# Patient Record
Sex: FEMALE | Race: WHITE | NOT HISPANIC OR LATINO | Employment: UNEMPLOYED | URBAN - METROPOLITAN AREA
[De-identification: names, ages, dates, MRNs, and addresses within clinical notes are randomized per-mention and may not be internally consistent; named-entity substitution may affect disease eponyms.]

---

## 2018-07-24 ENCOUNTER — APPOINTMENT (OUTPATIENT)
Dept: LAB | Facility: MEDICAL CENTER | Age: 63
End: 2018-07-24
Payer: COMMERCIAL

## 2018-07-24 ENCOUNTER — TRANSCRIBE ORDERS (OUTPATIENT)
Dept: LAB | Facility: MEDICAL CENTER | Age: 63
End: 2018-07-24

## 2018-07-24 DIAGNOSIS — E08.9 DIABETES MELLITUS DUE TO UNDERLYING CONDITION WITHOUT COMPLICATION, WITHOUT LONG-TERM CURRENT USE OF INSULIN (HCC): ICD-10-CM

## 2018-07-24 DIAGNOSIS — Z00.00 WELLNESS EXAMINATION: ICD-10-CM

## 2018-07-24 DIAGNOSIS — E08.9 DIABETES MELLITUS DUE TO UNDERLYING CONDITION WITHOUT COMPLICATION, WITHOUT LONG-TERM CURRENT USE OF INSULIN (HCC): Primary | ICD-10-CM

## 2018-07-24 LAB
CHOLEST SERPL-MCNC: 213 MG/DL (ref 50–200)
GLUCOSE P FAST SERPL-MCNC: 169 MG/DL (ref 65–99)
HDLC SERPL-MCNC: 44 MG/DL (ref 40–60)
LDLC SERPL CALC-MCNC: 141 MG/DL (ref 0–100)
NONHDLC SERPL-MCNC: 169 MG/DL
TRIGL SERPL-MCNC: 140 MG/DL

## 2018-07-24 PROCEDURE — 36415 COLL VENOUS BLD VENIPUNCTURE: CPT

## 2018-07-24 PROCEDURE — 80061 LIPID PANEL: CPT

## 2021-06-02 ENCOUNTER — TRANSCRIBE ORDERS (OUTPATIENT)
Dept: LAB | Facility: MEDICAL CENTER | Age: 66
End: 2021-06-02

## 2021-06-02 ENCOUNTER — APPOINTMENT (OUTPATIENT)
Dept: LAB | Facility: MEDICAL CENTER | Age: 66
End: 2021-06-02
Payer: COMMERCIAL

## 2021-06-02 DIAGNOSIS — E55.9 VITAMIN D DEFICIENCY: ICD-10-CM

## 2021-06-02 DIAGNOSIS — E11.69 TYPE 2 DIABETES MELLITUS WITH OTHER SPECIFIED COMPLICATION, WITH LONG-TERM CURRENT USE OF INSULIN (HCC): ICD-10-CM

## 2021-06-02 DIAGNOSIS — Z79.4 TYPE 2 DIABETES MELLITUS WITH OTHER SPECIFIED COMPLICATION, WITH LONG-TERM CURRENT USE OF INSULIN (HCC): ICD-10-CM

## 2021-06-02 DIAGNOSIS — I10 HYPERTENSION, UNSPECIFIED TYPE: ICD-10-CM

## 2021-06-02 DIAGNOSIS — E55.9 VITAMIN D DEFICIENCY: Primary | ICD-10-CM

## 2021-06-02 LAB
25(OH)D3 SERPL-MCNC: 9.4 NG/ML (ref 30–100)
ALBUMIN SERPL BCP-MCNC: 4.3 G/DL (ref 3.5–5)
ALP SERPL-CCNC: 107 U/L (ref 46–116)
ALT SERPL W P-5'-P-CCNC: 27 U/L (ref 12–78)
ANION GAP SERPL CALCULATED.3IONS-SCNC: 3 MMOL/L (ref 4–13)
AST SERPL W P-5'-P-CCNC: 16 U/L (ref 5–45)
BILIRUB SERPL-MCNC: 0.51 MG/DL (ref 0.2–1)
BUN SERPL-MCNC: 17 MG/DL (ref 5–25)
CALCIUM SERPL-MCNC: 10.7 MG/DL (ref 8.3–10.1)
CHLORIDE SERPL-SCNC: 103 MMOL/L (ref 100–108)
CHOLEST SERPL-MCNC: 241 MG/DL (ref 50–200)
CO2 SERPL-SCNC: 30 MMOL/L (ref 21–32)
CREAT SERPL-MCNC: 0.74 MG/DL (ref 0.6–1.3)
CREAT UR-MCNC: 63.2 MG/DL
ERYTHROCYTE [DISTWIDTH] IN BLOOD BY AUTOMATED COUNT: 12.8 % (ref 11.6–15.1)
EST. AVERAGE GLUCOSE BLD GHB EST-MCNC: 220 MG/DL
GFR SERPL CREATININE-BSD FRML MDRD: 86 ML/MIN/1.73SQ M
GLUCOSE P FAST SERPL-MCNC: 232 MG/DL (ref 65–99)
HBA1C MFR BLD: 9.3 %
HCT VFR BLD AUTO: 43.4 % (ref 34.8–46.1)
HDLC SERPL-MCNC: 44 MG/DL
HGB BLD-MCNC: 14.5 G/DL (ref 11.5–15.4)
LDLC SERPL CALC-MCNC: 156 MG/DL (ref 0–100)
MCH RBC QN AUTO: 30.3 PG (ref 26.8–34.3)
MCHC RBC AUTO-ENTMCNC: 33.4 G/DL (ref 31.4–37.4)
MCV RBC AUTO: 91 FL (ref 82–98)
MICROALBUMIN UR-MCNC: 12 MG/L (ref 0–20)
MICROALBUMIN/CREAT 24H UR: 19 MG/G CREATININE (ref 0–30)
NONHDLC SERPL-MCNC: 197 MG/DL
PLATELET # BLD AUTO: 294 THOUSANDS/UL (ref 149–390)
PMV BLD AUTO: 11.1 FL (ref 8.9–12.7)
POTASSIUM SERPL-SCNC: 4.5 MMOL/L (ref 3.5–5.3)
PROT SERPL-MCNC: 8.2 G/DL (ref 6.4–8.2)
RBC # BLD AUTO: 4.79 MILLION/UL (ref 3.81–5.12)
SODIUM SERPL-SCNC: 136 MMOL/L (ref 136–145)
T4 FREE SERPL-MCNC: 0.78 NG/DL (ref 0.76–1.46)
TRIGL SERPL-MCNC: 204 MG/DL
TSH SERPL DL<=0.05 MIU/L-ACNC: 1.58 UIU/ML (ref 0.36–3.74)
WBC # BLD AUTO: 8.48 THOUSAND/UL (ref 4.31–10.16)

## 2021-06-02 PROCEDURE — 84439 ASSAY OF FREE THYROXINE: CPT

## 2021-06-02 PROCEDURE — 82306 VITAMIN D 25 HYDROXY: CPT

## 2021-06-02 PROCEDURE — 83036 HEMOGLOBIN GLYCOSYLATED A1C: CPT

## 2021-06-02 PROCEDURE — 80061 LIPID PANEL: CPT

## 2021-06-02 PROCEDURE — 85027 COMPLETE CBC AUTOMATED: CPT

## 2021-06-02 PROCEDURE — 36415 COLL VENOUS BLD VENIPUNCTURE: CPT

## 2021-06-02 PROCEDURE — 80053 COMPREHEN METABOLIC PANEL: CPT

## 2021-06-02 PROCEDURE — 82043 UR ALBUMIN QUANTITATIVE: CPT

## 2021-06-02 PROCEDURE — 84443 ASSAY THYROID STIM HORMONE: CPT

## 2021-06-02 PROCEDURE — 82570 ASSAY OF URINE CREATININE: CPT

## 2022-05-25 ENCOUNTER — APPOINTMENT (OUTPATIENT)
Dept: LAB | Facility: MEDICAL CENTER | Age: 67
End: 2022-05-25
Payer: COMMERCIAL

## 2022-05-25 DIAGNOSIS — N39.0 URINARY TRACT INFECTION WITHOUT HEMATURIA, SITE UNSPECIFIED: ICD-10-CM

## 2022-05-25 PROCEDURE — 87077 CULTURE AEROBIC IDENTIFY: CPT

## 2022-05-25 PROCEDURE — 87086 URINE CULTURE/COLONY COUNT: CPT

## 2022-05-25 PROCEDURE — 87186 SC STD MICRODIL/AGAR DIL: CPT

## 2022-05-28 LAB — BACTERIA UR CULT: ABNORMAL

## 2023-06-05 ENCOUNTER — HOSPITAL ENCOUNTER (INPATIENT)
Facility: HOSPITAL | Age: 68
LOS: 3 days | Discharge: HOME | DRG: 387 | End: 2023-06-08
Attending: FAMILY MEDICINE | Admitting: INTERNAL MEDICINE
Payer: COMMERCIAL

## 2023-06-05 ENCOUNTER — APPOINTMENT (EMERGENCY)
Dept: CT IMAGING | Facility: HOSPITAL | Age: 68
DRG: 387 | End: 2023-06-05
Payer: COMMERCIAL

## 2023-06-05 DIAGNOSIS — K51.00 PANCOLITIS (HCC): ICD-10-CM

## 2023-06-05 DIAGNOSIS — E11.65 TYPE 2 DIABETES MELLITUS WITH HYPERGLYCEMIA, WITHOUT LONG-TERM CURRENT USE OF INSULIN (HCC): ICD-10-CM

## 2023-06-05 DIAGNOSIS — K51.90 ULCERATIVE COLITIS (HCC): ICD-10-CM

## 2023-06-05 DIAGNOSIS — K52.9 COLITIS: ICD-10-CM

## 2023-06-05 DIAGNOSIS — R10.9 ABDOMINAL PAIN: Primary | ICD-10-CM

## 2023-06-05 DIAGNOSIS — I95.9 HYPOTENSION: ICD-10-CM

## 2023-06-05 DIAGNOSIS — R79.82 ELEVATED C-REACTIVE PROTEIN (CRP): ICD-10-CM

## 2023-06-05 PROBLEM — K82.8 BILIARY DYSKINESIA: Status: ACTIVE | Noted: 2023-06-05

## 2023-06-05 PROBLEM — E11.9 DMII (DIABETES MELLITUS, TYPE 2) (HCC): Status: ACTIVE | Noted: 2023-06-05

## 2023-06-05 LAB
ABO GROUP BLD: NORMAL
ABO GROUP BLD: NORMAL
ALBUMIN SERPL BCP-MCNC: 3.5 G/DL (ref 3.5–5)
ALP SERPL-CCNC: 56 U/L (ref 34–104)
ALT SERPL W P-5'-P-CCNC: 9 U/L (ref 7–52)
ANION GAP SERPL CALCULATED.3IONS-SCNC: 11 MMOL/L (ref 4–13)
APTT PPP: 29 SECONDS (ref 23–37)
AST SERPL W P-5'-P-CCNC: 13 U/L (ref 13–39)
BASOPHILS # BLD AUTO: 0.04 THOUSANDS/ÂΜL (ref 0–0.1)
BASOPHILS NFR BLD AUTO: 0 % (ref 0–1)
BILIRUB SERPL-MCNC: 0.69 MG/DL (ref 0.2–1)
BILIRUB UR QL STRIP: NEGATIVE
BLD GP AB SCN SERPL QL: NEGATIVE
BUN SERPL-MCNC: 10 MG/DL (ref 5–25)
CALCIUM SERPL-MCNC: 9.8 MG/DL (ref 8.4–10.2)
CHLORIDE SERPL-SCNC: 96 MMOL/L (ref 96–108)
CLARITY UR: CLEAR
CO2 SERPL-SCNC: 28 MMOL/L (ref 21–32)
COLOR UR: YELLOW
CREAT SERPL-MCNC: 0.79 MG/DL (ref 0.6–1.3)
CRP SERPL QL: 192.7 MG/L
EOSINOPHIL # BLD AUTO: 0.03 THOUSAND/ÂΜL (ref 0–0.61)
EOSINOPHIL NFR BLD AUTO: 0 % (ref 0–6)
ERYTHROCYTE [DISTWIDTH] IN BLOOD BY AUTOMATED COUNT: 12.8 % (ref 11.6–15.1)
FLUAV RNA RESP QL NAA+PROBE: NEGATIVE
FLUBV RNA RESP QL NAA+PROBE: NEGATIVE
GFR SERPL CREATININE-BSD FRML MDRD: 78 ML/MIN/1.73SQ M
GLUCOSE SERPL-MCNC: 118 MG/DL (ref 65–140)
GLUCOSE SERPL-MCNC: 144 MG/DL (ref 65–140)
GLUCOSE SERPL-MCNC: 153 MG/DL (ref 65–140)
GLUCOSE UR STRIP-MCNC: NEGATIVE MG/DL
HCT VFR BLD AUTO: 34.5 % (ref 34.8–46.1)
HGB BLD-MCNC: 11.4 G/DL (ref 11.5–15.4)
HGB UR QL STRIP.AUTO: NEGATIVE
IMM GRANULOCYTES # BLD AUTO: 0.07 THOUSAND/UL (ref 0–0.2)
IMM GRANULOCYTES NFR BLD AUTO: 1 % (ref 0–2)
INR PPP: 1.18 (ref 0.84–1.19)
KETONES UR STRIP-MCNC: ABNORMAL MG/DL
LACTATE SERPL-SCNC: 1.1 MMOL/L (ref 0.5–2)
LEUKOCYTE ESTERASE UR QL STRIP: NEGATIVE
LIPASE SERPL-CCNC: 15 U/L (ref 11–82)
LYMPHOCYTES # BLD AUTO: 1.38 THOUSANDS/ÂΜL (ref 0.6–4.47)
LYMPHOCYTES NFR BLD AUTO: 13 % (ref 14–44)
MCH RBC QN AUTO: 29.3 PG (ref 26.8–34.3)
MCHC RBC AUTO-ENTMCNC: 33 G/DL (ref 31.4–37.4)
MCV RBC AUTO: 89 FL (ref 82–98)
MONOCYTES # BLD AUTO: 1.44 THOUSAND/ÂΜL (ref 0.17–1.22)
MONOCYTES NFR BLD AUTO: 14 % (ref 4–12)
NEUTROPHILS # BLD AUTO: 7.74 THOUSANDS/ÂΜL (ref 1.85–7.62)
NEUTS SEG NFR BLD AUTO: 72 % (ref 43–75)
NITRITE UR QL STRIP: NEGATIVE
NRBC BLD AUTO-RTO: 0 /100 WBCS
PH UR STRIP.AUTO: 5.5 [PH]
PLATELET # BLD AUTO: 573 THOUSANDS/UL (ref 149–390)
PMV BLD AUTO: 9.5 FL (ref 8.9–12.7)
POTASSIUM SERPL-SCNC: 3.5 MMOL/L (ref 3.5–5.3)
PROCALCITONIN SERPL-MCNC: 0.32 NG/ML
PROT SERPL-MCNC: 7 G/DL (ref 6.4–8.4)
PROT UR STRIP-MCNC: NEGATIVE MG/DL
PROTHROMBIN TIME: 15 SECONDS (ref 11.6–14.5)
RBC # BLD AUTO: 3.89 MILLION/UL (ref 3.81–5.12)
RH BLD: POSITIVE
RH BLD: POSITIVE
RSV RNA RESP QL NAA+PROBE: NEGATIVE
SARS-COV-2 RNA RESP QL NAA+PROBE: NEGATIVE
SODIUM SERPL-SCNC: 135 MMOL/L (ref 135–147)
SP GR UR STRIP.AUTO: <=1.005
SPECIMEN EXPIRATION DATE: NORMAL
UROBILINOGEN UR QL STRIP.AUTO: 0.2 E.U./DL
WBC # BLD AUTO: 10.7 THOUSAND/UL (ref 4.31–10.16)

## 2023-06-05 PROCEDURE — 96361 HYDRATE IV INFUSION ADD-ON: CPT

## 2023-06-05 PROCEDURE — 87040 BLOOD CULTURE FOR BACTERIA: CPT | Performed by: FAMILY MEDICINE

## 2023-06-05 PROCEDURE — 87493 C DIFF AMPLIFIED PROBE: CPT | Performed by: INTERNAL MEDICINE

## 2023-06-05 PROCEDURE — G1004 CDSM NDSC: HCPCS

## 2023-06-05 PROCEDURE — 85730 THROMBOPLASTIN TIME PARTIAL: CPT | Performed by: FAMILY MEDICINE

## 2023-06-05 PROCEDURE — 99285 EMERGENCY DEPT VISIT HI MDM: CPT

## 2023-06-05 PROCEDURE — 71275 CT ANGIOGRAPHY CHEST: CPT

## 2023-06-05 PROCEDURE — 99223 1ST HOSP IP/OBS HIGH 75: CPT | Performed by: INTERNAL MEDICINE

## 2023-06-05 PROCEDURE — 93005 ELECTROCARDIOGRAM TRACING: CPT

## 2023-06-05 PROCEDURE — 86901 BLOOD TYPING SEROLOGIC RH(D): CPT | Performed by: FAMILY MEDICINE

## 2023-06-05 PROCEDURE — 86850 RBC ANTIBODY SCREEN: CPT | Performed by: FAMILY MEDICINE

## 2023-06-05 PROCEDURE — 83690 ASSAY OF LIPASE: CPT | Performed by: FAMILY MEDICINE

## 2023-06-05 PROCEDURE — 87505 NFCT AGENT DETECTION GI: CPT | Performed by: INTERNAL MEDICINE

## 2023-06-05 PROCEDURE — 86140 C-REACTIVE PROTEIN: CPT | Performed by: INTERNAL MEDICINE

## 2023-06-05 PROCEDURE — 86900 BLOOD TYPING SEROLOGIC ABO: CPT | Performed by: FAMILY MEDICINE

## 2023-06-05 PROCEDURE — 96365 THER/PROPH/DIAG IV INF INIT: CPT

## 2023-06-05 PROCEDURE — 83605 ASSAY OF LACTIC ACID: CPT | Performed by: FAMILY MEDICINE

## 2023-06-05 PROCEDURE — 74174 CTA ABD&PLVS W/CONTRAST: CPT

## 2023-06-05 PROCEDURE — 84145 PROCALCITONIN (PCT): CPT | Performed by: FAMILY MEDICINE

## 2023-06-05 PROCEDURE — 82948 REAGENT STRIP/BLOOD GLUCOSE: CPT

## 2023-06-05 PROCEDURE — 85610 PROTHROMBIN TIME: CPT | Performed by: FAMILY MEDICINE

## 2023-06-05 PROCEDURE — 80053 COMPREHEN METABOLIC PANEL: CPT | Performed by: FAMILY MEDICINE

## 2023-06-05 PROCEDURE — 85025 COMPLETE CBC W/AUTO DIFF WBC: CPT | Performed by: FAMILY MEDICINE

## 2023-06-05 PROCEDURE — 81003 URINALYSIS AUTO W/O SCOPE: CPT | Performed by: FAMILY MEDICINE

## 2023-06-05 PROCEDURE — 36415 COLL VENOUS BLD VENIPUNCTURE: CPT | Performed by: FAMILY MEDICINE

## 2023-06-05 PROCEDURE — 0241U HB NFCT DS VIR RESP RNA 4 TRGT: CPT | Performed by: FAMILY MEDICINE

## 2023-06-05 RX ORDER — CEFEPIME HYDROCHLORIDE 2 G/50ML
2000 INJECTION, SOLUTION INTRAVENOUS ONCE
Status: COMPLETED | OUTPATIENT
Start: 2023-06-05 | End: 2023-06-05

## 2023-06-05 RX ORDER — SODIUM CHLORIDE 9 MG/ML
100 INJECTION, SOLUTION INTRAVENOUS CONTINUOUS
Status: DISCONTINUED | OUTPATIENT
Start: 2023-06-05 | End: 2023-06-06

## 2023-06-05 RX ORDER — METRONIDAZOLE 500 MG/1
500 TABLET ORAL ONCE
Status: COMPLETED | OUTPATIENT
Start: 2023-06-05 | End: 2023-06-05

## 2023-06-05 RX ORDER — METRONIDAZOLE 500 MG/100ML
500 INJECTION, SOLUTION INTRAVENOUS EVERY 8 HOURS
Status: DISCONTINUED | OUTPATIENT
Start: 2023-06-05 | End: 2023-06-05

## 2023-06-05 RX ORDER — ACETAMINOPHEN 325 MG/1
650 TABLET ORAL EVERY 6 HOURS PRN
Status: DISCONTINUED | OUTPATIENT
Start: 2023-06-05 | End: 2023-06-08 | Stop reason: HOSPADM

## 2023-06-05 RX ORDER — INSULIN LISPRO 100 [IU]/ML
1-5 INJECTION, SOLUTION INTRAVENOUS; SUBCUTANEOUS
Status: DISCONTINUED | OUTPATIENT
Start: 2023-06-05 | End: 2023-06-08 | Stop reason: HOSPADM

## 2023-06-05 RX ORDER — ENOXAPARIN SODIUM 100 MG/ML
40 INJECTION SUBCUTANEOUS DAILY
Status: DISCONTINUED | OUTPATIENT
Start: 2023-06-05 | End: 2023-06-08 | Stop reason: HOSPADM

## 2023-06-05 RX ORDER — ONDANSETRON 2 MG/ML
4 INJECTION INTRAMUSCULAR; INTRAVENOUS ONCE
Status: DISCONTINUED | OUTPATIENT
Start: 2023-06-05 | End: 2023-06-08 | Stop reason: HOSPADM

## 2023-06-05 RX ORDER — ONDANSETRON 2 MG/ML
4 INJECTION INTRAMUSCULAR; INTRAVENOUS EVERY 6 HOURS PRN
Status: DISCONTINUED | OUTPATIENT
Start: 2023-06-05 | End: 2023-06-08 | Stop reason: HOSPADM

## 2023-06-05 RX ORDER — CEFTRIAXONE 1 G/50ML
1000 INJECTION, SOLUTION INTRAVENOUS EVERY 24 HOURS
Status: DISCONTINUED | OUTPATIENT
Start: 2023-06-06 | End: 2023-06-05

## 2023-06-05 RX ADMIN — CEFEPIME HYDROCHLORIDE 2000 MG: 2 INJECTION, SOLUTION INTRAVENOUS at 11:32

## 2023-06-05 RX ADMIN — IOHEXOL 100 ML: 350 INJECTION, SOLUTION INTRAVENOUS at 11:21

## 2023-06-05 RX ADMIN — SODIUM CHLORIDE 1000 ML: 0.9 INJECTION, SOLUTION INTRAVENOUS at 12:39

## 2023-06-05 RX ADMIN — METRONIDAZOLE 500 MG: 500 TABLET ORAL at 13:38

## 2023-06-05 RX ADMIN — SODIUM CHLORIDE 1000 ML: 0.9 INJECTION, SOLUTION INTRAVENOUS at 11:14

## 2023-06-05 RX ADMIN — SODIUM CHLORIDE 100 ML/HR: 9 INJECTION, SOLUTION INTRAVENOUS at 14:02

## 2023-06-05 NOTE — H&P
"[address]  H&P  Name: Vadim Ridley 78 y o  female I MRN: [id number]  Unit/Bed#: ED 13 I Date of Admission: 6/5/2023   Date of Service: 6/5/2023 I Hospital Day: 0      Assessment/Plan   * Colitis  Assessment & Plan  · Presented for 2-week history of abdominal discomfort, bloating, N/V, and intermittent diarrhea  · CTA C/A/P (6/5): Pancolitis, presumably due to infection versus inflammatory bowel disease  Stable small liver hemangiomas  · Check ESR/CRP  · Check stool cultures  · Begin Rocephin and Flagyl  · GI consultation requested    Hypotension  Assessment & Plan  · One episode of hypotension on arrival will no further episodes  · CTA dissection protocol was performed and showed no acute aortic syndrome  · Continue IVF resuscitation     History of biliary dyskinesia  Assessment & Plan  · Noted in 2016  · No LFT elevation or GB disease noted on imaging as above    DMII (diabetes mellitus, type 2) (HCC)  Assessment & Plan  Lab Results   Component Value Date    HGBA1C 9 3 (H) 06/02/2021       No results for input(s): \"POCGLU\" in the last 72 hours  Blood Sugar Average: Last 72 hrs:     · Update Hgb a1c; maintained on Metformin at home  · Begin corrective sliding scale insulin, accuchecks, and hypoglycemic protocol  · Currently on clear liquid diet       VTE Pharmacologic Prophylaxis: VTE Score: 4 Moderate Risk (Score 3-4) - Pharmacological DVT Prophylaxis Ordered: enoxaparin (Lovenox)  Code Status: Level 1 - Full Code   Discussion with family: Updated  () at bedside  Anticipated Length of Stay: Patient will be admitted on an inpatient basis with an anticipated length of stay of greater than 2 midnights secondary to colitis       Total Time Spent on Date of Encounter in care of patient: 75 minutes This time was spent on one or more of the following: performing physical exam; counseling and coordination of care; obtaining or reviewing history; documenting in the " medical record; reviewing/ordering tests, medications or procedures; communicating with other healthcare professionals and discussing with patient's family/caregivers  Chief Complaint: Abdominal Pain     History of Present Illness:  Adela Mcginnis is a 78 y o  female with a PMH of DMII and biliary dyskinesia who presents with the complaint of abdominal pain  She reports 2-week history of diffuse abdominal discomfort, worse in the right upper quadrant, with associated nausea, vomiting, bloating, intermittent diarrhea  She notes a history of respiratory illness approximately 1 month ago that she subsequently recovered from but unfortunately has since developed the above GI symptoms  Patient notes remote history of reduced biliary EF on HIDA scan and was told that her gallbladder may potentially need amount in the future  This was an 2016 and since that time the patient has had follow-up right upper quadrant ultrasound and CT abdomen and pelvis showed no significant gallbladder disease but she has not had repeat HIDA  She became concerned that her respiratory illness triggered 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 and therefore prompted presentation  She denies any fevers or chills but has reported poor oral intake and weight loss because of this over the last 2 weeks  Upon arrival to the emergency department patient was initially hypotensive and in combination with her abdominal symptoms there was concern for dissection  CTA chest/abdomen/pelvis with dissection protocol was performed negative for acute aortic syndrome  Imaging however did and straight pancolitis concerning for either infectious or inflammatory bowel disease  The patient was started on IV fluid resuscitation and improvement in her blood pressure and IV antibiotics and ultimately admitted to the medical floor for further observation and management  Review of Systems:  Review of Systems   Constitutional: Negative for chills and fever     HENT: Negative for ear pain, sinus pressure and sore throat  Eyes: Negative for pain and visual disturbance  Respiratory: Negative for cough, shortness of breath and wheezing  Cardiovascular: Negative for chest pain and palpitations  Gastrointestinal: Positive for abdominal pain, diarrhea, nausea and vomiting  Negative for constipation  Genitourinary: Negative for dysuria and hematuria  Musculoskeletal: Negative for arthralgias and back pain  Skin: Negative for color change and rash  Neurological: Positive for dizziness  Negative for seizures, syncope and weakness  Psychiatric/Behavioral: Negative for agitation, confusion and hallucinations  All other systems reviewed and are negative  Past Medical and Surgical History:   History reviewed  No pertinent past medical history  Past Surgical History:   Procedure Laterality Date   • HYSTERECTOMY      ~1990       Meds/Allergies:  Prior to Admission medications    Medication Sig Start Date End Date Taking? Authorizing Provider   metFORMIN (GLUCOPHAGE) 500 mg tablet Take 500 mg by mouth 2 (two) times a day with meals PT UNSURE OF DOSE   Yes Historical Provider, MD     I have reviewed home medications with patient personally  Allergies: No Known Allergies    Social History:  Marital Status: /Civil Union   Patient Pre-hospital Living Situation: Home, With spouse  Patient Pre-hospital Level of Mobility: walks  Patient Pre-hospital Diet Restrictions: Diabetic    Substance Use History:   Social History     Substance and Sexual Activity   Alcohol Use Never     Social History     Tobacco Use   Smoking Status Never   Smokeless Tobacco Never     Social History     Substance and Sexual Activity   Drug Use Never       Family History:  History reviewed  No pertinent family history      Physical Exam:     Vitals:   Blood Pressure: 143/67 (06/05/23 1400)  Pulse: 92 (06/05/23 1400)  Temperature: (!) 97 2 °F (36 2 °C) (06/05/23 1046)  Temp Source: Tympanic "(06/05/23 1046)  Respirations: 18 (06/05/23 1046)  Height: 5' 7\" (170 2 cm) (06/05/23 1046)  Weight - Scale: 72 6 kg (160 lb) (06/05/23 1046)  SpO2: 95 % (06/05/23 1230)    Physical Exam  Vitals and nursing note reviewed  Constitutional:       General: She is not in acute distress  Appearance: Normal appearance  She is well-developed  HENT:      Head: Normocephalic and atraumatic  Mouth/Throat:      Mouth: Mucous membranes are moist       Pharynx: Oropharynx is clear  Eyes:      Extraocular Movements: Extraocular movements intact  Conjunctiva/sclera: Conjunctivae normal    Cardiovascular:      Rate and Rhythm: Normal rate and regular rhythm  Pulses: Normal pulses  Heart sounds: Murmur heard  Pulmonary:      Effort: Pulmonary effort is normal  No respiratory distress  Breath sounds: Normal breath sounds  No wheezing  Abdominal:      General: Bowel sounds are normal  There is no distension  Palpations: Abdomen is soft  Tenderness: There is no abdominal tenderness  There is no guarding or rebound  Musculoskeletal:         General: No swelling  Normal range of motion  Cervical back: Normal range of motion and neck supple  Skin:     General: Skin is warm and dry  Capillary Refill: Capillary refill takes less than 2 seconds  Neurological:      General: No focal deficit present  Mental Status: She is alert and oriented to person, place, and time  Mental status is at baseline     Psychiatric:         Mood and Affect: Mood normal          Behavior: Behavior normal          Judgment: Judgment normal           Additional Data:     Lab Results:  Results from last 7 days   Lab Units 06/05/23  1102   EOS PCT % 0   HEMATOCRIT % 34 5*   HEMOGLOBIN g/dL 11 4*   LYMPHS PCT % 13*   MONOS PCT % 14*   NEUTROS PCT % 72   PLATELETS Thousands/uL 573*   WBC Thousand/uL 10 70*     Results from last 7 days   Lab Units 06/05/23  1102   ANION GAP mmol/L 11   ALBUMIN g/dL 3 5 " ALK PHOS U/L 56   ALT U/L 9   AST U/L 13   BUN mg/dL 10   CALCIUM mg/dL 9 8   CHLORIDE mmol/L 96   CO2 mmol/L 28   CREATININE mg/dL 0 79   GLUCOSE RANDOM mg/dL 144*   POTASSIUM mmol/L 3 5   SODIUM mmol/L 135   TOTAL BILIRUBIN mg/dL 0 69     Results from last 7 days   Lab Units 06/05/23  1102   INR  1 18     Results from last 7 days   Lab Units 06/05/23  1541   POC GLUCOSE mg/dl 118         Results from last 7 days   Lab Units 06/05/23  1102   LACTIC ACID mmol/L 1 1   PROCALCITONIN ng/ml 0 32*       Lines/Drains:  Invasive Devices     Peripheral Intravenous Line  Duration           Peripheral IV 06/05/23 Right Antecubital <1 day                    Imaging: Reviewed radiology reports from this admission including: chest CT scan and abdominal/pelvic CT  CTA dissection protocol chest/abdomen/pelvis   Final Result by Vickie Ornelas MD (06/05 1306)      No acute aortic syndrome  Pancolitis, presumably due to infection versus inflammatory bowel disease  Stable small liver hemangiomas  Workstation performed: [id number]             EKG and Other Studies Reviewed on Admission:   · EKG: No EKG obtained  ** Please Note: This note has been constructed using a voice recognition system   **

## 2023-06-05 NOTE — CASE MANAGEMENT
Case Management Assessment & Discharge Planning Note    Patient name Bibi Chen  Location ED 15/ED 15 MRN [id number]  : 1955 Date 2023       Current Admission Date: 2023  Current Admission Diagnosis:Colitis   Patient Active Problem List    Diagnosis Date Noted   • Colitis 2023   • Hypotension 2023   • History of biliary dyskinesia 2023   • DMII (diabetes mellitus, type 2) ([facility] [address] ) 2023      LOS (days): 0  Geometric Mean LOS (GMLOS) (days):   Days to GMLOS:     OBJECTIVE:       Current admission status: Inpatient    [address]    [address]  [place] [address] [place] [address]  Phone: [phone] Fax: [phone]    Primary Care Provider: Cala Pallas, MD    Primary Insurance: [address] [place] [address]  Secondary Insurance:     ASSESSMENT:  Rosita Albrecht Proxies    There are no active Health Care Proxies on file  Readmission Root Cause  30 Day Readmission: No    Patient Information  Admitted from[de-identified] Home  Mental Status: Alert  During Assessment patient was accompanied by: Spouse  Assessment information provided by[de-identified] Patient, Spouse  Primary Caregiver: Self  Support Systems: [facility] of Residence: [address] [place] [address] Avenue do you live in?: [facility] [address]:    Discharge planning discussed with[de-identified] Patient and spouse  Freedom of Choice: Yes     CM contacted family/caregiver?: Yes  Were Treatment Team discharge recommendations reviewed with patient/caregiver?: Yes  Did patient/caregiver verbalize understanding of patient care needs?: Yes  Were patient/caregiver advised of the risks associated with not following Treatment Team discharge recommendations?: Yes    Contacts  Patient Contacts: Luke Perry (Spouse)   [phone] (Home Phone)  Relationship to Patient[de-identified] Family  Contact Method: In Person  Reason/Outcome: Emergency Contact     Additional Comments: Met with patient and spouse at bedside  Patient refuses to answer any CM questions  Did review demographics  Patient states we are private people and I don't feel comfortable or feel it is necessary to answer CM questions  Explained CM department will follow thru discharge for any discharge needs

## 2023-06-05 NOTE — ASSESSMENT & PLAN NOTE
"Lab Results   Component Value Date    HGBA1C 9 3 (H) 06/02/2021       No results for input(s): \"POCGLU\" in the last 72 hours      Blood Sugar Average: Last 72 hrs:     · Update Hgb a1c; maintained on Metformin at home  · Begin corrective sliding scale insulin, accuchecks, and hypoglycemic protocol  · Currently on clear liquid diet  "

## 2023-06-05 NOTE — CONSULTS
Consultation - [facility]  Orlando Low 78 y o  female MRN: [id number]  Unit/Bed#: ED 15 Encounter: [id number]        Consults    Reason for Consult / Principal Problem:     GI symptoms      ASSESSMENT AND PLAN:      78year old with uncontrolled DM with HbA1c over 9 presented with acute prolonged GI illness  Likely viral or bacterial in nature  They tell me she was told she had a UTI  I see ketones in the urine but without evidence of UTI  She is currently on IVF  Agree with these  Pt and  state they do not want further antibiotics given risk of it causing another infection which is not unreasonable given she is not toxic appearing however I did tell them we would readdress tomorrow given her uncontrolled DM she is at risk for worse outcomes  CT reviewed  GI will follow  I advanced her diet to soft foods should be dairy free  I discussed plan of care with primary team and ER RN     ______________________________________________________________________    HPI:  78year old female with nausea,vomiting, abdominal pain for 2 weeks  Also with DM with HbA1c of 9  Currently refusing insulin therapy to me  She and her  report she had a URI like symptoms a few weeks ago  No recent travel  Now- without BM since being in the ER  CT with colitis  REVIEW OF SYSTEMS:    CONSTITUTIONAL: Denies any fever, chills, rigors, and weight loss  HEENT: No earache or tinnitus  Denies hearing loss or visual disturbances  CARDIOVASCULAR: No chest pain or palpitations  RESPIRATORY: Denies any cough, hemoptysis, shortness of breath or dyspnea on exertion  GASTROINTESTINAL: As noted in the History of Present Illness  GENITOURINARY: No problems with urination  Denies any hematuria or dysuria  NEUROLOGIC: No dizziness or vertigo, denies headaches  MUSCULOSKELETAL: Denies any muscle or joint pain  SKIN: Denies skin rashes or itching     ENDOCRINE: Denies excessive thirst  Denies "intolerance to heat or cold  PSYCHOSOCIAL: Denies depression or anxiety  Denies any recent memory loss  Historical Information   History reviewed  No pertinent past medical history  Past Surgical History:   Procedure Laterality Date   • HYSTERECTOMY      ~1990     Social History   Social History     Substance and Sexual Activity   Alcohol Use Never     Social History     Substance and Sexual Activity   Drug Use Never     Social History     Tobacco Use   Smoking Status Never   Smokeless Tobacco Never     History reviewed  No pertinent family history  Meds/Allergies     (Not in a hospital admission)    Current Facility-Administered Medications   Medication Dose Route Frequency   • acetaminophen (TYLENOL) tablet 650 mg  650 mg Oral Q6H PRN   • enoxaparin (LOVENOX) subcutaneous injection 40 mg  40 mg Subcutaneous Daily   • insulin lispro (HumaLOG) 100 units/mL subcutaneous injection 1-5 Units  1-5 Units Subcutaneous TID AC   • insulin lispro (HumaLOG) 100 units/mL subcutaneous injection 1-5 Units  1-5 Units Subcutaneous HS   • ondansetron (ZOFRAN) injection 4 mg  4 mg Intravenous Once   • ondansetron (ZOFRAN) injection 4 mg  4 mg Intravenous Q6H PRN   • sodium chloride 0 9 % infusion  100 mL/hr Intravenous Continuous       No Known Allergies        Objective     Blood pressure 122/57, pulse 88, temperature (!) 97 2 °F (36 2 °C), temperature source Tympanic, resp  rate 18, height 5' 7\" (1 702 m), weight 72 6 kg (160 lb), SpO2 95 %  Body mass index is 25 06 kg/m²  No intake or output data in the 24 hours ending 06/05/23 1644      PHYSICAL EXAM:      General Appearance:   Alert, cooperative, no distress   HEENT:   Normocephalic, atraumatic, anicteric      Neck:  Supple, symmetrical, trachea midline   Lungs:   Clear to auscultation bilaterally; no rales, rhonchi or wheezing; respirations unlabored    Heart[de-identified]   Regular rate and rhythm; no murmur, rub, or gallop     Abdomen:   Soft, non-tender, non-distended; " normal bowel sounds; no masses, no organomegaly    Genitalia:   Deferred    Rectal:   Deferred    Extremities:  No cyanosis, clubbing or edema    Pulses:  2+ and symmetric all extremities    Skin:  No jaundice, rashes, or lesions    Lymph nodes:  No palpable cervical lymphadenopathy        Lab Results:   Admission on 06/05/2023   Component Date Value   • WBC 06/05/2023 10 70 (H)    • RBC 06/05/2023 3 89    • Hemoglobin 06/05/2023 11 4 (L)    • Hematocrit 06/05/2023 34 5 (L)    • MCV 06/05/2023 89    • MCH 06/05/2023 29 3    • MCHC 06/05/2023 33 0    • RDW 06/05/2023 12 8    • MPV 06/05/2023 9 5    • Platelets 99/88/7922 573 (H)    • nRBC 06/05/2023 0    • Neutrophils Relative 06/05/2023 72    • Immat GRANS % 06/05/2023 1    • Lymphocytes Relative 06/05/2023 13 (L)    • Monocytes Relative 06/05/2023 14 (H)    • Eosinophils Relative 06/05/2023 0    • Basophils Relative 06/05/2023 0    • Neutrophils Absolute 06/05/2023 7 74 (H)    • Immature Grans Absolute 06/05/2023 0 07    • Lymphocytes Absolute 06/05/2023 1 38    • Monocytes Absolute 06/05/2023 1 44 (H)    • Eosinophils Absolute 06/05/2023 0 03    • Basophils Absolute 06/05/2023 0 04    • Sodium 06/05/2023 135    • Potassium 06/05/2023 3 5    • Chloride 06/05/2023 96    • CO2 06/05/2023 28    • ANION GAP 06/05/2023 11    • BUN 06/05/2023 10    • Creatinine 06/05/2023 0 79    • Glucose 06/05/2023 144 (H)    • Calcium 06/05/2023 9 8    • AST 06/05/2023 13    • ALT 06/05/2023 9    • Alkaline Phosphatase 06/05/2023 56    • Total Protein 06/05/2023 7 0    • Albumin 06/05/2023 3 5    • Total Bilirubin 06/05/2023 0 69    • eGFR 06/05/2023 78    • LACTIC ACID 06/05/2023 1 1    • Procalcitonin 06/05/2023 0 32 (H)    • Protime 06/05/2023 15 0 (H)    • INR 06/05/2023 1 18    • PTT 06/05/2023 29    • Color, UA 06/05/2023 Yellow    • Clarity, UA 06/05/2023 Clear    • Specific Gravity, UA 06/05/2023 <=1 005 (L)    • pH, UA 06/05/2023 5 5    • Leukocytes, UA 06/05/2023 Negative • Nitrite, UA 06/05/2023 Negative    • Protein, UA 06/05/2023 Negative    • Glucose, UA 06/05/2023 Negative    • Ketones, UA 06/05/2023 15 (1+) (A)    • Urobilinogen, UA 06/05/2023 0 2    • Bilirubin, UA 06/05/2023 Negative    • Occult Blood, UA 06/05/2023 Negative    • Lipase 06/05/2023 15    • ABO Grouping 06/05/2023 O    • Rh Factor 06/05/2023 Positive    • Antibody Screen 06/05/2023 Negative    • Specimen Expiration Date 06/05/2023 [id number]    • SARS-CoV-2 06/05/2023 Negative    • INFLUENZA A PCR 06/05/2023 Negative    • INFLUENZA B PCR 06/05/2023 Negative    • RSV PCR 06/05/2023 Negative    • Ventricular Rate 06/05/2023 96    • Atrial Rate 06/05/2023 96    • DE Interval 06/05/2023 174    • QRSD Interval 06/05/2023 128    • QT Interval 06/05/2023 382    • QTC Interval 06/05/2023 482    • P Axis 06/05/2023 55    • QRS Axis 06/05/2023 -9    • T Wave Axis 06/05/2023 111    • ABO Grouping 06/05/2023 O    • Rh Factor 06/05/2023 Positive    • POC Glucose 06/05/2023 118        Imaging Studies: I have personally reviewed pertinent imaging studies

## 2023-06-05 NOTE — ED PROVIDER NOTES
"History  Chief Complaint   Patient presents with   • Abdominal Pain     Pt presents to ER from home for reports of diffuse abd pain x2 weeks  Reports mild nausea and diarrhea, denies vomitting  Decreased PO intake stating \"I feel so bloated that I'm just not hungry  \"     HPI  73-year-old female presented with complaint of abdominal pain ongoing for past 2 weeks  Pain is a diffuse states that had some nausea decreasing p o  intake  Patient states \"I just do not feel good and I feel so bloated \"  Rating her pain 8 out of 10  Patient was found to be hypotensive in the ED was started on IV fluid sepsis labs were obtained  Denies any chest pain or shortness of breath  States pain is a sharp  Prior to Admission Medications   Prescriptions Last Dose Informant Patient Reported? Taking?   metFORMIN (GLUCOPHAGE) 500 mg tablet   Yes Yes   Sig: Take 500 mg by mouth 2 (two) times a day with meals PT UNSURE OF DOSE      Facility-Administered Medications: None       History reviewed  No pertinent past medical history  Past Surgical History:   Procedure Laterality Date   • HYSTERECTOMY      ~1990       History reviewed  No pertinent family history  I have reviewed and agree with the history as documented  E-Cigarette/Vaping     E-Cigarette/Vaping Substances     Social History     Tobacco Use   • Smoking status: Never   • Smokeless tobacco: Never   Substance Use Topics   • Alcohol use: Never   • Drug use: Never       Review of Systems   Constitutional: Negative for chills and fever  HENT: Negative for rhinorrhea and sore throat  Eyes: Negative for visual disturbance  Respiratory: Negative for cough and shortness of breath  Cardiovascular: Negative for chest pain and leg swelling  Gastrointestinal: Positive for abdominal pain, nausea and vomiting  Negative for diarrhea  Genitourinary: Negative for dysuria  Musculoskeletal: Negative for back pain and myalgias  Skin: Negative for rash     Neurological: " Positive for weakness  Negative for dizziness and headaches  Psychiatric/Behavioral: Negative for confusion  All other systems reviewed and are negative  Physical Exam  Physical Exam  Vitals and nursing note reviewed  Constitutional:       Appearance: She is well-developed  HENT:      Head: Normocephalic and atraumatic  Right Ear: External ear normal       Left Ear: External ear normal       Nose: Nose normal       Mouth/Throat:      Mouth: Mucous membranes are moist       Pharynx: No oropharyngeal exudate  Eyes:      General: No scleral icterus  Right eye: No discharge  Left eye: No discharge  Conjunctiva/sclera: Conjunctivae normal       Pupils: Pupils are equal, round, and reactive to light  Cardiovascular:      Rate and Rhythm: Normal rate and regular rhythm  Pulses: Normal pulses  Heart sounds: Normal heart sounds  Pulmonary:      Effort: Pulmonary effort is normal  No respiratory distress  Breath sounds: Normal breath sounds  No wheezing  Abdominal:      General: Bowel sounds are normal       Palpations: Abdomen is soft  Tenderness: There is generalized abdominal tenderness  Musculoskeletal:         General: Normal range of motion  Cervical back: Normal range of motion and neck supple  Lymphadenopathy:      Cervical: No cervical adenopathy  Skin:     General: Skin is warm and dry  Capillary Refill: Capillary refill takes less than 2 seconds  Neurological:      General: No focal deficit present  Mental Status: She is alert and oriented to person, place, and time     Psychiatric:         Mood and Affect: Mood normal          Behavior: Behavior normal          Vital Signs  ED Triage Vitals [06/05/23 1046]   Temperature Pulse Respirations Blood Pressure SpO2   (!) 97 2 °F (36 2 °C) (!) 106 18 (!) 78/54 94 %      Temp Source Heart Rate Source Patient Position - Orthostatic VS BP Location FiO2 (%)   Tympanic Monitor Sitting Left arm --      Pain Score       8           Vitals:    06/05/23 1230 06/05/23 1300 06/05/23 1315 06/05/23 1330   BP: 120/58 141/63 130/62 121/55   Pulse: 96 92 90 89   Patient Position - Orthostatic VS:             Visual Acuity      ED Medications  Medications   ondansetron (ZOFRAN) injection 4 mg (4 mg Intravenous Not Given 6/5/23 1114)   sodium chloride 0 9 % bolus 1,000 mL (0 mL Intravenous Stopped 6/5/23 1238)     Followed by   sodium chloride 0 9 % bolus 1,000 mL (0 mL Intravenous Stopped 6/5/23 1339)   cefepime (MAXIPIME) IVPB (premix in dextrose) 2,000 mg 50 mL (0 mg Intravenous Stopped 6/5/23 1238)   iohexol (OMNIPAQUE) 350 MG/ML injection (SINGLE-DOSE) 100 mL (100 mL Intravenous Given 6/5/23 1121)   metroNIDAZOLE (FLAGYL) tablet 500 mg (500 mg Oral Given 6/5/23 1338)       Diagnostic Studies  Results Reviewed     Procedure Component Value Units Date/Time    UA w Reflex to Microscopic w Reflex to Culture [[id number]]  (Abnormal) Collected: 06/05/23 1256    Lab Status: Final result Specimen: Urine, Clean Catch Updated: 06/05/23 1306     Color, UA Yellow     Clarity, UA Clear     Specific Gravity, UA <=1 005     pH, UA 5 5     Leukocytes, UA Negative     Nitrite, UA Negative     Protein, UA Negative mg/dl      Glucose, UA Negative mg/dl      Ketones, UA 15 (1+) mg/dl      Urobilinogen, UA 0 2 E U /dl      Bilirubin, UA Negative     Occult Blood, UA Negative    FLU/RSV/COVID - if FLU/RSV clinically relevant [[id number]]  (Normal) Collected: 06/05/23 1102    Lab Status: Final result Specimen: Nares from Nose Updated: 06/05/23 1159     SARS-CoV-2 Negative     INFLUENZA A PCR Negative     INFLUENZA B PCR Negative     RSV PCR Negative    Narrative:      FOR PEDIATRIC PATIENTS - copy/paste COVID Guidelines URL to browser: [email or web address]    SARS-CoV-2 assay is a Nucleic Acid Amplification assay intended for the  qualitative detection of nucleic acid from SARS-CoV-2 in nasopharyngeal  swabs  Results are for the presumptive identification of SARS-CoV-2 RNA  Positive results are indicative of infection with SARS-CoV-2, the virus  causing COVID-19, but do not rule out bacterial infection or co-infection  with other viruses  Laboratories within the [facility] and its  territories are required to report all positive results to the appropriate  public health authorities  Negative results do not preclude SARS-CoV-2  infection and should not be used as the sole basis for treatment or other  patient management decisions  Negative results must be combined with  clinical observations, patient history, and epidemiological information  This test has not been FDA cleared or approved  This test has been authorized by FDA under an Emergency Use Authorization  (EUA)  This test is only authorized for the duration of time the  declaration that circumstances exist justifying the authorization of the  emergency use of an in vitro diagnostic tests for detection of SARS-CoV-2  virus and/or diagnosis of COVID-19 infection under section 564(b)(1) of  the Act, 21 U  S C  382CDN-8(R)(7), unless the authorization is terminated  or revoked sooner  The test has been validated but independent review by FDA  and CLIA is pending  Test performed using [employer] GeneXpert: This RT-PCR assay targets N2,  a region unique to SARS-CoV-2  A conserved region in the E-gene was chosen  for pan-Sarbecovirus detection which includes SARS-CoV-2  According to CMS-2020-01-R, this platform meets the definition of high-throughput technology  Lactic acid [[id number]]  (Normal) Collected: 06/05/23 1102    Lab Status: Final result Specimen: Blood from Arm, Right Updated: 06/05/23 1154     LACTIC ACID 1 1 mmol/L     Narrative:      Result may be elevated if tourniquet was used during collection      Lipase [[id number]]  (Normal) Collected: 06/05/23 1102    Lab Status: Final result Specimen: Blood from Arm, Right Updated: 06/05/23 1153     Lipase 15 u/L     Comprehensive metabolic panel [[id number]]  (Abnormal) Collected: 06/05/23 1102    Lab Status: Final result Specimen: Blood from Arm, Right Updated: 06/05/23 1153     Sodium 135 mmol/L      Potassium 3 5 mmol/L      Chloride 96 mmol/L      CO2 28 mmol/L      ANION GAP 11 mmol/L      BUN 10 mg/dL      Creatinine 0 79 mg/dL      Glucose 144 mg/dL      Calcium 9 8 mg/dL      AST 13 U/L      ALT 9 U/L      Alkaline Phosphatase 56 U/L      Total Protein 7 0 g/dL      Albumin 3 5 g/dL      Total Bilirubin 0 69 mg/dL      eGFR 78 ml/min/1 73sq m     Narrative:      [place] guidelines for Chronic Kidney Disease (CKD):   •  Stage 1 with normal or high GFR (GFR > 90 mL/min/1 73 square meters)  •  Stage 2 Mild CKD (GFR = 60-89 mL/min/1 73 square meters)  •  Stage 3A Moderate CKD (GFR = 45-59 mL/min/1 73 square meters)  •  Stage 3B Moderate CKD (GFR = 30-44 mL/min/1 73 square meters)  •  Stage 4 Severe CKD (GFR = 15-29 mL/min/1 73 square meters)  •  Stage 5 End Stage CKD (GFR <15 mL/min/1 73 square meters)  Note: GFR calculation is accurate only with a steady state creatinine    Procalcitonin [[id number]]  (Abnormal) Collected: 06/05/23 1102    Lab Status: Final result Specimen: Blood from Arm, Right Updated: 06/05/23 1148     Procalcitonin 0 32 ng/ml     Protime-INR [[id number]]  (Abnormal) Collected: 06/05/23 1102    Lab Status: Final result Specimen: Blood from Arm, Right Updated: 06/05/23 1134     Protime 15 0 seconds      INR 1 18    APTT [[id number]]  (Normal) Collected: 06/05/23 1102    Lab Status: Final result Specimen: Blood from Arm, Right Updated: 06/05/23 1134     PTT 29 seconds     CBC and differential [[id number]]  (Abnormal) Collected: 06/05/23 1102    Lab Status: Final result Specimen: Blood from Arm, Right Updated: 06/05/23 1117     WBC 10 70 Thousand/uL      RBC 3 89 Million/uL      Hemoglobin 11 4 g/dL      Hematocrit 34 5 %      MCV 89 fL      MCH 29 3 pg      MCHC 33 0 g/dL      RDW 12 8 %      MPV 9 5 fL      Platelets 138 Thousands/uL      nRBC 0 /100 WBCs      Neutrophils Relative 72 %      Immat GRANS % 1 %      Lymphocytes Relative 13 %      Monocytes Relative 14 %      Eosinophils Relative 0 %      Basophils Relative 0 %      Neutrophils Absolute 7 74 Thousands/µL      Immature Grans Absolute 0 07 Thousand/uL      Lymphocytes Absolute 1 38 Thousands/µL      Monocytes Absolute 1 44 Thousand/µL      Eosinophils Absolute 0 03 Thousand/µL      Basophils Absolute 0 04 Thousands/µL     Blood culture #2 [[id number]] Collected: 06/05/23 1102    Lab Status: In process Specimen: Blood from Arm, Right Updated: 06/05/23 1113    Blood culture #1 [[id number]] Collected: 06/05/23 1110    Lab Status: In process Specimen: Blood from Arm, Left Updated: 06/05/23 1113                 CTA dissection protocol chest/abdomen/pelvis   Final Result by Modesta Zendejas MD (06/05 1306)      No acute aortic syndrome  Pancolitis, presumably due to infection versus inflammatory bowel disease  Stable small liver hemangiomas           Workstation performed: [id number]                    Procedures  CriticalCare Time    Date/Time: 6/5/2023 1:47 PM    Performed by: Ninoska Bingham MD  Authorized by: Ninoska Bingham MD    Critical care provider statement:     Critical care time (minutes):  50    Critical care start time:  6/5/2023 11:00 AM    Critical care end time:  6/5/2023 1:47 PM    Critical care time was exclusive of:  Separately billable procedures and treating other patients    Critical care was necessary to treat or prevent imminent or life-threatening deterioration of the following conditions:  Sepsis and dehydration    Critical care was time spent personally by me on the following activities:  Blood draw for specimens, obtaining history from patient or surrogate, development of treatment plan with patient or surrogate, discussions with primary provider, evaluation of patient's response to treatment, examination of patient, review of old charts, re-evaluation of patient's condition, ordering and review of radiographic studies, ordering and review of laboratory studies, interpretation of cardiac output measurements and ordering and performing treatments and interventions    I assumed direction of critical care for this patient from another provider in my specialty: no               ED Course  ED Course as of 06/05/23 1348   Mon Jun 05, 2023   1308 No acute aortic syndrome      Pancolitis, presumably due to infection versus inflammatory bowel disease      Stable small liver hemangiomas  [address] [place] [address]-Hosp-Admit/Call [facility]  [facility] inpatient  1:39 PM  20 days left                               SBIRT 22yo+    Flowsheet Row Most Recent Value   Initial Alcohol Screen: US AUDIT-C     1  How often do you have a drink containing alcohol? 0 Filed at: 06/05/2023 1050   2  How many drinks containing alcohol do you have on a typical day you are drinking? 0 Filed at: 06/05/2023 1050   3b  FEMALE Any Age, or MALE 65+: How often do you have 4 or more drinks on one occassion? 0 Filed at: 06/05/2023 1050   Audit-C Score 0 Filed at: 06/05/2023 1050   ISA: How many times in the past year have you    Used an illegal drug or used a prescription medication for non-medical reasons? Never Filed at: 06/05/2023 1050                    Medical Decision Making  50-year-old female presented to ED with complaint of abdominal pain  History is taken from patient  Patient is found to be hypotensive will obtain labs and a CT abdomen pelvis  Differential diagnoses include AAA/dissection/peritonitis/appendicitis/small bowel obstruction/cholecystitis/malignancy  Patient was found to be hypotensive and started on IV fluid  Blood pressure improved  We will continue to resuscitate  Labs within normal limits  As CT shows a pancolitis patient started on antibiotics  Discussed with patient agrees with the plan  Disposition: Case was discussed with internal medicine team accepted the admission for inpatient  Amount and/or Complexity of Data Reviewed  Labs: ordered  Radiology: ordered  Risk  Prescription drug management  Decision regarding hospitalization  Disposition  Final diagnoses:   Abdominal pain   Pancolitis ([facility] [address] )   Hypotension     Time reflects when diagnosis was documented in both MDM as applicable and the Disposition within this note     Time User Action Codes Description Comment    6/5/2023  1:44 PM Cyntha [place] [R10 9] Abdominal pain     6/5/2023  1:44 PM Presley Escoto Add [K51 00] Pancolitis ([facility] [address] )     6/5/2023  1:45 PM Paola Jeter Add [I95 [id number]] Hypotension       ED Disposition     ED Disposition   Admit    Condition   Stable    Date/Time   Mon Jun 5, 2023  1:44 PM    Comment   Case was discussed with Dr Catherine and the patient's admission status was agreed to be Admission Status: inpatient status to the service of Dr George Lao   Follow-up Information    None         Patient's Medications   Discharge Prescriptions    No medications on file       No discharge procedures on file      PDMP Review     None          ED Provider  Electronically Signed by           Crissy Yip MD  06/05/23 6497

## 2023-06-05 NOTE — ASSESSMENT & PLAN NOTE
· One episode of hypotension on arrival will no further episodes  · CTA dissection protocol was performed and showed no acute aortic syndrome  · Continue IVF resuscitation

## 2023-06-06 PROBLEM — E87.8 ELECTROLYTE ABNORMALITY: Status: ACTIVE | Noted: 2023-06-06

## 2023-06-06 LAB
ALBUMIN SERPL BCP-MCNC: 3 G/DL (ref 3.5–5)
ALP SERPL-CCNC: 48 U/L (ref 34–104)
ALT SERPL W P-5'-P-CCNC: 7 U/L (ref 7–52)
ANION GAP SERPL CALCULATED.3IONS-SCNC: 12 MMOL/L (ref 4–13)
AST SERPL W P-5'-P-CCNC: 11 U/L (ref 13–39)
ATRIAL RATE: 96 BPM
BILIRUB SERPL-MCNC: 0.54 MG/DL (ref 0.2–1)
BUN SERPL-MCNC: 6 MG/DL (ref 5–25)
C DIFF TOX GENS STL QL NAA+PROBE: NEGATIVE
CALCIUM ALBUM COR SERPL-MCNC: 9.1 MG/DL (ref 8.3–10.1)
CALCIUM SERPL-MCNC: 8.3 MG/DL (ref 8.4–10.2)
CAMPYLOBACTER DNA SPEC NAA+PROBE: NORMAL
CHLORIDE SERPL-SCNC: 103 MMOL/L (ref 96–108)
CO2 SERPL-SCNC: 24 MMOL/L (ref 21–32)
CREAT SERPL-MCNC: 0.62 MG/DL (ref 0.6–1.3)
CRP SERPL QL: 145.6 MG/L
ERYTHROCYTE [DISTWIDTH] IN BLOOD BY AUTOMATED COUNT: 12.9 % (ref 11.6–15.1)
ERYTHROCYTE [SEDIMENTATION RATE] IN BLOOD: 61 MM/HOUR (ref 0–29)
EST. AVERAGE GLUCOSE BLD GHB EST-MCNC: 148 MG/DL
GFR SERPL CREATININE-BSD FRML MDRD: 94 ML/MIN/1.73SQ M
GLUCOSE SERPL-MCNC: 134 MG/DL (ref 65–140)
GLUCOSE SERPL-MCNC: 143 MG/DL (ref 65–140)
GLUCOSE SERPL-MCNC: 153 MG/DL (ref 65–140)
GLUCOSE SERPL-MCNC: 157 MG/DL (ref 65–140)
HBA1C MFR BLD: 6.8 %
HCT VFR BLD AUTO: 30.7 % (ref 34.8–46.1)
HGB BLD-MCNC: 9.9 G/DL (ref 11.5–15.4)
MAGNESIUM SERPL-MCNC: 1.1 MG/DL (ref 1.9–2.7)
MCH RBC QN AUTO: 28.8 PG (ref 26.8–34.3)
MCHC RBC AUTO-ENTMCNC: 32.2 G/DL (ref 31.4–37.4)
MCV RBC AUTO: 89 FL (ref 82–98)
P AXIS: 55 DEGREES
PLATELET # BLD AUTO: 482 THOUSANDS/UL (ref 149–390)
PMV BLD AUTO: 9.4 FL (ref 8.9–12.7)
POTASSIUM SERPL-SCNC: 2.7 MMOL/L (ref 3.5–5.3)
POTASSIUM SERPL-SCNC: 3.4 MMOL/L (ref 3.5–5.3)
PR INTERVAL: 174 MS
PROT SERPL-MCNC: 6.2 G/DL (ref 6.4–8.4)
QRS AXIS: -9 DEGREES
QRSD INTERVAL: 128 MS
QT INTERVAL: 382 MS
QTC INTERVAL: 482 MS
RBC # BLD AUTO: 3.44 MILLION/UL (ref 3.81–5.12)
SALMONELLA DNA SPEC QL NAA+PROBE: NORMAL
SHIGA TOXIN STX GENE SPEC NAA+PROBE: NORMAL
SHIGELLA DNA SPEC QL NAA+PROBE: NORMAL
SODIUM SERPL-SCNC: 139 MMOL/L (ref 135–147)
T WAVE AXIS: 111 DEGREES
VENTRICULAR RATE: 96 BPM
WBC # BLD AUTO: 8.07 THOUSAND/UL (ref 4.31–10.16)

## 2023-06-06 PROCEDURE — 85652 RBC SED RATE AUTOMATED: CPT | Performed by: INTERNAL MEDICINE

## 2023-06-06 PROCEDURE — 93010 ELECTROCARDIOGRAM REPORT: CPT | Performed by: INTERNAL MEDICINE

## 2023-06-06 PROCEDURE — 84132 ASSAY OF SERUM POTASSIUM: CPT | Performed by: INTERNAL MEDICINE

## 2023-06-06 PROCEDURE — 85027 COMPLETE CBC AUTOMATED: CPT | Performed by: INTERNAL MEDICINE

## 2023-06-06 PROCEDURE — 80053 COMPREHEN METABOLIC PANEL: CPT | Performed by: INTERNAL MEDICINE

## 2023-06-06 PROCEDURE — 99232 SBSQ HOSP IP/OBS MODERATE 35: CPT | Performed by: PHYSICIAN ASSISTANT

## 2023-06-06 PROCEDURE — 83735 ASSAY OF MAGNESIUM: CPT | Performed by: INTERNAL MEDICINE

## 2023-06-06 PROCEDURE — 83036 HEMOGLOBIN GLYCOSYLATED A1C: CPT | Performed by: INTERNAL MEDICINE

## 2023-06-06 PROCEDURE — 82948 REAGENT STRIP/BLOOD GLUCOSE: CPT

## 2023-06-06 PROCEDURE — 86140 C-REACTIVE PROTEIN: CPT | Performed by: INTERNAL MEDICINE

## 2023-06-06 RX ORDER — POTASSIUM CHLORIDE 20 MEQ/1
20 TABLET, EXTENDED RELEASE ORAL DAILY
Status: DISCONTINUED | OUTPATIENT
Start: 2023-06-06 | End: 2023-06-06

## 2023-06-06 RX ORDER — MAGNESIUM SULFATE HEPTAHYDRATE 40 MG/ML
2 INJECTION, SOLUTION INTRAVENOUS ONCE
Status: COMPLETED | OUTPATIENT
Start: 2023-06-06 | End: 2023-06-06

## 2023-06-06 RX ORDER — POTASSIUM CHLORIDE 14.9 MG/ML
20 INJECTION INTRAVENOUS ONCE
Status: COMPLETED | OUTPATIENT
Start: 2023-06-06 | End: 2023-06-06

## 2023-06-06 RX ORDER — SODIUM CHLORIDE 9 MG/ML
75 INJECTION, SOLUTION INTRAVENOUS CONTINUOUS
Status: DISCONTINUED | OUTPATIENT
Start: 2023-06-06 | End: 2023-06-07

## 2023-06-06 RX ORDER — POTASSIUM CHLORIDE 20 MEQ/1
40 TABLET, EXTENDED RELEASE ORAL
Status: COMPLETED | OUTPATIENT
Start: 2023-06-06 | End: 2023-06-06

## 2023-06-06 RX ORDER — POTASSIUM CHLORIDE 20 MEQ/1
40 TABLET, EXTENDED RELEASE ORAL ONCE
Status: COMPLETED | OUTPATIENT
Start: 2023-06-06 | End: 2023-06-06

## 2023-06-06 RX ADMIN — POTASSIUM CHLORIDE 40 MEQ: 1500 TABLET, EXTENDED RELEASE ORAL at 15:05

## 2023-06-06 RX ADMIN — MAGNESIUM SULFATE HEPTAHYDRATE 2 G: 40 INJECTION, SOLUTION INTRAVENOUS at 08:20

## 2023-06-06 RX ADMIN — SODIUM CHLORIDE 100 ML/HR: 9 INJECTION, SOLUTION INTRAVENOUS at 06:12

## 2023-06-06 RX ADMIN — POTASSIUM CHLORIDE 40 MEQ: 1500 TABLET, EXTENDED RELEASE ORAL at 08:20

## 2023-06-06 RX ADMIN — POTASSIUM CHLORIDE 40 MEQ: 1500 TABLET, EXTENDED RELEASE ORAL at 10:29

## 2023-06-06 RX ADMIN — POTASSIUM CHLORIDE 20 MEQ: 14.9 INJECTION, SOLUTION INTRAVENOUS at 08:00

## 2023-06-06 RX ADMIN — POLYETHYLENE GLYCOL 3350, SODIUM SULFATE ANHYDROUS, SODIUM BICARBONATE, SODIUM CHLORIDE, POTASSIUM CHLORIDE 4000 ML: 236; 22.74; 6.74; 5.86; 2.97 POWDER, FOR SOLUTION ORAL at 17:00

## 2023-06-06 RX ADMIN — SODIUM CHLORIDE 75 ML/HR: 0.9 INJECTION, SOLUTION INTRAVENOUS at 19:56

## 2023-06-06 RX ADMIN — MAGNESIUM SULFATE HEPTAHYDRATE 2 G: 40 INJECTION, SOLUTION INTRAVENOUS at 10:29

## 2023-06-06 NOTE — PLAN OF CARE
Problem: PAIN - ADULT  Goal: Verbalizes/displays adequate comfort level or baseline comfort level  Description: Interventions:  - Encourage patient to monitor pain and request assistance  - Assess pain using appropriate pain scale  - Administer analgesics based on type and severity of pain and evaluate response  - Implement non-pharmacological measures as appropriate and evaluate response  - Consider cultural and social influences on pain and pain management  - Notify physician/advanced practitioner if interventions unsuccessful or patient reports new pain  Outcome: Progressing     Problem: INFECTION - ADULT  Goal: Absence or prevention of progression during hospitalization  Description: INTERVENTIONS:  - Assess and monitor for signs and symptoms of infection  - Monitor lab/diagnostic results  - Monitor all insertion sites, i e  indwelling lines, tubes, and drains  - Monitor endotracheal if appropriate and nasal secretions for changes in amount and color  - [place] appropriate cooling/warming therapies per order  - Administer medications as ordered  - Instruct and encourage patient and family to use good hand hygiene technique  - Identify and instruct in appropriate isolation precautions for identified infection/condition  Outcome: Progressing  Goal: Absence of fever/infection during neutropenic period  Description: INTERVENTIONS:  - Monitor WBC    Outcome: Progressing     Problem: SAFETY ADULT  Goal: Patient will remain free of falls  Description: INTERVENTIONS:  - Educate patient/family on patient safety including physical limitations  - Instruct patient to call for assistance with activity   - Consult OT/PT to assist with strengthening/mobility   - Keep Call bell within reach  - Keep bed low and locked with side rails adjusted as appropriate  - Keep care items and personal belongings within reach  - Initiate and maintain comfort rounds  - Make Fall Risk Sign visible to staff  - Offer Toileting every  Hours, in advance of need  - Initiate/Maintain alarm  - Obtain necessary fall risk management equipment:   - Apply yellow socks and bracelet for high fall risk patients  - Consider moving patient to room near nurses station  Outcome: Progressing  Goal: Maintain or return to baseline ADL function  Description: INTERVENTIONS:  -  Assess patient's ability to carry out ADLs; assess patient's baseline for ADL function and identify physical deficits which impact ability to perform ADLs (bathing, care of mouth/teeth, toileting, grooming, dressing, etc )  - Assess/evaluate cause of self-care deficits   - Assess range of motion  - Assess patient's mobility; develop plan if impaired  - Assess patient's need for assistive devices and provide as appropriate  - Encourage maximum independence but intervene and supervise when necessary  - Involve family in performance of ADLs  - Assess for home care needs following discharge   - Consider OT consult to assist with ADL evaluation and planning for discharge  - Provide patient education as appropriate  Outcome: Progressing  Goal: Maintains/Returns to pre admission functional level  Description: INTERVENTIONS:  - Perform BMAT or MOVE assessment daily    - Set and communicate daily mobility goal to care team and patient/family/caregiver  - Collaborate with rehabilitation services on mobility goals if consulted  - Perform Range of Motion  times a day  - Reposition patient every  hours    - Dangle patient  times a day  - Stand patient  times a day  - Ambulate patient  times a day  - Out of bed to chair  times a day   - Out of bed for meala times a day  - Out of bed for toileting  - Record patient progress and toleration of activity level   Outcome: Progressing

## 2023-06-06 NOTE — PLAN OF CARE
Problem: PAIN - ADULT  Goal: Verbalizes/displays adequate comfort level or baseline comfort level  Description: Interventions:  - Encourage patient to monitor pain and request assistance  - Assess pain using appropriate pain scale  - Administer analgesics based on type and severity of pain and evaluate response  - Implement non-pharmacological measures as appropriate and evaluate response  - Consider cultural and social influences on pain and pain management  - Notify physician/advanced practitioner if interventions unsuccessful or patient reports new pain  Outcome: Progressing     Problem: INFECTION - ADULT  Goal: Absence or prevention of progression during hospitalization  Description: INTERVENTIONS:  - Assess and monitor for signs and symptoms of infection  - Monitor lab/diagnostic results  - Monitor all insertion sites, i e  indwelling lines, tubes, and drains  - Monitor endotracheal if appropriate and nasal secretions for changes in amount and color  - [place] appropriate cooling/warming therapies per order  - Administer medications as ordered  - Instruct and encourage patient and family to use good hand hygiene technique  - Identify and instruct in appropriate isolation precautions for identified infection/condition  Outcome: Progressing  Goal: Absence of fever/infection during neutropenic period  Description: INTERVENTIONS:  - Monitor WBC    Outcome: Progressing     Problem: SAFETY ADULT  Goal: Patient will remain free of falls  Description: INTERVENTIONS:  - Educate patient/family on patient safety including physical limitations  - Instruct patient to call for assistance with activity   - Consult OT/PT to assist with strengthening/mobility   - Keep Call bell within reach  - Keep bed low and locked with side rails adjusted as appropriate  - Keep care items and personal belongings within reach  - Initiate and maintain comfort rounds  - Make Fall Risk Sign visible to staff  - Offer Toileting every 2 Hours, in advance of need  - Initiate/Maintain bedalarm  - Obtain necessary fall risk management equipment:   - Apply yellow socks and bracelet for high fall risk patients  - Consider moving patient to room near nurses station  Outcome: Progressing  Goal: Maintain or return to baseline ADL function  Description: INTERVENTIONS:  -  Assess patient's ability to carry out ADLs; assess patient's baseline for ADL function and identify physical deficits which impact ability to perform ADLs (bathing, care of mouth/teeth, toileting, grooming, dressing, etc )  - Assess/evaluate cause of self-care deficits   - Assess range of motion  - Assess patient's mobility; develop plan if impaired  - Assess patient's need for assistive devices and provide as appropriate  - Encourage maximum independence but intervene and supervise when necessary  - Involve family in performance of ADLs  - Assess for home care needs following discharge   - Consider OT consult to assist with ADL evaluation and planning for discharge  - Provide patient education as appropriate  Outcome: Progressing  Goal: Maintains/Returns to pre admission functional level  Description: INTERVENTIONS:  - Perform BMAT or MOVE assessment daily    - Set and communicate daily mobility goal to care team and patient/family/caregiver  - Collaborate with rehabilitation services on mobility goals if consulted  - Perform Range of Motion 3 times a day  - Reposition patient every 2 hours    - Dangle patient 3 times a day  - Stand patient 3 times a day  - Ambulate patient 3 times a day  - Out of bed to chair 3 times a day   - Out of bed for meals 3 times a day  - Out of bed for toileting  - Record patient progress and toleration of activity level   Outcome: Progressing     Problem: DISCHARGE PLANNING  Goal: Discharge to home or other facility with appropriate resources  Description: INTERVENTIONS:  - Identify barriers to discharge w/patient and caregiver  - Arrange for needed discharge resources and transportation as appropriate  - Identify discharge learning needs (meds, wound care, etc )  - Arrange for interpretive services to assist at discharge as needed  - Refer to Case Management Department for coordinating discharge planning if the patient needs post-hospital services based on physician/advanced practitioner order or complex needs related to functional status, cognitive ability, or social support system  Outcome: Progressing     Problem: Knowledge Deficit  Goal: Patient/family/caregiver demonstrates understanding of disease process, treatment plan, medications, and discharge instructions  Description: Complete learning assessment and assess knowledge base    Interventions:  - Provide teaching at level of understanding  - Provide teaching via preferred learning methods  Outcome: Progressing

## 2023-06-06 NOTE — ASSESSMENT & PLAN NOTE
Lab Results   Component Value Date    HGBA1C 6 8 (H) 06/06/2023       Recent Labs     06/05/23  1541 06/05/23  2116 06/06/23  1140   POCGLU 118 153* 157*       Blood Sugar Average: Last 72 hrs:  (P) 099 [id number]   · Hgb a1c 6 8, maintained on Metformin at home  · Begin corrective sliding scale insulin, accuchecks, and hypoglycemic protocol  · Patient currently refusing ISS  · Clear liquid diet advanced to soft surgical diet

## 2023-06-06 NOTE — ASSESSMENT & PLAN NOTE
· One episode of hypotension on arrival will no further episodes  · CTA dissection protocol was performed and showed no acute aortic syndrome  · Continue IVF resuscitation, will decrease to 75 mL/hr

## 2023-06-06 NOTE — ASSESSMENT & PLAN NOTE
· Presented for 2-week history of abdominal discomfort, bloating, N/V, and intermittent diarrhea  · CTA C/A/P (6/5): Pancolitis, presumably due to infection versus inflammatory bowel disease  Stable small liver hemangiomas     · ESR 61,  6  · Cdiff and enteric stool panel: negative  · Rocephin and Flagyl discontinued 6/5  · GI consultation appreciated- likely prolonged viral gastroenteritis

## 2023-06-06 NOTE — PROGRESS NOTES
[address] [address]  Progress Note  Name: Amy Conley  MRN: [id number]  Unit/Bed#: ICU 12-01 I Date of Admission: 6/5/2023   Date of Service: 6/6/2023 I Hospital Day: 1    Assessment/Plan   * Colitis  Assessment & Plan  · Presented for 2-week history of abdominal discomfort, bloating, N/V, and intermittent diarrhea  · CTA C/A/P (6/5): Pancolitis, presumably due to infection versus inflammatory bowel disease  Stable small liver hemangiomas     · ESR 61,  6  · Cdiff and enteric stool panel: negative  · Rocephin and Flagyl discontinued 6/5  · GI consultation appreciated- likely prolonged viral gastroenteritis     Electrolyte abnormality  Assessment & Plan  · Potassium 2 7/Magnesium 1 1  · Telemetry monitoring  · Patient received 40 mEq IV potassium, 20 mEq of po potassium, and 4 g IV magnesium   · Repeat K 3 4  · Will give an additional 40 mEq of po potassium now  · Repeat mag and potassium in am    DMII (diabetes mellitus, type 2) ([facility] [address] )  Assessment & Plan  Lab Results   Component Value Date    HGBA1C 6 8 (H) 06/06/2023       Recent Labs     06/05/23  1541 06/05/23  2116 06/06/23  1140   POCGLU 118 153* 157*       Blood Sugar Average: Last 72 hrs:  (P) 741 [id number]   · Hgb a1c 6 8, maintained on Metformin at home  · Begin corrective sliding scale insulin, accuchecks, and hypoglycemic protocol  · Patient currently refusing ISS  · Clear liquid diet advanced to soft surgical diet    History of biliary dyskinesia  Assessment & Plan  · Noted in 2016  · No LFT elevation or GB disease noted on imaging as above    Hypotension  Assessment & Plan  · One episode of hypotension on arrival will no further episodes  · CTA dissection protocol was performed and showed no acute aortic syndrome  · Continue IVF resuscitation, will decrease to 75 mL/hr         VTE Pharmacologic Prophylaxis: VTE Score: 4 patient refused    Patient Centered Rounds: I performed bedside rounds with nursing staff today   Discussions with Specialists or Other Care Team Provider: nursing, CM, GI    Education and Discussions with Family / Patient: Updated  () at bedside  Total Time Spent on Date of Encounter in care of patient: 35 minutes This time was spent on one or more of the following: performing physical exam; counseling and coordination of care; obtaining or reviewing history; documenting in the medical record; reviewing/ordering tests, medications or procedures; communicating with other healthcare professionals and discussing with patient's family/caregivers  Current Length of Stay: 1 day(s)  Current Patient Status: Inpatient   Certification Statement: The patient will continue to require additional inpatient hospital stay due to continued monitoring of telemetry, monitoring of labs, IV fluids  Discharge Plan: Anticipate discharge in 24-48 hrs to home  Code Status: Level 1 - Full Code    Subjective: The patient was seen and examined  The patient is lying in bed in no acute distress  She states she continues to have diarrhea  Her  reports about 2 episodes so far today  Objective:     Vitals:   Temp (24hrs), Av 4 °F (36 9 °C), Min:97 8 °F (36 6 °C), Max:99 1 °F (37 3 °C)    Temp:  [97 8 °F (36 6 °C)-99 1 °F (37 3 °C)] 99 1 °F (37 3 °C)  HR:  [67-98] 67  Resp:  [17-20] 20  BP: (113-180)/(56-88) 180/86  SpO2:  [95 %-97 %] 95 %  Body mass index is 26 kg/m²  Input and Output Summary (last 24 hours): Intake/Output Summary (Last 24 hours) at 2023 1422  Last data filed at 2023 0612  Gross per 24 hour   Intake 1000 ml   Output 850 ml   Net 150 ml       Physical Exam:   Physical Exam  Vitals and nursing note reviewed  Constitutional:       General: She is awake  Appearance: Normal appearance  HENT:      Head: Normocephalic and atraumatic  Cardiovascular:      Rate and Rhythm: Normal rate and regular rhythm  Heart sounds: Normal heart sounds  Pulmonary:      Effort: Pulmonary effort is normal       Breath sounds: Normal breath sounds  Abdominal:      Palpations: Abdomen is soft  Tenderness: There is no abdominal tenderness  Skin:     General: Skin is warm and dry  Neurological:      General: No focal deficit present  Mental Status: She is alert and oriented to person, place, and time  Psychiatric:         Attention and Perception: Attention normal          Mood and Affect: Mood normal          Speech: Speech normal          Behavior: Behavior is cooperative            Additional Data:     Labs:  Results from last 7 days   Lab Units 06/06/23  0423 06/05/23  1102   EOS PCT %  --  0   HEMATOCRIT % 30 7* 34 5*   HEMOGLOBIN g/dL 9 9* 11 4*   LYMPHS PCT %  --  13*   MONOS PCT %  --  14*   NEUTROS PCT %  --  72   PLATELETS Thousands/uL 482* 573*   WBC Thousand/uL 8 07 10 70*     Results from last 7 days   Lab Units 06/06/23  1317 06/06/23  0601   ANION GAP mmol/L  --  12   ALBUMIN g/dL  --  3 0*   ALK PHOS U/L  --  48   ALT U/L  --  7   AST U/L  --  11*   BUN mg/dL  --  6   CALCIUM mg/dL  --  8 3*   CHLORIDE mmol/L  --  103   CO2 mmol/L  --  24   CREATININE mg/dL  --  0 62   GLUCOSE RANDOM mg/dL  --  134   POTASSIUM mmol/L 3 4* 2 7*   SODIUM mmol/L  --  139   TOTAL BILIRUBIN mg/dL  --  0 54     Results from last 7 days   Lab Units 06/05/23  1102   INR  1 18     Results from last 7 days   Lab Units 06/06/23  1140 06/05/23  2116 06/05/23  1541   POC GLUCOSE mg/dl 157* 153* 118     Results from last 7 days   Lab Units 06/06/23  0423   HEMOGLOBIN A1C % 6 8*     Results from last 7 days   Lab Units 06/05/23  1102   LACTIC ACID mmol/L 1 1   PROCALCITONIN ng/ml 0 32*       Lines/Drains:  Invasive Devices     Peripheral Intravenous Line  Duration           Peripheral IV 06/06/23 Distal;Left;Ventral (anterior) Forearm <1 day                  Telemetry:  Telemetry Orders (From admission, onward)             24 Hour Telemetry Monitoring  Continuous x 24 Hours (Telem)        Question:  Reason for 24 Hour Telemetry  Answer:  Metabolic/electrolyte disturbance with high probability of dysrhythmia  K level <3 or >6 OR KCL infusion >10mEq/hr                 Telemetry Reviewed: Normal Sinus Rhythm  Indication for Continued Telemetry Use: Metabolic/electrolyte disturbance with high probability of dysrhythmia             Imaging: No pertinent imaging reviewed  Recent Cultures (last 7 days):   Results from last 7 days   Lab Units 06/05/23  1937 06/05/23  1110 06/05/23  1102   BLOOD CULTURE   --  Received in Microbiology Lab  Culture in Progress  Received in Microbiology Lab  Culture in Progress  C DIFF TOXIN B BY PCR  Negative  --   --        Last 24 Hours Medication List:   Current Facility-Administered Medications   Medication Dose Route Frequency Provider Last Rate   • acetaminophen  650 mg Oral Q6H PRN Marcelyn [place], DO     • enoxaparin  40 mg Subcutaneous Daily [facility], DO     • insulin lispro  1-5 Units Subcutaneous TID [facility], DO     • insulin lispro  1-5 Units Subcutaneous HS [facility], DO     • ondansetron  4 mg Intravenous Once Ninoska Bingham MD     • ondansetron  4 mg Intravenous Q6H PRN Marcelyn [place], DO     • potassium chloride  40 mEq Oral Once Rangel Galvez PA-C     • sodium chloride  75 mL/hr Intravenous Continuous Rangel Galvez PA-C          Today, Patient Was Seen By: Rangel Galvez PA-C    **Please Note: This note may have been constructed using a voice recognition system  **

## 2023-06-06 NOTE — PROGRESS NOTES
Progress Note- Chad Graf 78 y o  female MRN: [id number]    Unit/Bed#: ICU 12-01 Encounter: [id number]      Assessment and Plan:    Patient is a 28-year-old female with PMH significant for DM 2 and biliary dyskinesia admitted for persistent abdominal pain with associated nausea/NBNB vomiting and diarrhea x2 weeks found to have pancolitis on imaging  1   Nausea, vomiting and diarrhea  2  Pancolitis  Infectious stool studies including stool enteric bacterial panel and C  difficile collected and in process  BC x2 collected and in process  CRP significantly elevated but down-trending (192 7-145  6)  Antibiotics discontinued at patient's request pending  Clinical picture is most suspicious for persistent viral gastroenteritis/colitis in the setting of uncontrolled diabetes  Recommend to continue supportive care pending stool studies  If stool studies are positive, will treat appropriately  If stool studies are negative, would recommend discussion regarding the utility of an inpatient colonoscopy with biopsy for further evaluation of inflammatory colitis given significantly elevated CRP     - Diet as tolerated; Low residue/lactose restricted  - Continue IV fluid hydration with lyte replacement  - Continue supportive care with antiemetics/analgesics PRN  - Okay to hold abx pending stool studies  - Follow-up on stool studies  - Monitor stool output (frequency/consistency)  - If stool studies are negative, recommend consideration of inpt colonoscopy with biopsy    GI will continue to follow  ______________________________________________________________________    Subjective:     Patient found resting comfortably in bed  No acute overnight events  Patient reports continued diarrhea  Also reports persistent nausea but no further vomiting  Denies fever/chills, abdominal pain or rectal bleeding  Denies any additional GI related complaints        Medication Administration - last 24 hours from 06/05/2023 0839 to 06/06/2023 5596       Date/Time Order Dose Route Action Action by     06/05/2023 1238 EDT sodium chloride 0 9 % bolus 1,000 mL 0 mL Intravenous Stopped [facility], RN     06/05/2023 1114 EDT sodium chloride 0 9 % bolus 1,000 mL 1,000 mL Intravenous [address] [place], [place]     06/05/2023 1339 EDT sodium chloride 0 9 % bolus 1,000 mL 0 mL Intravenous Stopped [facility], RN     06/05/2023 1239 EDT sodium chloride 0 9 % bolus 1,000 mL 1,000 mL Intravenous [address] [facility] [place]     06/05/2023 1238 EDT cefepime (MAXIPIME) IVPB (premix in dextrose) 2,000 mg 50 mL 0 mg Intravenous Stopped [facility], RN     06/05/2023 1132 EDT cefepime (MAXIPIME) IVPB (premix in dextrose) 2,000 mg 50 mL 2,000 mg Intravenous [facility] [address] [facility], RN     06/05/2023 1114 EDT ondansetron (ZOFRAN) injection 4 mg 4 mg Intravenous Not Given [facility], RN     06/05/2023 1121 EDT iohexol (OMNIPAQUE) 350 MG/ML injection (SINGLE-DOSE) 100 mL 100 mL Intravenous Given Alisha Mckeon     06/05/2023 1338 EDT metroNIDAZOLE (FLAGYL) tablet 500 mg 500 mg Oral Given [facility], RN     06/06/2023 0612 EDT sodium chloride 0 9 % infusion 100 mL/hr Intravenous [address] [facility]     06/05/2023 1930 EDT sodium chloride 0 9 % infusion 100 mL/hr Intravenous [place] RN     06/05/2023 1735 EDT sodium chloride 0 9 % infusion 0 mL/hr Intravenous Stopped [facility], RN     06/05/2023 1402 EDT sodium chloride 0 9 % infusion 100 mL/hr Intravenous [address] [facility]     06/06/2023 0229 EDT enoxaparin (LOVENOX) subcutaneous injection 40 mg 40 mg Subcutaneous Not Given Maria Eugenia Jo RN     06/05/2023 1402 EDT enoxaparin (LOVENOX) subcutaneous injection 40 mg 40 mg Subcutaneous Not Given Moon Wan RN     06/06/2023 0700 EDT insulin lispro (HumaLOG) 100 units/mL subcutaneous injection 1-5 Units -- Subcutaneous Not Given Maria Eugenia Jo RN "06/05/2023 1543 EDT insulin lispro (HumaLOG) 100 units/mL subcutaneous injection 1-5 Units -- Subcutaneous Not Given Belia Gandhi RN     06/05/2023 2125 EDT insulin lispro (HumaLOG) 100 units/mL subcutaneous injection 1-5 Units -- Subcutaneous Not Given Bradford Morales RN     06/06/2023 0800 EDT potassium chloride 20 mEq IVPB (premix) 20 mEq Intravenous [id number] [place] [facility]     06/06/2023 0820 EDT potassium chloride (K-DUR,KLOR-CON) CR tablet 40 mEq 40 mEq Oral Given Carlota Birch RN     06/06/2023 0820 EDT magnesium sulfate 2 g/50 mL IVPB (premix) 2 g 2 g Intravenous New Bag Carlota Birch RN          Objective:     Vitals: Blood pressure 126/88, pulse 98, temperature 98 9 °F (37 2 °C), temperature source Oral, resp  rate 18, height 5' 7\" (1 702 m), weight 75 3 kg (166 lb 0 1 oz), SpO2 97 %  ,Body mass index is 26 kg/m²  Intake/Output Summary (Last 24 hours) at 6/6/2023 0839  Last data filed at 6/6/2023 0612  Gross per 24 hour   Intake 1000 ml   Output 850 ml   Net 150 ml       Physical Exam:   General Appearance: Awake and alert, in no acute distress  Abdomen: +Mild abd dsitention;  Soft, non-tender; bowel sounds normal; no masses or no organomegaly    Invasive Devices     Peripheral Intravenous Line  Duration           Peripheral IV 06/06/23 Distal;Left;Ventral (anterior) Forearm <1 day                Lab Results:  Admission on 06/05/2023   Component Date Value   • WBC 06/05/2023 10 70 (H)    • RBC 06/05/2023 3 89    • Hemoglobin 06/05/2023 11 4 (L)    • Hematocrit 06/05/2023 34 5 (L)    • MCV 06/05/2023 89    • MCH 06/05/2023 29 3    • MCHC 06/05/2023 33 0    • RDW 06/05/2023 12 8    • MPV 06/05/2023 9 5    • Platelets 70/69/2991 573 (H)    • nRBC 06/05/2023 0    • Neutrophils Relative 06/05/2023 72    • Immat GRANS % 06/05/2023 1    • Lymphocytes Relative 06/05/2023 13 (L)    • Monocytes Relative 06/05/2023 14 (H)    • Eosinophils Relative 06/05/2023 0    • Basophils Relative " 06/05/2023 0    • Neutrophils Absolute 06/05/2023 7 74 (H)    • Immature Grans Absolute 06/05/2023 0 07    • Lymphocytes Absolute 06/05/2023 1 38    • Monocytes Absolute 06/05/2023 1 44 (H)    • Eosinophils Absolute 06/05/2023 0 03    • Basophils Absolute 06/05/2023 0 04    • Sodium 06/05/2023 135    • Potassium 06/05/2023 3 5    • Chloride 06/05/2023 96    • CO2 06/05/2023 28    • ANION GAP 06/05/2023 11    • BUN 06/05/2023 10    • Creatinine 06/05/2023 0 79    • Glucose 06/05/2023 144 (H)    • Calcium 06/05/2023 9 8    • AST 06/05/2023 13    • ALT 06/05/2023 9    • Alkaline Phosphatase 06/05/2023 56    • Total Protein 06/05/2023 7 0    • Albumin 06/05/2023 3 5    • Total Bilirubin 06/05/2023 0 69    • eGFR 06/05/2023 78    • LACTIC ACID 06/05/2023 1 1    • Procalcitonin 06/05/2023 0 32 (H)    • Protime 06/05/2023 15 0 (H)    • INR 06/05/2023 1 18    • PTT 06/05/2023 29    • Blood Culture 06/05/2023 Received in Microbiology Lab  Culture in Progress  • Blood Culture 06/05/2023 Received in Microbiology Lab  Culture in Progress      • Color, UA 06/05/2023 Yellow    • Clarity, UA 06/05/2023 Clear    • Specific Gravity, UA 06/05/2023 <=1 005 (L)    • pH, UA 06/05/2023 5 5    • Leukocytes, UA 06/05/2023 Negative    • Nitrite, UA 06/05/2023 Negative    • Protein, UA 06/05/2023 Negative    • Glucose, UA 06/05/2023 Negative    • Ketones, UA 06/05/2023 15 (1+) (A)    • Urobilinogen, UA 06/05/2023 0 2    • Bilirubin, UA 06/05/2023 Negative    • Occult Blood, UA 06/05/2023 Negative    • Lipase 06/05/2023 15    • ABO Grouping 06/05/2023 O    • Rh Factor 06/05/2023 Positive    • Antibody Screen 06/05/2023 Negative    • Specimen Expiration Date 06/05/2023 [id number]    • SARS-CoV-2 06/05/2023 Negative    • INFLUENZA A PCR 06/05/2023 Negative    • INFLUENZA B PCR 06/05/2023 Negative    • RSV PCR 06/05/2023 Negative    • Ventricular Rate 06/05/2023 96    • Atrial Rate 06/05/2023 96    • DE Interval 06/05/2023 174    • QRSD Interval 06/05/2023 128    • QT Interval 06/05/2023 382    • QTC Interval 06/05/2023 482    • P Axis 06/05/2023 55    • QRS Axis 06/05/2023 -9    • T Wave Axis 06/05/2023 111    • ABO Grouping 06/05/2023 O    • Rh Factor 06/05/2023 Positive    • Sed Rate 06/06/2023 61 (H)    • CRP 06/05/2023 192 7 (H)    • POC Glucose 06/05/2023 118    • POC Glucose 06/05/2023 153 (H)    • Sodium 06/06/2023 139    • Potassium 06/06/2023 2 7 (LL)    • Chloride 06/06/2023 103    • CO2 06/06/2023 24    • ANION GAP 06/06/2023 12    • BUN 06/06/2023 6    • Creatinine 06/06/2023 0 62    • Glucose 06/06/2023 134    • Calcium 06/06/2023 8 3 (L)    • Corrected Calcium 06/06/2023 9 1    • AST 06/06/2023 11 (L)    • ALT 06/06/2023 7    • Alkaline Phosphatase 06/06/2023 48    • Total Protein 06/06/2023 6 2 (L)    • Albumin 06/06/2023 3 0 (L)    • Total Bilirubin 06/06/2023 0 54    • eGFR 06/06/2023 94    • Magnesium 06/06/2023 1 1 (L)    • WBC 06/06/2023 8 07    • RBC 06/06/2023 3 44 (L)    • Hemoglobin 06/06/2023 9 9 (L)    • Hematocrit 06/06/2023 30 7 (L)    • MCV 06/06/2023 89    • MCH 06/06/2023 28 8    • MCHC 06/06/2023 32 2    • RDW 06/06/2023 12 9    • Platelets 41/14/2465 482 (H)    • MPV 06/06/2023 9 4        Imaging Studies: I have personally reviewed pertinent imaging studies  **Please note:  Dictation voice to text software may have been used in the creation of this record  Occasional wrong word or “sound alike” substitutions may have occurred due to the inherent limitations of voice recognition software  Read the chart carefully and recognize, using context, where substitutions have occurred  **

## 2023-06-06 NOTE — ASSESSMENT & PLAN NOTE
· Potassium 2 7/Magnesium 1 1  · Telemetry monitoring  · Patient received 40 mEq IV potassium, 20 mEq of po potassium, and 4 g IV magnesium   · Repeat K 3 4  · Will give an additional 40 mEq of po potassium now  · Repeat mag and potassium in am

## 2023-06-06 NOTE — UTILIZATION REVIEW
"Initial Clinical Review    Admission: Date/Time/Statement:   Admission Orders (From admission, onward)     Ordered        06/05/23 1345  INPATIENT ADMISSION  Once                      Orders Placed This Encounter   Procedures   • INPATIENT ADMISSION     Standing Status:   Standing     Number of Occurrences:   1     Order Specific Question:   Level of Care     Answer:   Med Surg [16]     Order Specific Question:   Estimated length of stay     Answer:   More than 2 Midnights     Order Specific Question:   Certification     Answer:   I certify that inpatient services are medically necessary for this patient for a duration of greater than two midnights  See H&P and MD Progress Notes for additional information about the patient's course of treatment  ED Arrival Information     Expected   -    Arrival   6/5/2023 10:25    Acuity   Emergent            Means of arrival   Walk-In    Escorted by   Spouse    Service   Hospitalist    Admission type   Emergency            Arrival complaint   abd pain           Chief Complaint   Patient presents with   • Abdominal Pain     Pt presents to ER from home for reports of diffuse abd pain x2 weeks  Reports mild nausea and diarrhea, denies vomitting  Decreased PO intake stating \"I feel so bloated that I'm just not hungry  \"       Initial Presentation: 78 y o  female to the ED from home with complaints of abdominal pain for 2 weeks  Admitted to inpatient for colitis, hypotension, biliary dyskinesia  Arrives tachycardic, hypotensive with generalized abdominal pain   H/O DMII and biliary dyskinesia  CTA shows pancolitis  In the ED, given 1 liter bolus iv fluids, IV zofran, started on IV abx  Check ESR, CRP  Check stool studies  GI consult  IV fluids helped with BP  Mag 1 1  Give IV mag and recheck  Potassium 2 7  Given IV potassium and recheck  GI consult:  Continue with IV fluids  Advance diet to soft  NO bm yet  Abdomen soft, nontender       Date: 6/6   Day 2: C-diff and " enteric stool panel neg  Potassium 2 7  Given po potassium and recheck  2 episodes diarrhea today  GI:  CRP very elevated  Abdomen distended, soft  NPO after MN  Plan for colonoscopy  Hold abx pending stool studies  Blood culture pending  ED Triage Vitals [06/05/23 1046]   Temperature Pulse Respirations Blood Pressure SpO2   (!) 97 2 °F (36 2 °C) (!) 106 18 (!) 78/54 94 %      Temp Source Heart Rate Source Patient Position - Orthostatic VS BP Location FiO2 (%)   Tympanic Monitor Sitting Left arm --      Pain Score       8          Wt Readings from Last 1 Encounters:   06/05/23 75 3 kg (166 lb 0 1 oz)     Additional Vital Signs:   Date/Time Temp Pulse Resp BP MAP (mmHg) SpO2 O2 Device Patient Position - Orthostatic VS   06/06/23 1100 99 1 °F (37 3 °C) -- -- -- -- -- -- --   06/06/23 08:55:36 97 9 °F (36 6 °C) 67 20 180/86 Abnormal  117 95 % -- --   06/05/23 2300 98 9 °F (37 2 °C) 98 18 126/88 -- 97 % None (Room air) --   06/05/23 17:49:27 97 8 °F (36 6 °C) 94 17 116/87 97 97 % -- --   06/05/23 1600 -- 88 -- 122/57 81 -- -- --   06/05/23 1315 -- 90 -- 130/62 89 -- -- --   06/05/23 1300 -- 92 -- 141/63 91 -- -- --   06/05/23 1230 -- 96 -- 120/58 84 95 % -- --   06/05/23 1200 -- 98 -- 145/64 92 94 % -- --   06/05/23 1145 -- 98 -- -- -- 96 % -- --   06/05/23 1130 -- 100 -- 145/64 92 96 % -- --   06/05/23 1115 -- 94 -- -- -- 95 % -- --   06/05/23 1100 -- 95 -- 136/67 93 96 % None (Room air) Lying   06/05/23 1046 97 2 °F (36 2 °C) Abnormal  106 Abnormal  18 78/54 Abnormal  -- 94 % None (Room air) Sitting       Pertinent Labs/Diagnostic Test Results:   6/6 EKG: Normal sinus rhythm  Left bundle branch block  Abnormal ECG  No previous ECGs available  CTA dissection protocol chest/abdomen/pelvis   Final Result by Vidya Villafuerte MD (06/05 3112)      No acute aortic syndrome  Pancolitis, presumably due to infection versus inflammatory bowel disease  Stable small liver hemangiomas           Workstation performed: [id number]           Results from last 7 days   Lab Units 06/05/23  1102   SARS-COV-2  Negative     Results from last 7 days   Lab Units 06/06/23  0423 06/05/23  1102   HEMATOCRIT % 30 7* 34 5*   HEMOGLOBIN g/dL 9 9* 11 4*   NEUTROS ABS Thousands/µL  --  7 74*   PLATELETS Thousands/uL 482* 573*   WBC Thousand/uL 8 07 10 70*         Results from last 7 days   Lab Units 06/06/23  0601 06/05/23  1102   ANION GAP mmol/L 12 11   BUN mg/dL 6 10   CALCIUM mg/dL 8 3* 9 8   CHLORIDE mmol/L 103 96   CO2 mmol/L 24 28   CREATININE mg/dL 0 62 0 79   EGFR ml/min/1 73sq m 94 78   POTASSIUM mmol/L 2 7* 3 5   MAGNESIUM mg/dL 1 1*  --    SODIUM mmol/L 139 135     Results from last 7 days   Lab Units 06/06/23  0601 06/05/23  1102   ALBUMIN g/dL 3 0* 3 5   ALK PHOS U/L 48 56   ALT U/L 7 9   AST U/L 11* 13   TOTAL BILIRUBIN mg/dL 0 54 0 69   TOTAL PROTEIN g/dL 6 2* 7 0     Results from last 7 days   Lab Units 06/06/23  1140 06/05/23  2116 06/05/23  1541   POC GLUCOSE mg/dl 157* 153* 118     Results from last 7 days   Lab Units 06/06/23  0601 06/05/23  1102   GLUCOSE RANDOM mg/dL 134 144*         Results from last 7 days   Lab Units 06/06/23  0423   EAG mg/dl 148   HEMOGLOBIN A1C % 6 8*         Results from last 7 days   Lab Units 06/05/23  1102   INR  1 18   PROTIME seconds 15 0*   PTT seconds 29         Results from last 7 days   Lab Units 06/05/23  1102   PROCALCITONIN ng/ml 0 32*     Results from last 7 days   Lab Units 06/05/23  1102   LACTIC ACID mmol/L 1 1         Results from last 7 days   Lab Units 06/05/23  1102   LIPASE u/L 15     Results from last 7 days   Lab Units 06/06/23  0601 06/06/23  0423 06/05/23  1102   CRP mg/L 145 6*  --  192 7*   SED RATE mm/hour  --  61*  --        Results from last 7 days   Lab Units 06/05/23  1256   BILIRUBIN UA  Negative   BLOOD UA  Negative   CLARITY UA  Clear   COLOR UA  Yellow   GLUCOSE UA mg/dl Negative   KETONES UA mg/dl 15 (1+)*   LEUKOCYTES UA  Negative   NITRITE UA  Negative PH UA  5 5   PROTEIN UA mg/dl Negative   SPEC GRAV UA  <=1 005*   UROBILINOGEN UA E U /dl 0 2     Results from last 7 days   Lab Units 06/05/23  1102   INFLUENZA A PCR  Negative   INFLUENZA B PCR  Negative   RSV PCR  Negative       Results from last 7 days   Lab Units 06/05/23  1110 06/05/23  1102   BLOOD CULTURE  Received in Microbiology Lab  Culture in Progress  Received in Microbiology Lab  Culture in Progress  ED Treatment:   Medication Administration from 06/05/2023 1025 to 06/05/2023 1740       Date/Time Order Dose Route Action Comments     06/05/2023 1114 EDT sodium chloride 0 9 % bolus 1,000 mL 1,000 mL Intravenous New Bag --     06/05/2023 1239 EDT sodium chloride 0 9 % bolus 1,000 mL 1,000 mL Intravenous New Bag --     06/05/2023 1132 EDT cefepime (MAXIPIME) IVPB (premix in dextrose) 2,000 mg 50 mL 2,000 mg Intravenous New Bag --     06/05/2023 1338 EDT metroNIDAZOLE (FLAGYL) tablet 500 mg 500 mg Oral Given --     06/05/2023 1402 EDT sodium chloride 0 9 % infusion 100 mL/hr Intravenous New Bag --            Admitting Diagnosis: Pancolitis (HCC) [K51 00]  Abdominal pain [R10 9]  Hypotension [I95 9]  Age/Sex: 78 y o  female  Admission Orders:  Scheduled Medications:  enoxaparin, 40 mg, Subcutaneous, Daily  insulin lispro, 1-5 Units, Subcutaneous, TID AC  insulin lispro, 1-5 Units, Subcutaneous, HS  magnesium sulfate, 2 g, Intravenous, Once  ondansetron, 4 mg, Intravenous, Once      Continuous IV Infusions:  sodium chloride, 100 mL/hr, Intravenous, Continuous      PRN Meds:  acetaminophen, 650 mg, Oral, Q6H PRN  ondansetron, 4 mg, Intravenous, Q6H PRN        IP CONSULT TO GASTROENTEROLOGY    Network Utilization Review Department  ATTENTION: Please call with any questions or concerns to [phone] and carefully listen to the prompts so that you are directed to the right person   All voicemails are confidential   Ericka hSi all requests for admission clinical reviews, approved or denied determinations and any other requests to dedicated fax number below belonging to the campus where the patient is receiving treatment   List of dedicated fax numbers for the Facilities:  [address] [place] [address] DENIALS (Administrative/Medical Necessity) [phone]   [address] [place] [address] (Maternity/NICU/Pediatrics) [phone]   [address] [phone]   [address] [phone]   [address] [address] [facility] [phone]   [facility] 815 [facility] [phone]

## 2023-06-07 ENCOUNTER — ANESTHESIA (INPATIENT)
Dept: GASTROENTEROLOGY | Facility: HOSPITAL | Age: 68
DRG: 387 | End: 2023-06-07
Payer: COMMERCIAL

## 2023-06-07 ENCOUNTER — ANESTHESIA EVENT (INPATIENT)
Dept: GASTROENTEROLOGY | Facility: HOSPITAL | Age: 68
DRG: 387 | End: 2023-06-07
Payer: COMMERCIAL

## 2023-06-07 ENCOUNTER — APPOINTMENT (INPATIENT)
Dept: GASTROENTEROLOGY | Facility: HOSPITAL | Age: 68
DRG: 387 | End: 2023-06-07
Attending: INTERNAL MEDICINE
Payer: COMMERCIAL

## 2023-06-07 LAB
ANION GAP SERPL CALCULATED.3IONS-SCNC: 9 MMOL/L (ref 4–13)
BUN SERPL-MCNC: 5 MG/DL (ref 5–25)
CALCIUM SERPL-MCNC: 7 MG/DL (ref 8.4–10.2)
CHLORIDE SERPL-SCNC: 110 MMOL/L (ref 96–108)
CO2 SERPL-SCNC: 22 MMOL/L (ref 21–32)
CREAT SERPL-MCNC: 0.41 MG/DL (ref 0.6–1.3)
ERYTHROCYTE [DISTWIDTH] IN BLOOD BY AUTOMATED COUNT: 13.2 % (ref 11.6–15.1)
GFR SERPL CREATININE-BSD FRML MDRD: 108 ML/MIN/1.73SQ M
GLUCOSE SERPL-MCNC: 126 MG/DL (ref 65–140)
GLUCOSE SERPL-MCNC: 133 MG/DL (ref 65–140)
GLUCOSE SERPL-MCNC: 133 MG/DL (ref 65–140)
GLUCOSE SERPL-MCNC: 137 MG/DL (ref 65–140)
GLUCOSE SERPL-MCNC: 173 MG/DL (ref 65–140)
GLUCOSE SERPL-MCNC: 255 MG/DL (ref 65–140)
HCT VFR BLD AUTO: 28.1 % (ref 34.8–46.1)
HGB BLD-MCNC: 9 G/DL (ref 11.5–15.4)
MAGNESIUM SERPL-MCNC: 1.4 MG/DL (ref 1.9–2.7)
MCH RBC QN AUTO: 28.8 PG (ref 26.8–34.3)
MCHC RBC AUTO-ENTMCNC: 32 G/DL (ref 31.4–37.4)
MCV RBC AUTO: 90 FL (ref 82–98)
PLATELET # BLD AUTO: 469 THOUSANDS/UL (ref 149–390)
PMV BLD AUTO: 8.9 FL (ref 8.9–12.7)
POTASSIUM SERPL-SCNC: 3 MMOL/L (ref 3.5–5.3)
RBC # BLD AUTO: 3.13 MILLION/UL (ref 3.81–5.12)
SODIUM SERPL-SCNC: 141 MMOL/L (ref 135–147)
WBC # BLD AUTO: 8.38 THOUSAND/UL (ref 4.31–10.16)

## 2023-06-07 PROCEDURE — 0DBP8ZX EXCISION OF RECTUM, VIA NATURAL OR ARTIFICIAL OPENING ENDOSCOPIC, DIAGNOSTIC: ICD-10-PCS | Performed by: INTERNAL MEDICINE

## 2023-06-07 PROCEDURE — 88305 TISSUE EXAM BY PATHOLOGIST: CPT | Performed by: PATHOLOGY

## 2023-06-07 PROCEDURE — 45380 COLONOSCOPY AND BIOPSY: CPT | Performed by: INTERNAL MEDICINE

## 2023-06-07 PROCEDURE — 99232 SBSQ HOSP IP/OBS MODERATE 35: CPT | Performed by: PHYSICIAN ASSISTANT

## 2023-06-07 PROCEDURE — 82948 REAGENT STRIP/BLOOD GLUCOSE: CPT

## 2023-06-07 PROCEDURE — 83735 ASSAY OF MAGNESIUM: CPT | Performed by: PHYSICIAN ASSISTANT

## 2023-06-07 PROCEDURE — 0DBN8ZX EXCISION OF SIGMOID COLON, VIA NATURAL OR ARTIFICIAL OPENING ENDOSCOPIC, DIAGNOSTIC: ICD-10-PCS | Performed by: INTERNAL MEDICINE

## 2023-06-07 PROCEDURE — 80048 BASIC METABOLIC PNL TOTAL CA: CPT | Performed by: PHYSICIAN ASSISTANT

## 2023-06-07 PROCEDURE — 85027 COMPLETE CBC AUTOMATED: CPT | Performed by: PHYSICIAN ASSISTANT

## 2023-06-07 RX ORDER — SODIUM CHLORIDE, SODIUM LACTATE, POTASSIUM CHLORIDE, CALCIUM CHLORIDE 600; 310; 30; 20 MG/100ML; MG/100ML; MG/100ML; MG/100ML
INJECTION, SOLUTION INTRAVENOUS CONTINUOUS PRN
Status: DISCONTINUED | OUTPATIENT
Start: 2023-06-07 | End: 2023-06-07

## 2023-06-07 RX ORDER — PROPOFOL 10 MG/ML
INJECTION, EMULSION INTRAVENOUS AS NEEDED
Status: DISCONTINUED | OUTPATIENT
Start: 2023-06-07 | End: 2023-06-07

## 2023-06-07 RX ORDER — POTASSIUM CHLORIDE 14.9 MG/ML
20 INJECTION INTRAVENOUS
Status: COMPLETED | OUTPATIENT
Start: 2023-06-07 | End: 2023-06-07

## 2023-06-07 RX ORDER — LIDOCAINE HYDROCHLORIDE 10 MG/ML
INJECTION, SOLUTION EPIDURAL; INFILTRATION; INTRACAUDAL; PERINEURAL AS NEEDED
Status: DISCONTINUED | OUTPATIENT
Start: 2023-06-07 | End: 2023-06-07

## 2023-06-07 RX ORDER — PREDNISONE 20 MG/1
40 TABLET ORAL DAILY
Status: DISCONTINUED | OUTPATIENT
Start: 2023-06-07 | End: 2023-06-08 | Stop reason: HOSPADM

## 2023-06-07 RX ORDER — MAGNESIUM SULFATE HEPTAHYDRATE 40 MG/ML
4 INJECTION, SOLUTION INTRAVENOUS ONCE
Status: COMPLETED | OUTPATIENT
Start: 2023-06-07 | End: 2023-06-08

## 2023-06-07 RX ADMIN — PROPOFOL 70 MG: 10 INJECTION, EMULSION INTRAVENOUS at 08:41

## 2023-06-07 RX ADMIN — INSULIN LISPRO 2 UNITS: 100 INJECTION, SOLUTION INTRAVENOUS; SUBCUTANEOUS at 21:11

## 2023-06-07 RX ADMIN — PROPOFOL 20 MG: 10 INJECTION, EMULSION INTRAVENOUS at 08:42

## 2023-06-07 RX ADMIN — POTASSIUM CHLORIDE 20 MEQ: 14.9 INJECTION, SOLUTION INTRAVENOUS at 09:49

## 2023-06-07 RX ADMIN — SODIUM CHLORIDE 75 ML/HR: 0.9 INJECTION, SOLUTION INTRAVENOUS at 12:16

## 2023-06-07 RX ADMIN — MAGNESIUM SULFATE HEPTAHYDRATE 4 G: 40 INJECTION, SOLUTION INTRAVENOUS at 13:28

## 2023-06-07 RX ADMIN — POTASSIUM CHLORIDE 20 MEQ: 14.9 INJECTION, SOLUTION INTRAVENOUS at 11:30

## 2023-06-07 RX ADMIN — PREDNISONE 40 MG: 20 TABLET ORAL at 12:14

## 2023-06-07 RX ADMIN — SODIUM CHLORIDE, SODIUM LACTATE, POTASSIUM CHLORIDE, AND CALCIUM CHLORIDE: .6; .31; .03; .02 INJECTION, SOLUTION INTRAVENOUS at 08:34

## 2023-06-07 RX ADMIN — PROPOFOL 50 MG: 10 INJECTION, EMULSION INTRAVENOUS at 08:47

## 2023-06-07 RX ADMIN — LIDOCAINE HYDROCHLORIDE 50 MG: 10 INJECTION, SOLUTION EPIDURAL; INFILTRATION; INTRACAUDAL; PERINEURAL at 08:41

## 2023-06-07 NOTE — PLAN OF CARE
Problem: PAIN - ADULT  Goal: Verbalizes/displays adequate comfort level or baseline comfort level  Description: Interventions:  - Encourage patient to monitor pain and request assistance  - Assess pain using appropriate pain scale  - Administer analgesics based on type and severity of pain and evaluate response  - Implement non-pharmacological measures as appropriate and evaluate response  - Consider cultural and social influences on pain and pain management  - Notify physician/advanced practitioner if interventions unsuccessful or patient reports new pain  Outcome: Progressing     Problem: SAFETY ADULT  Goal: Patient will remain free of falls  Description: INTERVENTIONS:  - Educate patient/family on patient safety including physical limitations  - Instruct patient to call for assistance with activity   - Consult OT/PT to assist with strengthening/mobility   - Keep Call bell within reach  - Keep bed low and locked with side rails adjusted as appropriate  - Keep care items and personal belongings within reach  - Initiate and maintain comfort rounds  - Make Fall Risk Sign visible to staff  - Offer Toileting every 1 Hour while bowel prepping in advance of need  - Obtain necessary fall risk management equipment  - Apply yellow socks and bracelet for high fall risk patients  - Consider moving patient to room near nurses station  -initiate comfort rounds  Outcome: Progressing     Problem: Knowledge Deficit  Goal: Patient/family/caregiver demonstrates understanding of disease process, treatment plan, medications, and discharge instructions  Description: Complete learning assessment and assess knowledge base    Interventions:  - Provide teaching at level of understanding  - Provide teaching via preferred learning methods  Outcome: Progressing     Problem: Prexisting or High Potential for Compromised Skin Integrity  Goal: Skin integrity is maintained or improved  Description: INTERVENTIONS:  - Identify patients at risk for skin breakdown  - Assess and monitor skin integrity  - Assess and monitor nutrition and hydration status  - Monitor labs   - Assess for incontinence   - Turn and reposition patient  - Assist with mobility/ambulation  - Relieve pressure over bony prominences  - Avoid friction and shearing  - Provide appropriate hygiene as needed including keeping skin clean and dry  - Evaluate need for skin moisturizer/barrier cream  - Collaborate with interdisciplinary team   - Patient/family teaching  - Consider wound care consult   Outcome: Progressing     Problem: Nutrition/Hydration-ADULT  Goal: Nutrient/Hydration intake appropriate for improving, restoring or maintaining nutritional needs  Description: Monitor and assess patient's nutrition/hydration status for malnutrition  Collaborate with interdisciplinary team and initiate plan and interventions as ordered  Monitor patient's weight and dietary intake as ordered or per policy  Utilize nutrition screening tool and intervene as necessary  Determine patient's food preferences and provide high-protein, high-caloric foods as appropriate       INTERVENTIONS:  - Monitor oral intake, urinary output, labs, and treatment plans  - Assess nutrition and hydration status and recommend course of action  - Evaluate amount of meals eaten  - Assist patient with eating if necessary   - Allow adequate time for meals  - Recommend/ encourage appropriate diets, oral nutritional supplements, and vitamin/mineral supplements  - Order, calculate, and assess calorie counts as needed  - Recommend, monitor, and adjust tube feedings and TPN/PPN based on assessed needs  - Assess need for intravenous fluids  - Provide specific nutrition/hydration education as appropriate  - Include patient/family/caregiver in decisions related to nutrition  Outcome: Progressing

## 2023-06-07 NOTE — ASSESSMENT & PLAN NOTE
Lab Results   Component Value Date    HGBA1C 6 8 (H) 06/06/2023       Recent Labs     06/06/23 2057 06/07/23  0700 06/07/23  0911 06/07/23  1116   POCGLU 153* 133 126 133       Blood Sugar Average: Last 72 hrs:  (P) 139 5   · Hgb a1c 6 8, maintained on Metformin at home  · Begin corrective sliding scale insulin, accuchecks, and hypoglycemic protocol  · Patient currently refusing ISS  · Clear liquid diet advanced to soft surgical diet

## 2023-06-07 NOTE — PROGRESS NOTES
[address]  Progress Note  Name: Nidhi Cedeno  MRN: [id number]  Unit/Bed#: ICU 12-01 I Date of Admission: 6/5/2023   Date of Service: 6/7/2023 I Hospital Day: 2    Assessment/Plan   * Colitis  Assessment & Plan  · Presented for 2-week history of abdominal discomfort, bloating, N/V, and intermittent diarrhea  · CTA C/A/P (6/5): Pancolitis, presumably due to infection versus inflammatory bowel disease  Stable small liver hemangiomas  · ESR 61,  6  · Cdiff and enteric stool panel: negative  · Rocephin and Flagyl discontinued 6/5  · GI consultation appreciated  · Colonoscopy 6/7- suggestive of ulcerative colitis  Biopsies obtained  · The patient was started on prednisone 40 mg po daily with plan to taper  Electrolyte abnormality  Assessment & Plan  · Potassium 2 7 -> 3 0  · Magnesium 1 1 -> 1 4  · Telemetry monitoring  · Give 40 mEq IV potassium and 4 g IV magnesium   · Repeat mag and potassium in am    DMII (diabetes mellitus, type 2) [facility])  Assessment & Plan  Lab Results   Component Value Date    HGBA1C 6 8 (H) 06/06/2023       Recent Labs     06/06/23  2057 06/07/23  0700 06/07/23  0911 06/07/23  1116   POCGLU 153* 133 126 133       Blood Sugar Average: Last 72 hrs:  (P) 139 5   · Hgb a1c 6 8, maintained on Metformin at home  · Begin corrective sliding scale insulin, accuchecks, and hypoglycemic protocol  · Patient currently refusing ISS  · Clear liquid diet advanced to soft surgical diet    History of biliary dyskinesia  Assessment & Plan  · Noted in 2016  · No LFT elevation or GB disease noted on imaging as above    Hypotension  Assessment & Plan  · One episode of hypotension on arrival will no further episodes  · CTA dissection protocol was performed and showed no acute aortic syndrome  · Discontnue IVF             VTE Pharmacologic Prophylaxis: VTE Score: 4 patient refusing    Patient Centered Rounds: I performed bedside rounds with nursing staff today    Discussions with Specialists or Other Care Team Provider: nursing, GI, GM    Education and Discussions with Family / Patient: Updated  () at bedside  Total Time Spent on Date of Encounter in care of patient: 35 minutes This time was spent on one or more of the following: performing physical exam; counseling and coordination of care; obtaining or reviewing history; documenting in the medical record; reviewing/ordering tests, medications or procedures; communicating with other healthcare professionals and discussing with patient's family/caregivers  Current Length of Stay: 2 day(s)  Current Patient Status: Inpatient   Certification Statement: The patient will continue to require additional inpatient hospital stay due to need for treatment and monitoring of electrolyte abnormalities  Discharge Plan: Anticipate discharge in 24-48 hrs to home  Code Status: Level 1 - Full Code    Subjective: The patient was seen and examined  The patient is lying in bed in no acute distress  She is tolerating her lunch  She is concerned about starting steroids given issues with her eyes and concerns for increased pressure in her eyes  Objective:     Vitals:   Temp (24hrs), Av 6 °F (37 °C), Min:98 °F (36 7 °C), Max:99 7 °F (37 6 °C)    Temp:  [98 °F (36 7 °C)-99 7 °F (37 6 °C)] 98 3 °F (36 8 °C)  HR:  [] 98  Resp:  [13-32] 20  BP: (107-153)/(52-78) 133/67  SpO2:  [92 %-97 %] 96 %  Body mass index is 26 kg/m²  Input and Output Summary (last 24 hours): Intake/Output Summary (Last 24 hours) at 2023 1337  Last data filed at 2023 0853  Gross per 24 hour   Intake 3136 25 ml   Output --   Net 3136 25 ml       Physical Exam:   Physical Exam  Vitals and nursing note reviewed  Constitutional:       General: She is awake  Appearance: Normal appearance  HENT:      Head: Normocephalic and atraumatic  Cardiovascular:      Rate and Rhythm: Normal rate and regular rhythm     Pulmonary:      Effort: Pulmonary effort is normal       Breath sounds: Normal breath sounds  Abdominal:      Palpations: Abdomen is soft  Tenderness: There is no abdominal tenderness  Skin:     General: Skin is warm and dry  Neurological:      General: No focal deficit present  Mental Status: She is alert and oriented to person, place, and time  Psychiatric:         Attention and Perception: Attention normal          Mood and Affect: Mood normal          Speech: Speech normal          Behavior: Behavior is cooperative  Additional Data:     Labs:  Results from last 7 days   Lab Units 06/07/23  0452 06/06/23  0423 06/05/23  1102   EOS PCT %  --   --  0   HEMATOCRIT % 28 1*   < > 34 5*   HEMOGLOBIN g/dL 9 0*   < > 11 4*   LYMPHS PCT %  --   --  13*   MONOS PCT %  --   --  14*   NEUTROS PCT %  --   --  72   PLATELETS Thousands/uL 469*   < > 573*   WBC Thousand/uL 8 38   < > 10 70*    < > = values in this interval not displayed  Results from last 7 days   Lab Units 06/07/23  0552 06/06/23  1317 06/06/23  0601   ANION GAP mmol/L 9  --  12   ALBUMIN g/dL  --   --  3 0*   ALK PHOS U/L  --   --  48   ALT U/L  --   --  7   AST U/L  --   --  11*   BUN mg/dL 5  --  6   CALCIUM mg/dL 7 0*  --  8 3*   CHLORIDE mmol/L 110*  --  103   CO2 mmol/L 22  --  24   CREATININE mg/dL 0 41*  --  0 62   GLUCOSE RANDOM mg/dL 137  --  134   POTASSIUM mmol/L 3 0*   < > 2 7*   SODIUM mmol/L 141  --  139   TOTAL BILIRUBIN mg/dL  --   --  0 54    < > = values in this interval not displayed       Results from last 7 days   Lab Units 06/05/23  1102   INR  1 18     Results from last 7 days   Lab Units 06/07/23  1116 06/07/23  0911 06/07/23  0700 06/06/23  2057 06/06/23  1552 06/06/23  1140 06/05/23  2116 06/05/23  1541   POC GLUCOSE mg/dl 133 126 133 153* 143* 157* 153* 118     Results from last 7 days   Lab Units 06/06/23  0423   HEMOGLOBIN A1C % 6 8*     Results from last 7 days   Lab Units 06/05/23  1102   LACTIC ACID mmol/L 1 1 PROCALCITONIN ng/ml 0 32*       Lines/Drains:  Invasive Devices     Peripheral Intravenous Line  Duration           Peripheral IV 06/06/23 Distal;Left;Ventral (anterior) Forearm 1 day                  Telemetry:  Telemetry Orders (From admission, onward)             24 Hour Telemetry Monitoring  Continuous x 24 Hours (Telem)        Question:  Reason for 24 Hour Telemetry  Answer:  Metabolic/electrolyte disturbance with high probability of dysrhythmia  K level <3 or >6 OR KCL infusion >10mEq/hr                 Telemetry Reviewed: Normal Sinus Rhythm  Indication for Continued Telemetry Use: Metabolic/electrolyte disturbance with high probability of dysrhythmia             Imaging: No pertinent imaging reviewed  Recent Cultures (last 7 days):   Results from last 7 days   Lab Units 06/05/23  1937 06/05/23  1110 06/05/23  1102   BLOOD CULTURE   --  No Growth at 24 hrs  No Growth at 24 hrs  C DIFF TOXIN B BY PCR  Negative  --   --        Last 24 Hours Medication List:   Current Facility-Administered Medications   Medication Dose Route Frequency Provider Last Rate   • acetaminophen  650 mg Oral Q6H PRN Cira Dumas DO     • enoxaparin  40 mg Subcutaneous Daily Melissa Dumas DO     • insulin lispro  1-5 Units Subcutaneous TID AC Melissa Dumas, DO     • insulin lispro  1-5 Units Subcutaneous HS Melissa Dumas DO     • magnesium sulfate  4 g Intravenous Once Octaviano Diaz PA-C 4 g (06/07/23 1328)   • ondansetron  4 mg Intravenous Once Blairmc Che DO     • ondansetron  4 mg Intravenous Q6H PRN Melissa Dumas DO     • predniSONE  40 mg Oral Daily Cira Dumas DO          Today, Patient Was Seen By: Octaviano Diaz PA-C    **Please Note: This note may have been constructed using a voice recognition system  **

## 2023-06-07 NOTE — CASE MANAGEMENT
Case Management Discharge Planning Note    Patient name Vadim Ridley  Location ICU 12/ICU  MRN [id number]  : 1955 Date 2023       Current Admission Date: 2023  Current Admission Diagnosis:Colitis   Patient Active Problem List    Diagnosis Date Noted   • Electrolyte abnormality 2023   • Colitis 2023   • Hypotension 2023   • History of biliary dyskinesia 2023   • DMII (diabetes mellitus, type 2) ([facility] [address] ) 2023      LOS (days): 2  Geometric Mean LOS (GMLOS) (days): 2 60  Days to GMLOS:0 6     OBJECTIVE:  Risk of Unplanned Readmission Score: 12 22         Current admission status: Inpatient   Preferred Pharmacy:    [address]  [address]  Phone: [phone] Fax: [phone]    Primary Care Provider: Calvin Lutz MD    Primary Insurance: [address] [place] [address]  Secondary Insurance:     DISCHARGE DETAILS:    Discharge planning discussed with[de-identified] patient and sposue at the bedside  Freedom of Choice: Yes  Comments - Freedom of Choice: pt and family deny yuniel d/c needs  CM contacted family/caregiver?: Yes             Contacts  Patient Contacts: Luke Perry  Relationship to Patient[de-identified] Family (spouse)  Contact Method:  In Person  Reason/Outcome: Discharge [address]         Is the patient interested in [facility] at discharge?: No    DME Referral Provided  Referral made for DME?: No    Other Referral/Resources/Interventions Provided:  Referral Comments: pt and spouse deny any d/c needs    Would you like to participate in our [address] service program?  : No - Declined    Treatment Team Recommendation: Home (home with spouse and outpt follow up- spouse)     Transport at Discharge : Automobile, Family                                      Additional Comments: cm met with the pt and spouse at [facility] bedside , I made them aware I would be their cm and I reviewed my role with them- when I started to ask some questions and made them aware that we ask these questions to help set up a safe d/c plan  & I was told he will not answer any questions and they do not have any d/c needs and they do not need my help- cm will continue to follow to see if the pt has any d/c needs

## 2023-06-07 NOTE — NURSING NOTE
"Pt bowel prepping has measured 850mL GoLytely left in the bottle to drink at 6/7 @ 0000  Pt currently NPO starting 6/7 @0000  Patient and family educated by dayshift and nightshift staff on importance of finishing the bottle to prepare for colonoscopy  Patient stated she \"could not drink anymore\" and also stated she felt nauseous and she was going to be sick  Multiple BM's throughout the night, last BM around 3 am, brown & loose     "

## 2023-06-07 NOTE — ASSESSMENT & PLAN NOTE
· Potassium 2 7 -> 3 0  · Magnesium 1 1 -> 1 4  · Telemetry monitoring  · Give 40 mEq IV potassium and 4 g IV magnesium   · Repeat mag and potassium in am

## 2023-06-07 NOTE — PLAN OF CARE
Problem: PAIN - ADULT  Goal: Verbalizes/displays adequate comfort level or baseline comfort level  Description: Interventions:  - Encourage patient to monitor pain and request assistance  - Assess pain using appropriate pain scale  - Administer analgesics based on type and severity of pain and evaluate response  - Implement non-pharmacological measures as appropriate and evaluate response  - Consider cultural and social influences on pain and pain management  - Notify physician/advanced practitioner if interventions unsuccessful or patient reports new pain  Outcome: Progressing     Problem: INFECTION - ADULT  Goal: Absence or prevention of progression during hospitalization  Description: INTERVENTIONS:  - Assess and monitor for signs and symptoms of infection  - Monitor lab/diagnostic results  - Monitor all insertion sites, i e  indwelling lines, tubes, and drains  - Monitor endotracheal if appropriate and nasal secretions for changes in amount and color  - Hollister appropriate cooling/warming therapies per order  - Administer medications as ordered  - Instruct and encourage patient and family to use good hand hygiene technique  - Identify and instruct in appropriate isolation precautions for identified infection/condition  Outcome: Progressing     Problem: SAFETY ADULT  Goal: Patient will remain free of falls  Description: INTERVENTIONS:  - Educate patient/family on patient safety including physical limitations  - Instruct patient to call for assistance with activity   - Consult OT/PT to assist with strengthening/mobility   - Keep Call bell within reach  - Keep bed low and locked with side rails adjusted as appropriate  - Keep care items and personal belongings within reach  - Initiate and maintain comfort rounds  - Make Fall Risk Sign visible to staff  - Offer Toileting every Hours, in advance of need  - Initiate/Maintain alarm  - Obtain necessary fall risk management equipment:   - Apply yellow socks and bracelet for high fall risk patients  - Consider moving patient to room near nurses station  Outcome: Progressing

## 2023-06-07 NOTE — ASSESSMENT & PLAN NOTE
· Presented for 2-week history of abdominal discomfort, bloating, N/V, and intermittent diarrhea  · CTA C/A/P (6/5): Pancolitis, presumably due to infection versus inflammatory bowel disease  Stable small liver hemangiomas  · ESR 61,  6  · Cdiff and enteric stool panel: negative  · Rocephin and Flagyl discontinued 6/5  · GI consultation appreciated  · Colonoscopy 6/7- suggestive of ulcerative colitis  Biopsies obtained  · The patient was started on prednisone 40 mg po daily with plan to taper

## 2023-06-07 NOTE — ANESTHESIA PREPROCEDURE EVALUATION
Procedure:  COLONOSCOPY    Relevant Problems   ENDO   (+) DMII (diabetes mellitus, type 2) ([facility])      Lab Results   Component Value Date    HGBA1C 6 8 (H) 06/06/2023         Physical Exam    Airway    Mallampati score: II  TM Distance: >3 FB  Neck ROM: full     Dental   No notable dental hx     Cardiovascular  Rhythm: regular, Rate: normal,     Pulmonary  Breath sounds clear to auscultation,     Other Findings  Intercisor Distance > 3cm          Anesthesia Plan  ASA Score- 2     Anesthesia Type- IV sedation with anesthesia with ASA Monitors  Additional Monitors:   Airway Plan:     Comment: NPO appropriate  Discussed benefits/risks of monitored anesthetic care which involves providing a dynamic level of mild to deep sedation  Complications include awareness and/or airway obstruction/aspiration which may necessitate conversion to general anesthesia  All questions answered  Patient understands and wishes to proceed          Plan Factors-Exercise tolerance (METS): >4 METS  Chart reviewed  EKG reviewed  Existing labs reviewed  Induction-     Postoperative Plan- Plan for postoperative opioid use  Informed Consent- Anesthetic plan and risks discussed with patient  I personally reviewed this patient with the CRNA  Discussed and agreed on the Anesthesia Plan with the CRNA  Emy Gore

## 2023-06-07 NOTE — ASSESSMENT & PLAN NOTE
· One episode of hypotension on arrival will no further episodes  · CTA dissection protocol was performed and showed no acute aortic syndrome  · Discontnue IVF

## 2023-06-07 NOTE — ANESTHESIA POSTPROCEDURE EVALUATION
Post-Op Assessment Note    CV Status:  Stable  Pain Score: 0    Pain management: adequate     Mental Status:  Awake   Hydration Status:  Euvolemic   PONV Controlled:  Controlled   Airway Patency:  Patent      Post Op Vitals Reviewed: Yes      Staff: CRNA         No notable events documented      BP   109/51   Temp      Pulse  94   Resp   12   SpO2   97

## 2023-06-07 NOTE — PROGRESS NOTES
"[facility] - Progress Note  Ashwin Pinzon 78 y o  female MRN: [id number]  Unit/Bed#: ICU 12-01 Encounter: [id number]      ASSESSMENT & PLAN:    77 y/o female with DM presenting with prolonged course of nausea, emesis, abdominal pain with electrolyte abnormalities and CT scan showing nonspecific colitis on presentation, also with elevated CRP, presenting for these GI sx  Nausea, Emesis, Abdominal Pain  • In s/o electrolyte abnormalities, elevated CRP, CT scan showing pancolonic colitis  • Plan for colonoscopy today for diagnostic evaluation  • Ddx includes prolonged viral or bacterial gastroenteritis, IBD     Liver Hemangiomas, Fatty Liver Disease  · Stable from prior imaging  · Outpatient hepatology follow up recommended  ______________________________________________________________________    SUBJECTIVE:     Lizet Kerr  Stable for planned colonoscopy  Scheduled Meds:  Current Facility-Administered Medications   Medication Dose Route Frequency Provider Last Rate   • acetaminophen  650 mg Oral Q6H PRN [facility] Catherine, DO     • enoxaparin  40 mg Subcutaneous Daily [facility], DO     • insulin lispro  1-5 Units Subcutaneous TID [facility] Catherine, DO     • insulin lispro  1-5 Units Subcutaneous HS [facility], DO     • ondansetron  4 mg Intravenous Once Krysta Kohler MD     • ondansetron  4 mg Intravenous Q6H PRN [facility], DO     • sodium chloride  75 mL/hr Intravenous Continuous Stephie Corcoran PA-C 75 mL/hr (06/1955)     Continuous Infusions:sodium chloride, 75 mL/hr, Last Rate: 75 mL/hr (06/1955)      PRN Meds: •  acetaminophen  •  ondansetron    OBJECTIVE:     Objective   Blood pressure 130/68, pulse 104, temperature 98 7 °F (37 1 °C), temperature source Temporal, resp  rate 22, height 5' 7\" (1 702 m), weight 75 3 kg (166 lb), SpO2 95 %  Body mass index is 26 kg/m²      Intake/Output Summary (Last 24 hours) at 6/7/2023 0850  Last data filed at " "6/7/2023 0300  Gross per 24 hour   Intake 2916 25 ml   Output --   Net 2916 25 ml       PHYSICAL EXAM:   General Appearance: Awake and alert, in no acute distress  Abdomen: Soft, non-tender, non-distended; no masses or no organomegaly    Invasive Devices     Peripheral Intravenous Line  Duration           Peripheral IV 06/06/23 Distal;Left;Ventral (anterior) Forearm 1 day                LAB RESULTS:      Lab Units 06/07/23  0552 06/06/23  1317 06/06/23  0601 06/05/23  1102   BUN mg/dL 5  --  6 10   CALCIUM mg/dL 7 0*  --  8 3* 9 8   CHLORIDE mmol/L 110*  --  103 96   CO2 mmol/L 22  --  24 28   CREATININE mg/dL 0 41*  --  0 62 0 79   GLUCOSE RANDOM mg/dL 137  --  134 144*   POTASSIUM mmol/L 3 0* 3 4* 2 7* 3 5   MAGNESIUM mg/dL  --   --  1 1*  --    SODIUM mmol/L 141  --  139 135            Lab Units 06/06/23  0601 06/05/23  1102   ALBUMIN g/dL 3 0* 3 5   ALK PHOS U/L 48 56   ALT U/L 7 9   AST U/L 11* 13   TOTAL BILIRUBIN mg/dL 0 54 0 69   TOTAL PROTEIN g/dL 6 2* 7 0           Lab Units 06/07/23  0452 06/06/23  0423 06/05/23  1102   HEMATOCRIT % 28 1* 30 7* 34 5*   HEMOGLOBIN g/dL 9 0* 9 9* 11 4*   MCV fL 90 89 89   PLATELETS Thousands/uL 469* 482* 573*   WBC Thousand/uL 8 38 8 07 10 70*       No results found for: \"FERRITIN\", \"IRON\", \"TIBC\"    Lab Results   Component Value Date    INR 1 18 06/05/2023    PROTIME 15 0 (H) 06/05/2023       RADIOLOGY RESULTS:   Procedure: CTA dissection protocol chest/abdomen/pelvis    Result Date: 6/5/2023  Narrative: CTA - CHEST, ABDOMEN AND PELVIS - WITHOUT AND WITH IV CONTRAST INDICATION:   abdominal pain  COMPARISON: CT abdomen pelvis 4/15/2016  TECHNIQUE: CT examination of the chest, abdomen and pelvis was performed both prior to and after the administration of intravenous contrast    The noncontrast portion of this examination was performed utilizing low radiation dose technique     Thin section angiographic arterial phase post contrast technique was used in order to evaluate for " aortic dissection  3D reformatted images and volume rendering were performed on an independent workstation  Multiplanar 2D reformatted images were created from the source data  Radiation dose length product (DLP) for this visit:  1513 33 mGy-cm   This examination, like all CT scans performed in the [facility], was performed utilizing techniques to minimize radiation dose exposure, including the use of iterative reconstruction and automated exposure control  IV Contrast:  100 mL of iohexol (OMNIPAQUE) Enteric Contrast:  Enteric contrast was not administered  FINDINGS: AORTA:  There is no aortic dissection or intramural hematoma  There is no aortic aneurysm  Mild atherosclerotic disease  No flow limiting atherosclerotic stenosis of aorta or major aortic branch vessel in the chest, abdomen or pelvis  CHEST LUNGS:  Lungs are clear  There is no tracheal or endobronchial lesion  PLEURA:  Unremarkable  HEART/PULMONARY ARTERIAL TREE: Normal heart size  Extensive coronary artery calcifications  Normal pulmonary arteries  MEDIASTINUM AND EDU:  Unremarkable  CHEST WALL AND LOWER NECK:  Unremarkable  ABDOMEN LIVER/BILIARY TREE: 1 0 cm enhancing focus in the left hepatic lobe lateral segment, and 2 4 cm nodule with nodular peripheral enhancement at the posterior margin of the right hepatic lobe posterior segment compatible with hemangiomas  These are similar to the prior exam  Diffuse fatty infiltration of the liver  No biliary dilatation  GALLBLADDER:  No calcified gallstones  No pericholecystic inflammatory change  SPLEEN:  Unremarkable  PANCREAS:  Unremarkable  ADRENAL GLANDS:  There is low density lobulated thickening of adrenal glands bilaterally statistically most likely to represent adenomatous hyperplasia  KIDNEYS/URETERS:  Unremarkable  No hydronephrosis  STOMACH AND BOWEL: Pancolonic thickening with mucosal enhancement indicative of colitis   Small bowel loops and terminal ileum appear unremarkable  No obstruction  APPENDIX:  No findings to suggest appendicitis  ABDOMINOPELVIC CAVITY:  No ascites or free intraperitoneal air  No lymphadenopathy  PELVIS REPRODUCTIVE ORGANS:  Patient is status post hysterectomy  URINARY BLADDER: Decompressed, limiting evaluation  ABDOMINAL WALL/INGUINAL REGIONS:  Unremarkable  OSSEOUS STRUCTURES:  There are age appropriate degenerative changes  No acute fracture or destructive osseous lesion  Impression: No acute aortic syndrome  Pancolitis, presumably due to infection versus inflammatory bowel disease  Stable small liver hemangiomas  Workstation performed: [id number]     Narrative/Impressions - 3 day look back     JACLYN Worrell   PGY-4 Gastroenterology Fellow  [facility]  Available on [email or web address]

## 2023-06-08 VITALS
HEIGHT: 67 IN | TEMPERATURE: 98.1 F | RESPIRATION RATE: 17 BRPM | BODY MASS INDEX: 26.06 KG/M2 | DIASTOLIC BLOOD PRESSURE: 57 MMHG | SYSTOLIC BLOOD PRESSURE: 121 MMHG | OXYGEN SATURATION: 91 % | HEART RATE: 91 BPM | WEIGHT: 166 LBS

## 2023-06-08 LAB
ANION GAP SERPL CALCULATED.3IONS-SCNC: 10 MMOL/L (ref 4–13)
BUN SERPL-MCNC: 7 MG/DL (ref 5–25)
CALCIUM SERPL-MCNC: 8.3 MG/DL (ref 8.4–10.2)
CHLORIDE SERPL-SCNC: 103 MMOL/L (ref 96–108)
CO2 SERPL-SCNC: 24 MMOL/L (ref 21–32)
CREAT SERPL-MCNC: 0.46 MG/DL (ref 0.6–1.3)
ERYTHROCYTE [DISTWIDTH] IN BLOOD BY AUTOMATED COUNT: 13.2 % (ref 11.6–15.1)
GFR SERPL CREATININE-BSD FRML MDRD: 103 ML/MIN/1.73SQ M
GLUCOSE SERPL-MCNC: 131 MG/DL (ref 65–140)
GLUCOSE SERPL-MCNC: 143 MG/DL (ref 65–140)
GLUCOSE SERPL-MCNC: 181 MG/DL (ref 65–140)
HBV CORE AB SER QL: NORMAL
HBV SURFACE AB SER-ACNC: 3.16 MIU/ML
HBV SURFACE AG SER QL: NORMAL
HCT VFR BLD AUTO: 29.9 % (ref 34.8–46.1)
HGB BLD-MCNC: 9.7 G/DL (ref 11.5–15.4)
MAGNESIUM SERPL-MCNC: 2 MG/DL (ref 1.9–2.7)
MCH RBC QN AUTO: 28.6 PG (ref 26.8–34.3)
MCHC RBC AUTO-ENTMCNC: 32.4 G/DL (ref 31.4–37.4)
MCV RBC AUTO: 88 FL (ref 82–98)
PLATELET # BLD AUTO: 555 THOUSANDS/UL (ref 149–390)
PMV BLD AUTO: 9.3 FL (ref 8.9–12.7)
POTASSIUM SERPL-SCNC: 3.4 MMOL/L (ref 3.5–5.3)
RBC # BLD AUTO: 3.39 MILLION/UL (ref 3.81–5.12)
SODIUM SERPL-SCNC: 137 MMOL/L (ref 135–147)
WBC # BLD AUTO: 8.7 THOUSAND/UL (ref 4.31–10.16)

## 2023-06-08 PROCEDURE — 87340 HEPATITIS B SURFACE AG IA: CPT | Performed by: FAMILY MEDICINE

## 2023-06-08 PROCEDURE — 82948 REAGENT STRIP/BLOOD GLUCOSE: CPT

## 2023-06-08 PROCEDURE — 86706 HEP B SURFACE ANTIBODY: CPT | Performed by: FAMILY MEDICINE

## 2023-06-08 PROCEDURE — 83735 ASSAY OF MAGNESIUM: CPT | Performed by: PHYSICIAN ASSISTANT

## 2023-06-08 PROCEDURE — 80048 BASIC METABOLIC PNL TOTAL CA: CPT | Performed by: PHYSICIAN ASSISTANT

## 2023-06-08 PROCEDURE — 99238 HOSP IP/OBS DSCHRG MGMT 30/<: CPT | Performed by: PHYSICIAN ASSISTANT

## 2023-06-08 PROCEDURE — 86704 HEP B CORE ANTIBODY TOTAL: CPT | Performed by: FAMILY MEDICINE

## 2023-06-08 PROCEDURE — 85027 COMPLETE CBC AUTOMATED: CPT | Performed by: PHYSICIAN ASSISTANT

## 2023-06-08 RX ORDER — GLUCOSAMINE HCL/CHONDROITIN SU 500-400 MG
CAPSULE ORAL
Qty: 100 EACH | Refills: 0 | Status: SHIPPED | OUTPATIENT
Start: 2023-06-08

## 2023-06-08 RX ORDER — INSULIN ASPART 100 [IU]/ML
INJECTION, SOLUTION INTRAVENOUS; SUBCUTANEOUS
Qty: 15 ML | Refills: 0 | Status: SHIPPED | OUTPATIENT
Start: 2023-06-08 | End: 2023-06-08 | Stop reason: SDUPTHER

## 2023-06-08 RX ORDER — POTASSIUM CHLORIDE 750 MG/1
10 CAPSULE, EXTENDED RELEASE ORAL DAILY
Qty: 90 CAPSULE | Refills: 0 | Status: SHIPPED | OUTPATIENT
Start: 2023-06-08 | End: 2023-09-06

## 2023-06-08 RX ORDER — PREDNISONE 10 MG/1
TABLET ORAL
Qty: 118 TABLET | Refills: 0 | Status: SHIPPED | OUTPATIENT
Start: 2023-06-08 | End: 2023-08-01

## 2023-06-08 RX ORDER — BLOOD SUGAR DIAGNOSTIC
STRIP MISCELLANEOUS
Qty: 100 EACH | Refills: 0 | Status: SHIPPED | OUTPATIENT
Start: 2023-06-08

## 2023-06-08 RX ORDER — POTASSIUM CHLORIDE 20 MEQ/1
40 TABLET, EXTENDED RELEASE ORAL ONCE
Status: COMPLETED | OUTPATIENT
Start: 2023-06-08 | End: 2023-06-08

## 2023-06-08 RX ORDER — INSULIN ASPART 100 [IU]/ML
1-5 INJECTION, SOLUTION INTRAVENOUS; SUBCUTANEOUS
Qty: 15 ML | Refills: 0 | Status: SHIPPED | OUTPATIENT
Start: 2023-06-08

## 2023-06-08 RX ORDER — PEN NEEDLE, DIABETIC 32GX 5/32"
NEEDLE, DISPOSABLE MISCELLANEOUS
Qty: 100 EACH | Refills: 0 | Status: SHIPPED | OUTPATIENT
Start: 2023-06-08

## 2023-06-08 RX ORDER — LANCETS 33 GAUGE
EACH MISCELLANEOUS
Qty: 100 EACH | Refills: 0 | Status: SHIPPED | OUTPATIENT
Start: 2023-06-08

## 2023-06-08 RX ORDER — UREA 10 %
500 LOTION (ML) TOPICAL DAILY
Qty: 90 TABLET | Refills: 0 | Status: SHIPPED | OUTPATIENT
Start: 2023-06-08 | End: 2023-09-06

## 2023-06-08 RX ADMIN — PREDNISONE 40 MG: 20 TABLET ORAL at 08:16

## 2023-06-08 RX ADMIN — POTASSIUM CHLORIDE 40 MEQ: 1500 TABLET, EXTENDED RELEASE ORAL at 09:23

## 2023-06-08 RX ADMIN — INSULIN LISPRO 1 UNITS: 100 INJECTION, SOLUTION INTRAVENOUS; SUBCUTANEOUS at 11:46

## 2023-06-08 NOTE — ASSESSMENT & PLAN NOTE
· One episode of hypotension on arrival will no further episodes  · CTA dissection protocol was performed and showed no acute aortic syndrome

## 2023-06-08 NOTE — ASSESSMENT & PLAN NOTE
Lab Results   Component Value Date    HGBA1C 6 8 (H) 06/06/2023       Recent Labs     06/07/23  1624 06/07/23  2056 06/08/23  0736 06/08/23  1122   POCGLU 173* 255* 131 181*       Blood Sugar Average: Last 72 hrs:  (P) 159 [id number]   · Hgb a1c 6 8,   · Resume Metformin at discharge  · The patient was discharged on an insulin sliding scale for hyperglycemia while she's on a prednisone taper     · Outpatient follow-up with PCP

## 2023-06-08 NOTE — ASSESSMENT & PLAN NOTE
· Presented for 2-week history of abdominal discomfort, bloating, N/V, and intermittent diarrhea  · CTA C/A/P (6/5): Pancolitis, presumably due to infection versus inflammatory bowel disease  Stable small liver hemangiomas  · ESR 61,  6  · Cdiff and enteric stool panel: negative  · Rocephin and Flagyl discontinued 6/5  · GI consultation appreciated  · Colonoscopy 6/7- suggestive of ulcerative colitis  Biopsies obtained  · The patient was discharged on prednisone 40 mg po daily with plan to taper by 5 mg every 7 days     · Outpatient follow-up with GI and PCP

## 2023-06-08 NOTE — DISCHARGE SUMMARY
Any U  67   Discharge- Romero Camara 1955, 78 y o  female MRN: [id number]  Unit/Bed#: ICU 12-01 Encounter: [id number]  Primary Care Provider: Michael Alston MD   Date and time admitted to hospital: 6/5/2023 10:51 AM    * Colitis  Assessment & Plan  · Presented for 2-week history of abdominal discomfort, bloating, N/V, and intermittent diarrhea  · CTA C/A/P (6/5): Pancolitis, presumably due to infection versus inflammatory bowel disease  Stable small liver hemangiomas  · ESR 61,  6  · Cdiff and enteric stool panel: negative  · Rocephin and Flagyl discontinued 6/5  · GI consultation appreciated  · Colonoscopy 6/7- suggestive of ulcerative colitis  Biopsies obtained  · The patient was discharged on prednisone 40 mg po daily with plan to taper by 5 mg every 7 days  · Outpatient follow-up with GI and PCP    Electrolyte abnormality  Assessment & Plan  · Potassium 2 7 -> 3 0 -> 3 4  · Magnesium 1 1 -> 1 4 -> 2 0  · The patient was prescribed low dose potassium and magnesium supplementation on discharge  · BMP next week and follow-up with PCP regarding results    DMII (diabetes mellitus, type 2) [facility])  Assessment & Plan  Lab Results   Component Value Date    HGBA1C 6 8 (H) 06/06/2023       Recent Labs     06/07/23  1624 06/07/23  2056 06/08/23  0736 06/08/23  1122   POCGLU 173* 255* 131 181*       Blood Sugar Average: Last 72 hrs:  (P) 240 [id number]   · Hgb a1c 6 8,   · Resume Metformin at discharge  · The patient was discharged on an insulin sliding scale for hyperglycemia while she's on a prednisone taper     · Outpatient follow-up with PCP    History of biliary dyskinesia  Assessment & Plan  · Noted in 2016  · No LFT elevation or GB disease noted on imaging as above    Hypotension  Assessment & Plan  · One episode of hypotension on arrival will no further episodes  · CTA dissection protocol was performed and showed no acute aortic syndrome      Medical Problems     Resolved Problems  Date Reviewed: 6/8/2023   None       Discharging Physician / Practitioner: Herber Lozada PA-C  PCP: Candice Templeton MD  Admission Date:   Admission Orders (From admission, onward)     Ordered        06/05/23 1345  INPATIENT ADMISSION  Once                      Discharge Date: 06/08/23    Consultations During Hospital Stay:  · Gastroenterology    Procedures Performed:   · Colonoscopy     Significant Findings / Test Results:   Colonoscopy    Addendum Date: 6/7/2023    [address] [facility] [id number] [phone] DATE OF SERVICE: 6/07/23 PHYSICIAN(S): Attending: Korey Méndez DO Fellow: Valentin Garcia MD INDICATION: Elevated C-reactive protein (CRP), Colitis POST-OP DIAGNOSIS: See the impression below  HISTORY: Prior colonoscopy: No prior colonoscopy  BOWEL PREPARATION: Golytely/Colyte/Trilyte PREPROCEDURE: Informed consent was obtained for the procedure, including sedation  Risks including but not limited to bleeding, infection, perforation, adverse drug reaction and aspiration were explained in detail  Also explained about less than 100% sensitivity with the exam and other alternatives  The patient was placed in the left lateral decubitus position  Procedure: Colonoscopy DETAILS OF PROCEDURE: Patient was taken to the procedure room where a time out was performed to confirm correct patient and correct procedure  The patient underwent monitored anesthesia care, which was administered by an anesthesia professional  The patient's blood pressure, heart rate, level of consciousness, oxygen, respirations and ECG were monitored throughout the procedure  A digital rectal exam was performed  The scope was introduced through the anus and advanced to the sigmoid colon  Retroflexion was performed in the rectum   The quality of bowel preparation was evaluated using the [facility] Bowel Preparation Scale with scores of: right colon = not assessed, transverse colon = not assessed, left colon = 2  The total BBPS score was 2  The patient experienced no blood loss  The procedure was moderately difficult due to severe inflammation  The patient tolerated the procedure well  There were no apparent adverse events  ANESTHESIA INFORMATION: ASA: II Anesthesia Type: IV Sedation with Anesthesia MEDICATIONS: No administrations occurring from 0826 to 0858 on 06/07/23 FINDINGS: Severe edematous, erythematous, hemorrhagic and ulcerated mucosa with erosion and loss of folds in the sigmoid colon, rectosigmoid and rectum, consistent with ulcerative colitis EVENTS: Procedure Events Event Event Time ENDO SCOPE OUT TIME 6/7/2023  8:56 AM SPECIMENS: ID Type Source Tests Collected by Time Destination 1 : BX, R/O COLITIS (MOD-SEVERE) Tissue Large Intestine, Sigmoid Colon TISSUE EXAM Melissa Dumas DO 6/7/2023  8:49 AM  2 : BX, R/O COLITIS (MOD-SEVERE) Tissue Rectum TISSUE EXAM Issac Dumas DO 6/7/2023  8:52 AM  EQUIPMENT: Colonoscope - IMPRESSION: Severe edematous, erythematous, hemorrhagic and ulcerated mucosa with erosion and loss of folds, consistent with ulcerative colitis in the sigmoid colon, rectosigmoid and rectum  Cold forceps biopsies obtained  Mckeon endoscopic score of 3  RECOMMENDATION:  Await pathology results  Will discuss with patient about starting IV steroids vs oral prednisone  Will check Hep B status and quantiferon gold while inpt  Can resume diet  Will discuss with patient about biologic therapy as this is necessary  Can be started as an outpt  [facility] is closest for her if she elects for an infusion  Issac Dumas DO     Result Date: 6/7/2023  Impression: Severe edematous, erythematous, hemorrhagic and ulcerated mucosa with erosion and loss of folds, consistent with ulcerative colitis in the sigmoid colon, rectosigmoid and rectum  Cold forceps biopsies obtained    RECOMMENDATION:  Await pathology results  Issac Dumas DO     CTA dissection protocol chest/abdomen/pelvis    Result Date: 6/5/2023  · Impression: No acute aortic syndrome  Pancolitis, presumably due to infection versus inflammatory bowel disease  Stable small liver hemangiomas  Workstation performed: [id number]   ·     Incidental Findings:   · none     Test Results Pending at Discharge (will require follow up): · Biopsy taken during colonoscopy- outpatient follow-up with GI regarding results     Outpatient Tests Requested:  · BMP/magnesium- outpatient follow-up with PCP regarding results    Complications:  none    Reason for Admission: colitis     Hospital Course: Afshan Ruffin is a 78 y o  female patient who originally presented to the hospital on 6/5/2023 due to diarrhea and abdominal pain  Please see H&P by Dr Jens Hagen dated 6/5/2023 for details of presentation  CT A/P Pancolitis, presumably due to infection versus inflammatory bowel disease  GI consulted  She was noted to have a potassium of 2 7 and magnesium of 1 1  She was placed on telemetry and given potassium and magnesium supplementation  Colonoscopy was concerning for ulcerative colitis  She was started on prednisone  Her abdominal pain improved and she was tolerating a regular diet  Her electrolytes normalized  She was discharged on low dose potassium and magnesium given she was still having some diarrhea  She was discharged on prednisone 40 mg daily with a taper by 5 mg every 7 days  She was also discharged on a insulin sliding scale to cover her elevated blood sugars from the steroids  She was given a script for BMP and magnesium levels to be checked next week  She was instructed to follow-up with PCP regarding results  She was instructed to follow-up with GI for biopsy results  Please see above list of diagnoses and related plan for additional information       Condition at Discharge: good    Discharge Day Visit / Exam:   Subjective:    Vitals: Blood Pressure: 121/57 (06/08/23 1100)  Pulse: 91 (06/08/23 "1100)  Temperature: 98 1 °F (36 7 °C) (06/08/23 1100)  Temp Source: Tympanic (06/08/23 1100)  Respirations: 17 (06/08/23 1100)  Height: 5' 7\" (170 2 cm) (06/07/23 0714)  Weight - Scale: 75 3 kg (166 lb) (06/07/23 0714)  SpO2: 91 % (06/08/23 1100)  Exam:   Physical Exam  Vitals and nursing note reviewed  Constitutional:       Appearance: Normal appearance  HENT:      Head: Normocephalic and atraumatic  Cardiovascular:      Rate and Rhythm: Normal rate and regular rhythm  Heart sounds: Normal heart sounds  Pulmonary:      Effort: Pulmonary effort is normal       Breath sounds: Normal breath sounds  Abdominal:      General: Bowel sounds are normal  There is no distension  Palpations: Abdomen is soft  Tenderness: There is no abdominal tenderness  Skin:     General: Skin is warm and dry  Neurological:      General: No focal deficit present  Mental Status: She is alert and oriented to person, place, and time  Psychiatric:         Mood and Affect: Mood normal          Behavior: Behavior normal          Thought Content: Thought content normal          Judgment: Judgment normal           Discussion with Family: Updated  () at bedside  Discharge instructions/Information to patient and family:   See after visit summary for information provided to patient and family  Provisions for Follow-Up Care:  See after visit summary for information related to follow-up care and any pertinent home health orders  Disposition:   Home    Planned Readmission: no     Discharge Statement:  I spent 25 minutes discharging the patient  This time was spent on the day of discharge  I had direct contact with the patient on the day of discharge  Greater than 50% of the total time was spent examining patient, answering all patient questions, arranging and discussing plan of care with patient as well as directly providing post-discharge instructions    Additional time then spent on " discharge activities  Discharge Medications:  See after visit summary for reconciled discharge medications provided to patient and/or family        **Please Note: This note may have been constructed using a voice recognition system**

## 2023-06-08 NOTE — CASE MANAGEMENT
Case Management Discharge Planning Note    Patient name Bibi Chen  Location ICU 12/ICU  MRN [id number]  : 1955 Date 2023       Current Admission Date: 2023  Current Admission Diagnosis:Colitis   Patient Active Problem List    Diagnosis Date Noted   • Electrolyte abnormality 2023   • Colitis 2023   • Hypotension 2023   • History of biliary dyskinesia 2023   • DMII (diabetes mellitus, type 2) ([facility] [address] ) 2023      LOS (days): 3  Geometric Mean LOS (GMLOS) (days): 2 60  Days to GMLOS:-0 5     OBJECTIVE:  Risk of Unplanned Readmission Score: 12 14         Current admission status: Inpatient   Preferred Pharmacy:    [address]  Phone: [phone] Fax: [phone]    Primary Care Provider: Cala Pallas, MD    Primary Insurance: [address] [place] [address]  Secondary Insurance:     DISCHARGE DETAILS:    Discharge planning discussed with[de-identified] patient and spouse  at the bedside  Freedom of Choice: Yes  Comments - Freedom of Choice: pt declined [facility]-  pt denies any d/c needs  CM contacted family/caregiver?: Yes  Were Treatment Team discharge recommendations reviewed with patient/caregiver?: Yes  Did patient/caregiver verbalize understanding of patient care needs?: Yes  Were patient/caregiver advised of the risks associated with not following Treatment Team discharge recommendations?: Yes    Contacts  Patient Contacts: Luke Perry  Relationship to Patient[de-identified] Family  Contact Method:  In Person  Reason/Outcome: Discharge [address]         Is the patient interested in [facility] at discharge?: No (pt declinded)         Other Referral/Resources/Interventions Provided:  Interventions: Prescription Price Check  Referral Comments: cm called Juan Miguel Wade  pt's copay for the insulin is $35 00  pt stated she does have a glucometer at home but she does need new strips-  pt needs to gvien them updated insurance information- rn did educate the pt on how to use the insulin pen for glucose glidign scale-  pt is on metformin- pt is worried about her glucose level going high since she needs to go home on prednisone- cm offered [facility] and she declined - I made her aware if she does change her mind about [facility] she just needs to call her pcp    Would you like to participate in our [address] service program?  : No - Declined    Treatment Team Recommendation: Home (home with spouse & outpt follow up- spouse)  Discharge Destination Plan[de-identified] Home (home with spouse & outpt follow up)  Transport at Discharge : Automobile, Family       IMM reviewed with patient, patient agrees with discharge determination    Pt and spouse are in agreement with the d/c and d/c plan                 Accompanied by: Family member     IMM Given (Date):: 06/08/23  IMM Given to[de-identified] Patient  Family notified[de-identified] spouse at bedside

## 2023-06-08 NOTE — NURSING NOTE
Discharge instructions provided to pt and   Insulin pen use and dosing education provided  Written instructions also provided  All questions answered

## 2023-06-08 NOTE — PLAN OF CARE
Problem: PAIN - ADULT  Goal: Verbalizes/displays adequate comfort level or baseline comfort level  Description: Interventions:  - Encourage patient to monitor pain and request assistance  - Assess pain using appropriate pain scale  - Administer analgesics based on type and severity of pain and evaluate response  - Implement non-pharmacological measures as appropriate and evaluate response  - Consider cultural and social influences on pain and pain management  - Notify physician/advanced practitioner if interventions unsuccessful or patient reports new pain  Outcome: Progressing     Problem: INFECTION - ADULT  Goal: Absence or prevention of progression during hospitalization  Description: INTERVENTIONS:  - Assess and monitor for signs and symptoms of infection  - Monitor lab/diagnostic results  - Monitor all insertion sites, i e  indwelling lines, tubes, and drains  - Monitor endotracheal if appropriate and nasal secretions for changes in amount and color  - [place] appropriate cooling/warming therapies per order  - Administer medications as ordered  - Instruct and encourage patient and family to use good hand hygiene technique  - Identify and instruct in appropriate isolation precautions for identified infection/condition  Outcome: Progressing  Goal: Absence of fever/infection during neutropenic period  Description: INTERVENTIONS:  - Monitor WBC    Outcome: Progressing     Problem: SAFETY ADULT  Goal: Patient will remain free of falls  Description: INTERVENTIONS:  - Educate patient/family on patient safety including physical limitations  - Instruct patient to call for assistance with activity   - Consult OT/PT to assist with strengthening/mobility   - Keep Call bell within reach  - Keep bed low and locked with side rails adjusted as appropriate  - Keep care items and personal belongings within reach  - Initiate and maintain comfort rounds  - Make Fall Risk Sign visible to staff  - Apply yellow socks and bracelet for high fall risk patients  - Consider moving patient to room near nurses station  Outcome: Progressing  Goal: Maintain or return to baseline ADL function  Description: INTERVENTIONS:  -  Assess patient's ability to carry out ADLs; assess patient's baseline for ADL function and identify physical deficits which impact ability to perform ADLs (bathing, care of mouth/teeth, toileting, grooming, dressing, etc )  - Assess/evaluate cause of self-care deficits   - Assess range of motion  - Assess patient's mobility; develop plan if impaired  - Assess patient's need for assistive devices and provide as appropriate  - Encourage maximum independence but intervene and supervise when necessary  - Involve family in performance of ADLs  - Assess for home care needs following discharge   - Consider OT consult to assist with ADL evaluation and planning for discharge  - Provide patient education as appropriate  Outcome: Progressing  Goal: Maintains/Returns to pre admission functional level  Description: INTERVENTIONS:  - Perform BMAT or MOVE assessment daily    - Set and communicate daily mobility goal to care team and patient/family/caregiver  - Collaborate with rehabilitation services on mobility goals if consulted  - Perform Range of Motion 3 times a day  - Reposition patient every 2 hours    - Dangle patient 3 times a day  - Stand patient 3 times a day  - Ambulate patient 3 times a day  - Out of bed to chair 3 times a day   - Out of bed for meals 3 times a day  - Out of bed for toileting  - Record patient progress and toleration of activity level   Outcome: Progressing      Problem: SAFETY ADULT  Goal: Patient will remain free of falls  Description: INTERVENTIONS:  - Educate patient/family on patient safety including physical limitations  - Instruct patient to call for assistance with activity   - Consult OT/PT to assist with strengthening/mobility   - Keep Call bell within reach  - Keep bed low and locked with side rails adjusted as appropriate  - Keep care items and personal belongings within reach  - Initiate and maintain comfort rounds  - Make Fall Risk Sign visible to staff  - Apply yellow socks and bracelet for high fall risk patients  - Consider moving patient to room near nurses station  Outcome: Progressing  Goal: Maintain or return to baseline ADL function  Description: INTERVENTIONS:  -  Assess patient's ability to carry out ADLs; assess patient's baseline for ADL function and identify physical deficits which impact ability to perform ADLs (bathing, care of mouth/teeth, toileting, grooming, dressing, etc )  - Assess/evaluate cause of self-care deficits   - Assess range of motion  - Assess patient's mobility; develop plan if impaired  - Assess patient's need for assistive devices and provide as appropriate  - Encourage maximum independence but intervene and supervise when necessary  - Involve family in performance of ADLs  - Assess for home care needs following discharge   - Consider OT consult to assist with ADL evaluation and planning for discharge  - Provide patient education as appropriate  Outcome: Progressing  Goal: Maintains/Returns to pre admission functional level  Description: INTERVENTIONS:  - Perform BMAT or MOVE assessment daily    - Set and communicate daily mobility goal to care team and patient/family/caregiver  - Collaborate with rehabilitation services on mobility goals if consulted  - Perform Range of Motion 3 times a day  - Reposition patient every 2 hours    - Dangle patient 3 times a day  - Stand patient 3 times a day  - Ambulate patient 3 times a day  - Out of bed to chair 3 times a day   - Out of bed for meals 3 times a day  - Out of bed for toileting  - Record patient progress and toleration of activity level   Outcome: Progressing      Problem: DISCHARGE PLANNING  Goal: Discharge to home or other facility with appropriate resources  Description: INTERVENTIONS:  - Identify barriers to discharge w/patient and caregiver  - Arrange for needed discharge resources and transportation as appropriate  - Identify discharge learning needs (meds, wound care, etc )  - Arrange for interpretive services to assist at discharge as needed  - Refer to Case Management Department for coordinating discharge planning if the patient needs post-hospital services based on physician/advanced practitioner order or complex needs related to functional status, cognitive ability, or social support system  Outcome: Progressing     Problem: Knowledge Deficit  Goal: Patient/family/caregiver demonstrates understanding of disease process, treatment plan, medications, and discharge instructions  Description: Complete learning assessment and assess knowledge base  Interventions:  - Provide teaching at level of understanding  - Provide teaching via preferred learning methods  Outcome: Progressing     Problem: Prexisting or High Potential for Compromised Skin Integrity  Goal: Skin integrity is maintained or improved  Description: INTERVENTIONS:  - Identify patients at risk for skin breakdown  - Assess and monitor skin integrity  - Assess and monitor nutrition and hydration status  - Monitor labs   - Assess for incontinence   - Turn and reposition patient  - Assist with mobility/ambulation  - Relieve pressure over bony prominences  - Avoid friction and shearing  - Provide appropriate hygiene as needed including keeping skin clean and dry  - Evaluate need for skin moisturizer/barrier cream  - Collaborate with interdisciplinary team   - Patient/family teaching  - Consider wound care consult   Outcome: Progressing     Problem: Nutrition/Hydration-ADULT  Goal: Nutrient/Hydration intake appropriate for improving, restoring or maintaining nutritional needs  Description: Monitor and assess patient's nutrition/hydration status for malnutrition  Collaborate with interdisciplinary team and initiate plan and interventions as ordered    Monitor patient's weight and dietary intake as ordered or per policy  Utilize nutrition screening tool and intervene as necessary  Determine patient's food preferences and provide high-protein, high-caloric foods as appropriate       INTERVENTIONS:  - Monitor oral intake, urinary output, labs, and treatment plans  - Assess nutrition and hydration status and recommend course of action  - Evaluate amount of meals eaten  - Assist patient with eating if necessary   - Allow adequate time for meals  - Recommend/ encourage appropriate diets, oral nutritional supplements, and vitamin/mineral supplements  - Order, calculate, and assess calorie counts as needed  - Recommend, monitor, and adjust tube feedings and TPN/PPN based on assessed needs  - Assess need for intravenous fluids  - Provide specific nutrition/hydration education as appropriate  - Include patient/family/caregiver in decisions related to nutrition  Outcome: Progressing

## 2023-06-08 NOTE — ASSESSMENT & PLAN NOTE
· Potassium 2 7 -> 3 0 -> 3 4  · Magnesium 1 1 -> 1 4 -> 2 0  · The patient was prescribed low dose potassium and magnesium supplementation on discharge  · BMP next week and follow-up with PCP regarding results

## 2023-06-09 ENCOUNTER — TRANSITIONAL CARE MANAGEMENT (OUTPATIENT)
Dept: FAMILY MEDICINE CLINIC | Facility: CLINIC | Age: 68
End: 2023-06-09

## 2023-06-09 NOTE — UTILIZATION REVIEW
NOTIFICATION OF ADMISSION DISCHARGE   This is a Notification of Discharge from [address]  Please be advised that this patient has been discharge from our facility  Below you will find the admission and discharge date and time including the patient’s disposition  UTILIZATION REVIEW CONTACT:  Paige Bruno  Utilization   Network Utilization Review Department  Phone: [phone] carefully listen to the prompts  All voicemails are confidential   Email: [email or web address]     ADMISSION INFORMATION  PRESENTATION DATE: 6/5/2023 10:51 AM  OBERVATION ADMISSION DATE:  INPATIENT ADMISSION DATE: 6/5/23  1:45 PM   DISCHARGE DATE: 6/8/2023  3:26 PM   DISPOSITION:Home/Self Care    IMPORTANT INFORMATION:  Send all requests for admission clinical reviews, approved or denied determinations and any other requests to dedicated fax number below belonging to the campus where the patient is receiving treatment   List of dedicated fax numbers:  [address] DENIALS (Administrative/Medical Necessity) [phone]   [address] (Maternity/NICU/Pediatrics) [phone]   [facility] [phone]   [place] [address] [phone]   [address] [phone]   [address] [facility] [phone] 90 [facility] [phone]   [address] [place], [place] [address] [phone]   [facility]  [phone]   [address] [facility] [phone]   412 [facility] [address] [facility] [phone]

## 2023-06-10 LAB
BACTERIA BLD CULT: NORMAL
BACTERIA BLD CULT: NORMAL

## 2023-06-12 ENCOUNTER — TELEPHONE (OUTPATIENT)
Dept: GASTROENTEROLOGY | Facility: CLINIC | Age: 68
End: 2023-06-12

## 2023-06-12 PROBLEM — K82.4 GALLBLADDER POLYP: Status: ACTIVE | Noted: 2022-08-31

## 2023-06-12 PROCEDURE — 88305 TISSUE EXAM BY PATHOLOGIST: CPT | Performed by: PATHOLOGY

## 2023-06-13 DIAGNOSIS — K52.9 COLITIS: Primary | ICD-10-CM

## 2023-06-13 RX ORDER — MESALAMINE 1.2 G/1
4800 TABLET, DELAYED RELEASE ORAL
Qty: 120 TABLET | Refills: 3 | Status: SHIPPED | OUTPATIENT
Start: 2023-06-13 | End: 2023-06-22 | Stop reason: SDUPTHER

## 2023-06-13 NOTE — RESULT ENCOUNTER NOTE
Please call pt and offer to start her on lialda 4 8 g pill by mouth daily for new diagnosis of UC  She likely also needs a biologic and has an appt with Dr Caleb Mcclellan scheduled soon

## 2023-06-14 ENCOUNTER — APPOINTMENT (OUTPATIENT)
Age: 68
End: 2023-06-14
Payer: COMMERCIAL

## 2023-06-14 DIAGNOSIS — K51.90 ULCERATIVE COLITIS (HCC): ICD-10-CM

## 2023-06-14 LAB
ANION GAP SERPL CALCULATED.3IONS-SCNC: 2 MMOL/L (ref 4–13)
BUN SERPL-MCNC: 12 MG/DL (ref 5–25)
CALCIUM SERPL-MCNC: 9.5 MG/DL (ref 8.3–10.1)
CHLORIDE SERPL-SCNC: 103 MMOL/L (ref 96–108)
CO2 SERPL-SCNC: 31 MMOL/L (ref 21–32)
CREAT SERPL-MCNC: 0.9 MG/DL (ref 0.6–1.3)
GFR SERPL CREATININE-BSD FRML MDRD: 66 ML/MIN/1.73SQ M
GLUCOSE P FAST SERPL-MCNC: 112 MG/DL (ref 65–99)
MAGNESIUM SERPL-MCNC: 1.9 MG/DL (ref 1.6–2.6)
POTASSIUM SERPL-SCNC: 3.5 MMOL/L (ref 3.5–5.3)
SODIUM SERPL-SCNC: 136 MMOL/L (ref 135–147)

## 2023-06-14 PROCEDURE — 83735 ASSAY OF MAGNESIUM: CPT

## 2023-06-14 PROCEDURE — 80048 BASIC METABOLIC PNL TOTAL CA: CPT

## 2023-06-14 PROCEDURE — 36415 COLL VENOUS BLD VENIPUNCTURE: CPT

## 2023-06-16 ENCOUNTER — OFFICE VISIT (OUTPATIENT)
Dept: FAMILY MEDICINE CLINIC | Facility: CLINIC | Age: 68
End: 2023-06-16
Payer: COMMERCIAL

## 2023-06-16 ENCOUNTER — TELEPHONE (OUTPATIENT)
Dept: GASTROENTEROLOGY | Facility: CLINIC | Age: 68
End: 2023-06-16

## 2023-06-16 VITALS
WEIGHT: 160 LBS | BODY MASS INDEX: 26.66 KG/M2 | HEART RATE: 74 BPM | DIASTOLIC BLOOD PRESSURE: 80 MMHG | SYSTOLIC BLOOD PRESSURE: 120 MMHG | TEMPERATURE: 97.3 F | HEIGHT: 65 IN

## 2023-06-16 DIAGNOSIS — K52.9 COLITIS: Primary | ICD-10-CM

## 2023-06-16 DIAGNOSIS — D64.9 ANEMIA, UNSPECIFIED TYPE: ICD-10-CM

## 2023-06-16 DIAGNOSIS — E87.6 HYPOKALEMIA: ICD-10-CM

## 2023-06-16 DIAGNOSIS — E11.9 TYPE II DIABETES MELLITUS, WELL CONTROLLED (HCC): ICD-10-CM

## 2023-06-16 PROCEDURE — 99495 TRANSJ CARE MGMT MOD F2F 14D: CPT | Performed by: FAMILY MEDICINE

## 2023-06-16 RX ORDER — ONDANSETRON 4 MG/1
TABLET, FILM COATED ORAL
COMMUNITY

## 2023-06-16 NOTE — PROGRESS NOTES
Assessment & Plan     1  Colitis  Comments:  Discussed diagnosis and treatment at length  Encourage follow-up with GI  Diet was discussed  2  Anemia, unspecified type  Comments:  Patient denies any upper GI complaints  For repeat labs in 1 to 2 weeks as well as stool for FIT which was not done in the hospital   Orders:  -     CBC and differential; Future  -     Iron, TIBC and Ferritin Panel  -     Occult Blood, Fecal Immunochemical; Future    3  Hypokalemia  Comments:  Improved per recent labs  Continue same meds and discussed foods with high potassium content  Slip for repeat labs  Orders:  -     Basic metabolic panel; Future    4  Type II diabetes mellitus, well controlled ([facility] [address] )  Comments:  Continue monitoring and insulin coverage as per sliding scale provided         Subjective     Transitional Care Management Review:   Afshan Ruffin is a 78 y o  female here for TCM follow up  During the TCM phone call patient stated:  TCM Call     Date and time call was made  6/9/2023  7:09 AM    Hospital care reviewed  Records reviewed    Patient was hospitialized at  [address]    Date of Admission  06/05/23    Date of discharge  06/08/23    Diagnosis  Colitis    Disposition  Home      TCM Call     Scheduled for follow up? Yes    Did you obtain your prescribed medications  Yes    Do you need help managing your prescriptions or medications  No    Is transportation to your appointment needed  No    I have advised the patient to call PCP with any new or worsening symptoms  Steve Andrukaitis RMA    Are you recieving any outpatient services  No    Are you recieving home care services  No    Are you using any community resources  No    Current waiver services  No    Have you fallen in the last 12 months  No    Interperter language line needed  No    Counseling  Patient        Patient here for hospital follow-up status post admission for a diagnosis of new onset colitis    Prior to admission she gradually developed "more severe abdominal pain and distention with diarrhea and some nausea without vomiting without fever she now is on tapering prednisone and was called by GI 2 days ago following the final report of her biopsy to begin mesalamine  She does have follow-up with GI in 1 to 2 weeks  She denies any further pain but still complains of abdominal distention, gas, daily loose watery BMs x3 to 4/day, and decreased appetite however without nausea or vomiting  She is very concerned about her diabetes therefore watching what she is eating   She is keeping well-hydrated  She remains very weak with a marked weight loss  Prior to hospitalization her sugars were controlled glucometer readings at home averaging in the low 100s  Prednisone her readings are into the 200s however she was given insulin and a sliding scale and is covering herself as instructed  He does have multiple questions about her diet  She is taking a multivitamin and her vitamin D    As noted above she is very weak and complains of \"being \"wobbly\" on her feet  She she denies vertigo however does admit to lightheadedness feeling that she may pass to pass out  States she had some of these feelings even before hospitalization however feels they are worse since on the medications as noted above  She denies any falls or focal weakness  Review of Systems   Constitutional: Positive for unexpected weight change  Respiratory: Negative  Cardiovascular: Negative  Gastrointestinal: Positive for abdominal distention and diarrhea  Genitourinary: Positive for decreased urine volume  Neurological: Positive for weakness and light-headedness  Hematological: Bruises/bleeds easily  Psychiatric/Behavioral: The patient is nervous/anxious          Objective     /80 (Patient Position: Standing)   Pulse 74   Temp (!) 97 3 °F (36 3 °C)   Ht 5' 5\" (1 651 m)   Wt 72 6 kg (160 lb)   BMI 26 63 kg/m²      Physical Exam  Constitutional:       Appearance: " She is ill-appearing  Comments: Patient very pale  Cardiovascular:      Rate and Rhythm: Normal rate and regular rhythm  Pulses: Normal pulses  Heart sounds: Normal heart sounds  Pulmonary:      Breath sounds: Normal breath sounds  Abdominal:      Comments: Abdomen is soft with increased bowel sounds without tenderness or palpable masses   Musculoskeletal:         General: Normal range of motion  Neurological:      General: No focal deficit present  Mental Status: She is alert and oriented to person, place, and time         Medications have been reviewed by provider in current encounter    Curtis Diez MD

## 2023-06-16 NOTE — TELEPHONE ENCOUNTER
Pt returned Zara's call from yesterday   Pt is going to PCP appt at 130 pm call after that time please

## 2023-06-21 ENCOUNTER — APPOINTMENT (OUTPATIENT)
Age: 68
End: 2023-06-21
Payer: COMMERCIAL

## 2023-06-21 DIAGNOSIS — D64.9 ANEMIA, UNSPECIFIED TYPE: Primary | ICD-10-CM

## 2023-06-21 DIAGNOSIS — E87.6 HYPOKALEMIA: ICD-10-CM

## 2023-06-21 LAB
ANION GAP SERPL CALCULATED.3IONS-SCNC: 4 MMOL/L
BASOPHILS # BLD AUTO: 0.01 THOUSANDS/ÂΜL (ref 0–0.1)
BASOPHILS NFR BLD AUTO: 0 % (ref 0–1)
BUN SERPL-MCNC: 13 MG/DL (ref 5–25)
CALCIUM SERPL-MCNC: 9.6 MG/DL (ref 8.3–10.1)
CHLORIDE SERPL-SCNC: 106 MMOL/L (ref 96–108)
CO2 SERPL-SCNC: 28 MMOL/L (ref 21–32)
CREAT SERPL-MCNC: 0.81 MG/DL (ref 0.6–1.3)
EOSINOPHIL # BLD AUTO: 0.03 THOUSAND/ÂΜL (ref 0–0.61)
EOSINOPHIL NFR BLD AUTO: 0 % (ref 0–6)
ERYTHROCYTE [DISTWIDTH] IN BLOOD BY AUTOMATED COUNT: 14 % (ref 11.6–15.1)
FERRITIN SERPL-MCNC: 355 NG/ML (ref 11–307)
GFR SERPL CREATININE-BSD FRML MDRD: 75 ML/MIN/1.73SQ M
GLUCOSE P FAST SERPL-MCNC: 92 MG/DL (ref 65–99)
HCT VFR BLD AUTO: 33.3 % (ref 34.8–46.1)
HGB BLD-MCNC: 10.6 G/DL (ref 11.5–15.4)
IMM GRANULOCYTES # BLD AUTO: 0.03 THOUSAND/UL (ref 0–0.2)
IMM GRANULOCYTES NFR BLD AUTO: 0 % (ref 0–2)
IRON SATN MFR SERPL: 24 % (ref 15–50)
IRON SERPL-MCNC: 58 UG/DL (ref 50–170)
LYMPHOCYTES # BLD AUTO: 4.81 THOUSANDS/ÂΜL (ref 0.6–4.47)
LYMPHOCYTES NFR BLD AUTO: 61 % (ref 14–44)
MCH RBC QN AUTO: 29.1 PG (ref 26.8–34.3)
MCHC RBC AUTO-ENTMCNC: 31.8 G/DL (ref 31.4–37.4)
MCV RBC AUTO: 92 FL (ref 82–98)
MONOCYTES # BLD AUTO: 0.7 THOUSAND/ÂΜL (ref 0.17–1.22)
MONOCYTES NFR BLD AUTO: 9 % (ref 4–12)
NEUTROPHILS # BLD AUTO: 2.38 THOUSANDS/ÂΜL (ref 1.85–7.62)
NEUTS SEG NFR BLD AUTO: 30 % (ref 43–75)
NRBC BLD AUTO-RTO: 0 /100 WBCS
PLATELET # BLD AUTO: 496 THOUSANDS/UL (ref 149–390)
PMV BLD AUTO: 9.3 FL (ref 8.9–12.7)
POTASSIUM SERPL-SCNC: 3.9 MMOL/L (ref 3.5–5.3)
RBC # BLD AUTO: 3.64 MILLION/UL (ref 3.81–5.12)
SODIUM SERPL-SCNC: 138 MMOL/L (ref 135–147)
TIBC SERPL-MCNC: 246 UG/DL (ref 250–450)
WBC # BLD AUTO: 7.96 THOUSAND/UL (ref 4.31–10.16)

## 2023-06-21 PROCEDURE — 80048 BASIC METABOLIC PNL TOTAL CA: CPT

## 2023-06-21 PROCEDURE — 83540 ASSAY OF IRON: CPT

## 2023-06-21 PROCEDURE — 83550 IRON BINDING TEST: CPT

## 2023-06-21 PROCEDURE — 85025 COMPLETE CBC W/AUTO DIFF WBC: CPT

## 2023-06-21 PROCEDURE — 36415 COLL VENOUS BLD VENIPUNCTURE: CPT

## 2023-06-21 PROCEDURE — 82728 ASSAY OF FERRITIN: CPT

## 2023-06-22 ENCOUNTER — APPOINTMENT (OUTPATIENT)
Age: 68
End: 2023-06-22
Payer: COMMERCIAL

## 2023-06-22 ENCOUNTER — OFFICE VISIT (OUTPATIENT)
Dept: GASTROENTEROLOGY | Facility: CLINIC | Age: 68
End: 2023-06-22
Payer: COMMERCIAL

## 2023-06-22 VITALS
WEIGHT: 157.8 LBS | HEART RATE: 82 BPM | OXYGEN SATURATION: 97 % | HEIGHT: 67 IN | SYSTOLIC BLOOD PRESSURE: 125 MMHG | DIASTOLIC BLOOD PRESSURE: 70 MMHG | BODY MASS INDEX: 24.77 KG/M2 | TEMPERATURE: 96.8 F

## 2023-06-22 DIAGNOSIS — K52.9 INFLAMMATORY BOWEL DISEASE: ICD-10-CM

## 2023-06-22 DIAGNOSIS — D64.9 ANEMIA, UNSPECIFIED TYPE: ICD-10-CM

## 2023-06-22 DIAGNOSIS — K52.9 COLITIS: Primary | ICD-10-CM

## 2023-06-22 DIAGNOSIS — D53.8 OTHER SPECIFIED NUTRITIONAL ANEMIAS: Primary | ICD-10-CM

## 2023-06-22 LAB — HEMOCCULT STL QL IA: POSITIVE

## 2023-06-22 PROCEDURE — G0328 FECAL BLOOD SCRN IMMUNOASSAY: HCPCS

## 2023-06-22 PROCEDURE — 99204 OFFICE O/P NEW MOD 45 MIN: CPT | Performed by: INTERNAL MEDICINE

## 2023-06-22 RX ORDER — MESALAMINE 1.2 G/1
4800 TABLET, DELAYED RELEASE ORAL
Qty: 120 TABLET | Refills: 4 | Status: SHIPPED | OUTPATIENT
Start: 2023-06-22

## 2023-06-22 RX ORDER — MESALAMINE 4 G/60ML
4 ENEMA RECTAL
Qty: 30 ENEMA | Refills: 0 | Status: SHIPPED | OUTPATIENT
Start: 2023-06-22

## 2023-06-22 NOTE — PROGRESS NOTES
[facility] - Outpatient Consultation  Malcom Mast 78 y o  female MRN: [id number]  Encounter: [id number]          ASSESSMENT AND PLAN:    Malcom Mast is a 78 y o  female with diabetes who presents to recent hospitalization with what look like an acute GI illness and colitis with imaging concerning for chronic active colitis more suggestive of inflammatory bowel disease, who now presents to establish care  She has been having some improvement on the prednisone but is still having active symptoms as well as general weakness  She is slowly regaining her strength  She is trying to focus on nutrition  Colonoscopy from June 2023 with severe ulcerated mucosa in the rectosigmoid colon, Mckeon endoscopic score of 3  Biopsies with chronic active colitis seen  CT chest abdomen pelvis with pancolitis and stable small liver hemangioma  Most recent BMP normal   Iron studies with normal iron and iron saturation and elevated ferritin  Normal white blood cell count, hemoglobin 10 6 with MCV 92 and platelet count of 288  Hepatitis serologies with positive surface antibody and negative antigen  CRP was 192 Grecia fast and then 1 4506  C  difficile and stool enteric pathogen panel negative  1  Colitis    2  Inflammatory bowel disease        Orders Placed This Encounter   Procedures   • CBC and differential   • Comprehensive metabolic panel   • C-reactive protein   • Quantiferon TB Gold Plus     Continue Lialda 4 8 g/day  Consider adding Rowasa   Next blood work ordered today for 4-6 weeks  Continue prednisone taper  Consider adding turmeric and/or Radha-Diana  We discussed food and nutrition  Close follow-up      I have spent a total time of 40 minutes on 06/22/23 in caring for this patient including Diagnostic results, Prognosis, Risks and benefits of tx options, Instructions for management, Patient and family education, Risk factor reductions, Impressions, Counseling / Coordination of care, Documenting in the medical record, Reviewing / ordering tests, medicine, procedures   and Obtaining or reviewing history    ______________________________________________________________________    Referred by Dr Allison Nobles for colitis    HPI:    [place] is a 78 y o  female who presents with complaint of new diagnosis of IBD    She was recently diagnosed with colitis  She went in to the hospital weak  She is eating more solids and gaining strength  Last night was a better BM but still very loose  She feels gassy  Sputters and watery  She had a URI right before  She feels weak  Her sugars are elevated  REVIEW OF SYSTEMS:  10 point ROS reviewed and negative, except as above      Historical Information   Past Medical History:   Diagnosis Date   • Diabetes mellitus ([address] )    • PONV (postoperative nausea and vomiting)      Past Surgical History:   Procedure Laterality Date   • HYSTERECTOMY      ~1990     Social History   Social History     Substance and Sexual Activity   Alcohol Use Never     Social History     Substance and Sexual Activity   Drug Use Never     Social History     Tobacco Use   Smoking Status Former   • Types: Cigarettes   Smokeless Tobacco Never     No family history on file      Meds/Allergies       Current Outpatient Medications:   •  Alcohol Swabs 70 % PADS  •  glucose blood (OneTouch Verio) test strip  •  insulin aspart (NovoLOG FlexPen) 100 UNIT/ML injection pen  •  Insulin Pen Needle (BD Pen Needle Leonie 2nd Gen) 32G X 4 MM MISC  •  magnesium gluconate (MAGONATE) 500 mg tablet  •  mesalamine (LIALDA) 1 2 g EC tablet  •  mesalamine (ROWASA) 4 g  •  metFORMIN (GLUCOPHAGE) 1000 MG tablet  •  OneTouch Delica Lancets 78J MISC  •  potassium chloride (MICRO-K) 10 MEQ CR capsule  •  predniSONE 10 mg tablet  •  ondansetron (ZOFRAN) 4 mg tablet    Allergies   Allergen Reactions   • Valdecoxib Other (See Comments)     Patient is unaware of this allergy           Objective     Blood pressure "125/70, pulse 82, temperature (!) 96 8 °F (36 °C), temperature source Tympanic, height 5' 7\" (1 702 m), weight 71 6 kg (157 lb 12 8 oz), SpO2 97 %  Body mass index is 24 71 kg/m²  PHYSICAL EXAMINATION:    General Appearance:   Alert, cooperative, no distress   HEENT:  Normocephalic, atraumatic, anicteric  Neck supple, symmetrical, trachea midline  Lungs:   Equal chest rise and unlabored breathing, normal effort, no coughing  Cardiovascular:   No visualized JVD  Abdomen:   No abdominal distension  Skin:   No jaundice, rashes, or lesions  Musculoskeletal:   Normal range of motion visualized  Psych:  Normal affect and normal insight  Neuro:  Alert and appropriate           Lab Results:   Appointment on 06/22/2023   Component Date Value   • OCCULT BLD, FECAL IMMUNO* 06/22/2023 Positive (A)        Lab Results   Component Value Date    WBC 7 96 06/21/2023    HGB 10 6 (L) 06/21/2023    HCT 33 3 (L) 06/21/2023    MCV 92 06/21/2023     (H) 06/21/2023       Lab Results   Component Value Date    SODIUM 138 06/21/2023    K 3 9 06/21/2023     06/21/2023    CO2 28 06/21/2023    AGAP 4 06/21/2023    BUN 13 06/21/2023    CREATININE 0 81 06/21/2023    GLUC 143 (H) 06/08/2023    GLUF 92 06/21/2023    CALCIUM 9 6 06/21/2023    AST 11 (L) 06/06/2023    ALT 7 06/06/2023    ALKPHOS 48 06/06/2023    TP 6 2 (L) 06/06/2023    TBILI 0 54 06/06/2023    EGFR 75 06/21/2023       Lab Results   Component Value Date     6 (H) 06/06/2023       Lab Results   Component Value Date    HVI3MMZCEDQM 1 580 06/02/2021       Lab Results   Component Value Date    IRON 58 06/21/2023    TIBC 246 (L) 06/21/2023    FERRITIN 355 (H) 06/21/2023       Radiology Results:   Colonoscopy    Addendum Date: 6/7/2023 Addendum:   Yakov Garcia [facility] [id number] [phone] DATE OF SERVICE: 6/07/23 PHYSICIAN(S): Attending: Lexy Cain DO Fellow: Tyra Eden MD " INDICATION: Elevated C-reactive protein (CRP), Colitis POST-OP DIAGNOSIS: See the impression below  HISTORY: Prior colonoscopy: No prior colonoscopy  BOWEL PREPARATION: Golytely/Colyte/Trilyte PREPROCEDURE: Informed consent was obtained for the procedure, including sedation  Risks including but not limited to bleeding, infection, perforation, adverse drug reaction and aspiration were explained in detail  Also explained about less than 100% sensitivity with the exam and other alternatives  The patient was placed in the left lateral decubitus position  Procedure: Colonoscopy DETAILS OF PROCEDURE: Patient was taken to the procedure room where a time out was performed to confirm correct patient and correct procedure  The patient underwent monitored anesthesia care, which was administered by an anesthesia professional  The patient's blood pressure, heart rate, level of consciousness, oxygen, respirations and ECG were monitored throughout the procedure  A digital rectal exam was performed  The scope was introduced through the anus and advanced to the sigmoid colon  Retroflexion was performed in the rectum  The quality of bowel preparation was evaluated using the [facility] Bowel Preparation Scale with scores of: right colon = not assessed, transverse colon = not assessed, left colon = 2  The total BBPS score was 2  The patient experienced no blood loss  The procedure was moderately difficult due to severe inflammation  The patient tolerated the procedure well  There were no apparent adverse events   ANESTHESIA INFORMATION: ASA: II Anesthesia Type: IV Sedation with Anesthesia MEDICATIONS: No administrations occurring from 0826 to 0858 on 06/07/23 FINDINGS: Severe edematous, erythematous, hemorrhagic and ulcerated mucosa with erosion and loss of folds in the sigmoid colon, rectosigmoid and rectum, consistent with ulcerative colitis EVENTS: Procedure Events Event Event Time ENDO SCOPE OUT TIME 6/7/2023  8:56 AM SPECIMENS: ID Type Source Tests Collected by Time Destination 1 : BX, R/O COLITIS (MOD-SEVERE) Tissue Large Intestine, Sigmoid Colon TISSUE EXAM Melissa Daily Alesia,  6/7/2023  8:49 AM  2 : BX, R/O COLITIS (MOD-SEVERE) Tissue Rectum TISSUE EXAM Josh Pedersen Alesia,  6/7/2023  8:52 AM  EQUIPMENT: Colonoscope - IMPRESSION: Severe edematous, erythematous, hemorrhagic and ulcerated mucosa with erosion and loss of folds, consistent with ulcerative colitis in the sigmoid colon, rectosigmoid and rectum  Cold forceps biopsies obtained  Mckeon endoscopic score of 3  RECOMMENDATION:  Await pathology results  Will discuss with patient about starting IV steroids vs oral prednisone  Will check Hep B status and quantiferon gold while inpt  Can resume diet  Will discuss with patient about biologic therapy as this is necessary  Can be started as an outpt  [facility] is closest for her if she elects for an infusion  Josh Dumas DO     Result Date: 6/7/2023  Narrative: Table formatting from the original result was not included  [address] [place] [id number] [phone] DATE OF SERVICE: 6/07/23 PHYSICIAN(S): Attending: Mady Nettles DO Fellow: Buckley Goldmann, MD INDICATION: Elevated C-reactive protein (CRP), Colitis POST-OP DIAGNOSIS: See the impression below  HISTORY: Prior colonoscopy: No prior colonoscopy  BOWEL PREPARATION: Golytely/Colyte/Trilyte PREPROCEDURE: Informed consent was obtained for the procedure, including sedation  Risks including but not limited to bleeding, infection, perforation, adverse drug reaction and aspiration were explained in detail  Also explained about less than 100% sensitivity with the exam and other alternatives  The patient was placed in the left lateral decubitus position   Procedure: Colonoscopy DETAILS OF PROCEDURE: Patient was taken to the procedure room where a time out was performed to confirm correct patient and correct procedure  The patient underwent monitored anesthesia care, which was administered by an anesthesia professional  The patient's blood pressure, heart rate, level of consciousness, oxygen, respirations and ECG were monitored throughout the procedure  A digital rectal exam was performed  The scope was introduced through the anus and advanced to the sigmoid colon  Retroflexion was performed in the rectum  The quality of bowel preparation was evaluated using the [facility] Bowel Preparation Scale with scores of: right colon = not assessed, transverse colon = not assessed, left colon = 2  The total BBPS score was 2  The patient experienced no blood loss  The procedure was moderately difficult due to severe inflammation  The patient tolerated the procedure well  There were no apparent adverse events  ANESTHESIA INFORMATION: ASA: II Anesthesia Type: IV Sedation with Anesthesia MEDICATIONS: No administrations occurring from 0826 to 0858 on 06/07/23 FINDINGS: Severe edematous, erythematous, hemorrhagic and ulcerated mucosa with erosion and loss of folds in the sigmoid colon, rectosigmoid and rectum, consistent with ulcerative colitis EVENTS: Procedure Events Event Event Time ENDO SCOPE OUT TIME 6/7/2023  8:56 AM SPECIMENS: ID Type Source Tests Collected by Time Destination 1 : BX, R/O COLITIS (MOD-SEVERE) Tissue Large Intestine, Sigmoid Colon TISSUE EXAM Melissa Dumas,  6/7/2023  8:49 AM  2 : BX, R/O COLITIS (MOD-SEVERE) Tissue Rectum TISSUE EXAM Virgilio Landau Chaput, DO 6/7/2023  8:52 AM  EQUIPMENT: Colonoscope -     Impression: Severe edematous, erythematous, hemorrhagic and ulcerated mucosa with erosion and loss of folds, consistent with ulcerative colitis in the sigmoid colon, rectosigmoid and rectum  Cold forceps biopsies obtained    RECOMMENDATION:  Await pathology results  Virgilio Landau Chaput, DO     CTA dissection protocol chest/abdomen/pelvis    Result Date: 6/5/2023  Narrative: CTA - CHEST, ABDOMEN AND PELVIS - WITHOUT AND WITH IV CONTRAST INDICATION:   abdominal pain  COMPARISON: CT abdomen pelvis 4/15/2016  TECHNIQUE: CT examination of the chest, abdomen and pelvis was performed both prior to and after the administration of intravenous contrast    The noncontrast portion of this examination was performed utilizing low radiation dose technique  Thin section angiographic arterial phase post contrast technique was used in order to evaluate for aortic dissection  3D reformatted images and volume rendering were performed on an independent workstation  Multiplanar 2D reformatted images were created from the source data  Radiation dose length product (DLP) for this visit:  1513 33 mGy-cm   This examination, like all CT scans performed in the [facility], was performed utilizing techniques to minimize radiation dose exposure, including the use of iterative reconstruction and automated exposure control  IV Contrast:  100 mL of iohexol (OMNIPAQUE) Enteric Contrast:  Enteric contrast was not administered  FINDINGS: AORTA:  There is no aortic dissection or intramural hematoma  There is no aortic aneurysm  Mild atherosclerotic disease  No flow limiting atherosclerotic stenosis of aorta or major aortic branch vessel in the chest, abdomen or pelvis  CHEST LUNGS:  Lungs are clear  There is no tracheal or endobronchial lesion  PLEURA:  Unremarkable  HEART/PULMONARY ARTERIAL TREE: Normal heart size  Extensive coronary artery calcifications  Normal pulmonary arteries  MEDIASTINUM AND EDU:  Unremarkable  CHEST WALL AND LOWER NECK:  Unremarkable  ABDOMEN LIVER/BILIARY TREE: 1 0 cm enhancing focus in the left hepatic lobe lateral segment, and 2 4 cm nodule with nodular peripheral enhancement at the posterior margin of the right hepatic lobe posterior segment compatible with hemangiomas  These are similar to the prior exam  Diffuse fatty infiltration of the liver  No biliary dilatation   GALLBLADDER:  No calcified gallstones  No pericholecystic inflammatory change  SPLEEN:  Unremarkable  PANCREAS:  Unremarkable  ADRENAL GLANDS:  There is low density lobulated thickening of adrenal glands bilaterally statistically most likely to represent adenomatous hyperplasia  KIDNEYS/URETERS:  Unremarkable  No hydronephrosis  STOMACH AND BOWEL: Pancolonic thickening with mucosal enhancement indicative of colitis  Small bowel loops and terminal ileum appear unremarkable  No obstruction  APPENDIX:  No findings to suggest appendicitis  ABDOMINOPELVIC CAVITY:  No ascites or free intraperitoneal air  No lymphadenopathy  PELVIS REPRODUCTIVE ORGANS:  Patient is status post hysterectomy  URINARY BLADDER: Decompressed, limiting evaluation  ABDOMINAL WALL/INGUINAL REGIONS:  Unremarkable  OSSEOUS STRUCTURES:  There are age appropriate degenerative changes  No acute fracture or destructive osseous lesion  Impression: No acute aortic syndrome  Pancolitis, presumably due to infection versus inflammatory bowel disease  Stable small liver hemangiomas   Workstation performed: [id number]

## 2023-06-23 ENCOUNTER — NURSE TRIAGE (OUTPATIENT)
Age: 68
End: 2023-06-23

## 2023-06-23 DIAGNOSIS — R42 DIZZINESS: Primary | ICD-10-CM

## 2023-06-23 NOTE — TELEPHONE ENCOUNTER
Spoke with patients   Patient does not feel the rowasa enemas are necessary at this time and patient  Patient is feeling better less BM, no pain      Would you like patient to start the rowasa enemas?    ----- Message from Jany Centeno sent at 6/23/2023 12:56 PM EDT -----  Pt spouse calling questions on medications that were Rx at yesterday visit w/ Dr Meño Aranda

## 2023-06-26 ENCOUNTER — NURSE TRIAGE (OUTPATIENT)
Age: 68
End: 2023-06-26

## 2023-06-26 ENCOUNTER — TELEPHONE (OUTPATIENT)
Age: 68
End: 2023-06-26

## 2023-06-26 RX ORDER — MECLIZINE HCL 12.5 MG/1
12.5 TABLET ORAL 3 TIMES DAILY PRN
Qty: 30 TABLET | Refills: 0 | Status: SHIPPED | OUTPATIENT
Start: 2023-06-26

## 2023-06-26 RX ORDER — MECLIZINE HCL 12.5 MG/1
12.5 TABLET ORAL 3 TIMES DAILY PRN
Qty: 30 TABLET | Refills: 0 | Status: SHIPPED | OUTPATIENT
Start: 2023-06-26 | End: 2023-06-26

## 2023-06-26 NOTE — TELEPHONE ENCOUNTER
Last OV 6/22/23    I spoke with the patient's   Since Saturday patient experiencing new onset dizziness  Ongoing +nausea + fatigue  2-3 loose bowel movements daily  Decreased appetite, able to maintain adequate fluids  reports  +bloating, denies abdominal pain  Denies bleeding  Today's Blood sugar 102, bp 120/60  Currently taking 30 mg prednisone and mesalamine PO  Patient's  hesitant to go to ED  Will reach out to provider

## 2023-06-26 NOTE — TELEPHONE ENCOUNTER
Reason for Disposition  • Caller has already spoken with another triager and has no further questions    Protocols used: NO CONTACT OR DUPLICATE CONTACT CALL-ADULT-[place]

## 2023-06-26 NOTE — TELEPHONE ENCOUNTER
Spoke with patient's  Alba Nickerson, Pt denies headache at this time  Reviewed provider recommendations  If symptoms worsen advised patient to go to ED or call PCP  Patient's  verbalized understanding and agreeable

## 2023-06-26 NOTE — TELEPHONE ENCOUNTER
Caller: Shira Rahman    Doctor: Jeraldene Halsted    Reason for call: Patient called to check on med refill    Call back#: [phone]

## 2023-06-26 NOTE — TELEPHONE ENCOUNTER
Hi,    It can be from prednisone potentially  They should continue to keep an eye on the BP and blood sugar  Any headache? I would like her to continue the current prednisone taper, stay well hydrated  I will send in meclizine  She can take Tylenol if she has a headache  Stay well hydrated and be sure to eat   If the symptoms worsen I would like her to go to the ER or at least her PCP

## 2023-06-26 NOTE — TELEPHONE ENCOUNTER
Called pt and pt's spouse, Ying Lujan answered the phone  I had relayed message from provider about not having to take rowasa  Per , pt is not doing well, not able to stand and feeling very dizzy  Requested call back from provider as soon as possible for next step

## 2023-06-26 NOTE — TELEPHONE ENCOUNTER
Patients  already spoke to the office  Regarding: Regarding Medication/Symptom Call  ----- Message from Araseli Uriarte sent at 6/26/2023  9:10 AM EDT -----  PT's spouse, Kassi Link called stating PT has been experiencing dizziness since being discharged from hospital, he states it has been getting worse since Saturday  He thinks it may be related to medications, he mentioned Prednisone & Mesalamine  PT is also nauseous and vomited a few minutes before calling

## 2023-08-04 ENCOUNTER — TELEPHONE (OUTPATIENT)
Dept: FAMILY MEDICINE CLINIC | Facility: CLINIC | Age: 68
End: 2023-08-04

## 2023-08-04 ENCOUNTER — APPOINTMENT (OUTPATIENT)
Age: 68
End: 2023-08-04
Payer: COMMERCIAL

## 2023-08-04 DIAGNOSIS — K52.9 COLITIS: ICD-10-CM

## 2023-08-04 DIAGNOSIS — E11.65 TYPE 2 DIABETES MELLITUS WITH HYPERGLYCEMIA, WITHOUT LONG-TERM CURRENT USE OF INSULIN (HCC): ICD-10-CM

## 2023-08-04 DIAGNOSIS — K52.9 INFLAMMATORY BOWEL DISEASE: ICD-10-CM

## 2023-08-04 LAB
ALBUMIN SERPL BCP-MCNC: 3.1 G/DL (ref 3.5–5)
ALP SERPL-CCNC: 67 U/L (ref 46–116)
ALT SERPL W P-5'-P-CCNC: 21 U/L (ref 12–78)
ANION GAP SERPL CALCULATED.3IONS-SCNC: 6 MMOL/L
AST SERPL W P-5'-P-CCNC: 11 U/L (ref 5–45)
BASOPHILS # BLD AUTO: 0.06 THOUSANDS/ÂΜL (ref 0–0.1)
BASOPHILS NFR BLD AUTO: 1 % (ref 0–1)
BILIRUB SERPL-MCNC: 0.35 MG/DL (ref 0.2–1)
BUN SERPL-MCNC: 16 MG/DL (ref 5–25)
CALCIUM ALBUM COR SERPL-MCNC: 10.4 MG/DL (ref 8.3–10.1)
CALCIUM SERPL-MCNC: 9.7 MG/DL (ref 8.3–10.1)
CHLORIDE SERPL-SCNC: 110 MMOL/L (ref 96–108)
CO2 SERPL-SCNC: 27 MMOL/L (ref 21–32)
CREAT SERPL-MCNC: 0.74 MG/DL (ref 0.6–1.3)
CRP SERPL QL: 9.1 MG/L
EOSINOPHIL # BLD AUTO: 0.07 THOUSAND/ÂΜL (ref 0–0.61)
EOSINOPHIL NFR BLD AUTO: 1 % (ref 0–6)
ERYTHROCYTE [DISTWIDTH] IN BLOOD BY AUTOMATED COUNT: 15.9 % (ref 11.6–15.1)
GFR SERPL CREATININE-BSD FRML MDRD: 84 ML/MIN/1.73SQ M
GLUCOSE P FAST SERPL-MCNC: 129 MG/DL (ref 65–99)
HCT VFR BLD AUTO: 36.1 % (ref 34.8–46.1)
HGB BLD-MCNC: 10.9 G/DL (ref 11.5–15.4)
IMM GRANULOCYTES # BLD AUTO: 0.03 THOUSAND/UL (ref 0–0.2)
IMM GRANULOCYTES NFR BLD AUTO: 0 % (ref 0–2)
LYMPHOCYTES # BLD AUTO: 2.27 THOUSANDS/ÂΜL (ref 0.6–4.47)
LYMPHOCYTES NFR BLD AUTO: 30 % (ref 14–44)
MCH RBC QN AUTO: 28.8 PG (ref 26.8–34.3)
MCHC RBC AUTO-ENTMCNC: 30.2 G/DL (ref 31.4–37.4)
MCV RBC AUTO: 96 FL (ref 82–98)
MONOCYTES # BLD AUTO: 0.51 THOUSAND/ÂΜL (ref 0.17–1.22)
MONOCYTES NFR BLD AUTO: 7 % (ref 4–12)
NEUTROPHILS # BLD AUTO: 4.64 THOUSANDS/ÂΜL (ref 1.85–7.62)
NEUTS SEG NFR BLD AUTO: 61 % (ref 43–75)
NRBC BLD AUTO-RTO: 0 /100 WBCS
PLATELET # BLD AUTO: 344 THOUSANDS/UL (ref 149–390)
PMV BLD AUTO: 10.4 FL (ref 8.9–12.7)
POTASSIUM SERPL-SCNC: 4.1 MMOL/L (ref 3.5–5.3)
PROT SERPL-MCNC: 6.6 G/DL (ref 6.4–8.4)
RBC # BLD AUTO: 3.78 MILLION/UL (ref 3.81–5.12)
SODIUM SERPL-SCNC: 143 MMOL/L (ref 135–147)
WBC # BLD AUTO: 7.58 THOUSAND/UL (ref 4.31–10.16)

## 2023-08-04 PROCEDURE — 86480 TB TEST CELL IMMUN MEASURE: CPT

## 2023-08-04 PROCEDURE — 86140 C-REACTIVE PROTEIN: CPT

## 2023-08-04 PROCEDURE — 80053 COMPREHEN METABOLIC PANEL: CPT

## 2023-08-04 PROCEDURE — 85025 COMPLETE CBC W/AUTO DIFF WBC: CPT

## 2023-08-04 PROCEDURE — 36415 COLL VENOUS BLD VENIPUNCTURE: CPT

## 2023-08-04 RX ORDER — BLOOD SUGAR DIAGNOSTIC
STRIP MISCELLANEOUS
Qty: 300 EACH | Refills: 3 | Status: SHIPPED | OUTPATIENT
Start: 2023-08-04

## 2023-08-04 NOTE — TELEPHONE ENCOUNTER
Spoke to pt she states she needs refills on metformin 500 mg pt takes 2 tabs PO BID and also her onetouch ultra test strips pt tests TID. Pt wants 90 day supply of both sent to [facility].

## 2023-08-04 NOTE — TELEPHONE ENCOUNTER
She wants the 500 mg 2 tabs BID. I asked her if she wanted the 1000 so she doesn't have to take 2 BID but that is what she wants.

## 2023-08-04 NOTE — TELEPHONE ENCOUNTER
Von Parson called with a bunch of questions about her medications. She also said she wants to change pharmacies. I advised her to use the refill line but she insisted on talking with someone.  She can be reached at [phone]

## 2023-08-08 ENCOUNTER — TELEPHONE (OUTPATIENT)
Dept: GASTROENTEROLOGY | Facility: CLINIC | Age: 68
End: 2023-08-08

## 2023-08-08 LAB
GAMMA INTERFERON BACKGROUND BLD IA-ACNC: 0.03 IU/ML
M TB IFN-G BLD-IMP: NEGATIVE
M TB IFN-G CD4+ BCKGRND COR BLD-ACNC: 0 IU/ML
M TB IFN-G CD4+ BCKGRND COR BLD-ACNC: 0 IU/ML
MITOGEN IGNF BCKGRD COR BLD-ACNC: 2.91 IU/ML

## 2023-08-08 NOTE — TELEPHONE ENCOUNTER
----- Message from Melonie Welsh MD sent at 8/7/2023  2:41 PM EDT -----  Hi,    Can you please let her know that her blood work came back and overall it looked good. Her inflammatory marker has improved significantly. Her blood counts are stable and her hemoglobin is improving. Her electrolytes looked good. Her sugar was a little bit elevated. Thank you!

## 2023-08-10 ENCOUNTER — RA CDI HCC (OUTPATIENT)
Dept: OTHER | Facility: HOSPITAL | Age: 68
End: 2023-08-10

## 2023-08-14 RX ORDER — MESALAMINE 4 G/60 ML
KIT RECTAL
COMMUNITY
Start: 2023-06-22

## 2023-08-18 ENCOUNTER — HOSPITAL ENCOUNTER (OUTPATIENT)
Dept: NON INVASIVE DIAGNOSTICS | Facility: HOSPITAL | Age: 68
Discharge: HOME | End: 2023-08-18
Payer: COMMERCIAL

## 2023-08-18 ENCOUNTER — OFFICE VISIT (OUTPATIENT)
Dept: FAMILY MEDICINE CLINIC | Facility: CLINIC | Age: 68
End: 2023-08-18
Payer: COMMERCIAL

## 2023-08-18 ENCOUNTER — TELEPHONE (OUTPATIENT)
Age: 68
End: 2023-08-18

## 2023-08-18 VITALS
DIASTOLIC BLOOD PRESSURE: 68 MMHG | SYSTOLIC BLOOD PRESSURE: 158 MMHG | TEMPERATURE: 96.9 F | HEIGHT: 64 IN | BODY MASS INDEX: 27.59 KG/M2 | WEIGHT: 161.6 LBS | HEART RATE: 100 BPM

## 2023-08-18 DIAGNOSIS — Z00.00 MEDICARE ANNUAL WELLNESS VISIT, SUBSEQUENT: Primary | ICD-10-CM

## 2023-08-18 DIAGNOSIS — E11.9 TYPE II DIABETES MELLITUS, WELL CONTROLLED (HCC): ICD-10-CM

## 2023-08-18 DIAGNOSIS — L65.9 HAIR LOSS: ICD-10-CM

## 2023-08-18 DIAGNOSIS — K52.9 COLITIS: ICD-10-CM

## 2023-08-18 DIAGNOSIS — E83.42 HYPOMAGNESEMIA: ICD-10-CM

## 2023-08-18 DIAGNOSIS — M79.89 SWOLLEN LEG: ICD-10-CM

## 2023-08-18 DIAGNOSIS — M21.371 ACQUIRED RIGHT FOOT DROP: ICD-10-CM

## 2023-08-18 DIAGNOSIS — D51.9 ANEMIA DUE TO VITAMIN B12 DEFICIENCY, UNSPECIFIED B12 DEFICIENCY TYPE: ICD-10-CM

## 2023-08-18 DIAGNOSIS — E87.6 HYPOKALEMIA: ICD-10-CM

## 2023-08-18 PROCEDURE — 93971 EXTREMITY STUDY: CPT

## 2023-08-18 PROCEDURE — G0439 PPPS, SUBSEQ VISIT: HCPCS | Performed by: FAMILY MEDICINE

## 2023-08-18 PROCEDURE — 93971 EXTREMITY STUDY: CPT | Performed by: SURGERY

## 2023-08-18 NOTE — ASSESSMENT & PLAN NOTE
Hopefully resolved since diarrhea has improved.   Vies she finish this bottle of pills then stopping potassium pills and slip for labs after off medication for 3-week

## 2023-08-18 NOTE — TELEPHONE ENCOUNTER
pts spouse and pt called with questions about pt being on mesalamine long term. I reassured them that pts with colitis are often on mesalamine long term. She has been on the [address] since beginning of June. Pts concern is the possible side effects of the mesalamine. Pt is currently doing well and not experiencing any side effects. They mentioned slight ankle swelling but admitted that occurred in the hospital and is a side effect of many medications pt is on. I answered all questions but they inquired how long pt will be on mesalamine or if pt can decrease dose. I stated I would reach out to provider. Pt is scheduled for OV 9/18.

## 2023-08-18 NOTE — ASSESSMENT & PLAN NOTE
Questionable due to recent illness and/or medications including steroids etc.  We will check further labs.

## 2023-08-18 NOTE — TELEPHONE ENCOUNTER
Hi,    Thank you! Once we start mesalamine we can stop it at any time, but it often is very well-tolerated and usually if there are side effects they tend to occur earlier on. I would recommend continuing it ongoing for now but we can certainly try to stop it in the future. However, I would reassure them that overall it is very safe. Thank you!

## 2023-08-18 NOTE — ASSESSMENT & PLAN NOTE
Acute over the past 3 weeks of questionable etiology. Possibly a peroneal nerve irritation versus lumbar spine issue.  until left lower leg problem resolved advised trial of heat to lateral aspect agent affecting a possible peroneal injury without improvement will require further work-up including MRI of LS spine and nerve conduction test.

## 2023-08-18 NOTE — PROGRESS NOTES
Assessment and Plan:     Problem List Items Addressed This Visit        Digestive    Colitis       Other    Medicare annual wellness visit, subsequent - Primary     Patient behind on mammogram and DEXA will be ordered once above acute problems resolved he has had recent labs. She refuses any immunizations colonoscopy is now as per GI with her new diagnosis of colitis. Acquired right foot drop     Acute over the past 3 weeks of questionable etiology. Possibly a peroneal nerve irritation versus lumbar spine issue. until left lower leg problem resolved advised trial of heat to lateral aspect agent affecting a possible peroneal injury without improvement will require further work-up including MRI of LS spine and nerve conduction test.         Hypokalemia     Hopefully resolved since diarrhea has improved. Vies she finish this bottle of pills then stopping potassium pills and slip for labs after off medication for 3-week         Relevant Orders    Basic metabolic panel    Hypomagnesemia     As per note under hypokalemia. Relevant Orders    Magnesium    Hair loss     Questionable due to recent illness and/or medications including steroids etc.  We will check further labs. Relevant Orders    TSH, 3rd generation with Free T4 reflex   Other Visit Diagnoses     Swollen leg        Relevant Orders    VAS lower limb venous duplex study, unilateral/limited    Type II diabetes mellitus, well controlled ([address])        Anemia due to vitamin B12 deficiency, unspecified B12 deficiency type        Relevant Orders    Vitamin B12           Preventive health issues were discussed with patient, and age appropriate screening tests were ordered as noted in patient's After Visit Summary. Personalized health advice and appropriate referrals for health education or preventive services given if needed, as noted in patient's After Visit Summary.      History of Present Illness:     Patient presents for a Medicare Wellness Visit    Patient here for wellness exam but with multiple problems. Problem #1: Diabetes. States sugars were markedly elevated while on prednisone but now off of prednisone since the beginning of August with sugars averaging in the 100-1 30 range. She is trying to follow a diet. States her vision has changed but not due for an eye exam till later in the fall she denies any eye pain. Problem #2: Has noted swelling in her legs which she thinks is due to medication however not sure why 1 leg is more swollen than the other. Denies any pain in her legs but states the left leg feels heavy and weak. She first noticed the swelling on the day of discharge from the hospital in June. Problem #3: Noticed right foot drop about 3 weeks ago without any known trauma. States leg does feel numb especially from knee to ankle. She does get occasional pain over her lower back left buttocks more so than right with questionable radiation into either leg. She has noticed slight swelling of her right leg again she feels is due to medication. Problem #4: Colitis -she is currently on maintenance mesalamine with what she feels are some side effects therefore put a call into GI this morning. States her bowel movements have markedly improved still frequent but now solid without hematochezia or melena. She of is wondering if she must continue her potassium and magnesium now the diarrhea has so much improved. Does note occasional abdominal distention however denies pain. She states her appetite is good without nausea or vomiting. She is concerned about recent medications affecting her bones and if she needs a DEXA scan. Also concerned about recent elevated calcium and CRP on recent labs. Denies taking any over-the-counter calcium. She is taking vitamin D. States she had a DEXA years ago and of course is not up-to-date with her mammogram.  Also she wants to make sure this is a wellness exam and not be billed. Despite all the problems as noted above     Patient Care Team:  Bandar Peterson MD as PCP - Lana Mendez MD     Review of Systems:     Review of Systems   Constitutional: Negative for chills, fever and unexpected weight change. HENT: Negative for ear pain and sore throat. Eyes: Positive for visual disturbance. Negative for pain. Respiratory: Negative for cough and shortness of breath. Cardiovascular: Positive for leg swelling. Negative for chest pain and palpitations. Gastrointestinal: Negative for abdominal pain, blood in stool, diarrhea, nausea and vomiting. Genitourinary: Negative for dysuria and hematuria. Musculoskeletal: Positive for gait problem. Negative for arthralgias and back pain. Skin: Negative for color change and rash. Neurological: Positive for weakness and numbness. Negative for dizziness, seizures, syncope and light-headedness. All other systems reviewed and are negative. Problem List:     Patient Active Problem List   Diagnosis   • Colitis   • Hypotension   • History of biliary dyskinesia   • DMII (diabetes mellitus, type 2) (HCC)   • Electrolyte abnormality   • Gallbladder polyp   • Leiomyoma of uterus   • Medicare annual wellness visit, subsequent   • Left leg swelling   • Acquired right foot drop   • Hypokalemia   • Hypomagnesemia   • Hair loss      Past Medical and Surgical History:     Past Medical History:   Diagnosis Date   • Diabetes mellitus ([address])    • PONV (postoperative nausea and vomiting)      Past Surgical History:   Procedure Laterality Date   • HYSTERECTOMY      ~1990      Family History:     History reviewed. No pertinent family history.    Social History:     Social History     Socioeconomic History   • Marital status: /Civil Union     Spouse name: None   • Number of children: None   • Years of education: None   • Highest education level: None   Occupational History   • None   Tobacco Use   • Smoking status: Former     Types: Cigarettes   • Smokeless tobacco: Never   Vaping Use   • Vaping Use: Never used   Substance and Sexual Activity   • Alcohol use: Never   • Drug use: Never   • Sexual activity: None   Other Topics Concern   • None   Social History Narrative   • None     Social Determinants of Health     Financial Resource Strain: Unknown (8/18/2023)    Overall Financial Resource Strain (CARDIA)    • Difficulty of Paying Living Expenses: Patient refused   Food Insecurity: Not on file   Transportation Needs: No Transportation Needs (8/18/2023)    PRAPARE - Transportation    • Lack of Transportation (Medical): No    • Lack of Transportation (Non-Medical): No   Physical Activity: Not on file   Stress: Not on file   Social Connections: Not on file   Intimate Partner Violence: Not on file   Housing Stability: Not on file      Medications and Allergies:     Current Outpatient Medications   Medication Sig Dispense Refill   • Alcohol Swabs 70 % PADS May substitute brand based on insurance coverage. Check glucose TID. 100 each 0   • glucose blood (OneTouch Verio) test strip May substitute brand based on insurance coverage. Check glucose TID. 300 each 3   • glucose blood test strip Use 1 each daily as needed Use as instructed     • magnesium gluconate (MAGONATE) 500 mg tablet Take 1 tablet (500 mg total) by mouth in the morning 90 tablet 0   • mesalamine (LIALDA) 1.2 g EC tablet Take 4 tablets (4.8 g total) by mouth daily with breakfast 120 tablet 4   • metFORMIN (GLUCOPHAGE) 500 mg tablet 2 pills bid 360 tablet 3   • OneTouch Delica Lancets 72H MISC May substitute brand based on insurance coverage. Check glucose TID.  100 each 0   • potassium chloride (MICRO-K) 10 MEQ CR capsule Take 1 capsule (10 mEq total) by mouth daily 90 capsule 0   • insulin aspart (NovoLOG FlexPen) 100 UNIT/ML injection pen Inject 1-5 Units under the skin 3 (three) times a day with meals Blood glucose 150 - 209: 1 unit of insulin Blood glucose 210 - 269: 2 units of insulin Blood glucose 270 - 329 3 units of insulin Blood glucose 330 - 389 4 units of insulin Blood glucose greater than or equal to 390: 5 units of insulin (Patient not taking: Reported on 8/18/2023) 15 mL 0   • Insulin Pen Needle (BD Pen Needle Leonie 2nd Gen) 32G X 4 MM MISC For use with insulin pen. Pharmacy may dispense brand covered by insurance. (Patient not taking: Reported on 8/18/2023) 100 each 0   • meclizine (ANTIVERT) 12.5 MG tablet Take 1 tablet (12.5 mg total) by mouth 3 (three) times a day as needed for dizziness (Patient not taking: Reported on 8/18/2023) 30 tablet 0   • mesalamine (ROWASA) 4 g Insert 60 mL (4 g total) into the rectum daily at bedtime (Patient not taking: Reported on 8/18/2023) 30 enema 0   • Mesalamine-Cleanser 4 g KIT  (Patient not taking: Reported on 8/18/2023)     • ondansetron (ZOFRAN) 4 mg tablet ondansetron HCl 4 mg tablet (Patient not taking: Reported on 6/16/2023)       No current facility-administered medications for this visit. Allergies   Allergen Reactions   • Valdecoxib Other (See Comments)     Patient is unaware of this allergy      Immunizations: There is no immunization history on file for this patient. Health Maintenance:         Topic Date Due   • Hepatitis C Screening  Never done   • Breast Cancer Screening: Mammogram  11/09/2018   • Colorectal Cancer Screening  06/07/2033         Topic Date Due   • COVID-19 Vaccine (1) Never done   • Pneumococcal Vaccine: 65+ Years (1 - PCV) Never done   • Influenza Vaccine (1) 09/01/2023      Medicare Screening Tests and Risk Assessments:         Health Risk Assessment:   Patient rates overall health as good. Patient feels that their physical health rating is same. Patient is satisfied with their life. Eyesight was rated as same. Hearing was rated as same. Patient feels that their emotional and mental health rating is same. Patients states they are never, rarely angry.  Patient states they are never, rarely unusually tired/fatigued. Pain experienced in the last 7 days has been none. Patient states that she has experienced no weight loss or gain in last 6 months. Fall Risk Screening: In the past year, patient has experienced: no history of falling in past year      Urinary Incontinence Screening:   Patient has not leaked urine accidently in the last six months. Home Safety:  Patient does not have trouble with stairs inside or outside of their home. Patient has working smoke alarms and has working carbon monoxide detector. Home safety hazards include: none. Nutrition:   Current diet is Regular and Diabetic. Medications:   Patient is not currently taking any over-the-counter supplements. Patient is able to manage medications. Activities of Daily Living (ADLs)/Instrumental Activities of Daily Living (IADLs):   Walk and transfer into and out of bed and chair?: Yes  Dress and groom yourself?: Yes    Bathe or shower yourself?: Yes    Feed yourself?  Yes  Do your laundry/housekeeping?: Yes  Manage your money, pay your bills and track your expenses?: Yes  Make your own meals?: Yes    Do your own shopping?: Yes    Previous Hospitalizations:   Any hospitalizations or ED visits within the last 12 months?: Yes    How many hospitalizations have you had in the last year?: 1-2    Hospitalization Comments: Admission for diarrhea- acute colitis ijn 6/2023    Advance Care Planning:   Living will: No    Durable POA for healthcare: No    Advanced directive: No    Advanced directive counseling given: No    Five wishes given: Yes      Cognitive Screening:   Provider or family/friend/caregiver concerned regarding cognition?: No    PREVENTIVE SCREENINGS      Cardiovascular Screening:    General: Screening Current and Risks and Benefits Discussed      Diabetes Screening:     General: Screening Not Indicated, History Diabetes and Screening Current      Colorectal Cancer Screening:     General: Screening Current      Breast Cancer Screening:     General: Patient Declines      Cervical Cancer Screening:    General: Screening Not Indicated      Osteoporosis Screening:    General: Risks and Benefits Discussed      Abdominal Aortic Aneurysm (AAA) Screening:        General: Screening Not Indicated      Lung Cancer Screening:     General: Screening Not Indicated      Hepatitis C Screening:    General: Screening Not Indicated    Screening, Brief Intervention, and Referral to Treatment (SBIRT)    Screening  Typical number of drinks in a day: 0  Typical number of drinks in a week: 0  Interpretation: Low risk drinking behavior. AUDIT-C Screenin) How often did you have a drink containing alcohol in the past year? never  2) How many drinks did you have on a typical day when you were drinking in the past year? 0  3) How often did you have 6 or more drinks on one occasion in the past year? never    AUDIT-C Score: 0  Interpretation: Score 0-2 (female): Negative screen for alcohol misuse    Other Counseling Topics:   Regular weightbearing exercise. No results found. Physical Exam:     /68 (BP Location: Left arm, Patient Position: Sitting, Cuff Size: Adult)   Pulse 100   Temp (!) 96.9 °F (36.1 °C) (Tympanic)   Ht 5' 4" (1.626 m)   Wt 73.3 kg (161 lb 9.6 oz)   BMI 27.74 kg/m²     Physical Exam  Constitutional:       Appearance: Normal appearance. Neck:      Vascular: No carotid bruit. Cardiovascular:      Rate and Rhythm: Normal rate and regular rhythm. Pulses: Normal pulses. Heart sounds: Normal heart sounds. No murmur heard. Pulmonary:      Effort: Pulmonary effort is normal.      Breath sounds: Normal breath sounds. Musculoskeletal:      Right lower leg: Edema present. Left lower leg: Edema present.       Comments: Left lower extremity with marked swelling especially from knee to foot slight erythema and warmth with questionable calf tenderness without Homans right lower extremity with +1 pitting edema about ankle without erythema or warmth or calf tenderness or Homans. Lymphadenopathy:      Cervical: No cervical adenopathy. Neurological:      Mental Status: She is alert and oriented to person, place, and time. Deep Tendon Reflexes:      Reflex Scores:       Patellar reflexes are 3+ on the right side and 3+ on the left side. Achilles reflexes are 1+ on the right side and 1+ on the left side. Comments: Positive right foot drop otherwise motor strength 4+/5 of both lower extremities.           Bandar Peterson MD

## 2023-08-28 DIAGNOSIS — M79.89 LEG SWELLING: Primary | ICD-10-CM

## 2023-09-15 ENCOUNTER — TELEPHONE (OUTPATIENT)
Dept: FAMILY MEDICINE CLINIC | Facility: CLINIC | Age: 68
End: 2023-09-15

## 2023-09-15 NOTE — TELEPHONE ENCOUNTER
Patient notified. States the wraps seem to be helping the swelling. Patient will let you know if she needs to be seen again.

## 2023-09-15 NOTE — TELEPHONE ENCOUNTER
Bora Villalobos called to say that she had a venous doppler done on 8/18. She said that [facility] is now calling her to schedule another one and Dr Ly Cross did put an order in for one dated 8/28. Please clarify.  Bora Villalobos can be reached at [phone]

## 2023-09-21 ENCOUNTER — OFFICE VISIT (OUTPATIENT)
Dept: GASTROENTEROLOGY | Facility: CLINIC | Age: 68
End: 2023-09-21
Payer: COMMERCIAL

## 2023-09-21 VITALS
DIASTOLIC BLOOD PRESSURE: 82 MMHG | HEIGHT: 64 IN | HEART RATE: 85 BPM | WEIGHT: 161 LBS | SYSTOLIC BLOOD PRESSURE: 154 MMHG | OXYGEN SATURATION: 98 % | BODY MASS INDEX: 27.49 KG/M2 | TEMPERATURE: 97.6 F

## 2023-09-21 DIAGNOSIS — M21.379 DROP FOOT GAIT: ICD-10-CM

## 2023-09-21 DIAGNOSIS — K52.9 INFLAMMATORY BOWEL DISEASE: Primary | ICD-10-CM

## 2023-09-21 DIAGNOSIS — L65.9 HAIR LOSS: ICD-10-CM

## 2023-09-21 DIAGNOSIS — R42 DIZZINESS: ICD-10-CM

## 2023-09-21 PROCEDURE — 99214 OFFICE O/P EST MOD 30 MIN: CPT | Performed by: INTERNAL MEDICINE

## 2023-09-21 RX ORDER — VITAMIN B COMPLEX
1 CAPSULE ORAL DAILY
COMMUNITY

## 2023-09-21 RX ORDER — MELATONIN
1000 DAILY
COMMUNITY

## 2023-09-21 RX ORDER — DIPHENOXYLATE HYDROCHLORIDE AND ATROPINE SULFATE 2.5; .025 MG/1; MG/1
TABLET ORAL DAILY
COMMUNITY

## 2023-09-21 NOTE — PATIENT INSTRUCTIONS
Stop mesalamine 4.8 g/day  Next blood work and stool test ordered today.  To be completed around the time of the colonoscopy or with your next set of blood work  Next colonoscopy now  Add turmeric 1000mg twice daily

## 2023-09-21 NOTE — PROGRESS NOTES
[facility] - Outpatient Follow-up Note  Sarah Reid 78 y.o. female MRN: [id number]  Encounter: [id number]          ASSESSMENT AND PLAN:    Sarah Reid is a 78 y.o. female with diabetes, last seen in June 2023 after recent hospitalization for what appeared to be acute GI illness and colitis but with concern for chronic active colitis more consistent with IBD, now presenting for follow-up. When she was last seen she had noted some improvement on prednisone but still with some symptoms at that time. She has been experiencing muscle weakness, drop foot and hair loss. Concerned this is from [facility]. She wishes to stop. Colonoscopy from June 2023 with severe ulcerated mucosa in the rectosigmoid colon, Mckeon score of 3, biopsies with chronic active colitis. Prior CT chest abdomen pelvis with pancolitis and stable small liver hemangioma. Most recent CMP was chloride 110, glucose 129, corrected calcium 10.4 and albumin of 3.1 but otherwise normal.  Most recent CBC with normal white blood cell count, MCV, platelets but hemoglobin 10.9. CRP 9.1. Quant gold negative as of August.    1. Inflammatory bowel disease    2. Dizziness    3. Hair loss    4.  Drop foot gait        Orders Placed This Encounter   Procedures    Calprotectin,Fecal    CBC and differential    Comprehensive metabolic panel    C-reactive protein    Colonoscopy     Stop mesalamine 4.8 g/day  Next blood work and stool test ordered today  Next colonoscopy now  Add turmeric 1000mg twice daily    General health recommendations:  Yearly flu shot  COVID vaccine and booster  Pneumonia vaccine  Shingrix  Routine skin exams with the dermatologist  Routine pap smears and mammograms    I have spent a total time of 30 minutes on 09/21/23 in caring for this patient including Prognosis, Risks and benefits of tx options, Impressions, Counseling / Coordination of care, Documenting in the medical record, Reviewing / ordering tests, medicine, procedures  , and Obtaining or reviewing history  . ______________________________________________________________________    SUBJECTIVE:    Lucita Ramos is a 78 y.o. female who presents with complaint of colitis. She feels like she has effects from the medication. She is not sleeping well. She feels weak. She has drop foot. She is having swelling of her left leg. + diarrhea. Overall she does not feel well. REVIEW OF SYSTEMS IS OTHERWISE NEGATIVE. 10 point ROS reviewed and negative, except as above      Historical Information   Past Medical History:   Diagnosis Date    Diabetes mellitus (HCC)     PONV (postoperative nausea and vomiting)      Past Surgical History:   Procedure Laterality Date    HYSTERECTOMY      ~1990     Social History   Social History     Substance and Sexual Activity   Alcohol Use Never     Social History     Substance and Sexual Activity   Drug Use Never     Social History     Tobacco Use   Smoking Status Former    Types: Cigarettes   Smokeless Tobacco Never     No family history on file.     Meds/Allergies       Current Outpatient Medications:     Alcohol Swabs 70 % PADS    b complex vitamins capsule    cholecalciferol (VITAMIN D3) 1,000 units tablet    glucose blood (OneTouch Verio) test strip    glucose blood test strip    mesalamine (LIALDA) 1.2 g EC tablet    metFORMIN (GLUCOPHAGE) 500 mg tablet    multivitamin (THERAGRAN) TABS    OneTouch Delica Lancets 68X MISC    insulin aspart (NovoLOG FlexPen) 100 UNIT/ML injection pen    Insulin Pen Needle (BD Pen Needle Leonie 2nd Gen) 32G X 4 MM MISC    magnesium gluconate (MAGONATE) 500 mg tablet    meclizine (ANTIVERT) 12.5 MG tablet    mesalamine (ROWASA) 4 g    Mesalamine-Cleanser 4 g KIT    ondansetron (ZOFRAN) 4 mg tablet    potassium chloride (MICRO-K) 10 MEQ CR capsule    Allergies   Allergen Reactions    Valdecoxib Other (See Comments)     Patient is unaware of this allergy           Objective     Blood pressure 154/82, pulse 85, temperature 97.6 °F (36.4 °C), temperature source Tympanic, height 5' 4" (1.626 m), weight 73 kg (161 lb), SpO2 98 %. Body mass index is 27.64 kg/m². PHYSICAL EXAMINATION:    General Appearance:   Alert, cooperative, no distress   HEENT:  Normocephalic, atraumatic, anicteric. Neck supple, symmetrical, trachea midline. Lungs:   Equal chest rise and unlabored breathing, normal effort, no coughing. Cardiovascular:   No visualized JVD. Abdomen:   No abdominal distension. Skin:   No jaundice, rashes, or lesions. Musculoskeletal:   Normal range of motion visualized. Psych:  Normal affect and normal insight. Neuro:  Alert and appropriate. Lab Results:   No visits with results within 1 Day(s) from this visit.    Latest known visit with results is:   Appointment on 08/04/2023   Component Date Value    WBC 08/04/2023 7.58     RBC 08/04/2023 3.78 (L)     Hemoglobin 08/04/2023 10.9 (L)     Hematocrit 08/04/2023 36.1     MCV 08/04/2023 96     MCH 08/04/2023 28.8     MCHC 08/04/2023 30.2 (L)     RDW 08/04/2023 15.9 (H)     MPV 08/04/2023 10.4     Platelets 04/46/2041 344     nRBC 08/04/2023 0     Neutrophils Relative 08/04/2023 61     Immat GRANS % 08/04/2023 0     Lymphocytes Relative 08/04/2023 30     Monocytes Relative 08/04/2023 7     Eosinophils Relative 08/04/2023 1     Basophils Relative 08/04/2023 1     Neutrophils Absolute 08/04/2023 4.64     Immature Grans Absolute 08/04/2023 0.03     Lymphocytes Absolute 08/04/2023 2.27     Monocytes Absolute 08/04/2023 0.51     Eosinophils Absolute 08/04/2023 0.07     Basophils Absolute 08/04/2023 0.06     Sodium 08/04/2023 143     Potassium 08/04/2023 4.1     Chloride 08/04/2023 110 (H)     CO2 08/04/2023 27     ANION GAP 08/04/2023 6     BUN 08/04/2023 16     Creatinine 08/04/2023 0.74     Glucose, Fasting 08/04/2023 129 (H)     Calcium 08/04/2023 9.7     Corrected Calcium 08/04/2023 10.4 (H)     AST 08/04/2023 11     ALT 08/04/2023 21     Alkaline Phosphatase 08/04/2023 67     Total Protein 08/04/2023 6.6     Albumin 08/04/2023 3.1 (L)     Total Bilirubin 08/04/2023 0.35     eGFR 08/04/2023 84     CRP 08/04/2023 9.1 (H)     QFT Nil 08/04/2023 0.03     QFT TB1-NIL 08/04/2023 0.00     QFT TB2-NIL 08/04/2023 0.00     QFT Mitogen-NIL 08/04/2023 2.91     QFT Final Interpretation 08/04/2023 Negative        Lab Results   Component Value Date    WBC 7.58 08/04/2023    HGB 10.9 (L) 08/04/2023    HCT 36.1 08/04/2023    MCV 96 08/04/2023     08/04/2023       Lab Results   Component Value Date    SODIUM 143 08/04/2023    K 4.1 08/04/2023     (H) 08/04/2023    CO2 27 08/04/2023    AGAP 6 08/04/2023    BUN 16 08/04/2023    CREATININE 0.74 08/04/2023    GLUC 143 (H) 06/08/2023    GLUF 129 (H) 08/04/2023    CALCIUM 9.7 08/04/2023    AST 11 08/04/2023    ALT 21 08/04/2023    ALKPHOS 67 08/04/2023    TP 6.6 08/04/2023    TBILI 0.35 08/04/2023    EGFR 84 08/04/2023       Lab Results   Component Value Date    CRP 9.1 (H) 08/04/2023       Lab Results   Component Value Date    IVR0LIQDTAZF 1.580 06/02/2021       Lab Results   Component Value Date    IRON 58 06/21/2023    TIBC 246 (L) 06/21/2023    FERRITIN 355 (H) 06/21/2023       Radiology Results:   No results found.

## 2023-10-17 PROBLEM — Z00.00 MEDICARE ANNUAL WELLNESS VISIT, SUBSEQUENT: Status: RESOLVED | Noted: 2023-08-18 | Resolved: 2023-10-17

## 2023-11-01 LAB
LEFT EYE DIABETIC RETINOPATHY: POSITIVE
RIGHT EYE DIABETIC RETINOPATHY: POSITIVE
SEVERITY (EYE EXAM): NORMAL

## 2023-11-03 ENCOUNTER — APPOINTMENT (OUTPATIENT)
Age: 68
End: 2023-11-03
Payer: COMMERCIAL

## 2023-11-03 DIAGNOSIS — K52.9 INFLAMMATORY BOWEL DISEASE: ICD-10-CM

## 2023-11-03 DIAGNOSIS — D53.8 OTHER SPECIFIED NUTRITIONAL ANEMIAS: ICD-10-CM

## 2023-11-03 DIAGNOSIS — D51.9 ANEMIA DUE TO VITAMIN B12 DEFICIENCY, UNSPECIFIED B12 DEFICIENCY TYPE: ICD-10-CM

## 2023-11-03 DIAGNOSIS — E83.42 HYPOMAGNESEMIA: ICD-10-CM

## 2023-11-03 DIAGNOSIS — L65.9 HAIR LOSS: ICD-10-CM

## 2023-11-03 DIAGNOSIS — E87.6 HYPOKALEMIA: ICD-10-CM

## 2023-11-03 LAB
ALBUMIN SERPL BCP-MCNC: 4.3 G/DL (ref 3.5–5)
ALP SERPL-CCNC: 58 U/L (ref 34–104)
ALT SERPL W P-5'-P-CCNC: 14 U/L (ref 7–52)
ANION GAP SERPL CALCULATED.3IONS-SCNC: 8 MMOL/L
AST SERPL W P-5'-P-CCNC: 18 U/L (ref 13–39)
BASOPHILS # BLD AUTO: 0.07 THOUSANDS/ÂΜL (ref 0–0.1)
BASOPHILS NFR BLD AUTO: 1 % (ref 0–1)
BILIRUB SERPL-MCNC: 0.68 MG/DL (ref 0.2–1)
BUN SERPL-MCNC: 17 MG/DL (ref 5–25)
CALCIUM SERPL-MCNC: 10.8 MG/DL (ref 8.4–10.2)
CHLORIDE SERPL-SCNC: 101 MMOL/L (ref 96–108)
CO2 SERPL-SCNC: 32 MMOL/L (ref 21–32)
CREAT SERPL-MCNC: 0.74 MG/DL (ref 0.6–1.3)
CRP SERPL QL: 3.5 MG/L
EOSINOPHIL # BLD AUTO: 0.07 THOUSAND/ÂΜL (ref 0–0.61)
EOSINOPHIL NFR BLD AUTO: 1 % (ref 0–6)
ERYTHROCYTE [DISTWIDTH] IN BLOOD BY AUTOMATED COUNT: 12.6 % (ref 11.6–15.1)
GFR SERPL CREATININE-BSD FRML MDRD: 84 ML/MIN/1.73SQ M
GLUCOSE P FAST SERPL-MCNC: 109 MG/DL (ref 65–99)
HCT VFR BLD AUTO: 40.7 % (ref 34.8–46.1)
HGB BLD-MCNC: 13 G/DL (ref 11.5–15.4)
IMM GRANULOCYTES # BLD AUTO: 0.03 THOUSAND/UL (ref 0–0.2)
IMM GRANULOCYTES NFR BLD AUTO: 0 % (ref 0–2)
LYMPHOCYTES # BLD AUTO: 3 THOUSANDS/ÂΜL (ref 0.6–4.47)
LYMPHOCYTES NFR BLD AUTO: 39 % (ref 14–44)
MAGNESIUM SERPL-MCNC: 1.6 MG/DL (ref 1.9–2.7)
MCH RBC QN AUTO: 29.6 PG (ref 26.8–34.3)
MCHC RBC AUTO-ENTMCNC: 31.9 G/DL (ref 31.4–37.4)
MCV RBC AUTO: 93 FL (ref 82–98)
MONOCYTES # BLD AUTO: 0.59 THOUSAND/ÂΜL (ref 0.17–1.22)
MONOCYTES NFR BLD AUTO: 8 % (ref 4–12)
NEUTROPHILS # BLD AUTO: 3.85 THOUSANDS/ÂΜL (ref 1.85–7.62)
NEUTS SEG NFR BLD AUTO: 51 % (ref 43–75)
NRBC BLD AUTO-RTO: 0 /100 WBCS
PLATELET # BLD AUTO: 291 THOUSANDS/UL (ref 149–390)
PMV BLD AUTO: 11.5 FL (ref 8.9–12.7)
POTASSIUM SERPL-SCNC: 4.3 MMOL/L (ref 3.5–5.3)
PROT SERPL-MCNC: 7 G/DL (ref 6.4–8.4)
RBC # BLD AUTO: 4.39 MILLION/UL (ref 3.81–5.12)
SODIUM SERPL-SCNC: 141 MMOL/L (ref 135–147)
TSH SERPL DL<=0.05 MIU/L-ACNC: 1.68 UIU/ML (ref 0.45–4.5)
VIT B12 SERPL-MCNC: 141 PG/ML (ref 180–914)
WBC # BLD AUTO: 7.61 THOUSAND/UL (ref 4.31–10.16)

## 2023-11-03 PROCEDURE — 86140 C-REACTIVE PROTEIN: CPT

## 2023-11-03 PROCEDURE — 83993 ASSAY FOR CALPROTECTIN FECAL: CPT

## 2023-11-03 PROCEDURE — 85025 COMPLETE CBC W/AUTO DIFF WBC: CPT

## 2023-11-03 PROCEDURE — 84443 ASSAY THYROID STIM HORMONE: CPT

## 2023-11-03 PROCEDURE — 36415 COLL VENOUS BLD VENIPUNCTURE: CPT

## 2023-11-03 PROCEDURE — 80053 COMPREHEN METABOLIC PANEL: CPT

## 2023-11-03 PROCEDURE — 82607 VITAMIN B-12: CPT

## 2023-11-03 PROCEDURE — 83735 ASSAY OF MAGNESIUM: CPT

## 2023-11-06 DIAGNOSIS — E83.42 HYPOMAGNESEMIA: Primary | ICD-10-CM

## 2023-11-06 DIAGNOSIS — D51.9 ANEMIA DUE TO VITAMIN B12 DEFICIENCY, UNSPECIFIED B12 DEFICIENCY TYPE: ICD-10-CM

## 2023-11-06 RX ORDER — LANOLIN ALCOHOL/MO/W.PET/CERES
1000 CREAM (GRAM) TOPICAL DAILY
COMMUNITY

## 2023-11-06 RX ORDER — UREA 10 %
500 LOTION (ML) TOPICAL DAILY
COMMUNITY

## 2023-11-08 LAB — CALPROTECTIN STL-MCNT: 33 UG/G (ref 0–120)

## 2023-11-16 ENCOUNTER — TELEPHONE (OUTPATIENT)
Dept: GASTROENTEROLOGY | Facility: CLINIC | Age: 68
End: 2023-11-16

## 2023-11-16 NOTE — TELEPHONE ENCOUNTER
Patients GI provider:  Dr. Fiona Lopez    Number to return call: [phone]    Reason for call: Pt calling wanting to know if prior auth was submitted for upcoming procedure? Please advise if pt is cleared to have procedure.     Scheduled procedure/appointment date if applicable: proc 76/1

## 2023-11-21 NOTE — TELEPHONE ENCOUNTER
Pt calling again re: colonoscopy. Pt already had a colonoscopy earlier this year and pt wants to make sure this colonoscopy will be covered. Pt wants a call back once the prior auth is approved. Pt also mentioned wanting to know what code will be put for the procedure in order for it to be covered. Pt would like a call back once this is done and approved.

## 2023-12-06 ENCOUNTER — ANESTHESIA EVENT (OUTPATIENT)
Dept: PERIOP | Facility: HOSPITAL | Age: 68
End: 2023-12-06

## 2023-12-06 ENCOUNTER — HOSPITAL ENCOUNTER (OUTPATIENT)
Dept: PERIOP | Facility: HOSPITAL | Age: 68
Setting detail: OUTPATIENT SURGERY
Discharge: HOME | End: 2023-12-06
Attending: INTERNAL MEDICINE
Payer: COMMERCIAL

## 2023-12-06 ENCOUNTER — ANESTHESIA (OUTPATIENT)
Dept: PERIOP | Facility: HOSPITAL | Age: 68
End: 2023-12-06

## 2023-12-06 VITALS
TEMPERATURE: 97.8 F | HEART RATE: 94 BPM | WEIGHT: 161 LBS | HEIGHT: 64 IN | SYSTOLIC BLOOD PRESSURE: 124 MMHG | OXYGEN SATURATION: 94 % | RESPIRATION RATE: 20 BRPM | DIASTOLIC BLOOD PRESSURE: 72 MMHG | BODY MASS INDEX: 27.49 KG/M2

## 2023-12-06 DIAGNOSIS — K52.9 INFLAMMATORY BOWEL DISEASE: ICD-10-CM

## 2023-12-06 PROBLEM — K63.5 POLYP OF ASCENDING COLON: Status: ACTIVE | Noted: 2023-12-06

## 2023-12-06 LAB — GLUCOSE SERPL-MCNC: 101 MG/DL (ref 65–140)

## 2023-12-06 PROCEDURE — 82948 REAGENT STRIP/BLOOD GLUCOSE: CPT

## 2023-12-06 PROCEDURE — 88305 TISSUE EXAM BY PATHOLOGIST: CPT | Performed by: PATHOLOGY

## 2023-12-06 PROCEDURE — 45380 COLONOSCOPY AND BIOPSY: CPT | Performed by: INTERNAL MEDICINE

## 2023-12-06 RX ORDER — PROPOFOL 10 MG/ML
INJECTION, EMULSION INTRAVENOUS AS NEEDED
Status: DISCONTINUED | OUTPATIENT
Start: 2023-12-06 | End: 2023-12-06

## 2023-12-06 RX ORDER — SODIUM CHLORIDE, SODIUM LACTATE, POTASSIUM CHLORIDE, CALCIUM CHLORIDE 600; 310; 30; 20 MG/100ML; MG/100ML; MG/100ML; MG/100ML
125 INJECTION, SOLUTION INTRAVENOUS CONTINUOUS
Status: DISCONTINUED | OUTPATIENT
Start: 2023-12-06 | End: 2023-12-10 | Stop reason: HOSPADM

## 2023-12-06 RX ORDER — LIDOCAINE HYDROCHLORIDE 10 MG/ML
INJECTION, SOLUTION EPIDURAL; INFILTRATION; INTRACAUDAL; PERINEURAL AS NEEDED
Status: DISCONTINUED | OUTPATIENT
Start: 2023-12-06 | End: 2023-12-06

## 2023-12-06 RX ORDER — SODIUM CHLORIDE, SODIUM LACTATE, POTASSIUM CHLORIDE, CALCIUM CHLORIDE 600; 310; 30; 20 MG/100ML; MG/100ML; MG/100ML; MG/100ML
INJECTION, SOLUTION INTRAVENOUS CONTINUOUS PRN
Status: DISCONTINUED | OUTPATIENT
Start: 2023-12-06 | End: 2023-12-06

## 2023-12-06 RX ADMIN — SODIUM CHLORIDE, SODIUM LACTATE, POTASSIUM CHLORIDE, AND CALCIUM CHLORIDE 125 ML/HR: .6; .31; .03; .02 INJECTION, SOLUTION INTRAVENOUS at 10:28

## 2023-12-06 RX ADMIN — PROPOFOL 130 MCG/KG/MIN: 10 INJECTION, EMULSION INTRAVENOUS at 11:00

## 2023-12-06 RX ADMIN — SODIUM CHLORIDE, SODIUM LACTATE, POTASSIUM CHLORIDE, AND CALCIUM CHLORIDE: .6; .31; .03; .02 INJECTION, SOLUTION INTRAVENOUS at 10:46

## 2023-12-06 RX ADMIN — PROPOFOL 100 MG: 10 INJECTION, EMULSION INTRAVENOUS at 10:56

## 2023-12-06 RX ADMIN — PROPOFOL 50 MG: 10 INJECTION, EMULSION INTRAVENOUS at 10:58

## 2023-12-06 RX ADMIN — LIDOCAINE HYDROCHLORIDE 50 MG: 10 INJECTION, SOLUTION EPIDURAL; INFILTRATION; INTRACAUDAL; PERINEURAL at 10:56

## 2023-12-06 NOTE — H&P
History and Physical - [facility]  Wilfredo Thayer 78 y.o. female MRN: [id number]                  HPI: Wilfredo Thayer is a 78y.o. year old female who presents for ulcerative colitis      REVIEW OF SYSTEMS: Per the HPI, and otherwise unremarkable. Historical Information   Past Medical History:   Diagnosis Date    Diabetes mellitus ([address])     PONV (postoperative nausea and vomiting)      Past Surgical History:   Procedure Laterality Date    HYSTERECTOMY      ~1990     Social History   Social History     Substance and Sexual Activity   Alcohol Use Never     Social History     Substance and Sexual Activity   Drug Use Never     Social History     Tobacco Use   Smoking Status Former    Types: Cigarettes   Smokeless Tobacco Never     No family history on file. Meds/Allergies     (Not in a hospital admission)      Allergies   Allergen Reactions    Valdecoxib Other (See Comments)     Patient is unaware of this allergy       Objective     There were no vitals taken for this visit. PHYSICAL EXAMINATION:    General Appearance:   Alert, cooperative, no distress   HEENT:  Normocephalic, atraumatic, anicteric. Neck supple, symmetrical, trachea midline. Lungs:   Equal chest rise and unlabored breathing, normal effort, no coughing. Cardiovascular:   No visualized JVD. Abdomen:   No abdominal distension. Skin:   No jaundice, rashes, or lesions. Musculoskeletal:   Normal range of motion visualized. Psych:  Normal affect and normal insight. Neuro:  Alert and appropriate. ASSESSMENT/PLAN:  This is a 78y.o. year old female here for colonoscopy, and she is stable and optimized for her procedure.

## 2023-12-06 NOTE — ANESTHESIA POSTPROCEDURE EVALUATION
Post-Op Assessment Note    CV Status:  Stable    Pain management: satisfactory to patient       Mental Status:  Sleepy   Hydration Status:  Euvolemic   PONV Controlled:  Controlled   Airway Patency:  Patent     Post Op Vitals Reviewed: Yes      Staff: CRNA               BP   97/62   Temp  98.3   Pulse  75   Resp   18   SpO2   98

## 2023-12-06 NOTE — ANESTHESIA PREPROCEDURE EVALUATION
Procedure:  COLONOSCOPY    Relevant Problems   ENDO   (+) DMII (diabetes mellitus, type 2) ([facility])        Physical Exam    Airway    Mallampati score: II  TM Distance: >3 FB  Neck ROM: full     Dental       Cardiovascular      Pulmonary      Other Findings  post-pubertal.      Anesthesia Plan  ASA Score- 2     Anesthesia Type- IV sedation with anesthesia with ASA Monitors. Additional Monitors:     Airway Plan:            Plan Factors-Exercise tolerance (METS): >4 METS. Chart reviewed. Patient is not a current smoker. Patient did not smoke on day of surgery. Obstructive sleep apnea risk education given perioperatively. Induction- intravenous. Postoperative Plan-     Informed Consent- Anesthetic plan and risks discussed with patient. I personally reviewed this patient with the CRNA. Discussed and agreed on the Anesthesia Plan with the CRNA. .            NPO appropriate. Discussed benefits/risks of monitored anesthetic care and discussed providing a dynamic level of mild to deep sedation. Risks include awareness, airway obstruction, aspiration which may necessitate conversion to general anesthesia. All questions answered. Patient understands and wishes to proceed. Anesthesia plan and consent discussed with Violeta Zhou who expressed understanding and agreement. Risks/benefits and alternatives discussed with patient including possible PONV, sore throat, damage to teeth/lips/gums and possibility of rare anesthetic and surgical emergencies.

## 2023-12-11 ENCOUNTER — APPOINTMENT (OUTPATIENT)
Dept: RADIOLOGY | Facility: CLINIC | Age: 68
End: 2023-12-11
Payer: COMMERCIAL

## 2023-12-11 ENCOUNTER — OFFICE VISIT (OUTPATIENT)
Dept: FAMILY MEDICINE CLINIC | Facility: CLINIC | Age: 68
End: 2023-12-11
Payer: COMMERCIAL

## 2023-12-11 VITALS
DIASTOLIC BLOOD PRESSURE: 68 MMHG | SYSTOLIC BLOOD PRESSURE: 128 MMHG | HEIGHT: 64 IN | TEMPERATURE: 96.2 F | WEIGHT: 169.8 LBS | BODY MASS INDEX: 28.99 KG/M2 | OXYGEN SATURATION: 96 %

## 2023-12-11 DIAGNOSIS — R06.00 DYSPNEA, UNSPECIFIED TYPE: Primary | ICD-10-CM

## 2023-12-11 DIAGNOSIS — R60.9 EDEMA, UNSPECIFIED TYPE: ICD-10-CM

## 2023-12-11 DIAGNOSIS — J18.9 PNEUMONIA OF RIGHT LUNG DUE TO INFECTIOUS ORGANISM, UNSPECIFIED PART OF LUNG: ICD-10-CM

## 2023-12-11 DIAGNOSIS — J32.9 SINUSITIS, UNSPECIFIED CHRONICITY, UNSPECIFIED LOCATION: ICD-10-CM

## 2023-12-11 PROCEDURE — 71046 X-RAY EXAM CHEST 2 VIEWS: CPT

## 2023-12-11 PROCEDURE — 99214 OFFICE O/P EST MOD 30 MIN: CPT | Performed by: NURSE PRACTITIONER

## 2023-12-11 PROCEDURE — 88305 TISSUE EXAM BY PATHOLOGIST: CPT | Performed by: PATHOLOGY

## 2023-12-11 RX ORDER — AZITHROMYCIN 250 MG/1
TABLET, FILM COATED ORAL
Qty: 6 TABLET | Refills: 0 | Status: SHIPPED | OUTPATIENT
Start: 2023-12-11 | End: 2023-12-16

## 2023-12-11 NOTE — PROGRESS NOTES
Name: Ethan Cervantes      : 1955      MRN: [id number]  Encounter Provider: ROYAL Duran  Encounter Date: 2023   Encounter department: [address] [facility]    Assessment & Plan     1. Dyspnea, unspecified type  Comments:  pneumonia versus CHF,recheck  Orders:  -     CBC and differential; Future  -     B-Type Natriuretic Peptide(BNP); Future    2. Sinusitis, unspecified chronicity, unspecified location    3. Pneumonia of right lung due to infectious organism, unspecified part of lung  Comments:  Versus other process  Orders:  -     azithromycin (Zithromax) 250 mg tablet; Take 2 tablets (500 mg total) by mouth daily for 1 day, THEN 1 tablet (250 mg total) daily for 4 days. -     XR chest pa & lateral; Future; Expected date: 2023  -     Basic metabolic panel; Future    4. Edema, unspecified type  Comments: We recommend diuretic. They are very hesitant to do so. I explained they can wait until tomorrow and call me but if BNP is elevated they will need to  Orders:  -     B-Type Natriuretic Peptide(BNP); Future      Depression Screening and Follow-up Plan: Patient was screened for depression during today's encounter. They screened negative with a PHQ-2 score of 0. Subjective      Is complaining of shortness of breath for approximately 1 week. Has been is present. They deny starting or stopping any medications recently. Denies any fever chills head cold symptoms, sinus no sore throat. She does have an occasional dry cough. Shortness of breath is worse worse with walking. Particularly bad at night when she lies supine. No previous history of pneumonia or heart disease has had a tough year in  had a hospitalization diagnosis of colitis. Tried on mesalamine but had a lot of reactions and side effects to that medications. They are very leery about taking any other kind of medicine.   Follow with cardiology but during the workup in  she did have a CTA of the chest and abdomen, and no pulmonary process was found but I do see that she had some calcifications of her coronary arteries. She saw Dr. Tiffanie Alexandre in August she had been complaining of a little bit of edema. He states Dr. Tiffanie Alexandre wanted her to take a diuretic but her and her  did not like the idea of taking a diuretic. They did not like the side effect profile. Does deny chest pain or palpitations      Review of Systems    Current Outpatient Medications on File Prior to Visit   Medication Sig   • Alcohol Swabs 70 % PADS May substitute brand based on insurance coverage. Check glucose TID. • b complex vitamins capsule Take 1 capsule by mouth daily   • cholecalciferol (VITAMIN D3) 1,000 units tablet Take 1,000 Units by mouth daily   • glucose blood (OneTouch Verio) test strip May substitute brand based on insurance coverage. Check glucose TID. • glucose blood test strip Use 1 each daily as needed Use as instructed   • insulin aspart (NovoLOG FlexPen) 100 UNIT/ML injection pen Inject 1-5 Units under the skin 3 (three) times a day with meals Blood glucose 150 - 209: 1 unit of insulin Blood glucose 210 - 269: 2 units of insulin Blood glucose 270 - 329 3 units of insulin Blood glucose 330 - 389 4 units of insulin Blood glucose greater than or equal to 390: 5 units of insulin   • metFORMIN (GLUCOPHAGE) 500 mg tablet 2 pills bid   • multivitamin (THERAGRAN) TABS Take by mouth daily   • OneTouch Delica Lancets 51H MISC May substitute brand based on insurance coverage. Check glucose TID. • vitamin B-12 (VITAMIN B-12) 1,000 mcg tablet Take 1,000 mcg by mouth daily   • Insulin Pen Needle (BD Pen Needle Leonie 2nd Gen) 32G X 4 MM MISC For use with insulin pen. Pharmacy may dispense brand covered by insurance.  (Patient not taking: Reported on 8/18/2023)   • magnesium gluconate (MAGONATE) 500 mg tablet Take 1 tablet (500 mg total) by mouth in the morning   • meclizine (ANTIVERT) 12.5 MG tablet Take 1 tablet (12.5 mg total) by mouth 3 (three) times a day as needed for dizziness (Patient not taking: Reported on 8/18/2023)       Objective     /68 (BP Location: Right arm, Patient Position: Sitting, Cuff Size: Adult)   Temp (!) 96.2 °F (35.7 °C) (Tympanic)   Ht 5' 4" (1.626 m)   Wt 77 kg (169 lb 12.8 oz)   SpO2 96%   BMI 29.15 kg/m²     Physical Exam  Vitals and nursing note reviewed. Constitutional:       General: She is not in acute distress. Appearance: Normal appearance. She is not toxic-appearing. HENT:      Head: Normocephalic. Nose: Congestion and rhinorrhea (bright yellow) present. Mouth/Throat:      Mouth: Mucous membranes are moist.   Eyes:      Conjunctiva/sclera: Conjunctivae normal.   Cardiovascular:      Rate and Rhythm: Normal rate and regular rhythm. Heart sounds: Normal heart sounds. No murmur (faint +2 systolic) heard. Pulmonary:      Effort: Pulmonary effort is normal. No respiratory distress. Breath sounds: Normal breath sounds. Comments: 8 lb wt gain since aug, fine crackles LLL  Musculoskeletal:      Right lower leg: Edema present. Left lower leg: Edema present. Comments: +1 pitting edema bilaterally   Skin:     General: Skin is warm and dry. Neurological:      General: No focal deficit present. Mental Status: She is alert and oriented to person, place, and time. Psychiatric:         Mood and Affect: Mood normal.      Comments: She is having some anxiety worried about symptoms. It is present both have many questions she had a bad experience at the hospital in the past and they just are very  Cautious.    seems certain that it is pneumonia and I did need to visit with CHF was in the differential       ROYAL Naidu

## 2023-12-12 ENCOUNTER — LAB (OUTPATIENT)
Age: 68
End: 2023-12-12
Payer: COMMERCIAL

## 2023-12-12 DIAGNOSIS — J18.9 PNEUMONIA OF RIGHT LUNG DUE TO INFECTIOUS ORGANISM, UNSPECIFIED PART OF LUNG: ICD-10-CM

## 2023-12-12 DIAGNOSIS — R60.9 EDEMA, UNSPECIFIED TYPE: ICD-10-CM

## 2023-12-12 DIAGNOSIS — R06.00 DYSPNEA, UNSPECIFIED TYPE: ICD-10-CM

## 2023-12-12 LAB
ANION GAP SERPL CALCULATED.3IONS-SCNC: 10 MMOL/L
BASOPHILS # BLD AUTO: 0.05 THOUSANDS/ÂΜL (ref 0–0.1)
BASOPHILS NFR BLD AUTO: 1 % (ref 0–1)
BNP SERPL-MCNC: 561 PG/ML (ref 0–100)
BUN SERPL-MCNC: 14 MG/DL (ref 5–25)
CALCIUM SERPL-MCNC: 9.9 MG/DL (ref 8.4–10.2)
CHLORIDE SERPL-SCNC: 103 MMOL/L (ref 96–108)
CO2 SERPL-SCNC: 27 MMOL/L (ref 21–32)
CREAT SERPL-MCNC: 0.74 MG/DL (ref 0.6–1.3)
EOSINOPHIL # BLD AUTO: 0.04 THOUSAND/ÂΜL (ref 0–0.61)
EOSINOPHIL NFR BLD AUTO: 1 % (ref 0–6)
ERYTHROCYTE [DISTWIDTH] IN BLOOD BY AUTOMATED COUNT: 13.6 % (ref 11.6–15.1)
GFR SERPL CREATININE-BSD FRML MDRD: 84 ML/MIN/1.73SQ M
GLUCOSE P FAST SERPL-MCNC: 121 MG/DL (ref 65–99)
HCT VFR BLD AUTO: 36.8 % (ref 34.8–46.1)
HGB BLD-MCNC: 11.9 G/DL (ref 11.5–15.4)
IMM GRANULOCYTES # BLD AUTO: 0.02 THOUSAND/UL (ref 0–0.2)
IMM GRANULOCYTES NFR BLD AUTO: 0 % (ref 0–2)
LYMPHOCYTES # BLD AUTO: 2.53 THOUSANDS/ÂΜL (ref 0.6–4.47)
LYMPHOCYTES NFR BLD AUTO: 32 % (ref 14–44)
MCH RBC QN AUTO: 29.4 PG (ref 26.8–34.3)
MCHC RBC AUTO-ENTMCNC: 32.3 G/DL (ref 31.4–37.4)
MCV RBC AUTO: 91 FL (ref 82–98)
MONOCYTES # BLD AUTO: 0.53 THOUSAND/ÂΜL (ref 0.17–1.22)
MONOCYTES NFR BLD AUTO: 7 % (ref 4–12)
NEUTROPHILS # BLD AUTO: 4.71 THOUSANDS/ÂΜL (ref 1.85–7.62)
NEUTS SEG NFR BLD AUTO: 59 % (ref 43–75)
NRBC BLD AUTO-RTO: 0 /100 WBCS
PLATELET # BLD AUTO: 327 THOUSANDS/UL (ref 149–390)
PMV BLD AUTO: 11 FL (ref 8.9–12.7)
POTASSIUM SERPL-SCNC: 4.3 MMOL/L (ref 3.5–5.3)
RBC # BLD AUTO: 4.05 MILLION/UL (ref 3.81–5.12)
SODIUM SERPL-SCNC: 140 MMOL/L (ref 135–147)
WBC # BLD AUTO: 7.88 THOUSAND/UL (ref 4.31–10.16)

## 2023-12-12 PROCEDURE — 83880 ASSAY OF NATRIURETIC PEPTIDE: CPT

## 2023-12-12 PROCEDURE — 85025 COMPLETE CBC W/AUTO DIFF WBC: CPT

## 2023-12-12 PROCEDURE — 36415 COLL VENOUS BLD VENIPUNCTURE: CPT

## 2023-12-12 PROCEDURE — 80048 BASIC METABOLIC PNL TOTAL CA: CPT

## 2023-12-13 ENCOUNTER — TELEPHONE (OUTPATIENT)
Dept: FAMILY MEDICINE CLINIC | Facility: CLINIC | Age: 68
End: 2023-12-13

## 2023-12-13 DIAGNOSIS — I50.9 ACUTE CONGESTIVE HEART FAILURE, UNSPECIFIED HEART FAILURE TYPE (HCC): ICD-10-CM

## 2023-12-13 DIAGNOSIS — R60.9 EDEMA, UNSPECIFIED TYPE: Primary | ICD-10-CM

## 2023-12-13 RX ORDER — FUROSEMIDE 40 MG/1
TABLET ORAL
Qty: 30 TABLET | Refills: 0 | Status: SHIPPED | OUTPATIENT
Start: 2023-12-13

## 2023-12-13 NOTE — TELEPHONE ENCOUNTER
Patient called stating she was looking for her Blood Work results to see if she had something viral or bacterial going on with her lungs. Advised JAREN Gaspar still seeing patient's and would send a note to her making her aware you are looking for results. Did ask how she was feeling and she said she still is having Shortness of breath at night.

## 2023-12-13 NOTE — RESULT ENCOUNTER NOTE
BNP elevated- this indicates Heart failure. She must start diuretic. Also reviewed with Dr Delinda Dubin. She absolutely wants You to start diuretic.  CXR results still pending

## 2023-12-14 ENCOUNTER — TELEPHONE (OUTPATIENT)
Dept: FAMILY MEDICINE CLINIC | Facility: CLINIC | Age: 68
End: 2023-12-14

## 2023-12-14 DIAGNOSIS — R06.00 DYSPNEA, UNSPECIFIED TYPE: ICD-10-CM

## 2023-12-14 DIAGNOSIS — I50.9 ACUTE CONGESTIVE HEART FAILURE, UNSPECIFIED HEART FAILURE TYPE (HCC): Primary | ICD-10-CM

## 2023-12-14 NOTE — TELEPHONE ENCOUNTER
Phone call from patient. A nurse called her yesterday with  her blood work results. She was told she needed to start Lasix. The pharmacist told her that this medicine could affect her heart and potassium. She needs something else that won't do this.   Uses  Ph

## 2023-12-14 NOTE — TELEPHONE ENCOUNTER
Phone call from Alisha at [facility].   Significant findings on chest xray

## 2023-12-20 ENCOUNTER — TELEPHONE (OUTPATIENT)
Dept: GASTROENTEROLOGY | Facility: CLINIC | Age: 68
End: 2023-12-20

## 2023-12-20 NOTE — TELEPHONE ENCOUNTER
----- Message from João Dejesus MD sent at 12/11/2023  4:15 PM EST -----  Hi,    Cam you please call the patient and let her know her biopsies looked very good. There was only rare and mild inflammation (in particular in the left colon) but overall no significant inflammation and this is a very good result.     Thank you!

## 2023-12-22 ENCOUNTER — OFFICE VISIT (OUTPATIENT)
Dept: FAMILY MEDICINE CLINIC | Facility: CLINIC | Age: 68
End: 2023-12-22
Payer: COMMERCIAL

## 2023-12-22 VITALS
SYSTOLIC BLOOD PRESSURE: 122 MMHG | HEART RATE: 101 BPM | DIASTOLIC BLOOD PRESSURE: 80 MMHG | OXYGEN SATURATION: 98 % | TEMPERATURE: 98.5 F | HEIGHT: 64 IN | BODY MASS INDEX: 28.34 KG/M2 | WEIGHT: 166 LBS

## 2023-12-22 DIAGNOSIS — E11.9 TYPE II DIABETES MELLITUS, WELL CONTROLLED (HCC): ICD-10-CM

## 2023-12-22 DIAGNOSIS — Z23 ENCOUNTER FOR IMMUNIZATION: ICD-10-CM

## 2023-12-22 DIAGNOSIS — K52.9 COLITIS: ICD-10-CM

## 2023-12-22 DIAGNOSIS — R06.02 SOB (SHORTNESS OF BREATH) ON EXERTION: Primary | ICD-10-CM

## 2023-12-22 PROBLEM — I95.9 HYPOTENSION: Status: RESOLVED | Noted: 2023-06-05 | Resolved: 2023-12-22

## 2023-12-22 PROBLEM — E87.8 ELECTROLYTE ABNORMALITY: Status: RESOLVED | Noted: 2023-06-06 | Resolved: 2023-12-22

## 2023-12-22 PROBLEM — M79.89 LEFT LEG SWELLING: Status: RESOLVED | Noted: 2023-08-18 | Resolved: 2023-12-22

## 2023-12-22 PROBLEM — E87.6 HYPOKALEMIA: Status: RESOLVED | Noted: 2023-08-18 | Resolved: 2023-12-22

## 2023-12-22 PROCEDURE — 99214 OFFICE O/P EST MOD 30 MIN: CPT | Performed by: FAMILY MEDICINE

## 2023-12-22 NOTE — PROGRESS NOTES
Name: Julia Perry      : 1955      MRN: [id number]  Encounter Provider: Toya Titus MD  Encounter Date: 2023   Encounter department: [facility]    Assessment & Plan     1. SOB (shortness of breath) on exertion  Comments:  still with sx's after antibx   rebegin lasix 40 mg 1/2 pill daily till echo  repeat labs 1 week  I will call her with results and as to status  Orders:  -     B-Type Natriuretic Peptide(BNP); Future    2. Type II diabetes mellitus, well controlled (HCC)  Comments:  await labs  Orders:  -     Albumin / creatinine urine ratio  -     Basic metabolic panel; Future  -     Hemoglobin A1C; Future    3. Encounter for immunization  Comments:  refuses    4. Colitis  Comments:  apparently resolved   cont F/U with G-I      BMI Counseling: Body mass index is 28.49 kg/m². The BMI is above normal. Nutrition recommendations include decreasing portion sizes, consuming healthier snacks, limiting drinks that contain sugar and reducing intake of cholesterol. Exercise recommendations include exercising 3-5 times per week. No pharmacotherapy was ordered. Rationale for BMI follow-up plan is due to patient being overweight or obese.         Subjective      Still short of breath with any activity, feeling a little bit better since taking the Z-Spencer which she finished 5 days ago.  Question of also felt better today she was taking the diuretic which she has since stopped.  She denies symptoms at rest and states her pulse ox at rest is 99% however admits to pulse ox of 94% checked after up-and-down wrapping gifts.  She has not checked her pulse ox at other times with activity.  She denies associated chest pain or palpitations and denies any ankle swelling.  She does try to watch the salt in her diet.  Her echo is scheduled for . She does note SOB during nite which will awaken her.    Her sugar monitoring has been basically good usually running around 1 20-1 30 with an  occasional number to 170..  She does see an eye doctor routinely in [place].  He does admit to numbness of her feet without pain without keeping her awake at night but knows not to walk barefoot.    She recently had a colonoscopy apparently revealed resolution of her colitis.  She had been on prednisone for quite some time which of course elevated her sugars.  She is now off the prednisone as well aa menzalamine and states stools basically negative.  She did have recent polyps therefore does have routine follow-up with GI.      Review of Systems    Current Outpatient Medications on File Prior to Visit   Medication Sig   • Alcohol Swabs 70 % PADS May substitute brand based on insurance coverage. Check glucose TID.   • b complex vitamins capsule Take 1 capsule by mouth daily   • cholecalciferol (VITAMIN D3) 1,000 units tablet Take 1,000 Units by mouth daily   • furosemide (LASIX) 40 mg tablet 1 po x 5 days then prn   • glucose blood (OneTouch Verio) test strip May substitute brand based on insurance coverage. Check glucose TID.   • glucose blood test strip Use 1 each daily as needed Use as instructed   • magnesium gluconate (MAGONATE) 500 mg tablet Take 1 tablet (500 mg total) by mouth in the morning   • metFORMIN (GLUCOPHAGE) 500 mg tablet 2 pills bid   • multivitamin (THERAGRAN) TABS Take by mouth daily   • OneTouch Delica Lancets 33G MISC May substitute brand based on insurance coverage. Check glucose TID.   • vitamin B-12 (VITAMIN B-12) 1,000 mcg tablet Take 1,000 mcg by mouth daily   • [DISCONTINUED] insulin aspart (NovoLOG FlexPen) 100 UNIT/ML injection pen Inject 1-5 Units under the skin 3 (three) times a day with meals Blood glucose 150 - 209: 1 unit of insulin Blood glucose 210 - 269: 2 units of insulin Blood glucose 270 - 329 3 units of insulin Blood glucose 330 - 389 4 units of insulin Blood glucose greater than or equal to 390: 5 units of insulin (Patient not taking: Reported on 12/22/2023)   •  "[DISCONTINUED] Insulin Pen Needle (BD Pen Needle Leonie 2nd Gen) 32G X 4 MM MISC For use with insulin pen. Pharmacy may dispense brand covered by insurance. (Patient not taking: Reported on 8/18/2023)   • [DISCONTINUED] meclizine (ANTIVERT) 12.5 MG tablet Take 1 tablet (12.5 mg total) by mouth 3 (three) times a day as needed for dizziness (Patient not taking: Reported on 8/18/2023)       Objective     /80 (BP Location: Left arm, Patient Position: Sitting, Cuff Size: Standard)   Pulse 101   Temp 98.5 °F (36.9 °C) (Tympanic)   Ht 5' 4\" (1.626 m)   Wt 75.3 kg (166 lb)   SpO2 98%   BMI 28.49 kg/m²     Physical Exam  Neck:      Vascular: No carotid bruit.   Cardiovascular:      Pulses: no weak pulses          Dorsalis pedis pulses are 2+ on the right side and 2+ on the left side.        Posterior tibial pulses are 2+ on the right side and 2+ on the left side.      Heart sounds: No murmur heard.     Comments: Heart rhythm is regular with a rate of 100 without audible murmur  Pulmonary:      Comments: Lungs with decreased breath sounds without rales rhonchi or wheezes.  Patient does notice \"crackles\" and wheeze especially at bedtime.  Musculoskeletal:      Right lower leg: No edema.      Left lower leg: No edema.   Feet:      Right foot:      Skin integrity: No ulcer, skin breakdown, erythema, warmth, callus or dry skin.      Left foot:      Skin integrity: No ulcer, skin breakdown, erythema, warmth, callus or dry skin.   Lymphadenopathy:      Cervical: No cervical adenopathy.     Diabetic Foot Exam    Patient's shoes and socks removed.    Right Foot/Ankle   Right Foot Inspection  Skin Exam: skin normal and skin intact. No dry skin, no warmth, no callus, no erythema, no maceration, no abnormal color, no pre-ulcer, no ulcer and no callus.     Toe Exam: ROM and strength within normal limits.     Sensory   Monofilament testing: diminished    Vascular  Capillary refills: < 3 seconds  The right DP pulse is 2+. The " right PT pulse is 2+.     Left Foot/Ankle  Left Foot Inspection  Skin Exam: skin normal and skin intact. No dry skin, no warmth, no erythema, no maceration, normal color, no pre-ulcer, no ulcer and no callus.     Toe Exam: ROM and strength within normal limits.     Sensory   Monofilament testing: diminished    Vascular  Capillary refills: < 3 seconds  The left DP pulse is 2+. The left PT pulse is 2+.     Assign Risk Category  No deformity present  Loss of protective sensation  No weak pulses  Risk: 1    Toya Titus MD

## 2023-12-27 ENCOUNTER — TELEPHONE (OUTPATIENT)
Dept: ADMINISTRATIVE | Facility: OTHER | Age: 68
End: 2023-12-27

## 2023-12-27 NOTE — LETTER
Diabetic Eye Exam Form    Date Requested: 23  Patient: Julia Perry  Patient : 1955   Referring Provider: Toya Titus MD      DIABETIC Eye Exam Date _______________________________      Type of Exam MUST be documented for Diabetic Eye Exams. Please CHECK ONE.     Retinal Exam       Dilated Retinal Exam       OCT       Optomap-Iris Exam      Fundus Photography       Left Eye - Please check Retinopathy or No Retinopathy        Exam did show retinopathy    Exam did not show retinopathy       Right Eye - Please check Retinopathy or No Retinopathy       Exam did show retinopathy    Exam did not show retinopathy       Comments __________________________________________________________    Practice Providing Exam ______________________________________________    Exam Performed By (print name) _______________________________________      Provider Signature ___________________________________________________      These reports are needed for  compliance.  Please fax this completed form and a copy of the Diabetic Eye Exam report to our office located at [address] [place] [address] as soon as possible via Fax [phone] attention Denise: Phone [phone]  We thank you for your assistance in treating our mutual patient.

## 2023-12-27 NOTE — TELEPHONE ENCOUNTER
----- Message from Ella Cordoba MA sent at 12/27/2023 11:48 AM EST -----  Regarding: dm optho exam  12/27/23 11:48 AM    Hello, our patient Julia Perry has had Diabetic Eye Exam completed/performed. Please assist in updating the patient chart by making an External outreach to [facility] facility located in [address] [place] [address] [address] [address]. The date of service is 2023.    Thank you,  [facility]

## 2023-12-28 ENCOUNTER — APPOINTMENT (OUTPATIENT)
Age: 68
End: 2023-12-28
Payer: COMMERCIAL

## 2023-12-28 DIAGNOSIS — R06.02 SOB (SHORTNESS OF BREATH) ON EXERTION: ICD-10-CM

## 2023-12-28 DIAGNOSIS — E11.9 TYPE II DIABETES MELLITUS, WELL CONTROLLED (HCC): ICD-10-CM

## 2023-12-28 LAB
ANION GAP SERPL CALCULATED.3IONS-SCNC: 10 MMOL/L
BNP SERPL-MCNC: 558 PG/ML (ref 0–100)
BUN SERPL-MCNC: 15 MG/DL (ref 5–25)
CALCIUM SERPL-MCNC: 10.4 MG/DL (ref 8.4–10.2)
CHLORIDE SERPL-SCNC: 102 MMOL/L (ref 96–108)
CO2 SERPL-SCNC: 28 MMOL/L (ref 21–32)
CREAT SERPL-MCNC: 0.68 MG/DL (ref 0.6–1.3)
CREAT UR-MCNC: 40.9 MG/DL
EST. AVERAGE GLUCOSE BLD GHB EST-MCNC: 137 MG/DL
GFR SERPL CREATININE-BSD FRML MDRD: 90 ML/MIN/1.73SQ M
GLUCOSE SERPL-MCNC: 113 MG/DL (ref 65–140)
HBA1C MFR BLD: 6.4 %
MICROALBUMIN UR-MCNC: <7 MG/L
MICROALBUMIN/CREAT 24H UR: <17 MG/G CREATININE (ref 0–30)
POTASSIUM SERPL-SCNC: 4.4 MMOL/L (ref 3.5–5.3)
SODIUM SERPL-SCNC: 140 MMOL/L (ref 135–147)

## 2023-12-28 PROCEDURE — 3044F HG A1C LEVEL LT 7.0%: CPT | Performed by: FAMILY MEDICINE

## 2023-12-28 PROCEDURE — 80048 BASIC METABOLIC PNL TOTAL CA: CPT

## 2023-12-28 PROCEDURE — 82570 ASSAY OF URINE CREATININE: CPT | Performed by: FAMILY MEDICINE

## 2023-12-28 PROCEDURE — 83880 ASSAY OF NATRIURETIC PEPTIDE: CPT

## 2023-12-28 PROCEDURE — 36415 COLL VENOUS BLD VENIPUNCTURE: CPT

## 2023-12-28 PROCEDURE — 83036 HEMOGLOBIN GLYCOSYLATED A1C: CPT

## 2023-12-28 PROCEDURE — 82043 UR ALBUMIN QUANTITATIVE: CPT | Performed by: FAMILY MEDICINE

## 2023-12-29 NOTE — TELEPHONE ENCOUNTER
Upon review of the In Basket request and the patient's chart, initial outreach has been made via fax to facility. Please see Contacts section for details.     Thank you  Denise Dove

## 2024-01-03 ENCOUNTER — HOSPITAL ENCOUNTER (OUTPATIENT)
Dept: NON INVASIVE DIAGNOSTICS | Facility: HOSPITAL | Age: 69
Discharge: HOME | End: 2024-01-03
Payer: COMMERCIAL

## 2024-01-03 VITALS
WEIGHT: 166.01 LBS | SYSTOLIC BLOOD PRESSURE: 118 MMHG | DIASTOLIC BLOOD PRESSURE: 64 MMHG | BODY MASS INDEX: 28.34 KG/M2 | HEIGHT: 64 IN | HEART RATE: 75 BPM

## 2024-01-03 DIAGNOSIS — I50.9 ACUTE CONGESTIVE HEART FAILURE, UNSPECIFIED HEART FAILURE TYPE (HCC): ICD-10-CM

## 2024-01-03 DIAGNOSIS — R06.00 DYSPNEA, UNSPECIFIED TYPE: ICD-10-CM

## 2024-01-03 LAB
AORTIC ROOT: 3.5 CM
AV LVOT MEAN GRADIENT: 1 MMHG
AV LVOT PEAK GRADIENT: 1 MMHG
DOP CALC LVOT AREA: 3.14 CM2
DOP CALC LVOT CARDIAC INDEX: 1.69 L/MIN/M2
DOP CALC LVOT CARDIAC OUTPUT: 3.05 L/MIN
DOP CALC LVOT DIAMETER: 2 CM
DOP CALC LVOT PEAK VEL VTI: 9.65 CM
DOP CALC LVOT PEAK VEL: 0.58 M/S
DOP CALC LVOT STROKE INDEX: 17.1 ML/M2
DOP CALC LVOT STROKE VOLUME: 30.3
E WAVE DECELERATION TIME: 162 MS
FRACTIONAL SHORTENING: 13 (ref 28–44)
INTERVENTRICULAR SEPTUM IN DIASTOLE (PARASTERNAL SHORT AXIS VIEW): 1 CM
INTERVENTRICULAR SEPTUM: 1 CM (ref 0.6–1.1)
LAAS-AP2: 18 CM2
LAAS-AP4: 22.7 CM2
LEFT ATRIUM SIZE: 4 CM
LEFT ATRIUM VOLUME (MOD BIPLANE): 74 ML
LEFT ATRIUM VOLUME INDEX (MOD BIPLANE): 40.9 ML/M2
LEFT INTERNAL DIMENSION IN SYSTOLE: 4.7 CM (ref 2.1–4)
LEFT VENTRICULAR INTERNAL DIMENSION IN DIASTOLE: 5.4 CM (ref 3.5–6)
LEFT VENTRICULAR POSTERIOR WALL IN END DIASTOLE: 1 CM
LEFT VENTRICULAR STROKE VOLUME: 35 ML
LVSV (TEICH): 35 ML
MV E'TISSUE VEL-LAT: 8 CM/S
MV E'TISSUE VEL-SEP: 8 CM/S
MV PEAK E VEL: 129 CM/S
MV STENOSIS PRESSURE HALF TIME: 47 MS
MV VALVE AREA P 1/2 METHOD: 4.68
PULM VEIN S/D RATIO: 1.92
PV PEAK D VEL: 0.12 M/S
PV PEAK S VEL: 0.23 M/S
RIGHT ATRIUM AREA SYSTOLE A4C: 10.7 CM2
RIGHT VENTRICLE ID DIMENSION: 2.6 CM
SL CV LEFT ATRIUM LENGTH A2C: 4.3 CM
SL CV LV EF: 30
SL CV PED ECHO LEFT VENTRICLE DIASTOLIC VOLUME (MOD BIPLANE) 2D: 139 ML
SL CV PED ECHO LEFT VENTRICLE SYSTOLIC VOLUME (MOD BIPLANE) 2D: 104 ML
TRICUSPID ANNULAR PLANE SYSTOLIC EXCURSION: 1.8 CM
TRICUSPID VALVE PEAK REGURGITATION VELOCITY: 2.23 M/S

## 2024-01-03 PROCEDURE — 93306 TTE W/DOPPLER COMPLETE: CPT | Performed by: INTERNAL MEDICINE

## 2024-01-03 PROCEDURE — 93306 TTE W/DOPPLER COMPLETE: CPT

## 2024-01-04 ENCOUNTER — OFFICE VISIT (OUTPATIENT)
Dept: GASTROENTEROLOGY | Facility: CLINIC | Age: 69
End: 2024-01-04
Payer: COMMERCIAL

## 2024-01-04 VITALS
WEIGHT: 151 LBS | DIASTOLIC BLOOD PRESSURE: 62 MMHG | SYSTOLIC BLOOD PRESSURE: 98 MMHG | TEMPERATURE: 98 F | BODY MASS INDEX: 25.78 KG/M2 | HEIGHT: 64 IN | HEART RATE: 103 BPM | OXYGEN SATURATION: 98 %

## 2024-01-04 DIAGNOSIS — K52.9 INFLAMMATORY BOWEL DISEASE: ICD-10-CM

## 2024-01-04 DIAGNOSIS — I50.20 SYSTOLIC HEART FAILURE, UNSPECIFIED HF CHRONICITY (HCC): ICD-10-CM

## 2024-01-04 DIAGNOSIS — R06.09 DOE (DYSPNEA ON EXERTION): ICD-10-CM

## 2024-01-04 DIAGNOSIS — K51.00 ULCERATIVE PANCOLITIS WITHOUT COMPLICATION (HCC): Primary | ICD-10-CM

## 2024-01-04 DIAGNOSIS — R06.02 SOB (SHORTNESS OF BREATH): ICD-10-CM

## 2024-01-04 PROCEDURE — 99215 OFFICE O/P EST HI 40 MIN: CPT | Performed by: INTERNAL MEDICINE

## 2024-01-04 NOTE — PROGRESS NOTES
[facility] - Outpatient Follow-up Note  Julia Perry 68 y.o. female MRN: [id number]  Encounter: [id number]          ASSESSMENT AND PLAN:    Julia Perry is a 68 y.o. female with diabetes, prior colitis diagnosed in June 2023 when she was hospitalized for what appeared to be acute GI illness with colitis but with concern for chronic active disease more consistent with IBD, previously on mesalamine but stopped as she developed some weakness and foot drop, as well as hair loss, that she attributed to her Lialda, recent colonoscopy with endoscopic healing and significant histologic improvement who now presents for follow-up. From a GI perspective she is doing well but she has SOB and [place] and was found to have cardiomyopathy.    CT chest abdomen pelvis previously with pancolitis and small stable hemangioma.    Colonoscopy performed December 2023 with benign-appearing inflammatory pan colonic polyps but overall no colitis seen in the colon, and internal hemorrhoids noted.  Biopsies with some focal edema and hyperplastic change in the right colon as well as some evidence of focal edema and crypt branching with hyperplastic change and rare intraepithelial eosinophil granulocytes noted in the transverse colon.  Some focal edema and crypt branching in the transverse, descending colon.  Left colonic polyps with some mild chronic active colitis with rare cryptitis.    Echocardiogram from yesterday noted to have mildly increased wall thickness and eccentric hypertrophy of the left ventricle with EF reduced to 30%.  Atrium moderately dilated on the left side.  Small pericardial effusion noted.  X-ray from December with small bilateral pleural effusions with associated bibasilar atelectasis.    BMP with normal creatinine and electrolytes.  Calcium slightly elevated at 10.4.  BNP elevated at 558.  CBC from December with normal white blood cell count, hemoglobin, MCV, platelets.  Hemoglobin A1c elevated at  6.4 consistent with prediabetes/borderline diabetes.    1. Ulcerative pancolitis without complication (HCC)    2. Systolic heart failure, unspecified HF chronicity (HCC)    3. Inflammatory bowel disease    4. SOB (shortness of breath)    5. [place] (dyspnea on exertion)        Orders Placed This Encounter   Procedures    Calprotectin,Fecal    CBC and differential    Comprehensive metabolic panel    C-reactive protein    Ambulatory Referral to Cardiology     Referral to cardiology  Repeat blood work and stool test ordered.   Can consider repeat colonoscopy in 2026    I have spent a total time of 40 minutes on 01/04/24 in caring for this patient including Diagnostic results, Prognosis, Risks and benefits of tx options, Instructions for management, Patient and family education, Impressions, Counseling / Coordination of care, Documenting in the medical record, Reviewing / ordering tests, medicine, procedures  , and Obtaining or reviewing history  .    ______________________________________________________________________    SUBJECTIVE:    Julia Perry is a 68 y.o. female who presents with complaint of colitis.     Since the hospital stay she is not feeling well. She is SOB and can not walk steps well. In terms of the intestine she feels mostly normal like she was years ago. She does not have a BM every day. She will have a good stools. Occasional diarrhea. She had worsening SOB. Crackling in her lungs this past week. She could not lay down in bed because of SOB. It has improved since then. She was told she has some yellow mucous. Not a lot of mucous. Just started having a cough. She has lasix to be taken as needed.       REVIEW OF SYSTEMS IS OTHERWISE NEGATIVE.  10 point ROS reviewed and negative, except as above      Historical Information   Past Medical History:   Diagnosis Date    Diabetes mellitus (HCC)     PONV (postoperative nausea and vomiting)      Past Surgical History:   Procedure Laterality Date    HYSTERECTOMY    "   ~1990     Social History   Social History     Substance and Sexual Activity   Alcohol Use Never     Social History     Substance and Sexual Activity   Drug Use Never     Social History     Tobacco Use   Smoking Status Former    Types: Cigarettes   Smokeless Tobacco Never     No family history on file.    Meds/Allergies       Current Outpatient Medications:     Alcohol Swabs 70 % PADS    b complex vitamins capsule    cholecalciferol (VITAMIN D3) 1,000 units tablet    furosemide (LASIX) 40 mg tablet    glucose blood (OneTouch Verio) test strip    glucose blood test strip    magnesium gluconate (MAGONATE) 500 mg tablet    metFORMIN (GLUCOPHAGE) 500 mg tablet    multivitamin (THERAGRAN) TABS    OneTouch Delica Lancets 33G MISC    vitamin B-12 (VITAMIN B-12) 1,000 mcg tablet    Allergies   Allergen Reactions    Valdecoxib Other (See Comments)     Patient is unaware of this allergy           Objective     Blood pressure 98/62, pulse 103, temperature 98 °F (36.7 °C), temperature source Temporal, height 5' 4\" (1.626 m), weight 68.5 kg (151 lb), SpO2 98%. Body mass index is 25.92 kg/m².      PHYSICAL EXAM:      General Appearance:   Alert, cooperative, no distress   HEENT:   Normocephalic, atraumatic, anicteric.     Neck:  Supple, symmetrical, trachea midline   Lungs:   Clear to auscultation bilaterally; no rales, rhonchi or wheezing; respirations unlabored    Heart::   Regular rate and rhythm; no murmur, rub, or gallop.   Abdomen:   Soft, non-tender, non-distended; normal bowel sounds; no masses, no organomegaly    Genitalia:   Deferred    Rectal:   Deferred    Extremities:  No cyanosis, clubbing or edema    Pulses:  2+ and symmetric    Skin:  No jaundice, rashes, or lesions    Lymph nodes:  No palpable cervical lymphadenopathy        Lab Results:   No visits with results within 1 Day(s) from this visit.   Latest known visit with results is:   Hospital Outpatient Visit on 01/03/2024   Component Date Value    RAA A4C " 01/03/2024 10.7     LA Volume Index (BP) 01/03/2024 40.9     PV Peak D Ambrocio 01/03/2024 0.12     PV Peak S Mabrocio 01/03/2024 0.23     MV stenosis pressure 1/2* 01/03/2024 47     MV Peak E Ambrocio 01/03/2024 129     LVOT stroke volume 01/03/2024 30.30     LVOT peak VTI 01/03/2024 9.65     LVOT peak ambrocio 01/03/2024 0.58     LVOT diameter 01/03/2024 2.0     E wave deceleration time 01/03/2024 162     MV valve area p 1/2 meth* 01/03/2024 4.68     AV LVOT peak gradient 01/03/2024 1     LVOT mn grad 01/03/2024 1.0     RVID d 01/03/2024 2.6     Tricuspid valve peak reg* 01/03/2024 2.23     Left ventricular stroke * 01/03/2024 35.00     IVSd 01/03/2024 1.00     Tricuspid annular plane * 01/03/2024 1.80     Ao root 01/03/2024 3.50     LVPWd 01/03/2024 1.00     LA size 01/03/2024 4     LA volume (BP) 01/03/2024 74     FS 01/03/2024 13     LVIDS 01/03/2024 4.70     IVS 01/03/2024 1     LVIDd 01/03/2024 5.40     LA length (A2C) 01/03/2024 4.30     LEFT VENTRICLE SYSTOLIC * 01/03/2024 104     LV DIASTOLIC VOLUME (MOD* 01/03/2024 139     LVOT Cardiac Index 01/03/2024 1.69     LVOT stroke volume index 01/03/2024 17.10     LVOT Cardiac Output 01/03/2024 3.05     Left Atrium Area-systoli* 01/03/2024 22.7     Left Atrium Area-systoli* 01/03/2024 18     MV E' Tissue Velocity La* 01/03/2024 8     MV E' Tissue Velocity Se* 01/03/2024 8     LVSV, 2D 01/03/2024 35     LVOT area 01/03/2024 3.14     Pulm vein S/D ratio 01/03/2024 1.92     LV EF 01/03/2024 30        Lab Results   Component Value Date    WBC 7.88 12/12/2023    HGB 11.9 12/12/2023    HCT 36.8 12/12/2023    MCV 91 12/12/2023     12/12/2023       Lab Results   Component Value Date    SODIUM 140 12/28/2023    K 4.4 12/28/2023     12/28/2023    CO2 28 12/28/2023    AGAP 10 12/28/2023    BUN 15 12/28/2023    CREATININE 0.68 12/28/2023    GLUC 113 12/28/2023    GLUF 121 (H) 12/12/2023    CALCIUM 10.4 (H) 12/28/2023    AST 18 11/03/2023    ALT 14 11/03/2023    ALKPHOS 58  11/03/2023    TP 7.0 11/03/2023    TBILI 0.68 11/03/2023    EGFR 90 12/28/2023       Lab Results   Component Value Date    CRP 3.5 (H) 11/03/2023       Lab Results   Component Value Date    QOA3BIEAIUDI 1.678 11/03/2023       Lab Results   Component Value Date    IRON 58 06/21/2023    TIBC 246 (L) 06/21/2023    FERRITIN 355 (H) 06/21/2023       Radiology Results:   Echo complete w/ contrast if indicated    Result Date: 1/3/2024  Narrative:   Left Ventricle: Left ventricular cavity size is at the upper limits of normal when indexed for BSA. Wall thickness is mildly increased. There is eccentric hypertrophy. The left ventricular ejection fraction is 30%. Systolic function is severely reduced. Wall motion is normal. Unable to assess diastolic function due to E/A fusion due to tachycardia.   The following segments are akinetic: basal inferoseptal, basal inferior, mid inferoseptal and mid inferior.   The following segments are hypokinetic: basal anterior, basal anteroseptal, basal inferolateral, basal anterolateral, mid anterior, mid anteroseptal, mid inferolateral, mid anterolateral, apical anterior, apical septal, apical inferior, apical lateral and apex.   Left Atrium: The atrium is moderately dilated (42-48 mL/m2).   Mitral Valve: There is mild annular calcification. There is mild to moderate regurgitation.   Pulmonic Valve: There is mild regurgitation.   Pericardium: There is a small pericardial effusion along the right atrial and right ventricular free wall. The fluid exhibits no internal echoes. There is no echocardiographic evidence of tamponade. There is a left pleural effusion.     XR chest pa & lateral    Result Date: 12/14/2023  Narrative: CHEST INDICATION:   J18.9: Pneumonia, unspecified organism. COMPARISON: CT 6/5/2023. EXAM PERFORMED/VIEWS:  XR CHEST PA & LATERAL FINDINGS: Cardiomediastinal silhouette appears unremarkable. Small bilateral pleural effusions, with probable overlying bibasilar compressive  atelectasis. Osseous structures appear within normal limits for patient age.     Impression: Small bilateral pleural effusions with associated bibasilar atelectasis. The study was marked in EPIC for significant notification. Workstation performed: [id number]     Colonoscopy    Result Date: 12/6/2023  Narrative: Table formatting from the original result was not included. [facility] [address] [id number] [phone] DATE OF SERVICE: 12/06/23 PHYSICIAN(S): Attending: João Dejesus MD Fellow: No Staff Documented INDICATION: Inflammatory bowel disease POST-OP DIAGNOSIS: See the impression below. HISTORY: Prior colonoscopy: Less than 3 years ago. It is being repeated at an interval of less than 3 years because: Last colonoscopy was incomplete BOWEL PREPARATION: Miralax/Dulcolax PREPROCEDURE: Informed consent was obtained for the procedure, including sedation. Risks including but not limited to bleeding, infection, perforation, adverse drug reaction and aspiration were explained in detail. Also explained about less than 100% sensitivity with the exam and other alternatives. The patient was placed in the left lateral decubitus position. Procedure: Colonoscopy DETAILS OF PROCEDURE: Patient was taken to the procedure room where a time out was performed to confirm correct patient and correct procedure. The patient underwent monitored anesthesia care, which was administered by an anesthesia professional. The patient's blood pressure, heart rate, level of consciousness, oxygen, respirations and ETCO2 were monitored throughout the procedure. A digital rectal exam was performed. A perianal exam was performed. The scope was introduced through the anus and advanced to the cecum. Retroflexion was performed in the rectum. The quality of bowel preparation was evaluated using the Koppel Bowel Preparation Scale with scores of: right colon = 2, transverse colon = 2, left colon = 2. The  total BBPS score was 6. Bowel prep was adequate. The patient experienced no blood loss. The procedure was not difficult. The patient tolerated the procedure well. There were no apparent adverse events. ANESTHESIA INFORMATION: ASA: II Anesthesia Type: Anesthesia type not filed in the log. MEDICATIONS: No administrations occurring from 1044 to 1122 on 12/06/23 FINDINGS: Multiple benign-appearing, inflammatory pancolonic polyps measuring smaller than 5 mm; performed partial removal by cold forceps biopsy The entire colon appeared normal. Performed random biopsy using biopsy forceps. Attempted to intubate the terminal ileum, but difficult secondary to looping of the scope. Internal hemorrhoids EVENTS: Procedure Events Event Event Time ENDO CECUM REACHED 12/6/2023 11:06 AM ENDO SCOPE OUT TIME 12/6/2023 11:21 AM SPECIMENS: ID Type Source Tests Collected by Time Destination 1 : Cold Forcep biopsies Hx of Colitis Tissue Large Intestine, Right/Ascending Colon TISSUE EXAM João Dejesus MD 12/6/2023 11:10 AM  2 : Cold Forcep Biopsies Hx of Colitis Tissue Large Intestine, Transverse Colon TISSUE EXAM João Dejesus MD 12/6/2023 11:11 AM  3 : Cold Forcep Biopsy of Polyps Tissue Large Intestine, Transverse Colon TISSUE EXAM João Dejesus MD 12/6/2023 11:14 AM  4 : Cold Forcep Biopsies Hx of Colitis Tissue Large Intestine, Left/Descending Colon TISSUE EXAM João Dejesus MD 12/6/2023 11:17 AM  5 : Cold Forcep Biopsies of Polyps Tissue Large Intestine, Left/Descending Colon TISSUE EXAM João Dejesus MD 12/6/2023 11:18 AM  6 : Cold Forcep Biopsies Hx of Colitis Tissue Rectum TISSUE EXAM João Dejesus MD 12/6/2023 11:20 AM  EQUIPMENT: Colonoscope -CF [id number]     Impression: Multiple benign-appearing, inflammatory pancolonic polyps measuring smaller than 5 mm; performed partial removal by cold forceps biopsy The entire colon appeared normal. Performed random biopsy using biopsy forceps. Attempted  to intubate the terminal ileum, but difficult secondary to looping of the scope. Internal hemorrhoids RECOMMENDATION:  Repeat colonoscopy in 2 years  Personal history of colon polyps    João Dejesus MD

## 2024-01-05 ENCOUNTER — TELEPHONE (OUTPATIENT)
Age: 69
End: 2024-01-05

## 2024-01-05 NOTE — TELEPHONE ENCOUNTER
Patient calling to confirm if she already had the labs completed that was ordered at yesterdays visit. Did inform patient that she did have the labs completed in November and provider would like her to repeat. Patient understood and will repeat labs closer to her upcoming appointment.

## 2024-01-11 ENCOUNTER — APPOINTMENT (OUTPATIENT)
Age: 69
End: 2024-01-11
Payer: COMMERCIAL

## 2024-01-11 ENCOUNTER — DOCUMENTATION (OUTPATIENT)
Dept: FAMILY MEDICINE CLINIC | Facility: CLINIC | Age: 69
End: 2024-01-11

## 2024-01-11 ENCOUNTER — TELEPHONE (OUTPATIENT)
Dept: FAMILY MEDICINE CLINIC | Facility: CLINIC | Age: 69
End: 2024-01-11

## 2024-01-11 ENCOUNTER — PREP FOR PROCEDURE (OUTPATIENT)
Dept: CARDIOLOGY CLINIC | Facility: HOSPITAL | Age: 69
End: 2024-01-11

## 2024-01-11 ENCOUNTER — APPOINTMENT (OUTPATIENT)
Dept: LAB | Facility: HOSPITAL | Age: 69
End: 2024-01-11
Attending: INTERNAL MEDICINE
Payer: COMMERCIAL

## 2024-01-11 ENCOUNTER — CONSULT (OUTPATIENT)
Dept: CARDIOLOGY CLINIC | Facility: HOSPITAL | Age: 69
End: 2024-01-11
Attending: INTERNAL MEDICINE
Payer: COMMERCIAL

## 2024-01-11 VITALS
HEIGHT: 64 IN | HEART RATE: 92 BPM | BODY MASS INDEX: 27.31 KG/M2 | WEIGHT: 160 LBS | SYSTOLIC BLOOD PRESSURE: 118 MMHG | DIASTOLIC BLOOD PRESSURE: 76 MMHG

## 2024-01-11 DIAGNOSIS — I25.5 ISCHEMIC CARDIOMYOPATHY: ICD-10-CM

## 2024-01-11 DIAGNOSIS — I25.84 CORONARY ARTERY CALCIFICATION: ICD-10-CM

## 2024-01-11 DIAGNOSIS — N30.00 ACUTE CYSTITIS WITHOUT HEMATURIA: Primary | ICD-10-CM

## 2024-01-11 DIAGNOSIS — E78.2 MIXED HYPERLIPIDEMIA: ICD-10-CM

## 2024-01-11 DIAGNOSIS — I25.10 CORONARY ARTERY CALCIFICATION: ICD-10-CM

## 2024-01-11 DIAGNOSIS — N30.00 ACUTE CYSTITIS WITHOUT HEMATURIA: ICD-10-CM

## 2024-01-11 DIAGNOSIS — E11.9 TYPE II DIABETES MELLITUS, WELL CONTROLLED (HCC): ICD-10-CM

## 2024-01-11 DIAGNOSIS — I25.5 ISCHEMIC CARDIOMYOPATHY: Primary | ICD-10-CM

## 2024-01-11 DIAGNOSIS — I50.20 SYSTOLIC HEART FAILURE, UNSPECIFIED HF CHRONICITY (HCC): ICD-10-CM

## 2024-01-11 DIAGNOSIS — E11.65 TYPE 2 DIABETES MELLITUS WITH HYPERGLYCEMIA, UNSPECIFIED WHETHER LONG TERM INSULIN USE (HCC): ICD-10-CM

## 2024-01-11 DIAGNOSIS — I50.20 SYSTOLIC HEART FAILURE, UNSPECIFIED HF CHRONICITY (HCC): Primary | ICD-10-CM

## 2024-01-11 LAB
ALBUMIN SERPL BCP-MCNC: 4.5 G/DL (ref 3.5–5)
ALP SERPL-CCNC: 66 U/L (ref 34–104)
ALT SERPL W P-5'-P-CCNC: 11 U/L (ref 7–52)
ANION GAP SERPL CALCULATED.3IONS-SCNC: 9 MMOL/L
AST SERPL W P-5'-P-CCNC: 15 U/L (ref 13–39)
BACTERIA UR QL AUTO: ABNORMAL /HPF
BASOPHILS # BLD AUTO: 0.06 THOUSANDS/ÂΜL (ref 0–0.1)
BASOPHILS NFR BLD AUTO: 1 % (ref 0–1)
BILIRUB SERPL-MCNC: 0.73 MG/DL (ref 0.2–1)
BILIRUB UR QL STRIP: NEGATIVE
BUN SERPL-MCNC: 18 MG/DL (ref 5–25)
CALCIUM SERPL-MCNC: 10.9 MG/DL (ref 8.4–10.2)
CHLORIDE SERPL-SCNC: 101 MMOL/L (ref 96–108)
CHOLEST SERPL-MCNC: 204 MG/DL
CLARITY UR: ABNORMAL
CO2 SERPL-SCNC: 29 MMOL/L (ref 21–32)
COLOR UR: YELLOW
CREAT SERPL-MCNC: 0.74 MG/DL (ref 0.6–1.3)
EOSINOPHIL # BLD AUTO: 0.04 THOUSAND/ÂΜL (ref 0–0.61)
EOSINOPHIL NFR BLD AUTO: 0 % (ref 0–6)
ERYTHROCYTE [DISTWIDTH] IN BLOOD BY AUTOMATED COUNT: 13 % (ref 11.6–15.1)
GFR SERPL CREATININE-BSD FRML MDRD: 84 ML/MIN/1.73SQ M
GLUCOSE P FAST SERPL-MCNC: 99 MG/DL (ref 65–99)
GLUCOSE UR STRIP-MCNC: NEGATIVE MG/DL
HCT VFR BLD AUTO: 40.7 % (ref 34.8–46.1)
HDLC SERPL-MCNC: 47 MG/DL
HGB BLD-MCNC: 13 G/DL (ref 11.5–15.4)
HGB UR QL STRIP.AUTO: ABNORMAL
IMM GRANULOCYTES # BLD AUTO: 0.02 THOUSAND/UL (ref 0–0.2)
IMM GRANULOCYTES NFR BLD AUTO: 0 % (ref 0–2)
KETONES UR STRIP-MCNC: NEGATIVE MG/DL
LDLC SERPL CALC-MCNC: 130 MG/DL (ref 0–100)
LEUKOCYTE ESTERASE UR QL STRIP: ABNORMAL
LYMPHOCYTES # BLD AUTO: 2.29 THOUSANDS/ÂΜL (ref 0.6–4.47)
LYMPHOCYTES NFR BLD AUTO: 21 % (ref 14–44)
MCH RBC QN AUTO: 28.6 PG (ref 26.8–34.3)
MCHC RBC AUTO-ENTMCNC: 31.9 G/DL (ref 31.4–37.4)
MCV RBC AUTO: 90 FL (ref 82–98)
MONOCYTES # BLD AUTO: 0.61 THOUSAND/ÂΜL (ref 0.17–1.22)
MONOCYTES NFR BLD AUTO: 6 % (ref 4–12)
NEUTROPHILS # BLD AUTO: 8.1 THOUSANDS/ÂΜL (ref 1.85–7.62)
NEUTS SEG NFR BLD AUTO: 72 % (ref 43–75)
NITRITE UR QL STRIP: NEGATIVE
NON-SQ EPI CELLS URNS QL MICRO: ABNORMAL /HPF
NRBC BLD AUTO-RTO: 0 /100 WBCS
PH UR STRIP.AUTO: 6.5 [PH]
PLATELET # BLD AUTO: 359 THOUSANDS/UL (ref 149–390)
PMV BLD AUTO: 10.7 FL (ref 8.9–12.7)
POTASSIUM SERPL-SCNC: 4.4 MMOL/L (ref 3.5–5.3)
PROT SERPL-MCNC: 7.3 G/DL (ref 6.4–8.4)
PROT UR STRIP-MCNC: ABNORMAL MG/DL
RBC # BLD AUTO: 4.54 MILLION/UL (ref 3.81–5.12)
RBC #/AREA URNS AUTO: ABNORMAL /HPF
SODIUM SERPL-SCNC: 139 MMOL/L (ref 135–147)
SP GR UR STRIP.AUTO: 1.02 (ref 1–1.03)
TRANS CELLS #/AREA URNS HPF: PRESENT /[HPF]
TRIGL SERPL-MCNC: 135 MG/DL
UROBILINOGEN UR STRIP-ACNC: <2 MG/DL
WBC # BLD AUTO: 11.12 THOUSAND/UL (ref 4.31–10.16)
WBC #/AREA URNS AUTO: ABNORMAL /HPF

## 2024-01-11 PROCEDURE — 87086 URINE CULTURE/COLONY COUNT: CPT

## 2024-01-11 PROCEDURE — 93000 ELECTROCARDIOGRAM COMPLETE: CPT | Performed by: INTERNAL MEDICINE

## 2024-01-11 PROCEDURE — 87077 CULTURE AEROBIC IDENTIFY: CPT

## 2024-01-11 PROCEDURE — 80061 LIPID PANEL: CPT

## 2024-01-11 PROCEDURE — 81001 URINALYSIS AUTO W/SCOPE: CPT

## 2024-01-11 PROCEDURE — 36415 COLL VENOUS BLD VENIPUNCTURE: CPT

## 2024-01-11 PROCEDURE — 87186 SC STD MICRODIL/AGAR DIL: CPT

## 2024-01-11 PROCEDURE — 85025 COMPLETE CBC W/AUTO DIFF WBC: CPT

## 2024-01-11 PROCEDURE — 99205 OFFICE O/P NEW HI 60 MIN: CPT | Performed by: INTERNAL MEDICINE

## 2024-01-11 PROCEDURE — 80053 COMPREHEN METABOLIC PANEL: CPT

## 2024-01-11 RX ORDER — CEPHALEXIN 500 MG/1
500 CAPSULE ORAL 2 TIMES DAILY
Qty: 20 CAPSULE | Refills: 0 | Status: SHIPPED | OUTPATIENT
Start: 2024-01-11 | End: 2024-01-21

## 2024-01-11 RX ORDER — ASPIRIN 81 MG/1
81 TABLET, CHEWABLE ORAL DAILY
Qty: 90 TABLET | Refills: 3 | Status: SHIPPED | OUTPATIENT
Start: 2024-01-11

## 2024-01-11 RX ORDER — ROSUVASTATIN CALCIUM 5 MG/1
5 TABLET, COATED ORAL DAILY
Qty: 90 TABLET | Refills: 3 | Status: SHIPPED | OUTPATIENT
Start: 2024-01-11 | End: 2024-01-17

## 2024-01-11 RX ORDER — METOPROLOL SUCCINATE 25 MG/1
25 TABLET, EXTENDED RELEASE ORAL DAILY
Qty: 90 TABLET | Refills: 3 | Status: SHIPPED | OUTPATIENT
Start: 2024-01-11

## 2024-01-11 NOTE — H&P (VIEW-ONLY)
Julia Perry  1955  [id number]  [facility] [address] [address] [phone]    1. Systolic heart failure, unspecified HF chronicity (HCC)  POCT ECG    Ambulatory Referral to Cardiology    metoprolol succinate (TOPROL-XL) 25 mg 24 hr tablet    rosuvastatin (CRESTOR) 5 mg tablet    Comprehensive metabolic panel    CBC and differential    Lipid Panel with Direct LDL reflex    aspirin 81 mg chewable tablet      2. Type 2 diabetes mellitus with hyperglycemia, unspecified whether long term insulin use (HCC)        3. Type II diabetes mellitus, well controlled (HCC)        4. Coronary artery calcification        5. Ischemic cardiomyopathy        6. Mixed hyperlipidemia            Discussion/Summary:  #1 cardiomyopathy ejection fraction 30%  #2 extensive heavy coronary calcifications  #3 chronic combined systolic and diastolic congestive heart failure  #4 hyperlipidemia  #5 type 2 diabetes mellitus  #6 IVCD    Recommendations: She has a newly diagnosed cardiomyopathy on echocardiogram.  She has been symptomatic since May 2023.  CAT scan was personally reviewed from last year shows very heavy extensive coronary calcifications particularly in the left main and LAD distribution.  Plan left heart catheterization to define coronary anatomy.  Add aspirin 81 daily.  Add Crestor 5 mg once daily.  Start Toprol-XL 25 daily for goal-directed medical therapy.  She has marginal blood pressures will follow-up post cath and add further afterload reducing agents.  I will talk to her tomorrow about the concept of a LifeVest.  This can be arranged in the near future.    Interval History: 68-year-old female with a history of type 2 diabetes and colitis presents for new patient consultation on behalf of Dr. Titus for new cardiomyopathy.  The patient has had shortness of breath, PND, orthopnea, chest pain since 2023.  She was started on steroids for colitis and  had worsening shortness of breath but attributed this to medication side effects.  An echocardiogram was recently obtained which showed an ejection fraction of 30%.  The patient tells me she has had some chest heaviness with walking on and off for several years.  She is a diabetic.  The symptoms have been quite mild.  She has felt more shortness of breath with activity and had occasional lower extremity edema recently.  There is been no syncope.  She has been taking her medications as prescribed.  She is a non-smoker.    Medical Problems       Problem List       Colitis    History of biliary dyskinesia    DMII (diabetes mellitus, type 2) ([facility])      Lab Results   Component Value Date    HGBA1C 6.4 (H) 12/28/2023         Gallbladder polyp    Leiomyoma of uterus    Acquired right foot drop    Hypomagnesemia    Hair loss    Polyp of ascending colon    Ulcerative pancolitis without complication ([facility])    Type 2 diabetes mellitus with hyperglycemia, unspecified whether long term insulin use ([facility])      Lab Results   Component Value Date    HGBA1C 6.4 (H) 12/28/2023             Past Medical History:   Diagnosis Date    Diabetes mellitus ([facility])     PONV (postoperative nausea and vomiting)      Social History     Socioeconomic History    Marital status: /Civil Union     Spouse name: Not on file    Number of children: Not on file    Years of education: Not on file    Highest education level: Not on file   Occupational History    Not on file   Tobacco Use    Smoking status: Former     Types: Cigarettes    Smokeless tobacco: Never   Vaping Use    Vaping status: Never Used   Substance and Sexual Activity    Alcohol use: Never    Drug use: Never    Sexual activity: Not on file   Other Topics Concern    Not on file   Social History Narrative    Not on file     Social Determinants of Health     Financial Resource Strain: Patient Declined (8/18/2023)    Overall Financial Resource Strain (CARDIA)     Difficulty of Paying Living  Expenses: Patient declined   Food Insecurity: Not on file   Transportation Needs: No Transportation Needs (8/18/2023)    PRAPARE - Transportation     Lack of Transportation (Medical): No     Lack of Transportation (Non-Medical): No   Physical Activity: Not on file   Stress: Not on file   Social Connections: Not on file   Intimate Partner Violence: Not on file   Housing Stability: Not on file      History reviewed. No pertinent family history.  Past Surgical History:   Procedure Laterality Date    HYSTERECTOMY      ~1990       Current Outpatient Medications:     Alcohol Swabs 70 % PADS, May substitute brand based on insurance coverage. Check glucose TID., Disp: 100 each, Rfl: 0    aspirin 81 mg chewable tablet, Chew 1 tablet (81 mg total) daily, Disp: 90 tablet, Rfl: 3    b complex vitamins capsule, Take 1 capsule by mouth daily, Disp: , Rfl:     cholecalciferol (VITAMIN D3) 1,000 units tablet, Take 1,000 Units by mouth daily, Disp: , Rfl:     furosemide (LASIX) 40 mg tablet, 1 po x 5 days then prn (Patient taking differently: as needed), Disp: 30 tablet, Rfl: 0    glucose blood (OneTouch Verio) test strip, May substitute brand based on insurance coverage. Check glucose TID., Disp: 300 each, Rfl: 3    glucose blood test strip, Use 1 each daily as needed Use as instructed, Disp: , Rfl:     magnesium gluconate (MAGONATE) 500 mg tablet, Take 1 tablet (500 mg total) by mouth in the morning, Disp: 90 tablet, Rfl: 0    metFORMIN (GLUCOPHAGE) 500 mg tablet, 2 pills bid, Disp: 360 tablet, Rfl: 3    metoprolol succinate (TOPROL-XL) 25 mg 24 hr tablet, Take 1 tablet (25 mg total) by mouth daily, Disp: 90 tablet, Rfl: 3    multivitamin (THERAGRAN) TABS, Take by mouth daily, Disp: , Rfl:     OneTouch Delica Lancets 33G MISC, May substitute brand based on insurance coverage. Check glucose TID., Disp: 100 each, Rfl: 0    rosuvastatin (CRESTOR) 5 mg tablet, Take 1 tablet (5 mg total) by mouth daily, Disp: 90 tablet, Rfl: 3     "vitamin B-12 (VITAMIN B-12) 1,000 mcg tablet, Take 1,000 mcg by mouth daily (Patient not taking: Reported on 1/11/2024), Disp: , Rfl:   Allergies   Allergen Reactions    Valdecoxib Other (See Comments)     Patient is unaware of this allergy       Labs:     Chemistry        Component Value Date/Time    K 4.4 12/28/2023 1313     12/28/2023 1313    CO2 28 12/28/2023 1313    BUN 15 12/28/2023 1313    CREATININE 0.68 12/28/2023 1313        Component Value Date/Time    CALCIUM 10.4 (H) 12/28/2023 1313    ALKPHOS 58 11/03/2023 1206    AST 18 11/03/2023 1206    ALT 14 11/03/2023 1206            No results found for: \"CHOL\"  Lab Results   Component Value Date    HDL 44 06/02/2021    HDL 44 07/24/2018     Lab Results   Component Value Date    LDLCALC 156 (H) 06/02/2021    LDLCALC 141 (H) 07/24/2018     Lab Results   Component Value Date    TRIG 204 (H) 06/02/2021    TRIG 140 07/24/2018     No results found for: \"CHOLHDL\"    Imaging: Echo complete w/ contrast if indicated    Result Date: 1/3/2024  Narrative:   Left Ventricle: Left ventricular cavity size is at the upper limits of normal when indexed for BSA. Wall thickness is mildly increased. There is eccentric hypertrophy. The left ventricular ejection fraction is 30%. Systolic function is severely reduced. Wall motion is normal. Unable to assess diastolic function due to E/A fusion due to tachycardia.   The following segments are akinetic: basal inferoseptal, basal inferior, mid inferoseptal and mid inferior.   The following segments are hypokinetic: basal anterior, basal anteroseptal, basal inferolateral, basal anterolateral, mid anterior, mid anteroseptal, mid inferolateral, mid anterolateral, apical anterior, apical septal, apical inferior, apical lateral and apex.   Left Atrium: The atrium is moderately dilated (42-48 mL/m2).   Mitral Valve: There is mild annular calcification. There is mild to moderate regurgitation.   Pulmonic Valve: There is mild regurgitation. " "  Pericardium: There is a small pericardial effusion along the right atrial and right ventricular free wall. The fluid exhibits no internal echoes. There is no echocardiographic evidence of tamponade. There is a left pleural effusion.       ECG: Sinus rhythm IVCD      ROS    Vitals:    01/11/24 1245   BP: 118/76   Pulse: 92     Vitals:    01/11/24 1245   Weight: 72.6 kg (160 lb)     Height: 5' 4\" (162.6 cm)   Body mass index is 27.46 kg/m².    Physical Exam:  Vital signs reviewed.  General appearance:  Appears stated age, alert, well appearing and in no distress  HEENT:  PERRLA, EOMI, no scleral icterus, no conjunctival pallor  NECK:  Supple, No elevated JVP, no thyromegaly, no carotid bruits, no JVD  HEART:  Regular rate and rhythm, normal S1/S2, no S3/S4, no murmur or rub, PMI nondisplaced  LUNGS:  Clear to auscultation bilaterally, no wheezes rales or rhonchi  ABDOMEN:  Soft, non-tender, positive bowel sounds, no rebound or guarding, no organomegaly   EXTREMITIES:  Normal range of motion.  No clubbing or cyanosis. No edema  VASCULAR:  Normal pedal pulses   SKIN: No lesions or rashes on exposed skin  NEURO:  CN II-XII intact, no focal deficits    "

## 2024-01-11 NOTE — PROGRESS NOTES
Julia Perry  1955  [id number]  [facility] [address] [address] [phone]    1. Systolic heart failure, unspecified HF chronicity (HCC)  POCT ECG    Ambulatory Referral to Cardiology    metoprolol succinate (TOPROL-XL) 25 mg 24 hr tablet    rosuvastatin (CRESTOR) 5 mg tablet    Comprehensive metabolic panel    CBC and differential    Lipid Panel with Direct LDL reflex    aspirin 81 mg chewable tablet      2. Type 2 diabetes mellitus with hyperglycemia, unspecified whether long term insulin use (HCC)        3. Type II diabetes mellitus, well controlled (HCC)        4. Coronary artery calcification        5. Ischemic cardiomyopathy        6. Mixed hyperlipidemia            Discussion/Summary:  #1 cardiomyopathy ejection fraction 30%  #2 extensive heavy coronary calcifications  #3 chronic combined systolic and diastolic congestive heart failure  #4 hyperlipidemia  #5 type 2 diabetes mellitus  #6 IVCD    Recommendations: She has a newly diagnosed cardiomyopathy on echocardiogram.  She has been symptomatic since May 2023.  CAT scan was personally reviewed from last year shows very heavy extensive coronary calcifications particularly in the left main and LAD distribution.  Plan left heart catheterization to define coronary anatomy.  Add aspirin 81 daily.  Add Crestor 5 mg once daily.  Start Toprol-XL 25 daily for goal-directed medical therapy.  She has marginal blood pressures will follow-up post cath and add further afterload reducing agents.  I will talk to her tomorrow about the concept of a LifeVest.  This can be arranged in the near future.    Interval History: 68-year-old female with a history of type 2 diabetes and colitis presents for new patient consultation on behalf of Dr. Titus for new cardiomyopathy.  The patient has had shortness of breath, PND, orthopnea, chest pain since 2023.  She was started on steroids for colitis and  had worsening shortness of breath but attributed this to medication side effects.  An echocardiogram was recently obtained which showed an ejection fraction of 30%.  The patient tells me she has had some chest heaviness with walking on and off for several years.  She is a diabetic.  The symptoms have been quite mild.  She has felt more shortness of breath with activity and had occasional lower extremity edema recently.  There is been no syncope.  She has been taking her medications as prescribed.  She is a non-smoker.    Medical Problems       Problem List       Colitis    History of biliary dyskinesia    DMII (diabetes mellitus, type 2) ([facility])      Lab Results   Component Value Date    HGBA1C 6.4 (H) 12/28/2023         Gallbladder polyp    Leiomyoma of uterus    Acquired right foot drop    Hypomagnesemia    Hair loss    Polyp of ascending colon    Ulcerative pancolitis without complication ([facility])    Type 2 diabetes mellitus with hyperglycemia, unspecified whether long term insulin use ([facility])      Lab Results   Component Value Date    HGBA1C 6.4 (H) 12/28/2023             Past Medical History:   Diagnosis Date    Diabetes mellitus ([facility])     PONV (postoperative nausea and vomiting)      Social History     Socioeconomic History    Marital status: /Civil Union     Spouse name: Not on file    Number of children: Not on file    Years of education: Not on file    Highest education level: Not on file   Occupational History    Not on file   Tobacco Use    Smoking status: Former     Types: Cigarettes    Smokeless tobacco: Never   Vaping Use    Vaping status: Never Used   Substance and Sexual Activity    Alcohol use: Never    Drug use: Never    Sexual activity: Not on file   Other Topics Concern    Not on file   Social History Narrative    Not on file     Social Determinants of Health     Financial Resource Strain: Patient Declined (8/18/2023)    Overall Financial Resource Strain (CARDIA)     Difficulty of Paying Living  Expenses: Patient declined   Food Insecurity: Not on file   Transportation Needs: No Transportation Needs (8/18/2023)    PRAPARE - Transportation     Lack of Transportation (Medical): No     Lack of Transportation (Non-Medical): No   Physical Activity: Not on file   Stress: Not on file   Social Connections: Not on file   Intimate Partner Violence: Not on file   Housing Stability: Not on file      History reviewed. No pertinent family history.  Past Surgical History:   Procedure Laterality Date    HYSTERECTOMY      ~1990       Current Outpatient Medications:     Alcohol Swabs 70 % PADS, May substitute brand based on insurance coverage. Check glucose TID., Disp: 100 each, Rfl: 0    aspirin 81 mg chewable tablet, Chew 1 tablet (81 mg total) daily, Disp: 90 tablet, Rfl: 3    b complex vitamins capsule, Take 1 capsule by mouth daily, Disp: , Rfl:     cholecalciferol (VITAMIN D3) 1,000 units tablet, Take 1,000 Units by mouth daily, Disp: , Rfl:     furosemide (LASIX) 40 mg tablet, 1 po x 5 days then prn (Patient taking differently: as needed), Disp: 30 tablet, Rfl: 0    glucose blood (OneTouch Verio) test strip, May substitute brand based on insurance coverage. Check glucose TID., Disp: 300 each, Rfl: 3    glucose blood test strip, Use 1 each daily as needed Use as instructed, Disp: , Rfl:     magnesium gluconate (MAGONATE) 500 mg tablet, Take 1 tablet (500 mg total) by mouth in the morning, Disp: 90 tablet, Rfl: 0    metFORMIN (GLUCOPHAGE) 500 mg tablet, 2 pills bid, Disp: 360 tablet, Rfl: 3    metoprolol succinate (TOPROL-XL) 25 mg 24 hr tablet, Take 1 tablet (25 mg total) by mouth daily, Disp: 90 tablet, Rfl: 3    multivitamin (THERAGRAN) TABS, Take by mouth daily, Disp: , Rfl:     OneTouch Delica Lancets 33G MISC, May substitute brand based on insurance coverage. Check glucose TID., Disp: 100 each, Rfl: 0    rosuvastatin (CRESTOR) 5 mg tablet, Take 1 tablet (5 mg total) by mouth daily, Disp: 90 tablet, Rfl: 3     "vitamin B-12 (VITAMIN B-12) 1,000 mcg tablet, Take 1,000 mcg by mouth daily (Patient not taking: Reported on 1/11/2024), Disp: , Rfl:   Allergies   Allergen Reactions    Valdecoxib Other (See Comments)     Patient is unaware of this allergy       Labs:     Chemistry        Component Value Date/Time    K 4.4 12/28/2023 1313     12/28/2023 1313    CO2 28 12/28/2023 1313    BUN 15 12/28/2023 1313    CREATININE 0.68 12/28/2023 1313        Component Value Date/Time    CALCIUM 10.4 (H) 12/28/2023 1313    ALKPHOS 58 11/03/2023 1206    AST 18 11/03/2023 1206    ALT 14 11/03/2023 1206            No results found for: \"CHOL\"  Lab Results   Component Value Date    HDL 44 06/02/2021    HDL 44 07/24/2018     Lab Results   Component Value Date    LDLCALC 156 (H) 06/02/2021    LDLCALC 141 (H) 07/24/2018     Lab Results   Component Value Date    TRIG 204 (H) 06/02/2021    TRIG 140 07/24/2018     No results found for: \"CHOLHDL\"    Imaging: Echo complete w/ contrast if indicated    Result Date: 1/3/2024  Narrative:   Left Ventricle: Left ventricular cavity size is at the upper limits of normal when indexed for BSA. Wall thickness is mildly increased. There is eccentric hypertrophy. The left ventricular ejection fraction is 30%. Systolic function is severely reduced. Wall motion is normal. Unable to assess diastolic function due to E/A fusion due to tachycardia.   The following segments are akinetic: basal inferoseptal, basal inferior, mid inferoseptal and mid inferior.   The following segments are hypokinetic: basal anterior, basal anteroseptal, basal inferolateral, basal anterolateral, mid anterior, mid anteroseptal, mid inferolateral, mid anterolateral, apical anterior, apical septal, apical inferior, apical lateral and apex.   Left Atrium: The atrium is moderately dilated (42-48 mL/m2).   Mitral Valve: There is mild annular calcification. There is mild to moderate regurgitation.   Pulmonic Valve: There is mild regurgitation. " "  Pericardium: There is a small pericardial effusion along the right atrial and right ventricular free wall. The fluid exhibits no internal echoes. There is no echocardiographic evidence of tamponade. There is a left pleural effusion.       ECG: Sinus rhythm IVCD      ROS    Vitals:    01/11/24 1245   BP: 118/76   Pulse: 92     Vitals:    01/11/24 1245   Weight: 72.6 kg (160 lb)     Height: 5' 4\" (162.6 cm)   Body mass index is 27.46 kg/m².    Physical Exam:  Vital signs reviewed.  General appearance:  Appears stated age, alert, well appearing and in no distress  HEENT:  PERRLA, EOMI, no scleral icterus, no conjunctival pallor  NECK:  Supple, No elevated JVP, no thyromegaly, no carotid bruits, no JVD  HEART:  Regular rate and rhythm, normal S1/S2, no S3/S4, no murmur or rub, PMI nondisplaced  LUNGS:  Clear to auscultation bilaterally, no wheezes rales or rhonchi  ABDOMEN:  Soft, non-tender, positive bowel sounds, no rebound or guarding, no organomegaly   EXTREMITIES:  Normal range of motion.  No clubbing or cyanosis. No edema  VASCULAR:  Normal pedal pulses   SKIN: No lesions or rashes on exposed skin  NEURO:  CN II-XII intact, no focal deficits    "

## 2024-01-11 NOTE — TELEPHONE ENCOUNTER
Upon review of the In Basket request we were able to locate, review, and update the patient chart as requested for Diabetic Eye Exam.    Any additional questions or concerns should be emailed to the Practice Liaisons via the appropriate education email address, please do not reply via In Basket.    Thank you  Denise Dove

## 2024-01-11 NOTE — PROGRESS NOTES
Left a VM on pts home phone that Dr Titus sent in an antibiotic to the pharmacy for her and that she can start to take it once she has given the specimen to the lab

## 2024-01-11 NOTE — TELEPHONE ENCOUNTER
Patient stopped in office on her way to the cardiologist today.  She thinks she has a UTI  she has urinary frequency, burning when voiding and itching.  She took a home U/A and said it showed positive.  Has no Blood in her urine and no abdominal pain.  She is wondering if you could place an order for a urinalysis and C&S and she can get it done at [facility] while she is up there.  
7

## 2024-01-12 ENCOUNTER — TELEPHONE (OUTPATIENT)
Dept: CARDIOLOGY CLINIC | Facility: CLINIC | Age: 69
End: 2024-01-12

## 2024-01-12 NOTE — TELEPHONE ENCOUNTER
[facility]oraine pending ref # [id number] for [facility]/[id number] @ [facility].  Dr. Ceballos.  Clinical uploaded.  I can't guarantee there will be a determination on this case by mid-afternoon Monday, 1/15/24

## 2024-01-12 NOTE — TELEPHONE ENCOUNTER
Patient scheduled for LHC at [facility] on 1/16/2024 with Dr Asencio.    Patient aware of general instructions.    Meds holds: Lasix to hold in the morning of the procedure.  Metformin to hold in the morning of the procedure.    Can we please check insurance for service.

## 2024-01-12 NOTE — TELEPHONE ENCOUNTER
----- Message from Lisa Younger sent at 1/11/2024  1:35 PM EST -----  Regarding: L Heart Cath  Dr. Ceballos    Would like this patient setup for a Left Heart Cath.    Thank You  Deonna

## 2024-01-13 LAB — BACTERIA UR CULT: ABNORMAL

## 2024-01-15 NOTE — TELEPHONE ENCOUNTER
Patient had an UTI, she got prescribed KEFLEX twice per day for 10 day's, she is scheduled for cath on 1/17/2024 with Dr Sousa.  Wonder if procedure should be rescheduled due to the medication.  Thank you.

## 2024-01-15 NOTE — TELEPHONE ENCOUNTER
[facility] approved auth @ [id number] for [facility]/[id number] @ [facility].  Dr. Ceballos.  Valid 1/15/24-7/13/24

## 2024-01-17 ENCOUNTER — HOSPITAL ENCOUNTER (OUTPATIENT)
Facility: HOSPITAL | Age: 69
Setting detail: OUTPATIENT SURGERY
Discharge: HOME | End: 2024-01-17
Attending: INTERNAL MEDICINE | Admitting: INTERNAL MEDICINE
Payer: COMMERCIAL

## 2024-01-17 VITALS
OXYGEN SATURATION: 90 % | TEMPERATURE: 97.1 F | BODY MASS INDEX: 25.71 KG/M2 | DIASTOLIC BLOOD PRESSURE: 65 MMHG | WEIGHT: 160 LBS | SYSTOLIC BLOOD PRESSURE: 126 MMHG | HEART RATE: 80 BPM | RESPIRATION RATE: 16 BRPM | HEIGHT: 66 IN

## 2024-01-17 DIAGNOSIS — I25.5 ISCHEMIC CARDIOMYOPATHY: ICD-10-CM

## 2024-01-17 DIAGNOSIS — I25.10 CORONARY ARTERY DISEASE INVOLVING NATIVE CORONARY ARTERY: Primary | ICD-10-CM

## 2024-01-17 DIAGNOSIS — E11.65 TYPE 2 DIABETES MELLITUS WITH HYPERGLYCEMIA, WITHOUT LONG-TERM CURRENT USE OF INSULIN (HCC): ICD-10-CM

## 2024-01-17 LAB
ATRIAL RATE: 84 BPM
P AXIS: 63 DEGREES
PR INTERVAL: 174 MS
QRS AXIS: 16 DEGREES
QRSD INTERVAL: 128 MS
QT INTERVAL: 400 MS
QTC INTERVAL: 472 MS
T WAVE AXIS: 151 DEGREES
VENTRICULAR RATE: 84 BPM

## 2024-01-17 PROCEDURE — 93458 L HRT ARTERY/VENTRICLE ANGIO: CPT | Performed by: INTERNAL MEDICINE

## 2024-01-17 PROCEDURE — C1894 INTRO/SHEATH, NON-LASER: HCPCS | Performed by: INTERNAL MEDICINE

## 2024-01-17 PROCEDURE — C1760 CLOSURE DEV, VASC: HCPCS | Performed by: INTERNAL MEDICINE

## 2024-01-17 PROCEDURE — NC001 PR NO CHARGE: Performed by: INTERNAL MEDICINE

## 2024-01-17 PROCEDURE — C1769 GUIDE WIRE: HCPCS | Performed by: INTERNAL MEDICINE

## 2024-01-17 PROCEDURE — 93005 ELECTROCARDIOGRAM TRACING: CPT

## 2024-01-17 PROCEDURE — 99153 MOD SED SAME PHYS/QHP EA: CPT | Performed by: INTERNAL MEDICINE

## 2024-01-17 PROCEDURE — 93010 ELECTROCARDIOGRAM REPORT: CPT | Performed by: INTERNAL MEDICINE

## 2024-01-17 PROCEDURE — 99152 MOD SED SAME PHYS/QHP 5/>YRS: CPT | Performed by: INTERNAL MEDICINE

## 2024-01-17 DEVICE — PERCLOSE™ PROSTYLE™ SUTURE-MEDIATED CLOSURE AND REPAIR SYSTEM
Type: IMPLANTABLE DEVICE | Status: FUNCTIONAL
Brand: PERCLOSE™ PROSTYLE™

## 2024-01-17 RX ORDER — NITROGLYCERIN 0.4 MG/1
0.4 TABLET SUBLINGUAL ONCE AS NEEDED
Status: DISCONTINUED | OUTPATIENT
Start: 2024-01-17 | End: 2024-01-17 | Stop reason: HOSPADM

## 2024-01-17 RX ORDER — SODIUM CHLORIDE 9 MG/ML
50 INJECTION, SOLUTION INTRAVENOUS CONTINUOUS
Status: DISPENSED | OUTPATIENT
Start: 2024-01-17 | End: 2024-01-17

## 2024-01-17 RX ORDER — LIDOCAINE HYDROCHLORIDE 10 MG/ML
INJECTION, SOLUTION EPIDURAL; INFILTRATION; INTRACAUDAL; PERINEURAL CODE/TRAUMA/SEDATION MEDICATION
Status: DISCONTINUED | OUTPATIENT
Start: 2024-01-17 | End: 2024-01-17 | Stop reason: HOSPADM

## 2024-01-17 RX ORDER — SODIUM CHLORIDE 9 MG/ML
100 INJECTION, SOLUTION INTRAVENOUS CONTINUOUS
Status: DISCONTINUED | OUTPATIENT
Start: 2024-01-17 | End: 2024-01-17

## 2024-01-17 RX ORDER — MIDAZOLAM HYDROCHLORIDE 2 MG/2ML
INJECTION, SOLUTION INTRAMUSCULAR; INTRAVENOUS CODE/TRAUMA/SEDATION MEDICATION
Status: DISCONTINUED | OUTPATIENT
Start: 2024-01-17 | End: 2024-01-17 | Stop reason: HOSPADM

## 2024-01-17 RX ORDER — ROSUVASTATIN CALCIUM 40 MG/1
40 TABLET, COATED ORAL DAILY
Qty: 90 TABLET | Refills: 3 | Status: SHIPPED | OUTPATIENT
Start: 2024-01-17

## 2024-01-17 RX ORDER — ONDANSETRON 2 MG/ML
4 INJECTION INTRAMUSCULAR; INTRAVENOUS ONCE AS NEEDED
Status: DISCONTINUED | OUTPATIENT
Start: 2024-01-17 | End: 2024-01-17 | Stop reason: HOSPADM

## 2024-01-17 RX ORDER — FENTANYL CITRATE 50 UG/ML
INJECTION, SOLUTION INTRAMUSCULAR; INTRAVENOUS CODE/TRAUMA/SEDATION MEDICATION
Status: DISCONTINUED | OUTPATIENT
Start: 2024-01-17 | End: 2024-01-17 | Stop reason: HOSPADM

## 2024-01-17 RX ORDER — ASPIRIN 81 MG/1
324 TABLET, CHEWABLE ORAL ONCE
Status: COMPLETED | OUTPATIENT
Start: 2024-01-17 | End: 2024-01-17

## 2024-01-17 RX ORDER — ACETAMINOPHEN 325 MG/1
975 TABLET ORAL ONCE AS NEEDED
Status: DISCONTINUED | OUTPATIENT
Start: 2024-01-17 | End: 2024-01-17 | Stop reason: HOSPADM

## 2024-01-17 RX ADMIN — SODIUM CHLORIDE 100 ML/HR: 0.9 INJECTION, SOLUTION INTRAVENOUS at 07:38

## 2024-01-17 RX ADMIN — ASPIRIN 81 MG CHEWABLE TABLET 243 MG: 81 TABLET CHEWABLE at 07:37

## 2024-01-17 NOTE — DISCHARGE INSTR - AVS FIRST PAGE
1. Please see the post cardiac catheterization dishcarge instructions.   No heavy lifting, greater than 10 lbs. or strenuous  activity for 48 hrs.    2.Remove band aid tomorrow.  Shower and wash area- groin gently with soap and water- beginning tomorrow. Rinse and pat dry.  Apply new water seal band aid.  Repeat this process for 5 days. No powders, creams lotions or antibiotic ointments  for 5 days.  No tub baths, hot tubs or swimming for 5 days.     3. Please call our office ([phone]) if you have any fever, redness, swelling, discharge from your groin access site.    4.No driving for 1 day    5. Perclose Booklet

## 2024-01-17 NOTE — INTERVAL H&P NOTE
"H&P reviewed. After examining the patient, I find no changed to the H&P since it had been written. 68M w/ new onset cardiomyopathy of EF 30%, coronary alcification on CT, HTN, HLD, DM w/ HF symptoms is referred for LHC +/- PCI.     /68   Pulse 88   Temp 98.2 °F (36.8 °C) (Oral)   Resp 16   Ht 5' 6\" (1.676 m)   Wt 72.6 kg (160 lb)   SpO2 96%   BMI 25.82 kg/m²      Patient re-evaluated. Accept as history and physical.    Gino Quesada, DO/January 17, 2024/10:35 AM    "

## 2024-01-18 ENCOUNTER — APPOINTMENT (OUTPATIENT)
Age: 69
End: 2024-01-18
Payer: COMMERCIAL

## 2024-01-18 DIAGNOSIS — R60.9 EDEMA, UNSPECIFIED TYPE: ICD-10-CM

## 2024-01-18 DIAGNOSIS — I50.9 ACUTE CONGESTIVE HEART FAILURE, UNSPECIFIED HEART FAILURE TYPE (HCC): ICD-10-CM

## 2024-01-18 DIAGNOSIS — I25.10 CORONARY ARTERY DISEASE INVOLVING NATIVE CORONARY ARTERY: ICD-10-CM

## 2024-01-18 PROCEDURE — 36415 COLL VENOUS BLD VENIPUNCTURE: CPT

## 2024-01-18 PROCEDURE — 80048 BASIC METABOLIC PNL TOTAL CA: CPT

## 2024-01-18 RX ORDER — FUROSEMIDE 40 MG/1
TABLET ORAL
Qty: 30 TABLET | Refills: 0 | Status: CANCELLED | OUTPATIENT
Start: 2024-01-18

## 2024-01-18 RX ORDER — FUROSEMIDE 40 MG/1
40 TABLET ORAL AS NEEDED
Qty: 30 TABLET | Refills: 6 | Status: SHIPPED | OUTPATIENT
Start: 2024-01-18

## 2024-01-19 LAB
ANION GAP SERPL CALCULATED.3IONS-SCNC: 9 MMOL/L
BUN SERPL-MCNC: 18 MG/DL (ref 5–25)
CALCIUM SERPL-MCNC: 10.5 MG/DL (ref 8.4–10.2)
CHLORIDE SERPL-SCNC: 102 MMOL/L (ref 96–108)
CO2 SERPL-SCNC: 30 MMOL/L (ref 21–32)
CREAT SERPL-MCNC: 0.8 MG/DL (ref 0.6–1.3)
GFR SERPL CREATININE-BSD FRML MDRD: 76 ML/MIN/1.73SQ M
GLUCOSE P FAST SERPL-MCNC: 113 MG/DL (ref 65–99)
POTASSIUM SERPL-SCNC: 4.2 MMOL/L (ref 3.5–5.3)
SODIUM SERPL-SCNC: 141 MMOL/L (ref 135–147)

## 2024-01-22 DIAGNOSIS — E83.52 HYPERCALCEMIA: Primary | ICD-10-CM

## 2024-01-23 ENCOUNTER — APPOINTMENT (OUTPATIENT)
Dept: LAB | Facility: CLINIC | Age: 69
End: 2024-01-23
Payer: COMMERCIAL

## 2024-01-23 ENCOUNTER — OFFICE VISIT (OUTPATIENT)
Dept: CARDIAC SURGERY | Facility: CLINIC | Age: 69
End: 2024-01-23
Payer: COMMERCIAL

## 2024-01-23 VITALS
SYSTOLIC BLOOD PRESSURE: 117 MMHG | HEIGHT: 66 IN | WEIGHT: 154.8 LBS | OXYGEN SATURATION: 98 % | DIASTOLIC BLOOD PRESSURE: 58 MMHG | TEMPERATURE: 96.6 F | HEART RATE: 74 BPM | BODY MASS INDEX: 24.88 KG/M2

## 2024-01-23 DIAGNOSIS — Z01.812 ENCOUNTER FOR PRE-OPERATIVE LABORATORY TESTING: ICD-10-CM

## 2024-01-23 DIAGNOSIS — Z01.810 PREOPERATIVE CARDIOVASCULAR EXAMINATION: ICD-10-CM

## 2024-01-23 DIAGNOSIS — Z01.810 PRE-OPERATIVE CARDIOVASCULAR EXAMINATION: ICD-10-CM

## 2024-01-23 DIAGNOSIS — I25.10 CORONARY ARTERY DISEASE INVOLVING NATIVE CORONARY ARTERY OF NATIVE HEART WITHOUT ANGINA PECTORIS: ICD-10-CM

## 2024-01-23 DIAGNOSIS — R06.02 SHORTNESS OF BREATH: ICD-10-CM

## 2024-01-23 DIAGNOSIS — I25.10 CORONARY ARTERY DISEASE INVOLVING NATIVE CORONARY ARTERY OF NATIVE HEART WITHOUT ANGINA PECTORIS: Primary | ICD-10-CM

## 2024-01-23 DIAGNOSIS — Z01.89 ENCOUNTER FOR IMAGING OF BILATERAL GREATER SAPHENOUS VEINS: ICD-10-CM

## 2024-01-23 DIAGNOSIS — Z13.6 ENCOUNTER FOR SCREENING FOR STENOSIS OF CAROTID ARTERY: ICD-10-CM

## 2024-01-23 LAB
ABO GROUP BLD: NORMAL
BLD GP AB SCN SERPL QL: NEGATIVE
INR PPP: 1.07 (ref 0.84–1.19)
PROTHROMBIN TIME: 13.8 SECONDS (ref 11.6–14.5)
RH BLD: POSITIVE
SPECIMEN EXPIRATION DATE: NORMAL

## 2024-01-23 PROCEDURE — 86850 RBC ANTIBODY SCREEN: CPT

## 2024-01-23 PROCEDURE — 86900 BLOOD TYPING SEROLOGIC ABO: CPT

## 2024-01-23 PROCEDURE — 86901 BLOOD TYPING SEROLOGIC RH(D): CPT

## 2024-01-23 PROCEDURE — 99204 OFFICE O/P NEW MOD 45 MIN: CPT | Performed by: STUDENT IN AN ORGANIZED HEALTH CARE EDUCATION/TRAINING PROGRAM

## 2024-01-23 PROCEDURE — 36415 COLL VENOUS BLD VENIPUNCTURE: CPT

## 2024-01-23 PROCEDURE — 87081 CULTURE SCREEN ONLY: CPT

## 2024-01-23 PROCEDURE — 85610 PROTHROMBIN TIME: CPT

## 2024-01-23 RX ORDER — CEFAZOLIN SODIUM 2 G/50ML
2000 SOLUTION INTRAVENOUS ONCE
OUTPATIENT
Start: 2024-01-23 | End: 2024-01-23

## 2024-01-23 RX ORDER — CHLORHEXIDINE GLUCONATE ORAL RINSE 1.2 MG/ML
15 SOLUTION DENTAL ONCE
OUTPATIENT
Start: 2024-01-23 | End: 2024-01-23

## 2024-01-23 NOTE — PROGRESS NOTES
Consultation - Cardiothoracic Surgery   Julia Perry 68 y.o. female MRN: [id number]    Physician Requesting Consult: Dr. Khoi Sousa     Reason for Consult / Principal Problem: Coronary artery disease    History of Present Illness: Julia Perry is a 68 y.o. year old female with PMHx DM Type 2 on Metformin (A1C 6.1), HLD, new ischemic cardiomyopathy (EF 30%), colitis who has been referred for CABG evaluation due to findings of severe 3-vessel CAD on cardiac catheterization performed on 1/17/24. Patient's current clinical course began around August of 2023 when she presented to her PCP for lower extremity edema. She had been on about 8 weeks of prednisone prior to this after a hospitalization in June 2023 for ulcerative colitis, noted to be severe on colonoscopy on 6/6/23. This edema was attributed to her steroid course. Patient presented to PCP again in December with ongoing shortness of breath for about a week. She was experiencing orthopnea, PND, ongoing edema and chest pain. At this time, a CXR was obtained that demonstrated bilateral pleural effusions, and she was treated with antibiotics for pneumonia. Despite antibiotics, her symptoms had not improved, so an echocardiogram was obtained, and demonstrated reduced EF of 30% due to ischemic cardiomyopathy, as well as akinetic and hypokinetic segments. She was referred to cardiology at this time. She saw Dr. Ceballos on 1/11/24, and was started on goal directed medical therapy, and a cardiac catheterization was obtained on 1/17 demonstrated multivessel CAD. She was then referred to cardiac surgery for CABG eval.     Upon interview today, patient reports that with adjustment in her medications, she is feeling a little better, but still notes changes in her activity tolerance. She states that she is unable to do things like make the bed, and running the vacuum is difficult due to her symptoms. She endorses shortness of breath with exertion and chest discomfort which  occurs randomly. She also has lower extremity edema, and has been taking her lasix daily, normally splitting the 40 mg pill and taking 1/2 in the morning and 1/2 in the evening. She denies family history of coronary artery disease or premature SCD. Her mom is still living at the age of 97. She is a remote former smoker - has not smoked in 50 years. She denies alcohol or drug use.     Past Medical History:  Past Medical History:   Diagnosis Date    Diabetes mellitus (HCC)     PONV (postoperative nausea and vomiting)          Past Surgical History:   Past Surgical History:   Procedure Laterality Date    CARDIAC CATHETERIZATION Left 1/17/2024    Procedure: Cardiac Left Heart Cath;  Surgeon: Juan A Sousa MD;  [facility];  Service: Cardiology    CARDIAC CATHETERIZATION  1/17/2024    Procedure: Cardiac catheterization;  Surgeon: Juan A Sousa MD;  Location: [facility];  Service: Cardiology    HYSTERECTOMY      ~1990         Family History:  No family history on file.      Social History:      Social History     Substance and Sexual Activity   Alcohol Use Never     Social History     Substance and Sexual Activity   Drug Use Never     Social History     Tobacco Use   Smoking Status Former    Types: Cigarettes   Smokeless Tobacco Never       Home Medications:   Prior to Admission medications    Medication Sig Start Date End Date Taking? Authorizing Provider   Alcohol Swabs 70 % PADS May substitute brand based on insurance coverage. Check glucose TID. 6/8/23  Yes Jean Claude Villatoro PA-C   aspirin 81 mg chewable tablet Chew 1 tablet (81 mg total) daily 1/11/24  Yes Maverick Ceballos DO   b complex vitamins capsule Take 1 capsule by mouth daily   Yes Historical Provider, MD   cholecalciferol (VITAMIN D3) 1,000 units tablet Take 1,000 Units by mouth daily   Yes Historical Provider, MD   furosemide (LASIX) 40 mg tablet Take 1 tablet (40 mg total) by mouth as needed (Take 1 tablet daily as needed)  1/18/24  Yes Maverick Ceballos DO   glucose blood test strip Use 1 each daily as needed Use as instructed   Yes Historical Provider, MD   magnesium gluconate (MAGONATE) 500 mg tablet Take 1 tablet (500 mg total) by mouth in the morning 6/8/23 1/23/24 Yes Jean Claude Villatoro PA-C   metFORMIN (GLUCOPHAGE) 500 mg tablet 2 pills bid Do not start before January 18, 2024. 1/18/24  Yes ROYAL Aguilera   metoprolol succinate (TOPROL-XL) 25 mg 24 hr tablet Take 1 tablet (25 mg total) by mouth daily 1/11/24  Yes Maverick Ceballos DO   multivitamin (THERAGRAN) TABS Take by mouth daily   Yes Historical Provider, MD Rodriguez Delica Lancets 33G MISC May substitute brand based on insurance coverage. Check glucose TID. 6/8/23  Yes Jean Claude Villatoro PA-C   rosuvastatin (CRESTOR) 40 MG tablet Take 1 tablet (40 mg total) by mouth daily 1/17/24  Yes ROYAL Aguilera       Allergies:  Allergies   Allergen Reactions    Valdecoxib Other (See Comments)     Patient is unaware of this allergy       Review of Systems:     Review of Systems   Constitutional:  Positive for activity change and fatigue. Negative for chills and fever.   HENT:  Negative for ear pain and sore throat.    Eyes:  Negative for pain and visual disturbance.   Respiratory:  Positive for shortness of breath. Negative for cough.    Cardiovascular:  Positive for chest pain and leg swelling. Negative for palpitations.   Gastrointestinal:  Negative for abdominal pain and vomiting.   Genitourinary:  Negative for dysuria and hematuria.   Musculoskeletal:  Negative for arthralgias and back pain.   Skin:  Negative for color change and rash.   Neurological:  Negative for seizures and syncope.   Psychiatric/Behavioral: Negative.     All other systems reviewed and are negative.      Vital Signs:     Vitals:    01/23/24 1043 01/23/24 1048   BP: 121/59 117/58   BP Location: Left arm Right arm   Patient Position: Sitting Sitting   Cuff Size: Standard Standard   Pulse: 74   "  Temp: (!) 96.6 °F (35.9 °C)    TempSrc: Tympanic    SpO2: 98%    Weight: 70.2 kg (154 lb 12.8 oz)    Height: 5' 6\" (1.676 m)        Physical Exam:    Physical Exam  Vitals reviewed.   Constitutional:       Appearance: Normal appearance.   HENT:      Head: Normocephalic and atraumatic.   Eyes:      Pupils: Pupils are equal, round, and reactive to light.   Neck:      Vascular: No carotid bruit or JVD.   Cardiovascular:      Rate and Rhythm: Normal rate and regular rhythm.      Pulses:           Carotid pulses are 2+ on the right side and 2+ on the left side.       Radial pulses are 2+ on the right side and 2+ on the left side.        Dorsalis pedis pulses are 2+ on the right side and 2+ on the left side.      Heart sounds: Normal heart sounds. No murmur heard.     No friction rub. No gallop.   Pulmonary:      Effort: Pulmonary effort is normal.      Breath sounds: Normal breath sounds. No wheezing, rhonchi or rales.   Abdominal:      Palpations: Abdomen is soft.      Tenderness: There is no abdominal tenderness.   Musculoskeletal:      Cervical back: Normal range of motion.      Right lower le+ Edema present.      Left lower le+ Edema present.   Skin:     General: Skin is warm and dry.      Capillary Refill: Capillary refill takes less than 2 seconds.   Neurological:      General: No focal deficit present.      Mental Status: She is alert.      Sensory: Sensation is intact.   Psychiatric:         Attention and Perception: Attention normal.         Mood and Affect: Mood normal.         Behavior: Behavior normal. Behavior is cooperative.         Lab Results:         Results from last 7 days   Lab Units 24  1236   POTASSIUM mmol/L 4.2   CHLORIDE mmol/L 102   CO2 mmol/L 30   BUN mg/dL 18   CREATININE mg/dL 0.80   CALCIUM mg/dL 10.5*         Lab Results   Component Value Date    HGBA1C 6.4 (H) 2023     No results found for: \"CKTOTAL\", \"CKMB\", \"CKMBINDEX\", \"TROPONINI\"    Imaging Studies:     Cardiac " Catheterization: 1/17/24    Prox LAD lesion is 50% stenosed.    Mid LAD lesion is 85% stenosed.    Prox Cx lesion is 40% stenosed.    Mid Cx lesion is 70% stenosed.    Dist Cx lesion is 90% stenosed.    Prox RCA lesion is 50% stenosed.    Mid RCA lesion is 95% stenosed.     3 vessel epicardial CAD    Echocardiogram: 1/3/24  Left Ventricle Left ventricular cavity size is at the upper limits of normal when indexed for BSA. Wall thickness is mildly increased. There is eccentric hypertrophy. The left ventricular ejection fraction is 30%. Systolic function is severely reduced. Wall motion is normal. Unable to assess diastolic function due to E/A fusion due to tachycardia.   Wall Scoring Baseline     The following segments are akinetic: basal inferoseptal, basal inferior, mid inferoseptal and mid inferior.  The following segments are hypokinetic: basal anterior, basal anteroseptal, basal inferolateral, basal anterolateral, mid anterior, mid anteroseptal, mid inferolateral, mid anterolateral, apical anterior, apical septal, apical inferior, apical lateral and apex.            Right Ventricle Right ventricular cavity size is normal. Systolic function is normal. Wall thickness is normal.   Left Atrium The atrium is moderately dilated (42-48 mL/m2).   Right Atrium The atrium is normal in size.   Aortic Valve The aortic valve is trileaflet. The leaflets are not thickened. The leaflets are not calcified. The leaflets exhibit normal mobility. There is no evidence of regurgitation. The aortic valve has no significant stenosis.   Mitral Valve There is mild annular calcification.  There is mild to moderate regurgitation. There is no evidence of stenosis.   Tricuspid Valve Tricuspid valve structure is normal. There is trace regurgitation. There is no evidence of stenosis. Right ventricular systolic pressure could not be assessed.   Pulmonic Valve Pulmonic valve structure is normal. There is mild regurgitation. There is no evidence  of stenosis.   Ascending Aorta The aortic root is normal in size.   IVC/SVC The inferior vena cava is normal in size. Respirophasic changes were normal.   Pericardium There is a small pericardial effusion along the right atrial and right ventricular free wall. The fluid exhibits no internal echoes. There is no echocardiographic evidence of tamponade. The pericardium is normal in appearance. There is a left pleural effusion.       I have personally reviewed pertinent reports.  I have also reviewed prior visit notes.     Assessment:  Patient Active Problem List    Diagnosis Date Noted    Type 2 diabetes mellitus with hyperglycemia, unspecified whether long term insulin use ([facility]) 01/11/2024    Coronary artery disease involving native coronary artery 01/11/2024    Ischemic cardiomyopathy 01/11/2024    Mixed hyperlipidemia 01/11/2024    Ulcerative pancolitis without complication ([facility]) 01/04/2024    Polyp of ascending colon 12/06/2023    Acquired right foot drop 08/18/2023    Hypomagnesemia 08/18/2023    Hair loss 08/18/2023    Colitis 06/05/2023    History of biliary dyskinesia 06/05/2023    DMII (diabetes mellitus, type 2) ([facility]) 06/05/2023    Gallbladder polyp 08/31/2022    Leiomyoma of uterus 11/22/2013     Severe coronary artery disease; Ongoing CABG workup    Plan:  Risks and benefits of coronary artery bypass grafting were discussed in detail today with the patient.  They understand and wish to proceed with further workup and ultimately surgical intervention.  We have ordered routine preoperative laboratory and vascular studies, including carotid artery ultrasound and vein mapping.  Pending the results of these tests, they will be scheduled for surgery, CABG x3 with Dr. Josh Perry was comfortable with our recommendations, and their questions were answered to their satisfaction.  Thank you for allowing us to participate in the care of this patient.     The patient recently had a screening  colonoscopy in December 2023.  Therefore GI referral is not indicated at this time.     SIGNATURE: Waleska Gee PA-C  DATE: 01/23/24  TIME: 10:51 AM

## 2024-01-23 NOTE — H&P
Preop History and Physical - Cardiothoracic Surgery   Julia Perry 68 y.o. female MRN: [id number]    Physician Requesting Consult: Dr. Khoi Sousa     Reason for Consult / Principal Problem: Coronary artery disease    History of Present Illness: Julia Perry is a 68 y.o. year old female with PMHx DM Type 2 on Metformin (A1C 6.1), HLD, new ischemic cardiomyopathy (EF 30%), colitis who has been referred for CABG evaluation due to findings of severe 3-vessel CAD on cardiac catheterization performed on 1/17/24. Patient's current clinical course began around August of 2023 when she presented to her PCP for lower extremity edema. She had been on about 8 weeks of prednisone prior to this after a hospitalization in June 2023 for ulcerative colitis, noted to be severe on colonoscopy on 6/6/23. This edema was attributed to her steroid course. Patient presented to PCP again in December with ongoing shortness of breath for about a week. She was experiencing orthopnea, PND, ongoing edema and chest pain. At this time, a CXR was obtained that demonstrated bilateral pleural effusions, and she was treated with antibiotics for pneumonia. Despite antibiotics, her symptoms had not improved, so an echocardiogram was obtained, and demonstrated reduced EF of 30% due to ischemic cardiomyopathy, as well as akinetic and hypokinetic segments. She was referred to cardiology at this time. She saw Dr. Ceballos on 1/11/24, and was started on goal directed medical therapy, and a cardiac catheterization was obtained on 1/17 demonstrated multivessel CAD. She was then referred to cardiac surgery for CABG eval.     Upon interview today, patient reports that with adjustment in her medications, she is feeling a little better, but still notes changes in her activity tolerance. She states that she is unable to do things like make the bed, and running the vacuum is difficult due to her symptoms. She endorses shortness of breath with exertion and chest  discomfort which occurs randomly. She also has lower extremity edema, and has been taking her lasix daily, normally splitting the 40 mg pill and taking 1/2 in the morning and 1/2 in the evening. She denies family history of coronary artery disease or premature SCD. Her mom is still living at the age of 97. She is a remote former smoker - has not smoked in 50 years. She denies alcohol or drug use.     Past Medical History:  Past Medical History:   Diagnosis Date    Diabetes mellitus (HCC)     PONV (postoperative nausea and vomiting)          Past Surgical History:   Past Surgical History:   Procedure Laterality Date    CARDIAC CATHETERIZATION Left 1/17/2024    Procedure: Cardiac Left Heart Cath;  Surgeon: Juan A Sousa MD;  Location: [facility];  Service: Cardiology    CARDIAC CATHETERIZATION  1/17/2024    Procedure: Cardiac catheterization;  Surgeon: Juan A Sousa MD;  Location: [facility];  Service: Cardiology    HYSTERECTOMY      ~1990         Family History:  No family history on file.      Social History:      Social History     Substance and Sexual Activity   Alcohol Use Never     Social History     Substance and Sexual Activity   Drug Use Never     Social History     Tobacco Use   Smoking Status Former    Types: Cigarettes   Smokeless Tobacco Never       Home Medications:   Prior to Admission medications    Medication Sig Start Date End Date Taking? Authorizing Provider   Alcohol Swabs 70 % PADS May substitute brand based on insurance coverage. Check glucose TID. 6/8/23  Yes Jean Claude Villtaoro PA-C   aspirin 81 mg chewable tablet Chew 1 tablet (81 mg total) daily 1/11/24  Yes Maverick Ceballos DO   b complex vitamins capsule Take 1 capsule by mouth daily   Yes Historical Provider, MD   cholecalciferol (VITAMIN D3) 1,000 units tablet Take 1,000 Units by mouth daily   Yes Historical Provider, MD   furosemide (LASIX) 40 mg tablet Take 1 tablet (40 mg total) by mouth as needed (Take 1 tablet  daily as needed) 1/18/24  Yes Maverick Ceballos DO   glucose blood test strip Use 1 each daily as needed Use as instructed   Yes Historical Provider, MD   magnesium gluconate (MAGONATE) 500 mg tablet Take 1 tablet (500 mg total) by mouth in the morning 6/8/23 1/23/24 Yes Jean Claude Villatoro PA-C   metFORMIN (GLUCOPHAGE) 500 mg tablet 2 pills bid Do not start before January 18, 2024. 1/18/24  Yes ROYAL Aguilera   metoprolol succinate (TOPROL-XL) 25 mg 24 hr tablet Take 1 tablet (25 mg total) by mouth daily 1/11/24  Yes Maverick Ceballos DO   multivitamin (THERAGRAN) TABS Take by mouth daily   Yes Historical Provider, MD Ruizuch Delica Lancets 33G MISC May substitute brand based on insurance coverage. Check glucose TID. 6/8/23  Yes Jean Claude Villatoro PA-C   rosuvastatin (CRESTOR) 40 MG tablet Take 1 tablet (40 mg total) by mouth daily 1/17/24  Yes ROYAL Aguilera       Allergies:  Allergies   Allergen Reactions    Valdecoxib Other (See Comments)     Patient is unaware of this allergy       Review of Systems:     Review of Systems   Constitutional:  Positive for activity change and fatigue. Negative for chills and fever.   HENT:  Negative for ear pain and sore throat.    Eyes:  Negative for pain and visual disturbance.   Respiratory:  Positive for shortness of breath. Negative for cough.    Cardiovascular:  Positive for chest pain and leg swelling. Negative for palpitations.   Gastrointestinal:  Negative for abdominal pain and vomiting.   Genitourinary:  Negative for dysuria and hematuria.   Musculoskeletal:  Negative for arthralgias and back pain.   Skin:  Negative for color change and rash.   Neurological:  Negative for seizures and syncope.   Psychiatric/Behavioral: Negative.     All other systems reviewed and are negative.      Vital Signs:     Vitals:    01/23/24 1043 01/23/24 1048   BP: 121/59 117/58   BP Location: Left arm Right arm   Patient Position: Sitting Sitting   Cuff Size: Standard  "Standard   Pulse: 74    Temp: (!) 96.6 °F (35.9 °C)    TempSrc: Tympanic    SpO2: 98%    Weight: 70.2 kg (154 lb 12.8 oz)    Height: 5' 6\" (1.676 m)        Physical Exam:    Physical Exam  Vitals reviewed.   Constitutional:       Appearance: Normal appearance.   HENT:      Head: Normocephalic and atraumatic.   Eyes:      Pupils: Pupils are equal, round, and reactive to light.   Neck:      Vascular: No carotid bruit or JVD.   Cardiovascular:      Rate and Rhythm: Normal rate and regular rhythm.      Pulses:           Carotid pulses are 2+ on the right side and 2+ on the left side.       Radial pulses are 2+ on the right side and 2+ on the left side.        Dorsalis pedis pulses are 2+ on the right side and 2+ on the left side.      Heart sounds: Normal heart sounds. No murmur heard.     No friction rub. No gallop.   Pulmonary:      Effort: Pulmonary effort is normal.      Breath sounds: Normal breath sounds. No wheezing, rhonchi or rales.   Abdominal:      Palpations: Abdomen is soft.      Tenderness: There is no abdominal tenderness.   Musculoskeletal:      Cervical back: Normal range of motion.      Right lower le+ Edema present.      Left lower le+ Edema present.   Skin:     General: Skin is warm and dry.      Capillary Refill: Capillary refill takes less than 2 seconds.   Neurological:      General: No focal deficit present.      Mental Status: She is alert.      Sensory: Sensation is intact.   Psychiatric:         Attention and Perception: Attention normal.         Mood and Affect: Mood normal.         Behavior: Behavior normal. Behavior is cooperative.         Lab Results:         Results from last 7 days   Lab Units 24  1236   POTASSIUM mmol/L 4.2   CHLORIDE mmol/L 102   CO2 mmol/L 30   BUN mg/dL 18   CREATININE mg/dL 0.80   CALCIUM mg/dL 10.5*         Lab Results   Component Value Date    HGBA1C 6.4 (H) 2023     No results found for: \"CKTOTAL\", \"CKMB\", \"CKMBINDEX\", \"TROPONINI\"    Imaging " Studies:     Cardiac Catheterization: 1/17/24    Prox LAD lesion is 50% stenosed.    Mid LAD lesion is 85% stenosed.    Prox Cx lesion is 40% stenosed.    Mid Cx lesion is 70% stenosed.    Dist Cx lesion is 90% stenosed.    Prox RCA lesion is 50% stenosed.    Mid RCA lesion is 95% stenosed.     3 vessel epicardial CAD    Echocardiogram: 1/3/24  Left Ventricle Left ventricular cavity size is at the upper limits of normal when indexed for BSA. Wall thickness is mildly increased. There is eccentric hypertrophy. The left ventricular ejection fraction is 30%. Systolic function is severely reduced. Wall motion is normal. Unable to assess diastolic function due to E/A fusion due to tachycardia.   Wall Scoring Baseline     The following segments are akinetic: basal inferoseptal, basal inferior, mid inferoseptal and mid inferior.  The following segments are hypokinetic: basal anterior, basal anteroseptal, basal inferolateral, basal anterolateral, mid anterior, mid anteroseptal, mid inferolateral, mid anterolateral, apical anterior, apical septal, apical inferior, apical lateral and apex.            Right Ventricle Right ventricular cavity size is normal. Systolic function is normal. Wall thickness is normal.   Left Atrium The atrium is moderately dilated (42-48 mL/m2).   Right Atrium The atrium is normal in size.   Aortic Valve The aortic valve is trileaflet. The leaflets are not thickened. The leaflets are not calcified. The leaflets exhibit normal mobility. There is no evidence of regurgitation. The aortic valve has no significant stenosis.   Mitral Valve There is mild annular calcification.  There is mild to moderate regurgitation. There is no evidence of stenosis.   Tricuspid Valve Tricuspid valve structure is normal. There is trace regurgitation. There is no evidence of stenosis. Right ventricular systolic pressure could not be assessed.   Pulmonic Valve Pulmonic valve structure is normal. There is mild regurgitation.  There is no evidence of stenosis.   Ascending Aorta The aortic root is normal in size.   IVC/SVC The inferior vena cava is normal in size. Respirophasic changes were normal.   Pericardium There is a small pericardial effusion along the right atrial and right ventricular free wall. The fluid exhibits no internal echoes. There is no echocardiographic evidence of tamponade. The pericardium is normal in appearance. There is a left pleural effusion.       I have personally reviewed pertinent reports.  I have also reviewed prior visit notes.     Assessment:  Patient Active Problem List    Diagnosis Date Noted    Type 2 diabetes mellitus with hyperglycemia, unspecified whether long term insulin use ([facility]) 01/11/2024    Coronary artery disease involving native coronary artery 01/11/2024    Ischemic cardiomyopathy 01/11/2024    Mixed hyperlipidemia 01/11/2024    Ulcerative pancolitis without complication ([facility]) 01/04/2024    Polyp of ascending colon 12/06/2023    Acquired right foot drop 08/18/2023    Hypomagnesemia 08/18/2023    Hair loss 08/18/2023    Colitis 06/05/2023    History of biliary dyskinesia 06/05/2023    DMII (diabetes mellitus, type 2) ([facility]) 06/05/2023    Gallbladder polyp 08/31/2022    Leiomyoma of uterus 11/22/2013     Severe coronary artery disease; Ongoing CABG workup    Plan:  Risks and benefits of coronary artery bypass grafting were discussed in detail today with the patient.  They understand and wish to proceed with further workup and ultimately surgical intervention.  We have ordered routine preoperative laboratory and vascular studies, including carotid artery ultrasound and vein mapping.  Pending the results of these tests, they will be scheduled for surgery, CABG x3 with Dr. Josh Perry was comfortable with our recommendations, and their questions were answered to their satisfaction.  Thank you for allowing us to participate in the care of this patient.     The patient recently had  a screening colonoscopy in December 2023.  Therefore GI referral is not indicated at this time.     SIGNATURE: Waleska Gee PA-C  DATE: 01/23/24  TIME: 10:51 AM

## 2024-01-24 LAB — MRSA NOSE QL CULT: NORMAL

## 2024-01-26 ENCOUNTER — HOSPITAL ENCOUNTER (OUTPATIENT)
Dept: NON INVASIVE DIAGNOSTICS | Facility: HOSPITAL | Age: 69
Discharge: HOME | End: 2024-01-26
Payer: COMMERCIAL

## 2024-01-26 ENCOUNTER — HOSPITAL ENCOUNTER (OUTPATIENT)
Dept: CT IMAGING | Facility: HOSPITAL | Age: 69
End: 2024-01-26
Payer: COMMERCIAL

## 2024-01-26 DIAGNOSIS — Z01.89 ENCOUNTER FOR IMAGING OF BILATERAL GREATER SAPHENOUS VEINS: ICD-10-CM

## 2024-01-26 DIAGNOSIS — R06.02 SHORTNESS OF BREATH: ICD-10-CM

## 2024-01-26 DIAGNOSIS — I25.10 CORONARY ARTERY DISEASE INVOLVING NATIVE CORONARY ARTERY OF NATIVE HEART WITHOUT ANGINA PECTORIS: ICD-10-CM

## 2024-01-26 DIAGNOSIS — I65.22 STENOSIS OF LEFT CAROTID ARTERY: ICD-10-CM

## 2024-01-26 DIAGNOSIS — Z01.812 ENCOUNTER FOR PRE-OPERATIVE LABORATORY TESTING: ICD-10-CM

## 2024-01-26 DIAGNOSIS — Z13.6 ENCOUNTER FOR SCREENING FOR STENOSIS OF CAROTID ARTERY: ICD-10-CM

## 2024-01-26 DIAGNOSIS — I65.22 STENOSIS OF LEFT CAROTID ARTERY: Primary | ICD-10-CM

## 2024-01-26 DIAGNOSIS — Z01.810 PRE-OPERATIVE CARDIOVASCULAR EXAMINATION: ICD-10-CM

## 2024-01-26 DIAGNOSIS — I25.10 CORONARY ARTERY DISEASE INVOLVING NATIVE CORONARY ARTERY OF NATIVE HEART WITHOUT ANGINA PECTORIS: Primary | ICD-10-CM

## 2024-01-26 PROCEDURE — 70498 CT ANGIOGRAPHY NECK: CPT

## 2024-01-26 PROCEDURE — G1004 CDSM NDSC: HCPCS

## 2024-01-26 PROCEDURE — 93880 EXTRACRANIAL BILAT STUDY: CPT

## 2024-01-26 PROCEDURE — 70496 CT ANGIOGRAPHY HEAD: CPT

## 2024-01-26 PROCEDURE — 93971 EXTREMITY STUDY: CPT | Performed by: SURGERY

## 2024-01-26 PROCEDURE — 93971 EXTREMITY STUDY: CPT

## 2024-01-26 RX ADMIN — IOHEXOL 85 ML: 350 INJECTION, SOLUTION INTRAVENOUS at 15:31

## 2024-01-26 NOTE — PROGRESS NOTES
Patient has 70-99% left carotid artery stenosis noted on carotid ultrasound.   Order placed for STAT CT head and neck, and order placed for urgent vascular surgery consult.   Called patient and left message with office callback number provided.

## 2024-01-27 PROCEDURE — 93880 EXTRACRANIAL BILAT STUDY: CPT | Performed by: SURGERY

## 2024-01-29 ENCOUNTER — TELEPHONE (OUTPATIENT)
Dept: FAMILY MEDICINE CLINIC | Facility: CLINIC | Age: 69
End: 2024-01-29

## 2024-01-29 DIAGNOSIS — G93.89 SUPRASELLAR MASS: Primary | ICD-10-CM

## 2024-01-29 DIAGNOSIS — I65.22 STENOSIS OF LEFT CAROTID ARTERY: ICD-10-CM

## 2024-01-29 DIAGNOSIS — E83.52 HYPERCALCEMIA: Primary | ICD-10-CM

## 2024-01-29 NOTE — PROGRESS NOTES
Reviewed results of CTA Head and Neck from 1/26/24 with Dr. Castellanos.   As a result of these findings, we will cancel surgery for Wednesday 1/31/24, pending Neurosurgery evaluation and clearance. She should keep her vascular surgery appointment for tomorrow afternoon.   Spoke with patient and made her aware of these details. Answered questions and reviewed instructions.   Order placed for Neurosurgery consult.

## 2024-01-29 NOTE — TELEPHONE ENCOUNTER
----- Message from Toya Titus MD sent at 2024  7:17 AM EST -----  Regarding: FW: LAB VALUE NOTIFICATION  Please notify patient of possible lab order and need to repeat    order placed in lab is nonfasting  ----- Message -----  From: Nini Paige  Sent: 2024   3:29 PM EST  To: Toya Titus MD  Subject: LAB VALUE NOTIFICATION                           2024     Dear Provider,      [facility] is notifying you that an analyzer at the core laboratory had an intermittent malfunction impacting serum Calcium results between 2023, and 2024. Due to the error, patients may have had a falsely elevated serum Calcium level reported. This error did not have an impact on other lab tests. Please review the below patient's result:     Patient Name: Julia Perry   : 1955   MRN: [id number]    Specimen Collection:  Calcium       Date                     Value               Ref Range           Status                2024               10.5 (H)            8.4 - 10.2 mg/#     Final            ----------       [facility] recommends repeat testing at no charge, especially for patients whose clinical presentation does not match the result. The need for reordering is at your discretion.      For repeat testing, please utilize the order: Calcium (LAB 53)     Refer questions to the Lab Customer Service Center at [phone], option 1.     Thank you,     Carey Bundy, MT ASCP MHA  Director of Clinical Pathology-Operations  [facility]     Maverick Benitez MD, FCAP   of Pathology & Laboratory Medicine  [facility]

## 2024-01-29 NOTE — TELEPHONE ENCOUNTER
Spoke to pt, she said she is not going back at this time, see's vascular tomorrow then maybe going for surgery.

## 2024-01-30 ENCOUNTER — OFFICE VISIT (OUTPATIENT)
Dept: VASCULAR SURGERY | Facility: CLINIC | Age: 69
End: 2024-01-30
Payer: COMMERCIAL

## 2024-01-30 ENCOUNTER — TELEPHONE (OUTPATIENT)
Dept: VASCULAR SURGERY | Facility: CLINIC | Age: 69
End: 2024-01-30

## 2024-01-30 VITALS
BODY MASS INDEX: 25.55 KG/M2 | HEIGHT: 66 IN | OXYGEN SATURATION: 96 % | SYSTOLIC BLOOD PRESSURE: 110 MMHG | HEART RATE: 72 BPM | WEIGHT: 159 LBS | DIASTOLIC BLOOD PRESSURE: 76 MMHG

## 2024-01-30 DIAGNOSIS — I25.10 CORONARY ARTERY DISEASE INVOLVING NATIVE CORONARY ARTERY OF NATIVE HEART WITHOUT ANGINA PECTORIS: ICD-10-CM

## 2024-01-30 DIAGNOSIS — I65.22 STENOSIS OF LEFT CAROTID ARTERY: ICD-10-CM

## 2024-01-30 DIAGNOSIS — I25.5 ISCHEMIC CARDIOMYOPATHY: Primary | ICD-10-CM

## 2024-01-30 DIAGNOSIS — I65.22 LEFT CAROTID STENOSIS: ICD-10-CM

## 2024-01-30 PROCEDURE — 99204 OFFICE O/P NEW MOD 45 MIN: CPT | Performed by: SURGERY

## 2024-01-30 NOTE — ASSESSMENT & PLAN NOTE
"Julia is a pleasant 68-year-old woman who is referred to the office for finding of high-grade asymptomatic left carotid stenosis during workup for planned CABG.  Patient denies any acute focal neurologic deficits.  She does complain of \"blurry vision\".  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  She is scheduled to be evaluated by neurosurgery tomorrow.    Discussed briefly with cardiac surgeon Dr. Castellanos.  In an effort to mitigate intraoperative/perioperative stroke risk especially in setting of severe intracranial occlusive disease recommend proceeding with left carotid intervention.  Discussed with patient and  that even though recommending procedure to decrease risk of stroke during planned open heart surgery there is a small risk of stroke approximately 1-2% during the carotid intervention.  Will have [facility] rep review images to ascertain suitability for potential TCAR. If deemed adequate candidate then will plan for L TCAR, possible endarterectomy.  Procedures, risk including but not limited to infection, bleeding, nerve injury, stroke were discussed with patient and .  Patient is agreeable to proceed.  Written consent was obtained.  Will await consultation/formal recommendations from neurosurgery regarding the intracranial disease/mass.  If no contraindications from that standpoint we will proceed accordingly.  Patient is already on aspirin and Crestor.  Will need initiation of Plavix at least 7 days prior to procedure if proceed with TCAR.    Multiple questions addressed in the office to their satisfaction.  She did make a point of only excepting blood transfusions if absolutely necessary to \"save her life\".    "

## 2024-01-30 NOTE — TELEPHONE ENCOUNTER
"AS per Dr. De Oliveira\" Will await consultation/formal recommendations from neurosurgery regarding the intracranial disease/mass.  If no contraindications from that standpoint we will proceed accordingly.  Patient is already on aspirin and Crestor.  Will need initiation of Plavix at least 7 days prior to procedure if proceed with TCAR.  "

## 2024-01-30 NOTE — PROGRESS NOTES
"Assessment/Plan:    Left carotid stenosis  Julia is a pleasant 68-year-old woman who is referred to the office for finding of high-grade asymptomatic left carotid stenosis during workup for planned CABG.  Patient denies any acute focal neurologic deficits.  She does complain of \"blurry vision\".  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  She is scheduled to be evaluated by neurosurgery tomorrow.    Discussed briefly with cardiac surgeon Dr. Castellanos.  In an effort to mitigate intraoperative/perioperative stroke risk especially in setting of severe intracranial occlusive disease recommend proceeding with left carotid intervention.  Discussed with patient and  that even though recommending procedure to decrease risk of stroke during planned open heart surgery there is a small risk of stroke approximately 1-2% during the carotid intervention.  Will have [facility] review images to ascertain suitability for potential TCAR. If deemed adequate candidate then will plan for L TCAR, possible endarterectomy.  Procedures, risk including but not limited to infection, bleeding, nerve injury, stroke were discussed with patient and .  Patient is agreeable to proceed.  Written consent was obtained.  Will await consultation/formal recommendations from neurosurgery regarding the intracranial disease/mass.  If no contraindications from that standpoint we will proceed accordingly.  Patient is already on aspirin and Crestor.  Will need initiation of Plavix at least 7 days prior to procedure if proceed with TCAR.    Multiple questions addressed in the office to their satisfaction.  She did make a point of only excepting blood transfusions if absolutely necessary to \"save her life\".       Diagnoses and all orders for this visit:    Ischemic cardiomyopathy    Coronary artery disease involving native coronary artery of native heart without angina pectoris  -     Ambulatory " "Referral to Vascular Surgery    Stenosis of left carotid artery  -     Ambulatory Referral to Vascular Surgery    Left carotid stenosis          Subjective:      Patient ID: Julia Perry is a 68 y.o. female.    Patient presents for vascular clearance to upcoming CABG and discuss carotid arteries. She had a CV and CTA  head/neck done 1/26/24. She reports blurry vision, BLE weakness, and dizziness. She is taking ASA 81 mg and Rosuvastatin.     Julia is a pleasant 68-year-old woman referred to the office for left carotid stenosis.        The following portions of the patient's history were reviewed and updated as appropriate: allergies, current medications, past family history, past medical history, past social history, past surgical history, and problem list.    Review of Systems   Constitutional: Negative.    HENT: Negative.     Eyes:  Positive for visual disturbance (Blurry vision).   Respiratory:  Positive for shortness of breath (walking inclines/steps).    Cardiovascular: Negative.    Gastrointestinal: Negative.    Endocrine: Negative.    Genitourinary: Negative.    Musculoskeletal: Negative.    Skin: Negative.    Neurological:  Positive for dizziness and weakness (BLE). Negative for facial asymmetry, speech difficulty, light-headedness and headaches.   Hematological: Negative.    Psychiatric/Behavioral: Negative.           Objective:      /76 (BP Location: Right arm, Patient Position: Sitting, Cuff Size: Standard)   Pulse 72   Ht 5' 6\" (1.676 m)   Wt 72.1 kg (159 lb)   SpO2 96%   BMI 25.66 kg/m²          Physical Exam  Constitutional:       General: She is not in acute distress.     Appearance: She is well-developed.   HENT:      Head: Normocephalic and atraumatic.   Eyes:      General: No scleral icterus.     Conjunctiva/sclera: Conjunctivae normal.   Neck:      Trachea: No tracheal deviation.   Cardiovascular:      Rate and Rhythm: Normal rate and regular rhythm.      Heart sounds: Normal heart " sounds.   Pulmonary:      Effort: Pulmonary effort is normal.      Breath sounds: Normal breath sounds.   Abdominal:      General: There is no distension.      Palpations: Abdomen is soft. There is no mass (no appreciable aortic pulsation/aneurysm).      Tenderness: There is no abdominal tenderness. There is no guarding or rebound.   Musculoskeletal:      Cervical back: Normal range of motion and neck supple.      Right lower leg: No edema.      Left lower leg: No edema.   Skin:     General: Skin is warm and dry.   Neurological:      Mental Status: She is alert and oriented to person, place, and time.   Psychiatric:         Mood and Affect: Mood normal.         Behavior: Behavior normal.         Thought Content: Thought content normal.         Judgment: Judgment normal.         CTA head and neck:  IMPRESSION:        1. No acute intracranial abnormality.     2. Mild cerebral chronic microangiopathic changes.     3. 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 There appears to be compression of the optic chiasm. Dedicated pre and postcontrast MR imaging of the brain with a sellar protocol is recommended along with consultations with the   neurosurgical and endocrinology services.     4. Critical left ICA origin and carotid bulb stenosis with greater than 90% stenosis. No hemodynamically significant stenosis at the right ICA origin. Consultation with the vascular surgical service is recommended.     5. Near occlusive to occlusive atherosclerotic disease in the left cavernous and proximal supraclinoid ICA with reconstitution of the distal segment and carotid terminus likely via an intact [place].     6. Moderate to severe stenosis in the right cavernous and proximal supraclinoid ICA.     7. Focal moderate to severe segmental stenosis in the P2 segment of the right posterior cerebral artery. Mild stenosis in the P2 segment of the left posterior cerebral artery.     8. Moderate mid to  distal basilar stenosis.

## 2024-01-31 ENCOUNTER — OFFICE VISIT (OUTPATIENT)
Dept: NEUROSURGERY | Facility: CLINIC | Age: 69
End: 2024-01-31
Payer: COMMERCIAL

## 2024-01-31 ENCOUNTER — TELEPHONE (OUTPATIENT)
Dept: NEUROSURGERY | Facility: CLINIC | Age: 69
End: 2024-01-31

## 2024-01-31 VITALS
RESPIRATION RATE: 20 BRPM | HEART RATE: 78 BPM | BODY MASS INDEX: 25.07 KG/M2 | DIASTOLIC BLOOD PRESSURE: 72 MMHG | OXYGEN SATURATION: 99 % | SYSTOLIC BLOOD PRESSURE: 120 MMHG | TEMPERATURE: 98.2 F | WEIGHT: 156 LBS | HEIGHT: 66 IN

## 2024-01-31 DIAGNOSIS — I65.22 STENOSIS OF LEFT CAROTID ARTERY: ICD-10-CM

## 2024-01-31 DIAGNOSIS — G93.89 SUPRASELLAR MASS: Primary | ICD-10-CM

## 2024-01-31 PROCEDURE — 99204 OFFICE O/P NEW MOD 45 MIN: CPT | Performed by: NURSE PRACTITIONER

## 2024-01-31 NOTE — PROGRESS NOTES
Assessment/Plan:    Stenosis of left carotid artery  As addressed in HPI  She presents with multiple complaints fatigue all the time, blurry vision, gait imbalance sometimes with dizziness, once in a blue moon headache, generalized weakness, confused and forgetful at times.  Imaging  1/26/2024 CTA H/N Critical left ICA origin and carotid bulb stenosis with greater than 90% stenosis. No hemodynamically significant stenosis at the right ICA origin. Consultation with the vascular surgical service is recommended.    Plan  Collaborative review of imaging with Dr. Centeno, no neurosurgical intervention indicated recommend cardiology and their medical PCP optimize medical management with ASA 81 mg, statin, and Plavix  If additional questions or concerns call the neurosurgery department.  Reviewed MRI and findings in great detail with patient and medical management protocol for secondary stroke prevention.  Patient reports she has recently lost weight about 20 pounds from 200-140.    Suprasellar mass  As addressed in HPI  Patient reports blurry vision.  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  She reports undergoing multiple eye examination since that time.  She is unsure if she has ever undergone a visual field testing  Ophthalmologist is  , [facility] . 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 There appears to be compression of the optic chiasm. 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  Patient is anxious reported she likes you know everything even the good and bad she asked if she needed surgery what the surgery would entitle, explained surgical procedure for resection of a pituitary macroadenoma.    Ordered pituitary assessment as per protocol/order set  Pituitary labs ordered as per order set  Ordered referral to endocrinology to interpret labs and for further assessment--checkout assisting patient to schedule an Endo appointment with a provider closer to her home if possible.  Ordered referral to ophthalmology patient's  telephoned her doctor's office, during the visit as patient expressed great concerns regarding the possibility of getting an appointment with her eye doctor.  I spoke directly with the office staff Katrin, patient's last VF was 2017.  Provided Katrin with neurosurgery fax number she will fax VF report today.  Office will call patient in the morning after they discussed case with her ophthalmologist ,, who is not in today, to determine when he can schedule VF examination.  And agrees to call patient tomorrow with a scheduled appointment date and time.  Ordered MRI pituitary with and without contrast, explained in detail to patient rationale for the order.  Schedule follow-up appointment joint with Dr. Quinn and I after completion of MRI pituitary and hopefully VF.          Suprasellar mass  -     Ambulatory Referral to Neurosurgery    Stenosis of left carotid artery  -     Ambulatory Referral to Neurosurgery        Subjective: Referral from cardiology to endovascular for abnormalities seen on CTA neck and brain stenosis of left carotid artery and suprasellar mass.     Patient ID: Julia Perry is a 68 y.o. female     HPI   She was initially scheduled for 1/31/2024 to undergo coronary artery bypass graft 3 vessels by Dr. Castellanos for a diagnosis of coronary artery disease involving native coronary artery of  native heart without angina pectoris but surgery was canceled after abnormalities seen on CTA of neck and brain requiring further assessment by corresponding disciplines vascular and neurosurgery.      1/26/20234 CTA Head /neck w/wo   BRAIN--PARENCHYMA: A rounded homogeneous sellar and suprasellar soft tissue mass is noted with thinning of the sellar floor. The lesion measures approximately 1.6 x 1.5 x 2.1 cm in the TV, AP and CC dimensions. The mass appears to abut and uplifts the optic   chiasm and its ventral and left lateral borders are intimately associated with the medial walls of the cavernous and supraclinoid ICA segments. There is erosion of the dorsum sella, more so on the left than the right.    INTERNAL CAROTID ARTERIES: Moderate to severe stenosis is noted within the right cavernous and proximal supraclinoid ICA. The ophthalmic artery and carotid termini remain patent. There is suggestion of moderate to severe stenosis in the proximal left   cavernous ICA. Occlusive versus near occlusive disease is suspected in the mid to distal left cavernous ICA with a slightly improved caliber noted in the distal left supraclinoid ICA segment. The left carotid terminus is patent. The left ophthalmic   artery origin is not clearly opacified.      She presents today for initial neurosurgery visit for assessment of abnormality on CTA for stenosis of the left coronary artery and for suprasellar mass.    REVIEW OF SYSTEMS  Review of Systems   Constitutional:  Positive for fatigue.   HENT: Negative.     Eyes:  Positive for visual disturbance (Blurr).   Respiratory: Negative.     Cardiovascular: Negative.    Gastrointestinal: Negative.    Endocrine: Negative.    Genitourinary: Negative.    Musculoskeletal:  Positive for gait problem (unbalance when dizzy). Negative for neck stiffness.   Skin: Negative.    Allergic/Immunologic: Negative.    Neurological:  Positive for dizziness, weakness (generalize), numbness and headaches  (once in the blue). Negative for speech difficulty and light-headedness.   Psychiatric/Behavioral:  Negative for confusion and sleep disturbance.          Meds/Allergies     Current Outpatient Medications   Medication Sig Dispense Refill    aspirin 81 mg chewable tablet Chew 1 tablet (81 mg total) daily 90 tablet 3    b complex vitamins capsule Take 1 capsule by mouth daily      furosemide (LASIX) 40 mg tablet Take 1 tablet (40 mg total) by mouth as needed (Take 1 tablet daily as needed) 30 tablet 6    glucose blood test strip Use 1 each daily as needed Use as instructed      metoprolol succinate (TOPROL-XL) 25 mg 24 hr tablet Take 1 tablet (25 mg total) by mouth daily 90 tablet 3    OneTouch Delica Lancets 33G MISC May substitute brand based on insurance coverage. Check glucose TID. 100 each 0    Alcohol Swabs 70 % PADS May substitute brand based on insurance coverage. Check glucose TID. (Patient not taking: Reported on 1/31/2024) 100 each 0    cholecalciferol (VITAMIN D3) 1,000 units tablet Take 1,000 Units by mouth daily (Patient not taking: Reported on 1/30/2024)      magnesium gluconate (MAGONATE) 500 mg tablet Take 1 tablet (500 mg total) by mouth in the morning 90 tablet 0    metFORMIN (GLUCOPHAGE) 500 mg tablet 2 pills bid Do not start before January 18, 2024. (Patient not taking: Reported on 1/30/2024)      multivitamin (THERAGRAN) TABS Take by mouth daily (Patient not taking: Reported on 1/30/2024)      mupirocin (BACTROBAN) 2 % ointment Apply topically 2 (two) times a day (Patient not taking: Reported on 1/30/2024) 22 g 0    rosuvastatin (CRESTOR) 40 MG tablet Take 1 tablet (40 mg total) by mouth daily (Patient taking differently: Take 40 mg by mouth daily Patient reports taking 5 mg) 90 tablet 3     No current facility-administered medications for this visit.       Allergies   Allergen Reactions    Valdecoxib Other (See Comments)     Patient is unaware of this allergy       PAST HISTORY    Past Medical  "History:   Diagnosis Date    Diabetes mellitus (HCC)     PONV (postoperative nausea and vomiting)        Past Surgical History:   Procedure Laterality Date    CARDIAC CATHETERIZATION Left 1/17/2024    Procedure: Cardiac Left Heart Cath;  Surgeon: Juan A Sousa MD;  Location: [facility];  Service: Cardiology    CARDIAC CATHETERIZATION  1/17/2024    Procedure: Cardiac catheterization;  Surgeon: Juan A Sousa MD;  Location: [facility];  Service: Cardiology    HYSTERECTOMY      ~1990       Social History     Tobacco Use    Smoking status: Former     Types: Cigarettes    Smokeless tobacco: Never   Vaping Use    Vaping status: Never Used   Substance Use Topics    Alcohol use: Never    Drug use: Never       History reviewed. No pertinent family history.    The following portions of the patient's history were reviewed and updated as appropriate: allergies, current medications, past family history, past medical history, past social history, past surgical history and problem list.      EXAM    Vitals:Blood pressure 120/72, pulse 78, temperature 98.2 °F (36.8 °C), temperature source Temporal, resp. rate 20, height 5' 6\" (1.676 m), weight 70.8 kg (156 lb), SpO2 99%.,Body mass index is 25.18 kg/m².     Physical Exam  Vitals and nursing note reviewed. Exam conducted with a chaperone present ().   Constitutional:       General: She is not in acute distress.     Appearance: Normal appearance. She is not ill-appearing or toxic-appearing.   HENT:      Head: Normocephalic and atraumatic.   Eyes:      General: No scleral icterus.        Right eye: No discharge.         Left eye: No discharge.   Pulmonary:      Effort: Pulmonary effort is normal. No respiratory distress.   Musculoskeletal:      Right lower leg: No edema.      Left lower leg: No edema.   Neurological:      Mental Status: She is alert and oriented to person, place, and time.      Sensory: No sensory deficit.      Motor: No weakness.      " Coordination: Coordination normal.      Gait: Gait is intact. Gait normal.   Psychiatric:         Mood and Affect: Mood normal.         Speech: Speech normal.         Behavior: Behavior normal.         Neurologic Exam     Mental Status   Oriented to person, place, and time.   Oriented to person.   Oriented to place. Oriented to country, city, area, street and number.   Oriented to time. Oriented to year, month, date, day and season.   Speech: speech is normal   Level of consciousness: alert  Knowledge: good.     Cranial Nerves     CN III, IV, VI   Nystagmus: none   Diplopia: none  Ophthalmoparesis: none  Upgaze: normal  Downgaze: normal  Conjugate gaze: present    Motor Exam   Muscle bulk: normal    Sensory Exam   Light touch normal.     Gait, Coordination, and Reflexes     Gait  Gait: normal      Imaging Studies  VAS carotid complete study    Result Date: 1/27/2024  Narrative:  [facility] REPORT CLINICAL: Indications: Patient presents for surgical clearance and general health evaluation secondary to future open heart surgery. Operative History: No cardiovascular surgeries Risk Factors The patient has history of Diabetes, Hyperlipidemia and CAD. Clinical Right Pressure:  110/ mm Hg, Left Pressure:  116/ mm Hg.  FINDINGS:  Right        Impression  PSV  EDV (cm/s)  Direction of Flow  Ratio  Dist. ICA                113          37                      0.86  Mid. ICA                  94          35                      0.71  Prox. ICA    1 - 49%     143          41                      1.09  Dist CCA                 108          29                            Mid CCA                  132          27                      1.26  Prox CCA                 104          18                      1.27  Ext Carotid               83           3                      0.63  Prox Vert                 51          14  Antegrade                 Subclavian                70           0                            Innominate                 82           0                             Left         Impression  PSV  EDV (cm/s)  Direction of Flow  Ratio  Dist. ICA                 53          20                      0.60  Mid. ICA                 120          35                      1.36  Prox. ICA    70 - 99%    361         143                      4.09  Dist CCA                  82          16                            Mid CCA                   88          19                      0.74  Prox CCA                 119          17                            Ext Carotid              166          16                      1.88  Prox Vert                 69          21  Antegrade                 Subclavian               108           0                               CONCLUSION:  Impression RIGHT: There is <50% stenosis noted in the internal carotid artery. Plaque is heterogenous and irregular. Vertebral artery flow is antegrade. There is no significant subclavian artery disease.  LEFT: There is 70-99% stenosis noted in the internal carotid artery. Plaque is heterogenous and irregular. Vertebral artery flow is antegrade. There is no significant subclavian artery disease.  Technical findings given to Waleska Gee PA-C via [employer] at time of exam.  There is no previous study for comparison.  SIGNATURE: Electronically Signed by: SHREE TAMEZ MD on 2024-01-27 11:46:29 PM    VAS VEIN MAPPING- LOWER EXTREMITY    Result Date: 1/26/2024  Narrative:  [facility] REPORT CLINICAL: Indications: Patient presents for surgical clearance and general health evaluation secondary to future open heart surgery.  Patient is asymptomatic at this time. Operative History: No cardiovascular surgeries Risk Factors The patient has history of Diabetes (IDDM).  FINDINGS:  Right           AP (mm)  AP (mm)  GSV Inguinal        5.3      5.3  GSV Prox Thigh      1.7           GSV Mid Thigh       1.0           GSV Dist Thigh      2.1           GSV Knee            2.2            GSV Prox Calf       2.4           GSV Mid Calf        1.3           GSV Dist Calf       1.0           SSV Knee            1.8           SSV Mid Calf        2.2           SSV Ankle           2.1            Left            AP (mm)  GSV Inguinal        4.6  GSV Prox Thigh      2.6  GSV Mid Thigh       2.6  GSV Dist Thigh      2.5  GSV Knee            0.6  GSV Prox Calf       0.9  GSV Mid Calf        0.8  GSV Dist Calf       1.2  SSV Knee            3.1  SSV Mid Calf        1.7     CONCLUSION:  Impression: RIGHT LOWER LIMB: The great saphenous vein is patent  from the groin to the ankle. The intraluminal diameter measurements range from 1 mm to 2.4 mm throughout.  LEFT LOWER LIMB: The great saphenous vein is patent  from the groin to the ankle. The intraluminal diameter measurements range from 0.6 mm to 2.6 mm throughout.  SIGNATURE: Electronically Signed by: SHREE TAMEZ MD on 2024-01-26 11:26:36 PM    CTA head and neck w wo contrast---Result Date: 1/26/2024  Narrative: CTA NECK AND BRAIN WITH AND WITHOUT CONTRAST INDICATION: I65.22: Occlusion and stenosis of left carotid artery COMPARISON:   None. TECHNIQUE:  Routine CT imaging of the Brain without contrast.  Post contrast imaging was performed after administration of iodinated contrast through the neck and brain. Post contrast axial 0.625 mm images timed to opacify the arterial system. 3D rendering was performed on an independent workstation.   MIP reconstructions performed. Coronal reconstructions were performed of the noncontrast portion of the brain. Radiation dose length product (DLP) for this visit:  1190.55 mGy-cm .  This examination, like all CT scans performed in the [facility], was performed utilizing techniques to minimize radiation dose exposure, including the use of iterative reconstruction and automated exposure control. IV Contrast:  85 mL of iohexol (OMNIPAQUE) IMAGE QUALITY:   Diagnostic FINDINGS: NONCONTRAST BRAIN PARENCHYMA: A  rounded homogeneous sellar and suprasellar soft tissue mass is noted with thinning of the sellar floor. The lesion measures approximately 1.6 x 1.5 x 2.1 cm in the TV, AP and CC dimensions. The mass appears to abut and uplifts the optic chiasm and its ventral and left lateral borders are intimately associated with the medial walls of the cavernous and supraclinoid ICA segments. There is erosion of the dorsum sella, more so on the left than the right. VENTRICLES AND EXTRA-AXIAL SPACES: The suprasellar component of the mass abuts the anterior floor of the third ventricle. Normal ventricular size. VISUALIZED ORBITS: Normal visualized orbits. PARANASAL SINUSES: Normal visualized paranasal sinuses. CERVICAL VASCULATURE AORTIC ARCH AND GREAT VESSELS: Mild atherosclerotic disease of the arch, proximal great vessels and visualized subclavian vessels. There is bilateral atherosclerotic stenosis within the mid to distal segments of both subclavian arteries and proximal axillary arteries. RIGHT VERTEBRAL ARTERY CERVICAL SEGMENT:  Normal origin. The vessel is normal in caliber throughout the neck. LEFT VERTEBRAL ARTERY CERVICAL SEGMENT:  Normal origin. The vessel is normal in caliber throughout the neck. RIGHT EXTRACRANIAL CAROTID SEGMENT: Mild atherosclerotic disease of the distal common carotid artery and proximal cervical internal carotid artery without significant stenosis compared to the more distal ICA. LEFT EXTRACRANIAL CAROTID SEGMENT: Severe atherosclerotic disease of the bifurcation. There is critical ICA origin stenosis due to calcified and noncalcified plaque extending from the ICA origin to the carotid bulb. The post bulbar ICA segment is normal in caliber and patent throughout the remaining neck. NASCET criteria was used to determine the degree of internal carotid artery diameter stenosis. INTRACRANIAL VASCULATURE INTERNAL CAROTID ARTERIES: Moderate to severe stenosis is noted within the right cavernous and  proximal supraclinoid ICA. The ophthalmic artery and carotid termini remain patent. There is suggestion of moderate to severe stenosis in the proximal left cavernous ICA. Occlusive versus near occlusive disease is suspected in the mid to distal left cavernous ICA with a slightly improved caliber noted in the distal left supraclinoid ICA segment. The left carotid terminus is patent. The left ophthalmic artery origin is not clearly opacified. ANTERIOR CIRCULATION:  Symmetric A1 segments and anterior cerebral arteries with normal enhancement.  Normal anterior communicating artery. MIDDLE CEREBRAL ARTERY CIRCULATION:  M1 segment and middle cerebral artery branches demonstrate normal enhancement bilaterally. DISTAL VERTEBRAL ARTERIES: Mild atherosclerotic disease in the proximal intradural vertebral artery segments without significant stenosis. The pre and post PICA segments are patent with mild stenosis noted in the right post PICA segment. The posterior inferior cerebellar artery origins are maintained. The vertebrobasilar junction is unremarkable. BASILAR ARTERY: Mild stenosis is noted in the mid basilar artery. There is moderate stenosis in the distal basilar artery as well as at the basilar terminus. POSTERIOR CEREBRAL ARTERIES: Multifocal segmental areas of narrowing are noted within the proximal, mid and distal right PCA branch segments, with moderate proximal and severe mid P2 segment stenosis. There is mild stenosis in the mid to distal left P2 segment. VENOUS STRUCTURES:  Normal. NON VASCULAR ANATOMY BONY STRUCTURES:  No acute osseous abnormality. Osteoporosis and multilevel cervical spondylosis with advanced degenerative changes resulting in moderate spinal stenosis from C3-4 to C6-7. Multilevel facet arthropathy and uncovertebral joint disease also results in moderate to severe bilateral foraminal stenosis when combined with the disc osteophytes throughout the cervical spine. SOFT TISSUES OF THE NECK:   Unremarkable. THORACIC INLET: Bilateral pleural effusions.     Impression: 1. No acute intracranial abnormality. 2. Mild cerebral chronic microangiopathic changes. 3. 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 There appears to be compression of the optic chiasm. Dedicated pre and postcontrast MR imaging of the brain with a sellar protocol is recommended along with consultations with the neurosurgical and endocrinology services. 4.     Critical left ICA origin and carotid bulb stenosis with greater than 90% stenosis. No hemodynamically significant stenosis at the right ICA origin. Consultation with the vascular surgical service is recommended. 5. Near occlusive to occlusive atherosclerotic disease in the left cavernous and proximal supraclinoid ICA with reconstitution of the distal segment and carotid terminus likely via an intact [place]. 6. Moderate to severe stenosis in the right cavernous and proximal supraclinoid ICA. 7. Focal moderate to severe segmental stenosis in the P2 segment of the right posterior cerebral artery. Mild stenosis in the P2 segment of the left posterior cerebral artery. 8. Moderate mid to distal basilar stenosis. I personally discussed this study with TALIA CLAY on 1/26/2024 4:19 PM. Workstation performed: [id number]     Cardiac catheterization    Result Date: 1/17/2024  Narrative:   Prox LAD lesion is 50% stenosed.   Mid LAD lesion is 85% stenosed.   Prox Cx lesion is 40% stenosed.   Mid Cx lesion is 70% stenosed.   Dist Cx lesion is 90% stenosed.   Prox RCA lesion is 50% stenosed.   Mid RCA lesion is 95% stenosed. 3 vessel epicardial CAD     Echo complete w/ contrast if indicated    Result Date: 1/3/2024  Narrative:   Left Ventricle: Left ventricular cavity size is at the upper limits of normal when indexed for BSA. Wall thickness is mildly increased. There is eccentric hypertrophy. The left ventricular ejection fraction is 30%. Systolic function is  severely reduced. Wall motion is normal. Unable to assess diastolic function due to E/A fusion due to tachycardia.   The following segments are akinetic: basal inferoseptal, basal inferior, mid inferoseptal and mid inferior.   The following segments are hypokinetic: basal anterior, basal anteroseptal, basal inferolateral, basal anterolateral, mid anterior, mid anteroseptal, mid inferolateral, mid anterolateral, apical anterior, apical septal, apical inferior, apical lateral and apex.   Left Atrium: The atrium is moderately dilated (42-48 mL/m2).   Mitral Valve: There is mild annular calcification. There is mild to moderate regurgitation.   Pulmonic Valve: There is mild regurgitation.   Pericardium: There is a small pericardial effusion along the right atrial and right ventricular free wall. The fluid exhibits no internal echoes. There is no echocardiographic evidence of tamponade. There is a left pleural effusion.       I have personally reviewed pertinent reports.   and I have personally reviewed pertinent films in PACS    I have spent a total time of 5 minutes on 01/31/24 in caring for this patient including Diagnostic results, Risks and benefits of tx options, Instructions for management, Patient and family education, Importance of tx compliance, Risk factor reductions, Impressions, Counseling / Coordination of care, Documenting in the medical record, Reviewing / ordering tests, medicine, procedures  , Obtaining or reviewing history  , Communicating with other healthcare professionals , and   .

## 2024-01-31 NOTE — ASSESSMENT & PLAN NOTE
As addressed in HPI  She presents with multiple complaints fatigue all the time, blurry vision, gait imbalance sometimes with dizziness, once in a blue moon headache, generalized weakness, confused and forgetful at times.  Imaging  1/26/2024 CTA H/N Critical left ICA origin and carotid bulb stenosis with greater than 90% stenosis. No hemodynamically significant stenosis at the right ICA origin. Consultation with the vascular surgical service is recommended.    Plan  Collaborative review of imaging with Dr. Centeno, no neurosurgical intervention indicated recommend cardiology and their medical PCP optimize medical management with ASA 81 mg, statin, and Plavix  If additional questions or concerns call the neurosurgery department.  Reviewed MRI and findings in great detail with patient and medical management protocol for secondary stroke prevention.  Patient reports she has recently lost weight about 20 pounds from 200-140.

## 2024-01-31 NOTE — ASSESSMENT & PLAN NOTE
As addressed in HPI  Patient reports blurry vision.  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  She reports undergoing multiple eye examination since that time.  She is unsure if she has ever undergone a visual field testing  Ophthalmologist is  , [facility] . 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 There appears to be compression of the optic chiasm. 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  Patient is anxious reported she likes you know everything even the good and bad she asked if she needed surgery what the surgery would entitle, explained surgical procedure for resection of a pituitary macroadenoma.    Ordered pituitary assessment as per protocol/order set  Pituitary labs ordered as per order set  Ordered referral to endocrinology to interpret labs and for further assessment--checkout assisting patient to schedule an Endo appointment with a provider closer to her home if possible.  Ordered referral to ophthalmology patient's  telephoned her doctor's office, during the visit as patient expressed great concerns regarding the possibility of getting an appointment with her eye doctor.  I spoke directly with the office staff Katrin, patient's last VF was 2017.  Provided Katrin with neurosurgery fax number she will fax VF report today.  Office will call patient in the morning after they discussed case with her ophthalmologist  ,, who is not in today, to determine when he can schedule VF examination.  And agrees to call patient tomorrow with a scheduled appointment date and time.  Ordered MRI pituitary with and without contrast, explained in detail to patient rationale for the order.  Schedule follow-up appointment joint with Dr. Quinn and I after completion of MRI pituitary and hopefully VF.

## 2024-02-01 ENCOUNTER — APPOINTMENT (OUTPATIENT)
Dept: LAB | Facility: HOSPITAL | Age: 69
End: 2024-02-01
Payer: COMMERCIAL

## 2024-02-01 ENCOUNTER — TELEPHONE (OUTPATIENT)
Dept: VASCULAR SURGERY | Facility: CLINIC | Age: 69
End: 2024-02-01

## 2024-02-01 DIAGNOSIS — G93.89 SUPRASELLAR MASS: ICD-10-CM

## 2024-02-01 DIAGNOSIS — E83.52 HYPERCALCEMIA: ICD-10-CM

## 2024-02-01 LAB
ANION GAP SERPL CALCULATED.3IONS-SCNC: 9 MMOL/L
BUN SERPL-MCNC: 25 MG/DL (ref 5–25)
CA-I BLD-SCNC: 1.27 MMOL/L (ref 1.12–1.32)
CALCIUM SERPL-MCNC: 10.6 MG/DL (ref 8.4–10.2)
CHLORIDE SERPL-SCNC: 97 MMOL/L (ref 96–108)
CO2 SERPL-SCNC: 31 MMOL/L (ref 21–32)
CORTIS AM PEAK SERPL-MCNC: 20.1 UG/DL (ref 6.7–22.6)
CREAT SERPL-MCNC: 0.83 MG/DL (ref 0.6–1.3)
FSH SERPL-ACNC: 11.3 MIU/ML
GFR SERPL CREATININE-BSD FRML MDRD: 73 ML/MIN/1.73SQ M
GLUCOSE P FAST SERPL-MCNC: 112 MG/DL (ref 65–99)
LH SERPL-ACNC: 5.8 MIU/ML
OSMOLALITY UR/SERPL-RTO: 299 MMOL/KG (ref 282–298)
OSMOLALITY UR: 438 MMOL/KG
POTASSIUM SERPL-SCNC: 4.1 MMOL/L (ref 3.5–5.3)
PROLACTIN SERPL-MCNC: 34.81 NG/ML (ref 2.74–19.64)
SODIUM SERPL-SCNC: 137 MMOL/L (ref 135–147)
T4 FREE SERPL-MCNC: 0.84 NG/DL (ref 0.61–1.12)
TSH SERPL DL<=0.05 MIU/L-ACNC: 2.53 UIU/ML (ref 0.45–4.5)

## 2024-02-01 PROCEDURE — 83002 ASSAY OF GONADOTROPIN (LH): CPT

## 2024-02-01 PROCEDURE — 83930 ASSAY OF BLOOD OSMOLALITY: CPT

## 2024-02-01 PROCEDURE — 84305 ASSAY OF SOMATOMEDIN: CPT

## 2024-02-01 PROCEDURE — 36415 COLL VENOUS BLD VENIPUNCTURE: CPT

## 2024-02-01 PROCEDURE — 84443 ASSAY THYROID STIM HORMONE: CPT

## 2024-02-01 PROCEDURE — 84439 ASSAY OF FREE THYROXINE: CPT

## 2024-02-01 PROCEDURE — 82024 ASSAY OF ACTH: CPT

## 2024-02-01 PROCEDURE — 84403 ASSAY OF TOTAL TESTOSTERONE: CPT

## 2024-02-01 PROCEDURE — 80048 BASIC METABOLIC PNL TOTAL CA: CPT

## 2024-02-01 PROCEDURE — 84402 ASSAY OF FREE TESTOSTERONE: CPT

## 2024-02-01 PROCEDURE — 83001 ASSAY OF GONADOTROPIN (FSH): CPT

## 2024-02-01 PROCEDURE — 83520 IMMUNOASSAY QUANT NOS NONAB: CPT

## 2024-02-01 PROCEDURE — 82330 ASSAY OF CALCIUM: CPT

## 2024-02-01 PROCEDURE — 84146 ASSAY OF PROLACTIN: CPT

## 2024-02-01 PROCEDURE — 82533 TOTAL CORTISOL: CPT

## 2024-02-01 PROCEDURE — 83935 ASSAY OF URINE OSMOLALITY: CPT

## 2024-02-01 NOTE — TELEPHONE ENCOUNTER
Spoke with Dr. Woods this afternoon and he advised patient is to be scheduled for L CEA not TCAR candidate. Advised patient has an MRI upcoming and ENDO/NEURO SX appts in April. He does not recommend we wait that long. Advised to obtain clearance from neurosurgery.

## 2024-02-02 ENCOUNTER — APPOINTMENT (OUTPATIENT)
Dept: LAB | Facility: HOSPITAL | Age: 69
End: 2024-02-02
Payer: COMMERCIAL

## 2024-02-02 LAB
ACTH PLAS-MCNC: 26.9 PG/ML (ref 7.2–63.3)
TESTOST FREE SERPL-MCNC: 2.7 PG/ML (ref 0–4.2)
TESTOST SERPL-MCNC: 19 NG/DL (ref 3–67)

## 2024-02-02 NOTE — TELEPHONE ENCOUNTER
Att: to Irina...Patient called to inform that she had her appointment with Ophthalomogy. Patient mentioned that office faxed over the results to have it in her records. Patient requested a callback from office reports are received. Please assist

## 2024-02-03 LAB — IGF-I SERPL-MCNC: 105 NG/ML (ref 52–196)

## 2024-02-05 ENCOUNTER — OFFICE VISIT (OUTPATIENT)
Dept: CARDIOLOGY CLINIC | Facility: HOSPITAL | Age: 69
End: 2024-02-05
Payer: COMMERCIAL

## 2024-02-05 VITALS
HEIGHT: 66 IN | HEART RATE: 67 BPM | BODY MASS INDEX: 25.23 KG/M2 | DIASTOLIC BLOOD PRESSURE: 64 MMHG | SYSTOLIC BLOOD PRESSURE: 116 MMHG | WEIGHT: 157 LBS

## 2024-02-05 DIAGNOSIS — I65.22 STENOSIS OF LEFT CAROTID ARTERY: ICD-10-CM

## 2024-02-05 DIAGNOSIS — E78.2 MIXED HYPERLIPIDEMIA: ICD-10-CM

## 2024-02-05 DIAGNOSIS — I25.5 ISCHEMIC CARDIOMYOPATHY: Primary | ICD-10-CM

## 2024-02-05 DIAGNOSIS — I25.10 CORONARY ARTERY DISEASE INVOLVING NATIVE CORONARY ARTERY OF NATIVE HEART WITHOUT ANGINA PECTORIS: ICD-10-CM

## 2024-02-05 PROCEDURE — 99214 OFFICE O/P EST MOD 30 MIN: CPT | Performed by: INTERNAL MEDICINE

## 2024-02-05 NOTE — PROGRESS NOTES
Julia Perry  1955  [id number]  [facility] [address] [id number] [phone]    1. Ischemic cardiomyopathy        2. Coronary artery disease involving native coronary artery of native heart without angina pectoris        3. Stenosis of left carotid artery        4. Mixed hyperlipidemia            Discussion/Summary:  #1 cardiomyopathy ejection fraction 30%  #2 extensive heavy coronary calcifications  #3 chronic combined systolic and diastolic congestive heart failure  #4 hyperlipidemia  #5 type 2 diabetes mellitus  #6 IVCD  #7 high degree left internal carotid stenosis  #8 intracranial stenosis  #9 pituitary mass    Recommendations: Left heart catheterization showed multivessel coronary disease she already met with cardiothoracic surgery.  Preoperative testing revealed high-grade left internal carotid lesion.  Discussed the case with both vascular and cardiothoracic surgery we are all in agreement to reduce the risk of perioperative stroke will proceed with left carotid endarterectomy first followed by coronary artery bypass graft surgery.  She is already met with neurosurgery and has follow-up with them as well as endocrine given pituitary mass.  Uptitrate Crestor to 10 mg a day after a few weeks can go to 20.  Continue antiplatelet agent.  For cardiomyopathy continue Toprol.  Avoid ACE inhibitor preoperatively want to maximize CNS pressure.    Interval History: 68-year-old female with a history of type 2 diabetes and colitis presents for new patient consultation on behalf of Dr. Titus for new cardiomyopathy.  The patient has had shortness of breath, PND, orthopnea, chest pain since 2023.  She was started on steroids for colitis and had worsening shortness of breath but attributed this to medication side effects.  An echocardiogram was recently obtained which showed an ejection fraction of 30%.  The patient tells me she has had some  chest heaviness with walking on and off for several years.  She is a diabetic.  The symptoms have been quite mild.  She has felt more shortness of breath with activity and had occasional lower extremity edema recently.  There is been no syncope.  She has been taking her medications as prescribed.  She is a non-smoker.    Patient currently awaiting plan from surgery regarding CABG versus carotid endarterectomy.  She has not had any anginal sounding chest pain or discomfort.  She does continue to have heart failure symptoms and has been taking the Lasix on a daily basis.  Denies any PND orthopnea.  Is been a lower extremity edema, PND.  Denies any palpitations, lightheadedness, dizziness.    Medical Problems       Problem List       Colitis    History of biliary dyskinesia    DMII (diabetes mellitus, type 2) ([facility])      Lab Results   Component Value Date    HGBA1C 6.4 (H) 12/28/2023         Gallbladder polyp    Leiomyoma of uterus    Acquired right foot drop    Hypomagnesemia    Hair loss    Polyp of ascending colon    Ulcerative pancolitis without complication ([facility])    Type 2 diabetes mellitus with hyperglycemia, unspecified whether long term insulin use ([facility])      Lab Results   Component Value Date    HGBA1C 6.4 (H) 12/28/2023             Past Medical History:   Diagnosis Date    Diabetes mellitus (HCC)     PONV (postoperative nausea and vomiting)      Social History     Socioeconomic History    Marital status: /Civil Union     Spouse name: Not on file    Number of children: Not on file    Years of education: Not on file    Highest education level: Not on file   Occupational History    Not on file   Tobacco Use    Smoking status: Former     Types: Cigarettes    Smokeless tobacco: Never   Vaping Use    Vaping status: Never Used   Substance and Sexual Activity    Alcohol use: Never    Drug use: Never    Sexual activity: Not on file   Other Topics Concern    Not on file   Social History Narrative    Not on file      Social Determinants of Health     Financial Resource Strain: Patient Declined (8/18/2023)    Overall Financial Resource Strain (CARDIA)     Difficulty of Paying Living Expenses: Patient declined   Food Insecurity: Not on file   Transportation Needs: No Transportation Needs (8/18/2023)    PRAPARE - Transportation     Lack of Transportation (Medical): No     Lack of Transportation (Non-Medical): No   Physical Activity: Not on file   Stress: Not on file   Social Connections: Not on file   Intimate Partner Violence: Not on file   Housing Stability: Not on file      History reviewed. No pertinent family history.  Past Surgical History:   Procedure Laterality Date    CARDIAC CATHETERIZATION Left 1/17/2024    Procedure: Cardiac Left Heart Cath;  Surgeon: Juan A Sousa MD;  [facility]: [facility];  Service: Cardiology    CARDIAC CATHETERIZATION  1/17/2024    Procedure: Cardiac catheterization;  Surgeon: Juan A Sousa MD;  Location: [facility];  Service: Cardiology    HYSTERECTOMY      ~1990       Current Outpatient Medications:     Alcohol Swabs 70 % PADS, May substitute brand based on insurance coverage. Check glucose TID., Disp: 100 each, Rfl: 0    aspirin 81 mg chewable tablet, Chew 1 tablet (81 mg total) daily, Disp: 90 tablet, Rfl: 3    b complex vitamins capsule, Take 1 capsule by mouth daily, Disp: , Rfl:     furosemide (LASIX) 40 mg tablet, Take 1 tablet (40 mg total) by mouth as needed (Take 1 tablet daily as needed), Disp: 30 tablet, Rfl: 6    glucose blood test strip, Use 1 each daily as needed Use as instructed, Disp: , Rfl:     magnesium gluconate (MAGONATE) 500 mg tablet, Take 1 tablet (500 mg total) by mouth in the morning, Disp: 90 tablet, Rfl: 0    metFORMIN (GLUCOPHAGE) 500 mg tablet, 2 pills bid Do not start before January 18, 2024., Disp: , Rfl:     metoprolol succinate (TOPROL-XL) 25 mg 24 hr tablet, Take 1 tablet (25 mg total) by mouth daily, Disp: 90 tablet, Rfl: 3     "multivitamin (THERAGRAN) TABS, Take by mouth daily, Disp: , Rfl:     mupirocin (BACTROBAN) 2 % ointment, Apply topically 2 (two) times a day, Disp: 22 g, Rfl: 0    OneTouch Delica Lancets 33G MISC, May substitute brand based on insurance coverage. Check glucose TID., Disp: 100 each, Rfl: 0    rosuvastatin (CRESTOR) 40 MG tablet, Take 1 tablet (40 mg total) by mouth daily (Patient taking differently: Take 40 mg by mouth daily Patient reports taking 5 mg), Disp: 90 tablet, Rfl: 3    cholecalciferol (VITAMIN D3) 1,000 units tablet, Take 1,000 Units by mouth daily (Patient not taking: Reported on 1/30/2024), Disp: , Rfl:   Allergies   Allergen Reactions    Valdecoxib Other (See Comments)     Patient is unaware of this allergy       Labs:     Chemistry        Component Value Date/Time    K 4.1 02/01/2024 0735    CL 97 02/01/2024 0735    CO2 31 02/01/2024 0735    BUN 25 02/01/2024 0735    CREATININE 0.83 02/01/2024 0735        Component Value Date/Time    CALCIUM 10.6 (H) 02/01/2024 0735    ALKPHOS 66 01/11/2024 1355    AST 15 01/11/2024 1355    ALT 11 01/11/2024 1355            No results found for: \"CHOL\"  Lab Results   Component Value Date    HDL 47 (L) 01/11/2024    HDL 44 06/02/2021    HDL 44 07/24/2018     Lab Results   Component Value Date    LDLCALC 130 (H) 01/11/2024    LDLCALC 156 (H) 06/02/2021    LDLCALC 141 (H) 07/24/2018     Lab Results   Component Value Date    TRIG 135 01/11/2024    TRIG 204 (H) 06/02/2021    TRIG 140 07/24/2018     No results found for: \"CHOLHDL\"    Imaging: Echo complete w/ contrast if indicated    Result Date: 1/3/2024  Narrative:   Left Ventricle: Left ventricular cavity size is at the upper limits of normal when indexed for BSA. Wall thickness is mildly increased. There is eccentric hypertrophy. The left ventricular ejection fraction is 30%. Systolic function is severely reduced. Wall motion is normal. Unable to assess diastolic function due to E/A fusion due to tachycardia.   The " "following segments are akinetic: basal inferoseptal, basal inferior, mid inferoseptal and mid inferior.   The following segments are hypokinetic: basal anterior, basal anteroseptal, basal inferolateral, basal anterolateral, mid anterior, mid anteroseptal, mid inferolateral, mid anterolateral, apical anterior, apical septal, apical inferior, apical lateral and apex.   Left Atrium: The atrium is moderately dilated (42-48 mL/m2).   Mitral Valve: There is mild annular calcification. There is mild to moderate regurgitation.   Pulmonic Valve: There is mild regurgitation.   Pericardium: There is a small pericardial effusion along the right atrial and right ventricular free wall. The fluid exhibits no internal echoes. There is no echocardiographic evidence of tamponade. There is a left pleural effusion.       ECG: Sinus rhythm IVCD      ROS    Vitals:    02/05/24 0831   BP: 116/64   Pulse: 67     Vitals:    02/05/24 0831   Weight: 71.2 kg (157 lb)     Height: 5' 6\" (167.6 cm)   Body mass index is 25.34 kg/m².    Physical Exam:  Vital signs reviewed  General:  Alert and cooperative, appears stated age, no acute distress  HEENT:  PERRLA, EOMI, no scleral icterus, no conjunctival pallor  Neck:  No lymphadenopathy, no thyromegaly, no carotid bruits, no elevated JVP  Heart:  Regular rate and rhythm, normal S1/S2, no S3/S4, no murmur, rubs or gallops.  PMI nondisplaced  Lungs:  Clear to auscultation bilaterally, no wheezes rales or rhonchi  Abdomen:  Soft, non-tender, positive bowel sounds, no rebound or guarding,   no organomegaly   Extremities:  Normal range of motion.  No clubbing, cyanosis or edema   Vascular:  2+ pedal pulses  Skin:  No rashes or lesions on exposed skin  Neurologic:  Cranial nerves II-XII grossly intact without focal deficits  Psych:  Normal mood and affect      "

## 2024-02-05 NOTE — TELEPHONE ENCOUNTER
Contacted Julia to advise VF results have been received and are scanned to her chart. Left a detailed message stating above and encouraging a call back with questions.

## 2024-02-06 ENCOUNTER — HOSPITAL ENCOUNTER (OUTPATIENT)
Dept: MRI IMAGING | Facility: HOSPITAL | Age: 69
Discharge: HOME | End: 2024-02-06
Payer: COMMERCIAL

## 2024-02-06 DIAGNOSIS — G93.89 SUPRASELLAR MASS: ICD-10-CM

## 2024-02-06 PROCEDURE — 70553 MRI BRAIN STEM W/O & W/DYE: CPT

## 2024-02-06 PROCEDURE — G1004 CDSM NDSC: HCPCS

## 2024-02-06 PROCEDURE — A9585 GADOBUTROL INJECTION: HCPCS | Performed by: FAMILY MEDICINE

## 2024-02-06 RX ORDER — GADOBUTROL 604.72 MG/ML
7 INJECTION INTRAVENOUS
Status: COMPLETED | OUTPATIENT
Start: 2024-02-06 | End: 2024-02-06

## 2024-02-06 RX ADMIN — GADOBUTROL 7 ML: 604.72 INJECTION INTRAVENOUS at 18:47

## 2024-02-07 LAB — ALPHA SUBUNIT FREE SERPL-MCNC: 0.41 NG/ML

## 2024-02-15 ENCOUNTER — APPOINTMENT (OUTPATIENT)
Dept: LAB | Facility: CLINIC | Age: 69
End: 2024-02-15
Payer: COMMERCIAL

## 2024-02-15 ENCOUNTER — CONSULT (OUTPATIENT)
Dept: ENDOCRINOLOGY | Facility: CLINIC | Age: 69
End: 2024-02-15
Payer: COMMERCIAL

## 2024-02-15 VITALS
SYSTOLIC BLOOD PRESSURE: 138 MMHG | BODY MASS INDEX: 25.2 KG/M2 | HEART RATE: 69 BPM | HEIGHT: 66 IN | WEIGHT: 156.8 LBS | DIASTOLIC BLOOD PRESSURE: 78 MMHG

## 2024-02-15 DIAGNOSIS — E22.1 HYPERPROLACTINEMIA (HCC): ICD-10-CM

## 2024-02-15 DIAGNOSIS — D35.2 PITUITARY MACROADENOMA (HCC): ICD-10-CM

## 2024-02-15 DIAGNOSIS — E11.65 TYPE 2 DIABETES MELLITUS WITH HYPERGLYCEMIA, WITHOUT LONG-TERM CURRENT USE OF INSULIN (HCC): ICD-10-CM

## 2024-02-15 DIAGNOSIS — D35.2 PITUITARY MACROADENOMA (HCC): Primary | ICD-10-CM

## 2024-02-15 DIAGNOSIS — E83.52 HYPERCALCEMIA: ICD-10-CM

## 2024-02-15 DIAGNOSIS — G93.89 SUPRASELLAR MASS: ICD-10-CM

## 2024-02-15 PROCEDURE — 36415 COLL VENOUS BLD VENIPUNCTURE: CPT

## 2024-02-15 PROCEDURE — 84146 ASSAY OF PROLACTIN: CPT

## 2024-02-15 PROCEDURE — 99204 OFFICE O/P NEW MOD 45 MIN: CPT | Performed by: STUDENT IN AN ORGANIZED HEALTH CARE EDUCATION/TRAINING PROGRAM

## 2024-02-15 NOTE — PROGRESS NOTES
Julia Perry 68 y.o. female MRN: [id number]    Encounter: [id number]      Assessment/Plan     Problem List Items Addressed This Visit     DMII (diabetes mellitus, type 2) (HCC)     Last A1c 6.4% on metformin           Suprasellar mass    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HCC) - Primary     Mild elevation of prolactin in setting of macroadenoma. This may be due to hook effect vs stalk effect. No evidence of hormonal hyposecretion or other evidence concerning for hypersecretion. Will proceed with checking prolactin dilution study. If the diluted specimen yields a higher value, the diagnosis of a macroprolactinoma may be made. Treatment for macroprolactinoma with dopamine agonist will be recommended. MRI did reveal impingement of adenoma against optic chiasm. So long as no urgent surgical indication per neurosurgery, would prioritize management of carotid stenosis and mutivessel CAD.           Relevant Orders    Prolactin, Dilution Study    Hypercalcemia     Mild elevation of serum calcium, ionized wnl. PTH non-suppressed. 24h urine calcium wnl. No history of kidney stones. This may be mild primary hyperparathyroidism. Would prefer course of monitoring for now. Should consider updated DXA study         Hyperprolactinemia (HCC)    Relevant Orders    Prolactin, Dilution Study     RTC TBDim    I have spent a total time of 45 minutes on 02/15/24 in caring for this patient including Diagnostic results, Patient and family education, Risk factor reductions, Impressions, Counseling / Coordination of care, Documenting in the medical record, Reviewing / ordering tests, medicine, procedures  , and Obtaining or reviewing history  .      CC: pituitary macroadenoma    History of Present Illness     HPI:    Julia presents today for endocrine evaluation of a pituitary macroadenoma. Pituitary macroadenoma detected incidentally during CT imaging for evaluation of occluded left carotid artery. Patient had been in process of eval for carotid  endarterectomy and CABG. She has since met with neurosurgery and had a dedicated pituitary MRI in addition to visual field testing, which is available in her chart.     She and her  are confused about next steps.     Review of Systems   Constitutional:  Negative for diaphoresis.   Eyes:  Positive for visual disturbance.   Respiratory:  Positive for shortness of breath.    Gastrointestinal:  Negative for nausea and vomiting.   Endocrine:        No galactorrhea   Neurological:  Negative for headaches.   All other systems reviewed and are negative.      Historical Information   Past Medical History:   Diagnosis Date   • Diabetes mellitus (HCC)    • PONV (postoperative nausea and vomiting)      Past Surgical History:   Procedure Laterality Date   • CARDIAC CATHETERIZATION Left 1/17/2024    Procedure: Cardiac Left Heart Cath;  Surgeon: Juan A Sousa MD;  Location: [facility];  Service: Cardiology   • CARDIAC CATHETERIZATION  1/17/2024    Procedure: Cardiac catheterization;  Surgeon: Juan A Sousa MD;  Location: [facility];  Service: Cardiology   • HYSTERECTOMY      ~1990     Social History   Social History     Substance and Sexual Activity   Alcohol Use Never     Social History     Substance and Sexual Activity   Drug Use Never     Social History     Tobacco Use   Smoking Status Former   • Types: Cigarettes   Smokeless Tobacco Never     Family History: History reviewed. No pertinent family history.    Meds/Allergies   Current Outpatient Medications   Medication Sig Dispense Refill   • Alcohol Swabs 70 % PADS May substitute brand based on insurance coverage. Check glucose TID. 100 each 0   • aspirin 81 mg chewable tablet Chew 1 tablet (81 mg total) daily 90 tablet 3   • b complex vitamins capsule Take 1 capsule by mouth daily     • cholecalciferol (VITAMIN D3) 1,000 units tablet Take 1,000 Units by mouth daily     • furosemide (LASIX) 40 mg tablet Take 1 tablet (40 mg total) by mouth as needed  "(Take 1 tablet daily as needed) 30 tablet 6   • glucose blood test strip Use 1 each daily as needed Use as instructed     • magnesium gluconate (MAGONATE) 500 mg tablet Take 1 tablet (500 mg total) by mouth in the morning 90 tablet 0   • metFORMIN (GLUCOPHAGE) 500 mg tablet 2 pills bid Do not start before January 18, 2024.     • metoprolol succinate (TOPROL-XL) 25 mg 24 hr tablet Take 1 tablet (25 mg total) by mouth daily 90 tablet 3   • OneTouch Delica Lancets 33G MISC May substitute brand based on insurance coverage. Check glucose TID. 100 each 0   • rosuvastatin (CRESTOR) 40 MG tablet Take 1 tablet (40 mg total) by mouth daily (Patient taking differently: Take 10 mg by mouth daily Patient reports taking 10 mg) 90 tablet 3   • multivitamin (THERAGRAN) TABS Take by mouth daily (Patient not taking: Reported on 2/15/2024)     • mupirocin (BACTROBAN) 2 % ointment Apply topically 2 (two) times a day (Patient not taking: Reported on 2/15/2024) 22 g 0     No current facility-administered medications for this visit.     Allergies   Allergen Reactions   • Valdecoxib Other (See Comments)     Patient is unaware of this allergy       Objective   Vitals: Blood pressure 138/78, pulse 69, height 5' 6\" (1.676 m), weight 71.1 kg (156 lb 12.8 oz).    Physical Exam  Vitals reviewed.   Constitutional:       Appearance: Normal appearance.   HENT:      Head: Normocephalic and atraumatic.      Nose: Nose normal.   Eyes:      General: No scleral icterus.     Conjunctiva/sclera: Conjunctivae normal.   Cardiovascular:      Rate and Rhythm: Normal rate and regular rhythm.   Pulmonary:      Effort: Pulmonary effort is normal. No respiratory distress.   Musculoskeletal:         General: No deformity.      Cervical back: Normal range of motion.   Skin:     General: Skin is warm and dry.   Neurological:      General: No focal deficit present.      Mental Status: She is alert.   Psychiatric:         Mood and Affect: Mood normal.         " Behavior: Behavior normal.         The history was obtained from the review of the chart, patient and family.    Lab Results:   Lab Results   Component Value Date/Time    Potassium 4.1 02/01/2024 07:35 AM    Potassium 4.2 01/18/2024 12:36 PM    Potassium 4.4 01/11/2024 01:55 PM    Chloride 97 02/01/2024 07:35 AM    Chloride 102 01/18/2024 12:36 PM    Chloride 101 01/11/2024 01:55 PM    CO2 31 02/01/2024 07:35 AM    CO2 30 01/18/2024 12:36 PM    CO2 29 01/11/2024 01:55 PM    BUN 25 02/01/2024 07:35 AM    BUN 18 01/18/2024 12:36 PM    BUN 18 01/11/2024 01:55 PM    Creatinine 0.83 02/01/2024 07:35 AM    Creatinine 0.80 01/18/2024 12:36 PM    Creatinine 0.74 01/11/2024 01:55 PM    Glucose, Fasting 112 (H) 02/01/2024 07:35 AM    Glucose, Fasting 113 (H) 01/18/2024 12:36 PM    Glucose, Fasting 99 01/11/2024 01:55 PM    Calcium 10.6 (H) 02/01/2024 07:35 AM    Calcium 10.5 (H) 01/18/2024 12:36 PM    Calcium 10.9 (H) 01/11/2024 01:55 PM    eGFR 73 02/01/2024 07:35 AM    eGFR 76 01/18/2024 12:36 PM    eGFR 84 01/11/2024 01:55 PM    TSH 3RD GENERATON 2.530 02/01/2024 07:35 AM    TSH 3RD GENERATON 1.678 11/03/2023 12:06 PM    Free T4 0.84 02/01/2024 07:35 AM    Prolactin 34.81 (H) 02/01/2024 07:35 AM    Testosterone, Free 2.7 02/01/2024 07:35 AM    FSH 11.3 02/01/2024 07:35 AM    LH 5.8 02/01/2024 07:35 AM       Component      Latest Ref Rng 2/1/2024 2/2/2024   Sodium      135 - 147 mmol/L 137     Potassium      3.5 - 5.3 mmol/L 4.1     Chloride      96 - 108 mmol/L 97     Carbon Dioxide      21 - 32 mmol/L 31     ANION GAP      mmol/L 9     BUN      5 - 25 mg/dL 25     Creatinine      0.60 - 1.30 mg/dL 0.83     GLUCOSE, FASTING      65 - 99 mg/dL 112 (H)     Calcium      8.4 - 10.2 mg/dL 10.6 (H)     GFR, Calculated      ml/min/1.73sq m 73     24 HR URINE VOLUME      mL  1,200    CALCIUM 24 HOUR URINE      100 - 300 mg/24 hrs  234    PARATHYROID HORMONE      12.0 - 88.0 pg/mL 49.2     Calcium, Ionized      1.12 - 1.32 mmol/L  "1.27        Legend:  (H) High    Component      Latest Ref Rng 2/1/2024   Sodium      135 - 147 mmol/L 137    Potassium      3.5 - 5.3 mmol/L 4.1    Chloride      96 - 108 mmol/L 97    Carbon Dioxide      21 - 32 mmol/L 31    ANION GAP      mmol/L 9    BUN      5 - 25 mg/dL 25    Creatinine      0.60 - 1.30 mg/dL 0.83    GLUCOSE, FASTING      65 - 99 mg/dL 112 (H)    Calcium      8.4 - 10.2 mg/dL 10.6 (H)    GFR, Calculated      ml/min/1.73sq m 73    TESTOSTERONE FREE      0.0 - 4.2 pg/mL 2.7    Testosterone, Total, LC/MS      3 - 67 ng/dL 19    INSULIN-LIKE GROWTH FACTOR-1      52 - 196 ng/mL 105    TSH 3RD GENERATON      0.450 - 4.500 uIU/mL 2.530    Free T4      0.61 - 1.12 ng/dL 0.84    ACTH      7.2 - 63.3 pg/mL 26.9    Cortisol - AM      6.7 - 22.6 ug/dL 20.1    ALPHA SUBUNIT (FREE)      ng/mL 0.41    PROLACTIN      2.74 - 19.64 ng/mL 34.81 (H)    FSH, POC      See Comment mIU/mL 11.3    LUTEINIZING HORMONE      See Comment mIU/mL 5.8    Osmolality, Ur      250 - 900 mmol/    OSMOLALITY, SERUM      282 - 298 mmol/ (H)       Legend:  (H) High  Imaging Studies:  Results for orders placed during the hospital encounter of 02/06/24    MRI brain pituitary wo and w contrast    Impression  Enhancing sellar/suprasellar lesion compatible with a macroadenoma. The lesion impinges on the optic chiasm.    No acute infarction, edema, or or pathologic intra-axial enhancement.    Mild chronic microangiopathic ischemic changes.        Workstation performed: [id number]    ?     I have personally reviewed pertinent reports.      Portions of the record may have been created with voice recognition software. Occasional wrong word or \"sound a like\" substitutions may have occurred due to the inherent limitations of voice recognition software. Read the chart carefully and recognize, using context, where substitutions have occurred.    " "    Portions of the record may have been created with voice recognition software. Occasional wrong word or \"sound a like\" substitutions may have occurred due to the inherent limitations of voice recognition software. Read the chart carefully and recognize, using context, where substitutions have occurred.    "

## 2024-02-15 NOTE — ASSESSMENT & PLAN NOTE
Mild elevation of prolactin in setting of macroadenoma. This may be due to hook effect vs stalk effect. No evidence of hormonal hyposecretion or other evidence concerning for hypersecretion. Will proceed with checking prolactin dilution study. If the diluted specimen yields a higher value, the diagnosis of a macroprolactinoma may be made. Treatment for macroprolactinoma with dopamine agonist will be recommended. MRI did reveal impingement of adenoma against optic chiasm. So long as no urgent surgical indication per neurosurgery, would prioritize management of carotid stenosis and mutivessel CAD.

## 2024-02-15 NOTE — ASSESSMENT & PLAN NOTE
Mild elevation of serum calcium, ionized wnl. PTH non-suppressed. 24h urine calcium wnl. No history of kidney stones. This may be mild primary hyperparathyroidism. Would prefer course of monitoring for now. Should consider updated DXA study

## 2024-02-16 LAB — PROLACTIN SERPL-MCNC: 27.48 NG/ML (ref 2.74–19.64)

## 2024-02-19 ENCOUNTER — PREP FOR PROCEDURE (OUTPATIENT)
Dept: VASCULAR SURGERY | Facility: CLINIC | Age: 69
End: 2024-02-19

## 2024-02-19 DIAGNOSIS — I65.22 STENOSIS OF LEFT CAROTID ARTERY: Primary | ICD-10-CM

## 2024-02-20 ENCOUNTER — APPOINTMENT (OUTPATIENT)
Age: 69
End: 2024-02-20
Payer: COMMERCIAL

## 2024-02-20 DIAGNOSIS — I65.22 STENOSIS OF LEFT CAROTID ARTERY: Primary | ICD-10-CM

## 2024-02-20 DIAGNOSIS — D35.2 PITUITARY MACROADENOMA (HCC): Primary | ICD-10-CM

## 2024-02-20 LAB
ANION GAP SERPL CALCULATED.3IONS-SCNC: 9 MMOL/L
BUN SERPL-MCNC: 21 MG/DL (ref 5–25)
CALCIUM SERPL-MCNC: 10.4 MG/DL (ref 8.4–10.2)
CHLORIDE SERPL-SCNC: 99 MMOL/L (ref 96–108)
CO2 SERPL-SCNC: 33 MMOL/L (ref 21–32)
CREAT SERPL-MCNC: 0.95 MG/DL (ref 0.6–1.3)
ERYTHROCYTE [DISTWIDTH] IN BLOOD BY AUTOMATED COUNT: 13.1 % (ref 11.6–15.1)
GFR SERPL CREATININE-BSD FRML MDRD: 62 ML/MIN/1.73SQ M
GLUCOSE SERPL-MCNC: 105 MG/DL (ref 65–140)
HCT VFR BLD AUTO: 41.5 % (ref 34.8–46.1)
HGB BLD-MCNC: 12.6 G/DL (ref 11.5–15.4)
INR PPP: 1.05 (ref 0.84–1.19)
MCH RBC QN AUTO: 27.9 PG (ref 26.8–34.3)
MCHC RBC AUTO-ENTMCNC: 30.4 G/DL (ref 31.4–37.4)
MCV RBC AUTO: 92 FL (ref 82–98)
PLATELET # BLD AUTO: 210 THOUSANDS/UL (ref 149–390)
PMV BLD AUTO: 12.7 FL (ref 8.9–12.7)
POTASSIUM SERPL-SCNC: 4.4 MMOL/L (ref 3.5–5.3)
PROTHROMBIN TIME: 13.6 SECONDS (ref 11.6–14.5)
RBC # BLD AUTO: 4.51 MILLION/UL (ref 3.81–5.12)
SODIUM SERPL-SCNC: 141 MMOL/L (ref 135–147)
WBC # BLD AUTO: 5.08 THOUSAND/UL (ref 4.31–10.16)

## 2024-02-20 PROCEDURE — 86900 BLOOD TYPING SEROLOGIC ABO: CPT | Performed by: SURGERY

## 2024-02-20 PROCEDURE — 85027 COMPLETE CBC AUTOMATED: CPT

## 2024-02-20 PROCEDURE — 85610 PROTHROMBIN TIME: CPT

## 2024-02-20 PROCEDURE — 36415 COLL VENOUS BLD VENIPUNCTURE: CPT

## 2024-02-20 PROCEDURE — 86850 RBC ANTIBODY SCREEN: CPT | Performed by: SURGERY

## 2024-02-20 PROCEDURE — 86901 BLOOD TYPING SEROLOGIC RH(D): CPT | Performed by: SURGERY

## 2024-02-20 PROCEDURE — 80048 BASIC METABOLIC PNL TOTAL CA: CPT

## 2024-02-21 ENCOUNTER — TELEPHONE (OUTPATIENT)
Age: 69
End: 2024-02-21

## 2024-02-21 ENCOUNTER — LAB REQUISITION (OUTPATIENT)
Dept: LAB | Facility: HOSPITAL | Age: 69
End: 2024-02-21
Payer: COMMERCIAL

## 2024-02-21 ENCOUNTER — ANESTHESIA EVENT (OUTPATIENT)
Dept: PERIOP | Facility: HOSPITAL | Age: 69
DRG: 038 | End: 2024-02-21
Payer: COMMERCIAL

## 2024-02-21 DIAGNOSIS — I65.22 OCCLUSION AND STENOSIS OF LEFT CAROTID ARTERY: ICD-10-CM

## 2024-02-21 LAB
ABO GROUP BLD: NORMAL
BLD GP AB SCN SERPL QL: NEGATIVE
RH BLD: POSITIVE
SPECIMEN EXPIRATION DATE: NORMAL

## 2024-02-21 NOTE — PRE-PROCEDURE INSTRUCTIONS
Pre-Surgery Instructions:   Medication Instructions    aspirin 81 mg chewable tablet Instructions provided by MD    b complex vitamins capsule Stop taking 7 days prior to surgery.    cholecalciferol (VITAMIN D3) 1,000 units tablet Stop taking 7 days prior to surgery.    furosemide (LASIX) 40 mg tablet Hold day of surgery.    magnesium gluconate (MAGONATE) 500 mg tablet Hold day of surgery.    metFORMIN (GLUCOPHAGE) 500 mg tablet Hold day of surgery.    metoprolol succinate (TOPROL-XL) 25 mg 24 hr tablet Take day of surgery.    rosuvastatin (CRESTOR) 40 MG tablet Take night before surgery        Medication instructions for day surgery reviewed. Please use only a sip of water to take your instructed medications. Avoid all over the counter vitamins, supplements and NSAIDS for one week prior to surgery per anesthesia guidelines. Tylenol is ok to take as needed.     You will receive a call one business day prior to surgery with an arrival time and hospital directions. If your surgery is scheduled on a Monday, the hospital will be calling you on the Friday prior to your surgery. If you have not heard from anyone by 8pm, please call the hospital supervisor through the hospital  at [phone]. ([place] [phone] or [place] [phone]).    Do not eat or drink anything after midnight the night before your surgery, including candy, mints, lifesavers, or chewing gum. Do not drink alcohol 24hrs before your surgery. Try not to smoke at least 24hrs before your surgery.       Follow the pre surgery showering instructions as listed in the “My Surgical Experience Booklet” or otherwise provided by your surgeon's office. Do not use a blade to shave the surgical area 1 week before surgery. It is okay to use a clean electric clippers up to 24 hours before surgery. Do not apply any lotions, creams, including makeup, cologne, deodorant, or perfumes after showering on the day of your surgery. Do not use dry shampoo, hair  spray, hair gel, or any type of hair products.     No contact lenses, eye make-up, or artificial eyelashes. Remove nail polish, including gel polish, and any artificial, gel, or acrylic nails if possible. Remove all jewelry including rings and body piercing jewelry.     Wear causal clothing that is easy to take on and off. Consider your type of surgery.    Keep any valuables, jewelry, piercings at home. Please bring any specially ordered equipment (sling, braces) if indicated.    Arrange for a responsible person to drive you to and from the hospital on the day of your surgery. Please confirm the visitor policy for the day of your procedure when you receive your phone call with an arrival time.     Call the surgeon's office with any new illnesses, exposures, or additional questions prior to surgery.    Please reference your “My Surgical Experience Booklet” for additional information to prepare for your upcoming surgery.

## 2024-03-04 ENCOUNTER — ANESTHESIA (OUTPATIENT)
Dept: PERIOP | Facility: HOSPITAL | Age: 69
DRG: 038 | End: 2024-03-04
Payer: COMMERCIAL

## 2024-03-04 ENCOUNTER — HOSPITAL ENCOUNTER (OUTPATIENT)
Dept: NON INVASIVE DIAGNOSTICS | Facility: HOSPITAL | Age: 69
Discharge: HOME | DRG: 038 | End: 2024-03-04
Payer: COMMERCIAL

## 2024-03-04 ENCOUNTER — HOSPITAL ENCOUNTER (INPATIENT)
Facility: HOSPITAL | Age: 69
LOS: 1 days | Discharge: HOME | DRG: 038 | End: 2024-03-05
Attending: SURGERY | Admitting: SURGERY
Payer: COMMERCIAL

## 2024-03-04 DIAGNOSIS — I65.22 CAROTID STENOSIS, LEFT: ICD-10-CM

## 2024-03-04 DIAGNOSIS — R60.9 EDEMA, UNSPECIFIED TYPE: ICD-10-CM

## 2024-03-04 DIAGNOSIS — I65.22 STENOSIS OF LEFT CAROTID ARTERY: Primary | ICD-10-CM

## 2024-03-04 DIAGNOSIS — I50.9 ACUTE CONGESTIVE HEART FAILURE, UNSPECIFIED HEART FAILURE TYPE (HCC): ICD-10-CM

## 2024-03-04 LAB
GLUCOSE SERPL-MCNC: 136 MG/DL (ref 65–140)
GLUCOSE SERPL-MCNC: 137 MG/DL (ref 65–140)
GLUCOSE SERPL-MCNC: 157 MG/DL (ref 65–140)
KCT BLD-ACNC: 234 SEC (ref 89–137)
KCT BLD-ACNC: 240 SEC (ref 89–137)
PLATELET # BLD AUTO: 229 THOUSANDS/UL (ref 149–390)
PMV BLD AUTO: 11.5 FL (ref 8.9–12.7)
SPECIMEN SOURCE: ABNORMAL
SPECIMEN SOURCE: ABNORMAL

## 2024-03-04 PROCEDURE — 99223 1ST HOSP IP/OBS HIGH 75: CPT | Performed by: INTERNAL MEDICINE

## 2024-03-04 PROCEDURE — 85049 AUTOMATED PLATELET COUNT: CPT | Performed by: STUDENT IN AN ORGANIZED HEALTH CARE EDUCATION/TRAINING PROGRAM

## 2024-03-04 PROCEDURE — 85347 COAGULATION TIME ACTIVATED: CPT

## 2024-03-04 PROCEDURE — 82948 REAGENT STRIP/BLOOD GLUCOSE: CPT

## 2024-03-04 PROCEDURE — 35301 RECHANNELING OF ARTERY: CPT | Performed by: SURGERY

## 2024-03-04 PROCEDURE — 03CJ0ZZ EXTIRPATION OF MATTER FROM LEFT COMMON CAROTID ARTERY, OPEN APPROACH: ICD-10-PCS | Performed by: SURGERY

## 2024-03-04 PROCEDURE — 93882 EXTRACRANIAL UNI/LTD STUDY: CPT

## 2024-03-04 PROCEDURE — NC001 PR NO CHARGE: Performed by: SURGERY

## 2024-03-04 RX ORDER — ONDANSETRON 2 MG/ML
4 INJECTION INTRAMUSCULAR; INTRAVENOUS EVERY 6 HOURS PRN
Status: DISCONTINUED | OUTPATIENT
Start: 2024-03-04 | End: 2024-03-05 | Stop reason: HOSPADM

## 2024-03-04 RX ORDER — ONDANSETRON 2 MG/ML
4 INJECTION INTRAMUSCULAR; INTRAVENOUS ONCE AS NEEDED
Status: COMPLETED | OUTPATIENT
Start: 2024-03-04 | End: 2024-03-04

## 2024-03-04 RX ORDER — METOPROLOL SUCCINATE 25 MG/1
25 TABLET, EXTENDED RELEASE ORAL DAILY
Status: DISCONTINUED | OUTPATIENT
Start: 2024-03-05 | End: 2024-03-05 | Stop reason: HOSPADM

## 2024-03-04 RX ORDER — PHENYLEPHRINE HCL IN 0.9% NACL 1 MG/10 ML
SYRINGE (ML) INTRAVENOUS AS NEEDED
Status: DISCONTINUED | OUTPATIENT
Start: 2024-03-04 | End: 2024-03-04

## 2024-03-04 RX ORDER — HYDROMORPHONE HCL/PF 1 MG/ML
0.5 SYRINGE (ML) INJECTION
Status: DISCONTINUED | OUTPATIENT
Start: 2024-03-04 | End: 2024-03-04 | Stop reason: HOSPADM

## 2024-03-04 RX ORDER — EPHEDRINE SULFATE 50 MG/ML
INJECTION INTRAVENOUS AS NEEDED
Status: DISCONTINUED | OUTPATIENT
Start: 2024-03-04 | End: 2024-03-04

## 2024-03-04 RX ORDER — PROMETHAZINE HYDROCHLORIDE 25 MG/ML
6.25 INJECTION, SOLUTION INTRAMUSCULAR; INTRAVENOUS ONCE AS NEEDED
Status: COMPLETED | OUTPATIENT
Start: 2024-03-04 | End: 2024-03-05

## 2024-03-04 RX ORDER — LABETALOL HYDROCHLORIDE 5 MG/ML
INJECTION, SOLUTION INTRAVENOUS AS NEEDED
Status: DISCONTINUED | OUTPATIENT
Start: 2024-03-04 | End: 2024-03-04

## 2024-03-04 RX ORDER — FENTANYL CITRATE/PF 50 MCG/ML
50 SYRINGE (ML) INJECTION
Status: DISCONTINUED | OUTPATIENT
Start: 2024-03-04 | End: 2024-03-04 | Stop reason: HOSPADM

## 2024-03-04 RX ORDER — INSULIN LISPRO 100 [IU]/ML
1-5 INJECTION, SOLUTION INTRAVENOUS; SUBCUTANEOUS EVERY 6 HOURS SCHEDULED
Status: DISCONTINUED | OUTPATIENT
Start: 2024-03-04 | End: 2024-03-05 | Stop reason: HOSPADM

## 2024-03-04 RX ORDER — MELATONIN
1000 DAILY
Status: DISCONTINUED | OUTPATIENT
Start: 2024-03-04 | End: 2024-03-05 | Stop reason: HOSPADM

## 2024-03-04 RX ORDER — SODIUM CHLORIDE 9 MG/ML
75 INJECTION, SOLUTION INTRAVENOUS CONTINUOUS
Status: DISCONTINUED | OUTPATIENT
Start: 2024-03-04 | End: 2024-03-05 | Stop reason: HOSPADM

## 2024-03-04 RX ORDER — MAGNESIUM HYDROXIDE 1200 MG/15ML
LIQUID ORAL AS NEEDED
Status: DISCONTINUED | OUTPATIENT
Start: 2024-03-04 | End: 2024-03-04 | Stop reason: HOSPADM

## 2024-03-04 RX ORDER — HEPARIN SODIUM 5000 [USP'U]/ML
5000 INJECTION, SOLUTION INTRAVENOUS; SUBCUTANEOUS EVERY 8 HOURS SCHEDULED
Status: DISCONTINUED | OUTPATIENT
Start: 2024-03-04 | End: 2024-03-05 | Stop reason: HOSPADM

## 2024-03-04 RX ORDER — PROMETHAZINE HYDROCHLORIDE 25 MG/ML
6.25 INJECTION, SOLUTION INTRAMUSCULAR; INTRAVENOUS ONCE AS NEEDED
Status: DISCONTINUED | OUTPATIENT
Start: 2024-03-04 | End: 2024-03-04 | Stop reason: HOSPADM

## 2024-03-04 RX ORDER — CEFAZOLIN SODIUM 1 G/3ML
INJECTION, POWDER, FOR SOLUTION INTRAMUSCULAR; INTRAVENOUS AS NEEDED
Status: DISCONTINUED | OUTPATIENT
Start: 2024-03-04 | End: 2024-03-04

## 2024-03-04 RX ORDER — SODIUM CHLORIDE 9 MG/ML
125 INJECTION, SOLUTION INTRAVENOUS CONTINUOUS
Status: DISCONTINUED | OUTPATIENT
Start: 2024-03-04 | End: 2024-03-04

## 2024-03-04 RX ORDER — LABETALOL HYDROCHLORIDE 5 MG/ML
5 INJECTION, SOLUTION INTRAVENOUS
Status: DISCONTINUED | OUTPATIENT
Start: 2024-03-04 | End: 2024-03-05 | Stop reason: HOSPADM

## 2024-03-04 RX ORDER — PROTAMINE SULFATE 10 MG/ML
INJECTION, SOLUTION INTRAVENOUS AS NEEDED
Status: DISCONTINUED | OUTPATIENT
Start: 2024-03-04 | End: 2024-03-04

## 2024-03-04 RX ORDER — CHLORHEXIDINE GLUCONATE ORAL RINSE 1.2 MG/ML
15 SOLUTION DENTAL EVERY 12 HOURS SCHEDULED
Status: COMPLETED | OUTPATIENT
Start: 2024-03-04 | End: 2024-03-04

## 2024-03-04 RX ORDER — MEPERIDINE HYDROCHLORIDE 25 MG/ML
12.5 INJECTION INTRAMUSCULAR; INTRAVENOUS; SUBCUTANEOUS ONCE AS NEEDED
Status: DISCONTINUED | OUTPATIENT
Start: 2024-03-04 | End: 2024-03-04 | Stop reason: HOSPADM

## 2024-03-04 RX ORDER — HYDRALAZINE HYDROCHLORIDE 20 MG/ML
15 INJECTION INTRAMUSCULAR; INTRAVENOUS
Status: DISCONTINUED | OUTPATIENT
Start: 2024-03-04 | End: 2024-03-05 | Stop reason: HOSPADM

## 2024-03-04 RX ORDER — ROCURONIUM BROMIDE 10 MG/ML
INJECTION, SOLUTION INTRAVENOUS AS NEEDED
Status: DISCONTINUED | OUTPATIENT
Start: 2024-03-04 | End: 2024-03-04

## 2024-03-04 RX ORDER — ATORVASTATIN CALCIUM 80 MG/1
80 TABLET, FILM COATED ORAL
Status: DISCONTINUED | OUTPATIENT
Start: 2024-03-04 | End: 2024-03-05 | Stop reason: HOSPADM

## 2024-03-04 RX ORDER — MIDAZOLAM HYDROCHLORIDE 2 MG/2ML
INJECTION, SOLUTION INTRAMUSCULAR; INTRAVENOUS AS NEEDED
Status: DISCONTINUED | OUTPATIENT
Start: 2024-03-04 | End: 2024-03-04

## 2024-03-04 RX ORDER — ONDANSETRON 2 MG/ML
INJECTION INTRAMUSCULAR; INTRAVENOUS AS NEEDED
Status: DISCONTINUED | OUTPATIENT
Start: 2024-03-04 | End: 2024-03-04

## 2024-03-04 RX ORDER — PROPOFOL 10 MG/ML
INJECTION, EMULSION INTRAVENOUS AS NEEDED
Status: DISCONTINUED | OUTPATIENT
Start: 2024-03-04 | End: 2024-03-04

## 2024-03-04 RX ORDER — HEPARIN SODIUM 1000 [USP'U]/ML
INJECTION, SOLUTION INTRAVENOUS; SUBCUTANEOUS AS NEEDED
Status: DISCONTINUED | OUTPATIENT
Start: 2024-03-04 | End: 2024-03-04

## 2024-03-04 RX ORDER — CEFAZOLIN SODIUM 1 G/50ML
1000 SOLUTION INTRAVENOUS EVERY 8 HOURS
Qty: 100 ML | Refills: 0 | Status: COMPLETED | OUTPATIENT
Start: 2024-03-04 | End: 2024-03-05

## 2024-03-04 RX ORDER — ASPIRIN 81 MG/1
81 TABLET, CHEWABLE ORAL DAILY
Status: DISCONTINUED | OUTPATIENT
Start: 2024-03-05 | End: 2024-03-05 | Stop reason: HOSPADM

## 2024-03-04 RX ORDER — ACETAMINOPHEN 325 MG/1
650 TABLET ORAL EVERY 6 HOURS PRN
Status: DISCONTINUED | OUTPATIENT
Start: 2024-03-04 | End: 2024-03-05 | Stop reason: HOSPADM

## 2024-03-04 RX ORDER — FENTANYL CITRATE 50 UG/ML
INJECTION, SOLUTION INTRAMUSCULAR; INTRAVENOUS AS NEEDED
Status: DISCONTINUED | OUTPATIENT
Start: 2024-03-04 | End: 2024-03-04

## 2024-03-04 RX ORDER — VITAMIN B COMPLEX
1 CAPSULE ORAL DAILY
Status: DISCONTINUED | OUTPATIENT
Start: 2024-03-04 | End: 2024-03-04

## 2024-03-04 RX ORDER — PROPOFOL 10 MG/ML
INJECTION, EMULSION INTRAVENOUS CONTINUOUS PRN
Status: DISCONTINUED | OUTPATIENT
Start: 2024-03-04 | End: 2024-03-04

## 2024-03-04 RX ADMIN — SODIUM CHLORIDE 75 ML/HR: 0.9 INJECTION, SOLUTION INTRAVENOUS at 15:18

## 2024-03-04 RX ADMIN — INSULIN LISPRO 1 UNITS: 100 INJECTION, SOLUTION INTRAVENOUS; SUBCUTANEOUS at 18:48

## 2024-03-04 RX ADMIN — ROCURONIUM BROMIDE 50 MG: 10 INJECTION, SOLUTION INTRAVENOUS at 10:36

## 2024-03-04 RX ADMIN — MIDAZOLAM 2 MG: 1 INJECTION INTRAMUSCULAR; INTRAVENOUS at 10:36

## 2024-03-04 RX ADMIN — ROCURONIUM BROMIDE 10 MG: 10 INJECTION, SOLUTION INTRAVENOUS at 12:14

## 2024-03-04 RX ADMIN — CEFAZOLIN SODIUM 1000 MG: 1 SOLUTION INTRAVENOUS at 18:32

## 2024-03-04 RX ADMIN — ACETAMINOPHEN 325MG 650 MG: 325 TABLET ORAL at 18:40

## 2024-03-04 RX ADMIN — FENTANYL CITRATE 100 MCG: 50 INJECTION INTRAMUSCULAR; INTRAVENOUS at 10:36

## 2024-03-04 RX ADMIN — Medication 100 MCG: at 10:42

## 2024-03-04 RX ADMIN — PHENYLEPHRINE HYDROCHLORIDE 20 MCG/MIN: 10 INJECTION INTRAVENOUS at 10:40

## 2024-03-04 RX ADMIN — HEPARIN SODIUM 5000 UNITS: 5000 INJECTION INTRAVENOUS; SUBCUTANEOUS at 21:39

## 2024-03-04 RX ADMIN — FENTANYL CITRATE 25 MCG: 50 INJECTION INTRAMUSCULAR; INTRAVENOUS at 12:35

## 2024-03-04 RX ADMIN — SODIUM CHLORIDE: 0.9 INJECTION, SOLUTION INTRAVENOUS at 13:04

## 2024-03-04 RX ADMIN — PROMETHAZINE HYDROCHLORIDE 6.25 MG: 25 INJECTION INTRAMUSCULAR; INTRAVENOUS at 14:01

## 2024-03-04 RX ADMIN — PROPOFOL 50 MCG/KG/MIN: 10 INJECTION, EMULSION INTRAVENOUS at 10:40

## 2024-03-04 RX ADMIN — EPHEDRINE SULFATE 5 MG: 50 INJECTION, SOLUTION INTRAVENOUS at 10:42

## 2024-03-04 RX ADMIN — SODIUM CHLORIDE 0.15 MCG/KG/MIN: 9 INJECTION, SOLUTION INTRAVENOUS at 10:44

## 2024-03-04 RX ADMIN — CHLORHEXIDINE GLUCONATE 15 ML: 1.2 RINSE ORAL at 08:26

## 2024-03-04 RX ADMIN — ROCURONIUM BROMIDE 20 MG: 10 INJECTION, SOLUTION INTRAVENOUS at 11:36

## 2024-03-04 RX ADMIN — ONDANSETRON 4 MG: 2 INJECTION INTRAMUSCULAR; INTRAVENOUS at 13:33

## 2024-03-04 RX ADMIN — SODIUM CHLORIDE 125 ML/HR: 0.9 INJECTION, SOLUTION INTRAVENOUS at 08:26

## 2024-03-04 RX ADMIN — PROPOFOL 130 MG: 10 INJECTION, EMULSION INTRAVENOUS at 10:36

## 2024-03-04 RX ADMIN — CEFAZOLIN 2000 MG: 1 INJECTION, POWDER, FOR SOLUTION INTRAMUSCULAR; INTRAVENOUS at 11:05

## 2024-03-04 RX ADMIN — FENTANYL CITRATE 50 MCG: 50 INJECTION INTRAMUSCULAR; INTRAVENOUS at 13:31

## 2024-03-04 RX ADMIN — Medication 100 MCG: at 10:44

## 2024-03-04 RX ADMIN — LABETALOL HYDROCHLORIDE 2.5 MG: 5 INJECTION, SOLUTION INTRAVENOUS at 12:58

## 2024-03-04 RX ADMIN — HEPARIN SODIUM 2000 UNITS: 1000 INJECTION INTRAVENOUS; SUBCUTANEOUS at 11:39

## 2024-03-04 RX ADMIN — HEPARIN SODIUM 7000 UNITS: 1000 INJECTION INTRAVENOUS; SUBCUTANEOUS at 11:29

## 2024-03-04 RX ADMIN — PROTAMINE SULFATE 50 MG: 10 INJECTION, SOLUTION INTRAVENOUS at 12:35

## 2024-03-04 RX ADMIN — ONDANSETRON 4 MG: 2 INJECTION INTRAMUSCULAR; INTRAVENOUS at 12:55

## 2024-03-04 NOTE — ASSESSMENT & PLAN NOTE
Home medication: Crestor 40 mg daily  Continue atorvastatin 80 mg daily   [General Appearance - Alert] : alert [General Appearance - In No Acute Distress] : in no acute distress [Sclera] : the sclera and conjunctiva were normal [Extraocular Movements] : extraocular movements were intact [Neck Appearance] : the appearance of the neck was normal [] : no respiratory distress [Respiration, Rhythm And Depth] : normal respiratory rhythm and effort [Heart Rate And Rhythm] : heart rate was normal and rhythm regular [Heart Sounds] : normal S1 and S2 [Heart Sounds Gallop] : no gallops [Murmurs] : no murmurs [Heart Sounds Pericardial Friction Rub] : no pericardial rub [FreeTextEntry1] : bilateral LE <1+ pitting edema [Bowel Sounds] : normal bowel sounds [Abdomen Soft] : soft [Abdomen Tenderness] : non-tender [Abnormal Walk] : normal gait [Involuntary Movements] : no involuntary movements were seen [No Focal Deficits] : no focal deficits [Oriented To Time, Place, And Person] : oriented to person, place, and time [Impaired Insight] : insight and judgment were intact [Affect] : the affect was normal [Mood] : the mood was normal

## 2024-03-04 NOTE — ASSESSMENT & PLAN NOTE
Presenting for Left asymptomatic carotid stenosis revascularization prior to coronary revascularization (CABG)   Procedure performed on 3/4 without complications  History is significant for HFrEF, UC, type 2 diabetes mellitus, pituitary macroadenoma, CAD, and bilateral carotid artery stenosis maintained on aspirin and high intensity statin  On exam, patient is in no acute pain or considerable discomfort. Vitals stable.      Plan:  Continue aspirin and atorvastatin  Frequent neurovascular checks  Monitor left carotid surgical site for signs of infection  Monitor for signs of bleeding and dysphagia  Can consider addition of low dose oxycodone for pain management  Tylenol every 6 hrs PRN for mild pain  Zofran PRN  As needed anti-hypertensives ordered by primary  Remainder of care per primary

## 2024-03-04 NOTE — QUICK NOTE
Post-Op Check  68 F with DM, CAD, L JARON now s/p L CEA with Eversion.  C/o mild PONV, otherwise well    Vitals:    03/04/24 1500   BP:    Pulse: 88   Resp: 15   Temp:    SpO2: (!) 88%     General: NAD  HEENT: normocephalic, atraumatic, MMM  Neck: incision c/d/i  Cardiovascular: RRR, 2+ carotid and femoral pulses b/l, Radial a-line with proper waveform   Respiratory: No distress, 2L nasal cannula   Abdomen: soft, non-distended, nontender   Extremities: No clubbing, no cyanosis, no edema   Neuro: moving all extremities, 5/5 strength in all major muscle groups, no sensory deficits, no faical droop, no tongue deviation   Skin: warm, dry     -continue diet as toelrated  -SBP goal 110-160mmHg  -continue asa/statin  -incisional wound care protocol   -neuro checks  -wean supplemental O2 as able, goal Sat> 92%  -DVT PPX  -disposition planning

## 2024-03-04 NOTE — ANESTHESIA PROCEDURE NOTES
Arterial Line Insertion    Performed by: Shaniqua Linder CRNA  Authorized by: Waldo Larose DO

## 2024-03-04 NOTE — PLAN OF CARE
Problem: PAIN - ADULT  Goal: Verbalizes/displays adequate comfort level or baseline comfort level  Description: Interventions:  - Encourage patient to monitor pain and request assistance  - Assess pain using appropriate pain scale  - Administer analgesics based on type and severity of pain and evaluate response  - Implement non-pharmacological measures as appropriate and evaluate response  - Consider cultural and social influences on pain and pain management  - Notify physician/advanced practitioner if interventions unsuccessful or patient reports new pain  Outcome: Progressing     Problem: INFECTION - ADULT  Goal: Absence or prevention of progression during hospitalization  Description: INTERVENTIONS:  - Assess and monitor for signs and symptoms of infection  - Monitor lab/diagnostic results  - Monitor all insertion sites, i.e. indwelling lines, tubes, and drains  - Monitor endotracheal if appropriate and nasal secretions for changes in amount and color  - [place] appropriate cooling/warming therapies per order  - Administer medications as ordered  - Instruct and encourage patient and family to use good hand hygiene technique  - Identify and instruct in appropriate isolation precautions for identified infection/condition  Outcome: Progressing  Goal: Absence of fever/infection during neutropenic period  Description: INTERVENTIONS:  - Monitor WBC    Outcome: Progressing     Problem: SAFETY ADULT  Goal: Patient will remain free of falls  Description: INTERVENTIONS:  - Educate patient/family on patient safety including physical limitations  - Instruct patient to call for assistance with activity   - Consult OT/PT to assist with strengthening/mobility   - Keep Call bell within reach  - Keep bed low and locked with side rails adjusted as appropriate  - Keep care items and personal belongings within reach  - Initiate and maintain comfort rounds  - Make Fall Risk Sign visible to staff  - Offer Toileting every 2 Hours,  in advance of need  - Initiate/Maintain bed alarm  - Obtain necessary fall risk management equipment: call bell.  - Apply yellow socks and bracelet for high fall risk patients  - Consider moving patient to room near nurses station  Outcome: Progressing  Goal: Maintain or return to baseline ADL function  Description: INTERVENTIONS:  -  Assess patient's ability to carry out ADLs; assess patient's baseline for ADL function and identify physical deficits which impact ability to perform ADLs (bathing, care of mouth/teeth, toileting, grooming, dressing, etc.)  - Assess/evaluate cause of self-care deficits   - Assess range of motion  - Assess patient's mobility; develop plan if impaired  - Assess patient's need for assistive devices and provide as appropriate  - Encourage maximum independence but intervene and supervise when necessary  - Involve family in performance of ADLs  - Assess for home care needs following discharge   - Consider OT consult to assist with ADL evaluation and planning for discharge  - Provide patient education as appropriate  Outcome: Progressing  Goal: Maintains/Returns to pre admission functional level  Description: INTERVENTIONS:  - Perform AM-PAC 6 Click Basic Mobility/ Daily Activity assessment daily.  - Set and communicate daily mobility goal to care team and patient/family/caregiver.   - Collaborate with rehabilitation services on mobility goals if consulted  - Perform Range of Motion 4 times a day.  - Reposition patient every 2 hours.  - Dangle patient 0 times a day  - Stand patient 0 times a day  - Ambulate patient 0 times a day  - Out of bed to chair 0 times a day   - Out of bed for meals 0 times a day  - Out of bed for toileting  - Record patient progress and toleration of activity level   Outcome: Progressing     Problem: DISCHARGE PLANNING  Goal: Discharge to home or other facility with appropriate resources  Description: INTERVENTIONS:  - Identify barriers to discharge w/patient and  caregiver  - Arrange for needed discharge resources and transportation as appropriate  - Identify discharge learning needs (meds, wound care, etc.)  - Arrange for interpretive services to assist at discharge as needed  - Refer to Case Management Department for coordinating discharge planning if the patient needs post-hospital services based on physician/advanced practitioner order or complex needs related to functional status, cognitive ability, or social support system  Outcome: Progressing     Problem: Knowledge Deficit  Goal: Patient/family/caregiver demonstrates understanding of disease process, treatment plan, medications, and discharge instructions  Description: Complete learning assessment and assess knowledge base.  Interventions:  - Provide teaching at level of understanding  - Provide teaching via preferred learning methods  Outcome: Progressing     Problem: Nutrition/Hydration-ADULT  Goal: Nutrient/Hydration intake appropriate for improving, restoring or maintaining nutritional needs  Description: Monitor and assess patient's nutrition/hydration status for malnutrition. Collaborate with interdisciplinary team and initiate plan and interventions as ordered.  Monitor patient's weight and dietary intake as ordered or per policy. Utilize nutrition screening tool and intervene as necessary. Determine patient's food preferences and provide high-protein, high-caloric foods as appropriate.     INTERVENTIONS:  - Monitor oral intake, urinary output, labs, and treatment plans  - Assess nutrition and hydration status and recommend course of action  - Evaluate amount of meals eaten  - Assist patient with eating if necessary   - Allow adequate time for meals  - Recommend/ encourage appropriate diets, oral nutritional supplements, and vitamin/mineral supplements  - Order, calculate, and assess calorie counts as needed  - Recommend, monitor, and adjust tube feedings and TPN/PPN based on assessed needs  - Assess need for  intravenous fluids  - Provide specific nutrition/hydration education as appropriate  - Include patient/family/caregiver in decisions related to nutrition  Outcome: Progressing     Problem: NEUROSENSORY - ADULT  Goal: Achieves stable or improved neurological status  Description: INTERVENTIONS  - Monitor and report changes in neurological status  - Monitor vital signs such as temperature, blood pressure, glucose, and any other labs ordered   - Initiate measures to prevent increased intracranial pressure  - Monitor for seizure activity and implement precautions if appropriate      Outcome: Progressing  Goal: Achieves maximal functionality and self care  Description: INTERVENTIONS  - Monitor swallowing and airway patency with patient fatigue and changes in neurological status  - Encourage and assist patient to increase activity and self care.   - Encourage visually impaired, hearing impaired and aphasic patients to use assistive/communication devices  Outcome: Progressing     Problem: RESPIRATORY - ADULT  Goal: Achieves optimal ventilation and oxygenation  Description: INTERVENTIONS:  - Assess for changes in respiratory status  - Assess for changes in mentation and behavior  - Position to facilitate oxygenation and minimize respiratory effort  - Oxygen administered by appropriate delivery if ordered  - Initiate smoking cessation education as indicated  - Encourage broncho-pulmonary hygiene including cough, deep breathe, Incentive Spirometry  - Assess the need for suctioning and aspirate as needed  - Assess and instruct to report SOB or any respiratory difficulty  - Respiratory Therapy support as indicated  Outcome: Progressing     Problem: GENITOURINARY - ADULT  Goal: Absence of urinary retention  Description: INTERVENTIONS:  - Assess patient’s ability to void and empty bladder  - Monitor I/O  - Bladder scan as needed  - Discuss with physician/AP medications to alleviate retention as needed  - Discuss catheterization  for long term situations as appropriate  Outcome: Progressing     Problem: METABOLIC, FLUID AND ELECTROLYTES - ADULT  Goal: Electrolytes maintained within normal limits  Description: INTERVENTIONS:  - Monitor labs and assess patient for signs and symptoms of electrolyte imbalances  - Administer electrolyte replacement as ordered  - Monitor response to electrolyte replacements, including repeat lab results as appropriate  - Instruct patient on fluid and nutrition as appropriate  Outcome: Progressing  Goal: Fluid balance maintained  Description: INTERVENTIONS:  - Monitor labs   - Monitor I/O and WT  - Instruct patient on fluid and nutrition as appropriate  - Assess for signs & symptoms of volume excess or deficit  Outcome: Progressing  Goal: Glucose maintained within target range  Description: INTERVENTIONS:  - Monitor Blood Glucose as ordered  - Assess for signs and symptoms of hyperglycemia and hypoglycemia  - Administer ordered medications to maintain glucose within target range  - Assess nutritional intake and initiate nutrition service referral as needed  Outcome: Progressing

## 2024-03-04 NOTE — ANESTHESIA POSTPROCEDURE EVALUATION
Post-Op Assessment Note    CV Status:  Stable    Pain management: adequate       Mental Status:  Alert and awake   Hydration Status:  Euvolemic   PONV Controlled:  Controlled   Airway Patency:  Patent     Post Op Vitals Reviewed: Yes    No anethesia notable event occurred.    Staff: Anesthesiologist               /78 (03/04/24 1430)    Temp      Pulse 86 (03/04/24 1430)   Resp 14 (03/04/24 1430)    SpO2 94 % (03/04/24 1430)

## 2024-03-04 NOTE — NURSING NOTE
Attempted calling pts family for update, no answer received from either listed phone number at this time

## 2024-03-04 NOTE — CONSULTS
[facility]  Consult  Name: Julia Perry 68 y.o. female I MRN: [id number]  Unit/Bed#: ICU 13 I Date of Admission: 3/4/2024   Date of Service: 3/4/2024 I Hospital Day: 0    Inpatient consult to Medical Critical Care  Consult performed by: Wild Sagastume DO  Consult ordered by: Kailash Dumont DO          Assessment/Plan   * Stenosis of left carotid artery  Assessment & Plan  Presenting for Left asymptomatic carotid stenosis revascularization prior to coronary revascularization (CABG)   Procedure performed on 3/4 without complications  History is significant for HFrEF, UC, type 2 diabetes mellitus, pituitary macroadenoma, CAD, and bilateral carotid artery stenosis maintained on aspirin and high intensity statin  On exam, patient is in no acute pain or considerable discomfort. Vitals stable.      Plan:  Continue aspirin and atorvastatin  Frequent neurovascular checks  Monitor left carotid surgical site for signs of infection  Monitor for signs of bleeding and dysphagia  Can consider addition of low dose oxycodone for pain management  Tylenol every 6 hrs PRN for mild pain  Zofran PRN  As needed anti-hypertensives ordered by primary  Remainder of care per primary    Mixed hyperlipidemia  Assessment & Plan  Home medication: Crestor 40 mg daily  Continue atorvastatin 80 mg daily    Coronary artery disease involving native coronary artery  Assessment & Plan  History of coronary atherosclerosis  Recent cardiac cath in 1/2024 with evidence of triple vessel CAD involving > 80% stenosis in the mid LAD, distal circumflex, and mid RCA  Continue to monitor on telemetry  Continue metoprolol succinate 25 mg daily    DMII (diabetes mellitus, type 2) (Formerly [facility])  Assessment & Plan  Lab Results   Component Value Date    HGBA1C 6.4 (H) 12/28/2023       Recent Labs     03/04/24  0821 03/04/24  1337   POCGLU 136 137       Blood Sugar Average: Last 72 hrs:  (P) 136.5    Continue insulin sliding scale  Monitor  "blood glucose levels   Continue to hold home metformin  Carbohydrate level II diet             -------------------------------------------------------------------------------------------------------------  Chief Complaint: Left CEA    History of Present Illness   HX and PE limited by: None  Julia Perry is a 68 y.o. female with PMH of HFrEF with EF of 30%, T2DM, HLD, CAD, UC, pituitary macroadenoma, and bilateral carotid artery stenosis who presents to the [place] intensive care unit following left carotid endarterectomy on 3/4 prior to coronary revascularization. Patient was endorsing symptoms of chest tightness and shortness of breath with exertion while walking up the stairs in the hospital today. These symptoms are chronic but had worsened recently. She denies any complaints of slurred speech, confusion, headache, visual changes, chest pain, shortness of breath, and abdominal pain following the CEA. Does endorse some mild pain at the operative site and nausea. Arrived to the ICU hemodynamically stable.    History obtained from chart review and the patient.  -------------------------------------------------------------------------------------------------------------  Dispo: Transfer to Critical Care     Code Status: Prior  --------------------------------------------------------------------------------------------------------------  Review of Systems    A 12-point, complete review of systems was reviewed and negative except as stated above     Physical Exam  --------------------------------------------------------------------------------------------------------------  Vitals:   Vitals:    03/04/24 1400 03/04/24 1415 03/04/24 1430 03/04/24 1455   BP: 143/72 129/78 129/78 147/78   BP Location:    Right arm   Pulse: 86 88 86 92   Resp: 14 16 14 14   Temp:    (!) 96.5 °F (35.8 °C)   TempSrc:    Axillary   SpO2: 96% 92% 94% (!) 87%   Weight:    73.9 kg (162 lb 14.7 oz)   Height:    5' 4\" (1.626 m)     Temp  Min: " "96.5 °F (35.8 °C)  Max: 97.6 °F (36.4 °C)  IBW (Ideal Body Weight): 54.7 kg  Height: 5' 4\" (162.6 cm)  Body mass index is 27.97 kg/m².    Laboratory and Diagnostics:                            ABG:    VBG:          Micro:        EKG: Normal sinus rhythm  Imaging:  No pertinent imaging results.      Historical Information   Past Medical History:   Diagnosis Date    CHF (congestive heart failure) (HCC)     Colitis     Diabetes mellitus (HCC)     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HCC)     PONV (postoperative nausea and vomiting)      Past Surgical History:   Procedure Laterality Date    CARDIAC CATHETERIZATION Left 1/17/2024    Procedure: Cardiac Left Heart Cath;  Surgeon: Juan A Sousa MD;  [facility];  Service: Cardiology    CARDIAC CATHETERIZATION  1/17/2024    Procedure: Cardiac catheterization;  Surgeon: Juan A Sousa MD;  Location: [facility];  Service: Cardiology    HYSTERECTOMY      ~1990     Social History   Social History     Substance and Sexual Activity   Alcohol Use Never     Social History     Substance and Sexual Activity   Drug Use Never     Social History     Tobacco Use   Smoking Status Former    Types: Cigarettes   Smokeless Tobacco Never   Tobacco Comments    Quit 1970's     Exercise History: N/A  Family History:   History reviewed. No pertinent family history.  I have reviewed this patient's family history and commented on sigificant items within the HPI      Medications:  Current Facility-Administered Medications   Medication Dose Route Frequency    acetaminophen (TYLENOL) tablet 650 mg  650 mg Oral Q6H PRN    [START ON 3/5/2024] aspirin chewable tablet 81 mg  81 mg Oral Daily    atorvastatin (LIPITOR) tablet 80 mg  80 mg Oral Daily With Dinner    ceFAZolin (ANCEF) IVPB (premix in dextrose) 1,000 mg 50 mL  1,000 mg Intravenous Q8H    cholecalciferol (VITAMIN D3) tablet 1,000 Units  1,000 Units Oral Daily    heparin (porcine) subcutaneous injection 5,000 Units  5,000 Units " Subcutaneous Q8H LINDA    labetalol (NORMODYNE) injection 5 mg  5 mg Intravenous Q15 Min PRN    And    hydrALAZINE (APRESOLINE) injection 15 mg  15 mg Intravenous Q15 Min PRN    And    niCARdipine (CARDENE) 25 mg (STANDARD CONCENTRATION) in sodium chloride 0.9% 250 mL  1-15 mg/hr Intravenous Continuous PRN    insulin lispro (HumALOG/ADMELOG) 100 units/mL subcutaneous injection 1-5 Units  1-5 Units Subcutaneous Q6H LINDA    [START ON 3/5/2024] metoprolol succinate (TOPROL-XL) 24 hr tablet 25 mg  25 mg Oral Daily    multivitamin stress formula tablet 1 tablet  1 tablet Oral Daily    ondansetron (ZOFRAN) injection 4 mg  4 mg Intravenous Q6H PRN    sodium chloride 0.9 % bolus 500 mL  500 mL Intravenous Once PRN    Followed by    [START ON 3/6/2024] phenylephrine (TRI-SYNEPHRINE) 50 mg (STANDARD CONCENTRATION) in sodium chloride 0.9% 250 mL   mcg/min Intravenous Continuous PRN    promethazine (PHENERGAN) injection 6.25 mg  6.25 mg Intravenous Once PRN    sodium chloride 0.9 % infusion  75 mL/hr Intravenous Continuous     Home medications:  Prior to Admission Medications   Prescriptions Last Dose Informant Patient Reported? Taking?   Alcohol Swabs 70 % PADS  Self No No   Sig: May substitute brand based on insurance coverage. Check glucose TID.   OneTouch Delica Lancets 33G MISC  Self No No   Sig: May substitute brand based on insurance coverage. Check glucose TID.   aspirin 81 mg chewable tablet 3/4/2024 at 0630 Self No Yes   Sig: Chew 1 tablet (81 mg total) daily   b complex vitamins capsule 2/26/2024 Self Yes Yes   Sig: Take 1 capsule by mouth daily   cholecalciferol (VITAMIN D3) 1,000 units tablet 2/26/2024 Self Yes Yes   Sig: Take 1,000 Units by mouth daily   furosemide (LASIX) 40 mg tablet 3/3/2024 at 2100 Self No Yes   Sig: Take 1 tablet (40 mg total) by mouth as needed (Take 1 tablet daily as needed)   Patient taking differently: Take 40 mg by mouth as needed 1/2 tab in am, 1/2 in pm   glucose blood test strip   Self Yes No   Sig: Use 1 each daily as needed Use as instructed   magnesium gluconate (MAGONATE) 500 mg tablet 2024  No Yes   Sig: Take 1 tablet (500 mg total) by mouth in the morning   metFORMIN (GLUCOPHAGE) 500 mg tablet 3/3/2024 at 2100 Self No Yes   Si pills bid Do not start before 2024.   Patient taking differently: 500 mg daily after dinner Pt takes according to BGM   metoprolol succinate (TOPROL-XL) 25 mg 24 hr tablet 3/4/2024 at 0630 Self No Yes   Sig: Take 1 tablet (25 mg total) by mouth daily   multivitamin (THERAGRAN) TABS  Self Yes No   Sig: Take by mouth daily   Patient not taking: Reported on 2/15/2024   mupirocin (BACTROBAN) 2 % ointment  Self No No   Sig: Apply topically 2 (two) times a day   Patient not taking: Reported on 2/15/2024   rosuvastatin (CRESTOR) 40 MG tablet 3/3/2024 at 2100 Self No Yes   Sig: Take 1 tablet (40 mg total) by mouth daily   Patient taking differently: Take 10 mg by mouth daily Patient reports taking 10 mg      Facility-Administered Medications: None     Allergies:  Allergies   Allergen Reactions    Valdecoxib Other (See Comments)     Patient is unaware of this allergy       ------------------------------------------------------------------------------------------------------------  Advance Directive and Living Will:      Power of :    POLST:    ------------------------------------------------------------------------------------------------------------  Anticipated Length of Stay is > 2 midnights    Care Time Delivered:   Upon my evaluation, this patient had a high probability of imminent or life-threatening deterioration due to left carotid aterosclerosis, which required my direct attention, intervention, and personal management.  I have personally provided 30 minutes (1300 to 1330) of critical care time, exclusive of procedures, teaching, family meetings, and any prior time recorded by providers other than myself.       Wild Sagastume,  "DO        Portions of the record may have been created with voice recognition software.  Occasional wrong word or \"sound a like\" substitutions may have occurred due to the inherent limitations of voice recognition software.  Read the chart carefully and recognize, using context, where substitutions have occurred   "

## 2024-03-04 NOTE — ASSESSMENT & PLAN NOTE
Lab Results   Component Value Date    HGBA1C 6.4 (H) 12/28/2023       Recent Labs     03/04/24  0821 03/04/24  1337   POCGLU 136 137       Blood Sugar Average: Last 72 hrs:  (P) 136.5    Continue insulin sliding scale  Monitor blood glucose levels   Continue to hold home metformin  Carbohydrate level II diet

## 2024-03-04 NOTE — OP NOTE
OPERATIVE REPORT  PATIENT NAME: Julia Perry    :  1955  MRN: [id number]  Pt Location: [facility] 09    SURGERY DATE: 3/4/2024    Surgeons and Role:     * Janeth De Oliveira, DO - Primary     * Nigel Castillo MD - Assisting     * Kailash Dumont DO - Fellow    Preop Diagnosis:  Stenosis of left carotid artery [I65.22]    Post-Op Diagnosis Codes:     * Stenosis of left carotid artery [I65.22]    Procedure(s):  Left - LEFT ENDARTERECTOMY ARTERY CAROTID    Specimen(s):  * No specimens in log *    Estimated Blood Loss:   Minimal    Drains:  * No LDAs found *    Anesthesia Type:   General    Operative Indications:  Stenosis of left carotid artery [I65.22]    69 y/o fem with PMH CHF, UC, DM, Pituitary Macroadenoma, CAD, B/L Carotid artery stenosis presenting for Left Asymptomatic carotid stenosis revascularization prior to coronary revascularization (CABG). Discussed risk and benefits intervention.     Operative Findings:    Left Carotid Endarterectomy with Eversion     Patient tolerated procedure well and awoke with intact neurologic exam, moving all extremities.     Complications:   None    Procedure and Technique:    Patient seen and examined in the preoperative holding area the preoperative nursing staff and anesthesiology team.  Patient was brought to the operating table and placed supine on the operating table.  General anesthesia was induced with endotracheal intubation by the anesthesiology team.  A radial A-line was placed.  A carotid ultrasound was used to lon the level of the bifurcation.  The left neck was prepped and draped in normal standard fashion.  A timeout was performed.    A 15 blade scalpel was used to make an oblique incision in a pre-existing neck crease.  Electrocautery was used to through the subcutaneous tissues.  Care was taken to clip or tie any significant crossing venous or lymphatic channels.  Latissimus was incised with electrocautery.  The anterior border of the  sternocleidomastoid was mobilized.  The omohyoid was identified.  The anterior border the internal jugular vein was mobilized with a combination of electrocautery Bovie and sharp dissection.  The facial vein was identified and ligated with clips and ties.  Any accessory internal jugular vein branches crossing the carotid were clipped and tied as appropriate.  A self-retaining retractor was then placed through expose the common carotid artery.  The carotid artery was identified and controlled at the common carotid artery with a Silastic loop.    Patient was fully heparinized and ACT's were trended throughout the case to maintain therapeutic levels.    The superior thyroid artery and the external carotid artery were sharply dissected circumferentially and controlled with Silastic loops.  The internal carotid artery was fully circumferentially dissected for a generous length and controlled Silastic loops.  The ansa, vagus, hypogastric nerves were identified and preserved.  There appeared to be adequate anatomy to perform eversion carotid endarterectomy.  ACT's were confirmed to be therapeutic and a MAP goal greater than 100 was maintained.    The internal carotid artery was occluded with a profunda clamp.  The common carotid artery was occluded with Silastic loops.  The external carotid artery and superior thyroid artery were occluded with Silastic loops.  11 blade scalpel was used to make a arteriotomy near the carotid bifurcation.  Ignacio scissors were used to transect the internal carotid artery away from the carotid bulb.  A endarterectomy spatula was then used to develop a plane between the atherosclerotic plaque and the adventitia of the internal carotid artery.  An eversion carotid endarterectomy was then performed of the internal carotid artery.  The endpoint was appropriate and the plaque feathered away from the intima.  The endpoint was investigated with heparin saline flushes without any significant intimal  flaps or debris remaining.  An Nelson shunt was then placed into the internal carotid artery and a shunt clamp placed on the internal carotid artery.  The shunt was then placed in the common carotid artery with appropriate backbleeding.  A endarterectomy was then performed with the carotid bulb, ECA, common carotid artery.  There is appropriate endpoints any significant remaining debris or flaps were flushed from the carotid bulb.    A end-to-end anastomosis was then performed of the internal carotid artery onto the carotid bulb in a running fashion with a 6-0 Prolene.  Prior to completion the internal carotid artery was allowed to generously backbleed.  The shunt was removed and the common carotid artery was carotid artery was flushed.  The anastomosis then completed and was hemostatic.    A completion carotid duplex showed a patent common carotid, ECA, ICA.  The ICA endpoint of the endarterectomy appeared appropriate without any significant flaps or technical defects.  The anastomosis appeared patent without any significant flaps or technical defects.  The ICA waveforms were of low resistance with approximately peak systolic velocity in the 60s.  The common carotid artery had mixed waveforms with low velocities.    The wound was packed with fibrillar and 50 mg of protamine was administered.  The wound was copiously irrigated and diligent inspection of hemostasis occurred.    The wound was reapproximated with 3-0 Monocryl in the platysma.  The subcutaneous tissues were then once again irrigated.  The skin was closed with 4-0 Monocryl and skin glue.    The patient was extubated and awoken from general anesthesia.  She was neurologically intact and moving all extremities.    Dr. De Oliveira was present and scrubbed for all critical steps of the procedure.  All sharps and counts were correct x 2.    Patient Disposition:  PACU  and Critical Care Unit        SIGNATURE: Kailash Dumont DO  DATE: March 4, 2024  TIME:  12:47 PM    Vascular Quality Initiative - Carotid Endarterectomy     Urgency:  Elective    Anesthesia: General Type: Eversion    Side: left    Patch Type: None    If Prosthetic, Patch : N/A    Shunt: Yes- Routine    Skin Prep: Chlorhexidine    Drain: no  Heparin: yes    Protamine: yes      Dextran: no     Re-explore artery after closure: no    Total Procedure time: see anesthesia record     Monitoring:   EEG: no   Stump Pressure: no   Other: no    Completion Study:   Doppler: no   Duplex: yes   Arteriogram: no    Concomitant Procedure:   Proximal Endovascular: no   Distal Endovascular: no   CABG: no   Other Arterial Op: no

## 2024-03-04 NOTE — ASSESSMENT & PLAN NOTE
History of coronary atherosclerosis  Recent cardiac cath in 1/2024 with evidence of triple vessel CAD involving > 80% stenosis in the mid LAD, distal circumflex, and mid RCA  Continue to monitor on telemetry  Continue metoprolol succinate 25 mg daily

## 2024-03-04 NOTE — H&P
H&P Exam - Vascular Surgery   Julia Perry 68 y.o. female MRN: [id number]  Unit/Bed#: OR [place] Encounter: [id number]    Assessment/Plan     Assessment:    67 y/o fem with PMH CHF, UC, DM, Pituitary Macroadenoma, CAD, B/L Carotid artery stenosis presenting for Left Asymptomatic carotid stenosis revascularization prior to coronary revascularization (CABG).      Plan:  - OR 3/4 for Left CEA  - Discussed risk of ernie-operative stroke, MI, hematoma, bleeding  - Discussed role of asymptomatic carotid artery interventions in setting of known coronary artery disease    History of Present Illness     HPI:  Julia Perry is a 68 y.o. female who presents with no new PMH.     States she has chest tightness, sob when walking upstairs. This occurred today when walking on the stairs to hospital. States the symptoms are stable and have been present to similar to ongoing symptoms. Denies chest pain at rest    States she has bilateral vision changes described as looking through a dirty window with bright sun. Denies symptoms consistent with amaurosis fugax.     Review of Systems   All other systems reviewed and are negative.      Historical Information   Past Medical History:   Diagnosis Date    CHF (congestive heart failure) (HCC)     Colitis     Diabetes mellitus (HCC)     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HCC)     PONV (postoperative nausea and vomiting)      Past Surgical History:   Procedure Laterality Date    CARDIAC CATHETERIZATION Left 1/17/2024    Procedure: Cardiac Left Heart Cath;  Surgeon: Juan A Sousa MD;  [facility];  Service: Cardiology    CARDIAC CATHETERIZATION  1/17/2024    Procedure: Cardiac catheterization;  Surgeon: Juan A Sousa MD;  [facility]: [facility];  Service: Cardiology    HYSTERECTOMY      ~1990     Social History   Social History     Substance and Sexual Activity   Alcohol Use Never     Social History     Substance and Sexual Activity   Drug Use Never     Social History     Tobacco Use    Smoking Status Former    Types: Cigarettes   Smokeless Tobacco Never   Tobacco Comments    Quit      E-Cigarette/Vaping    E-Cigarette Use Never User      E-Cigarette/Vaping Substances    Nicotine No      Family History: non-contributory    Meds/Allergies   all current active meds have been reviewed and PTA meds:   Prior to Admission Medications   Prescriptions Last Dose Informant Patient Reported? Taking?   Alcohol Swabs 70 % PADS  Self No No   Sig: May substitute brand based on insurance coverage. Check glucose TID.   OneTouch Delica Lancets 33G MISC  Self No No   Sig: May substitute brand based on insurance coverage. Check glucose TID.   aspirin 81 mg chewable tablet 3/4/2024 at 0630 Self No Yes   Sig: Chew 1 tablet (81 mg total) daily   b complex vitamins capsule 2024 Self Yes Yes   Sig: Take 1 capsule by mouth daily   cholecalciferol (VITAMIN D3) 1,000 units tablet 2024 Self Yes Yes   Sig: Take 1,000 Units by mouth daily   furosemide (LASIX) 40 mg tablet 3/3/2024 at 2100 Self No Yes   Sig: Take 1 tablet (40 mg total) by mouth as needed (Take 1 tablet daily as needed)   Patient taking differently: Take 40 mg by mouth as needed 1/2 tab in am, 1/2 in pm   glucose blood test strip  Self Yes No   Sig: Use 1 each daily as needed Use as instructed   magnesium gluconate (MAGONATE) 500 mg tablet 2024  No Yes   Sig: Take 1 tablet (500 mg total) by mouth in the morning   metFORMIN (GLUCOPHAGE) 500 mg tablet 3/3/2024 at 2100 Self No Yes   Si pills bid Do not start before 2024.   Patient taking differently: 500 mg daily after dinner Pt takes according to BGM   metoprolol succinate (TOPROL-XL) 25 mg 24 hr tablet 3/4/2024 at 0630 Self No Yes   Sig: Take 1 tablet (25 mg total) by mouth daily   multivitamin (THERAGRAN) TABS  Self Yes No   Sig: Take by mouth daily   Patient not taking: Reported on 2/15/2024   mupirocin (BACTROBAN) 2 % ointment  Self No No   Sig: Apply topically 2 (two)  "times a day   Patient not taking: Reported on 2/15/2024   rosuvastatin (CRESTOR) 40 MG tablet 3/3/2024 at 2100 Self No Yes   Sig: Take 1 tablet (40 mg total) by mouth daily   Patient taking differently: Take 10 mg by mouth daily Patient reports taking 10 mg      Facility-Administered Medications: None     Allergies   Allergen Reactions    Valdecoxib Other (See Comments)     Patient is unaware of this allergy       Objective   Vitals: Blood pressure 110/56, pulse 68, temperature 97.6 °F (36.4 °C), temperature source Temporal, resp. rate 16, height 5' 4\" (1.626 m), weight 69.9 kg (154 lb 1.6 oz), SpO2 98%.,Body mass index is 26.45 kg/m².  No intake or output data in the 24 hours ending 03/04/24 0843  Invasive Devices       Peripheral Intravenous Line  Duration             Peripheral IV 03/04/24 Right Wrist <1 day                    Physical Exam  Vitals and nursing note reviewed.   Constitutional:       General: She is not in acute distress.     Appearance: She is well-developed.   HENT:      Head: Normocephalic and atraumatic.   Eyes:      Conjunctiva/sclera: Conjunctivae normal.   Cardiovascular:      Rate and Rhythm: Normal rate and regular rhythm.      Heart sounds: No murmur heard.  Pulmonary:      Effort: Pulmonary effort is normal. No respiratory distress.      Breath sounds: Normal breath sounds.   Abdominal:      Palpations: Abdomen is soft.      Tenderness: There is no abdominal tenderness.   Musculoskeletal:         General: No swelling.      Cervical back: Normal range of motion and neck supple. No rigidity.   Skin:     General: Skin is warm and dry.      Capillary Refill: Capillary refill takes less than 2 seconds.   Neurological:      General: No focal deficit present.      Mental Status: She is alert and oriented to person, place, and time. Mental status is at baseline.   Psychiatric:         Mood and Affect: Mood normal.         Lab Results: I have personally reviewed pertinent reports.    Imaging: I " have personally reviewed pertinent reports.    EKG, Pathology, and Other Studies: I have personally reviewed pertinent reports.      Code Status: Prior  Advance Directive and Living Will:      Power of :    POLST:

## 2024-03-04 NOTE — ANESTHESIA PROCEDURE NOTES
"Arterial Line Insertion    Performed by: Shaniqua Linder CRNA  Authorized by: Waldo Larose DO  Consent: Verbal consent obtained.  Risks and benefits: risks, benefits and alternatives were discussed  Consent given by: patient  Required items: required blood products, implants, devices, and special equipment available  Time out: Immediately prior to procedure a \"time out\" was called to verify the correct patient, procedure, equipment, support staff and site/side marked as required.  Preparation: Patient was prepped and draped in the usual sterile fashion.  Indications: multiple ABGs and hemodynamic monitoring  Orientation:  Left  Location: radial artery  Sedation:  Patient sedated: yes (general anesthesia)    Procedure Details:  Eddie's test normal: yes  Needle gauge: 22  Seldinger technique: Seldinger technique used  Number of attempts: 2    Post-procedure:  Post-procedure: dressing applied  Waveform: good waveform  Post-procedure CNS: normal  Comments: Placed by Lincoln Mcmillan CRNA alongside Dr Laroes          "

## 2024-03-04 NOTE — DISCHARGE INSTR - AVS FIRST PAGE
DISCHARGE INSTRUCTIONS  CAROTID ENDARTERECTOMY    ACTIVITY:  Limit your physical activity to walking for the first week and then increase your activity as tolerated.  Walking up steps and normal activities may be resumed as you feel ready.  Avoid strenuous activity such as vigorous exercise.  Avoid heavy lifting (do not lift more than 15 pounds) for the first four weeks after surgery.  You should not drive a car for at least one week following discharge from the hospital and you are off all narcotic pain medication.  You may ride in a car.  If you have had a stroke or mini-stroke, please consult with your neurologist prior to driving.    DO NOT START OR RESUME ANY PREVIOUSLY PLANNED THERAPY (PHYSICAL, CARDIAC, REHAB, ETC…) UNTIL YOU DISCUSS WITH YOUR SURGEON AND THEY FEEL IT IS SAFE TO ENGAGE IN THERAPY.    DIET:  Resume your normal diet.  Good nutrition is important for healing of your incision.  You can expect to have a sore throat and trouble swallowing after surgery which should improve quickly.  If you feel like you are choking, please call your doctor.    RECURRENT SYMPTOMS: If you develop any new numbness, weakness, vision changes, drooping of the face, or difficulty with speech after discharge, CALL 911 or go to the nearest Emergency department immediately.    INCISION SITE:  You have surgical glue at your incision site.  There are stitches present under the skin which will absorb on their own.  The glue is used to cover the incision, assist in closure, and prevent contamination. This adhesive will darken and peel away on its own within one to two weeks. Do not pick at it.  If you have a bandage, please remove this on the second day after surgery.    You should shower daily.  Wash incision daily with soap and water, but do not rub or scrub the incision; rinse thoroughly and pat dry.  Do not bathe in a tub or swim for the first 4 weeks following surgery or if you have any open wounds.  It is normal to have  some bruising, swelling or discoloration around the incision.  IF increasing redness, pain, bleeding or a bulge develops, call our office immediately.    If you notice any active bleeding at the site, apply pressure to the site and call our office ([phone]) or [phone].    FOLLOW UP STUDIES: Doppler ultrasound studies are very important to your post-operative care. Your surgeon will arrange them at your first postoperative visit. The first study will be 3 months after surgery.    FOLLOW UP APPOINTMENTS:  Making and keeping follow up appointments and ultrasound tests are important to your recovery.  If you have difficulty making it to or keeping your follow up appointments, call the office.    If you have increased pain, fever >101.5, increased drainage, redness or a bad smell at your surgery site, new coldness/numbness of your arm or leg, please call us immediately and GO directly to the ER.    PLEASE CALL THE OFFICE IF YOU HAVE ANY QUESTIONS  [phone]  [phone] [address], [address], [place] [address]  [address] [facility], [address], [place] [address]  [address] [place] [address]  [address], [address], [place] [address]  [address] [facility], [place] [address]  [address] [facility], 2nd Floor, [address], [place] [address]  [address] [facility], [address], [place] [address]  [address] [facility], 1st Floor, [address], [place] [address]  [address], [facility] [address], [place] [address]  [address] [address]

## 2024-03-04 NOTE — ANESTHESIA PREPROCEDURE EVALUATION
Procedure:  ENDARTERECTOMY ARTERY CAROTID (Left: Neck)    Relevant Problems   CARDIO   (+) Coronary artery disease involving native coronary artery   (+) Mixed hyperlipidemia      ENDO   (+) DMII (diabetes mellitus, type 2) (Formerly [facility])        Physical Exam    Airway    Mallampati score: II  TM Distance: >3 FB  Neck ROM: full     Dental   Comment: Partial lower lower dentures    Cardiovascular  Rhythm: regular, Rate: normal, Cardiovascular exam normal    Pulmonary  Pulmonary exam normal Breath sounds clear to auscultation    Other Findings  post-pubertal.      Anesthesia Plan  ASA Score- 4     Anesthesia Type- general with ASA Monitors.         Additional Monitors: arterial line.    Airway Plan: ETT.    Comment: 30% EF  Cath:  Prox LAD lesion is 50% stenosed.  ·  Mid LAD lesion is 85% stenosed.  ·  Prox Cx lesion is 40% stenosed.  ·  Mid Cx lesion is 70% stenosed.  ·  Dist Cx lesion is 90% stenosed.  ·  Prox RCA lesion is 50% stenosed.  ·  Mid RCA lesion is 95% stenosed.  For CABG after carotid stenosis is resolved.  .       Plan Factors-    Chart reviewed. EKG reviewed. Imaging results reviewed. Existing labs reviewed. Patient summary reviewed.    Patient is not a current smoker. Patient not instructed to abstain from smoking on day of procedure. Patient did not smoke on day of surgery.    There is medical exclusion for perioperative obstructive sleep apnea risk education.        Induction- intravenous.    Postoperative Plan- . Planned trial extubation    Informed Consent- Anesthetic plan and risks discussed with patient and spouse.                  
yes

## 2024-03-05 ENCOUNTER — DOCUMENTATION (OUTPATIENT)
Dept: VASCULAR SURGERY | Facility: CLINIC | Age: 69
End: 2024-03-05

## 2024-03-05 VITALS
RESPIRATION RATE: 20 BRPM | DIASTOLIC BLOOD PRESSURE: 71 MMHG | WEIGHT: 162.92 LBS | SYSTOLIC BLOOD PRESSURE: 109 MMHG | OXYGEN SATURATION: 93 % | HEART RATE: 90 BPM | TEMPERATURE: 98.5 F | BODY MASS INDEX: 27.81 KG/M2 | HEIGHT: 64 IN

## 2024-03-05 LAB
ANION GAP SERPL CALCULATED.3IONS-SCNC: 6 MMOL/L
APTT PPP: 18 SECONDS (ref 23–37)
ATRIAL RATE: 111 BPM
BUN SERPL-MCNC: 21 MG/DL (ref 5–25)
CALCIUM SERPL-MCNC: 8.9 MG/DL (ref 8.4–10.2)
CHLORIDE SERPL-SCNC: 108 MMOL/L (ref 96–108)
CO2 SERPL-SCNC: 24 MMOL/L (ref 21–32)
CREAT SERPL-MCNC: 0.71 MG/DL (ref 0.6–1.3)
ERYTHROCYTE [DISTWIDTH] IN BLOOD BY AUTOMATED COUNT: 12.9 % (ref 11.6–15.1)
GFR SERPL CREATININE-BSD FRML MDRD: 88 ML/MIN/1.73SQ M
GLUCOSE SERPL-MCNC: 131 MG/DL (ref 65–140)
GLUCOSE SERPL-MCNC: 146 MG/DL (ref 65–140)
GLUCOSE SERPL-MCNC: 154 MG/DL (ref 65–140)
GLUCOSE SERPL-MCNC: 159 MG/DL (ref 65–140)
HCT VFR BLD AUTO: 34.4 % (ref 34.8–46.1)
HGB BLD-MCNC: 11.2 G/DL (ref 11.5–15.4)
INR PPP: 1.07 (ref 0.84–1.19)
MCH RBC QN AUTO: 28.2 PG (ref 26.8–34.3)
MCHC RBC AUTO-ENTMCNC: 32.6 G/DL (ref 31.4–37.4)
MCV RBC AUTO: 87 FL (ref 82–98)
P AXIS: 7 DEGREES
PLATELET # BLD AUTO: 221 THOUSANDS/UL (ref 149–390)
PMV BLD AUTO: 11 FL (ref 8.9–12.7)
POTASSIUM SERPL-SCNC: 4.2 MMOL/L (ref 3.5–5.3)
PR INTERVAL: 150 MS
PROTHROMBIN TIME: 14.1 SECONDS (ref 11.6–14.5)
QRS AXIS: -77 DEGREES
QRSD INTERVAL: 133 MS
QT INTERVAL: 325 MS
QTC INTERVAL: 442 MS
RBC # BLD AUTO: 3.97 MILLION/UL (ref 3.81–5.12)
SODIUM SERPL-SCNC: 138 MMOL/L (ref 135–147)
T WAVE AXIS: 116 DEGREES
VENTRICULAR RATE: 111 BPM
WBC # BLD AUTO: 12.74 THOUSAND/UL (ref 4.31–10.16)

## 2024-03-05 PROCEDURE — 85610 PROTHROMBIN TIME: CPT | Performed by: STUDENT IN AN ORGANIZED HEALTH CARE EDUCATION/TRAINING PROGRAM

## 2024-03-05 PROCEDURE — 93005 ELECTROCARDIOGRAM TRACING: CPT

## 2024-03-05 PROCEDURE — 80048 BASIC METABOLIC PNL TOTAL CA: CPT | Performed by: STUDENT IN AN ORGANIZED HEALTH CARE EDUCATION/TRAINING PROGRAM

## 2024-03-05 PROCEDURE — 99024 POSTOP FOLLOW-UP VISIT: CPT | Performed by: SURGERY

## 2024-03-05 PROCEDURE — 85730 THROMBOPLASTIN TIME PARTIAL: CPT | Performed by: STUDENT IN AN ORGANIZED HEALTH CARE EDUCATION/TRAINING PROGRAM

## 2024-03-05 PROCEDURE — 93010 ELECTROCARDIOGRAM REPORT: CPT | Performed by: INTERNAL MEDICINE

## 2024-03-05 PROCEDURE — 99232 SBSQ HOSP IP/OBS MODERATE 35: CPT | Performed by: INTERNAL MEDICINE

## 2024-03-05 PROCEDURE — 82948 REAGENT STRIP/BLOOD GLUCOSE: CPT

## 2024-03-05 PROCEDURE — 85027 COMPLETE CBC AUTOMATED: CPT | Performed by: STUDENT IN AN ORGANIZED HEALTH CARE EDUCATION/TRAINING PROGRAM

## 2024-03-05 RX ORDER — ONDANSETRON 2 MG/ML
4 INJECTION INTRAMUSCULAR; INTRAVENOUS ONCE
Status: COMPLETED | OUTPATIENT
Start: 2024-03-05 | End: 2024-03-05

## 2024-03-05 RX ORDER — ACETAMINOPHEN 325 MG/1
650 TABLET ORAL EVERY 6 HOURS PRN
COMMUNITY
Start: 2024-03-05

## 2024-03-05 RX ADMIN — CEFAZOLIN SODIUM 1000 MG: 1 SOLUTION INTRAVENOUS at 03:11

## 2024-03-05 RX ADMIN — HEPARIN SODIUM 5000 UNITS: 5000 INJECTION INTRAVENOUS; SUBCUTANEOUS at 05:49

## 2024-03-05 RX ADMIN — ATORVASTATIN CALCIUM 80 MG: 80 TABLET, FILM COATED ORAL at 17:46

## 2024-03-05 RX ADMIN — ACETAMINOPHEN 325MG 650 MG: 325 TABLET ORAL at 00:49

## 2024-03-05 RX ADMIN — Medication 1000 UNITS: at 08:42

## 2024-03-05 RX ADMIN — SODIUM CHLORIDE 500 ML: 0.9 INJECTION, SOLUTION INTRAVENOUS at 13:10

## 2024-03-05 RX ADMIN — ASPIRIN 81 MG CHEWABLE TABLET 81 MG: 81 TABLET CHEWABLE at 08:42

## 2024-03-05 RX ADMIN — ONDANSETRON 4 MG: 2 INJECTION INTRAMUSCULAR; INTRAVENOUS at 00:49

## 2024-03-05 RX ADMIN — ONDANSETRON 4 MG: 2 INJECTION INTRAMUSCULAR; INTRAVENOUS at 01:22

## 2024-03-05 RX ADMIN — PROMETHAZINE HYDROCHLORIDE 6.25 MG: 25 INJECTION INTRAMUSCULAR; INTRAVENOUS at 00:08

## 2024-03-05 RX ADMIN — METOPROLOL SUCCINATE 25 MG: 25 TABLET, EXTENDED RELEASE ORAL at 08:42

## 2024-03-05 RX ADMIN — INSULIN LISPRO 1 UNITS: 100 INJECTION, SOLUTION INTRAVENOUS; SUBCUTANEOUS at 12:22

## 2024-03-05 NOTE — PROGRESS NOTES
Progress Note - Vascular Surgery   Julia Perry 68 y.o. female [id number]  Unit/Bed#:ICU 13 Encounter: [id number]      Assessment:    68 y.o. with PMH PMH CHF, UC, DM, Pituitary Macroadenoma, CAD, B/L Carotid artery stenosis presenting for Left Asymptomatic carotid stenosis revascularization prior to coronary revascularization (CABG).     3/4: Left CEA c Eversion     Plan:  - Doing well, grossly neuro intact  - Post-op nausea   - Resolved with phenergan   - Able to jaida diet   - known history from prior hysterectomy  - Cont SBP Goals 100-160mmHg   - Well controlled overnight   - no requirement for vasopressor or antihypertensive PRNs   - DC Joes  - Repeat EKG Overnight with sinus tach  - OOB as tolerated  - Diet: as tolerated   - Abx: SCIPs  - VTEppx: SQH  - Cont Aspirin, Statin  - Anticipate DC to Home today    Subjective:    Pt s/e. Pt awake, alert.    Overnight with nausea, stomach pain. Known history of post-anesthesia nausea    Pt complains of mild pain at incision site. Tolerating diet. Not OOB. Voiding.      No new complaints. Denies n/v, sob, chest pain, f/c. No sensory change, numbness, or weakness of lower extremities    Admits to mild headache, non-localizing. No hypertensive episodes overnight.     I/Os:  I/O last 3 completed shifts:  In: 3252.5 [I.V.:3152.5; IV Piggyback:100]  Out: 510 [Urine:500; Blood:10]  No intake/output data recorded.    Review of Systems   All other systems reviewed and are negative.      Vitals:    03/05/24 0630   BP: 103/60   Pulse: 88   Resp: 12   Temp:    SpO2: 95%        Physical Exam  Vitals and nursing note reviewed.   Constitutional:       General: She is not in acute distress.     Appearance: She is well-developed. She is not ill-appearing, toxic-appearing or diaphoretic.   HENT:      Head: Normocephalic and atraumatic.   Eyes:      Conjunctiva/sclera: Conjunctivae normal.   Neck:      Comments: Left neck soft, incision c/d/I, no ecchymosis, no hematoma   Cardiovascular:       Rate and Rhythm: Normal rate and regular rhythm.      Heart sounds: No murmur heard.  Pulmonary:      Effort: Pulmonary effort is normal. No respiratory distress.      Breath sounds: Normal breath sounds.   Abdominal:      Palpations: Abdomen is soft.      Tenderness: There is no abdominal tenderness. There is no guarding.   Musculoskeletal:         General: No swelling.      Cervical back: Neck supple.      Right lower leg: No edema.      Left lower leg: No edema.   Skin:     General: Skin is warm and dry.      Capillary Refill: Capillary refill takes less than 2 seconds.   Neurological:      General: No focal deficit present.      Mental Status: She is alert and oriented to person, place, and time. Mental status is at baseline.      Comments: B/L UE and LE Motor and sensory intact   Psychiatric:         Mood and Affect: Mood normal.         Imaging:I have personally reviewed pertinent reports.      Lab Results and Cultures:   Lab Results   Component Value Date    WBC 12.74 (H) 03/05/2024    HGB 11.2 (L) 03/05/2024    HCT 34.4 (L) 03/05/2024    MCV 87 03/05/2024     03/05/2024     Lab Results   Component Value Date    CALCIUM 8.9 03/05/2024    K 4.2 03/05/2024    CO2 24 03/05/2024     03/05/2024    BUN 21 03/05/2024    CREATININE 0.71 03/05/2024     Lab Results   Component Value Date    INR 1.07 03/05/2024    INR 1.05 02/20/2024    INR 1.07 01/23/2024    PROTIME 14.1 03/05/2024    PROTIME 13.6 02/20/2024    PROTIME 13.8 01/23/2024        Blood Culture:   Lab Results   Component Value Date    BLOODCX No Growth After 5 Days. 06/05/2023   ,   Urinalysis:   Lab Results   Component Value Date    COLORU Yellow 01/11/2024    CLARITYU Turbid 01/11/2024    SPECGRAV 1.016 01/11/2024    PHUR 6.5 01/11/2024    LEUKOCYTESUR Large (A) 01/11/2024    NITRITE Negative 01/11/2024    GLUCOSEU Negative 01/11/2024    KETONESU Negative 01/11/2024    BILIRUBINUR Negative 01/11/2024    BLOODU Small (A) 01/11/2024   ,  "  Urine Culture:   Lab Results   Component Value Date    URINECX >100,000 cfu/ml Escherichia coli (A) 01/11/2024   ,   Wound Culure: No results found for: \"WOUNDCULT\"    Medications:  Current Facility-Administered Medications   Medication Dose Route Frequency    acetaminophen (TYLENOL) tablet 650 mg  650 mg Oral Q6H PRN    aspirin chewable tablet 81 mg  81 mg Oral Daily    atorvastatin (LIPITOR) tablet 80 mg  80 mg Oral Daily With Dinner    cholecalciferol (VITAMIN D3) tablet 1,000 Units  1,000 Units Oral Daily    heparin (porcine) subcutaneous injection 5,000 Units  5,000 Units Subcutaneous Q8H [facility]    labetalol (NORMODYNE) injection 5 mg  5 mg Intravenous Q15 Min PRN    And    hydrALAZINE (APRESOLINE) injection 15 mg  15 mg Intravenous Q15 Min PRN    And    niCARdipine (CARDENE) 25 mg (STANDARD CONCENTRATION) in sodium chloride 0.9% 250 mL  1-15 mg/hr Intravenous Continuous PRN    insulin lispro (HumALOG/ADMELOG) 100 units/mL subcutaneous injection 1-5 Units  1-5 Units Subcutaneous Q6H [facility]    metoprolol succinate (TOPROL-XL) 24 hr tablet 25 mg  25 mg Oral Daily    multivitamin stress formula tablet 1 tablet  1 tablet Oral Daily    ondansetron (ZOFRAN) injection 4 mg  4 mg Intravenous Q6H PRN    sodium chloride 0.9 % bolus 500 mL  500 mL Intravenous Once PRN    Followed by    [START ON 3/6/2024] phenylephrine (TRI-SYNEPHRINE) 50 mg (STANDARD CONCENTRATION) in sodium chloride 0.9% 250 mL   mcg/min Intravenous Continuous PRN    sodium chloride 0.9 % infusion  75 mL/hr Intravenous Continuous       Patient Active Problem List   Diagnosis    Colitis    History of biliary dyskinesia    DMII (diabetes mellitus, type 2) (Formerly [facility])    Gallbladder polyp    Leiomyoma of uterus    Acquired right foot drop    Hypomagnesemia    Hair loss    Polyp of ascending colon    Ulcerative pancolitis without complication (Formerly [facility])    Type 2 diabetes mellitus with hyperglycemia, unspecified whether long term insulin use (Formerly [facility])    Coronary artery " disease involving native coronary artery    Ischemic cardiomyopathy    Mixed hyperlipidemia    Stenosis of left carotid artery    Suprasellar mass    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HCC)    Hypercalcemia    Hyperprolactinemia (HCC)       Past Surgical History:   Procedure Laterality Date    CARDIAC CATHETERIZATION Left 1/17/2024    Procedure: Cardiac Left Heart Cath;  Surgeon: Juan A Sousa MD;  Location: [facility] CARD[facility]AC [facility];  Service: Cardiology    CARDIAC CATHETERIZATION  1/17/2024    Procedure: Cardiac catheterization;  Surgeon: Juan A Sousa MD;  Location: [facility] CARDIAC [facility];  Service: Cardiology    HYSTERECTOMY      ~1990       History reviewed. No pertinent family history.    Social History     Socioeconomic History    Marital status: /Civil Union     Spouse name: Not on file    Number of children: Not on file    Years of education: Not on file    Highest education level: Not on file   Occupational History    Not on file   Tobacco Use    Smoking status: Former     Types: Cigarettes    Smokeless tobacco: Never    Tobacco comments:     Quit 1970's   Vaping Use    Vaping status: Never Used   Substance and Sexual Activity    Alcohol use: Never    Drug use: Never    Sexual activity: Not on file   Other Topics Concern    Not on file   Social History Narrative    Not on file     Social Determinants of Health     Financial Resource Strain: Patient Declined (8/18/2023)    Overall Financial Resource Strain (CARDIA)     Difficulty of Paying Living Expenses: Patient declined   Food Insecurity: Not on file   Transportation Needs: No Transportation Needs (8/18/2023)    PRAPARE - Transportation     Lack of Transportation (Medical): No     Lack of Transportation (Non-Medical): No   Physical Activity: Not on file   Stress: Not on file   Social Connections: Not on file   Intimate Partner Violence: Not on file   Housing Stability: Not on file       Allergies   Allergen Reactions    Valdecoxib Other (See Comments)     Patient  is unaware of this allergy

## 2024-03-05 NOTE — PLAN OF CARE
Problem: PAIN - ADULT  Goal: Verbalizes/displays adequate comfort level or baseline comfort level  Description: Interventions:  - Encourage patient to monitor pain and request assistance  - Assess pain using appropriate pain scale  - Administer analgesics based on type and severity of pain and evaluate response  - Implement non-pharmacological measures as appropriate and evaluate response  - Consider cultural and social influences on pain and pain management  - Notify physician/advanced practitioner if interventions unsuccessful or patient reports new pain  Outcome: Progressing     Problem: INFECTION - ADULT  Goal: Absence or prevention of progression during hospitalization  Description: INTERVENTIONS:  - Assess and monitor for signs and symptoms of infection  - Monitor lab/diagnostic results  - Monitor all insertion sites, i.e. indwelling lines, tubes, and drains  - Monitor endotracheal if appropriate and nasal secretions for changes in amount and color  - [place] appropriate cooling/warming therapies per order  - Administer medications as ordered  - Instruct and encourage patient and family to use good hand hygiene technique  - Identify and instruct in appropriate isolation precautions for identified infection/condition  Outcome: Progressing  Goal: Absence of fever/infection during neutropenic period  Description: INTERVENTIONS:  - Monitor WBC    Outcome: Progressing     Problem: SAFETY ADULT  Goal: Patient will remain free of falls  Description: INTERVENTIONS:  - Educate patient/family on patient safety including physical limitations  - Instruct patient to call for assistance with activity   - Consult OT/PT to assist with strengthening/mobility   - Keep Call bell within reach  - Keep bed low and locked with side rails adjusted as appropriate  - Keep care items and personal belongings within reach  - Initiate and maintain comfort rounds  - Make Fall Risk Sign visible to staff  - Offer Toileting   - Apply  yellow socks and bracelet for high fall risk patients  - Consider moving patient to room near nurses station  Outcome: Progressing  Goal: Maintain or return to baseline ADL function  Description: INTERVENTIONS:  -  Assess patient's ability to carry out ADLs; assess patient's baseline for ADL function and identify physical deficits which impact ability to perform ADLs (bathing, care of mouth/teeth, toileting, grooming, dressing, etc.)  - Assess/evaluate cause of self-care deficits   - Assess range of motion  - Assess patient's mobility; develop plan if impaired  - Assess patient's need for assistive devices and provide as appropriate  - Encourage maximum independence but intervene and supervise when necessary  - Involve family in performance of ADLs  - Assess for home care needs following discharge   - Consider OT consult to assist with ADL evaluation and planning for discharge  - Provide patient education as appropriate  Outcome: Progressing  Goal: Maintains/Returns to pre admission functional level  Description: INTERVENTIONS:  - Perform AM-PAC 6 Click Basic Mobility/ Daily Activity assessment daily.  - Set and communicate daily mobility goal to care team and patient/family/caregiver.   - Collaborate with rehabilitation services on mobility goals if consulted  - Perform Range of Motion   - Reposition patient   - Out of bed for toileting  - Record patient progress and toleration of activity level   Outcome: Progressing     Problem: DISCHARGE PLANNING  Goal: Discharge to home or other facility with appropriate resources  Description: INTERVENTIONS:  - Identify barriers to discharge w/patient and caregiver  - Arrange for needed discharge resources and transportation as appropriate  - Identify discharge learning needs (meds, wound care, etc.)  - Arrange for interpretive services to assist at discharge as needed  - Refer to Case Management Department for coordinating discharge planning if the patient needs post-hospital  services based on physician/advanced practitioner order or complex needs related to functional status, cognitive ability, or social support system  Outcome: Progressing     Problem: Knowledge Deficit  Goal: Patient/family/caregiver demonstrates understanding of disease process, treatment plan, medications, and discharge instructions  Description: Complete learning assessment and assess knowledge base.  Interventions:  - Provide teaching at level of understanding  - Provide teaching via preferred learning methods  Outcome: Progressing     Problem: Nutrition/Hydration-ADULT  Goal: Nutrient/Hydration intake appropriate for improving, restoring or maintaining nutritional needs  Description: Monitor and assess patient's nutrition/hydration status for malnutrition. Collaborate with interdisciplinary team and initiate plan and interventions as ordered.  Monitor patient's weight and dietary intake as ordered or per policy. Utilize nutrition screening tool and intervene as necessary. Determine patient's food preferences and provide high-protein, high-caloric foods as appropriate.     INTERVENTIONS:  - Monitor oral intake, urinary output, labs, and treatment plans  - Assess nutrition and hydration status and recommend course of action  - Evaluate amount of meals eaten  - Assist patient with eating if necessary   - Allow adequate time for meals  - Recommend/ encourage appropriate diets, oral nutritional supplements, and vitamin/mineral supplements  - Order, calculate, and assess calorie counts as needed  - Recommend, monitor, and adjust tube feedings and TPN/PPN based on assessed needs  - Assess need for intravenous fluids  - Provide specific nutrition/hydration education as appropriate  - Include patient/family/caregiver in decisions related to nutrition  Outcome: Progressing     Problem: NEUROSENSORY - ADULT  Goal: Achieves stable or improved neurological status  Description: INTERVENTIONS  - Monitor and report changes in  neurological status  - Monitor vital signs such as temperature, blood pressure, glucose, and any other labs ordered   - Initiate measures to prevent increased intracranial pressure  - Monitor for seizure activity and implement precautions if appropriate      Outcome: Progressing  Goal: Achieves maximal functionality and self care  Description: INTERVENTIONS  - Monitor swallowing and airway patency with patient fatigue and changes in neurological status  - Encourage and assist patient to increase activity and self care.   - Encourage visually impaired, hearing impaired and aphasic patients to use assistive/communication devices  Outcome: Progressing     Problem: RESPIRATORY - ADULT  Goal: Achieves optimal ventilation and oxygenation  Description: INTERVENTIONS:  - Assess for changes in respiratory status  - Assess for changes in mentation and behavior  - Position to facilitate oxygenation and minimize respiratory effort  - Oxygen administered by appropriate delivery if ordered  - Initiate smoking cessation education as indicated  - Encourage broncho-pulmonary hygiene including cough, deep breathe, Incentive Spirometry  - Assess the need for suctioning and aspirate as needed  - Assess and instruct to report SOB or any respiratory difficulty  - Respiratory Therapy support as indicated  Outcome: Progressing     Problem: GENITOURINARY - ADULT  Goal: Absence of urinary retention  Description: INTERVENTIONS:  - Assess patient’s ability to void and empty bladder  - Monitor I/O  - Bladder scan as needed  - Discuss with physician/AP medications to alleviate retention as needed  - Discuss catheterization for long term situations as appropriate  Outcome: Progressing     Problem: METABOLIC, FLUID AND ELECTROLYTES - ADULT  Goal: Electrolytes maintained within normal limits  Description: INTERVENTIONS:  - Monitor labs and assess patient for signs and symptoms of electrolyte imbalances  - Administer electrolyte replacement as  ordered  - Monitor response to electrolyte replacements, including repeat lab results as appropriate  - Instruct patient on fluid and nutrition as appropriate  Outcome: Progressing  Goal: Fluid balance maintained  Description: INTERVENTIONS:  - Monitor labs   - Monitor I/O and WT  - Instruct patient on fluid and nutrition as appropriate  - Assess for signs & symptoms of volume excess or deficit  Outcome: Progressing  Goal: Glucose maintained within target range  Description: INTERVENTIONS:  - Monitor Blood Glucose as ordered  - Assess for signs and symptoms of hyperglycemia and hypoglycemia  - Administer ordered medications to maintain glucose within target range  - Assess nutritional intake and initiate nutrition service referral as needed  Outcome: Progressing     Problem: Prexisting or High Potential for Compromised Skin Integrity  Goal: Skin integrity is maintained or improved  Description: INTERVENTIONS:  - Identify patients at risk for skin breakdown  - Assess and monitor skin integrity  - Assess and monitor nutrition and hydration status  - Monitor labs   - Assess for incontinence   - Turn and reposition patient  - Assist with mobility/ambulation  - Relieve pressure over bony prominences  - Avoid friction and shearing  - Provide appropriate hygiene as needed including keeping skin clean and dry  - Evaluate need for skin moisturizer/barrier cream  - Collaborate with interdisciplinary team   - Patient/family teaching  - Consider wound care consult   Outcome: Progressing

## 2024-03-05 NOTE — ASSESSMENT & PLAN NOTE
Lab Results   Component Value Date    HGBA1C 6.4 (H) 12/28/2023       Recent Labs     03/04/24  1337 03/04/24  1834 03/05/24  0013 03/05/24  0452   POCGLU 137 157* 131 146*         Blood Sugar Average: Last 72 hrs:  (P) 141.4    Continue insulin sliding scale  Monitor blood glucose levels   Continue to hold home metformin  Carbohydrate level II diet

## 2024-03-05 NOTE — PLAN OF CARE
Problem: METABOLIC, FLUID AND ELECTROLYTES - ADULT  Goal: Electrolytes maintained within normal limits  Description: INTERVENTIONS:  - Monitor labs and assess patient for signs and symptoms of electrolyte imbalances  - Administer electrolyte replacement as ordered  - Monitor response to electrolyte replacements, including repeat lab results as appropriate  - Instruct patient on fluid and nutrition as appropriate  3/4/2024 2312 by Lisa Thornton RN  Outcome: Progressing    Problem: NEUROSENSORY - ADULT  Goal: Achieves stable or improved neurological status  Description: INTERVENTIONS  - Monitor and report changes in neurological status  - Monitor vital signs such as temperature, blood pressure, glucose, and any other labs ordered   - Initiate measures to prevent increased intracranial pressure  - Monitor for seizure activity and implement precautions if appropriate      3/4/2024 2312 by Lisa Thornton RN  Outcome: Progressing    Goal: Achieves maximal functionality and self care  Description: INTERVENTIONS  - Monitor swallowing and airway patency with patient fatigue and changes in neurological status  - Encourage and assist patient to increase activity and self care.   - Encourage visually impaired, hearing impaired and aphasic patients to use assistive/communication devices  3/4/2024 2312 by Lisa Thornton RN  Outcome: Progressing    Goal: Fluid balance maintained  Description: INTERVENTIONS:  - Monitor labs   - Monitor I/O and WT  - Instruct patient on fluid and nutrition as appropriate  - Assess for signs & symptoms of volume excess or deficit  3/4/2024 2312 by Lisa Thornton RN  Outcome: Progressing    Goal: Glucose maintained within target range  Description: INTERVENTIONS:  - Monitor Blood Glucose as ordered  - Assess for signs and symptoms of hyperglycemia and hypoglycemia  - Administer ordered medications to maintain glucose within target range  - Assess nutritional intake and initiate  nutrition service referral as needed  3/4/2024 2312 by Lisa Thornton, RN  Outcome: Progressing

## 2024-03-05 NOTE — ASSESSMENT & PLAN NOTE
67 yo w/ a hx of CHF, UC, DM, pituitary macroadenoma and CAD, presented to [facility] on 3/4/24 for a scheduled L CEA. Pt has a hx of B/L JARON and is planning to undergo CABG in the near future and pre-op testing showed a high-grade L ICA lesion. Decision was made between CT surgery, cardiology and vascular surgery to address asymptomatic L JARON prior to CABG to decrease stroke risk. Pt underwent L CEA w/ eversion on 3/4/24. Pt experienced some post-op nausea which resolved w/ phenergan.    POD #1: Repeat EKG overnight showed sinus tachycardia w/ rate 110s. Pt is eating, swallowing, voiding and ambulating at baseline. Pt remains hemodynamically stable w/ Hgb 11.2 and stable VS. Pt's SBP remains  after administration of home metoprolol and HR has decreased to 80bpm w/ no further tachycardia. L neck incision well-approx, soft w/ small amount of soft swelling at incision site. No erythema or drainage; surgical glue closure. Pt remains neurologically intact w/ no unilateral deficits. Pt reports a dull headache which has improved w/ tylenol. Pt feels her headache is from not sleeping last night. Pt reports mild soreness at L neck incision site. She has used tylenol prn twice since her surgery for pain control, which she reports as effective. Pt denies any visual changes, unilateral weakness or any further complaints at this time and is looking forward to returning home with her family today. Pt will continue ASA and crestor for medical management. She will follow up w/ vascular surgery in the office; appt scheduled for 3/18/24.    Plan:  -Asymptomatic L JARON  -S/P L CEA w/ eversion on 3/4 (POD #1)  -Continue regular diet as tolerated  -Continue BP monitoring for SBP goal 100-160  -OOB and ambulate as tolerated  -Continue ASA and lipitor for medical management  -Anticipate D/C to home today   -Will discuss w/ Dr. Núñez

## 2024-03-05 NOTE — PROGRESS NOTES
Vascular Nurse Navigator Post Op Education    Met with patient and daughter Fe and  Luke at bedside to introduce myself as Vascular Nurse Navigator and explained my role.  Patient is appropriate and accepting to education. Patient was educated with Review of written materials provided, Teachback, Explanation, Demonstration, and Question & Answer on expectations of post op care and recovery on Left CEA. Patient is a former smoker, quit 50 years ago, as such Smoking effects on the lungs, tobacco triggers, and Smoking cessation was reviewed. Education provided to patient, patient's  Luke and patient's daughter Fe on infection prevention, activity limitations, when to call the office, importance of follow up, and incisional care.  Discharge instruction handout provided to patient to review.

## 2024-03-05 NOTE — PROGRESS NOTES
[facility]  Progress Note  Name: Julia Perry I  MRN: [id number]  Unit/Bed#: ICU 13 I Date of Admission: 3/4/2024   Date of Service: 3/5/2024 I Hospital Day: 1    Assessment/Plan   * Stenosis of left carotid artery  Assessment & Plan  Presenting for Left asymptomatic carotid stenosis revascularization prior to coronary revascularization (CABG)   Procedure performed on 3/4 without complications  History is significant for HFrEF, UC, type 2 diabetes mellitus, pituitary macroadenoma, CAD, and bilateral carotid artery stenosis maintained on aspirin and high intensity statin  On exam, patient is in no acute pain or considerable discomfort. Vitals stable.      Plan:  Continue aspirin and atorvastatin  Frequent neurovascular checks  Monitor left carotid surgical site for signs of infection  Monitor for signs of bleeding and dysphagia  Can consider addition of low dose oxycodone for pain management  Tylenol every 6 hrs PRN for mild pain  Zofran PRN  As needed anti-hypertensives ordered by primary  Remainder of care per primary    Mixed hyperlipidemia  Assessment & Plan  Home medication: Crestor 40 mg daily  Continue atorvastatin 80 mg daily    Coronary artery disease involving native coronary artery  Assessment & Plan  History of coronary atherosclerosis  Recent cardiac cath in 1/2024 with evidence of triple vessel CAD involving > 80% stenosis in the mid LAD, distal circumflex, and mid RCA  Continue to monitor on telemetry  Continue metoprolol succinate 25 mg daily    DMII (diabetes mellitus, type 2) (HCC)  Assessment & Plan  Lab Results   Component Value Date    HGBA1C 6.4 (H) 12/28/2023       Recent Labs     03/04/24  1337 03/04/24  1834 03/05/24  0013 03/05/24  0452   POCGLU 137 157* 131 146*         Blood Sugar Average: Last 72 hrs:  (P) 141.4    Continue insulin sliding scale  Monitor blood glucose levels   Continue to hold home metformin  Carbohydrate level II diet             Disposition:  Critical care    ICU Core Measures     A: Assess, Prevent, and Manage Pain Has pain been assessed? Yes  Need for changes to pain regimen? No   B: Both SAT/SAT  N/A   C: Choice of Sedation RASS Goal: N/A patient not on sedation  Need for changes to sedation or analgesia regimen? NA   D: Delirium CAM-ICU: Negative   E: Early Mobility  Plan for early mobility? Yes   F: Family Engagement Plan for family engagement today? Yes       Review of Invasive Devices:            Prophylaxis:  VTE VTE covered by:  heparin (porcine), Subcutaneous, 5,000 Units at 03/05/24 0549       Stress Ulcer  not ordered         Significant 24hr Events     24hr events: No acute events overnight. Patient did have some nausea overnight and was given promethazine with relief. Remains hemodynamically stable. Anticipate discharge today.     Subjective   Review of Systems   Constitutional:  Negative for chills and fever.   HENT:  Negative for trouble swallowing.    Eyes:  Negative for visual disturbance.   Respiratory:  Negative for shortness of breath and wheezing.    Cardiovascular:  Negative for chest pain and palpitations.   Gastrointestinal:  Positive for nausea.   Genitourinary:  Negative for dysuria.   Musculoskeletal:  Negative for arthralgias and neck pain.   Skin:  Negative for color change and rash.   Neurological:  Negative for light-headedness and headaches.   Psychiatric/Behavioral:  Negative for agitation and confusion.       Objective                            Vitals I/O      Most Recent Min/Max in 24hrs   Temp 98.6 °F (37 °C) Temp  Min: 96.5 °F (35.8 °C)  Max: 98.6 °F (37 °C)   Pulse 88 Pulse  Min: 68  Max: 110   Resp 12 Resp  Min: 7  Max: 34   /60 BP  Min: 102/54  Max: 148/75   O2 Sat 95 % SpO2  Min: 87 %  Max: 100 %      Intake/Output Summary (Last 24 hours) at 3/5/2024 0705  Last data filed at 3/5/2024 0400  Gross per 24 hour   Intake 3252.5 ml   Output 510 ml   Net 2742.5 ml       Diet Ernie/CHO Controlled; Consistent  Carbohydrate Diet Level 2 (5 carb servings/75 grams CHO/meal)    Invasive Monitoring   None        Physical Exam   Physical Exam  Eyes:      Extraocular Movements: Extraocular movements intact.      Conjunctiva/sclera: Conjunctivae normal.   Skin:     General: Skin is warm and dry.   HENT:      Head: Normocephalic and atraumatic.   Cardiovascular:      Rate and Rhythm: Normal rate and regular rhythm.      Pulses: Normal pulses.      Heart sounds: Normal heart sounds.   Abdominal: General: Bowel sounds are normal.      Palpations: Abdomen is soft.      Tenderness: There is no abdominal tenderness.   Constitutional:       General: She is not in acute distress.     Appearance: She is well-developed. She is not ill-appearing.   Pulmonary:      Effort: Pulmonary effort is normal.      Breath sounds: Normal breath sounds.   Neurological:      General: No focal deficit present.      Mental Status: She is alert and oriented to person, place and time. Mental status is at baseline. She is not agitated.            Diagnostic Studies      EKG: Normal sinus rhythm  Imaging:  I have personally reviewed pertinent reports.       Medications:  Scheduled PRN   aspirin, 81 mg, Daily  atorvastatin, 80 mg, Daily With Dinner  cholecalciferol, 1,000 Units, Daily  heparin (porcine), 5,000 Units, Q8H LINDA  insulin lispro, 1-5 Units, Q6H LINDA  metoprolol succinate, 25 mg, Daily  multivitamin stress formula, 1 tablet, Daily      acetaminophen, 650 mg, Q6H PRN  labetalol, 5 mg, Q15 Min PRN   And  hydrALAZINE, 15 mg, Q15 Min PRN   And  niCARdipine, 1-15 mg/hr, Continuous PRN  ondansetron, 4 mg, Q6H PRN  sodium chloride, 500 mL, Once PRN   Followed by  [START ON 3/6/2024] phenylephine,  mcg/min, Continuous PRN       Continuous    niCARdipine, 1-15 mg/hr  [START ON 3/6/2024] phenylephine,  mcg/min  sodium chloride, 75 mL/hr, Last Rate: 75 mL/hr (03/04/24 1518)         Labs:    CBC    Recent Labs     03/04/24  1514 03/05/24  0450   WBC   --  12.74*   HGB  --  11.2*   HCT  --  34.4*    221     BMP    Recent Labs     03/05/24  0450   SODIUM 138   K 4.2      CO2 24   AGAP 6   BUN 21   CREATININE 0.71   CALCIUM 8.9       Coags    Recent Labs     03/05/24  0450   INR 1.07   PTT 18*        Additional Electrolytes  No recent results       Blood Gas    No recent results  No recent results LFTs  No recent results    Infectious  No recent results  Glucose  Recent Labs     03/05/24  0450   GLUC 154*               Wild Sagastume DO

## 2024-03-05 NOTE — UTILIZATION REVIEW
"Initial Clinical Review    Elective   IP    surgical procedure    Age/Sex: 68 y.o. female    Surgery Date:   3/4/24    Procedure: Left - LEFT ENDARTERECTOMY ARTERY CAROTID     Anesthesia:  general    Operative Findings: Left Carotid Endarterectomy with Eversion        POD#1 Progress Note:  3/5    Continue post op care.  Overnight  EKG  shows  ST.  Continue pain control/antiemetics as  needed.  Tolerating diet.    Admission Orders: Date/Time/Statement:   Admission Orders (From admission, onward)       Ordered        03/04/24 1246  Inpatient Admission  Once                          Orders Placed This Encounter   Procedures    Inpatient Admission     Standing Status:   Standing     Number of Occurrences:   1     Order Specific Question:   Level of Care     Answer:   Critical Care [15]     Order Specific Question:   Estimated length of stay     Answer:   Inpatient Only Surgery     Vital Signs: /60   Pulse 96   Temp 98.1 °F (36.7 °C) (Oral)   Resp (!) 27   Ht 5' 4\" (1.626 m)   Wt 73.9 kg (162 lb 14.7 oz)   SpO2 96%   BMI 27.97 kg/m²     Pertinent Labs/Diagnostic Test Results:         Results from last 7 days   Lab Units 03/05/24  0450 03/04/24  1514   WBC Thousand/uL 12.74*  --    HEMOGLOBIN g/dL 11.2*  --    HEMATOCRIT % 34.4*  --    PLATELETS Thousands/uL 221 229         Results from last 7 days   Lab Units 03/05/24  0450   SODIUM mmol/L 138   POTASSIUM mmol/L 4.2   CHLORIDE mmol/L 108   CO2 mmol/L 24   ANION GAP mmol/L 6   BUN mg/dL 21   CREATININE mg/dL 0.71   EGFR ml/min/1.73sq m 88   CALCIUM mg/dL 8.9         Results from last 7 days   Lab Units 03/05/24  0452 03/05/24  0013 03/04/24  1834 03/04/24  1337 03/04/24  0821   POC GLUCOSE mg/dl 146* 131 157* 137 136     Results from last 7 days   Lab Units 03/05/24  0450   GLUCOSE RANDOM mg/dL 154*           Results from last 7 days   Lab Units 03/05/24  0450   PROTIME seconds 14.1   INR  1.07   PTT seconds 18*                     Diet:  CCD    Mobility:  as "  tolerated    DVT Prophylaxis:  SCD's    Medications/Pain Control:   Scheduled Medications:  aspirin, 81 mg, Oral, Daily  atorvastatin, 80 mg, Oral, Daily With Dinner  cholecalciferol, 1,000 Units, Oral, Daily  heparin (porcine), 5,000 Units, Subcutaneous, Q8H LINDA  insulin lispro, 1-5 Units, Subcutaneous, Q6H LINDA  metoprolol succinate, 25 mg, Oral, Daily  multivitamin stress formula, 1 tablet, Oral, Daily      Continuous IV Infusions:  niCARdipine, 1-15 mg/hr, Intravenous, Continuous PRN  [START ON 3/6/2024] phenylephine,  mcg/min, Intravenous, Continuous PRN  sodium chloride, 75 mL/hr, Intravenous, Continuous      PRN Meds:  acetaminophen, 650 mg, Oral, Q6H PRN  labetalol, 5 mg, Intravenous, Q15 Min PRN   And  hydrALAZINE, 15 mg, Intravenous, Q15 Min PRN   And  niCARdipine, 1-15 mg/hr, Intravenous, Continuous PRN  ondansetron, 4 mg, Intravenous, Q6H PRN  sodium chloride, 500 mL, Intravenous, Once PRN   Followed by  [START ON 3/6/2024] phenylephine,  mcg/min, Intravenous, Continuous PRN    Neuro checks Q 1 hr    Network Utilization Review Department  ATTENTION: Please call with any questions or concerns to [phone] and carefully listen to the prompts so that you are directed to the right person. All voicemails are confidential.   For Discharge needs, contact Care Management DC Support Team at [phone] opt. 2  Send all requests for admission clinical reviews, approved or denied determinations and any other requests to dedicated fax number below belonging to the campus where the patient is receiving treatment. List of dedicated fax numbers for the Facilities:  FACILITY NAME UR FAX NUMBER   ADMISSION DENIALS (Administrative/Medical Necessity) [phone]   DISCHARGE SUPPORT TEAM (NETWORK) [phone]   PARENT CHILD HEALTH (Maternity/NICU/Pediatrics) [phone]   [facility] [phone]   [facility] [phone]   [facility]  [facility] [phone]   [facility] [phone]   [facility] [phone]   [facility] [phone]   [facility] [phone]   [facility] [phone]   [facility] [phone]   [facility] [phone]   [facility] [phone]   [facility] [phone]

## 2024-03-06 ENCOUNTER — TRANSITIONAL CARE MANAGEMENT (OUTPATIENT)
Dept: FAMILY MEDICINE CLINIC | Facility: CLINIC | Age: 69
End: 2024-03-06

## 2024-03-06 NOTE — UTILIZATION REVIEW
NOTIFICATION OF ADMISSION DISCHARGE   This is a Notification of Discharge from [facility]. Please be advised that this patient has been discharge from our facility. Below you will find the admission and discharge date and time including the patient’s disposition.   UTILIZATION REVIEW CONTACT:  Shruti Tran  Utilization   Network Utilization Review Department  Phone: [phone] x carefully listen to the prompts. All voicemails are confidential.  Email: [email or web address]     ADMISSION INFORMATION  PRESENTATION DATE: 3/4/2024  7:35 AM  OBERVATION ADMISSION DATE:   INPATIENT ADMISSION DATE: 3/4/24 12:46 PM   DISCHARGE DATE: 3/5/2024  6:03 PM   DISPOSITION:Home/Self Care    Network Utilization Review Department  ATTENTION: Please call with any questions or concerns to [phone] and carefully listen to the prompts so that you are directed to the right person. All voicemails are confidential.   For Discharge needs, contact Care Management DC Support Team at [phone] opt. 2  Send all requests for admission clinical reviews, approved or denied determinations and any other requests to dedicated fax number below belonging to the campus where the patient is receiving treatment. List of dedicated fax numbers for the Facilities:  FACILITY NAME UR FAX NUMBER   ADMISSION DENIALS (Administrative/Medical Necessity) [phone]   DISCHARGE SUPPORT TEAM ([facility]) [phone]   PARENT CHILD HEALTH (Maternity/NICU/Pediatrics) [phone]   [facility] [phone]   [facility] [phone]   Replaced by [facility] [phone]   [facility] [phone]   [facility] [phone]   [facility] [phone]   [facility] [phone]   [facility] [phone]   [facility]  [facility] [phone]   [facility] [phone]   [facility] [phone]   [facility] [phone]

## 2024-03-07 ENCOUNTER — TELEPHONE (OUTPATIENT)
Dept: VASCULAR SURGERY | Facility: CLINIC | Age: 69
End: 2024-03-07

## 2024-03-07 ENCOUNTER — TELEPHONE (OUTPATIENT)
Age: 69
End: 2024-03-07

## 2024-03-07 NOTE — TELEPHONE ENCOUNTER
Patients GI provider:  Dr. Dejesus    Number to return call: [phone]    Reason for call: Pts  calling stating she's having bypass surgery in the near future. They would like  to be involved due to antibiotics causing flare ups.  is requesting a call back to discuss further.     Scheduled procedure/appointment date if applicable: Apt 7/16/2024

## 2024-03-07 NOTE — TELEPHONE ENCOUNTER
Vascular Nurse Navigator Post Op Call    Procedure: Left - LEFT ENDARTERECTOMY ARTERY CAROTID     Date of Procedure: 3/4/24    Surgeon:    * Janeth De Oliveira, DO - Primary     * Nigel Castillo MD - Assisting     * Kailash Dumont, DO - Fellow    Discharge Date: 3/5/24    Discharge Disposition: Home    Change in Vision?: No    Change in Speech?: No    Weakness?: No    Uncontrolled Pain?: No    Hoarseness?: No    Trouble Swallowing?: No    Incision Concerns?: No    Anticoagulation pt was discharged on post op?: Aspirin    Statin pt was discharged on post op?: Rosuvastatin (Crestor)    Bleeding?: No    Fever/chills?: No      Reviewed discharge instructions and incision care with patient.      NEXT OFFICE VISIT SCHEDULED:  3/18/24 at 11 am with ROYAL Luong at [facility]    Transportation Available?: Yes      Any further questions/concerns?    Patient stated that she has been doing okay since discharge.  She stated that he has colitis and yesterday she was sick and vomited a little.  She stated that they reached out to her GI doctor to see if the antibiotic that was given to her prior to surgery caused a flare up of her colitis.  She stated that she was able to eat today and had no vomiting today, but stated that she has some stomach pains similar to when she has a flare up of her colitis.  Advised her to reach out to PCP and await recommendations from GI.  She stated that her neck is still swollen and unchanged from when she was in the hospital and denies any redness, drainage, or open areas on incision.  Reviewed incision care with her - wash daily with soap and water.  All questions answered.

## 2024-03-08 NOTE — TELEPHONE ENCOUNTER
I spoke with the patient's . Relayed Dr. Dejesus's message for patient to take probiotics (Align) if antibiotics are involved.     Pt not currently scheduled for coronary artery bypass graft surgery,  to call our office once date is scheduled.

## 2024-03-13 ENCOUNTER — RA CDI HCC (OUTPATIENT)
Dept: OTHER | Facility: HOSPITAL | Age: 69
End: 2024-03-13

## 2024-03-13 NOTE — PROGRESS NOTES
HCC coding opportunities       Chart reviewed, no opportunity found: CHART REVIEWED, NO OPPORTUNITY FOUND     No hypertension dx found.     Patients Insurance     Medicare Insurance: [employer]

## 2024-03-18 ENCOUNTER — TELEPHONE (OUTPATIENT)
Dept: CARDIAC SURGERY | Facility: CLINIC | Age: 69
End: 2024-03-18

## 2024-03-18 ENCOUNTER — OFFICE VISIT (OUTPATIENT)
Dept: VASCULAR SURGERY | Facility: CLINIC | Age: 69
End: 2024-03-18

## 2024-03-18 VITALS
OXYGEN SATURATION: 95 % | WEIGHT: 163 LBS | BODY MASS INDEX: 27.83 KG/M2 | DIASTOLIC BLOOD PRESSURE: 68 MMHG | SYSTOLIC BLOOD PRESSURE: 96 MMHG | HEIGHT: 64 IN | HEART RATE: 87 BPM

## 2024-03-18 DIAGNOSIS — Z98.890 S/P CAROTID ENDARTERECTOMY: Primary | ICD-10-CM

## 2024-03-18 DIAGNOSIS — I65.22 STENOSIS OF LEFT CAROTID ARTERY: ICD-10-CM

## 2024-03-18 PROCEDURE — 99024 POSTOP FOLLOW-UP VISIT: CPT | Performed by: NURSE PRACTITIONER

## 2024-03-18 NOTE — ASSESSMENT & PLAN NOTE
69y/o f w/ hx of CHF, UC, DM, pituitary macroadenoma and CAD, b/l JARON s/p L CEA w. Eversion on 3/4/24 by   -Presents to the office w/o complaints accompanied with .  -Denies symptoms of numbness/ tingling/ weakness on one side of the body, facial droop, slurred speech or blindness in one eye.   -L carotid incision site is clean, dry and well approximated with surgical glue in place.   -No drainage, no erythremia, denies fever/ chills.  -Increasing physical activity daily.   -Discussed post-op incision care and post-op restrictions. Educated on post-op follow up ultrasound surveillance and monitoring. All questions answered.     Plan  -Complete carotid ultrasound in 3 months and return to the office for review with your vascular surgeon.  -Continue ASA daily.  -Continue Rosuvastatin daily as prescribed by PCP.  -Call 911/ Go to the ER with any S/S of TIA/ CVA.  -Call the office with any surgical site swelling, discoloration, fever/ chills.

## 2024-03-18 NOTE — PATIENT INSTRUCTIONS
-Complete carotid ultrasound in 3 months and return to the office for review with vascular surgeon    -Go to the emergency department/ Call 911 with any signs or symptoms of a stroke: numbness/ tingling/ weakness on one side of the body, facial droop, slurred speech or blindness in one eye.     -Continue to take Aspirin 81 mg daily, as per your family doctor.     -Continue to take statin medication as per your family doctor daily.    -Call our office with any question or concerns.

## 2024-03-18 NOTE — PROGRESS NOTES
Assessment/Plan:    Stenosis of left carotid artery  67y/o f w/ hx of CHF, UC, DM, pituitary macroadenoma and CAD, b/l JARON s/p L CEA w. Eversion on 3/4/24 by   -Presents to the office w/o complaints accompanied with .  -Denies symptoms of numbness/ tingling/ weakness on one side of the body, facial droop, slurred speech or blindness in one eye.   -L carotid incision site is clean, dry and well approximated with surgical glue in place.   -No drainage, no erythremia, denies fever/ chills.  -Increasing physical activity daily.   -Discussed post-op incision care and post-op restrictions. Educated on post-op follow up ultrasound surveillance and monitoring. All questions answered.     Plan  -Complete carotid ultrasound in 3 months and return to the office for review with your vascular surgeon.  -Continue ASA daily.  -Continue Rosuvastatin daily as prescribed by PCP.  -Call 911/ Go to the ER with any S/S of TIA/ CVA.  -Call the office with any surgical site swelling, discoloration, fever/ chills.        Diagnoses and all orders for this visit:    S/P carotid endarterectomy  -     VAS carotid complete study; Future    Stenosis of left carotid artery  -     VAS carotid complete study; Future          Subjective:      Patient ID: Julia Perry is a 68 y.o. female.    Patient is s/p L CEA done 3/4/24 by Dr. De Oliveira. She denies fever, chills, pain, trouble swallowing, sore throat, or swelling. Left neck incision is clean, dry , and intact with glue. She does report numbness of the left side of her face. She denies TIA/CVA symptoms. She is taking ASA 81 mg and Rosuvastatin.     67y/o f w/ hx of CHF, UC, DM, pituitary macroadenoma and CAD, b/l JARON s/p L CEA w. Eversion on 3/4/24 by .   Pt has a hx of B/L JARON and is planning to undergo CABG in the near future and pre-op testing showed a high-grade L ICA lesion. Decision was made between CT surgery, cardiology and vascular surgery to address  "asymptomatic L JARON prior to CABG to decrease stroke risk she is now s/p L CEA.   -She presents to the office today accompanied with . She is doing well. She reports some mild numbness surrounding the incision site with some mild tenderness. We discussed this will take several weeks to months if it resolves.   -She is compliant with her Aspirin and rosuvastatin.         The following portions of the patient's history were reviewed and updated as appropriate: allergies, current medications, past family history, past medical history, past social history, past surgical history, and problem list.    Review of Systems   Constitutional: Negative.    HENT: Negative.     Eyes: Negative.  Negative for visual disturbance.   Respiratory: Negative.     Cardiovascular: Negative.    Gastrointestinal: Negative.    Endocrine: Negative.    Genitourinary: Negative.    Musculoskeletal: Negative.    Skin:  Positive for wound (Left neck incision).   Allergic/Immunologic: Negative.    Neurological:  Positive for dizziness. Negative for facial asymmetry, speech difficulty, weakness, light-headedness and headaches.   Hematological: Negative.    Psychiatric/Behavioral: Negative.           Objective:      BP 96/68 (BP Location: Left arm, Patient Position: Sitting, Cuff Size: Standard)   Pulse 87   Ht 5' 4\" (1.626 m)   Wt 73.9 kg (163 lb)   SpO2 95%   BMI 27.98 kg/m²          Physical Exam  Vitals and nursing note reviewed.   Constitutional:       Appearance: Normal appearance. She is normal weight.   HENT:      Head: Normocephalic and atraumatic.   Neck:      Vascular: No carotid bruit.   Cardiovascular:      Rate and Rhythm: Normal rate and regular rhythm.      Pulses: Normal pulses.      Heart sounds: Normal heart sounds. No murmur heard.  Pulmonary:      Effort: Pulmonary effort is normal. No respiratory distress.      Breath sounds: Normal breath sounds.   Musculoskeletal:      Cervical back: Normal range of motion. Tenderness " "(Mild tenderness to the left neck incision site) present. No rigidity.   Skin:     General: Skin is warm and dry.   Neurological:      General: No focal deficit present.      Mental Status: She is alert and oriented to person, place, and time.      Comments: Equal strength bilaterally, midline tongue.   Psychiatric:         Mood and Affect: Mood normal.         Behavior: Behavior normal.         I have reviewed and made appropriate changes to the review of systems input by the medical assistant.    Vitals:    03/18/24 1059   BP: 96/68   BP Location: Left arm   Patient Position: Sitting   Cuff Size: Standard   Pulse: 87   SpO2: 95%   Weight: 73.9 kg (163 lb)   Height: 5' 4\" (1.626 m)       Patient Active Problem List   Diagnosis    Colitis    History of biliary dyskinesia    DMII (diabetes mellitus, type 2) (HCC)    Gallbladder polyp    Leiomyoma of uterus    Acquired right foot drop    Hypomagnesemia    Hair loss    Polyp of ascending colon    Ulcerative pancolitis without complication (HCC)    Type 2 diabetes mellitus with hyperglycemia, unspecified whether long term insulin use (HCC)    Coronary artery disease involving native coronary artery    Ischemic cardiomyopathy    Mixed hyperlipidemia    Stenosis of left carotid artery    Suprasellar mass    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HCC)    Hypercalcemia    Hyperprolactinemia (HCC)       Past Surgical History:   Procedure Laterality Date    CARDIAC CATHETERIZATION Left 1/17/2024    Procedure: Cardiac Left Heart Cath;  Surgeon: Juan A Sousa MD;  [facility];  Service: Cardiology    CARDIAC CATHETERIZATION  1/17/2024    Procedure: Cardiac catheterization;  Surgeon: Juan A Sousa MD;  [facility];  Service: Cardiology    HYSTERECTOMY      ~1990    MO TEAEC W/PATCH GRF CAROTID VERTB SUBCLAV NECK INC Left 3/4/2024    Procedure: LEFT ENDARTERECTOMY ARTERY CAROTID;  Surgeon: Janeth De Oliveira DO;  Location: [facility];  Service: Vascular "       History reviewed. No pertinent family history.    Social History     Socioeconomic History    Marital status: /Civil Union     Spouse name: Not on file    Number of children: Not on file    Years of education: Not on file    Highest education level: Not on file   Occupational History    Not on file   Tobacco Use    Smoking status: Former     Types: Cigarettes    Smokeless tobacco: Never    Tobacco comments:     Quit 1970's   Vaping Use    Vaping status: Never Used   Substance and Sexual Activity    Alcohol use: Never    Drug use: Never    Sexual activity: Not on file   Other Topics Concern    Not on file   Social History Narrative    Not on file     Social Determinants of Health     Financial Resource Strain: Patient Declined (8/18/2023)    Overall Financial Resource Strain (CARDIA)     Difficulty of Paying Living Expenses: Patient declined   Food Insecurity: No Food Insecurity (3/5/2024)    Hunger Vital Sign     Worried About Running Out of Food in the Last Year: Never true     Ran Out of Food in the Last Year: Never true   Transportation Needs: No Transportation Needs (3/5/2024)    PRAPARE - Transportation     Lack of Transportation (Medical): No     Lack of Transportation (Non-Medical): No   Physical Activity: Not on file   Stress: Not on file   Social Connections: Not on file   Intimate Partner Violence: Not on file   Housing Stability: Low Risk  (3/5/2024)    Housing Stability Vital Sign     Unable to Pay for Housing in the Last Year: No     Number of Places Lived in the Last Year: 1     Unstable Housing in the Last Year: No       Allergies   Allergen Reactions    Valdecoxib Other (See Comments)     Patient is unaware of this allergy         Current Outpatient Medications:     acetaminophen (TYLENOL) 325 mg tablet, Take 2 tablets (650 mg total) by mouth every 6 (six) hours as needed for mild pain, Disp: , Rfl:     Alcohol Swabs 70 % PADS, May substitute brand based on insurance coverage. Check  glucose TID., Disp: 100 each, Rfl: 0    aspirin 81 mg chewable tablet, Chew 1 tablet (81 mg total) daily, Disp: 90 tablet, Rfl: 3    b complex vitamins capsule, Take 1 capsule by mouth daily, Disp: , Rfl:     cholecalciferol (VITAMIN D3) 1,000 units tablet, Take 1,000 Units by mouth daily, Disp: , Rfl:     furosemide (LASIX) 40 mg tablet, Take 1 tablet (40 mg total) by mouth as needed (Take 1 tablet daily as needed) (Patient taking differently: Take 40 mg by mouth as needed 1/2 tab in am, 1/2 in pm), Disp: 30 tablet, Rfl: 6    glucose blood test strip, Use 1 each daily as needed Use as instructed, Disp: , Rfl:     metFORMIN (GLUCOPHAGE) 500 mg tablet, 2 pills bid Do not start before January 18, 2024. (Patient taking differently: 500 mg daily after dinner Pt takes according to BGM / PRN), Disp: , Rfl:     metoprolol succinate (TOPROL-XL) 25 mg 24 hr tablet, Take 1 tablet (25 mg total) by mouth daily, Disp: 90 tablet, Rfl: 3    OneTouch Delica Lancets 33G MISC, May substitute brand based on insurance coverage. Check glucose TID., Disp: 100 each, Rfl: 0    rosuvastatin (CRESTOR) 40 MG tablet, Take 1 tablet (40 mg total) by mouth daily (Patient taking differently: Take 10 mg by mouth daily Patient reports taking 10 mg), Disp: 90 tablet, Rfl: 3    magnesium gluconate (MAGONATE) 500 mg tablet, Take 1 tablet (500 mg total) by mouth in the morning, Disp: 90 tablet, Rfl: 0    mupirocin (BACTROBAN) 2 % ointment, Apply topically 2 (two) times a day (Patient not taking: Reported on 2/15/2024), Disp: 22 g, Rfl: 0  I have spent a total time of 30 minutes on 03/18/24 in caring for this patient including Diagnostic results, Risks and benefits of tx options, Instructions for management, Patient and family education, Importance of tx compliance, Risk factor reductions, Documenting in the medical record, and Reviewing / ordering tests, medicine, procedures  .

## 2024-03-19 ENCOUNTER — OFFICE VISIT (OUTPATIENT)
Dept: CARDIOLOGY CLINIC | Facility: HOSPITAL | Age: 69
End: 2024-03-19
Payer: COMMERCIAL

## 2024-03-19 ENCOUNTER — OFFICE VISIT (OUTPATIENT)
Dept: FAMILY MEDICINE CLINIC | Facility: CLINIC | Age: 69
End: 2024-03-19
Payer: COMMERCIAL

## 2024-03-19 VITALS
WEIGHT: 164 LBS | HEIGHT: 64 IN | BODY MASS INDEX: 28 KG/M2 | TEMPERATURE: 96.9 F | SYSTOLIC BLOOD PRESSURE: 94 MMHG | DIASTOLIC BLOOD PRESSURE: 60 MMHG

## 2024-03-19 VITALS
SYSTOLIC BLOOD PRESSURE: 95 MMHG | DIASTOLIC BLOOD PRESSURE: 72 MMHG | HEART RATE: 88 BPM | HEIGHT: 64 IN | WEIGHT: 164 LBS | BODY MASS INDEX: 28 KG/M2

## 2024-03-19 DIAGNOSIS — E22.1 HYPERPROLACTINEMIA (HCC): ICD-10-CM

## 2024-03-19 DIAGNOSIS — E11.65 TYPE 2 DIABETES MELLITUS WITH HYPERGLYCEMIA, WITHOUT LONG-TERM CURRENT USE OF INSULIN (HCC): ICD-10-CM

## 2024-03-19 DIAGNOSIS — I25.5 ISCHEMIC CARDIOMYOPATHY: ICD-10-CM

## 2024-03-19 DIAGNOSIS — E83.52 HYPERCALCEMIA: ICD-10-CM

## 2024-03-19 DIAGNOSIS — D35.2 PITUITARY MACROADENOMA (HCC): ICD-10-CM

## 2024-03-19 DIAGNOSIS — I65.22 STENOSIS OF LEFT CAROTID ARTERY: ICD-10-CM

## 2024-03-19 DIAGNOSIS — I25.118 CORONARY ARTERY DISEASE OF NATIVE ARTERY OF NATIVE HEART WITH STABLE ANGINA PECTORIS (HCC): Primary | ICD-10-CM

## 2024-03-19 DIAGNOSIS — E78.2 MIXED HYPERLIPIDEMIA: ICD-10-CM

## 2024-03-19 DIAGNOSIS — I25.10 CORONARY ARTERY DISEASE INVOLVING NATIVE CORONARY ARTERY OF NATIVE HEART WITHOUT ANGINA PECTORIS: Primary | ICD-10-CM

## 2024-03-19 DIAGNOSIS — K52.9 COLITIS: ICD-10-CM

## 2024-03-19 PROBLEM — G93.89 SUPRASELLAR MASS: Status: RESOLVED | Noted: 2024-01-31 | Resolved: 2024-03-19

## 2024-03-19 PROCEDURE — 99214 OFFICE O/P EST MOD 30 MIN: CPT | Performed by: INTERNAL MEDICINE

## 2024-03-19 PROCEDURE — 99495 TRANSJ CARE MGMT MOD F2F 14D: CPT | Performed by: FAMILY MEDICINE

## 2024-03-19 NOTE — ASSESSMENT & PLAN NOTE
Status post left carotid endarterectomy.  She does have follow-up with vascular scheduled in 3 months.

## 2024-03-19 NOTE — PROGRESS NOTES
Julia Perry  1955  [id number]  [facility] [address] [id number] [phone]    1. Coronary artery disease involving native coronary artery of native heart without angina pectoris        2. Ischemic cardiomyopathy        3. Stenosis of left carotid artery        4. 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HCC)        5. Mixed hyperlipidemia            Discussion/Summary:  #1 cardiomyopathy ejection fraction 30%  #2 extensive heavy coronary calcifications  #3 chronic combined systolic and diastolic congestive heart failure  #4 hyperlipidemia  #5 type 2 diabetes mellitus  #6 IVCD  #7 high degree left internal carotid stenosis  #8 intracranial stenosis  #9 pituitary mass    Recommendations: Left heart catheterization showed multivessel coronary disease she already met with cardiothoracic surgery.  She had her carotid endarterectomy done with a good result.  She has also been seen by endocrine and neurosurgery for the CNS mass.  Needs to be scheduled for CABG will contact CT surgery.  Continue current medical therapy.  No room for any afterload reducing agents given borderline blood pressures want to avoid any renal dysfunction preoperatively.      Interval History: 68-year-old female with a history of type 2 diabetes and colitis presents for new patient consultation on behalf of Dr. Titus for new cardiomyopathy.  The patient has had shortness of breath, PND, orthopnea, chest pain since 2023.  She was started on steroids for colitis and had worsening shortness of breath but attributed this to medication side effects.  An echocardiogram was recently obtained which showed an ejection fraction of 30%.  The patient tells me she has had some chest heaviness with walking on and off for several years.  She is a diabetic.  The symptoms have been quite mild.  She has felt more shortness of breath with activity and had occasional lower extremity edema recently.   There is been no syncope.  She has been taking her medications as prescribed.  She is a non-smoker.    Remains with shortness of breath on exertion.  She is euvolemic on exam.  Awaiting CABG she underwent carotid endarterectomy on the left recently.  Denies any palpitations or fluttering.  There is been no syncope.  Denies any lower extremity edema, PND, orthopnea.    Medical Problems       Problem List       Colitis    History of biliary dyskinesia    DMII (diabetes mellitus, type 2) ([facility])      Lab Results   Component Value Date    HGBA1C 6.4 (H) 12/28/2023         Gallbladder polyp    Leiomyoma of uterus    Acquired right foot drop    Hypomagnesemia    Hair loss    Polyp of ascending colon    Ulcerative pancolitis without complication ([facility])    Type 2 diabetes mellitus with hyperglycemia, unspecified whether long term insulin use ([facility])      Lab Results   Component Value Date    HGBA1C 6.4 (H) 12/28/2023             Past Medical History:   Diagnosis Date    CHF (congestive heart failure) ([facility])     Colitis     Diabetes mellitus (HCC)     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HCC)     PONV (postoperative nausea and vomiting)      Social History     Socioeconomic History    Marital status: /Civil Union     Spouse name: Not on file    Number of children: Not on file    Years of education: Not on file    Highest education level: Not on file   Occupational History    Not on file   Tobacco Use    Smoking status: Former     Types: Cigarettes    Smokeless tobacco: Never    Tobacco comments:     Quit 1970's   Vaping Use    Vaping status: Never Used   Substance and Sexual Activity    Alcohol use: Never    Drug use: Never    Sexual activity: Not on file   Other Topics Concern    Not on file   Social History Narrative    Not on file     Social Determinants of Health     Financial Resource Strain: Patient Declined (8/18/2023)    Overall Financial Resource Strain (CARDIA)     Difficulty of Paying Living Expenses: Patient declined   Food  Insecurity: No Food Insecurity (3/5/2024)    Hunger Vital Sign     Worried About Running Out of Food in the Last Year: Never true     Ran Out of Food in the Last Year: Never true   Transportation Needs: No Transportation Needs (3/5/2024)    PRAPARE - Transportation     Lack of Transportation (Medical): No     Lack of Transportation (Non-Medical): No   Physical Activity: Not on file   Stress: Not on file   Social Connections: Not on file   Intimate Partner Violence: Not on file   Housing Stability: Low Risk  (3/5/2024)    Housing Stability Vital Sign     Unable to Pay for Housing in the Last Year: No     Number of Places Lived in the Last Year: 1     Unstable Housing in the Last Year: No      History reviewed. No pertinent family history.  Past Surgical History:   Procedure Laterality Date    CARDIAC CATHETERIZATION Left 1/17/2024    Procedure: Cardiac Left Heart Cath;  Surgeon: Juan A Sousa MD;  [facility];  Service: Cardiology    CARDIAC CATHETERIZATION  1/17/2024    Procedure: Cardiac catheterization;  Surgeon: Juan A Sousa MD;  [facility];  Service: Cardiology    HYSTERECTOMY      ~1990    ND TEAEC W/PATCH GRF CAROTID VERTB SUBCLAV NECK INC Left 3/4/2024    Procedure: LEFT ENDARTERECTOMY ARTERY CAROTID;  Surgeon: Janeth De Oliveira DO;  Location[facility];  Service: Vascular       Current Outpatient Medications:     acetaminophen (TYLENOL) 325 mg tablet, Take 2 tablets (650 mg total) by mouth every 6 (six) hours as needed for mild pain, Disp: , Rfl:     Alcohol Swabs 70 % PADS, May substitute brand based on insurance coverage. Check glucose TID., Disp: 100 each, Rfl: 0    aspirin 81 mg chewable tablet, Chew 1 tablet (81 mg total) daily, Disp: 90 tablet, Rfl: 3    b complex vitamins capsule, Take 1 capsule by mouth daily, Disp: , Rfl:     cholecalciferol (VITAMIN D3) 1,000 units tablet, Take 1,000 Units by mouth daily, Disp: , Rfl:     furosemide (LASIX) 40 mg tablet,  "Take 1 tablet (40 mg total) by mouth as needed (Take 1 tablet daily as needed) (Patient taking differently: Take 40 mg by mouth daily 1/2 tab in am, 1/2 in pm), Disp: 30 tablet, Rfl: 6    glucose blood test strip, Use 1 each daily as needed Use as instructed, Disp: , Rfl:     magnesium gluconate (MAGONATE) 500 mg tablet, Take 1 tablet (500 mg total) by mouth in the morning, Disp: 90 tablet, Rfl: 0    metFORMIN (GLUCOPHAGE) 500 mg tablet, 2 pills bid Do not start before January 18, 2024. (Patient taking differently: 500 mg daily after dinner Pt takes according to BGM / PRN), Disp: , Rfl:     metoprolol succinate (TOPROL-XL) 25 mg 24 hr tablet, Take 1 tablet (25 mg total) by mouth daily, Disp: 90 tablet, Rfl: 3    OneTouch Delica Lancets 33G MISC, May substitute brand based on insurance coverage. Check glucose TID., Disp: 100 each, Rfl: 0    rosuvastatin (CRESTOR) 40 MG tablet, Take 1 tablet (40 mg total) by mouth daily (Patient taking differently: Take 10 mg by mouth daily Patient reports taking 10 mg), Disp: 90 tablet, Rfl: 3    mupirocin (BACTROBAN) 2 % ointment, Apply topically 2 (two) times a day (Patient not taking: Reported on 2/15/2024), Disp: 22 g, Rfl: 0  Allergies   Allergen Reactions    Valdecoxib Other (See Comments)     Patient is unaware of this allergy       Labs:     Chemistry        Component Value Date/Time    K 4.2 03/05/2024 0450    K 4.4 08/17/2022 1507     03/05/2024 0450     08/17/2022 1507    CO2 24 03/05/2024 0450    CO2 27 08/17/2022 1507    BUN 21 03/05/2024 0450    BUN 14 08/17/2022 1507    CREATININE 0.71 03/05/2024 0450    CREATININE 0.78 08/17/2022 1507        Component Value Date/Time    CALCIUM 8.9 03/05/2024 0450    CALCIUM 10.0 08/17/2022 1507    ALKPHOS 66 01/11/2024 1355    AST 15 01/11/2024 1355    ALT 11 01/11/2024 1355            No results found for: \"CHOL\"  Lab Results   Component Value Date    HDL 47 (L) 01/11/2024    HDL 44 06/02/2021    HDL 44 07/24/2018 " "    Lab Results   Component Value Date    LDLCALC 130 (H) 01/11/2024    LDLCALC 156 (H) 06/02/2021    LDLCALC 141 (H) 07/24/2018     Lab Results   Component Value Date    TRIG 135 01/11/2024    TRIG 204 (H) 06/02/2021    TRIG 140 07/24/2018     No results found for: \"CHOLHDL\"    Imaging: Echo complete w/ contrast if indicated    Result Date: 1/3/2024  Narrative:   Left Ventricle: Left ventricular cavity size is at the upper limits of normal when indexed for BSA. Wall thickness is mildly increased. There is eccentric hypertrophy. The left ventricular ejection fraction is 30%. Systolic function is severely reduced. Wall motion is normal. Unable to assess diastolic function due to E/A fusion due to tachycardia.   The following segments are akinetic: basal inferoseptal, basal inferior, mid inferoseptal and mid inferior.   The following segments are hypokinetic: basal anterior, basal anteroseptal, basal inferolateral, basal anterolateral, mid anterior, mid anteroseptal, mid inferolateral, mid anterolateral, apical anterior, apical septal, apical inferior, apical lateral and apex.   Left Atrium: The atrium is moderately dilated (42-48 mL/m2).   Mitral Valve: There is mild annular calcification. There is mild to moderate regurgitation.   Pulmonic Valve: There is mild regurgitation.   Pericardium: There is a small pericardial effusion along the right atrial and right ventricular free wall. The fluid exhibits no internal echoes. There is no echocardiographic evidence of tamponade. There is a left pleural effusion.       ECG: Sinus rhythm IVCD      ROS    Vitals:    03/19/24 0802   BP: 95/72   Pulse: 88     Vitals:    03/19/24 0802   Weight: 74.4 kg (164 lb)     Height: 5' 4\" (162.6 cm)   Body mass index is 28.15 kg/m².    Physical Exam:  Vital signs reviewed  General:  Alert and cooperative, appears stated age, no acute distress  HEENT:  PERRLA, EOMI, no scleral icterus, no conjunctival pallor  Neck:  No lymphadenopathy, no " thyromegaly, no carotid bruits, no elevated JVP  Heart:  Regular rate and rhythm, normal S1/S2, no S3/S4, no murmur, rubs or gallops.  PMI nondisplaced  Lungs:  Clear to auscultation bilaterally, no wheezes rales or rhonchi  Abdomen:  Soft, non-tender, positive bowel sounds, no rebound or guarding,   no organomegaly   Extremities:  Normal range of motion.  No clubbing, cyanosis or edema   Vascular:  2+ pedal pulses  Skin:  No rashes or lesions on exposed skin  Neurologic:  Cranial nerves II-XII grossly intact without focal deficits  Psych:  Normal mood and affect

## 2024-03-19 NOTE — ASSESSMENT & PLAN NOTE
Symptoms stable at present, but leaving a very sedentary lifestyle due to the symptoms.  He is awaiting CABG   as per cardiology, additional medications are limited due to her hypotension.

## 2024-03-19 NOTE — PROGRESS NOTES
Assessment & Plan     1. Coronary artery disease of native artery of native heart with stable angina pectoris (HCC)  Assessment & Plan:  Symptoms stable at present, but leaving a very sedentary lifestyle due to the symptoms.  He is awaiting CABG   as per cardiology, additional medications are limited due to her hypotension.      2. Ischemic cardiomyopathy  Assessment & Plan:  As above      3. Stenosis of left carotid artery  Assessment & Plan:  Status post left carotid endarterectomy.  She does have follow-up with vascular scheduled in 3 months.      4. Colitis  Assessment & Plan:  Symptoms controlled at present      5. Type 2 diabetes mellitus with hyperglycemia, without long-term current use of insulin (HCC)  Assessment & Plan:  Patient currently taking metformin as needed pending her sugar reports.  Endo is aware of this.  Sugars of course have been up-and-down since occasions were completely discontinued prior to surgery again will be discontinued prior to CABG.  I advised after surgery we will wait at least 2 months before repeating hemoglobin A1c.  I did encourage diabetic diet as much as she is capable of.  Lab Results   Component Value Date    HGBA1C 6.4 (H) 12/28/2023       6. 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HCC)  Assessment & Plan:  Being followed by Endo and neuro      7. Hyperprolactinemia (HCC)  Assessment & Plan:  Being followed by Endo      8. Mixed hyperlipidemia  Assessment & Plan:  Again we will hold on rechecking lab several months after CABG      9. Hypercalcemia  Assessment & Plan:  Being followed by Endo           Subjective     Transitional Care Management Review:   Julia Perry is a 68 y.o. female here for TCM follow up.     During the TCM phone call patient stated:  TCM Call     Date and time call was made  3/6/2024  7:15 AM    Hospital care reviewed  Records reviewed    Patient was hospitialized at  [facility]    Date of Admission  03/04/24    Date of discharge  03/05/24    Diagnosis   "Stenosis of left carotid artery    Disposition  Home    Were the patients medications reviewed and updated  Yes      TCM Call     Scheduled for follow up?  Yes    Did you obtain your prescribed medications  Yes    Do you need help managing your prescriptions or medications  No    Is transportation to your appointment needed  No    I have advised the patient to call PCP with any new or worsening symptoms  Ella CARR    Are you recieving any outpatient services  No    Are you recieving home care services  No    Are you using any community resources  No    Current waiver services  No    Have you fallen in the last 12 months  No    Interperter language line needed  No    Counseling  Patient        Patient admitted March 4 through March for for left carotid endarterectomy which was completed successfully..  She was sent home feeling fine however the following 2 days and marked GI symptoms she feels due to anesthesia nausea and vomiting.  She did have follow-up with vascular surgeon; incision healing well and for follow-up Dopplers in 3 months.  She complains of pain in the area but states the area is numb she reassured is normal postop.    She also saw her cardiologist earlier today and is being scheduled to see cardiac surgeon due to need for CABG.  She continues with shortness of breath with any activity associated chest pain.  She states the fluid tension is fairly well-controlled taking the Lasix and is watching a low-salt diet.  She does hear a little \"crackling\" in her lungs when lying down in the evening but denies any worsened shortness of breath at that time or symptoms ofPND.  Despite low blood pressure she denies lightheadedness with ambulation.  She is trying to stay well-hydrated.    She also is being followed by Endo due to diabetes as well as a pituitary mass.  At present this is being monitored by Endo and also by neurology.  She denies any headache or visual complaints.      Review of " "Systems    Objective     BP 94/60 (BP Location: Left arm, Patient Position: Sitting, Cuff Size: Adult)   Temp (!) 96.9 °F (36.1 °C) (Tympanic)   Ht 5' 4\" (1.626 m)   Wt 74.4 kg (164 lb)   BMI 28.15 kg/m²      Physical Exam  Vitals and nursing note reviewed.   Constitutional:       General: She is not in acute distress.     Appearance: Normal appearance. She is well-developed.   HENT:      Head: Normocephalic and atraumatic.   Eyes:      Conjunctiva/sclera: Conjunctivae normal.   Neck:      Vascular: No carotid bruit.   Cardiovascular:      Rate and Rhythm: Normal rate and regular rhythm.      Pulses:           Carotid pulses are 2+ on the right side and 2+ on the left side.       Radial pulses are 2+ on the right side and 1+ on the left side.        Dorsalis pedis pulses are 2+ on the right side and 2+ on the left side.        Posterior tibial pulses are 2+ on the right side and 2+ on the left side.      Heart sounds: Murmur heard.      Comments: Heart rate 80; rhythm is regular without ectopics with grade 2/6 blowing systolic murmur over right upper sternal border without radiation.  BP is 105/70 without tilt    Edema is noted without calf tenderness  Pulmonary:      Effort: Pulmonary effort is normal. No respiratory distress.      Breath sounds: Normal breath sounds.   Abdominal:      Tenderness: There is no abdominal tenderness.   Musculoskeletal:         General: No swelling.      Cervical back: Neck supple.      Right lower le+ Pitting Edema present.      Left lower le+ Pitting Edema present.   Lymphadenopathy:      Cervical: No cervical adenopathy.   Skin:     General: Skin is warm and dry.      Capillary Refill: Capillary refill takes less than 2 seconds.   Neurological:      Mental Status: She is alert.   Psychiatric:         Mood and Affect: Mood normal.       Medications have been reviewed by provider in current encounter    Toya Titus MD         "

## 2024-03-19 NOTE — ASSESSMENT & PLAN NOTE
Patient currently taking metformin as needed pending her sugar reports.  Endo is aware of this.  Sugars of course have been up-and-down since occasions were completely discontinued prior to surgery again will be discontinued prior to CABG.  I advised after surgery we will wait at least 2 months before repeating hemoglobin A1c.  I did encourage diabetic diet as much as she is capable of.  Lab Results   Component Value Date    HGBA1C 6.4 (H) 12/28/2023

## 2024-04-02 ENCOUNTER — HOSPITAL ENCOUNTER (OUTPATIENT)
Dept: RADIOLOGY | Facility: HOSPITAL | Age: 69
Discharge: HOME | End: 2024-04-02
Payer: COMMERCIAL

## 2024-04-02 ENCOUNTER — OFFICE VISIT (OUTPATIENT)
Dept: CARDIAC SURGERY | Facility: CLINIC | Age: 69
End: 2024-04-02
Payer: COMMERCIAL

## 2024-04-02 ENCOUNTER — APPOINTMENT (OUTPATIENT)
Dept: LAB | Facility: CLINIC | Age: 69
End: 2024-04-02
Payer: COMMERCIAL

## 2024-04-02 VITALS
OXYGEN SATURATION: 98 % | HEART RATE: 92 BPM | WEIGHT: 164.4 LBS | TEMPERATURE: 96.8 F | HEIGHT: 64 IN | SYSTOLIC BLOOD PRESSURE: 108 MMHG | BODY MASS INDEX: 28.07 KG/M2 | DIASTOLIC BLOOD PRESSURE: 57 MMHG

## 2024-04-02 DIAGNOSIS — I25.5 ISCHEMIC CARDIOMYOPATHY: ICD-10-CM

## 2024-04-02 DIAGNOSIS — I25.118 CORONARY ARTERY DISEASE INVOLVING NATIVE CORONARY ARTERY OF NATIVE HEART WITH OTHER FORM OF ANGINA PECTORIS (HCC): ICD-10-CM

## 2024-04-02 DIAGNOSIS — Z98.890 S/P CAROTID ENDARTERECTOMY: ICD-10-CM

## 2024-04-02 DIAGNOSIS — I25.118 CORONARY ARTERY DISEASE INVOLVING NATIVE CORONARY ARTERY OF NATIVE HEART WITH OTHER FORM OF ANGINA PECTORIS (HCC): Primary | ICD-10-CM

## 2024-04-02 LAB
ABO GROUP BLD: NORMAL
ALBUMIN SERPL BCP-MCNC: 4.1 G/DL (ref 3.5–5)
ALP SERPL-CCNC: 64 U/L (ref 34–104)
ALT SERPL W P-5'-P-CCNC: 29 U/L (ref 7–52)
ANION GAP SERPL CALCULATED.3IONS-SCNC: 8 MMOL/L (ref 4–13)
AST SERPL W P-5'-P-CCNC: 25 U/L (ref 13–39)
BILIRUB SERPL-MCNC: 0.7 MG/DL (ref 0.2–1)
BLD GP AB SCN SERPL QL: NEGATIVE
BUN SERPL-MCNC: 28 MG/DL (ref 5–25)
CALCIUM SERPL-MCNC: 9.7 MG/DL (ref 8.4–10.2)
CHLORIDE SERPL-SCNC: 102 MMOL/L (ref 96–108)
CO2 SERPL-SCNC: 31 MMOL/L (ref 21–32)
CREAT SERPL-MCNC: 0.96 MG/DL (ref 0.6–1.3)
ERYTHROCYTE [DISTWIDTH] IN BLOOD BY AUTOMATED COUNT: 15.7 % (ref 11.6–15.1)
GFR SERPL CREATININE-BSD FRML MDRD: 61 ML/MIN/1.73SQ M
GLUCOSE P FAST SERPL-MCNC: 127 MG/DL (ref 65–99)
HCT VFR BLD AUTO: 33.3 % (ref 34.8–46.1)
HGB BLD-MCNC: 10.3 G/DL (ref 11.5–15.4)
INR PPP: 1.16 (ref 0.84–1.19)
MCH RBC QN AUTO: 28.6 PG (ref 26.8–34.3)
MCHC RBC AUTO-ENTMCNC: 30.9 G/DL (ref 31.4–37.4)
MCV RBC AUTO: 93 FL (ref 82–98)
PLATELET # BLD AUTO: 323 THOUSANDS/UL (ref 149–390)
PMV BLD AUTO: 10.4 FL (ref 8.9–12.7)
POTASSIUM SERPL-SCNC: 4.1 MMOL/L (ref 3.5–5.3)
PROT SERPL-MCNC: 6.9 G/DL (ref 6.4–8.4)
PROTHROMBIN TIME: 14.7 SECONDS (ref 11.6–14.5)
RBC # BLD AUTO: 3.6 MILLION/UL (ref 3.81–5.12)
RH BLD: POSITIVE
SODIUM SERPL-SCNC: 141 MMOL/L (ref 135–147)
SPECIMEN EXPIRATION DATE: NORMAL
WBC # BLD AUTO: 8.41 THOUSAND/UL (ref 4.31–10.16)

## 2024-04-02 PROCEDURE — 86850 RBC ANTIBODY SCREEN: CPT

## 2024-04-02 PROCEDURE — 85610 PROTHROMBIN TIME: CPT

## 2024-04-02 PROCEDURE — 99214 OFFICE O/P EST MOD 30 MIN: CPT | Performed by: STUDENT IN AN ORGANIZED HEALTH CARE EDUCATION/TRAINING PROGRAM

## 2024-04-02 PROCEDURE — 85027 COMPLETE CBC AUTOMATED: CPT

## 2024-04-02 PROCEDURE — 80053 COMPREHEN METABOLIC PANEL: CPT

## 2024-04-02 PROCEDURE — 86901 BLOOD TYPING SEROLOGIC RH(D): CPT

## 2024-04-02 PROCEDURE — 86900 BLOOD TYPING SEROLOGIC ABO: CPT

## 2024-04-02 PROCEDURE — 36415 COLL VENOUS BLD VENIPUNCTURE: CPT

## 2024-04-02 PROCEDURE — 71046 X-RAY EXAM CHEST 2 VIEWS: CPT

## 2024-04-02 RX ORDER — CHLORHEXIDINE GLUCONATE ORAL RINSE 1.2 MG/ML
15 SOLUTION DENTAL ONCE
Status: CANCELLED | OUTPATIENT
Start: 2024-04-02 | End: 2024-04-02

## 2024-04-02 NOTE — H&P
Pre-Op History & Physical - Cardiothoracic Surgery   Julia Perry 68 y.o. female MRN: [id number]    Cardiologist: Dr. Ceballos  PCP:  Dr. Titus  Vascular Surgeon: Dr. De Oliveira  Endocrinologist: Dr. Lau    Reason for Consult / Principal Problem: Coronary artery disease; Ischemic Cardiomyopathy    History of Present Illness: Julia Perry is a 68 y.o. year old female with PMHx notable for CAD, ICMP (EF 30%), HLD, DM2, chronic combined systolic & diastolic CHF, IVCD, B/L carotid stenosis s/p L CEA (3/4/24), intracranial stenosis, pituitary macroadenoma, hyperprolactinemia, hypercalcemia and ulcerative colitis.  Patient's clinical course began around August 2023 when she presented to her PCP for lower extremity edema. She had been on an 8 week course of prednisone prior to this after a hospitalization in June 2023 for ulcerative colitis (severe on  colonoscopy on 6/6/23.) The edema was attributed to her steroid course. Patient presented to PCP again in December with ongoing shortness of breath with orthopnea, PND, persistent edema and chest pain. At this time, a CXR was obtained that demonstrated bilateral pleural effusions, and she was treated with antibiotics for suspected pneumonia. Despite antibiotics, her symptoms did not improved, therefore,  she was sent for an echocardiogram that demonstrated reduced EF of 30%.  She was referred to cardiology and was  seen by Dr. Ceballos on 1/11/24. GDMT was initiated and cardiac cath ordered for further ischemic w/u. Coronary angiogram on 1/17/24 demonstrated multivessel CAD and she was then referred to cardiac surgery for CABG evaluation, seen in the office on 1/23/24 by Dr. Castellanos. Pre-op testing ordered. Carotid duplex demonstrated significant carotid stenosis prompting immediate CTA H&N scan which confirmed high grade left carotid stenosis. CABG surgery was postponed and she was seen by Vascular Surgery and subsequently underwent L CEA on 3/4/34. CTA H&N also identified a  pituitary macroadenoma and patient has been seen in consultation by both neurosurgery and endocrinology due to this finding.     Patient presents back to our office today to discuss and schedule CABG surgery. She is accompanied by her . She reports progressive SOB & [place] with most all activities. She is fatigued with poor activity tolerance  She can not climb stairs anymore. Her edema is persistent, despite Furosemide,  and she is experiencing orthopnea at night.  She reports a UC flair after returning home after her L CEA surgery in early March. She denies N&V, abdominal pain, bloody stool, fever or chills currently.  She reports back pain.    Former smoker (remote; quit > 50 yr ago); no alcohol or drug use.      Past Medical History:  Past Medical History:   Diagnosis Date    CHF (congestive heart failure) (HCC)     Colitis     Diabetes mellitus (HCC)     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HCC)     PONV (postoperative nausea and vomiting)          Past Surgical History:   Past Surgical History:   Procedure Laterality Date    CARDIAC CATHETERIZATION Left 1/17/2024    Procedure: Cardiac Left Heart Cath;  Surgeon: Juan A Sousa MD;  Location: [facility];  Service: Cardiology    CARDIAC CATHETERIZATION  1/17/2024    Procedure: Cardiac catheterization;  Surgeon: Juan A Sousa MD;  Location: [facility];  Service: Cardiology    HYSTERECTOMY      ~1990    NC TEAEC W/PATCH GRF CAROTID VERTB SUBCLAV NECK INC Left 3/4/2024    Procedure: LEFT ENDARTERECTOMY ARTERY CAROTID;  Surgeon: Janeth De Oliveira DO;  Location[facility];  Service: Vascular         Family History:  History reviewed. No pertinent family history.      Social History:    Social History     Substance and Sexual Activity   Alcohol Use Never     Social History     Substance and Sexual Activity   Drug Use Never     Social History     Tobacco Use   Smoking Status Former    Types: Cigarettes    Passive exposure: Past   Smokeless Tobacco Never    Tobacco Comments    Quit 1970's       Home Medications:   Prior to Admission medications    Medication Sig Start Date End Date Taking? Authorizing Provider   acetaminophen (TYLENOL) 325 mg tablet Take 2 tablets (650 mg total) by mouth every 6 (six) hours as needed for mild pain  Patient taking differently: Take 650 mg by mouth every 6 (six) hours as needed for mild pain As needed 3/5/24  Yes ROYAL Rojas   Alcohol Swabs 70 % PADS May substitute brand based on insurance coverage. Check glucose TID. 6/8/23  Yes Jean Claude Villatoro PA-C   aspirin 81 mg chewable tablet Chew 1 tablet (81 mg total) daily 1/11/24  Yes Maverick Ceballos DO   b complex vitamins capsule Take 1 capsule by mouth daily   Yes Historical Provider, MD   cholecalciferol (VITAMIN D3) 1,000 units tablet Take 1,000 Units by mouth daily   Yes Historical Provider, MD   furosemide (LASIX) 40 mg tablet Take 1 tablet (40 mg total) by mouth as needed (Take 1 tablet daily as needed)  Patient taking differently: Take 40 mg by mouth daily 1/2 tab in am, 1/2 in pm 1/18/24  Yes Maverick Ceballos DO   glucose blood test strip Use 1 each daily as needed Use as instructed   Yes Historical Provider, MD   magnesium gluconate (MAGONATE) 500 mg tablet Take 1 tablet (500 mg total) by mouth in the morning  Patient taking differently: Take 400 mg by mouth in the morning 6/8/23 4/2/24 Yes Jean Claude Villatoro PA-C   metFORMIN (GLUCOPHAGE) 500 mg tablet 2 pills bid Do not start before January 18, 2024.  Patient taking differently: 500 mg daily after dinner Pt takes according to BGM / PRN 1/18/24  Yes ROYAL Aguilera   metoprolol succinate (TOPROL-XL) 25 mg 24 hr tablet Take 1 tablet (25 mg total) by mouth daily 1/11/24  Yes Maverick Ceballos DO   OneTouch Delica Lancets 33G MISC May substitute brand based on insurance coverage. Check glucose TID. 6/8/23  Yes Jean Claude Villatoro PA-C   rosuvastatin (CRESTOR) 40 MG tablet Take 1 tablet (40 mg total) by mouth  "daily  Patient taking differently: Take 10 mg by mouth daily Patient reports taking 10 mg 1/17/24  Yes ROYAL Aguilera       Allergies:  Allergies   Allergen Reactions    Valdecoxib Other (See Comments)     Patient is unaware of this allergy       Review of Systems:  Review of Systems - History obtained from chart review and the patient  General ROS: positive for  - fatigue and poor activity tolerance  negative for - chills or fever  Psychological ROS: negative  Ophthalmic ROS: positive for - uses glasses  ENT ROS: negative  Allergy and Immunology ROS: negative  Hematological and Lymphatic ROS: negative  Endocrine ROS: negative  Breast ROS: negative  Respiratory ROS: positive for - orthopnea and shortness of breath  negative for - cough, hemoptysis, or pleuritic pain  Cardiovascular ROS: positive for - chest pain, dyspnea on exertion, edema, orthopnea, paroxysmal nocturnal dyspnea, and shortness of breath  negative for - irregular heartbeat, loss of consciousness, palpitations, or rapid heart rate  Gastrointestinal ROS: positive for - diarrhea  negative for - abdominal pain, blood in stools, nausea/vomiting, or swallowing difficulty/pain  Genito-Urinary ROS: no dysuria, trouble voiding, or hematuria  Musculoskeletal ROS: positive for - back pain  Neurological ROS: no TIA or stroke symptoms  Dermatological ROS: negative    Vital Signs:     Vitals:    04/02/24 1024 04/02/24 1028   BP: 116/62 108/57   BP Location: Left arm Right arm   Patient Position: Sitting Sitting   Cuff Size: Standard    Pulse: 92    Temp: (!) 96.8 °F (36 °C)    TempSrc: Tympanic    SpO2: 98%    Weight: 74.6 kg (164 lb 6.4 oz)    Height: 5' 4\" (1.626 m)        Physical Exam:    General:  Alert, oriented, well developed, no acute distress  HEENT/NECK:  PERRLA.  No jugular venous distention. Cardiac:Regular rate and rhythm, no murmurs rubs or gallops.  Carotid arteries: 1+, no bruits; L CEA incision well healed.  Pulmonary:  Breath sounds " clear bilaterally- upper lung fields  but absent in bases bilaterally  Abdomen:  Non-tender, Non-distended.  Positive bowel sounds.  Upper extremities: 2+ radial pulses; brisk capillary refill; RIGHT hand dominant  Lower extremities: Extremities warm/dry. PT/DP pulses 1+ bilaterally.  2+ edema B/L  Musculoskeletal: MAEE, stable gait  Neuro: Alert and oriented X 3.  Sensation is grossly intact.  No focal deficits.  Skin: Warm/Dry, without rashes or lesions.    Lab Results:     Lab Results   Component Value Date    WBC 12.74 (H) 03/05/2024    HGB 11.2 (L) 03/05/2024    HCT 34.4 (L) 03/05/2024    MCV 87 03/05/2024     03/05/2024     Lab Results   Component Value Date    SODIUM 138 03/05/2024    K 4.2 03/05/2024     03/05/2024    CO2 24 03/05/2024    AGAP 6 03/05/2024    BUN 21 03/05/2024    CREATININE 0.71 03/05/2024    GLUC 154 (H) 03/05/2024    GLUF 112 (H) 02/01/2024    CALCIUM 8.9 03/05/2024    AST 15 01/11/2024    ALT 11 01/11/2024    ALKPHOS 66 01/11/2024    TP 7.3 01/11/2024    TBILI 0.73 01/11/2024    EGFR 88 03/05/2024     Lab Results   Component Value Date    HGBA1C 6.4 (H) 12/28/2023     Lab Results   Component Value Date    CHOLESTEROL 204 (H) 01/11/2024    CHOLESTEROL 241 (H) 06/02/2021    CHOLESTEROL 213 (H) 07/24/2018     Lab Results   Component Value Date    HDL 47 (L) 01/11/2024    HDL 44 06/02/2021    HDL 44 07/24/2018     Lab Results   Component Value Date    TRIG 135 01/11/2024    TRIG 204 (H) 06/02/2021    TRIG 140 07/24/2018     Lab Results   Component Value Date    NONHDLC 197 06/02/2021    NONHDLC 169 07/24/2018       Imaging Studies:     Cardiac Catheterization: 1/17/24      Prox LAD lesion is 50% stenosed.    Mid LAD lesion is 85% stenosed.    Prox Cx lesion is 40% stenosed.    Mid Cx lesion is 70% stenosed.    Dist Cx lesion is 90% stenosed.    Prox RCA lesion is 50% stenosed.    Mid RCA lesion is 95% stenosed.    Echocardiogram:  1/3/24      Left Ventricle: Left ventricular  cavity size is at the upper limits of normal when indexed for BSA. Wall thickness is mildly increased. There is eccentric hypertrophy. The left ventricular ejection fraction is 30%. Systolic function is severely reduced. Wall motion is normal. Unable to assess diastolic function due to E/A fusion due to tachycardia.    The following segments are akinetic: basal inferoseptal, basal inferior, mid inferoseptal and mid inferior.    The following segments are hypokinetic: basal anterior, basal anteroseptal, basal inferolateral, basal anterolateral, mid anterior, mid anteroseptal, mid inferolateral, mid anterolateral, apical anterior, apical septal, apical inferior, apical lateral and apex.    Left Atrium: The atrium is moderately dilated (42-48 mL/m2).    Mitral Valve: There is mild annular calcification. There is mild to moderate regurgitation.    Pulmonic Valve: There is mild regurgitation.    Pericardium: There is a small pericardial effusion along the right atrial and right ventricular free wall. The fluid exhibits no internal echoes. There is no echocardiographic evidence of tamponade. There is a left pleural effusion.     Findings    Left Ventricle Left ventricular cavity size is at the upper limits of normal when indexed for BSA. Wall thickness is mildly increased. There is eccentric hypertrophy. The left ventricular ejection fraction is 30%. Systolic function is severely reduced. Wall motion is normal. Unable to assess diastolic function due to E/A fusion due to tachycardia.   Wall Scoring Baseline     The following segments are akinetic: basal inferoseptal, basal inferior, mid inferoseptal and mid inferior.  The following segments are hypokinetic: basal anterior, basal anteroseptal, basal inferolateral, basal anterolateral, mid anterior, mid anteroseptal, mid inferolateral, mid anterolateral, apical anterior, apical septal, apical inferior, apical lateral and apex.            Right Ventricle Right ventricular  cavity size is normal. Systolic function is normal. Wall thickness is normal.   Left Atrium The atrium is moderately dilated (42-48 mL/m2).   Right Atrium The atrium is normal in size.   Aortic Valve The aortic valve is trileaflet. The leaflets are not thickened. The leaflets are not calcified. The leaflets exhibit normal mobility. There is no evidence of regurgitation. The aortic valve has no significant stenosis.   Mitral Valve There is mild annular calcification.  There is mild to moderate regurgitation. There is no evidence of stenosis.   Tricuspid Valve Tricuspid valve structure is normal. There is trace regurgitation. There is no evidence of stenosis. Right ventricular systolic pressure could not be assessed.   Pulmonic Valve Pulmonic valve structure is normal. There is mild regurgitation. There is no evidence of stenosis.   Ascending Aorta The aortic root is normal in size.   IVC/SVC The inferior vena cava is normal in size. Respirophasic changes were normal.   Pericardium There is a small pericardial effusion along the right atrial and right ventricular free wall. The fluid exhibits no internal echoes. There is no echocardiographic evidence of tamponade. The pericardium is normal in appearance. There is a left pleural effusion.     Left Ventricle Measurements    Function/Volumes   LVOT stroke volume 30.3         LVOT stroke volume index 17.1 ml/m2         LVOT Cardiac Output 3.05 l/min         LVOT Cardiac Index 1.69 l/min/m2         Dimensions   LVIDd 5.4 cm         LVIDS 4.7 cm         IVSd 1 cm         LVPWd 1 cm         LVOT area 3.14 cm2         FS 13         Diastolic Filling   MV E' Tissue Velocity Septal 8 cm/s         MV E' Tissue Velocity Lateral 8 cm/s         LA Volume Index (BP) 40.9 mL/m2         E wave deceleration time 162 ms         MV Peak E Ambrocio 129 cm/s         PV Peak S Ambrocio 0.23 m/s         PV Peak D Ambrocio 0.12 m/s         Pulm vein S/D ratio 1.92          Report Measurements   AV LVOT peak  gradient 1 mmHg              Interventricular Septum Measurements    Shunt Ratio   LVOT peak VTI 9.65 cm         LVOT peak valentino 0.58 m/s              Right Ventricle Measurements    Dimensions   RVID d 2.6 cm         Tricuspid annular plane systolic excursion 1.8 cm               Left Atrium Measurements    Dimensions   LA size 4 cm         LA length (A2C) 4.3 cm         Volumes   LA volume (BP) 74 mL         LA Volume Index (BP) 40.9 mL/m2               Right Atrium Measurements    Dimensions   RAA A4C 10.7 cm2               Atrial Septum Measurements    Shunt Ratio   LVOT peak VTI 9.65 cm         LVOT peak valentino 0.58 m/s               Aortic Valve Measurements    Stenosis   LVOT peak valentino 0.58 m/s         LVOT peak VTI 9.65 cm         LVOT mn grad 1 mmHg         AV LVOT peak gradient 1 mmHg         Area/Dimensions   LVOT diameter 2 cm         LVOT area 3.14 cm2               Mitral Valve Measurements    Stenosis   MV stenosis pressure 1/2 time 47 ms         MV valve area p 1/2 method 4.68               Aorta Measurements    Aortic Dimensions   Ao root 3.5 cm           ECG: 3/5/24     Sinus tachycardia  Left axis deviation  Non-specific intra-ventricular conduction block  T wave abnormality, consider lateral ischemia      Carotid Duplex: 1/26/24    RIGHT:  There is <50% stenosis noted in the internal carotid artery. Plaque is  heterogenous and irregular.  Vertebral artery flow is antegrade. There is no significant subclavian artery  disease.     LEFT:  There is 70-99% stenosis noted in the internal carotid artery. Plaque is  heterogenous and irregular.  Vertebral artery flow is antegrade. There is no significant subclavian artery  disease.     CTA H & N: 1/26/24    1. No acute intracranial abnormality.     2. Mild cerebral chronic microangiopathic changes.     3. 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 There appears to be compression of the optic chiasm. Dedicated pre and postcontrast MR  imaging of the brain with a sellar protocol is recommended along with consultations with the   neurosurgical and endocrinology services.     4. Critical left ICA origin and carotid bulb stenosis with greater than 90% stenosis. No hemodynamically significant stenosis at the right ICA origin. Consultation with the vascular surgical service is recommended.     5. Near occlusive to occlusive atherosclerotic disease in the left cavernous and proximal supraclinoid ICA with reconstitution of the distal segment and carotid terminus likely via an intact [place].     6. Moderate to severe stenosis in the right cavernous and proximal supraclinoid ICA.     7. Focal moderate to severe segmental stenosis in the P2 segment of the right posterior cerebral artery. Mild stenosis in the P2 segment of the left posterior cerebral artery.     8. Moderate mid to distal basilar stenosis.       Vein Mappin24    RIGHT LOWER LIMB:  The great saphenous vein is patent  from the groin to the ankle.  The intraluminal diameter measurements range from 1 mm to 2.4 mm throughout.     LEFT LOWER LIMB:  The great saphenous vein is patent  from the groin to the ankle.  The intraluminal diameter measurements range from 0.6 mm to 2.6 mm throughout.       I have personally reviewed pertinent films in PACS    Assessment:  Patient Active Problem List    Diagnosis Date Noted    S/P carotid endarterectomy 2024    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HCC) 02/15/2024    Hypercalcemia 02/15/2024    Hyperprolactinemia (HCC) 02/15/2024    Stenosis of left carotid artery 2024    Coronary artery disease involving native coronary artery 2024    Ischemic cardiomyopathy 2024    Mixed hyperlipidemia 2024    Ulcerative pancolitis without complication (HCC) 2024    Polyp of ascending colon 2023    Acquired right foot drop 2023    Hypomagnesemia 2023    Hair loss 2023    Colitis 2023    History of  biliary dyskinesia 06/05/2023    DMII (diabetes mellitus, type 2) (HCC) 06/05/2023    Gallbladder polyp 08/31/2022    Leiomyoma of uterus 11/22/2013         Impression/Plan:    Coronary Artery Disease  Ischemic Cardiomyopathy    Risks and benefits of coronary artery bypass grafting were discussed in detail today with the patient. We have reviewed results all preoperative radiographic,  laboratory and vascular studies.  They understand and wish to proceed with surgical intervention on 4/9/24 with Dr. Josh Perry was comfortable with our recommendations, and their questions were answered to their satisfaction.  Thank you for allowing us to participate in the care of this patient.     The patient recently had a screening colonoscopy in 12/6/23.  Therefore GI referral is not indicated at this time.     SIGNATURE: ROYAL Moore  DATE: April 2, 2024  TIME: 11:12 AM

## 2024-04-02 NOTE — PROGRESS NOTES
Pre-Op History & Physical - Cardiothoracic Surgery   Julia Perry 68 y.o. female MRN: [id number]    Cardiologist: Dr. Ceballos  PCP:  Dr. Titus  Vascular Surgeon: Dr. De Oliveira  Endocrinologist: Dr. Lau    Reason for Consult / Principal Problem: Coronary artery disease; Ischemic Cardiomyopathy    History of Present Illness: Julia Perry is a 68 y.o. year old female with PMHx notable for CAD, ICMP (EF 30%), HLD, DM2, chronic combined systolic & diastolic CHF, IVCD, B/L carotid stenosis s/p L CEA (3/4/24), intracranial stenosis, pituitary macroadenoma, hyperprolactinemia, hypercalcemia and ulcerative colitis.  Patient's clinical course began around August 2023 when she presented to her PCP for lower extremity edema. She had been on an 8 week course of prednisone prior to this after a hospitalization in June 2023 for ulcerative colitis (severe on  colonoscopy on 6/6/23.) The edema was attributed to her steroid course. Patient presented to PCP again in December with ongoing shortness of breath with orthopnea, PND, persistent edema and chest pain. At this time, a CXR was obtained that demonstrated bilateral pleural effusions, and she was treated with antibiotics for suspected pneumonia. Despite antibiotics, her symptoms did not improved, therefore,  she was sent for an echocardiogram that demonstrated reduced EF of 30%.  She was referred to cardiology and was  seen by Dr. Ceballos on 1/11/24. GDMT was initiated and cardiac cath ordered for further ischemic w/u. Coronary angiogram on 1/17/24 demonstrated multivessel CAD and she was then referred to cardiac surgery for CABG evaluation, seen in the office on 1/23/24 by Dr. Castellanos. Pre-op testing ordered. Carotid duplex demonstrated significant carotid stenosis prompting immediate CTA H&N scan which confirmed high grade left carotid stenosis. CABG surgery was postponed and she was seen by Vascular Surgery and subsequently underwent L CEA on 3/4/34. CTA H&N also identified a  pituitary macroadenoma and patient has been seen in consultation by both neurosurgery and endocrinology due to this finding.     Patient presents back to our office today to discuss and schedule CABG surgery. She is accompanied by her . She reports progressive SOB & [place] with most all activities. She is fatigued with poor activity tolerance  She can not climb stairs anymore. Her edema is persistent, despite Furosemide,  and she is experiencing orthopnea at night.  She reports a UC flair after returning home after her L CEA surgery in early March. She denies N&V, abdominal pain, bloody stool, fever or chills currently.  She reports back pain.    Former smoker (remote; quit > 50 yr ago); no alcohol or drug use.      Past Medical History:  Past Medical History:   Diagnosis Date    CHF (congestive heart failure) (HCC)     Colitis     Diabetes mellitus (HCC)     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HCC)     PONV (postoperative nausea and vomiting)          Past Surgical History:   Past Surgical History:   Procedure Laterality Date    CARDIAC CATHETERIZATION Left 1/17/2024    Procedure: Cardiac Left Heart Cath;  Surgeon: Juan A Sousa MD;  Location: [facility];  Service: Cardiology    CARDIAC CATHETERIZATION  1/17/2024    Procedure: Cardiac catheterization;  Surgeon: Juan A Sousa MD;  Location: [facility];  Service: Cardiology    HYSTERECTOMY      ~1990    OR TEAEC W/PATCH GRF CAROTID VERTB SUBCLAV NECK INC Left 3/4/2024    Procedure: LEFT ENDARTERECTOMY ARTERY CAROTID;  Surgeon: Janeth De Oliveira DO;  Location[facility];  Service: Vascular         Family History:  History reviewed. No pertinent family history.      Social History:    Social History     Substance and Sexual Activity   Alcohol Use Never     Social History     Substance and Sexual Activity   Drug Use Never     Social History     Tobacco Use   Smoking Status Former    Types: Cigarettes    Passive exposure: Past   Smokeless Tobacco Never    Tobacco Comments    Quit 1970's       Home Medications:   Prior to Admission medications    Medication Sig Start Date End Date Taking? Authorizing Provider   acetaminophen (TYLENOL) 325 mg tablet Take 2 tablets (650 mg total) by mouth every 6 (six) hours as needed for mild pain  Patient taking differently: Take 650 mg by mouth every 6 (six) hours as needed for mild pain As needed 3/5/24  Yes ROYAL Rojas   Alcohol Swabs 70 % PADS May substitute brand based on insurance coverage. Check glucose TID. 6/8/23  Yes Jean Claude Villatoro PA-C   aspirin 81 mg chewable tablet Chew 1 tablet (81 mg total) daily 1/11/24  Yes Maverick Ceballos DO   b complex vitamins capsule Take 1 capsule by mouth daily   Yes Historical Provider, MD   cholecalciferol (VITAMIN D3) 1,000 units tablet Take 1,000 Units by mouth daily   Yes Historical Provider, MD   furosemide (LASIX) 40 mg tablet Take 1 tablet (40 mg total) by mouth as needed (Take 1 tablet daily as needed)  Patient taking differently: Take 40 mg by mouth daily 1/2 tab in am, 1/2 in pm 1/18/24  Yes Maverick Ceballos DO   glucose blood test strip Use 1 each daily as needed Use as instructed   Yes Historical Provider, MD   magnesium gluconate (MAGONATE) 500 mg tablet Take 1 tablet (500 mg total) by mouth in the morning  Patient taking differently: Take 400 mg by mouth in the morning 6/8/23 4/2/24 Yes Jean Claude Villatoro PA-C   metFORMIN (GLUCOPHAGE) 500 mg tablet 2 pills bid Do not start before January 18, 2024.  Patient taking differently: 500 mg daily after dinner Pt takes according to BGM / PRN 1/18/24  Yes ROYAL Aguilera   metoprolol succinate (TOPROL-XL) 25 mg 24 hr tablet Take 1 tablet (25 mg total) by mouth daily 1/11/24  Yes Maverick Ceballos DO   OneTouch Delica Lancets 33G MISC May substitute brand based on insurance coverage. Check glucose TID. 6/8/23  Yes Jean Claude Villatoro PA-C   rosuvastatin (CRESTOR) 40 MG tablet Take 1 tablet (40 mg total) by mouth  "daily  Patient taking differently: Take 10 mg by mouth daily Patient reports taking 10 mg 1/17/24  Yes ROYAL Aguilera       Allergies:  Allergies   Allergen Reactions    Valdecoxib Other (See Comments)     Patient is unaware of this allergy       Review of Systems:  Review of Systems - History obtained from chart review and the patient  General ROS: positive for  - fatigue and poor activity tolerance  negative for - chills or fever  Psychological ROS: negative  Ophthalmic ROS: positive for - uses glasses  ENT ROS: negative  Allergy and Immunology ROS: negative  Hematological and Lymphatic ROS: negative  Endocrine ROS: negative  Breast ROS: negative  Respiratory ROS: positive for - orthopnea and shortness of breath  negative for - cough, hemoptysis, or pleuritic pain  Cardiovascular ROS: positive for - chest pain, dyspnea on exertion, edema, orthopnea, paroxysmal nocturnal dyspnea, and shortness of breath  negative for - irregular heartbeat, loss of consciousness, palpitations, or rapid heart rate  Gastrointestinal ROS: positive for - diarrhea  negative for - abdominal pain, blood in stools, nausea/vomiting, or swallowing difficulty/pain  Genito-Urinary ROS: no dysuria, trouble voiding, or hematuria  Musculoskeletal ROS: positive for - back pain  Neurological ROS: no TIA or stroke symptoms  Dermatological ROS: negative    Vital Signs:     Vitals:    04/02/24 1024 04/02/24 1028   BP: 116/62 108/57   BP Location: Left arm Right arm   Patient Position: Sitting Sitting   Cuff Size: Standard    Pulse: 92    Temp: (!) 96.8 °F (36 °C)    TempSrc: Tympanic    SpO2: 98%    Weight: 74.6 kg (164 lb 6.4 oz)    Height: 5' 4\" (1.626 m)        Physical Exam:    General:  Alert, oriented, well developed, no acute distress  HEENT/NECK:  PERRLA.  No jugular venous distention. Cardiac:Regular rate and rhythm, no murmurs rubs or gallops.  Carotid arteries: 1+, no bruits; L CEA incision well healed.  Pulmonary:  Breath sounds " clear bilaterally- upper lung fields  but absent in bases bilaterally  Abdomen:  Non-tender, Non-distended.  Positive bowel sounds.  Upper extremities: 2+ radial pulses; brisk capillary refill; RIGHT hand dominant  Lower extremities: Extremities warm/dry. PT/DP pulses 1+ bilaterally.  2+ edema B/L  Musculoskeletal: MAEE, stable gait  Neuro: Alert and oriented X 3.  Sensation is grossly intact.  No focal deficits.  Skin: Warm/Dry, without rashes or lesions.    Lab Results:     Lab Results   Component Value Date    WBC 12.74 (H) 03/05/2024    HGB 11.2 (L) 03/05/2024    HCT 34.4 (L) 03/05/2024    MCV 87 03/05/2024     03/05/2024     Lab Results   Component Value Date    SODIUM 138 03/05/2024    K 4.2 03/05/2024     03/05/2024    CO2 24 03/05/2024    AGAP 6 03/05/2024    BUN 21 03/05/2024    CREATININE 0.71 03/05/2024    GLUC 154 (H) 03/05/2024    GLUF 112 (H) 02/01/2024    CALCIUM 8.9 03/05/2024    AST 15 01/11/2024    ALT 11 01/11/2024    ALKPHOS 66 01/11/2024    TP 7.3 01/11/2024    TBILI 0.73 01/11/2024    EGFR 88 03/05/2024     Lab Results   Component Value Date    HGBA1C 6.4 (H) 12/28/2023     Lab Results   Component Value Date    CHOLESTEROL 204 (H) 01/11/2024    CHOLESTEROL 241 (H) 06/02/2021    CHOLESTEROL 213 (H) 07/24/2018     Lab Results   Component Value Date    HDL 47 (L) 01/11/2024    HDL 44 06/02/2021    HDL 44 07/24/2018     Lab Results   Component Value Date    TRIG 135 01/11/2024    TRIG 204 (H) 06/02/2021    TRIG 140 07/24/2018     Lab Results   Component Value Date    NONHDLC 197 06/02/2021    NONHDLC 169 07/24/2018       Imaging Studies:     Cardiac Catheterization: 1/17/24      Prox LAD lesion is 50% stenosed.    Mid LAD lesion is 85% stenosed.    Prox Cx lesion is 40% stenosed.    Mid Cx lesion is 70% stenosed.    Dist Cx lesion is 90% stenosed.    Prox RCA lesion is 50% stenosed.    Mid RCA lesion is 95% stenosed.    Echocardiogram:  1/3/24      Left Ventricle: Left ventricular  cavity size is at the upper limits of normal when indexed for BSA. Wall thickness is mildly increased. There is eccentric hypertrophy. The left ventricular ejection fraction is 30%. Systolic function is severely reduced. Wall motion is normal. Unable to assess diastolic function due to E/A fusion due to tachycardia.    The following segments are akinetic: basal inferoseptal, basal inferior, mid inferoseptal and mid inferior.    The following segments are hypokinetic: basal anterior, basal anteroseptal, basal inferolateral, basal anterolateral, mid anterior, mid anteroseptal, mid inferolateral, mid anterolateral, apical anterior, apical septal, apical inferior, apical lateral and apex.    Left Atrium: The atrium is moderately dilated (42-48 mL/m2).    Mitral Valve: There is mild annular calcification. There is mild to moderate regurgitation.    Pulmonic Valve: There is mild regurgitation.    Pericardium: There is a small pericardial effusion along the right atrial and right ventricular free wall. The fluid exhibits no internal echoes. There is no echocardiographic evidence of tamponade. There is a left pleural effusion.     Findings    Left Ventricle Left ventricular cavity size is at the upper limits of normal when indexed for BSA. Wall thickness is mildly increased. There is eccentric hypertrophy. The left ventricular ejection fraction is 30%. Systolic function is severely reduced. Wall motion is normal. Unable to assess diastolic function due to E/A fusion due to tachycardia.   Wall Scoring Baseline     The following segments are akinetic: basal inferoseptal, basal inferior, mid inferoseptal and mid inferior.  The following segments are hypokinetic: basal anterior, basal anteroseptal, basal inferolateral, basal anterolateral, mid anterior, mid anteroseptal, mid inferolateral, mid anterolateral, apical anterior, apical septal, apical inferior, apical lateral and apex.            Right Ventricle Right ventricular  cavity size is normal. Systolic function is normal. Wall thickness is normal.   Left Atrium The atrium is moderately dilated (42-48 mL/m2).   Right Atrium The atrium is normal in size.   Aortic Valve The aortic valve is trileaflet. The leaflets are not thickened. The leaflets are not calcified. The leaflets exhibit normal mobility. There is no evidence of regurgitation. The aortic valve has no significant stenosis.   Mitral Valve There is mild annular calcification.  There is mild to moderate regurgitation. There is no evidence of stenosis.   Tricuspid Valve Tricuspid valve structure is normal. There is trace regurgitation. There is no evidence of stenosis. Right ventricular systolic pressure could not be assessed.   Pulmonic Valve Pulmonic valve structure is normal. There is mild regurgitation. There is no evidence of stenosis.   Ascending Aorta The aortic root is normal in size.   IVC/SVC The inferior vena cava is normal in size. Respirophasic changes were normal.   Pericardium There is a small pericardial effusion along the right atrial and right ventricular free wall. The fluid exhibits no internal echoes. There is no echocardiographic evidence of tamponade. The pericardium is normal in appearance. There is a left pleural effusion.     Left Ventricle Measurements    Function/Volumes   LVOT stroke volume 30.3         LVOT stroke volume index 17.1 ml/m2         LVOT Cardiac Output 3.05 l/min         LVOT Cardiac Index 1.69 l/min/m2         Dimensions   LVIDd 5.4 cm         LVIDS 4.7 cm         IVSd 1 cm         LVPWd 1 cm         LVOT area 3.14 cm2         FS 13         Diastolic Filling   MV E' Tissue Velocity Septal 8 cm/s         MV E' Tissue Velocity Lateral 8 cm/s         LA Volume Index (BP) 40.9 mL/m2         E wave deceleration time 162 ms         MV Peak E Ambrocio 129 cm/s         PV Peak S Ambrocio 0.23 m/s         PV Peak D Ambrocio 0.12 m/s         Pulm vein S/D ratio 1.92          Report Measurements   AV LVOT peak  gradient 1 mmHg              Interventricular Septum Measurements    Shunt Ratio   LVOT peak VTI 9.65 cm         LVOT peak valentino 0.58 m/s              Right Ventricle Measurements    Dimensions   RVID d 2.6 cm         Tricuspid annular plane systolic excursion 1.8 cm               Left Atrium Measurements    Dimensions   LA size 4 cm         LA length (A2C) 4.3 cm         Volumes   LA volume (BP) 74 mL         LA Volume Index (BP) 40.9 mL/m2               Right Atrium Measurements    Dimensions   RAA A4C 10.7 cm2               Atrial Septum Measurements    Shunt Ratio   LVOT peak VTI 9.65 cm         LVOT peak valentino 0.58 m/s               Aortic Valve Measurements    Stenosis   LVOT peak valentino 0.58 m/s         LVOT peak VTI 9.65 cm         LVOT mn grad 1 mmHg         AV LVOT peak gradient 1 mmHg         Area/Dimensions   LVOT diameter 2 cm         LVOT area 3.14 cm2               Mitral Valve Measurements    Stenosis   MV stenosis pressure 1/2 time 47 ms         MV valve area p 1/2 method 4.68               Aorta Measurements    Aortic Dimensions   Ao root 3.5 cm           ECG: 3/5/24     Sinus tachycardia  Left axis deviation  Non-specific intra-ventricular conduction block  T wave abnormality, consider lateral ischemia      Carotid Duplex: 1/26/24    RIGHT:  There is <50% stenosis noted in the internal carotid artery. Plaque is  heterogenous and irregular.  Vertebral artery flow is antegrade. There is no significant subclavian artery  disease.     LEFT:  There is 70-99% stenosis noted in the internal carotid artery. Plaque is  heterogenous and irregular.  Vertebral artery flow is antegrade. There is no significant subclavian artery  disease.     CTA H & N: 1/26/24    1. No acute intracranial abnormality.     2. Mild cerebral chronic microangiopathic changes.     3. 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 There appears to be compression of the optic chiasm. Dedicated pre and postcontrast MR  imaging of the brain with a sellar protocol is recommended along with consultations with the   neurosurgical and endocrinology services.     4. Critical left ICA origin and carotid bulb stenosis with greater than 90% stenosis. No hemodynamically significant stenosis at the right ICA origin. Consultation with the vascular surgical service is recommended.     5. Near occlusive to occlusive atherosclerotic disease in the left cavernous and proximal supraclinoid ICA with reconstitution of the distal segment and carotid terminus likely via an intact [place].     6. Moderate to severe stenosis in the right cavernous and proximal supraclinoid ICA.     7. Focal moderate to severe segmental stenosis in the P2 segment of the right posterior cerebral artery. Mild stenosis in the P2 segment of the left posterior cerebral artery.     8. Moderate mid to distal basilar stenosis.       Vein Mappin24    RIGHT LOWER LIMB:  The great saphenous vein is patent  from the groin to the ankle.  The intraluminal diameter measurements range from 1 mm to 2.4 mm throughout.     LEFT LOWER LIMB:  The great saphenous vein is patent  from the groin to the ankle.  The intraluminal diameter measurements range from 0.6 mm to 2.6 mm throughout.       I have personally reviewed pertinent films in PACS    Assessment:  Patient Active Problem List    Diagnosis Date Noted    S/P carotid endarterectomy 2024    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HCC) 02/15/2024    Hypercalcemia 02/15/2024    Hyperprolactinemia (HCC) 02/15/2024    Stenosis of left carotid artery 2024    Coronary artery disease involving native coronary artery 2024    Ischemic cardiomyopathy 2024    Mixed hyperlipidemia 2024    Ulcerative pancolitis without complication (HCC) 2024    Polyp of ascending colon 2023    Acquired right foot drop 2023    Hypomagnesemia 2023    Hair loss 2023    Colitis 2023    History of  biliary dyskinesia 06/05/2023    DMII (diabetes mellitus, type 2) (HCC) 06/05/2023    Gallbladder polyp 08/31/2022    Leiomyoma of uterus 11/22/2013         Impression/Plan:    Coronary Artery Disease  Ischemic Cardiomyopathy    Risks and benefits of coronary artery bypass grafting were discussed in detail today with the patient. We have reviewed results all preoperative radiographic,  laboratory and vascular studies.  They understand and wish to proceed with surgical intervention on 4/9/24 with Dr. Josh Perry was comfortable with our recommendations, and their questions were answered to their satisfaction.  Thank you for allowing us to participate in the care of this patient.     The patient recently had a screening colonoscopy in 12/6/23.  Therefore GI referral is not indicated at this time.     SIGNATURE: ROYAL Moore  DATE: April 2, 2024  TIME: 11:12 AM

## 2024-04-02 NOTE — LETTER
April 2, 2024     Toya Titus MD  [address] [place] [id number]    Patient: Julia Perry   YOB: 1955   Date of Visit: 4/2/2024       Dear Dr. Titus:    Thank you for referring Julia Perry to me for evaluation. Below are my notes for this consultation.    If you have questions, please do not hesitate to call me. I look forward to following your patient along with you.         Sincerely,        Josh Castellanos MD        CC: DO Haley Arias CRNP  4/2/2024 11:55 AM  Attested  Pre-Op History & Physical - Cardiothoracic Surgery   Julia Perry 68 y.o. female MRN: [id number]    Cardiologist: Dr. Ceballos  PCP:  Dr. Titus  Vascular Surgeon: Dr. De Oliveira  Endocrinologist: Dr. Lau    Reason for Consult / Principal Problem: Coronary artery disease; Ischemic Cardiomyopathy    History of Present Illness: Julia Perry is a 68 y.o. year old female with PMHx notable for CAD, ICMP (EF 30%), HLD, DM2, chronic combined systolic & diastolic CHF, IVCD, B/L carotid stenosis s/p L CEA (3/4/24), intracranial stenosis, pituitary macroadenoma, hyperprolactinemia, hypercalcemia and ulcerative colitis.  Patient's clinical course began around August 2023 when she presented to her PCP for lower extremity edema. She had been on an 8 week course of prednisone prior to this after a hospitalization in June 2023 for ulcerative colitis (severe on  colonoscopy on 6/6/23.) The edema was attributed to her steroid course. Patient presented to PCP again in December with ongoing shortness of breath with orthopnea, PND, persistent edema and chest pain. At this time, a CXR was obtained that demonstrated bilateral pleural effusions, and she was treated with antibiotics for suspected pneumonia. Despite antibiotics, her symptoms did not improved, therefore,  she was sent for an echocardiogram that demonstrated reduced EF of 30%.  She was referred to cardiology and was  seen by Dr. Ceballos on 1/11/24.  GDMT was initiated and cardiac cath ordered for further ischemic w/u. Coronary angiogram on 1/17/24 demonstrated multivessel CAD and she was then referred to cardiac surgery for CABG evaluation, seen in the office on 1/23/24 by Dr. Castellanos. Pre-op testing ordered. Carotid duplex demonstrated significant carotid stenosis prompting immediate CTA H&N scan which confirmed high grade left carotid stenosis. CABG surgery was postponed and she was seen by Vascular Surgery and subsequently underwent L CEA on 3/4/34. CTA H&N also identified a pituitary macroadenoma and patient has been seen in consultation by both neurosurgery and endocrinology due to this finding.     Patient presents back to our office today to discuss and schedule CABG surgery. She is accompanied by her . She reports progressive SOB & [place] with most all activities. She is fatigued with poor activity tolerance  She can not climb stairs anymore. Her edema is persistent, despite Furosemide,  and she is experiencing orthopnea at night.  She reports a UC flair after returning home after her L CEA surgery in early March. She denies N&V, abdominal pain, bloody stool, fever or chills currently.  She reports back pain.    Former smoker (remote; quit > 50 yr ago); no alcohol or drug use.      Past Medical History:  Past Medical History:   Diagnosis Date   • CHF (congestive heart failure) (HCC)    • Colitis    • Diabetes mellitus (HCC)    • 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HCC)    • PONV (postoperative nausea and vomiting)          Past Surgical History:   Past Surgical History:   Procedure Laterality Date   • CARDIAC CATHETERIZATION Left 1/17/2024    Procedure: Cardiac Left Heart Cath;  Surgeon: Juan A Sousa MD;  Location: [facility];  Service: Cardiology   • CARDIAC CATHETERIZATION  1/17/2024    Procedure: Cardiac catheterization;  Surgeon: Juan A Sousa MD;  Location: [facility];  Service: Cardiology   • HYSTERECTOMY      ~1990   • HI TEAEC  W/PATCH GRF CAROTID VERTB SUBCLAV NECK INC Left 3/4/2024    Procedure: LEFT ENDARTERECTOMY ARTERY CAROTID;  Surgeon: Janeth De Oliveira DO;  Location: [facility];  Service: Vascular         Family History:  History reviewed. No pertinent family history.      Social History:    Social History     Substance and Sexual Activity   Alcohol Use Never     Social History     Substance and Sexual Activity   Drug Use Never     Social History     Tobacco Use   Smoking Status Former   • Types: Cigarettes   • Passive exposure: Past   Smokeless Tobacco Never   Tobacco Comments    Quit 1970's       Home Medications:   Prior to Admission medications    Medication Sig Start Date End Date Taking? Authorizing Provider   acetaminophen (TYLENOL) 325 mg tablet Take 2 tablets (650 mg total) by mouth every 6 (six) hours as needed for mild pain  Patient taking differently: Take 650 mg by mouth every 6 (six) hours as needed for mild pain As needed 3/5/24  Yes ROYAL Rojas   Alcohol Swabs 70 % PADS May substitute brand based on insurance coverage. Check glucose TID. 6/8/23  Yes Jean Claude Villatoro PA-C   aspirin 81 mg chewable tablet Chew 1 tablet (81 mg total) daily 1/11/24  Yes Maverick Ceballos DO   b complex vitamins capsule Take 1 capsule by mouth daily   Yes Historical Provider, MD   cholecalciferol (VITAMIN D3) 1,000 units tablet Take 1,000 Units by mouth daily   Yes Historical Provider, MD   furosemide (LASIX) 40 mg tablet Take 1 tablet (40 mg total) by mouth as needed (Take 1 tablet daily as needed)  Patient taking differently: Take 40 mg by mouth daily 1/2 tab in am, 1/2 in pm 1/18/24  Yes Maverick Ceballos DO   glucose blood test strip Use 1 each daily as needed Use as instructed   Yes Historical Provider, MD   magnesium gluconate (MAGONATE) 500 mg tablet Take 1 tablet (500 mg total) by mouth in the morning  Patient taking differently: Take 400 mg by mouth in the morning 6/8/23 4/2/24 Yes Jean Claude Villatoro PA-C   metFORMIN  (GLUCOPHAGE) 500 mg tablet 2 pills bid Do not start before January 18, 2024.  Patient taking differently: 500 mg daily after dinner Pt takes according to BGM / PRN 1/18/24  Yes ROYAL Aguilera   metoprolol succinate (TOPROL-XL) 25 mg 24 hr tablet Take 1 tablet (25 mg total) by mouth daily 1/11/24  Yes Maverick Ceballos,    OneTouch Delica Lancets 33G MISC May substitute brand based on insurance coverage. Check glucose TID. 6/8/23  Yes Jean Claude Villatoro PA-C   rosuvastatin (CRESTOR) 40 MG tablet Take 1 tablet (40 mg total) by mouth daily  Patient taking differently: Take 10 mg by mouth daily Patient reports taking 10 mg 1/17/24  Yes ROYAL Aguilera       Allergies:  Allergies   Allergen Reactions   • Valdecoxib Other (See Comments)     Patient is unaware of this allergy       Review of Systems:  Review of Systems - History obtained from chart review and the patient  General ROS: positive for  - fatigue and poor activity tolerance  negative for - chills or fever  Psychological ROS: negative  Ophthalmic ROS: positive for - uses glasses  ENT ROS: negative  Allergy and Immunology ROS: negative  Hematological and Lymphatic ROS: negative  Endocrine ROS: negative  Breast ROS: negative  Respiratory ROS: positive for - orthopnea and shortness of breath  negative for - cough, hemoptysis, or pleuritic pain  Cardiovascular ROS: positive for - chest pain, dyspnea on exertion, edema, orthopnea, paroxysmal nocturnal dyspnea, and shortness of breath  negative for - irregular heartbeat, loss of consciousness, palpitations, or rapid heart rate  Gastrointestinal ROS: positive for - diarrhea  negative for - abdominal pain, blood in stools, nausea/vomiting, or swallowing difficulty/pain  Genito-Urinary ROS: no dysuria, trouble voiding, or hematuria  Musculoskeletal ROS: positive for - back pain  Neurological ROS: no TIA or stroke symptoms  Dermatological ROS: negative    Vital Signs:     Vitals:    04/02/24 1024 04/02/24  "1028   BP: 116/62 108/57   BP Location: Left arm Right arm   Patient Position: Sitting Sitting   Cuff Size: Standard    Pulse: 92    Temp: (!) 96.8 °F (36 °C)    TempSrc: Tympanic    SpO2: 98%    Weight: 74.6 kg (164 lb 6.4 oz)    Height: 5' 4\" (1.626 m)        Physical Exam:    General:  Alert, oriented, well developed, no acute distress  HEENT/NECK:  PERRLA.  No jugular venous distention. Cardiac:Regular rate and rhythm, no murmurs rubs or gallops.  Carotid arteries: 1+, no bruits; L CEA incision well healed.  Pulmonary:  Breath sounds clear bilaterally- upper lung fields  but absent in bases bilaterally  Abdomen:  Non-tender, Non-distended.  Positive bowel sounds.  Upper extremities: 2+ radial pulses; brisk capillary refill; RIGHT hand dominant  Lower extremities: Extremities warm/dry. PT/DP pulses 1+ bilaterally.  2+ edema B/L  Musculoskeletal: MAEE, stable gait  Neuro: Alert and oriented X 3.  Sensation is grossly intact.  No focal deficits.  Skin: Warm/Dry, without rashes or lesions.    Lab Results:     Lab Results   Component Value Date    WBC 12.74 (H) 03/05/2024    HGB 11.2 (L) 03/05/2024    HCT 34.4 (L) 03/05/2024    MCV 87 03/05/2024     03/05/2024     Lab Results   Component Value Date    SODIUM 138 03/05/2024    K 4.2 03/05/2024     03/05/2024    CO2 24 03/05/2024    AGAP 6 03/05/2024    BUN 21 03/05/2024    CREATININE 0.71 03/05/2024    GLUC 154 (H) 03/05/2024    GLUF 112 (H) 02/01/2024    CALCIUM 8.9 03/05/2024    AST 15 01/11/2024    ALT 11 01/11/2024    ALKPHOS 66 01/11/2024    TP 7.3 01/11/2024    TBILI 0.73 01/11/2024    EGFR 88 03/05/2024     Lab Results   Component Value Date    HGBA1C 6.4 (H) 12/28/2023     Lab Results   Component Value Date    CHOLESTEROL 204 (H) 01/11/2024    CHOLESTEROL 241 (H) 06/02/2021    CHOLESTEROL 213 (H) 07/24/2018     Lab Results   Component Value Date    HDL 47 (L) 01/11/2024    HDL 44 06/02/2021    HDL 44 07/24/2018     Lab Results   Component Value " Date    TRIG 135 01/11/2024    TRIG 204 (H) 06/02/2021    TRIG 140 07/24/2018     Lab Results   Component Value Date    NONHDLC 197 06/02/2021    NONHDLC 169 07/24/2018       Imaging Studies:     Cardiac Catheterization: 1/17/24    •  Prox LAD lesion is 50% stenosed.  •  Mid LAD lesion is 85% stenosed.  •  Prox Cx lesion is 40% stenosed.  •  Mid Cx lesion is 70% stenosed.  •  Dist Cx lesion is 90% stenosed.  •  Prox RCA lesion is 50% stenosed.  •  Mid RCA lesion is 95% stenosed.    Echocardiogram:  1/3/24    •  Left Ventricle: Left ventricular cavity size is at the upper limits of normal when indexed for BSA. Wall thickness is mildly increased. There is eccentric hypertrophy. The left ventricular ejection fraction is 30%. Systolic function is severely reduced. Wall motion is normal. Unable to assess diastolic function due to E/A fusion due to tachycardia.  •  The following segments are akinetic: basal inferoseptal, basal inferior, mid inferoseptal and mid inferior.  •  The following segments are hypokinetic: basal anterior, basal anteroseptal, basal inferolateral, basal anterolateral, mid anterior, mid anteroseptal, mid inferolateral, mid anterolateral, apical anterior, apical septal, apical inferior, apical lateral and apex.  •  Left Atrium: The atrium is moderately dilated (42-48 mL/m2).  •  Mitral Valve: There is mild annular calcification. There is mild to moderate regurgitation.  •  Pulmonic Valve: There is mild regurgitation.  •  Pericardium: There is a small pericardial effusion along the right atrial and right ventricular free wall. The fluid exhibits no internal echoes. There is no echocardiographic evidence of tamponade. There is a left pleural effusion.     Findings    Left Ventricle Left ventricular cavity size is at the upper limits of normal when indexed for BSA. Wall thickness is mildly increased. There is eccentric hypertrophy. The left ventricular ejection fraction is 30%. Systolic function is  severely reduced. Wall motion is normal. Unable to assess diastolic function due to E/A fusion due to tachycardia.   Wall Scoring Baseline     The following segments are akinetic: basal inferoseptal, basal inferior, mid inferoseptal and mid inferior.  The following segments are hypokinetic: basal anterior, basal anteroseptal, basal inferolateral, basal anterolateral, mid anterior, mid anteroseptal, mid inferolateral, mid anterolateral, apical anterior, apical septal, apical inferior, apical lateral and apex.            Right Ventricle Right ventricular cavity size is normal. Systolic function is normal. Wall thickness is normal.   Left Atrium The atrium is moderately dilated (42-48 mL/m2).   Right Atrium The atrium is normal in size.   Aortic Valve The aortic valve is trileaflet. The leaflets are not thickened. The leaflets are not calcified. The leaflets exhibit normal mobility. There is no evidence of regurgitation. The aortic valve has no significant stenosis.   Mitral Valve There is mild annular calcification.  There is mild to moderate regurgitation. There is no evidence of stenosis.   Tricuspid Valve Tricuspid valve structure is normal. There is trace regurgitation. There is no evidence of stenosis. Right ventricular systolic pressure could not be assessed.   Pulmonic Valve Pulmonic valve structure is normal. There is mild regurgitation. There is no evidence of stenosis.   Ascending Aorta The aortic root is normal in size.   IVC/SVC The inferior vena cava is normal in size. Respirophasic changes were normal.   Pericardium There is a small pericardial effusion along the right atrial and right ventricular free wall. The fluid exhibits no internal echoes. There is no echocardiographic evidence of tamponade. The pericardium is normal in appearance. There is a left pleural effusion.     Left Ventricle Measurements    Function/Volumes   LVOT stroke volume 30.3         LVOT stroke volume index 17.1 ml/m2         LVOT  Cardiac Output 3.05 l/min         LVOT Cardiac Index 1.69 l/min/m2         Dimensions   LVIDd 5.4 cm         LVIDS 4.7 cm         IVSd 1 cm         LVPWd 1 cm         LVOT area 3.14 cm2         FS 13         Diastolic Filling   MV E' Tissue Velocity Septal 8 cm/s         MV E' Tissue Velocity Lateral 8 cm/s         LA Volume Index (BP) 40.9 mL/m2         E wave deceleration time 162 ms         MV Peak E Ambrocio 129 cm/s         PV Peak S Ambrocio 0.23 m/s         PV Peak D Ambrocio 0.12 m/s         Pulm vein S/D ratio 1.92          Report Measurements   AV LVOT peak gradient 1 mmHg              Interventricular Septum Measurements    Shunt Ratio   LVOT peak VTI 9.65 cm         LVOT peak ambrocio 0.58 m/s              Right Ventricle Measurements    Dimensions   RVID d 2.6 cm         Tricuspid annular plane systolic excursion 1.8 cm               Left Atrium Measurements    Dimensions   LA size 4 cm         LA length (A2C) 4.3 cm         Volumes   LA volume (BP) 74 mL         LA Volume Index (BP) 40.9 mL/m2               Right Atrium Measurements    Dimensions   RAA A4C 10.7 cm2               Atrial Septum Measurements    Shunt Ratio   LVOT peak VTI 9.65 cm         LVOT peak ambrocio 0.58 m/s               Aortic Valve Measurements    Stenosis   LVOT peak ambrocio 0.58 m/s         LVOT peak VTI 9.65 cm         LVOT mn grad 1 mmHg         AV LVOT peak gradient 1 mmHg         Area/Dimensions   LVOT diameter 2 cm         LVOT area 3.14 cm2               Mitral Valve Measurements    Stenosis   MV stenosis pressure 1/2 time 47 ms         MV valve area p 1/2 method 4.68               Aorta Measurements    Aortic Dimensions   Ao root 3.5 cm           ECG: 3/5/24     Sinus tachycardia  Left axis deviation  Non-specific intra-ventricular conduction block  T wave abnormality, consider lateral ischemia      Carotid Duplex: 1/26/24    RIGHT:  There is <50% stenosis noted in the internal carotid artery. Plaque is  heterogenous and irregular.  Vertebral  artery flow is antegrade. There is no significant subclavian artery  disease.     LEFT:  There is 70-99% stenosis noted in the internal carotid artery. Plaque is  heterogenous and irregular.  Vertebral artery flow is antegrade. There is no significant subclavian artery  disease.     CTA H & N: 24    1. No acute intracranial abnormality.     2. Mild cerebral chronic microangiopathic changes.     3. 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 There appears to be compression of the optic chiasm. Dedicated pre and postcontrast MR imaging of the brain with a sellar protocol is recommended along with consultations with the   neurosurgical and endocrinology services.     4. Critical left ICA origin and carotid bulb stenosis with greater than 90% stenosis. No hemodynamically significant stenosis at the right ICA origin. Consultation with the vascular surgical service is recommended.     5. Near occlusive to occlusive atherosclerotic disease in the left cavernous and proximal supraclinoid ICA with reconstitution of the distal segment and carotid terminus likely via an intact [place].     6. Moderate to severe stenosis in the right cavernous and proximal supraclinoid ICA.     7. Focal moderate to severe segmental stenosis in the P2 segment of the right posterior cerebral artery. Mild stenosis in the P2 segment of the left posterior cerebral artery.     8. Moderate mid to distal basilar stenosis.       Vein Mappin24    RIGHT LOWER LIMB:  The great saphenous vein is patent  from the groin to the ankle.  The intraluminal diameter measurements range from 1 mm to 2.4 mm throughout.     LEFT LOWER LIMB:  The great saphenous vein is patent  from the groin to the ankle.  The intraluminal diameter measurements range from 0.6 mm to 2.6 mm throughout.       I have personally reviewed pertinent films in PACS    Assessment:  Patient Active Problem List    Diagnosis Date Noted   • S/P carotid  endarterectomy 04/02/2024   • 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HCC) 02/15/2024   • Hypercalcemia 02/15/2024   • Hyperprolactinemia (HCC) 02/15/2024   • Stenosis of left carotid artery 01/30/2024   • Coronary artery disease involving native coronary artery 01/11/2024   • Ischemic cardiomyopathy 01/11/2024   • Mixed hyperlipidemia 01/11/2024   • Ulcerative pancolitis without complication (HCC) 01/04/2024   • Polyp of ascending colon 12/06/2023   • Acquired right foot drop 08/18/2023   • Hypomagnesemia 08/18/2023   • Hair loss 08/18/2023   • Colitis 06/05/2023   • History of biliary dyskinesia 06/05/2023   • DMII (diabetes mellitus, type 2) ([facility]) 06/05/2023   • Gallbladder polyp 08/31/2022   • Leiomyoma of uterus 11/22/2013         Impression/Plan:    Coronary Artery Disease  Ischemic Cardiomyopathy    Risks and benefits of coronary artery bypass grafting were discussed in detail today with the patient. We have reviewed results all preoperative radiographic,  laboratory and vascular studies.  They understand and wish to proceed with surgical intervention on 4/9/24 with Dr. Josh Guevarafelicia BRUCE Perry was comfortable with our recommendations, and their questions were answered to their satisfaction.  Thank you for allowing us to participate in the care of this patient.     The patient recently had a screening colonoscopy in 12/6/23.  Therefore GI referral is not indicated at this time.     SIGNATURE: ROYAL Moore  DATE: April 2, 2024  TIME: 11:12 AM  Attestation signed by Josh Castellanos MD at 4/2/2024 12:07 PM:  Attending Attestation:    I supervised the Advanced Practitioner.? In addition to personally performing portions of the history and physical exam, I performed, in its entirety, the assessment/plan/medical decision making/counseling/care coordination component of the visit.  I reviewed the Advanced Practitioner's note,  medications prescribed and orders placed.    Medical decision making is detailed  below:  Ms. Perry is a 68yF who was initially seen in early February for CABG evaluation, but was found to have severe carotid stenosis during workup, for which she ultimately underwent left CEA. She did well after surgery and now presents for CABG. She has seen Dr. Ceballos with cardiology, with whom I discussed the case.     She notes lower and middle back pain since surgery, but denies chest pain. She does endorse clear heart failure symptoms off orthopnea and PND. She has lower extremity edema to mid-shin level. Overall, she is in good spirits and appears well.     We again discussed the risks and benefits of surgery, which are a bit elevated with her low EF. We discussed the potential need for IABP. She voiced understanding and was ready to proceed.      Plan will be CABGx3 with LIMA, vein. Targets include: LAD, OM2, distal RCA. Her distal RCA branches are all relatively small and do not supply much myocardium, but her distal RCA appears to be a good target.     Josh Castellanos MD  04/02/24  12:07 PM

## 2024-04-02 NOTE — H&P (VIEW-ONLY)
Pre-Op History & Physical - Cardiothoracic Surgery   Julia Perry 68 y.o. female MRN: [id number]    Cardiologist: Dr. Ceballos  PCP:  Dr. Titus  Vascular Surgeon: Dr. De Oliveira  Endocrinologist: Dr. Lau    Reason for Consult / Principal Problem: Coronary artery disease; Ischemic Cardiomyopathy    History of Present Illness: Julia Perry is a 68 y.o. year old female with PMHx notable for CAD, ICMP (EF 30%), HLD, DM2, chronic combined systolic & diastolic CHF, IVCD, B/L carotid stenosis s/p L CEA (3/4/24), intracranial stenosis, pituitary macroadenoma, hyperprolactinemia, hypercalcemia and ulcerative colitis.  Patient's clinical course began around August 2023 when she presented to her PCP for lower extremity edema. She had been on an 8 week course of prednisone prior to this after a hospitalization in June 2023 for ulcerative colitis (severe on  colonoscopy on 6/6/23.) The edema was attributed to her steroid course. Patient presented to PCP again in December with ongoing shortness of breath with orthopnea, PND, persistent edema and chest pain. At this time, a CXR was obtained that demonstrated bilateral pleural effusions, and she was treated with antibiotics for suspected pneumonia. Despite antibiotics, her symptoms did not improved, therefore,  she was sent for an echocardiogram that demonstrated reduced EF of 30%.  She was referred to cardiology and was  seen by Dr. Ceballos on 1/11/24. GDMT was initiated and cardiac cath ordered for further ischemic w/u. Coronary angiogram on 1/17/24 demonstrated multivessel CAD and she was then referred to cardiac surgery for CABG evaluation, seen in the office on 1/23/24 by Dr. Castellanos. Pre-op testing ordered. Carotid duplex demonstrated significant carotid stenosis prompting immediate CTA H&N scan which confirmed high grade left carotid stenosis. CABG surgery was postponed and she was seen by Vascular Surgery and subsequently underwent L CEA on 3/4/34. CTA H&N also identified a  pituitary macroadenoma and patient has been seen in consultation by both neurosurgery and endocrinology due to this finding.     Patient presents back to our office today to discuss and schedule CABG surgery. She is accompanied by her . She reports progressive SOB & [place] with most all activities. She is fatigued with poor activity tolerance  She can not climb stairs anymore. Her edema is persistent, despite Furosemide,  and she is experiencing orthopnea at night.  She reports a UC flair after returning home after her L CEA surgery in early March. She denies N&V, abdominal pain, bloody stool, fever or chills currently.  She reports back pain.    Former smoker (remote; quit > 50 yr ago); no alcohol or drug use.      Past Medical History:  Past Medical History:   Diagnosis Date    CHF (congestive heart failure) (HCC)     Colitis     Diabetes mellitus (HCC)     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HCC)     PONV (postoperative nausea and vomiting)          Past Surgical History:   Past Surgical History:   Procedure Laterality Date    CARDIAC CATHETERIZATION Left 1/17/2024    Procedure: Cardiac Left Heart Cath;  Surgeon: Juan A Sousa MD;  Location: [facility];  Service: Cardiology    CARDIAC CATHETERIZATION  1/17/2024    Procedure: Cardiac catheterization;  Surgeon: Juan A Sousa MD;  Location: [facility];  Service: Cardiology    HYSTERECTOMY      ~1990    [place] TEAEC W/PATCH GRF CAROTID VERTB SUBCLAV NECK INC Left 3/4/2024    Procedure: LEFT ENDARTERECTOMY ARTERY CAROTID;  Surgeon: Janeth De Oliveira DO;  Location: [facility];  Service: Vascular         Family History:  History reviewed. No pertinent family history.      Social History:    Social History     Substance and Sexual Activity   Alcohol Use Never     Social History     Substance and Sexual Activity   Drug Use Never     Social History     Tobacco Use   Smoking Status Former    Types: Cigarettes    Passive exposure: Past   Smokeless Tobacco Never    Tobacco Comments    Quit 1970's       Home Medications:   Prior to Admission medications    Medication Sig Start Date End Date Taking? Authorizing Provider   acetaminophen (TYLENOL) 325 mg tablet Take 2 tablets (650 mg total) by mouth every 6 (six) hours as needed for mild pain  Patient taking differently: Take 650 mg by mouth every 6 (six) hours as needed for mild pain As needed 3/5/24  Yes ROYAL Rojas   Alcohol Swabs 70 % PADS May substitute brand based on insurance coverage. Check glucose TID. 6/8/23  Yes Jean Claude Villatoro PA-C   aspirin 81 mg chewable tablet Chew 1 tablet (81 mg total) daily 1/11/24  Yes Maverick Ceballos DO   b complex vitamins capsule Take 1 capsule by mouth daily   Yes Historical Provider, MD   cholecalciferol (VITAMIN D3) 1,000 units tablet Take 1,000 Units by mouth daily   Yes Historical Provider, MD   furosemide (LASIX) 40 mg tablet Take 1 tablet (40 mg total) by mouth as needed (Take 1 tablet daily as needed)  Patient taking differently: Take 40 mg by mouth daily 1/2 tab in am, 1/2 in pm 1/18/24  Yes Maverick Ceballos DO   glucose blood test strip Use 1 each daily as needed Use as instructed   Yes Historical Provider, MD   magnesium gluconate (MAGONATE) 500 mg tablet Take 1 tablet (500 mg total) by mouth in the morning  Patient taking differently: Take 400 mg by mouth in the morning 6/8/23 4/2/24 Yes Jean Claude Villatoro PA-C   metFORMIN (GLUCOPHAGE) 500 mg tablet 2 pills bid Do not start before January 18, 2024.  Patient taking differently: 500 mg daily after dinner Pt takes according to BGM / PRN 1/18/24  Yes ROYAL Aguilera   metoprolol succinate (TOPROL-XL) 25 mg 24 hr tablet Take 1 tablet (25 mg total) by mouth daily 1/11/24  Yes Maverick Ceballos DO   OneTouch Delica Lancets 33G MISC May substitute brand based on insurance coverage. Check glucose TID. 6/8/23  Yes Jean Claude Villatoro PA-C   rosuvastatin (CRESTOR) 40 MG tablet Take 1 tablet (40 mg total) by mouth  "daily  Patient taking differently: Take 10 mg by mouth daily Patient reports taking 10 mg 1/17/24  Yes ROYAL Aguilera       Allergies:  Allergies   Allergen Reactions    Valdecoxib Other (See Comments)     Patient is unaware of this allergy       Review of Systems:  Review of Systems - History obtained from chart review and the patient  General ROS: positive for  - fatigue and poor activity tolerance  negative for - chills or fever  Psychological ROS: negative  Ophthalmic ROS: positive for - uses glasses  ENT ROS: negative  Allergy and Immunology ROS: negative  Hematological and Lymphatic ROS: negative  Endocrine ROS: negative  Breast ROS: negative  Respiratory ROS: positive for - orthopnea and shortness of breath  negative for - cough, hemoptysis, or pleuritic pain  Cardiovascular ROS: positive for - chest pain, dyspnea on exertion, edema, orthopnea, paroxysmal nocturnal dyspnea, and shortness of breath  negative for - irregular heartbeat, loss of consciousness, palpitations, or rapid heart rate  Gastrointestinal ROS: positive for - diarrhea  negative for - abdominal pain, blood in stools, nausea/vomiting, or swallowing difficulty/pain  Genito-Urinary ROS: no dysuria, trouble voiding, or hematuria  Musculoskeletal ROS: positive for - back pain  Neurological ROS: no TIA or stroke symptoms  Dermatological ROS: negative    Vital Signs:     Vitals:    04/02/24 1024 04/02/24 1028   BP: 116/62 108/57   BP Location: Left arm Right arm   Patient Position: Sitting Sitting   Cuff Size: Standard    Pulse: 92    Temp: (!) 96.8 °F (36 °C)    TempSrc: Tympanic    SpO2: 98%    Weight: 74.6 kg (164 lb 6.4 oz)    Height: 5' 4\" (1.626 m)        Physical Exam:    General:  Alert, oriented, well developed, no acute distress  HEENT/NECK:  PERRLA.  No jugular venous distention. Cardiac:Regular rate and rhythm, no murmurs rubs or gallops.  Carotid arteries: 1+, no bruits; L CEA incision well healed.  Pulmonary:  Breath sounds " clear bilaterally- upper lung fields  but absent in bases bilaterally  Abdomen:  Non-tender, Non-distended.  Positive bowel sounds.  Upper extremities: 2+ radial pulses; brisk capillary refill; RIGHT hand dominant  Lower extremities: Extremities warm/dry. PT/DP pulses 1+ bilaterally.  2+ edema B/L  Musculoskeletal: MAEE, stable gait  Neuro: Alert and oriented X 3.  Sensation is grossly intact.  No focal deficits.  Skin: Warm/Dry, without rashes or lesions.    Lab Results:     Lab Results   Component Value Date    WBC 12.74 (H) 03/05/2024    HGB 11.2 (L) 03/05/2024    HCT 34.4 (L) 03/05/2024    MCV 87 03/05/2024     03/05/2024     Lab Results   Component Value Date    SODIUM 138 03/05/2024    K 4.2 03/05/2024     03/05/2024    CO2 24 03/05/2024    AGAP 6 03/05/2024    BUN 21 03/05/2024    CREATININE 0.71 03/05/2024    GLUC 154 (H) 03/05/2024    GLUF 112 (H) 02/01/2024    CALCIUM 8.9 03/05/2024    AST 15 01/11/2024    ALT 11 01/11/2024    ALKPHOS 66 01/11/2024    TP 7.3 01/11/2024    TBILI 0.73 01/11/2024    EGFR 88 03/05/2024     Lab Results   Component Value Date    HGBA1C 6.4 (H) 12/28/2023     Lab Results   Component Value Date    CHOLESTEROL 204 (H) 01/11/2024    CHOLESTEROL 241 (H) 06/02/2021    CHOLESTEROL 213 (H) 07/24/2018     Lab Results   Component Value Date    HDL 47 (L) 01/11/2024    HDL 44 06/02/2021    HDL 44 07/24/2018     Lab Results   Component Value Date    TRIG 135 01/11/2024    TRIG 204 (H) 06/02/2021    TRIG 140 07/24/2018     Lab Results   Component Value Date    NONHDLC 197 06/02/2021    NONHDLC 169 07/24/2018       Imaging Studies:     Cardiac Catheterization: 1/17/24      Prox LAD lesion is 50% stenosed.    Mid LAD lesion is 85% stenosed.    Prox Cx lesion is 40% stenosed.    Mid Cx lesion is 70% stenosed.    Dist Cx lesion is 90% stenosed.    Prox RCA lesion is 50% stenosed.    Mid RCA lesion is 95% stenosed.    Echocardiogram:  1/3/24      Left Ventricle: Left ventricular  cavity size is at the upper limits of normal when indexed for BSA. Wall thickness is mildly increased. There is eccentric hypertrophy. The left ventricular ejection fraction is 30%. Systolic function is severely reduced. Wall motion is normal. Unable to assess diastolic function due to E/A fusion due to tachycardia.    The following segments are akinetic: basal inferoseptal, basal inferior, mid inferoseptal and mid inferior.    The following segments are hypokinetic: basal anterior, basal anteroseptal, basal inferolateral, basal anterolateral, mid anterior, mid anteroseptal, mid inferolateral, mid anterolateral, apical anterior, apical septal, apical inferior, apical lateral and apex.    Left Atrium: The atrium is moderately dilated (42-48 mL/m2).    Mitral Valve: There is mild annular calcification. There is mild to moderate regurgitation.    Pulmonic Valve: There is mild regurgitation.    Pericardium: There is a small pericardial effusion along the right atrial and right ventricular free wall. The fluid exhibits no internal echoes. There is no echocardiographic evidence of tamponade. There is a left pleural effusion.     Findings    Left Ventricle Left ventricular cavity size is at the upper limits of normal when indexed for BSA. Wall thickness is mildly increased. There is eccentric hypertrophy. The left ventricular ejection fraction is 30%. Systolic function is severely reduced. Wall motion is normal. Unable to assess diastolic function due to E/A fusion due to tachycardia.   Wall Scoring Baseline     The following segments are akinetic: basal inferoseptal, basal inferior, mid inferoseptal and mid inferior.  The following segments are hypokinetic: basal anterior, basal anteroseptal, basal inferolateral, basal anterolateral, mid anterior, mid anteroseptal, mid inferolateral, mid anterolateral, apical anterior, apical septal, apical inferior, apical lateral and apex.            Right Ventricle Right ventricular  cavity size is normal. Systolic function is normal. Wall thickness is normal.   Left Atrium The atrium is moderately dilated (42-48 mL/m2).   Right Atrium The atrium is normal in size.   Aortic Valve The aortic valve is trileaflet. The leaflets are not thickened. The leaflets are not calcified. The leaflets exhibit normal mobility. There is no evidence of regurgitation. The aortic valve has no significant stenosis.   Mitral Valve There is mild annular calcification.  There is mild to moderate regurgitation. There is no evidence of stenosis.   Tricuspid Valve Tricuspid valve structure is normal. There is trace regurgitation. There is no evidence of stenosis. Right ventricular systolic pressure could not be assessed.   Pulmonic Valve Pulmonic valve structure is normal. There is mild regurgitation. There is no evidence of stenosis.   Ascending Aorta The aortic root is normal in size.   IVC/SVC The inferior vena cava is normal in size. Respirophasic changes were normal.   Pericardium There is a small pericardial effusion along the right atrial and right ventricular free wall. The fluid exhibits no internal echoes. There is no echocardiographic evidence of tamponade. The pericardium is normal in appearance. There is a left pleural effusion.     Left Ventricle Measurements    Function/Volumes   LVOT stroke volume 30.3         LVOT stroke volume index 17.1 ml/m2         LVOT Cardiac Output 3.05 l/min         LVOT Cardiac Index 1.69 l/min/m2         Dimensions   LVIDd 5.4 cm         LVIDS 4.7 cm         IVSd 1 cm         LVPWd 1 cm         LVOT area 3.14 cm2         FS 13         Diastolic Filling   MV E' Tissue Velocity Septal 8 cm/s         MV E' Tissue Velocity Lateral 8 cm/s         LA Volume Index (BP) 40.9 mL/m2         E wave deceleration time 162 ms         MV Peak E Ambrocio 129 cm/s         PV Peak S Ambrocio 0.23 m/s         PV Peak D Ambrocio 0.12 m/s         Pulm vein S/D ratio 1.92          Report Measurements   AV LVOT peak  gradient 1 mmHg              Interventricular Septum Measurements    Shunt Ratio   LVOT peak VTI 9.65 cm         LVOT peak valentino 0.58 m/s              Right Ventricle Measurements    Dimensions   RVID d 2.6 cm         Tricuspid annular plane systolic excursion 1.8 cm               Left Atrium Measurements    Dimensions   LA size 4 cm         LA length (A2C) 4.3 cm         Volumes   LA volume (BP) 74 mL         LA Volume Index (BP) 40.9 mL/m2               Right Atrium Measurements    Dimensions   RAA A4C 10.7 cm2               Atrial Septum Measurements    Shunt Ratio   LVOT peak VTI 9.65 cm         LVOT peak valentino 0.58 m/s               Aortic Valve Measurements    Stenosis   LVOT peak valentino 0.58 m/s         LVOT peak VTI 9.65 cm         LVOT mn grad 1 mmHg         AV LVOT peak gradient 1 mmHg         Area/Dimensions   LVOT diameter 2 cm         LVOT area 3.14 cm2               Mitral Valve Measurements    Stenosis   MV stenosis pressure 1/2 time 47 ms         MV valve area p 1/2 method 4.68               Aorta Measurements    Aortic Dimensions   Ao root 3.5 cm           ECG: 3/5/24     Sinus tachycardia  Left axis deviation  Non-specific intra-ventricular conduction block  T wave abnormality, consider lateral ischemia      Carotid Duplex: 1/26/24    RIGHT:  There is <50% stenosis noted in the internal carotid artery. Plaque is  heterogenous and irregular.  Vertebral artery flow is antegrade. There is no significant subclavian artery  disease.     LEFT:  There is 70-99% stenosis noted in the internal carotid artery. Plaque is  heterogenous and irregular.  Vertebral artery flow is antegrade. There is no significant subclavian artery  disease.     CTA H & N: 1/26/24    1. No acute intracranial abnormality.     2. Mild cerebral chronic microangiopathic changes.     3. 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 There appears to be compression of the optic chiasm. Dedicated pre and postcontrast MR  imaging of the brain with a sellar protocol is recommended along with consultations with the   neurosurgical and endocrinology services.     4. Critical left ICA origin and carotid bulb stenosis with greater than 90% stenosis. No hemodynamically significant stenosis at the right ICA origin. Consultation with the vascular surgical service is recommended.     5. Near occlusive to occlusive atherosclerotic disease in the left cavernous and proximal supraclinoid ICA with reconstitution of the distal segment and carotid terminus likely via an intact [place] of [place].     6. Moderate to severe stenosis in the right cavernous and proximal supraclinoid ICA.     7. Focal moderate to severe segmental stenosis in the P2 segment of the right posterior cerebral artery. Mild stenosis in the P2 segment of the left posterior cerebral artery.     8. Moderate mid to distal basilar stenosis.       Vein Mappin24    RIGHT LOWER LIMB:  The great saphenous vein is patent  from the groin to the ankle.  The intraluminal diameter measurements range from 1 mm to 2.4 mm throughout.     LEFT LOWER LIMB:  The great saphenous vein is patent  from the groin to the ankle.  The intraluminal diameter measurements range from 0.6 mm to 2.6 mm throughout.       I have personally reviewed pertinent films in PACS    Assessment:  Patient Active Problem List    Diagnosis Date Noted    S/P carotid endarterectomy 2024    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HCC) 02/15/2024    Hypercalcemia 02/15/2024    Hyperprolactinemia (HCC) 02/15/2024    Stenosis of left carotid artery 2024    Coronary artery disease involving native coronary artery 2024    Ischemic cardiomyopathy 2024    Mixed hyperlipidemia 2024    Ulcerative pancolitis without complication (HCC) 2024    Polyp of ascending colon 2023    Acquired right foot drop 2023    Hypomagnesemia 2023    Hair loss 2023    Colitis 2023    History of  biliary dyskinesia 06/05/2023    DMII (diabetes mellitus, type 2) (HCC) 06/05/2023    Gallbladder polyp 08/31/2022    Leiomyoma of uterus 11/22/2013         Impression/Plan:    Coronary Artery Disease  Ischemic Cardiomyopathy    Risks and benefits of coronary artery bypass grafting were discussed in detail today with the patient. We have reviewed results all preoperative radiographic,  laboratory and vascular studies.  They understand and wish to proceed with surgical intervention on 4/9/24 with Dr. Josh Perry was comfortable with our recommendations, and their questions were answered to their satisfaction.  Thank you for allowing us to participate in the care of this patient.     The patient recently had a screening colonoscopy in 12/6/23.  Therefore GI referral is not indicated at this time.     SIGNATURE: ROYAL Moore  DATE: April 2, 2024  TIME: 11:12 AM

## 2024-04-08 ENCOUNTER — ANESTHESIA EVENT (OUTPATIENT)
Dept: PERIOP | Facility: HOSPITAL | Age: 69
End: 2024-04-08
Payer: COMMERCIAL

## 2024-04-08 PROBLEM — Z98.890 PONV (POSTOPERATIVE NAUSEA AND VOMITING): Status: ACTIVE | Noted: 2024-04-08

## 2024-04-08 PROBLEM — I50.9 CHF (CONGESTIVE HEART FAILURE) (HCC): Status: ACTIVE | Noted: 2024-04-08

## 2024-04-08 PROBLEM — R11.2 PONV (POSTOPERATIVE NAUSEA AND VOMITING): Status: ACTIVE | Noted: 2024-04-08

## 2024-04-08 NOTE — ANESTHESIA PREPROCEDURE EVALUATION
Procedure:  CORONARY ARTERY BYPASS GRAFT (CABG) 3 VESSELS / EVH, LIMA (Chest)    Relevant Problems   ANESTHESIA   (+) PONV (postoperative nausea and vomiting)      CARDIO   (+) CHF (congestive heart failure) ([facility])   (+) Coronary artery disease involving native coronary artery   (+) Mixed hyperlipidemia      ENDO   (+) DMII (diabetes mellitus, type 2) ([facility])      Digestive   (+) History of biliary dyskinesia      Neurology/Sleep   (+) Acquired right foot drop   (+) 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[facility])      Surgery/Wound/Pain   (+) S/P carotid endarterectomy      Cardiovascular/Peripheral Vascular   (+) Ischemic cardiomyopathy      FEN/Gastrointestinal   (+) Colitis   (+) Ulcerative pancolitis without complication ([facility])      Findings    Left Ventricle Left ventricular cavity size is at the upper limits of normal when indexed for BSA. Wall thickness is mildly increased. There is eccentric hypertrophy. The left ventricular ejection fraction is 30%. Systolic function is severely reduced. Wall motion is normal. Unable to assess diastolic function due to E/A fusion due to tachycardia.   Wall Scoring Baseline     The following segments are akinetic: basal inferoseptal, basal inferior, mid inferoseptal and mid inferior.  The following segments are hypokinetic: basal anterior, basal anteroseptal, basal inferolateral, basal anterolateral, mid anterior, mid anteroseptal, mid inferolateral, mid anterolateral, apical anterior, apical septal, apical inferior, apical lateral and apex.            Right Ventricle Right ventricular cavity size is normal. Systolic function is normal. Wall thickness is normal.   Left Atrium The atrium is moderately dilated (42-48 mL/m2).   Right Atrium The atrium is normal in size.   Aortic Valve The aortic valve is trileaflet. The leaflets are not thickened. The leaflets are not calcified. The leaflets exhibit normal mobility. There is no evidence of regurgitation. The aortic valve has no significant stenosis.    Mitral Valve There is mild annular calcification.  There is mild to moderate regurgitation. There is no evidence of stenosis.   Tricuspid Valve Tricuspid valve structure is normal. There is trace regurgitation. There is no evidence of stenosis. Right ventricular systolic pressure could not be assessed.   Pulmonic Valve Pulmonic valve structure is normal. There is mild regurgitation. There is no evidence of stenosis.   Ascending Aorta The aortic root is normal in size.   IVC/SVC The inferior vena cava is normal in size. Respirophasic changes were normal.   Pericardium There is a small pericardial effusion along the right atrial and right ventricular free wall. The fluid exhibits no internal echoes. There is no echocardiographic evidence of tamponade. The pericardium is normal in appearance. There is a left pleural effusion.         Prox LAD lesion is 50% stenosed.    Mid LAD lesion is 85% stenosed.    Prox Cx lesion is 40% stenosed.    Mid Cx lesion is 70% stenosed.    Dist Cx lesion is 90% stenosed.    Prox RCA lesion is 50% stenosed.    Mid RCA lesion is 95% stenosed.     3 vessel epicardial CAD     Recommendation    CABG       Sinus tachycardia  Left axis deviation  Non-specific intra-ventricular conduction block  T wave abnormality, consider lateral ischemia  Abnormal ECG  When compared with ECG of 17-JAN-2024 07:17,  QRS axis Shifted left  Nonspecific T wave abnormality no longer evident in Inferior leads  T wave inversion more evident in Lateral leads  Physical Exam    Airway    Mallampati score: II  TM Distance: >3 FB  Neck ROM: full     Dental   No notable dental hx     Cardiovascular  Rhythm: regular, Rate: normal, Cardiovascular exam normal    Pulmonary   Decreased breath sounds    Other Findings  post-pubertal.      Anesthesia Plan  ASA Score- 4     Anesthesia Type- general with ASA Monitors.         Additional Monitors: arterial line and central venous line.    Airway Plan: ETT.    Comment: ELOISA.        Plan Factors-Exercise tolerance (METS): <4 METS.    Chart reviewed. EKG reviewed.  Existing labs reviewed. Patient summary reviewed.    Patient is not a current smoker.              Induction- intravenous.    Postoperative Plan-     Informed Consent- Anesthetic plan and risks discussed with patient.

## 2024-04-09 ENCOUNTER — HOSPITAL ENCOUNTER (INPATIENT)
Facility: HOSPITAL | Age: 69
LOS: 14 days | Discharge: HOME HEALTH | DRG: 215 | End: 2024-04-23
Attending: STUDENT IN AN ORGANIZED HEALTH CARE EDUCATION/TRAINING PROGRAM | Admitting: STUDENT IN AN ORGANIZED HEALTH CARE EDUCATION/TRAINING PROGRAM
Payer: COMMERCIAL

## 2024-04-09 ENCOUNTER — APPOINTMENT (INPATIENT)
Dept: RADIOLOGY | Facility: HOSPITAL | Age: 69
DRG: 215 | End: 2024-04-09
Payer: COMMERCIAL

## 2024-04-09 ENCOUNTER — ANESTHESIA (OUTPATIENT)
Dept: PERIOP | Facility: HOSPITAL | Age: 69
End: 2024-04-09
Payer: COMMERCIAL

## 2024-04-09 ENCOUNTER — APPOINTMENT (OUTPATIENT)
Dept: NON INVASIVE DIAGNOSTICS | Facility: HOSPITAL | Age: 69
DRG: 215 | End: 2024-04-09
Payer: COMMERCIAL

## 2024-04-09 DIAGNOSIS — S71.101A OPEN WOUND OF RIGHT THIGH, INITIAL ENCOUNTER: ICD-10-CM

## 2024-04-09 DIAGNOSIS — S71.001A OPEN WOUND OF RIGHT HIP, INITIAL ENCOUNTER: ICD-10-CM

## 2024-04-09 DIAGNOSIS — R57.0 CARDIAC SHOCK SYNDROME (HCC): ICD-10-CM

## 2024-04-09 DIAGNOSIS — E87.1 HYPONATREMIA: ICD-10-CM

## 2024-04-09 DIAGNOSIS — I25.10 CORONARY ARTERY DISEASE INVOLVING NATIVE CORONARY ARTERY: ICD-10-CM

## 2024-04-09 DIAGNOSIS — E11.65 TYPE 2 DIABETES MELLITUS WITH HYPERGLYCEMIA, WITHOUT LONG-TERM CURRENT USE OF INSULIN (HCC): ICD-10-CM

## 2024-04-09 DIAGNOSIS — I50.9 ACUTE CONGESTIVE HEART FAILURE, UNSPECIFIED HEART FAILURE TYPE (HCC): ICD-10-CM

## 2024-04-09 DIAGNOSIS — I48.91 ATRIAL FIBRILLATION WITH RVR (HCC): ICD-10-CM

## 2024-04-09 DIAGNOSIS — I25.10 CAD IN NATIVE ARTERY: ICD-10-CM

## 2024-04-09 DIAGNOSIS — R60.9 EDEMA, UNSPECIFIED TYPE: ICD-10-CM

## 2024-04-09 DIAGNOSIS — Z95.1 S/P CABG X 3: Primary | ICD-10-CM

## 2024-04-09 DIAGNOSIS — R93.1 LVEF <40%: ICD-10-CM

## 2024-04-09 LAB
ANION GAP SERPL CALCULATED.3IONS-SCNC: 8 MMOL/L (ref 4–13)
APTT PPP: 29 SECONDS (ref 23–37)
ATRIAL RATE: 104 BPM
BASE EX.OXY STD BLDV CALC-SCNC: 67.4 % (ref 60–80)
BASE EXCESS BLDA CALC-SCNC: -1 MMOL/L (ref -2–3)
BASE EXCESS BLDA CALC-SCNC: -2 MMOL/L (ref -2–3)
BASE EXCESS BLDA CALC-SCNC: -2 MMOL/L (ref -2–3)
BASE EXCESS BLDA CALC-SCNC: -4 MMOL/L (ref -2–3)
BASE EXCESS BLDA CALC-SCNC: 0 MMOL/L (ref -2–3)
BASE EXCESS BLDA CALC-SCNC: 0 MMOL/L (ref -2–3)
BASE EXCESS BLDA CALC-SCNC: 2 MMOL/L (ref -2–3)
BASE EXCESS BLDV CALC-SCNC: -3.8 MMOL/L
BUN SERPL-MCNC: 26 MG/DL (ref 5–25)
CA-I BLD-SCNC: 1.04 MMOL/L (ref 1.12–1.32)
CA-I BLD-SCNC: 1.05 MMOL/L (ref 1.12–1.32)
CA-I BLD-SCNC: 1.09 MMOL/L (ref 1.12–1.32)
CA-I BLD-SCNC: 1.17 MMOL/L (ref 1.12–1.32)
CA-I BLD-SCNC: 1.25 MMOL/L (ref 1.12–1.32)
CA-I BLD-SCNC: 1.25 MMOL/L (ref 1.12–1.32)
CA-I BLD-SCNC: 1.29 MMOL/L (ref 1.12–1.32)
CALCIUM SERPL-MCNC: 8.1 MG/DL (ref 8.4–10.2)
CHLORIDE SERPL-SCNC: 109 MMOL/L (ref 96–108)
CO2 SERPL-SCNC: 23 MMOL/L (ref 21–32)
CREAT SERPL-MCNC: 0.82 MG/DL (ref 0.6–1.3)
FIBRINOGEN PPP-MCNC: 240 MG/DL (ref 207–520)
FRACTIONAL SHORTENING: 16 (ref 28–44)
GFR SERPL CREATININE-BSD FRML MDRD: 74 ML/MIN/1.73SQ M
GLUCOSE SERPL-MCNC: 115 MG/DL (ref 65–140)
GLUCOSE SERPL-MCNC: 129 MG/DL (ref 65–140)
GLUCOSE SERPL-MCNC: 143 MG/DL (ref 65–140)
GLUCOSE SERPL-MCNC: 144 MG/DL (ref 65–140)
GLUCOSE SERPL-MCNC: 154 MG/DL (ref 65–140)
GLUCOSE SERPL-MCNC: 158 MG/DL (ref 65–140)
GLUCOSE SERPL-MCNC: 168 MG/DL (ref 65–140)
GLUCOSE SERPL-MCNC: 169 MG/DL (ref 65–140)
GLUCOSE SERPL-MCNC: 175 MG/DL (ref 65–140)
GLUCOSE SERPL-MCNC: 177 MG/DL (ref 65–140)
GLUCOSE SERPL-MCNC: 187 MG/DL (ref 65–140)
GLUCOSE SERPL-MCNC: 211 MG/DL (ref 65–140)
GLUCOSE SERPL-MCNC: 85 MG/DL (ref 65–140)
HCO3 BLDA-SCNC: 20.7 MMOL/L (ref 22–28)
HCO3 BLDA-SCNC: 21.6 MMOL/L (ref 22–28)
HCO3 BLDA-SCNC: 24.2 MMOL/L (ref 24–30)
HCO3 BLDA-SCNC: 24.4 MMOL/L (ref 22–28)
HCO3 BLDA-SCNC: 24.4 MMOL/L (ref 22–28)
HCO3 BLDA-SCNC: 25.1 MMOL/L (ref 22–28)
HCO3 BLDA-SCNC: 28.5 MMOL/L (ref 24–30)
HCO3 BLDV-SCNC: 21.6 MMOL/L (ref 24–30)
HCT VFR BLD AUTO: 34.5 % (ref 34.8–46.1)
HCT VFR BLD AUTO: 35.8 % (ref 34.8–46.1)
HCT VFR BLD CALC: 19 % (ref 34.8–46.1)
HCT VFR BLD CALC: 20 % (ref 34.8–46.1)
HCT VFR BLD CALC: 21 % (ref 34.8–46.1)
HCT VFR BLD CALC: 25 % (ref 34.8–46.1)
HCT VFR BLD CALC: 28 % (ref 34.8–46.1)
HCT VFR BLD CALC: 29 % (ref 34.8–46.1)
HCT VFR BLD CALC: 32 % (ref 34.8–46.1)
HGB BLD-MCNC: 11.1 G/DL (ref 11.5–15.4)
HGB BLD-MCNC: 11.7 G/DL (ref 11.5–15.4)
HGB BLDA-MCNC: 10.9 G/DL (ref 11.5–15.4)
HGB BLDA-MCNC: 6.5 G/DL (ref 11.5–15.4)
HGB BLDA-MCNC: 6.8 G/DL (ref 11.5–15.4)
HGB BLDA-MCNC: 7.1 G/DL (ref 11.5–15.4)
HGB BLDA-MCNC: 8.5 G/DL (ref 11.5–15.4)
HGB BLDA-MCNC: 9.5 G/DL (ref 11.5–15.4)
HGB BLDA-MCNC: 9.9 G/DL (ref 11.5–15.4)
HOROWITZ INDEX BLDA+IHG-RTO: 40 MM[HG]
INR PPP: 1.82 (ref 0.84–1.19)
KCT BLD-ACNC: 149 SEC (ref 89–137)
KCT BLD-ACNC: 441 SEC (ref 89–137)
KCT BLD-ACNC: 467 SEC (ref 89–137)
KCT BLD-ACNC: 473 SEC (ref 89–137)
KCT BLD-ACNC: 557 SEC (ref 89–137)
LEFT INTERNAL DIMENSION IN SYSTOLE: 5.2 CM (ref 2.1–4)
LEFT VENTRICULAR INTERNAL DIMENSION IN DIASTOLE: 6.2 CM (ref 3.5–6)
LEFT VENTRICULAR STROKE VOLUME: 64 ML
LVSV (TEICH): 64 ML
O2 CT BLDV-SCNC: 11.7 ML/DL
P AXIS: 75 DEGREES
PCO2 BLD: 22 MMOL/L (ref 21–32)
PCO2 BLD: 23 MMOL/L (ref 21–32)
PCO2 BLD: 25 MMOL/L (ref 21–32)
PCO2 BLD: 26 MMOL/L (ref 21–32)
PCO2 BLD: 26 MMOL/L (ref 21–32)
PCO2 BLD: 27 MMOL/L (ref 21–32)
PCO2 BLD: 30 MMOL/L (ref 21–32)
PCO2 BLD: 33.2 MM HG (ref 36–44)
PCO2 BLD: 33.8 MM HG (ref 36–44)
PCO2 BLD: 37.4 MM HG (ref 36–44)
PCO2 BLD: 37.7 MM HG (ref 36–44)
PCO2 BLD: 39.5 MM HG (ref 42–50)
PCO2 BLD: 50.9 MM HG (ref 36–44)
PCO2 BLD: 52.8 MM HG (ref 42–50)
PCO2 BLDV: 40.6 MM HG (ref 42–50)
PEEP RESPIRATORY: 6 CM[H2O]
PH BLD: 7.3 [PH] (ref 7.35–7.45)
PH BLD: 7.34 [PH] (ref 7.3–7.4)
PH BLD: 7.39 [PH] (ref 7.35–7.45)
PH BLD: 7.39 [PH] (ref 7.3–7.4)
PH BLD: 7.42 [PH] (ref 7.35–7.45)
PH BLDV: 7.34 [PH] (ref 7.3–7.4)
PLATELET # BLD AUTO: 152 THOUSANDS/UL (ref 149–390)
PLATELET # BLD AUTO: 180 THOUSANDS/UL (ref 149–390)
PMV BLD AUTO: 10.2 FL (ref 8.9–12.7)
PMV BLD AUTO: 9.8 FL (ref 8.9–12.7)
PO2 BLD: 210 MM HG (ref 75–129)
PO2 BLD: 240 MM HG (ref 75–129)
PO2 BLD: 28 MM HG (ref 35–45)
PO2 BLD: 303 MM HG (ref 75–129)
PO2 BLD: 347 MM HG (ref 75–129)
PO2 BLD: 347 MM HG (ref 75–129)
PO2 BLD: 47 MM HG (ref 35–45)
PO2 BLDV: 38.7 MM HG (ref 35–45)
POTASSIUM BLD-SCNC: 2.9 MMOL/L (ref 3.5–5.3)
POTASSIUM BLD-SCNC: 3.4 MMOL/L (ref 3.5–5.3)
POTASSIUM BLD-SCNC: 3.7 MMOL/L (ref 3.5–5.3)
POTASSIUM BLD-SCNC: 3.8 MMOL/L (ref 3.5–5.3)
POTASSIUM BLD-SCNC: 3.8 MMOL/L (ref 3.5–5.3)
POTASSIUM BLD-SCNC: 4 MMOL/L (ref 3.5–5.3)
POTASSIUM BLD-SCNC: 4 MMOL/L (ref 3.5–5.3)
POTASSIUM SERPL-SCNC: 3 MMOL/L (ref 3.5–5.3)
POTASSIUM SERPL-SCNC: 3.6 MMOL/L (ref 3.5–5.3)
POTASSIUM SERPL-SCNC: 3.6 MMOL/L (ref 3.5–5.3)
PR INTERVAL: 198 MS
PROTHROMBIN TIME: 20.8 SECONDS (ref 11.6–14.5)
QRS AXIS: 132 DEGREES
QRSD INTERVAL: 140 MS
QT INTERVAL: 378 MS
QTC INTERVAL: 497 MS
SAO2 % BLD FROM PO2: 100 % (ref 60–85)
SAO2 % BLD FROM PO2: 47 % (ref 60–85)
SAO2 % BLD FROM PO2: 82 % (ref 60–85)
SL CV PED ECHO LEFT VENTRICLE DIASTOLIC VOLUME (MOD BIPLANE) 2D: 194 ML
SL CV PED ECHO LEFT VENTRICLE SYSTOLIC VOLUME (MOD BIPLANE) 2D: 130 ML
SODIUM BLD-SCNC: 134 MMOL/L (ref 136–145)
SODIUM BLD-SCNC: 136 MMOL/L (ref 136–145)
SODIUM BLD-SCNC: 137 MMOL/L (ref 136–145)
SODIUM BLD-SCNC: 138 MMOL/L (ref 136–145)
SODIUM BLD-SCNC: 141 MMOL/L (ref 136–145)
SODIUM SERPL-SCNC: 140 MMOL/L (ref 135–147)
SPECIMEN SOURCE: ABNORMAL
T WAVE AXIS: 18 DEGREES
VENT AC: 15
VENT- AC: AC
VENTRICULAR RATE: 104 BPM
VT SETTING VENT: 450 ML

## 2024-04-09 PROCEDURE — 82805 BLOOD GASES W/O2 SATURATION: CPT | Performed by: PHYSICIAN ASSISTANT

## 2024-04-09 PROCEDURE — 02HV33Z INSERTION OF INFUSION DEVICE INTO SUPERIOR VENA CAVA, PERCUTANEOUS APPROACH: ICD-10-PCS | Performed by: ANESTHESIOLOGY

## 2024-04-09 PROCEDURE — 82330 ASSAY OF CALCIUM: CPT

## 2024-04-09 PROCEDURE — 84295 ASSAY OF SERUM SODIUM: CPT

## 2024-04-09 PROCEDURE — 85014 HEMATOCRIT: CPT

## 2024-04-09 PROCEDURE — 30233N1 TRANSFUSION OF NONAUTOLOGOUS RED BLOOD CELLS INTO PERIPHERAL VEIN, PERCUTANEOUS APPROACH: ICD-10-PCS | Performed by: STUDENT IN AN ORGANIZED HEALTH CARE EDUCATION/TRAINING PROGRAM

## 2024-04-09 PROCEDURE — 85384 FIBRINOGEN ACTIVITY: CPT | Performed by: PHYSICIAN ASSISTANT

## 2024-04-09 PROCEDURE — C1894 INTRO/SHEATH, NON-LASER: HCPCS | Performed by: STUDENT IN AN ORGANIZED HEALTH CARE EDUCATION/TRAINING PROGRAM

## 2024-04-09 PROCEDURE — 71045 X-RAY EXAM CHEST 1 VIEW: CPT

## 2024-04-09 PROCEDURE — 0BH17EZ INSERTION OF ENDOTRACHEAL AIRWAY INTO TRACHEA, VIA NATURAL OR ARTIFICIAL OPENING: ICD-10-PCS | Performed by: ANESTHESIOLOGY

## 2024-04-09 PROCEDURE — 93010 ELECTROCARDIOGRAM REPORT: CPT | Performed by: INTERNAL MEDICINE

## 2024-04-09 PROCEDURE — 93355 ECHO TRANSESOPHAGEAL (TEE): CPT

## 2024-04-09 PROCEDURE — 85347 COAGULATION TIME ACTIVATED: CPT

## 2024-04-09 PROCEDURE — 30233N0 TRANSFUSION OF AUTOLOGOUS RED BLOOD CELLS INTO PERIPHERAL VEIN, PERCUTANEOUS APPROACH: ICD-10-PCS | Performed by: ANESTHESIOLOGY

## 2024-04-09 PROCEDURE — 33518 CABG ARTERY-VEIN TWO: CPT | Performed by: STUDENT IN AN ORGANIZED HEALTH CARE EDUCATION/TRAINING PROGRAM

## 2024-04-09 PROCEDURE — 84132 ASSAY OF SERUM POTASSIUM: CPT | Performed by: PHYSICIAN ASSISTANT

## 2024-04-09 PROCEDURE — 84132 ASSAY OF SERUM POTASSIUM: CPT

## 2024-04-09 PROCEDURE — 86923 COMPATIBILITY TEST ELECTRIC: CPT

## 2024-04-09 PROCEDURE — 93005 ELECTROCARDIOGRAM TRACING: CPT

## 2024-04-09 PROCEDURE — 06BQ4ZZ EXCISION OF LEFT SAPHENOUS VEIN, PERCUTANEOUS ENDOSCOPIC APPROACH: ICD-10-PCS | Performed by: STUDENT IN AN ORGANIZED HEALTH CARE EDUCATION/TRAINING PROGRAM

## 2024-04-09 PROCEDURE — 33518 CABG ARTERY-VEIN TWO: CPT | Performed by: PHYSICIAN ASSISTANT

## 2024-04-09 PROCEDURE — 82948 REAGENT STRIP/BLOOD GLUCOSE: CPT

## 2024-04-09 PROCEDURE — 85014 HEMATOCRIT: CPT | Performed by: PHYSICIAN ASSISTANT

## 2024-04-09 PROCEDURE — 5A1223Z PERFORMANCE OF CARDIAC PACING, CONTINUOUS: ICD-10-PCS | Performed by: STUDENT IN AN ORGANIZED HEALTH CARE EDUCATION/TRAINING PROGRAM

## 2024-04-09 PROCEDURE — 33508 ENDOSCOPIC VEIN HARVEST: CPT | Performed by: STUDENT IN AN ORGANIZED HEALTH CARE EDUCATION/TRAINING PROGRAM

## 2024-04-09 PROCEDURE — P9016 RBC LEUKOCYTES REDUCED: HCPCS

## 2024-04-09 PROCEDURE — 99291 CRITICAL CARE FIRST HOUR: CPT | Performed by: ANESTHESIOLOGY

## 2024-04-09 PROCEDURE — 33508 ENDOSCOPIC VEIN HARVEST: CPT | Performed by: PHYSICIAN ASSISTANT

## 2024-04-09 PROCEDURE — 94760 N-INVAS EAR/PLS OXIMETRY 1: CPT

## 2024-04-09 PROCEDURE — 82947 ASSAY GLUCOSE BLOOD QUANT: CPT

## 2024-04-09 PROCEDURE — 33533 CABG ARTERIAL SINGLE: CPT | Performed by: PHYSICIAN ASSISTANT

## 2024-04-09 PROCEDURE — 80048 BASIC METABOLIC PNL TOTAL CA: CPT | Performed by: PHYSICIAN ASSISTANT

## 2024-04-09 PROCEDURE — 85049 AUTOMATED PLATELET COUNT: CPT | Performed by: PHYSICIAN ASSISTANT

## 2024-04-09 PROCEDURE — A7041 WATER SEAL DRAIN CONTAINER: HCPCS | Performed by: STUDENT IN AN ORGANIZED HEALTH CARE EDUCATION/TRAINING PROGRAM

## 2024-04-09 PROCEDURE — 021009W BYPASS CORONARY ARTERY, ONE ARTERY FROM AORTA WITH AUTOLOGOUS VENOUS TISSUE, OPEN APPROACH: ICD-10-PCS | Performed by: STUDENT IN AN ORGANIZED HEALTH CARE EDUCATION/TRAINING PROGRAM

## 2024-04-09 PROCEDURE — 85049 AUTOMATED PLATELET COUNT: CPT | Performed by: STUDENT IN AN ORGANIZED HEALTH CARE EDUCATION/TRAINING PROGRAM

## 2024-04-09 PROCEDURE — 5A1945Z RESPIRATORY VENTILATION, 24-96 CONSECUTIVE HOURS: ICD-10-PCS | Performed by: ANESTHESIOLOGY

## 2024-04-09 PROCEDURE — 33533 CABG ARTERIAL SINGLE: CPT | Performed by: STUDENT IN AN ORGANIZED HEALTH CARE EDUCATION/TRAINING PROGRAM

## 2024-04-09 PROCEDURE — 85610 PROTHROMBIN TIME: CPT | Performed by: PHYSICIAN ASSISTANT

## 2024-04-09 PROCEDURE — 85018 HEMOGLOBIN: CPT | Performed by: PHYSICIAN ASSISTANT

## 2024-04-09 PROCEDURE — 02100Z9 BYPASS CORONARY ARTERY, ONE ARTERY FROM LEFT INTERNAL MAMMARY, OPEN APPROACH: ICD-10-PCS | Performed by: STUDENT IN AN ORGANIZED HEALTH CARE EDUCATION/TRAINING PROGRAM

## 2024-04-09 PROCEDURE — 82803 BLOOD GASES ANY COMBINATION: CPT

## 2024-04-09 PROCEDURE — 85730 THROMBOPLASTIN TIME PARTIAL: CPT | Performed by: PHYSICIAN ASSISTANT

## 2024-04-09 PROCEDURE — 5A1221Z PERFORMANCE OF CARDIAC OUTPUT, CONTINUOUS: ICD-10-PCS | Performed by: STUDENT IN AN ORGANIZED HEALTH CARE EDUCATION/TRAINING PROGRAM

## 2024-04-09 DEVICE — MARKER CORONARY BYPASS VOSS GRAFT: Type: IMPLANTABLE DEVICE | Site: HEART | Status: FUNCTIONAL

## 2024-04-09 RX ORDER — ACETAMINOPHEN 650 MG/1
650 SUPPOSITORY RECTAL EVERY 4 HOURS PRN
Status: DISCONTINUED | OUTPATIENT
Start: 2024-04-09 | End: 2024-04-17

## 2024-04-09 RX ORDER — ALBUMIN, HUMAN INJ 5% 5 %
SOLUTION INTRAVENOUS CONTINUOUS PRN
Status: DISCONTINUED | OUTPATIENT
Start: 2024-04-09 | End: 2024-04-09

## 2024-04-09 RX ORDER — MILRINONE LACTATE 0.2 MG/ML
INJECTION, SOLUTION INTRAVENOUS CONTINUOUS PRN
Status: DISCONTINUED | OUTPATIENT
Start: 2024-04-09 | End: 2024-04-09

## 2024-04-09 RX ORDER — SODIUM CHLORIDE, SODIUM GLUCONATE, SODIUM ACETATE, POTASSIUM CHLORIDE, MAGNESIUM CHLORIDE, SODIUM PHOSPHATE, DIBASIC, AND POTASSIUM PHOSPHATE .53; .5; .37; .037; .03; .012; .00082 G/100ML; G/100ML; G/100ML; G/100ML; G/100ML; G/100ML; G/100ML
INJECTION, SOLUTION INTRAVENOUS AS NEEDED
Status: DISCONTINUED | OUTPATIENT
Start: 2024-04-09 | End: 2024-04-09

## 2024-04-09 RX ORDER — CALCIUM CHLORIDE 100 MG/ML
1 INJECTION INTRAVENOUS; INTRAVENTRICULAR ONCE AS NEEDED
Status: DISCONTINUED | OUTPATIENT
Start: 2024-04-09 | End: 2024-04-10

## 2024-04-09 RX ORDER — VANCOMYCIN HYDROCHLORIDE 1 G/20ML
INJECTION, POWDER, LYOPHILIZED, FOR SOLUTION INTRAVENOUS AS NEEDED
Status: DISCONTINUED | OUTPATIENT
Start: 2024-04-09 | End: 2024-04-09 | Stop reason: HOSPADM

## 2024-04-09 RX ORDER — AMIODARONE HYDROCHLORIDE 150 MG/3ML
INJECTION, SOLUTION INTRAVENOUS AS NEEDED
Status: DISCONTINUED | OUTPATIENT
Start: 2024-04-09 | End: 2024-04-09

## 2024-04-09 RX ORDER — ATORVASTATIN CALCIUM 80 MG/1
80 TABLET, FILM COATED ORAL
Status: DISCONTINUED | OUTPATIENT
Start: 2024-04-09 | End: 2024-04-23 | Stop reason: HOSPADM

## 2024-04-09 RX ORDER — POTASSIUM CHLORIDE 14.9 MG/ML
20 INJECTION INTRAVENOUS ONCE AS NEEDED
Status: COMPLETED | OUTPATIENT
Start: 2024-04-09 | End: 2024-04-09

## 2024-04-09 RX ORDER — AMIODARONE HYDROCHLORIDE 200 MG/1
200 TABLET ORAL EVERY 8 HOURS SCHEDULED
Status: DISCONTINUED | OUTPATIENT
Start: 2024-04-09 | End: 2024-04-23 | Stop reason: HOSPADM

## 2024-04-09 RX ORDER — HEPARIN SODIUM 1000 [USP'U]/ML
400 INJECTION, SOLUTION INTRAVENOUS; SUBCUTANEOUS ONCE
Status: COMPLETED | OUTPATIENT
Start: 2024-04-09 | End: 2024-04-09

## 2024-04-09 RX ORDER — MAGNESIUM SULFATE HEPTAHYDRATE 40 MG/ML
2 INJECTION, SOLUTION INTRAVENOUS ONCE
Status: COMPLETED | OUTPATIENT
Start: 2024-04-09 | End: 2024-04-10

## 2024-04-09 RX ORDER — ONDANSETRON 2 MG/ML
4 INJECTION INTRAMUSCULAR; INTRAVENOUS EVERY 6 HOURS PRN
Status: DISCONTINUED | OUTPATIENT
Start: 2024-04-09 | End: 2024-04-23 | Stop reason: HOSPADM

## 2024-04-09 RX ORDER — MIDAZOLAM HYDROCHLORIDE 2 MG/2ML
INJECTION, SOLUTION INTRAMUSCULAR; INTRAVENOUS AS NEEDED
Status: DISCONTINUED | OUTPATIENT
Start: 2024-04-09 | End: 2024-04-09

## 2024-04-09 RX ORDER — CEFAZOLIN SODIUM 2 G/50ML
2000 SOLUTION INTRAVENOUS ONCE
Status: COMPLETED | OUTPATIENT
Start: 2024-04-09 | End: 2024-04-09

## 2024-04-09 RX ORDER — CALCIUM CHLORIDE 100 MG/ML
INJECTION INTRAVENOUS; INTRAVENTRICULAR AS NEEDED
Status: DISCONTINUED | OUTPATIENT
Start: 2024-04-09 | End: 2024-04-09

## 2024-04-09 RX ORDER — GLYCOPYRROLATE 0.2 MG/ML
INJECTION INTRAMUSCULAR; INTRAVENOUS AS NEEDED
Status: DISCONTINUED | OUTPATIENT
Start: 2024-04-09 | End: 2024-04-09

## 2024-04-09 RX ORDER — GABAPENTIN 300 MG/1
300 CAPSULE ORAL ONCE
Status: COMPLETED | OUTPATIENT
Start: 2024-04-09 | End: 2024-04-09

## 2024-04-09 RX ORDER — SODIUM CHLORIDE 9 MG/ML
INJECTION, SOLUTION INTRAVENOUS CONTINUOUS PRN
Status: DISCONTINUED | OUTPATIENT
Start: 2024-04-09 | End: 2024-04-09

## 2024-04-09 RX ORDER — PROTAMINE SULFATE 10 MG/ML
INJECTION, SOLUTION INTRAVENOUS AS NEEDED
Status: DISCONTINUED | OUTPATIENT
Start: 2024-04-09 | End: 2024-04-09

## 2024-04-09 RX ORDER — POTASSIUM CHLORIDE 14.9 MG/ML
20 INJECTION INTRAVENOUS
Status: COMPLETED | OUTPATIENT
Start: 2024-04-09 | End: 2024-04-10

## 2024-04-09 RX ORDER — FENTANYL CITRATE 50 UG/ML
INJECTION, SOLUTION INTRAMUSCULAR; INTRAVENOUS AS NEEDED
Status: DISCONTINUED | OUTPATIENT
Start: 2024-04-09 | End: 2024-04-09

## 2024-04-09 RX ORDER — OXYCODONE HYDROCHLORIDE 5 MG/1
5 TABLET ORAL EVERY 4 HOURS PRN
Status: DISCONTINUED | OUTPATIENT
Start: 2024-04-09 | End: 2024-04-23 | Stop reason: HOSPADM

## 2024-04-09 RX ORDER — PHENYLEPHRINE HCL IN 0.9% NACL 1 MG/10 ML
200-2000 SYRINGE (ML) INTRAVENOUS ONCE
Status: DISCONTINUED | OUTPATIENT
Start: 2024-04-09 | End: 2024-04-09

## 2024-04-09 RX ORDER — POTASSIUM CHLORIDE 20MEQ/15ML
40 LIQUID (ML) ORAL ONCE
Status: COMPLETED | OUTPATIENT
Start: 2024-04-09 | End: 2024-04-09

## 2024-04-09 RX ORDER — ASPIRIN 325 MG
325 TABLET ORAL DAILY
Status: DISCONTINUED | OUTPATIENT
Start: 2024-04-09 | End: 2024-04-12

## 2024-04-09 RX ORDER — MAGNESIUM HYDROXIDE 1200 MG/15ML
LIQUID ORAL AS NEEDED
Status: DISCONTINUED | OUTPATIENT
Start: 2024-04-09 | End: 2024-04-09 | Stop reason: HOSPADM

## 2024-04-09 RX ORDER — PANTOPRAZOLE SODIUM 40 MG/1
40 TABLET, DELAYED RELEASE ORAL
Status: DISCONTINUED | OUTPATIENT
Start: 2024-04-09 | End: 2024-04-23 | Stop reason: HOSPADM

## 2024-04-09 RX ORDER — ALBUMIN, HUMAN INJ 5% 5 %
12.5 SOLUTION INTRAVENOUS ONCE
Status: COMPLETED | OUTPATIENT
Start: 2024-04-09 | End: 2024-04-09

## 2024-04-09 RX ORDER — MAGNESIUM SULFATE HEPTAHYDRATE 40 MG/ML
2 INJECTION, SOLUTION INTRAVENOUS ONCE
Status: COMPLETED | OUTPATIENT
Start: 2024-04-09 | End: 2024-04-09

## 2024-04-09 RX ORDER — FENTANYL CITRATE 50 UG/ML
50 INJECTION, SOLUTION INTRAMUSCULAR; INTRAVENOUS ONCE
Status: DISCONTINUED | OUTPATIENT
Start: 2024-04-09 | End: 2024-04-09

## 2024-04-09 RX ORDER — ACETAMINOPHEN 325 MG/1
650 TABLET ORAL
Status: DISCONTINUED | OUTPATIENT
Start: 2024-04-09 | End: 2024-04-10

## 2024-04-09 RX ORDER — EPHEDRINE SULFATE 50 MG/ML
INJECTION INTRAVENOUS AS NEEDED
Status: DISCONTINUED | OUTPATIENT
Start: 2024-04-09 | End: 2024-04-09

## 2024-04-09 RX ORDER — MILRINONE LACTATE 0.2 MG/ML
0.38 INJECTION, SOLUTION INTRAVENOUS CONTINUOUS
Status: DISCONTINUED | OUTPATIENT
Start: 2024-04-09 | End: 2024-04-10

## 2024-04-09 RX ORDER — MAGNESIUM SULFATE 500 MG/ML
VIAL (ML) INJECTION AS NEEDED
Status: DISCONTINUED | OUTPATIENT
Start: 2024-04-09 | End: 2024-04-09

## 2024-04-09 RX ORDER — FONDAPARINUX SODIUM 2.5 MG/.5ML
2.5 INJECTION SUBCUTANEOUS DAILY
Status: DISCONTINUED | OUTPATIENT
Start: 2024-04-10 | End: 2024-04-10

## 2024-04-09 RX ORDER — BISACODYL 10 MG
10 SUPPOSITORY, RECTAL RECTAL DAILY PRN
Status: DISCONTINUED | OUTPATIENT
Start: 2024-04-09 | End: 2024-04-17

## 2024-04-09 RX ORDER — HEPARIN SODIUM 1000 [USP'U]/ML
INJECTION, SOLUTION INTRAVENOUS; SUBCUTANEOUS AS NEEDED
Status: DISCONTINUED | OUTPATIENT
Start: 2024-04-09 | End: 2024-04-09

## 2024-04-09 RX ORDER — POTASSIUM CHLORIDE 29.8 MG/ML
40 INJECTION INTRAVENOUS
Status: COMPLETED | OUTPATIENT
Start: 2024-04-09 | End: 2024-04-09

## 2024-04-09 RX ORDER — ALBUMIN, HUMAN INJ 5% 5 %
SOLUTION INTRAVENOUS
Status: COMPLETED
Start: 2024-04-09 | End: 2024-04-09

## 2024-04-09 RX ORDER — HEPARIN SODIUM 1000 [USP'U]/ML
10000 INJECTION, SOLUTION INTRAVENOUS; SUBCUTANEOUS ONCE
Status: DISCONTINUED | OUTPATIENT
Start: 2024-04-09 | End: 2024-04-09

## 2024-04-09 RX ORDER — KETAMINE HCL IN NACL, ISO-OSM 100MG/10ML
SYRINGE (ML) INJECTION AS NEEDED
Status: DISCONTINUED | OUTPATIENT
Start: 2024-04-09 | End: 2024-04-09

## 2024-04-09 RX ORDER — FENTANYL CITRATE 50 UG/ML
50 INJECTION, SOLUTION INTRAMUSCULAR; INTRAVENOUS
Status: DISCONTINUED | OUTPATIENT
Start: 2024-04-09 | End: 2024-04-11

## 2024-04-09 RX ORDER — LIDOCAINE HYDROCHLORIDE 10 MG/ML
INJECTION, SOLUTION EPIDURAL; INFILTRATION; INTRACAUDAL; PERINEURAL
Status: COMPLETED
Start: 2024-04-09 | End: 2024-04-09

## 2024-04-09 RX ORDER — CHLORHEXIDINE GLUCONATE ORAL RINSE 1.2 MG/ML
15 SOLUTION DENTAL ONCE
Status: COMPLETED | OUTPATIENT
Start: 2024-04-09 | End: 2024-04-09

## 2024-04-09 RX ORDER — CEFAZOLIN SODIUM 2 G/50ML
2000 SOLUTION INTRAVENOUS EVERY 8 HOURS
Qty: 150 ML | Refills: 0 | Status: COMPLETED | OUTPATIENT
Start: 2024-04-09 | End: 2024-04-10

## 2024-04-09 RX ORDER — HYDROMORPHONE HCL/PF 1 MG/ML
0.5 SYRINGE (ML) INJECTION EVERY 2 HOUR PRN
Status: DISCONTINUED | OUTPATIENT
Start: 2024-04-09 | End: 2024-04-17

## 2024-04-09 RX ORDER — ONDANSETRON 2 MG/ML
INJECTION INTRAMUSCULAR; INTRAVENOUS AS NEEDED
Status: DISCONTINUED | OUTPATIENT
Start: 2024-04-09 | End: 2024-04-09

## 2024-04-09 RX ORDER — LIDOCAINE HCL/PF 100 MG/5ML
SYRINGE (ML) INJECTION AS NEEDED
Status: DISCONTINUED | OUTPATIENT
Start: 2024-04-09 | End: 2024-04-09

## 2024-04-09 RX ORDER — CHLORHEXIDINE GLUCONATE ORAL RINSE 1.2 MG/ML
15 SOLUTION DENTAL 2 TIMES DAILY
Status: DISCONTINUED | OUTPATIENT
Start: 2024-04-09 | End: 2024-04-23 | Stop reason: HOSPADM

## 2024-04-09 RX ORDER — ACETAMINOPHEN 325 MG/1
975 TABLET ORAL ONCE
Status: COMPLETED | OUTPATIENT
Start: 2024-04-09 | End: 2024-04-09

## 2024-04-09 RX ORDER — SODIUM CHLORIDE 450 MG/100ML
20 INJECTION, SOLUTION INTRAVENOUS CONTINUOUS
Status: DISCONTINUED | OUTPATIENT
Start: 2024-04-09 | End: 2024-04-13

## 2024-04-09 RX ORDER — NEOSTIGMINE METHYLSULFATE 1 MG/ML
INJECTION INTRAVENOUS AS NEEDED
Status: DISCONTINUED | OUTPATIENT
Start: 2024-04-09 | End: 2024-04-09

## 2024-04-09 RX ORDER — LIDOCAINE HYDROCHLORIDE 20 MG/ML
INJECTION, SOLUTION EPIDURAL; INFILTRATION; INTRACAUDAL; PERINEURAL AS NEEDED
Status: DISCONTINUED | OUTPATIENT
Start: 2024-04-09 | End: 2024-04-09

## 2024-04-09 RX ORDER — VECURONIUM BROMIDE 1 MG/ML
INJECTION, POWDER, LYOPHILIZED, FOR SOLUTION INTRAVENOUS AS NEEDED
Status: DISCONTINUED | OUTPATIENT
Start: 2024-04-09 | End: 2024-04-09

## 2024-04-09 RX ORDER — UREA 10 %
500 LOTION (ML) TOPICAL DAILY
Status: DISCONTINUED | OUTPATIENT
Start: 2024-04-09 | End: 2024-04-18

## 2024-04-09 RX ORDER — POLYETHYLENE GLYCOL 3350 17 G/17G
17 POWDER, FOR SOLUTION ORAL DAILY
Status: DISCONTINUED | OUTPATIENT
Start: 2024-04-09 | End: 2024-04-23 | Stop reason: HOSPADM

## 2024-04-09 RX ORDER — LIDOCAINE HYDROCHLORIDE 10 MG/ML
INJECTION, SOLUTION EPIDURAL; INFILTRATION; INTRACAUDAL; PERINEURAL
Status: COMPLETED | OUTPATIENT
Start: 2024-04-09 | End: 2024-04-09

## 2024-04-09 RX ADMIN — VASOPRESSIN 0.04 UNITS/MIN: 20 INJECTION INTRAVENOUS at 16:08

## 2024-04-09 RX ADMIN — POTASSIUM CHLORIDE 40 MEQ: 29.8 INJECTION, SOLUTION INTRAVENOUS at 14:31

## 2024-04-09 RX ADMIN — NOREPINEPHRINE BITARTRATE 8 MCG/MIN: 1 INJECTION, SOLUTION, CONCENTRATE INTRAVENOUS at 16:09

## 2024-04-09 RX ADMIN — ACETAMINOPHEN 650 MG: 325 TABLET, FILM COATED ORAL at 20:18

## 2024-04-09 RX ADMIN — GABAPENTIN 300 MG: 300 CAPSULE ORAL at 06:20

## 2024-04-09 RX ADMIN — SODIUM CHLORIDE: 0.9 INJECTION, SOLUTION INTRAVENOUS at 08:05

## 2024-04-09 RX ADMIN — MAGNESIUM SULFATE HEPTAHYDRATE 2 G: 40 INJECTION, SOLUTION INTRAVENOUS at 22:47

## 2024-04-09 RX ADMIN — MIDAZOLAM 2 MG: 1 INJECTION INTRAMUSCULAR; INTRAVENOUS at 09:55

## 2024-04-09 RX ADMIN — Medication 150 ML: at 10:17

## 2024-04-09 RX ADMIN — PROTAMINE SULFATE 240 MG: 10 INJECTION, SOLUTION INTRAVENOUS at 11:29

## 2024-04-09 RX ADMIN — PHENYLEPHRINE HYDROCHLORIDE 250 MCG: 10 INJECTION INTRAVENOUS at 10:56

## 2024-04-09 RX ADMIN — Medication 1000 ML: at 10:02

## 2024-04-09 RX ADMIN — Medication 50 MG: at 11:14

## 2024-04-09 RX ADMIN — CHLORHEXIDINE GLUCONATE 15 ML: 1.2 SOLUTION ORAL at 06:20

## 2024-04-09 RX ADMIN — CALCIUM CHLORIDE 1 G: 100 INJECTION INTRAVENOUS; INTRAVENTRICULAR at 11:23

## 2024-04-09 RX ADMIN — SODIUM CHLORIDE: 9 INJECTION, SOLUTION INTRAVENOUS at 07:27

## 2024-04-09 RX ADMIN — AMINOCAPROIC ACID 1 G/HR: 250 INJECTION, SOLUTION INTRAVENOUS at 08:26

## 2024-04-09 RX ADMIN — HEPARIN SODIUM 29840 UNITS: 1000 INJECTION INTRAVENOUS; SUBCUTANEOUS at 09:16

## 2024-04-09 RX ADMIN — CEFAZOLIN SODIUM 2000 MG: 2 SOLUTION INTRAVENOUS at 20:14

## 2024-04-09 RX ADMIN — ALBUMIN (HUMAN) 12.5 G: 12.5 INJECTION, SOLUTION INTRAVENOUS at 17:17

## 2024-04-09 RX ADMIN — VECURONIUM BROMIDE 3 MG: 10 INJECTION, POWDER, LYOPHILIZED, FOR SOLUTION INTRAVENOUS at 09:55

## 2024-04-09 RX ADMIN — CEFAZOLIN SODIUM 2000 MG: 2 SOLUTION INTRAVENOUS at 11:14

## 2024-04-09 RX ADMIN — AMINOCAPROIC ACID 5 G: 250 INJECTION, SOLUTION INTRAVENOUS at 08:05

## 2024-04-09 RX ADMIN — SODIUM BICARBONATE 50 MEQ: 84 INJECTION, SOLUTION INTRAVENOUS at 09:57

## 2024-04-09 RX ADMIN — SODIUM CHLORIDE, SODIUM GLUCONATE, SODIUM ACETATE, POTASSIUM CHLORIDE, MAGNESIUM CHLORIDE, SODIUM PHOSPHATE, DIBASIC, AND POTASSIUM PHOSPHATE 500 ML: .53; .5; .37; .037; .03; .012; .00082 INJECTION, SOLUTION INTRAVENOUS at 07:05

## 2024-04-09 RX ADMIN — HEPARIN SODIUM 5000 UNITS: 1000 INJECTION INTRAVENOUS; SUBCUTANEOUS at 09:57

## 2024-04-09 RX ADMIN — LIDOCAINE HYDROCHLORIDE 100 MG: 20 INJECTION, SOLUTION EPIDURAL; INFILTRATION; INTRACAUDAL; PERINEURAL at 07:39

## 2024-04-09 RX ADMIN — EPINEPHRINE 4 MCG/MIN: 1 INJECTION INTRAMUSCULAR; INTRAVENOUS; SUBCUTANEOUS at 16:08

## 2024-04-09 RX ADMIN — LIDOCAINE HYDROCHLORIDE 0.5 ML: 10 INJECTION, SOLUTION EPIDURAL; INFILTRATION; INTRACAUDAL; PERINEURAL at 07:34

## 2024-04-09 RX ADMIN — MUPIROCIN 1 APPLICATION: 20 OINTMENT TOPICAL at 20:18

## 2024-04-09 RX ADMIN — SODIUM CHLORIDE 1 ML/HR: 9 INJECTION, SOLUTION INTRAVENOUS at 08:05

## 2024-04-09 RX ADMIN — HEPARIN SODIUM 5000 UNITS: 1000 INJECTION INTRAVENOUS; SUBCUTANEOUS at 10:23

## 2024-04-09 RX ADMIN — PHENYLEPHRINE HYDROCHLORIDE 250 MCG: 10 INJECTION INTRAVENOUS at 10:04

## 2024-04-09 RX ADMIN — Medication 200 ML: at 10:43

## 2024-04-09 RX ADMIN — GLYCOPYRROLATE 0.4 MG: 0.2 INJECTION, SOLUTION INTRAMUSCULAR; INTRAVENOUS at 13:04

## 2024-04-09 RX ADMIN — VASOPRESSIN 0.04 UNITS/MIN: 20 INJECTION INTRAVENOUS at 11:14

## 2024-04-09 RX ADMIN — EPHEDRINE SULFATE 5 MG: 50 INJECTION, SOLUTION INTRAVENOUS at 07:42

## 2024-04-09 RX ADMIN — MILRINONE LACTATE 0.25 MCG/KG/MIN: 0.2 INJECTION, SOLUTION INTRAVENOUS at 08:30

## 2024-04-09 RX ADMIN — ALBUMIN (HUMAN): 12.5 INJECTION, SOLUTION INTRAVENOUS at 12:18

## 2024-04-09 RX ADMIN — AMIODARONE HYDROCHLORIDE 150 MG: 50 INJECTION, SOLUTION INTRAVENOUS at 11:06

## 2024-04-09 RX ADMIN — MIDAZOLAM 4 MG: 1 INJECTION INTRAMUSCULAR; INTRAVENOUS at 07:30

## 2024-04-09 RX ADMIN — NOREPINEPHRINE BITARTRATE 2 MCG/MIN: 1 INJECTION, SOLUTION, CONCENTRATE INTRAVENOUS at 11:28

## 2024-04-09 RX ADMIN — CEFAZOLIN SODIUM 2000 MG: 2 SOLUTION INTRAVENOUS at 08:05

## 2024-04-09 RX ADMIN — NEOSTIGMINE METHYLSULFATE 4 MG: 1 INJECTION INTRAVENOUS at 13:04

## 2024-04-09 RX ADMIN — Medication 50 MG: at 07:39

## 2024-04-09 RX ADMIN — Medication 1 EACH: at 07:05

## 2024-04-09 RX ADMIN — EPINEPHRINE 2 MCG/MIN: 1 INJECTION INTRAMUSCULAR; INTRAVENOUS; SUBCUTANEOUS at 08:05

## 2024-04-09 RX ADMIN — Medication 12.5 MG: at 06:20

## 2024-04-09 RX ADMIN — CHLORHEXIDINE GLUCONATE 0.12% ORAL RINSE 15 ML: 1.2 LIQUID ORAL at 20:18

## 2024-04-09 RX ADMIN — ONDANSETRON 4 MG: 2 INJECTION INTRAMUSCULAR; INTRAVENOUS at 12:11

## 2024-04-09 RX ADMIN — ACETAMINOPHEN 975 MG: 325 TABLET, FILM COATED ORAL at 06:20

## 2024-04-09 RX ADMIN — ALBUMIN (HUMAN): 12.5 INJECTION, SOLUTION INTRAVENOUS at 12:05

## 2024-04-09 RX ADMIN — LIDOCAINE HYDROCHLORIDE 100 MG: 20 INJECTION INTRAVENOUS at 11:14

## 2024-04-09 RX ADMIN — SODIUM CHLORIDE 1.5 UNITS/HR: 9 INJECTION, SOLUTION INTRAVENOUS at 16:09

## 2024-04-09 RX ADMIN — MILRINONE LACTATE IN DEXTROSE 0.25 MCG/KG/MIN: 200 INJECTION, SOLUTION INTRAVENOUS at 16:06

## 2024-04-09 RX ADMIN — MAGNESIUM SULFATE HEPTAHYDRATE 2 G: 40 INJECTION, SOLUTION INTRAVENOUS at 13:09

## 2024-04-09 RX ADMIN — MIDAZOLAM 4 MG: 1 INJECTION INTRAMUSCULAR; INTRAVENOUS at 11:14

## 2024-04-09 RX ADMIN — ALBUMIN, HUMAN INJ 5% 12.5 G: 5 SOLUTION at 16:37

## 2024-04-09 RX ADMIN — VECURONIUM BROMIDE 10 MG: 10 INJECTION, POWDER, LYOPHILIZED, FOR SOLUTION INTRAVENOUS at 07:39

## 2024-04-09 RX ADMIN — MUPIROCIN 1 APPLICATION: 20 OINTMENT TOPICAL at 06:21

## 2024-04-09 RX ADMIN — ALBUMIN (HUMAN) 12.5 G: 12.5 INJECTION, SOLUTION INTRAVENOUS at 16:37

## 2024-04-09 RX ADMIN — POTASSIUM CHLORIDE 40 MEQ: 1.5 SOLUTION ORAL at 20:18

## 2024-04-09 RX ADMIN — EPINEPHRINE 4 MCG/MIN: 1 INJECTION INTRAMUSCULAR; INTRAVENOUS; SUBCUTANEOUS at 13:15

## 2024-04-09 RX ADMIN — AMIODARONE HYDROCHLORIDE 200 MG: 200 TABLET ORAL at 21:03

## 2024-04-09 RX ADMIN — MAGNESIUM SULFATE HEPTAHYDRATE 2 G: 500 INJECTION, SOLUTION INTRAMUSCULAR; INTRAVENOUS at 10:56

## 2024-04-09 RX ADMIN — POTASSIUM CHLORIDE 20 MEQ: 14.9 INJECTION, SOLUTION INTRAVENOUS at 15:48

## 2024-04-09 RX ADMIN — FENTANYL CITRATE 1000 MCG: 50 INJECTION, SOLUTION INTRAMUSCULAR; INTRAVENOUS at 07:39

## 2024-04-09 RX ADMIN — POTASSIUM CHLORIDE 20 MEQ: 14.9 INJECTION, SOLUTION INTRAVENOUS at 23:42

## 2024-04-09 NOTE — RESPIRATORY THERAPY NOTE
04/09/24 3626   Respiratory Assessment   Resp Comments Placed pt on ASV per MD   O2 Device vent   Vent Information   Vent type Gurrola C3   Gurrola Vent Mode ASV   $ Pulse Oximetry Spot Check Charge Completed   SpO2 98 %   ASV Settings   P-ramp (ms) 100 (ms)   P ASV Limit (cmH2O) 35 cmH2O   ETS (%) 25 %   FIO2 (%) 40 %   PEEP (cmH2O) 6 cmH2O   % MinVol 100 %   % MinVol Target 5.5 %   Flow Trigger (LPM) 3 LPM   ASV Actuals   Resp Rate (BPM) 15 BPM   VT (mL) 375 mL   MV (Obs) 5.5   MAP (cmH2O) 12 cmH2O   Peak Pressure (cmH2O) 27 cmH2O   I:E Ratio (Obs) 1/2

## 2024-04-09 NOTE — RESPIRATORY THERAPY NOTE
RT Ventilator Management Note  Julia Perry 68 y.o. female MRN: [id number]  Unit/Bed#: [facility] 414-01 Encounter: [id number]      Daily Screen         4/9/2024  1258             Patient safety screen outcome:: Failed (P)               Physical Exam:   Assessment Type: Assess only  General Appearance: Sedated  Respiratory Pattern: Assisted  Chest Assessment: Chest expansion symmetrical  Bilateral Breath Sounds: Clear, Diminished  O2 Device: vent      Resp Comments: Receivde pt back from OR. Intubated with 7.0/18 @ lip. Bilateral BS clear/diminshed. Placed pt on CMV settings.S/P CABG. No known pulmonary hx nor home meds

## 2024-04-09 NOTE — ANESTHESIA PROCEDURE NOTES
"Arterial Line Insertion    Performed by: Saida Maya MD  Authorized by: Saida Maya MD  Consent: Verbal consent obtained. Written consent obtained.  Risks and benefits: risks, benefits and alternatives were discussed  Consent given by: patient  Patient understanding: patient states understanding of the procedure being performed  Patient consent: the patient's understanding of the procedure matches consent given  Required items: required blood products, implants, devices, and special equipment available  Patient identity confirmed: arm band and verbally with patient  Time out: Immediately prior to procedure a \"time out\" was called to verify the correct patient, procedure, equipment, support staff and site/side marked as required.  Preparation: Patient was prepped and draped in the usual sterile fashion.  Indications: multiple ABGs and hemodynamic monitoring  Orientation:  Left  Location: radial arterylidocaine (PF) (XYLOCAINE-MPF) 1 % - Infiltration   0.5 mL - 4/9/2024 7:34:00 AM  Sedation:  Patient sedated: yes  Sedation type: anxiolysis  Sedatives: midazolam and see MAR for details  Vitals: Vital signs were monitored during sedation.    Procedure Details:  Needle gauge: 20  Seldinger technique: Seldinger technique used  Number of attempts: 1    Post-procedure:  Post-procedure: dressing applied  Waveform: good waveform  Post-procedure CNS: unchanged  Patient tolerance: Patient tolerated the procedure well with no immediate complications and patient tolerated the procedure well with no immediate complications          "

## 2024-04-09 NOTE — ANESTHESIA POSTPROCEDURE EVALUATION
Post-Op Assessment Note    CV Status:  Stable    Pain management: adequate       Mental Status:  Sleepy and somnolent   Hydration Status:  Stable   PONV Controlled:  None   Airway Patency:  Patent  Airway: intubated     Post Op Vitals Reviewed: Yes    No anethesia notable event occurred.    Staff: Anesthesiologist     Reason for prolonged intubation > 24 hours:  Persistent respiratory and circulatory failureReason for prolonged intubation > 48 hours:  Persistent respiratory and circulatory failure          BP   115/67   Temp   97.5F   Pulse (!) 107 (04/09/24 1258)   Resp 22 (04/09/24 1258)    SpO2 98 % (04/09/24 1258)

## 2024-04-09 NOTE — ANESTHESIA PROCEDURE NOTES
"Introducer/[place]    Performed by: Saida Maya MD  Authorized by: Saida Maya MD    Date/Time: 4/9/2024 8:02 AM  Consent: Verbal consent obtained. Written consent obtained.  Risks and benefits: risks, benefits and alternatives were discussed  Consent given by: patient  Patient understanding: patient states understanding of the procedure being performed  Patient consent: the patient's understanding of the procedure matches consent given  Required items: required blood products, implants, devices, and special equipment available  Patient identity confirmed: arm band, provided demographic data and hospital-assigned identification number  Time out: Immediately prior to procedure a \"time out\" was called to verify the correct patient, procedure, equipment, support staff and site/side marked as required.  Indications: central pressure monitoring and vascular access  Location details: right internal jugular  Catheter size: 9 Fr  Patient position: Trendelenburg  Assessment: blood return through all ports and free fluid flow  Preparation: skin prepped with 2% chlorhexidine  Skin prep agent dried: skin prep agent completely dried prior to procedure  Sterile barriers: all five maximum sterile barriers used - cap, mask, sterile gown, sterile gloves, and large sterile sheet  Hand hygiene: hand hygiene performed prior to central venous catheter insertion  Ultrasound guidance: yes  ultrasound permanent image saved  Number of attempts: 1  Successful placement: yes  Post-procedure: line sutured and dressing applied  Patient tolerance: Patient tolerated the procedure well with no immediate complications and patient tolerated the procedure well with no immediate complications        "

## 2024-04-09 NOTE — OP NOTE
OPERATIVE REPORT  PATIENT NAME: Julia Perry    :  1955  MRN: [id number]  Pt Location: [facility] OR ROOM 16    SURGERY DATE: 2024    SURGEON: Josh Castellanos MD    ASSISTANT: Rosalina Leslie PA-C    PREOPERATIVE DIAGNOSIS:  Multivessel coronary artery disease    POSTOPERATIVE DIAGNOSIS:  Multivessel coronary artery disease    NYHA Class: 2    CCS Class: 2    PROCEDURE: Coronary artery bypass grafting x 3 with left internal mammary artery to left anterior descending, saphenous vein graft to obtuse marginal 2, saphenous vein graft to right posterior descending artery.     ANESTHESIA: General endotracheal anesthesia with transesophageal echocardiogram guidance, Dr. Saida Maya     CARDIOPULMONARY BYPASS TIME: 93 minutes.     CROSSCLAMP TIME: 76 minutes.    PACKS/TUBES/DRAINS: Chest tubes x 4.     MATERIALS: Pacing wires: A x1. V x1.     TRANSFUSION: 2 PRBC.     SPECIMENS: None.     ESTIMATED BLOOD LOSS: 25 mL    OPERATIVE TECHNIQUE:    The patient was taken to the operating room and placed supine on the operating table. Following the satisfactory induction of general anesthesia and placement of monitoring lines, the patient was prepped and draped in the usual sterile fashion. A time-out procedure was performed.    The patient underwent median sternotomy, LIMA harvest, endoscopic left greater saphenous vein harvest, systemic heparinization and conduit preparation. The patient underwent pericardiotomy and epiaortic ultrasound was used to evaluate the ascending aorta, which was found to be free of significant atheromatous disease. The patient underwent aortic and right atrial cannulation and was initiated on bypass. The ascending aorta was crossclamped. Antegrade del Nido cardioplegia was delivered with an excellent arrest.    Attention was turned to the inferior wall. The PDA was readily identified. On the cath, the distal RCA was very large and appeared to be the ideal target. The PDA was dissected back to  the distal RCA, which was found to be heavily calcified. Thus the proximal PDA was chosen for bypass. The saphenous vein was anastomosed to the right posterior descending artery in end-to-side fashion using running 7-0 Prolene suture. Test flow was 70cc/min. Following anastomosis, 150cc del Nido cardioplegia was delivered down the graft. The saphenous vein was then anastomosed to the  obtuse marginal 2 in end-to-side fashion using running 7-0 Prolene suture. Test flow was 90cc/min. The left internal mammary artery was anastomosed to the left anterior descending in end-to-side fashion using running 7-0 Prolene suture. A total of 2 proximal anastomoses were completed on the ascending aorta in end-to-side fashion using running 5-0 Prolene suture. The heart was de-aired and the crossclamp was removed. Atrial and ventricular pacing wires were placed. Following a period of reperfusion, the patient was weaned from cardiopulmonary bypass and decannulated. Protamine was administered with normalization of the ACT. Hemostasis was confirmed in all fields. Thoracostomy tubes were placed. The sternum was closed with stainless steel wires. The fascia, subdermis and skin were closed with multiple layers of running absorbable suture.    As the attending surgeon, I was present and scrubbed for all critical portions of this procedure. Sponge, needle and instrument counts were reported as correct by the nursing staff. There is no cardiac residency program at this facility and for complex procedures such as this, it is vital to have a physician assistant experienced in cardiac surgery.  The assistance of Rosalina Leslie PA-C was necessary for endoscopic saphenous vein harvesting, retraction of the heart and coronary conduit with proper tissue handling techniques, and following of the suture with correct tension during construction of the proximal and distal coronary anastomoses.    Final transesophageal echocardiogram demonstrated  improved RV function and unchanged LV function.        SIGNATURE: Josh Castellanos MD  DATE: April 9, 2024  TIME: 12:37 PM

## 2024-04-09 NOTE — CONSULTS
[facility]  Consult: Critical Care   Date of Service: 4/9/2024  Hospital Day: 0  Name: Julia Perry I  MRN: [id number]  Unit/Bed#: [facility] 414-01    Inpatient consult to Medical Critical Care  Consult performed by: Brandon Up PA-C  Consult ordered by: Rosalina Leslie PA-C        Assessment/Plan     Impressions:  MVCAD s/p CABG x3, LIMA to LAD, SVG to OM2, SVG to RPDA  ICM (EF 30%)  HTN  HLD  B/l carotid stenosis s/p L CEA 3/24  Pituitary Macroadenoma with elevated prolactin  Hypercalcemia  UC    Neuro:   Discontinue continuous sedation.   ATC tylenol, PRN oxycodone for pain  Trend neuro exam  Delirium precautions    CV:   Goals:   Cardiac Surgery Hemodynamic Monitoring goals: MAP goal >65 CI >2.2 Continue pulmonary artery catheter for hemodynamic monitoring  Continue central line due to vasoactive medications Continue arterial line for blood pressure monitoring and/or frequent blood gas draws  Volume resuscitation as needed.   Epicardial pacing wire plan : Epicardial pacing wires in place. Will pace as needed for bradycardia or cardiac output  V wire only  Monitor rhythm on telemetry.        Lung:   Check STAT post-op ABG and CXR  Wean vent with spontaneous breathing trial with goal to extubate today    GI:   GI prophylaxis with PPI  Bowel regimen  Zofran PRN for nausea.     FEN:   NPO Replenish K >4.0, mag >2.0 and calcium >7.0.     :   Check STAT post-op BMP  Stiles in place.   Monitor UOP with goal >0.5cc/kg/hour.  Lasix versus volume resuscitate as needed depending on hemodynamics and volume status.    ID:   Prophylactic post-op abx. Maintain normothermia. Trend temps.     Heme:   Check STAT post-op H/H and platelets.  Monitor incision site, invasive lines, and chest tube outputs for bleeding. Send coag panel.    Endo:   Insulin gtt for blood sugar control.     Disposition: ICU Care        Subjective     HPI: Julia Perry is a 68 y.o. female with PMH of CAD, iCM ( EF 30%),  HTN, HLD, IVCD DM2, b/l carotid stenosis s/p L CEA ( 3/24), pituitary macrodenoma, hyperporlactinemia, hypercalcemia,and UC who presented to her PCP in late 2023 with complaints of LE edema, PND, orthopnea, and chest pain. A CXR revealed b/l pleural effusion. FOllow up ECHO revealed EF30% and was initiated on GDMT by cardiology. Coronary Angiogram in 1/24 revealed MVCAD. The patient was identified to have b/l carotid stenosis on CABG workup for which she underwent L CEA. In addition to carotid stenosis, patient was incidentally found to have a pituitary macroadenoma for which she follows with endocrinology and NSG.After completion of CEA she was scheduled for CABG. Patient presents to the ICU 4/9 s/p CABGx3, LIMA to LAD, SVG to OM2, and SVG to RPDA supported on Epinephrine, milrinone, levo, and vaso. Post op EF unchanged from 25-30% however RV depressed fxn had improved post op. A R femoral arterial line was placed at the start of the case however a balloon pump was not needed.     Intra-op ELOISA LVEF 25-30%  Post-op medications: Critical Care Infusions : Levophed 4 mcg/min, Vasopressin .04 Units/min, Epinephrine 4 mcg/min, and Primacor .25 mcg/kg/min    ROS: ROS unable to be obtained due to patient being intubated and sedated.     History obtained from chart review due to patient being intubated and sedated.        Objective          Vitals: Invasive Monitoring      /81   Pulse (!) 107   Resp 22   O2 Sat 98 %   O2 Device Nasal cannula   Temp (!) 97.1 °F (36.2 °C)    Arterial Line  [place] BP    No data recorded   MAP    No data recorded     PA Catheter   Most Recent  Min/Max in 24hrs    PAP  27/7 No data recorded   CVP  3-6 No data recorded   CI   1.97 No data recorded   SVR  1100 No data recorded          Physical Exam   Physical Exam  Eyes:      Pupils: Pupils are equal, round, and reactive to light.   Skin:     General: Skin is warm, dry and not mottled extremities.      Capillary Refill: Capillary refill  takes less than 2 seconds.   Neck:      Vascular: Central line present. No JVD.   Cardiovascular:      Rate and Rhythm: Normal rate and regular rhythm.      Pulses: Normal pulses.      Heart sounds: No murmur heard.     Friction rub present. No gallop.      Comments: Wide complex tachycardia  Musculoskeletal:      Right lower leg: No edema.      Left lower leg: No edema.   Abdominal: General: There is no distension.      Palpations: Abdomen is soft.      Tenderness: There is no abdominal tenderness. There is no guarding.      Comments: R groin arterial line in place, no hematoma or ecchymosis.    Constitutional:       General: She is not in acute distress.     Appearance: She is well-developed. She is not toxic-appearing.      Interventions: She is sedated and intubated.   Pulmonary:      Effort: Pulmonary effort is normal. No accessory muscle usage, respiratory distress or accessory muscle usage. She is intubated.      Breath sounds: Normal breath sounds. No wheezing or rales.   Neurological:      General: No focal deficit present.      Mental Status: She is calm.      Cranial Nerves: No facial asymmetry.   Genitourinary/Anorectal:  Stiles present.        Diagnostic Studies      04/09/24 CXR: Possibly very small R PTX at apex.   This was personally reviewed by myself in PACS.  04/09/24 EKG: Wide complex tachycardia, Qtc 582, No obvious ST segment elevation.   This was personally reviewed by myself.       Medications:  Scheduled PRN   acetaminophen, 650 mg, Q6H While awake  amiodarone, 200 mg, Q8H LINDA  aspirin, 325 mg, Daily  atorvastatin, 80 mg, Daily With Dinner  cefazolin, 2,000 mg, Q8H  chlorhexidine, 15 mL, BID  Cholecalciferol, 1,000 Units, Daily  fentaNYL, 50 mcg, Once  [START ON 4/10/2024] fondaparinux, 2.5 mg, Daily  magnesium gluconate, 500 mg, Daily  magnesium sulfate, 2 g, Once  mupirocin, 1 Application, Q12H LINDA  pantoprazole, 40 mg, Early Morning  polyethylene glycol, 17 g, Daily      acetaminophen, 650  mg, Q4H PRN  bisacodyl, 10 mg, Daily PRN  calcium chloride, 1 g, Once PRN  fentaNYL, 50 mcg, Q1H PRN  HYDROmorphone, 0.5 mg, Q2H PRN  lactated ringers, 500 mL, Once PRN  ondansetron, 4 mg, Q6H PRN  oxyCODONE, 2.5 mg, Q4H PRN   Or  oxyCODONE, 5 mg, Q4H PRN  potassium chloride, 20 mEq, Once PRN  potassium chloride, 20 mEq, Q30 Min PRN  potassium chloride, 40 mEq, Q1H PRN       Continuous    epinephrine, 1-10 mcg/min  insulin regular (HumuLIN R,NovoLIN R) 1 Units/mL in sodium chloride 0.9 % 100 mL infusion, 0.3-21 Units/hr  milrinone (Primacor) infusion, 0.25 mcg/kg/min  niCARdipine, 2.5-15 mg/hr  norepinephrine, 1-30 mcg/min  phenylephine,  mcg/min  sodium chloride, 20 mL/hr  vasopressin, 0.04 Units/min         Labs:  CBC    Recent Labs     04/09/24  1103 04/09/24  1105 04/09/24  1305 04/09/24  1307   HGB  --    < > 11.7 10.9*   HCT  --    < > 35.8 32*     --  152  --     < > = values in this interval not displayed.     BMP    Recent Labs     04/09/24  1305 04/09/24  1307   SODIUM 140  --    K 3.0*  --    *  --    CO2 23 22   AGAP 8  --    BUN 26*  --    CREATININE 0.82  --    CALCIUM 8.1*  --        Coags    No recent results     Additional Electrolytes  Recent Labs     04/09/24  1150 04/09/24  1307   CAIONIZED 1.25 1.17          Blood Gas    No recent results  No recent results LFTs  No recent results    Infectious    No recent results     No recent results Glucose  Recent Labs     04/09/24  1305   GLUC 158*          Invasive lines and devices:  Invasive Devices       Central Venous Catheter Line  Duration             CVC Central Lines 04/09/24 <1 day    Introducer 04/09/24 <1 day              Peripheral Intravenous Line  Duration             Peripheral IV 03/04/24 Left Arm 36 days    Peripheral IV 03/04/24 Right Wrist 36 days    Peripheral IV 04/09/24 Right Forearm <1 day              Arterial Line  Duration             Arterial Line 04/09/24 Left Radial <1 day    Arterial Line 04/09/24 Right  Femoral <1 day              Line  Duration             Pacer Wires <1 day    Pacer Wires <1 day              Drain  Duration             Chest Tube 1 Left Pleural 28 Fr. <1 day    Chest Tube 2 Right Pleural 28 Fr. <1 day    Chest Tube 3 Posterior;Mediastinal 24 Fr. <1 day    Chest Tube 4 Mediastinal;Anterior 28 Fr. <1 day    Urethral Catheter Latex;Straight-tip 16 Fr. <1 day              Airway  Duration             ETT  Hi-Lo;Cuffed;Oral 7 mm <1 day                     Historical Information   Past Medical History:  4/8/2024: CHF (congestive heart failure) (HCC)  No date: Colitis  No date: Diabetes mellitus (HCC)  No date: 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HCC)  No date: PONV (postoperative nausea and vomiting) Past Surgical History:  01/17/2024: CARDIAC CATHETERIZATION; Left      Comment:  Procedure: Cardiac Left Heart Cath;  Surgeon: Juan A Sousa MD;  Location: [facility];  Service:                Cardiology  01/17/2024: CARDIAC CATHETERIZATION      Comment:  Procedure: Cardiac catheterization;  Surgeon: Juan A Sousa MD;  Location: [facility];  Service:                Cardiology  12/06/2023: COLONOSCOPY  No date: HYSTERECTOMY      Comment:  ~1990 03/04/2024: DE TEAEC W/PATCH GRF CAROTID VERTB SUBCLAV NECK INC; Left      Comment:  Procedure: LEFT ENDARTERECTOMY ARTERY CAROTID;  Surgeon:               Janeth De Oliveira DO;  Location: [facility];  Service:                Vascular   Current Outpatient Medications   Medication Instructions    acetaminophen (TYLENOL) 650 mg, Oral, Every 6 hours PRN    Alcohol Swabs 70 % PADS May substitute brand based on insurance coverage. Check glucose TID.    aspirin 81 mg, Oral, Daily    b complex vitamins capsule 1 capsule, Oral, Daily    cholecalciferol 1,000 Units, Oral, Daily    furosemide (LASIX) 40 mg, Oral, As needed    glucose blood test strip 1 each, Other, Daily PRN, Use as instructed    magnesium gluconate (MAGONATE) 500 mg,  Oral, Daily    metFORMIN (GLUCOPHAGE) 500 mg tablet 2 pills bid    metoprolol succinate (TOPROL-XL) 25 mg, Oral, Daily    OneTouch Delica Lancets 33G MISC May substitute brand based on insurance coverage. Check glucose TID.    rosuvastatin (CRESTOR) 40 mg, Oral, Daily    Allergies   Allergen Reactions    Valdecoxib Other (See Comments)     Patient is unaware of this allergy      Social History     Tobacco Use    Smoking status: Former     Types: Cigarettes     Passive exposure: Past    Smokeless tobacco: Never    Tobacco comments:     Quit 1970's   Vaping Use    Vaping status: Never Used   Substance Use Topics    Alcohol use: Never    Drug use: Never    No family history on file.         SIGNATURE: Brandon Up PA-C

## 2024-04-09 NOTE — Clinical Note
The LIMA was selectively engaged, injected and visualized. Multiple views of the injected vessel were taken.

## 2024-04-09 NOTE — Clinical Note
Sterile dressing applied to right femoral site, manual pressure held for 30 minutes, no evidence of bleeding or hematoma, pt ready for transport to room 414

## 2024-04-09 NOTE — INTERVAL H&P NOTE
H&P reviewed. After examining the patient I find no changes in the patients condition since the H&P had been written.    Vitals:    04/09/24 0542   BP: 122/81   Pulse: 97   Resp: 18   Temp: (!) 97.1 °F (36.2 °C)   SpO2: 96%       Anticipated Length of Stay: Patient will be admitted on an inpatient basis with an anticipated length of stay of greater than 2 midnights.  Justification for Hospital StaY: Post surgical recovery following open heart surgery.      Josh Castellanos MD  04/09/24  7:23 AM

## 2024-04-09 NOTE — ANESTHESIA PROCEDURE NOTES
Procedure Performed: ELOISA Anesthesia  Start Time:  4/9/2024 8:04 AM        Preanesthesia Checklist    Patient identified, IV assessed, risks and benefits discussed, monitors and equipment assessed, procedure being performed at surgeon's request and anesthesia consent obtained.      Procedure     Type of ELOISA: complete ELOISA with interpretation. Images Saved: ultrasound permanent image saved.   Location performed: OR. Intubated.  Heart visualized. Insertion of ELOISA Probe:  Easy. Probe Type:  Epiaortic and multiplane. Modalities:  Color flow mapping, 3D, pulse wave Doppler and continuous wave Doppler.      Echocardiographic and Doppler Measurements    PREPROCEDURE    LEFT VENTRICLE:  Systolic Function: severely impaired. Ejection Fraction: 25-30%. Cavity size: dilated. Cavity Dimension: 6 cm.  Regional Wall Motion Abnormalities: Akinetic inferoseptal wall. Severely hypokinetic anterior, anteroseptal  inferior walls. Moderately hypokinetic anterolateral and inferolateral walls.    RIGHT VENTRICLE:  Systolic Function: moderately impaired.  Cavity size mildly dilated. No hypertrophy              AORTIC VALVE:  Leaflets: normal and trileaflet. Leaflet motions normal and normal. Stenosis: none.     Regurgitation: none.      MITRAL VALVE:  Leaflets: normal. Leaflet Motions: restricted. Regurgitation: mild and Mild to moderate.   Stenosis: none.       TRICUSPID VALVE:  Leaflets: normal.         PULMONIC VALVE:  Leaflets: normal. Regurgitation: trace. Stenosis: none.        ASCENDING AORTA:  Size:  normal.  Dissection not present.      AORTIC ARCH:  Size:  normal.  dissection not present. Grade 3: atheroma protruding < 0.5 cm into lumen.    DESCENDING AORTA:  Size: normal.  Dissection not present. Grade 3: atheroma protruding < 0.5 cm into lumen.        RIGHT ATRIUM:  Size:  normal. No spontaneous echo contrast.    LEFT ATRIUM:  Size: dilated. No spontaneous echo contrast.    LEFT ATRIAL APPENDAGE:  Size: normal. No spontaneous  echo contrast         ATRIAL SEPTUM:  Intra-atrial septal morphology: normal.          VENTRICULAR SEPTUM:  Intra-ventricular septum morphology: normal.         EPIAORTIC:  Plaque Thickness: 0-5 mm.    OTHER FINDINGS:  Pericardium:  normal. Pleural Effusion:  bilateral.        POSTPROCEDURE    LEFT VENTRICLE: Unchanged .         RIGHT VENTRICLE:   Systolic Function: mildly depressed.         AORTIC VALVE: Unchanged .           MITRAL VALVE: Unchanged .         TRICUSPID VALVE: Unchanged .        PULMONIC VALVE: Unchanged             ATRIA: Unchanged .          AORTA: Unchanged .        REMOVAL:  Probe Removal: atraumatic.

## 2024-04-09 NOTE — RESPIRATORY THERAPY NOTE
04/09/24 1504   Respiratory Assessment   Resp Comments attempted to wean pt; howver pt very rapid and shallow breathing in high 40s. Communicated with RN and will try again when more awake.   Vent Information   Vent type Gurrola C3   Gurrola Vent Mode (S)CMV   $ Pulse Oximetry Spot Check Charge Completed   SpO2 100 %   (S)CMV Settings   Resp Rate (BPM) 10 BPM   VT (mL) 450 mL   FIO2 (%) 40 %   PEEP (cmH2O) 6 cmH2O   I:E Ratio 1/6.5   Insp Time (%) 0.8 %   Flow Trigger (LPM) 3   Humidification Heat and moisture exchanger   (S)CMV Actuals   Resp Rate (BPM) 26 BPM   VT (mL) 414   MV 11.1   MAP (cmH2O) 13 cmH2O   Peak Pressure (cmH2O) 32 cmH2O   I:E Ratio (Obs) 1/1.9   (S)CMV Alarms   High Peak Pressure (cmH2O) 45   Low Pressure (cmH2O) 5 cm H2O   High Resp Rate (BPM) 40 BPM   Low Resp Rate (BPM) 8 BPM   High MV (L/min) 16 L/min   Low MV (L/min) 3 L/min   High VT (mL) 1000 mL   Low VT (mL) 200 mL   Apnea Time (s) 20 S   Maintenance   Alarm (pink) cable attached No   Resuscitation bag with peep valve at bedside Yes   Water bag changed No   Circuit changed No   IHI Ventilator Associated Pneumonia Bundle   Head of Bed Elevated HOB 30   ETT  Hi-Lo;Cuffed;Oral 7 mm   Placement Date/Time: 04/09/24 0742   Mask Ventilation: Ventilated by mask (1)  Preoxygenated: Yes  Technique: Direct laryngoscopy  Type: Hi-Lo;Cuffed;Oral  Tube Size: 7 mm  Laryngoscope: Mac  Blade Size: 3  Location: Oral  Grade View: Full view of the...   Secured at (cm) 18   Measured from Lips   Secured Location Right   Secured by Commercial tube padgett   Site Condition Dry   Cuff Pressure (color) Green   HI-LO Suction  Intermittent suction   HI-LO Secretions Scant   HI-LO Intervention Patent

## 2024-04-09 NOTE — ANESTHESIA PROCEDURE NOTES
"Central Line Insertion    Performed by: Saida Maya MD  Authorized by: Saida Maya MD    Date/Time: 4/9/2024 8:02 AM  Consent: Verbal consent obtained. Written consent obtained.  Risks and benefits: risks, benefits and alternatives were discussed  Consent given by: patient  Patient understanding: patient states understanding of the procedure being performed  Patient consent: the patient's understanding of the procedure matches consent given  Required items: required blood products, implants, devices, and special equipment available  Patient identity confirmed: arm band, provided demographic data and hospital-assigned identification number  Time out: Immediately prior to procedure a \"time out\" was called to verify the correct patient, procedure, equipment, support staff and site/side marked as required.  Indications: vascular access and central pressure monitoring  Catheter size: 7 Fr  Patient position: Trendelenburg  Assessment: blood return through all ports and free fluid flow  Preparation: skin prepped with 2% chlorhexidine  Skin prep agent dried: skin prep agent completely dried prior to procedure  Sterile barriers: all five maximum sterile barriers used - cap, mask, sterile gown, sterile gloves, and large sterile sheet  Hand hygiene: hand hygiene performed prior to central venous catheter insertion  sterile gel and probe cover used in ultrasound-guided central venous catheter insertionultrasound permanent image saved  Vessel of Catheter Tip End: svc  Number of attempts: 1  Successful placement: yes  Post-procedure: line sutured, dressing applied and chlorhexidine patch applied  Patient tolerance: Patient tolerated the procedure well with no immediate complications and patient tolerated the procedure well with no immediate complications        "

## 2024-04-10 ENCOUNTER — APPOINTMENT (INPATIENT)
Dept: NON INVASIVE DIAGNOSTICS | Facility: HOSPITAL | Age: 69
DRG: 215 | End: 2024-04-10
Payer: COMMERCIAL

## 2024-04-10 ENCOUNTER — APPOINTMENT (INPATIENT)
Dept: RADIOLOGY | Facility: HOSPITAL | Age: 69
DRG: 215 | End: 2024-04-10
Payer: COMMERCIAL

## 2024-04-10 LAB
ABO GROUP BLD BPU: NORMAL
ACANTHOCYTES BLD QL SMEAR: PRESENT
ALBUMIN SERPL BCP-MCNC: 3.6 G/DL (ref 3.5–5)
ALP SERPL-CCNC: 42 U/L (ref 34–104)
ALT SERPL W P-5'-P-CCNC: 12 U/L (ref 7–52)
ANION GAP SERPL CALCULATED.3IONS-SCNC: 10 MMOL/L (ref 4–13)
ANION GAP SERPL CALCULATED.3IONS-SCNC: 12 MMOL/L (ref 4–13)
ANION GAP SERPL CALCULATED.3IONS-SCNC: 8 MMOL/L (ref 4–13)
ANION GAP SERPL CALCULATED.3IONS-SCNC: 9 MMOL/L (ref 4–13)
ANISOCYTOSIS BLD QL SMEAR: PRESENT
APICAL FOUR CHAMBER EJECTION FRACTION: 24 %
APTT PPP: >210 SECONDS (ref 23–37)
APTT PPP: >210 SECONDS (ref 23–37)
ARTERIAL PATENCY WRIST A: NO
AST SERPL W P-5'-P-CCNC: 35 U/L (ref 13–39)
ATRIAL RATE: 104 BPM
ATRIAL RATE: 99 BPM
BASE EX.OXY STD BLDV CALC-SCNC: 67.8 % (ref 60–80)
BASE EX.OXY STD BLDV CALC-SCNC: 71.1 % (ref 60–80)
BASE EX.OXY STD BLDV CALC-SCNC: 75.3 % (ref 60–80)
BASE EX.OXY STD BLDV CALC-SCNC: 76.1 % (ref 60–80)
BASE EXCESS BLDA CALC-SCNC: -3.8 MMOL/L
BASE EXCESS BLDA CALC-SCNC: -4 MMOL/L
BASE EXCESS BLDA CALC-SCNC: -4.5 MMOL/L
BASE EXCESS BLDA CALC-SCNC: -5 MMOL/L
BASE EXCESS BLDA CALC-SCNC: -6.2 MMOL/L
BASE EXCESS BLDV CALC-SCNC: -1.9 MMOL/L
BASE EXCESS BLDV CALC-SCNC: -3.6 MMOL/L
BASE EXCESS BLDV CALC-SCNC: -4.1 MMOL/L
BASE EXCESS BLDV CALC-SCNC: -7 MMOL/L
BASOPHILS # BLD MANUAL: 0 THOUSAND/UL (ref 0–0.1)
BASOPHILS NFR MAR MANUAL: 0 % (ref 0–1)
BILIRUB DIRECT SERPL-MCNC: 0.24 MG/DL (ref 0–0.2)
BILIRUB SERPL-MCNC: 0.62 MG/DL (ref 0.2–1)
BNP SERPL-MCNC: 3164 PG/ML (ref 0–100)
BODY TEMPERATURE: 101.3 DEGREES FEHRENHEIT
BPU ID: NORMAL
BSA FOR ECHO PROCEDURE: 1.93 M2
BUN SERPL-MCNC: 21 MG/DL (ref 5–25)
BUN SERPL-MCNC: 22 MG/DL (ref 5–25)
BUN SERPL-MCNC: 23 MG/DL (ref 5–25)
BUN SERPL-MCNC: 23 MG/DL (ref 5–25)
CA-I BLD-SCNC: 1.16 MMOL/L (ref 1.12–1.32)
CA-I BLD-SCNC: 1.16 MMOL/L (ref 1.12–1.32)
CALCIUM SERPL-MCNC: 8 MG/DL (ref 8.4–10.2)
CALCIUM SERPL-MCNC: 8.1 MG/DL (ref 8.4–10.2)
CALCIUM SERPL-MCNC: 8.1 MG/DL (ref 8.4–10.2)
CALCIUM SERPL-MCNC: 8.4 MG/DL (ref 8.4–10.2)
CHLORIDE SERPL-SCNC: 110 MMOL/L (ref 96–108)
CHLORIDE SERPL-SCNC: 111 MMOL/L (ref 96–108)
CO2 SERPL-SCNC: 18 MMOL/L (ref 21–32)
CO2 SERPL-SCNC: 19 MMOL/L (ref 21–32)
CO2 SERPL-SCNC: 21 MMOL/L (ref 21–32)
CO2 SERPL-SCNC: 22 MMOL/L (ref 21–32)
CORTIS SERPL-MCNC: 83.4 UG/DL
CREAT SERPL-MCNC: 0.86 MG/DL (ref 0.6–1.3)
CREAT SERPL-MCNC: 0.94 MG/DL (ref 0.6–1.3)
CREAT SERPL-MCNC: 0.95 MG/DL (ref 0.6–1.3)
CREAT SERPL-MCNC: 0.95 MG/DL (ref 0.6–1.3)
CROSSMATCH: NORMAL
EOSINOPHIL # BLD MANUAL: 0 THOUSAND/UL (ref 0–0.4)
EOSINOPHIL NFR BLD MANUAL: 0 % (ref 0–6)
ERYTHROCYTE [DISTWIDTH] IN BLOOD BY AUTOMATED COUNT: 17.1 % (ref 11.6–15.1)
ERYTHROCYTE [DISTWIDTH] IN BLOOD BY AUTOMATED COUNT: 17.5 % (ref 11.6–15.1)
GFR SERPL CREATININE-BSD FRML MDRD: 62 ML/MIN/1.73SQ M
GFR SERPL CREATININE-BSD FRML MDRD: 70 ML/MIN/1.73SQ M
GIANT PLATELETS BLD QL SMEAR: PRESENT
GLUCOSE SERPL-MCNC: 119 MG/DL (ref 65–140)
GLUCOSE SERPL-MCNC: 136 MG/DL (ref 65–140)
GLUCOSE SERPL-MCNC: 138 MG/DL (ref 65–140)
GLUCOSE SERPL-MCNC: 140 MG/DL (ref 65–140)
GLUCOSE SERPL-MCNC: 150 MG/DL (ref 65–140)
GLUCOSE SERPL-MCNC: 158 MG/DL (ref 65–140)
GLUCOSE SERPL-MCNC: 162 MG/DL (ref 65–140)
GLUCOSE SERPL-MCNC: 173 MG/DL (ref 65–140)
GLUCOSE SERPL-MCNC: 173 MG/DL (ref 65–140)
GLUCOSE SERPL-MCNC: 175 MG/DL (ref 65–140)
GLUCOSE SERPL-MCNC: 185 MG/DL (ref 65–140)
GLUCOSE SERPL-MCNC: 188 MG/DL (ref 65–140)
GLUCOSE SERPL-MCNC: 204 MG/DL (ref 65–140)
GLUCOSE SERPL-MCNC: 205 MG/DL (ref 65–140)
GLUCOSE SERPL-MCNC: 211 MG/DL (ref 65–140)
GLUCOSE SERPL-MCNC: 211 MG/DL (ref 65–140)
HCO3 BLDA-SCNC: 18 MMOL/L (ref 22–28)
HCO3 BLDA-SCNC: 19.5 MMOL/L (ref 22–28)
HCO3 BLDA-SCNC: 19.9 MMOL/L (ref 22–28)
HCO3 BLDA-SCNC: 20.9 MMOL/L (ref 22–28)
HCO3 BLDA-SCNC: 20.9 MMOL/L (ref 22–28)
HCO3 BLDV-SCNC: 19.5 MMOL/L (ref 24–30)
HCO3 BLDV-SCNC: 21.5 MMOL/L (ref 24–30)
HCO3 BLDV-SCNC: 21.9 MMOL/L (ref 24–30)
HCO3 BLDV-SCNC: 24.9 MMOL/L (ref 24–30)
HCT VFR BLD AUTO: 31.4 % (ref 34.8–46.1)
HCT VFR BLD AUTO: 32.5 % (ref 34.8–46.1)
HGB BLD-MCNC: 10.2 G/DL (ref 11.5–15.4)
HGB BLD-MCNC: 10.2 G/DL (ref 11.5–15.4)
HGB BLD-MCNC: 10.6 G/DL (ref 11.5–15.4)
HOROWITZ INDEX BLDA+IHG-RTO: 40 MM[HG]
INR PPP: 2.18 (ref 0.84–1.19)
INR PPP: 2.22 (ref 0.84–1.19)
KCT BLD-ACNC: 132 SEC (ref 89–137)
KCT BLD-ACNC: 328 SEC (ref 89–137)
LACTATE SERPL-SCNC: 1.3 MMOL/L (ref 0.5–2)
LACTATE SERPL-SCNC: 1.4 MMOL/L (ref 0.5–2)
LACTATE SERPL-SCNC: 1.5 MMOL/L (ref 0.5–2)
LACTATE SERPL-SCNC: 1.9 MMOL/L (ref 0.5–2)
LDH SERPL-CCNC: 361 U/L (ref 140–271)
LYMPHOCYTES # BLD AUTO: 0.32 THOUSAND/UL (ref 0.6–4.47)
LYMPHOCYTES # BLD AUTO: 2 % (ref 14–44)
MAGNESIUM SERPL-MCNC: 2.5 MG/DL (ref 1.9–2.7)
MAGNESIUM SERPL-MCNC: 2.7 MG/DL (ref 1.9–2.7)
MAGNESIUM SERPL-MCNC: 2.9 MG/DL (ref 1.9–2.7)
MCH RBC QN AUTO: 29.3 PG (ref 26.8–34.3)
MCH RBC QN AUTO: 29.6 PG (ref 26.8–34.3)
MCHC RBC AUTO-ENTMCNC: 32.5 G/DL (ref 31.4–37.4)
MCHC RBC AUTO-ENTMCNC: 32.6 G/DL (ref 31.4–37.4)
MCV RBC AUTO: 90 FL (ref 82–98)
MCV RBC AUTO: 91 FL (ref 82–98)
MONOCYTES # BLD AUTO: 1.28 THOUSAND/UL (ref 0–1.22)
MONOCYTES NFR BLD: 8 % (ref 4–12)
NEUTROPHILS # BLD MANUAL: 14.45 THOUSAND/UL (ref 1.85–7.62)
NEUTS BAND NFR BLD MANUAL: 2 % (ref 0–8)
NEUTS SEG NFR BLD AUTO: 88 % (ref 43–75)
O2 CT BLDA-SCNC: 14.8 ML/DL (ref 16–23)
O2 CT BLDA-SCNC: 15.7 ML/DL (ref 16–23)
O2 CT BLDA-SCNC: 15.9 ML/DL (ref 16–23)
O2 CT BLDA-SCNC: 16 ML/DL (ref 16–23)
O2 CT BLDA-SCNC: 17.1 ML/DL (ref 16–23)
O2 CT BLDV-SCNC: 10.4 ML/DL
O2 CT BLDV-SCNC: 11.1 ML/DL
O2 CT BLDV-SCNC: 11.4 ML/DL
O2 CT BLDV-SCNC: 12.8 ML/DL
OXYHGB MFR BLDA: 97.9 % (ref 94–97)
OXYHGB MFR BLDA: 98 % (ref 94–97)
OXYHGB MFR BLDA: 98.2 % (ref 94–97)
OXYHGB MFR BLDA: 98.2 % (ref 94–97)
OXYHGB MFR BLDA: 98.6 % (ref 94–97)
P AXIS: 68 DEGREES
P AXIS: 69 DEGREES
PCO2 BLDA: 31.2 MM HG (ref 36–44)
PCO2 BLDA: 32.5 MM HG (ref 36–44)
PCO2 BLDA: 36.4 MM HG (ref 36–44)
PCO2 BLDA: 36.7 MM HG (ref 36–44)
PCO2 BLDA: 37.1 MM HG (ref 36–44)
PCO2 BLDV: 41.2 MM HG (ref 42–50)
PCO2 BLDV: 41.5 MM HG (ref 42–50)
PCO2 BLDV: 42.8 MM HG (ref 42–50)
PCO2 BLDV: 52.1 MM HG (ref 42–50)
PEEP RESPIRATORY: 6 CM[H2O]
PH BLDA: 7.35 [PH] (ref 7.35–7.45)
PH BLDA: 7.37 [PH] (ref 7.35–7.45)
PH BLDA: 7.38 [PH] (ref 7.35–7.45)
PH BLDA: 7.38 [PH] (ref 7.35–7.45)
PH BLDA: 7.4 [PH] (ref 7.35–7.45)
PH BLDV: 7.28 [PH] (ref 7.3–7.4)
PH BLDV: 7.3 [PH] (ref 7.3–7.4)
PH BLDV: 7.33 [PH] (ref 7.3–7.4)
PH BLDV: 7.34 [PH] (ref 7.3–7.4)
PHOSPHATE SERPL-MCNC: 2.7 MG/DL (ref 2.3–4.1)
PHOSPHATE SERPL-MCNC: 2.9 MG/DL (ref 2.3–4.1)
PLATELET # BLD AUTO: 127 THOUSANDS/UL (ref 149–390)
PLATELET # BLD AUTO: 141 THOUSANDS/UL (ref 149–390)
PLATELET BLD QL SMEAR: ABNORMAL
PMV BLD AUTO: 10.5 FL (ref 8.9–12.7)
PMV BLD AUTO: 11.1 FL (ref 8.9–12.7)
PO2 BLDA: 156.6 MM HG (ref 75–129)
PO2 BLDA: 163.1 MM HG (ref 75–129)
PO2 BLDA: 179.2 MM HG (ref 75–129)
PO2 BLDA: 180.4 MM HG (ref 75–129)
PO2 BLDA: 189.6 MM HG (ref 75–129)
PO2 BLDV: 36.7 MM HG (ref 35–45)
PO2 BLDV: 39.3 MM HG (ref 35–45)
PO2 BLDV: 41.4 MM HG (ref 35–45)
PO2 BLDV: 45.2 MM HG (ref 35–45)
POIKILOCYTOSIS BLD QL SMEAR: PRESENT
POLYCHROMASIA BLD QL SMEAR: PRESENT
POTASSIUM SERPL-SCNC: 4.4 MMOL/L (ref 3.5–5.3)
POTASSIUM SERPL-SCNC: 4.4 MMOL/L (ref 3.5–5.3)
POTASSIUM SERPL-SCNC: 4.5 MMOL/L (ref 3.5–5.3)
POTASSIUM SERPL-SCNC: 4.6 MMOL/L (ref 3.5–5.3)
PR INTERVAL: 176 MS
PR INTERVAL: 186 MS
PROT SERPL-MCNC: 4.9 G/DL (ref 6.4–8.4)
PROTHROMBIN TIME: 23.8 SECONDS (ref 11.6–14.5)
PROTHROMBIN TIME: 24.2 SECONDS (ref 11.6–14.5)
QRS AXIS: 122 DEGREES
QRS AXIS: 124 DEGREES
QRSD INTERVAL: 116 MS
QRSD INTERVAL: 118 MS
QT INTERVAL: 376 MS
QT INTERVAL: 384 MS
QTC INTERVAL: 482 MS
QTC INTERVAL: 504 MS
RBC # BLD AUTO: 3.45 MILLION/UL (ref 3.81–5.12)
RBC # BLD AUTO: 3.62 MILLION/UL (ref 3.81–5.12)
RBC MORPH BLD: PRESENT
SL CV LV EF: 14
SODIUM SERPL-SCNC: 140 MMOL/L (ref 135–147)
SODIUM SERPL-SCNC: 140 MMOL/L (ref 135–147)
SODIUM SERPL-SCNC: 141 MMOL/L (ref 135–147)
SODIUM SERPL-SCNC: 141 MMOL/L (ref 135–147)
SPECIMEN SOURCE: ABNORMAL
SPECIMEN SOURCE: NORMAL
T WAVE AXIS: -21 DEGREES
T WAVE AXIS: -28 DEGREES
TSH SERPL DL<=0.05 MIU/L-ACNC: 1.1 UIU/ML (ref 0.45–4.5)
UNIT DISPENSE STATUS: NORMAL
UNIT PRODUCT CODE: NORMAL
UNIT PRODUCT VOLUME: 350 ML
UNIT RH: NORMAL
VENT AC: 15
VENT- AC: AC
VENTRICULAR RATE: 104 BPM
VENTRICULAR RATE: 99 BPM
VT SETTING VENT: 350 ML
VT SETTING VENT: 650 ML
WBC # BLD AUTO: 14.48 THOUSAND/UL (ref 4.31–10.16)
WBC # BLD AUTO: 16.05 THOUSAND/UL (ref 4.31–10.16)
WBC TOXIC VACUOLES BLD QL SMEAR: PRESENT

## 2024-04-10 PROCEDURE — 80076 HEPATIC FUNCTION PANEL: CPT | Performed by: PHYSICIAN ASSISTANT

## 2024-04-10 PROCEDURE — 33990 INSJ PERQ VAD L HRT ARTERIAL: CPT | Performed by: INTERNAL MEDICINE

## 2024-04-10 PROCEDURE — 4A023N7 MEASUREMENT OF CARDIAC SAMPLING AND PRESSURE, LEFT HEART, PERCUTANEOUS APPROACH: ICD-10-PCS | Performed by: INTERNAL MEDICINE

## 2024-04-10 PROCEDURE — 93005 ELECTROCARDIOGRAM TRACING: CPT

## 2024-04-10 PROCEDURE — 5A0221D ASSISTANCE WITH CARDIAC OUTPUT USING IMPELLER PUMP, CONTINUOUS: ICD-10-PCS | Performed by: INTERNAL MEDICINE

## 2024-04-10 PROCEDURE — 85610 PROTHROMBIN TIME: CPT | Performed by: INTERNAL MEDICINE

## 2024-04-10 PROCEDURE — C1874 STENT, COATED/COV W/DEL SYS: HCPCS | Performed by: INTERNAL MEDICINE

## 2024-04-10 PROCEDURE — 93010 ELECTROCARDIOGRAM REPORT: CPT | Performed by: INTERNAL MEDICINE

## 2024-04-10 PROCEDURE — 80048 BASIC METABOLIC PNL TOTAL CA: CPT | Performed by: INTERNAL MEDICINE

## 2024-04-10 PROCEDURE — 83605 ASSAY OF LACTIC ACID: CPT | Performed by: PHYSICIAN ASSISTANT

## 2024-04-10 PROCEDURE — 027035Z DILATION OF CORONARY ARTERY, ONE ARTERY WITH TWO DRUG-ELUTING INTRALUMINAL DEVICES, PERCUTANEOUS APPROACH: ICD-10-PCS | Performed by: INTERNAL MEDICINE

## 2024-04-10 PROCEDURE — 82805 BLOOD GASES W/O2 SATURATION: CPT | Performed by: PHYSICIAN ASSISTANT

## 2024-04-10 PROCEDURE — 83735 ASSAY OF MAGNESIUM: CPT | Performed by: PHYSICIAN ASSISTANT

## 2024-04-10 PROCEDURE — 93459 L HRT ART/GRFT ANGIO: CPT | Performed by: INTERNAL MEDICINE

## 2024-04-10 PROCEDURE — C1760 CLOSURE DEV, VASC: HCPCS | Performed by: INTERNAL MEDICINE

## 2024-04-10 PROCEDURE — 82948 REAGENT STRIP/BLOOD GLUCOSE: CPT

## 2024-04-10 PROCEDURE — 99153 MOD SED SAME PHYS/QHP EA: CPT | Performed by: INTERNAL MEDICINE

## 2024-04-10 PROCEDURE — 80048 BASIC METABOLIC PNL TOTAL CA: CPT | Performed by: PHYSICIAN ASSISTANT

## 2024-04-10 PROCEDURE — B2111ZZ FLUOROSCOPY OF MULTIPLE CORONARY ARTERIES USING LOW OSMOLAR CONTRAST: ICD-10-PCS | Performed by: INTERNAL MEDICINE

## 2024-04-10 PROCEDURE — C1769 GUIDE WIRE: HCPCS | Performed by: INTERNAL MEDICINE

## 2024-04-10 PROCEDURE — 93979 VASCULAR STUDY: CPT

## 2024-04-10 PROCEDURE — B2151ZZ FLUOROSCOPY OF LEFT HEART USING LOW OSMOLAR CONTRAST: ICD-10-PCS | Performed by: INTERNAL MEDICINE

## 2024-04-10 PROCEDURE — 71045 X-RAY EXAM CHEST 1 VIEW: CPT

## 2024-04-10 PROCEDURE — 85027 COMPLETE CBC AUTOMATED: CPT

## 2024-04-10 PROCEDURE — 99291 CRITICAL CARE FIRST HOUR: CPT | Performed by: ANESTHESIOLOGY

## 2024-04-10 PROCEDURE — 02HA3RZ INSERTION OF SHORT-TERM EXTERNAL HEART ASSIST SYSTEM INTO HEART, PERCUTANEOUS APPROACH: ICD-10-PCS | Performed by: INTERNAL MEDICINE

## 2024-04-10 PROCEDURE — 94003 VENT MGMT INPAT SUBQ DAY: CPT

## 2024-04-10 PROCEDURE — NC001 PR NO CHARGE: Performed by: SURGERY

## 2024-04-10 PROCEDURE — 85007 BL SMEAR W/DIFF WBC COUNT: CPT

## 2024-04-10 PROCEDURE — 84100 ASSAY OF PHOSPHORUS: CPT

## 2024-04-10 PROCEDURE — 82330 ASSAY OF CALCIUM: CPT | Performed by: PHYSICIAN ASSISTANT

## 2024-04-10 PROCEDURE — 99024 POSTOP FOLLOW-UP VISIT: CPT | Performed by: STUDENT IN AN ORGANIZED HEALTH CARE EDUCATION/TRAINING PROGRAM

## 2024-04-10 PROCEDURE — 93308 TTE F-UP OR LMTD: CPT | Performed by: INTERNAL MEDICINE

## 2024-04-10 PROCEDURE — 83735 ASSAY OF MAGNESIUM: CPT

## 2024-04-10 PROCEDURE — 99152 MOD SED SAME PHYS/QHP 5/>YRS: CPT | Performed by: INTERNAL MEDICINE

## 2024-04-10 PROCEDURE — 93321 DOPPLER ECHO F-UP/LMTD STD: CPT | Performed by: INTERNAL MEDICINE

## 2024-04-10 PROCEDURE — 93926 LOWER EXTREMITY STUDY: CPT

## 2024-04-10 PROCEDURE — 93325 DOPPLER ECHO COLOR FLOW MAPG: CPT | Performed by: INTERNAL MEDICINE

## 2024-04-10 PROCEDURE — B2131ZZ FLUOROSCOPY OF MULTIPLE CORONARY ARTERY BYPASS GRAFTS USING LOW OSMOLAR CONTRAST: ICD-10-PCS | Performed by: INTERNAL MEDICINE

## 2024-04-10 PROCEDURE — 93456 R HRT CORONARY ARTERY ANGIO: CPT | Performed by: INTERNAL MEDICINE

## 2024-04-10 PROCEDURE — 82330 ASSAY OF CALCIUM: CPT

## 2024-04-10 PROCEDURE — B2181ZZ FLUOROSCOPY OF LEFT INTERNAL MAMMARY BYPASS GRAFT USING LOW OSMOLAR CONTRAST: ICD-10-PCS | Performed by: INTERNAL MEDICINE

## 2024-04-10 PROCEDURE — C1725 CATH, TRANSLUMIN NON-LASER: HCPCS | Performed by: INTERNAL MEDICINE

## 2024-04-10 PROCEDURE — C1887 CATHETER, GUIDING: HCPCS | Performed by: INTERNAL MEDICINE

## 2024-04-10 PROCEDURE — 93308 TTE F-UP OR LMTD: CPT

## 2024-04-10 PROCEDURE — 93321 DOPPLER ECHO F-UP/LMTD STD: CPT

## 2024-04-10 PROCEDURE — 84100 ASSAY OF PHOSPHORUS: CPT | Performed by: PHYSICIAN ASSISTANT

## 2024-04-10 PROCEDURE — 82805 BLOOD GASES W/O2 SATURATION: CPT | Performed by: INTERNAL MEDICINE

## 2024-04-10 PROCEDURE — 93325 DOPPLER ECHO COLOR FLOW MAPG: CPT

## 2024-04-10 PROCEDURE — 85610 PROTHROMBIN TIME: CPT

## 2024-04-10 PROCEDURE — 83615 LACTATE (LD) (LDH) ENZYME: CPT | Performed by: PHYSICIAN ASSISTANT

## 2024-04-10 PROCEDURE — 85730 THROMBOPLASTIN TIME PARTIAL: CPT

## 2024-04-10 PROCEDURE — 82533 TOTAL CORTISOL: CPT | Performed by: PHYSICIAN ASSISTANT

## 2024-04-10 PROCEDURE — 85018 HEMOGLOBIN: CPT | Performed by: PHYSICIAN ASSISTANT

## 2024-04-10 PROCEDURE — C1894 INTRO/SHEATH, NON-LASER: HCPCS | Performed by: INTERNAL MEDICINE

## 2024-04-10 PROCEDURE — 84443 ASSAY THYROID STIM HORMONE: CPT | Performed by: PHYSICIAN ASSISTANT

## 2024-04-10 PROCEDURE — 94760 N-INVAS EAR/PLS OXIMETRY 1: CPT

## 2024-04-10 PROCEDURE — 85347 COAGULATION TIME ACTIVATED: CPT

## 2024-04-10 PROCEDURE — 92928 PRQ TCAT PLMT NTRAC ST 1 LES: CPT | Performed by: INTERNAL MEDICINE

## 2024-04-10 PROCEDURE — 85027 COMPLETE CBC AUTOMATED: CPT | Performed by: PHYSICIAN ASSISTANT

## 2024-04-10 PROCEDURE — 83880 ASSAY OF NATRIURETIC PEPTIDE: CPT

## 2024-04-10 PROCEDURE — 85730 THROMBOPLASTIN TIME PARTIAL: CPT | Performed by: INTERNAL MEDICINE

## 2024-04-10 PROCEDURE — C9600 PERC DRUG-EL COR STENT SING: HCPCS | Performed by: INTERNAL MEDICINE

## 2024-04-10 DEVICE — XIENCE SKYPOINT™ EVEROLIMUS ELUTING CORONARY STENT SYSTEM 2.75 MM X 12 MM / RAPID-EXCHANGE
Type: IMPLANTABLE DEVICE | Site: GROIN | Status: FUNCTIONAL
Brand: XIENCE SKYPOINT™

## 2024-04-10 DEVICE — XIENCE SKYPOINT™ EVEROLIMUS ELUTING CORONARY STENT SYSTEM 2.50 MM X 38 MM / RAPID-EXCHANGE
Type: IMPLANTABLE DEVICE | Site: GROIN | Status: FUNCTIONAL
Brand: XIENCE SKYPOINT™

## 2024-04-10 DEVICE — PERCLOSE™ PROSTYLE™ SUTURE-MEDIATED CLOSURE AND REPAIR SYSTEM
Type: IMPLANTABLE DEVICE | Site: GROIN | Status: FUNCTIONAL
Brand: PERCLOSE™ PROSTYLE™

## 2024-04-10 RX ORDER — FENTANYL CITRATE-0.9 % NACL/PF 10 MCG/ML
50 PLASTIC BAG, INJECTION (ML) INTRAVENOUS CONTINUOUS
Status: DISCONTINUED | OUTPATIENT
Start: 2024-04-10 | End: 2024-04-11

## 2024-04-10 RX ORDER — HEPARIN SODIUM 1000 [USP'U]/ML
2000 INJECTION, SOLUTION INTRAVENOUS; SUBCUTANEOUS EVERY 6 HOURS PRN
Status: DISCONTINUED | OUTPATIENT
Start: 2024-04-10 | End: 2024-04-10

## 2024-04-10 RX ORDER — FENTANYL CITRATE 50 UG/ML
50 INJECTION, SOLUTION INTRAMUSCULAR; INTRAVENOUS ONCE
Status: COMPLETED | OUTPATIENT
Start: 2024-04-10 | End: 2024-04-10

## 2024-04-10 RX ORDER — ALBUMIN, HUMAN INJ 5% 5 %
12.5 SOLUTION INTRAVENOUS ONCE
Status: COMPLETED | OUTPATIENT
Start: 2024-04-10 | End: 2024-04-10

## 2024-04-10 RX ORDER — FENTANYL CITRATE 50 UG/ML
INJECTION, SOLUTION INTRAMUSCULAR; INTRAVENOUS CODE/TRAUMA/SEDATION MEDICATION
Status: DISCONTINUED | OUTPATIENT
Start: 2024-04-10 | End: 2024-04-10 | Stop reason: HOSPADM

## 2024-04-10 RX ORDER — ACETAMINOPHEN 325 MG/1
975 TABLET ORAL EVERY 8 HOURS SCHEDULED
Status: DISCONTINUED | OUTPATIENT
Start: 2024-04-10 | End: 2024-04-16

## 2024-04-10 RX ORDER — NITROGLYCERIN 20 MG/100ML
INJECTION INTRAVENOUS CODE/TRAUMA/SEDATION MEDICATION
Status: DISCONTINUED | OUTPATIENT
Start: 2024-04-10 | End: 2024-04-10 | Stop reason: HOSPADM

## 2024-04-10 RX ORDER — ENOXAPARIN SODIUM 100 MG/ML
40 INJECTION SUBCUTANEOUS DAILY
Status: DISCONTINUED | OUTPATIENT
Start: 2024-04-11 | End: 2024-04-11 | Stop reason: SDUPTHER

## 2024-04-10 RX ORDER — DEXMEDETOMIDINE HYDROCHLORIDE 4 UG/ML
.1-.7 INJECTION, SOLUTION INTRAVENOUS
Status: DISCONTINUED | OUTPATIENT
Start: 2024-04-10 | End: 2024-04-11

## 2024-04-10 RX ORDER — LIDOCAINE HYDROCHLORIDE 10 MG/ML
INJECTION, SOLUTION EPIDURAL; INFILTRATION; INTRACAUDAL; PERINEURAL CODE/TRAUMA/SEDATION MEDICATION
Status: DISCONTINUED | OUTPATIENT
Start: 2024-04-10 | End: 2024-04-10 | Stop reason: HOSPADM

## 2024-04-10 RX ORDER — AMIODARONE HYDROCHLORIDE 150 MG/3ML
INJECTION, SOLUTION INTRAVENOUS
Status: COMPLETED
Start: 2024-04-10 | End: 2024-04-10

## 2024-04-10 RX ORDER — MILRINONE LACTATE 0.2 MG/ML
0.13 INJECTION, SOLUTION INTRAVENOUS CONTINUOUS
Status: DISCONTINUED | OUTPATIENT
Start: 2024-04-10 | End: 2024-04-12

## 2024-04-10 RX ORDER — HEPARIN SODIUM 10000 [USP'U]/100ML
3-20 INJECTION, SOLUTION INTRAVENOUS
Status: DISCONTINUED | OUTPATIENT
Start: 2024-04-10 | End: 2024-04-10

## 2024-04-10 RX ORDER — ALBUMIN, HUMAN INJ 5% 5 %
12.5 SOLUTION INTRAVENOUS ONCE
Status: COMPLETED | OUTPATIENT
Start: 2024-04-10 | End: 2024-04-11

## 2024-04-10 RX ORDER — MIDAZOLAM HYDROCHLORIDE 2 MG/2ML
INJECTION, SOLUTION INTRAMUSCULAR; INTRAVENOUS CODE/TRAUMA/SEDATION MEDICATION
Status: DISCONTINUED | OUTPATIENT
Start: 2024-04-10 | End: 2024-04-10 | Stop reason: HOSPADM

## 2024-04-10 RX ORDER — HEPARIN SODIUM 1000 [USP'U]/ML
4000 INJECTION, SOLUTION INTRAVENOUS; SUBCUTANEOUS EVERY 6 HOURS PRN
Status: DISCONTINUED | OUTPATIENT
Start: 2024-04-10 | End: 2024-04-10

## 2024-04-10 RX ORDER — HEPARIN SODIUM 1000 [USP'U]/ML
INJECTION, SOLUTION INTRAVENOUS; SUBCUTANEOUS CODE/TRAUMA/SEDATION MEDICATION
Status: DISCONTINUED | OUTPATIENT
Start: 2024-04-10 | End: 2024-04-10 | Stop reason: HOSPADM

## 2024-04-10 RX ADMIN — ATORVASTATIN CALCIUM 80 MG: 80 TABLET, FILM COATED ORAL at 15:51

## 2024-04-10 RX ADMIN — NOREPINEPHRINE BITARTRATE 6 MCG/MIN: 1 INJECTION, SOLUTION, CONCENTRATE INTRAVENOUS at 07:20

## 2024-04-10 RX ADMIN — MUPIROCIN 1 APPLICATION: 20 OINTMENT TOPICAL at 21:03

## 2024-04-10 RX ADMIN — Medication 50 MCG/HR: at 23:02

## 2024-04-10 RX ADMIN — FENTANYL CITRATE 50 MCG: 50 INJECTION INTRAMUSCULAR; INTRAVENOUS at 14:00

## 2024-04-10 RX ADMIN — MILRINONE LACTATE IN DEXTROSE 0.5 MCG/KG/MIN: 200 INJECTION, SOLUTION INTRAVENOUS at 10:10

## 2024-04-10 RX ADMIN — AMIODARONE HYDROCHLORIDE 150 MG: 50 INJECTION, SOLUTION INTRAVENOUS at 03:50

## 2024-04-10 RX ADMIN — ALBUMIN (HUMAN) 12.5 G: 12.5 INJECTION, SOLUTION INTRAVENOUS at 03:23

## 2024-04-10 RX ADMIN — ACETAMINOPHEN 650 MG: 325 TABLET, FILM COATED ORAL at 08:03

## 2024-04-10 RX ADMIN — MILRINONE LACTATE IN DEXTROSE 0.38 MCG/KG/MIN: 200 INJECTION, SOLUTION INTRAVENOUS at 02:21

## 2024-04-10 RX ADMIN — TICAGRELOR 90 MG: 90 TABLET ORAL at 21:03

## 2024-04-10 RX ADMIN — AMIODARONE HYDROCHLORIDE 200 MG: 200 TABLET ORAL at 21:03

## 2024-04-10 RX ADMIN — CEFAZOLIN SODIUM 2000 MG: 2 SOLUTION INTRAVENOUS at 02:45

## 2024-04-10 RX ADMIN — ACETAMINOPHEN 975 MG: 325 TABLET, FILM COATED ORAL at 21:03

## 2024-04-10 RX ADMIN — PANTOPRAZOLE SODIUM 40 MG: 40 TABLET, DELAYED RELEASE ORAL at 05:12

## 2024-04-10 RX ADMIN — AMIODARONE HYDROCHLORIDE 200 MG: 200 TABLET ORAL at 05:12

## 2024-04-10 RX ADMIN — PERFLUTREN 0.4 ML/MIN: 6.52 INJECTION, SUSPENSION INTRAVENOUS at 08:26

## 2024-04-10 RX ADMIN — ACETAMINOPHEN 650 MG: 325 TABLET, FILM COATED ORAL at 15:50

## 2024-04-10 RX ADMIN — CEFAZOLIN SODIUM 2000 MG: 2 SOLUTION INTRAVENOUS at 10:16

## 2024-04-10 RX ADMIN — ALBUMIN (HUMAN) 12.5 G: 12.5 INJECTION, SOLUTION INTRAVENOUS at 06:01

## 2024-04-10 RX ADMIN — ASPIRIN 325 MG ORAL TABLET 325 MG: 325 PILL ORAL at 08:03

## 2024-04-10 RX ADMIN — POLYETHYLENE GLYCOL 3350 17 G: 17 POWDER, FOR SOLUTION ORAL at 08:03

## 2024-04-10 RX ADMIN — MILRINONE LACTATE IN DEXTROSE 0.25 MCG/KG/MIN: 200 INJECTION, SOLUTION INTRAVENOUS at 23:03

## 2024-04-10 RX ADMIN — VASOPRESSIN 0.04 UNITS/MIN: 20 INJECTION INTRAVENOUS at 14:00

## 2024-04-10 RX ADMIN — HEPARIN SODIUM 17.3 ML/HR: 5000 INJECTION INTRAVENOUS; SUBCUTANEOUS at 16:26

## 2024-04-10 RX ADMIN — FONDAPARINUX SODIUM 2.5 MG: 2.5 INJECTION SUBCUTANEOUS at 08:03

## 2024-04-10 RX ADMIN — MUPIROCIN 1 APPLICATION: 20 OINTMENT TOPICAL at 08:03

## 2024-04-10 RX ADMIN — NOREPINEPHRINE BITARTRATE 8 MCG/MIN: 1 INJECTION, SOLUTION, CONCENTRATE INTRAVENOUS at 02:24

## 2024-04-10 RX ADMIN — FENTANYL CITRATE 50 MCG: 50 INJECTION INTRAMUSCULAR; INTRAVENOUS at 07:20

## 2024-04-10 RX ADMIN — FENTANYL CITRATE 50 MCG: 50 INJECTION INTRAMUSCULAR; INTRAVENOUS at 11:59

## 2024-04-10 RX ADMIN — Medication 500 MG: at 08:03

## 2024-04-10 RX ADMIN — CHLORHEXIDINE GLUCONATE 0.12% ORAL RINSE 15 ML: 1.2 LIQUID ORAL at 08:03

## 2024-04-10 RX ADMIN — DEXMEDETOMIDINE HYDROCHLORIDE 0.7 MCG/KG/HR: 4 INJECTION, SOLUTION INTRAVENOUS at 10:10

## 2024-04-10 RX ADMIN — VASOPRESSIN 0.04 UNITS/MIN: 20 INJECTION INTRAVENOUS at 07:20

## 2024-04-10 RX ADMIN — EPINEPHRINE 3 MCG/MIN: 1 INJECTION INTRAMUSCULAR; INTRAVENOUS; SUBCUTANEOUS at 14:30

## 2024-04-10 RX ADMIN — DEXMEDETOMIDINE HYDROCHLORIDE 0.7 MCG/KG/HR: 4 INJECTION, SOLUTION INTRAVENOUS at 18:15

## 2024-04-10 RX ADMIN — AMIODARONE HYDROCHLORIDE 200 MG: 200 TABLET ORAL at 15:51

## 2024-04-10 RX ADMIN — CHLORHEXIDINE GLUCONATE 0.12% ORAL RINSE 15 ML: 1.2 LIQUID ORAL at 18:15

## 2024-04-10 RX ADMIN — Medication 1000 UNITS: at 08:03

## 2024-04-10 RX ADMIN — Medication 50 MCG/HR: at 12:11

## 2024-04-10 RX ADMIN — DEXMEDETOMIDINE HYDROCHLORIDE 0.2 MCG/KG/HR: 4 INJECTION, SOLUTION INTRAVENOUS at 02:46

## 2024-04-10 NOTE — RESPIRATORY THERAPY NOTE
RT Ventilator Management Note  Julia Perry 68 y.o. female MRN: [id number]  Unit/Bed#: [facility] 414-01 Encounter: [id number]      Daily Screen         4/9/2024  1258 4/10/2024  0713          Patient safety screen outcome:: Failed Failed      Not Ready for Weaning due to:: -- Underline problem not resolved                Physical Exam:   Assessment Type: Assess only  General Appearance: Sedated  Respiratory Pattern: Assisted  Chest Assessment: Chest expansion symmetrical  Bilateral Breath Sounds: Clear, Diminished      Resp Comments: (P) will instruct pt on IS once pt is extubated.  pt found on [facility] vent settings as charted.  no changes made at this time will continue to monitor per protocol.

## 2024-04-10 NOTE — PHYSICAL THERAPY NOTE
Physical Therapy Cancellation Note           04/10/24 0809   Note Type   Note type Cancelled Session   Cancel Reasons Intubated/sedated     Lobo Jain

## 2024-04-10 NOTE — PROGRESS NOTES
Verbal report given to STEPHANIE Arcos.   Patient prepared for transfer via bed on the monitor to 414.   Right groin dry and intact with impella catheter sutured at 85cm.   Dry sterile dressing applied to the site.  Site verified with receiving RN.  Any changes from the above assessment will be documented by receiving RN.

## 2024-04-10 NOTE — UTILIZATION REVIEW
Initial Clinical Review    Elective IP surgical procedure  Age/Sex: 68 y.o. female  Surgery Date: 4/9/2024  Procedure:   Coronary artery bypass grafting x 3 with left internal mammary artery to left anterior descending, saphenous vein graft to obtuse marginal 2, saphenous vein graft to right posterior descending artery.   Anesthesia: General endotracheal anesthesia with transesophageal echocardiogram guidance   Operative Findings:   CARDIOPULMONARY BYPASS TIME: 93 minutes.    CROSSCLAMP TIME: 76 minutes.   PACKS/TUBES/DRAINS: Chest tubes x 4.    MATERIALS: Pacing wires: A x1. V x1.    TRANSFUSION: 2 PRBC.     POD#1 Progress Note: Persistent elevated SVR and low CI overnight. Fluctuating pressor requirements, up to levo 17. Received significant volume for CI. This morning drips are epi 3, mil 0.5, levo 6, vaso. UOP is borderline. Not bleeding. Check abg, VBG. Needs stat echo, and likely IABP vs. Impella for additional support. Keep intubated for now, until plan for cardiac assist. Keep tubes/wires.  Precedex .5 and RASS Goal: RASS Goal: 0 Alert and Calm. MAP goal >65 CI >2.2 Continue pulmonary artery catheter for hemodynamic monitoring Continue central line due to vasoactive medications Continue arterial line for blood pressure monitoring and/or frequent blood gas draws.  Would like to trial SBT today however patient pulls very low TV and becomes very tachypneac on PSV. Wean vent  as tolerated, possibly re-trial ASV today. Diuresis prn. Strict I/Os. Continue insulin infusion at this time for glucose control. SCDs.      Admission Orders: Date/Time/Statement:   Admission Orders (From admission, onward)       Ordered        04/09/24 0704  Inpatient Admission  Once                          Orders Placed This Encounter   Procedures    Inpatient Admission     Standing Status:   Standing     Number of Occurrences:   1     Order Specific Question:   Level of Care     Answer:   Med Surg [16]     Order Specific Question:    Estimated length of stay     Answer:   More than 2 Midnights     Order Specific Question:   Certification     Answer:   I certify that inpatient services are medically necessary for this patient for a duration of greater than two midnights. See H&P and MD Progress Notes for additional information about the patient's course of treatment.     Vital Signs:   Date/Time Temp Pulse Resp BP MAP (mmHg) Arterial Line BP Arterial Line BP 2 MAP MAP SpO2 FiO2 (%) Calculated FIO2 (%) - Nasal Cannula Nasal Cannula O2 Flow Rate (L/min) O2 Device CO CI Patient Position - Orthostatic VS CVP (mean)   04/10/24 12:50:37 -- -- -- -- -- -- -- -- -- 93 % -- -- -- Ventilator -- -- -- --   04/10/24 1000 100.9 °F (38.3 °C) Abnormal  102 20 112/64 82 96/56 116/58 70 mmHg 77 mmHg 100 % -- -- -- -- 2.8 L/min 1.6 L/min/m2 -- 39 mmHg   04/10/24 0900 -- 100 20 105/62 78 89/52 107/54 65 mmHg 72 mmHg 100 % -- -- -- -- -- -- -- 8 mmHg   04/10/24 0843 -- 101 19 105/61 78 93/54 109/57 67 mmHg 75 mmHg 100 % -- -- -- -- -- -- -- 8 mmHg   04/10/24 0800 100.2 °F (37.9 °C) 98 18 103/59 76 84/50 101/53 62 mmHg Abnormal  68 mmHg 100 % 40 -- -- Ventilator 2.8 L/min 1.6 L/min/m2 Lying 9 mmHg   04/10/24 0700 99.9 °F (37.7 °C) 98 20 105/61 78 87/53 108/56 64 mmHg Abnormal  73 mmHg 100 % -- -- -- -- 2.7 L/min 1.5 L/min/m2 -- 9 mmHg   04/10/24 0600 99.7 °F (37.6 °C) 101 30 Abnormal  -- -- 67/43 109/56 55 mmHg Abnormal  74 mmHg 99 % -- -- -- -- 2.4 L/min 1.3 L/min/m2 -- 9 mmHg   04/10/24 0500 99.3 °F (37.4 °C) 96 18 111/56 80 84/51 103/53 63 mmHg Abnormal  69 mmHg 99 % -- -- -- -- 2.6 L/min 1.5 L/min/m2 -- 8 mmHg   04/10/24 0400 98.8 °F (37.1 °C) 102 45 Abnormal  93/59 69 72/46 98/44 54 mmHg Abnormal  61 mmHg 100 % -- -- -- -- 3.4 L/min 1.9 L/min/m2 -- 4 mmHg   04/10/24 0301 -- -- -- -- -- -- -- -- -- 98 % -- -- -- -- -- -- -- --   04/10/24 0300 99.1 °F (37.3 °C) 114 Abnormal  30 Abnormal  107/56 76 91/59 117/63 70 mmHg 82 mmHg 99 % -- -- -- -- 3.7 L/min 2.1  L/min/m2 -- 14 mmHg   04/10/24 0200 99 °F (37.2 °C) 110 Abnormal  18 99/55 72 83/49 105/53 61 mmHg Abnormal  71 mmHg 98 % -- -- -- -- 3.8 L/min 2.1 L/min/m2 -- 8 mmHg   04/10/24 0100 98.8 °F (37.1 °C) 106 Abnormal  17 104/58 77 86/51 105/56 63 mmHg Abnormal  73 mmHg 98 % -- -- -- -- 3.5 L/min 2 L/min/m2 -- 7 mmHg   04/10/24 0000 98.6 °F (37 °C) 106 Abnormal  27 Abnormal  -- -- 86/52 99/49 63 mmHg Abnormal  66 mmHg 99 % -- -- -- -- 3.9 L/min 2.2 L/min/m2 -- 64 mmHg   04/09/24 2300 98.6 °F (37 °C) 104 16 -- -- 87/51 99/49 63 mmHg Abnormal  65 mmHg 99 % -- -- -- -- 3.6 L/min 2 L/min/m2 -- 18 mmHg   04/09/24 2244 -- -- -- -- -- -- -- -- -- 100 % -- -- -- -- -- -- -- --   04/09/24 2200 98.6 °F (37 °C) 102 26 Abnormal  -- -- 83/50 98/48 61 mmHg Abnormal  64 mmHg 100 % -- -- -- -- 3.9 L/min 2.2 L/min/m2 -- 18 mmHg   04/09/24 2100 99 °F (37.2 °C) 106 Abnormal  21 -- -- 99/61 106/52 76 mmHg 70 mmHg 100 % -- -- -- -- 3.3 L/min 1.9 L/min/m2 -- 18 mmHg   04/09/24 2025 -- -- -- -- -- -- -- -- -- 100 % -- -- -- -- -- -- -- --   04/09/24 2000 98.8 °F (37.1 °C) 102 15 -- -- 101/60 -- 74 mmHg -- 100 % -- -- -- -- 3 L/min 1.7 L/min/m2 -- 9 mmHg   04/09/24 1900 98.8 °F (37.1 °C) 104 15 -- -- 95/57 -- 71 mmHg -- 100 % -- -- -- Ventilator 2.9 L/min 1.6 L/min/m2 -- 9 mmHg   04/09/24 1800 98.6 °F (37 °C) 101 8 Abnormal  -- -- 92/56 96/51 68 mmHg 69 mmHg  100 % -- -- -- -- 3.1 L/min 1.8 L/min/m2 -- 11 mmHg   MAP: right fem at 04/09/24 1800   04/09/24 1752 -- -- -- -- -- -- -- -- -- 98 % -- -- -- -- -- -- -- --   04/09/24 1728 -- 104 15 101/58 -- 101/59 -- 74 mmHg -- 100 % -- -- -- -- -- -- -- 7 mmHg   04/09/24 1700 97.9 °F (36.6 °C) 108 Abnormal  27 Abnormal  -- -- 104/60 121/52 76 mmHg 81 mmHg 100 % -- -- -- -- 3.2 L/min 1.8 L/min/m2 -- 5 mmHg   04/09/24 1653 -- 102 24 Abnormal  -- -- 103/59 -- 74 mmHg -- 100 % -- -- -- -- -- -- -- 4 mmHg   04/09/24 1600 98.1 °F (36.7 °C) 101 25 Abnormal  -- -- 88/53 99/54 65 mmHg 69 mmHg  100 % 40 -- --  Ventilator 2.9 L/min 1.6 L/min/m2 -- 3 mmHg   MAP: right fem at 04/09/24 1600   04/09/24 1504 -- -- -- -- -- -- -- -- -- 100 % -- -- -- -- -- -- -- --   04/09/24 1500 97.7 °F (36.5 °C) 102 31 Abnormal  -- -- 82/57 100/51 66 mmHg 67 mmHg  100 % -- -- -- -- 3.5 L/min 2 L/min/m2 -- 18 mmHg   MAP: right fem at 04/09/24 1500   04/09/24 1415 -- 101 25 Abnormal  -- -- 95/56 112/57 69 mmHg 76 mmHg 100 % -- -- -- -- -- -- -- 5 mmHg   04/09/24 1400 97.7 °F (36.5 °C) 100 25 Abnormal  -- -- 83/50 101/60 62 mmHg Abnormal  71 mmHg  100 % -- -- -- -- 3.2 L/min 1.8 L/min/m2 -- 3 mmHg   MAP: right fem at 04/09/24 1400   04/09/24 1345 -- 102 27 Abnormal  -- -- 84/52 -- 63 mmHg Abnormal  -- 100 % -- -- -- -- -- -- -- 8 mmHg   04/09/24 1330 -- 102 25 Abnormal  -- -- 83/50 -- 61 mmHg Abnormal  -- 100 % -- -- -- -- -- -- -- 6 mmHg   04/09/24 1315 97.5 °F (36.4 °C) 105 25 Abnormal  -- -- 84/51 -- 63 mmHg Abnormal  -- 100 % 50 -- -- Ventilator 3.5 L/min 2 L/min/m2 -- 14 mmHg   04/09/24 1258 -- 107 Abnormal  22 -- -- -- -- -- -- 98 % -- -- -- -- -- -- -- --   04/09/24 0621 -- -- -- -- -- -- -- -- -- -- -- 28 2 L/min Nasal cannula -- -- -- --   04/09/24 0542 97.1 °F (36.2 °C) Abnormal  97 18 122/81 -- -- -- -- -- 96 % -- -- -- None (Room air) -- -- -- --       Pertinent Labs/Diagnostic Test Results:   XR chest portable   Final Result by Maverick George MD (04/10 1014)   The [place] line appears to have been retracted somewhat with the tip now projecting in the region of the main pulmonary artery. No other significant changes from prior.            Workstation performed: [id number]         XR chest portable ICU   Final Result by Mehdi Duke MD (04/09 2057)      Status post open heart surgery, without evidence of pneumothorax or focal infiltrate.      Endotracheal tube tip 5.7 cm above the errol            Workstation performed: [id number]         VAS limited iliac-femoral evaluation for Impella Device    (Results Pending)          Results from last 7 days   Lab Units 04/10/24  0748 04/10/24  0510 04/09/24 2159 04/09/24  1307 04/09/24  1305 04/09/24  1150 04/09/24  1105 04/09/24  1103   WBC Thousand/uL  --  14.48*  --   --   --   --   --   --    HEMOGLOBIN g/dL 10.2* 10.6* 11.1*  --  11.7  --   --   --    I STAT HEMOGLOBIN g/dl  --   --   --  10.9*  --  8.5*   < >  --    HEMATOCRIT %  --  32.5* 34.5*  --  35.8  --   --   --    HEMATOCRIT, ISTAT %  --   --   --  32*  --  25*   < >  --    PLATELETS Thousands/uL  --  141*  --   --  152  --   --  180    < > = values in this interval not displayed.         Results from last 7 days   Lab Units 04/10/24  1204 04/10/24  0510 04/10/24  0129 04/09/24 2159 04/09/24  1801 04/09/24  1307 04/09/24  1305 04/09/24  1305 04/09/24  1150 04/09/24  1105 04/09/24  1033   SODIUM mmol/L 140 141 141  --   --   --   --  140  --   --   --    POTASSIUM mmol/L 4.6 4.4 4.4 3.6 3.6  --    < > 3.0*  --   --   --    CHLORIDE mmol/L 111* 111* 111*  --   --   --   --  109*  --   --   --    CO2 mmol/L 19* 22 21  --   --   --   --  23  --   --   --    CO2, I-STAT mmol/L  --   --   --   --   --  22  --   --  23 26 25   ANION GAP mmol/L 10 8 9  --   --   --   --  8  --   --   --    BUN mg/dL 22 23 23  --   --   --   --  26*  --   --   --    CREATININE mg/dL 0.94 0.95 0.95  --   --   --   --  0.82  --   --   --    EGFR ml/min/1.73sq m 62 62 62  --   --   --   --  74  --   --   --    CALCIUM mg/dL 8.4 8.0* 8.1*  --   --   --   --  8.1*  --   --   --    CALCIUM, IONIZED mmol/L  --   --  1.16  --   --   --   --   --   --   --   --    CALCIUM, IONIZED, ISTAT mmol/L  --   --   --   --   --  1.17  --   --  1.25 1.05* 1.09*   MAGNESIUM mg/dL  --  2.7 2.9*  --   --   --   --   --   --   --   --    PHOSPHORUS mg/dL  --   --  2.9  --   --   --   --   --   --   --   --     < > = values in this interval not displayed.     Results from last 7 days   Lab Units 04/10/24  0510   AST U/L 35   ALT U/L 12   ALK PHOS U/L 42   TOTAL PROTEIN g/dL  4.9*   ALBUMIN g/dL 3.6   TOTAL BILIRUBIN mg/dL 0.62   BILIRUBIN DIRECT mg/dL 0.24*     Results from last 7 days   Lab Units 04/10/24  1207 04/10/24  0959 04/10/24  0752 04/10/24  0525 04/10/24  0509 04/10/24  0128 04/10/24  0004 04/09/24  2159 04/09/24  2006 04/09/24  1804 04/09/24  1615 04/09/24  1410   POC GLUCOSE mg/dl 173* 136 140 205* 204* 173* 162* 143* 168* 154* 85 115     Results from last 7 days   Lab Units 04/10/24  1204 04/10/24  0510 04/10/24  0129 04/09/24  1305   GLUCOSE RANDOM mg/dL 158* 211* 175* 158*     Results from last 7 days   Lab Units 04/10/24  1204 04/10/24  0749 04/10/24  0523   PH ART  7.397 7.368 7.376   PCO2 ART mm Hg 32.5* 37.1 36.4   PO2 ART mm Hg 179.2* 180.4* 156.6*   HCO3 ART mmol/L 19.5* 20.9* 20.9*   BASE EXC ART mmol/L -4.5 -4.0 -3.8   O2 CONTENT ART mL/dL 15.9* 15.7* 16.0   O2 HGB, ARTERIAL % 98.2* 97.9* 98.0*   ABG SOURCE  Line, Arterial Line, Arterial Line, Arterial     Results from last 7 days   Lab Units 04/10/24  1204 04/10/24  0748 04/10/24  0129   PH GRACIELA  7.335 7.298* 7.276*   PCO2 GRACIELA mm Hg 41.2* 52.1* 42.8   PO2 GRACIELA mm Hg 39.3 36.7 45.2*   HCO3 GRACIELA mmol/L 21.5* 24.9 19.5*   BASE EXC GRACIELA mmol/L -4.1 -1.9 -7.0   O2 CONTENT GRACIELA ml/dL 11.1 10.4 12.8   O2 HGB, VENOUS % 71.1 67.8 76.1     Results from last 7 days   Lab Units 04/09/24  1307 04/09/24  1150 04/09/24  1105 04/09/24  1033 04/09/24  0926 04/09/24  0757   PH, GRACIELA I-STAT   --   --   --  7.395  --  7.340   PCO2, GRACIELA ISTAT mm HG  --   --   --  39.5*  --  52.8*   PO2, GRACIELA ISTAT mm HG  --   --   --  47.0*  --  28.0*   HCO3, GRACIELA ISTAT mmol/L  --   --   --  24.2  --  28.5   I STAT BASE EXC mmol/L -4* -2 0 -1   < > 2   I STAT O2 SAT % 100* 100* 100* 82   < > 47*   ISTAT PH ART  7.395 7.422 7.420  --    < >  --    I STAT ART PCO2 mm HG 33.8* 33.2* 37.7  --    < >  --    I STAT ART PO2 mm .0* 210.0* 303.0*  --    < >  --    I STAT ART HCO3 mmol/L 20.7* 21.6* 24.4  --    < >  --     < > = values in this interval not  displayed.     Results from last 7 days   Lab Units 04/09/24  1305   PROTIME seconds 20.8*   INR  1.82*   PTT seconds 29     Results from last 7 days   Lab Units 04/10/24  0958   TSH 3RD GENERATON uIU/mL 1.101         Results from last 7 days   Lab Units 04/10/24  1204 04/10/24  0811 04/10/24  0510   LACTIC ACID mmol/L 1.5 1.4 1.9     Results from last 7 days   Lab Units 04/10/24  0555   UNIT PRODUCT CODE  [id number]  [id number]  [id number]  [id number]   UNIT NUMBER  [id number]  [id number]  [id number]  [id number]   UNITABO  O  O  O  O   UNITRH  POS  POS  POS  POS   CROSSMATCH  Compatible  Compatible  Compatible  Compatible   UNIT DISPENSE STATUS  Presumed Trans  Return to Inv  Presumed Trans  Return to Inv   UNIT PRODUCT VOL mL 350  350  350  350     Scheduled Medications:  acetaminophen, 650 mg, Oral, Q6H While awake  [Transfer Hold] amiodarone 150 mg in dextrose 5 % 100 mL IV bolus, 150 mg, Intravenous, Once  amiodarone, 200 mg, Oral, Q8H LINDA  aspirin, 325 mg, Oral, Daily  atorvastatin, 80 mg, Oral, Daily With Dinner  chlorhexidine, 15 mL, Mouth/Throat, BID  Cholecalciferol, 1,000 Units, Oral, Daily  fondaparinux, 2.5 mg, Subcutaneous, Daily  magnesium gluconate, 500 mg, Oral, Daily  mupirocin, 1 Application, Nasal, Q12H LINDA  pantoprazole, 40 mg, Oral, Early Morning  polyethylene glycol, 17 g, Oral, Daily    Continuous IV Infusions:  dexmedetomidine, 0.1-0.7 mcg/kg/hr, Intravenous, Titrated  epinephrine, 1-10 mcg/min, Intravenous, Titrated  fentaNYL, 50 mcg/hr, Intravenous, Continuous  insulin regular (HumuLIN R,NovoLIN R) 1 Units/mL in sodium chloride 0.9 % 100 mL infusion, 0.3-21 Units/hr, Intravenous, Titrated  milrinone (Primacor) infusion, 0.5 mcg/kg/min, Intravenous, Continuous  norepinephrine, 1-30 mcg/min, Intravenous, Titrated  sodium chloride, 20 mL/hr, Intravenous, Continuous  vasopressin, 0.04 Units/min, Intravenous, Continuous      PRN Meds:  acetaminophen, 650 mg,  Rectal, Q4H PRN  bisacodyl, 10 mg, Rectal, Daily PRN  calcium chloride, 1 g, Intravenous, Once PRN  fentaNYL, 50 mcg, Intravenous, Q1H PRN 4/10 x1  HYDROmorphone, 0.5 mg, Intravenous, Q2H PRN  lactated ringers, 500 mL, Intravenous, Once PRN  ondansetron, 4 mg, Intravenous, Q6H PRN  oxyCODONE, 2.5 mg, Oral, Q4H PRN   Or  oxyCODONE, 5 mg, Oral, Q4H PRN        Network Utilization Review Department  ATTENTION: Please call with any questions or concerns to [phone] and carefully listen to the prompts so that you are directed to the right person. All voicemails are confidential.   For Discharge needs, contact Care Management DC Support Team at [phone] opt. [phone]  Send all requests for admission clinical reviews, approved or denied determinations and any other requests to dedicated fax number below belonging to the [facility] where the patient is receiving treatment. List of dedicated fax numbers for the Facilities:  FACILITY NAME UR FAX NUMBER   ADMISSION DENIALS (Administrative/Medical Necessity) [phone]   DISCHARGE SUPPORT TEAM (NETWORK) [phone]   PARENT CHILD HEALTH (Maternity/NICU/Pediatrics) [phone]   [facility] [phone]   [facility] [phone]   Counts include 234 beds at the [facility] [phone]   [facility] [phone]   [facility] [phone]   [facility] [phone]   [facility] [phone]   [facility] [phone]   [facility] [phone]   [facility] [phone]   [facility] [phone]   [facility] [phone]

## 2024-04-10 NOTE — RESPIRATORY THERAPY NOTE
RT Ventilator Management Note  Julia Perry 68 y.o. female MRN: [id number]  Unit/Bed#: BE CATH LAB ROOM Encounter: [id number]      Daily Screen         4/9/2024  1258 4/10/2024  0713          Patient safety screen outcome:: Failed Failed      Not Ready for Weaning due to:: -- Underline problem not resolved                Physical Exam:   Respiratory Pattern: Assisted  Chest Assessment: Chest expansion symmetrical  Bilateral Breath Sounds: Clear, Diminished      Resp Comments: (P) pt placed on transport vent on settings 15/350/100%/6 during procedure.

## 2024-04-10 NOTE — PROGRESS NOTES
Progress Note - Cardiothoracic Surgery   Julia Perry 68 y.o. female MRN: [id number]  Unit/Bed#: [facility] 414-01 Encounter: [id number]      POD # 1 s/p CABGx3    Pt seen/examined.  Interval history and data reviewed with critical care team.  Persistent elevated SVR and low CI overnight. Fluctuating pressor requirements, up to levo 17. Received significant volume for CI. This morning drips are epi 3, mil 0.5, levo 6, vaso. UOP is borderline. Not bleeding.        Medications:   Scheduled Meds:  Current Facility-Administered Medications   Medication Dose Route Frequency Provider Last Rate    acetaminophen  650 mg Rectal Q4H PRN Rosalina Mariama, PA-C      acetaminophen  650 mg Oral Q6H While awake Rosalina Mariama, PA-C      amiodarone 150 mg in dextrose 5 % 100 mL IV bolus  150 mg Intravenous Once Yumiko Loo PA-C      amiodarone  200 mg Oral Q8H LINDA Rosalina Mariama, PA-C      aspirin  325 mg Oral Daily Rosalina Mariama, PA-C      atorvastatin  80 mg Oral Daily With Dinner Rosalina Mariama, PA-C      bisacodyl  10 mg Rectal Daily PRN Rosalina Mariama, PA-C      calcium chloride  1 g Intravenous Once PRN Rosalina Mariama, PA-C      cefazolin  2,000 mg Intravenous Q8H Rosalina Mariama, PA-C 2,000 mg (04/10/24 0245)    chlorhexidine  15 mL Mouth/Throat BID Rosalina Mariama, PA-C      Cholecalciferol  1,000 Units Oral Daily Rosalina Mariama, PA-C      dexmedetomidine  0.1-0.7 mcg/kg/hr Intravenous Titrated KANDICE Gutierrez-C 0.5 mcg/kg/hr (04/10/24 0603)    epinephrine  1-10 mcg/min Intravenous Titrated Rosalina Mariama, PA-C 3 mcg/min (04/10/24 0204)    fentaNYL  50 mcg Intravenous Q1H PRN Rosalina Mariama, PA-C      fondaparinux  2.5 mg Subcutaneous Daily Rosalina Mariama, PA-C      HYDROmorphone  0.5 mg Intravenous Q2H PRN Rosalina Mariama, PA-C      insulin regular (HumuLIN R,NovoLIN R) 1 Units/mL in sodium chloride 0.9 % 100 mL infusion  0.3-21 Units/hr Intravenous Titrated Rosalina Mariama, PA-C 2 Units/hr (04/10/24 4571)    lactated ringers  500 mL Intravenous Once PRN  "Rosalina Mariama, PA-C      magnesium gluconate  500 mg Oral Daily Rosalina Mariama, PA-C      milrinone (Primacor) infusion  0.5 mcg/kg/min Intravenous Continuous Brandon Up PA-C 0.5 mcg/kg/min (04/10/24 0720)    mupirocin  1 Application Nasal Q12H LINDA Rosalina Mariama, PA-C      norepinephrine  1-30 mcg/min Intravenous Titrated Rosalina Mariama, PA-C 6 mcg/min (04/10/24 0720)    ondansetron  4 mg Intravenous Q6H PRN Rosalina Mariama, PA-C      oxyCODONE  2.5 mg Oral Q4H PRN Rosalina Mariama, PA-C      Or    oxyCODONE  5 mg Oral Q4H PRN Rosalina Mariama, PA-C      pantoprazole  40 mg Oral Early Morning Rosalina Mariama, PA-C      polyethylene glycol  17 g Oral Daily Rosalina Mariama, PA-C      sodium chloride  20 mL/hr Intravenous Continuous Rosalina Mariama, PA-C 20 mL/hr (04/09/24 1315)    vasopressin  0.04 Units/min Intravenous Continuous Rosalina Mariama, PA-C 0.04 Units/min (04/10/24 0720)     Continuous Infusions:dexmedetomidine, 0.1-0.7 mcg/kg/hr, Last Rate: 0.5 mcg/kg/hr (04/10/24 0603)  epinephrine, 1-10 mcg/min, Last Rate: 3 mcg/min (04/10/24 0204)  insulin regular (HumuLIN R,NovoLIN R) 1 Units/mL in sodium chloride 0.9 % 100 mL infusion, 0.3-21 Units/hr, Last Rate: 2 Units/hr (04/10/24 0526)  milrinone (Primacor) infusion, 0.5 mcg/kg/min, Last Rate: 0.5 mcg/kg/min (04/10/24 0720)  norepinephrine, 1-30 mcg/min, Last Rate: 6 mcg/min (04/10/24 0720)  sodium chloride, 20 mL/hr, Last Rate: 20 mL/hr (04/09/24 1315)  vasopressin, 0.04 Units/min, Last Rate: 0.04 Units/min (04/10/24 0720)      PRN Meds:.  acetaminophen    bisacodyl    calcium chloride    fentaNYL    HYDROmorphone    lactated ringers    ondansetron    oxyCODONE **OR** oxyCODONE    Vitals: Blood pressure 111/56, pulse 101, temperature 99.7 °F (37.6 °C), temperature source Core, resp. rate (!) 30, height 5' 4\" (1.626 m), weight 87.6 kg (193 lb 2 oz), SpO2 99%.,Body mass index is 33.15 kg/m².  I/O last 24 hours:  In: 5966.3 [I.V.:2316.3; Blood:700; NG/GT:80; IV Piggyback:2370]  Out: 3465 " [Urine:1925; Emesis/NG output:200; Blood:500; Chest Tube:840]  Invasive Devices       Central Venous Catheter Line  Duration             CVC Central Lines 04/09/24 <1 day    Introducer 04/09/24 <1 day              Peripheral Intravenous Line  Duration             Peripheral IV 04/09/24 Right Forearm 1 day              Arterial Line  Duration             Arterial Line 04/09/24 Left Radial 1 day    Arterial Line 04/09/24 Right Femoral <1 day              Line  Duration             Pacer Wires <1 day    Pacer Wires <1 day              Drain  Duration             Chest Tube 1 Left Pleural 28 Fr. <1 day    Chest Tube 2 Right Pleural 28 Fr. <1 day    Chest Tube 3 Posterior;Mediastinal 24 Fr. <1 day    Chest Tube 4 Mediastinal;Anterior 28 Fr. <1 day    NG/OG/Enteral Tube Center mouth <1 day    Urethral Catheter Latex;Straight-tip 16 Fr. <1 day              Airway  Duration             ETT  Hi-Lo;Cuffed;Oral 7 mm <1 day                      Lab, Imaging and other studies:   Results from last 7 days   Lab Units 04/10/24  0510 04/09/24  2159 04/09/24  1307 04/09/24  1305 04/09/24  1105 04/09/24  1103   WBC Thousand/uL 14.48*  --   --   --   --   --    HEMOGLOBIN g/dL 10.6* 11.1*  --  11.7  --   --    I STAT HEMOGLOBIN g/dl  --   --  10.9*  --    < >  --    HEMATOCRIT % 32.5* 34.5*  --  35.8  --   --    HEMATOCRIT, ISTAT %  --   --  32*  --    < >  --    PLATELETS Thousands/uL 141*  --   --  152  --  180    < > = values in this interval not displayed.     Results from last 7 days   Lab Units 04/10/24  0510 04/10/24  0129 04/09/24 2159 04/09/24  1801 04/09/24  1307 04/09/24  1305   POTASSIUM mmol/L 4.4 4.4 3.6   < >  --  3.0*   CHLORIDE mmol/L 111* 111*  --   --   --  109*   CO2 mmol/L 22 21  --   --   --  23   CO2, I-STAT mmol/L  --   --   --   --  22  --    BUN mg/dL 23 23  --   --   --  26*   CREATININE mg/dL 0.95 0.95  --   --   --  0.82   GLUCOSE, ISTAT mg/dl  --   --   --   --  158*  --    CALCIUM mg/dL 8.0* 8.1*  --    --   --  8.1*    < > = values in this interval not displayed.     Results from last 7 days   Lab Units 04/09/24  1305   INR  1.82*   PTT seconds 29     Recent Labs     04/10/24  0523   PHART 7.376   BYN5SYV 20.9*   PO2ART 156.6*   ILS5WOL 36.4   BEART -3.8           Plan:    Check abg, VBG  Needs stat echo, and likely IABP vs. Impella for additional support  Keep intubated for now, until plan for cardiac assist  Keep tubes/wires      SIGNATURE: Josh Castellanos MD  DATE: April 10, 2024  TIME: 7:40 AM

## 2024-04-10 NOTE — OCCUPATIONAL THERAPY NOTE
Occupational Therapy Cancel Note        Patient Name: Julia Perry  Today's Date: 4/10/2024         04/10/24 0807   OT Last Visit   OT Visit Date 04/10/24   Note Type   Note type Cancelled Session   Cancel Reasons Intubated/sedated   Additional Comments OT consult received, chart reviewed, pt intubated at this time. Will continue to follow as pt is medically appropriate for therapy.       Richelle Leos, ELSIE, OTR/L

## 2024-04-10 NOTE — CASE MANAGEMENT
Case Management Assessment & Discharge Planning Note    Patient name Julia Perry  Location [facility] 414/[facility] 414-01 MRN [id number]  : 1955 Date 4/10/2024       Current Admission Date: 2024  Current Admission Diagnosis:Coronary artery disease involving native coronary artery   Patient Active Problem List    Diagnosis Date Noted    S/P CABG x 3 2024    CHF (congestive heart failure) (HCC) 2024    PONV (postoperative nausea and vomiting) 2024    S/P carotid endarterectomy 2024    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HCC) 02/15/2024    Hypercalcemia 02/15/2024    Hyperprolactinemia (HCC) 02/15/2024    Stenosis of left carotid artery 2024    Coronary artery disease involving native coronary artery 2024    Ischemic cardiomyopathy 2024    Mixed hyperlipidemia 2024    Ulcerative pancolitis without complication (HCC) 2024    Polyp of ascending colon 2023    Acquired right foot drop 2023    Hypomagnesemia 2023    Hair loss 2023    Colitis 2023    History of biliary dyskinesia 2023    DMII (diabetes mellitus, type 2) ([facility]) 2023    Gallbladder polyp 2022    Leiomyoma of uterus 2013      LOS (days): 1  Geometric Mean LOS (GMLOS) (days): 8.2  Days to GMLOS:6.9     OBJECTIVE:    Risk of Unplanned Readmission Score: 23.9         Current admission status: Inpatient       Preferred Pharmacy:   [id number] - [place] - [address] [address]  Phone: [phone] Fax: [phone]    [facility] - [place] - [address]  [address]  [place] [address]  Phone: [phone] Fax: [phone]    Primary Care Provider: Toya Titus MD    Primary Insurance: [facility]  Secondary Insurance:     ASSESSMENT:  Active Health Care Proxies    There are no active Health Care Proxies on file.                 Readmission  Root Cause  30 Day Readmission: No    Patient Information  Admitted from:: Home  Mental Status: Intubated  What city do you live in[place]                                   DISCHARGE DETAILS:                           Contacts  Contact Method: Phone  Phone Number: home [phone], cell [phone]  Reason/Outcome: Continuity of Care, Emergency Contact, Discharge Planning                                                                     Additional Comments: 69yo female was admitted 4/9/24 for elective CABG. This is POD#1, pt remains intubated, on vent, 4 chest tubes, 7 gtts. Today is currently in OR receiving Impella and PCI planned. Has low EF and low cardiac index. CM unable to obtain further information at this time.  is not available at this time. CM following.

## 2024-04-10 NOTE — CONSULTS
Consult Note - Vascular Surgery   Julia Perry 68 y.o. female MRN: [id number]    Unit/Bed#: [facility] 414-01 Encounter: [id number]    Assessment:  69 yo female with CAD, iCM (EF 30%),HTN s/p 4/9 CABG x3, returned to CCU intubated on pressor support, post operative EF unchanged (25-30%) with depressed RV function and increasing pressor requirements overnight, found to have worsening cardiac index requiring 4/10 left cardiac cath with insertion of cardiac impella into R groin. Vascular surgery consulted for monitoring of limb ischemia.    Plan:  Please contact vascular surgery via [employer] if acute changes in neurovascular exam.   Continue frequent neurovascular exams  Wean pressor support as able  Remainder of care per ICU    Consulting Service: Critical Care    Chief Complaint: N/A    HPI: Julia Perry is a 68 y.o. female who presents after 4/9 CABG x 3 due to CAD and heart failure. She was admitted to the CCU post operatively and remained intubated post operatively on pressor support. Overnight, she had worsening pressor requirements and worsening cardiac index, prompting 4/10 cardiac cath with Impella insertion with right groin access. Vascular surgery consulted due to risk for lower extremity ischemia in setting of Impella device.    At the time of assessment, patient appeared critically ill, intubated and sedated on pressor support.    Review of Systems:  Unable to obtain 2/2 intubation/sedation    Past Medical History:  Past Medical History:   Diagnosis Date    CHF (congestive heart failure) (HCC) 4/8/2024    Colitis     Diabetes mellitus (HCC)     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HCC)     PONV (postoperative nausea and vomiting)        Past Surgical History:  Past Surgical History:   Procedure Laterality Date    CARDIAC CATHETERIZATION Left 01/17/2024    Procedure: Cardiac Left Heart Cath;  Surgeon: Juan A Sousa MD;  Location: [facility];  Service: Cardiology    CARDIAC CATHETERIZATION  01/17/2024    Procedure:  Cardiac catheterization;  Surgeon: Juan A Sousa MD;  Location: [facility] CARD[facility]AC CATH [facility];  Service: Cardiology    COLONOSCOPY  12/06/2023    HYSTERECTOMY      ~1990    KS CORONARY ARTERY BYP W/VEIN & ARTERY GRAFT 3 VEIN N/A 4/9/2024    Procedure: CORONARY ARTERY BYPASS GRAFT (CABG) x3 VESSELS, LIMA - LAD, LEFT LEG EVH/SVG - PDA  AND OM, W/ELOISA;  Surgeon: Josh Castellanos MD;  Location: [facility] MAIN OR;  Service: Cardiac Surgery    KS TEAEC W/PATCH GRF CAROTID VERTB SUBCLAV NECK INC Left 03/04/2024    Procedure: LEFT ENDARTERECTOMY ARTERY CAROTID;  Surgeon: Janeth De Oliveira DO;  Location: [facility] OR;  Service: Vascular       Social History:  Social History     Substance and Sexual Activity   Alcohol Use Never     Social History     Substance and Sexual Activity   Drug Use Never     Social History     Tobacco Use   Smoking Status Former    Types: Cigarettes    Passive exposure: Past   Smokeless Tobacco Never   Tobacco Comments    Quit 1970's       Family History:  No family history on file.    Allergies:  Allergies   Allergen Reactions    Valdecoxib Other (See Comments)     Patient is unaware of this allergy       Medications:  Current Facility-Administered Medications   Medication Dose Route Frequency    acetaminophen (TYLENOL) rectal suppository 650 mg  650 mg Rectal Q4H PRN    acetaminophen (TYLENOL) tablet 650 mg  650 mg Oral Q6H While awake    amiodarone 150 mg in dextrose 5 % 100 mL IV bolus  150 mg Intravenous Once    amiodarone tablet 200 mg  200 mg Oral Q8H LINDA    aspirin tablet 325 mg  325 mg Oral Daily    atorvastatin (LIPITOR) tablet 80 mg  80 mg Oral Daily With Dinner    bisacodyl (DULCOLAX) rectal suppository 10 mg  10 mg Rectal Daily PRN    calcium chloride 10 % injection 1 g  1 g Intravenous Once PRN    chlorhexidine (PERIDEX) 0.12 % oral rinse 15 mL  15 mL Mouth/Throat BID    Cholecalciferol (VITAMIN D3) tablet 1,000 Units  1,000 Units Oral Daily    dexmedeTOMIDine (Precedex) 400 mcg in sodium chloride  0.9% 100 mL  0.1-0.7 mcg/kg/hr Intravenous Titrated    [START ON 4/11/2024] enoxaparin (LOVENOX) subcutaneous injection 40 mg  40 mg Subcutaneous Daily    EPINEPHrine 5,000 mcg (STANDARD CONCENTRATION) IV in sodium chloride 0.9% 250 mL  1-10 mcg/min Intravenous Titrated    fentaNYL 1000 mcg in sodium chloride 0.9% 100mL infusion  50 mcg/hr Intravenous Continuous    fentaNYL injection 50 mcg  50 mcg Intravenous Q1H PRN    heparin (porcine) 25,000 units in 0.45% NaCl 250 mL infusion (premix)  3-20 Units/kg/hr (Order-Specific) Intravenous Titrated    heparin (porcine) 6,250 Units in dextrose 5 % 500 mL purge solution  3-20 mL/hr Device Titrated    heparin (porcine) injection 2,000 Units  2,000 Units Intravenous Q6H PRN    heparin (porcine) injection 4,000 Units  4,000 Units Intravenous Q6H PRN    HYDROmorphone (DILAUDID) injection 0.5 mg  0.5 mg Intravenous Q2H PRN    insulin regular (HumuLIN R,NovoLIN R) 1 Units/mL in sodium chloride 0.9 % 100 mL infusion  0.3-21 Units/hr Intravenous Titrated    lactated ringers bolus 500 mL  500 mL Intravenous Once PRN    magnesium gluconate (MAGONATE) tablet 500 mg  500 mg Oral Daily    milrinone (PRIMACOR) 20 mg in 100 mL infusion (premix)  0.5 mcg/kg/min Intravenous Continuous    mupirocin (BACTROBAN) 2 % nasal ointment 1 Application  1 Application Nasal Q12H LINDA    norepinephrine (LEVOPHED) 4 mg (STANDARD CONCENTRATION) IV in sodium chloride 0.9% 250 mL  1-30 mcg/min Intravenous Titrated    ondansetron (ZOFRAN) injection 4 mg  4 mg Intravenous Q6H PRN    oxyCODONE (ROXICODONE) split tablet 2.5 mg  2.5 mg Oral Q4H PRN    Or    oxyCODONE (ROXICODONE) IR tablet 5 mg  5 mg Oral Q4H PRN    pantoprazole (PROTONIX) EC tablet 40 mg  40 mg Oral Early Morning    polyethylene glycol (MIRALAX) packet 17 g  17 g Oral Daily    sodium chloride infusion 0.45 %  20 mL/hr Intravenous Continuous    ticagrelor (BRILINTA) tablet 90 mg  90 mg Oral Q12H LINDA    vasopressin (PITRESSIN) 20 Units in sodium  "chloride 0.9 % 100 mL infusion  0.04 Units/min Intravenous Continuous       Vitals:  /64   Pulse 102   Temp (!) 100.9 °F (38.3 °C) (Core)   Resp 20   Ht 5' 4\" (1.626 m)   Wt 87.5 kg (193 lb)   SpO2 93%   BMI 33.13 kg/m²     I/Os:  I/O last 24 hours:  In: 6704.4 [I.V.:2724.4; Blood:700; NG/GT:160; IV Piggyback:2620]  Out: 3670 [Urine:2050; Emesis/NG output:200; Blood:500; Chest Tube:920]    Lab Results and Cultures:   Lab Results   Component Value Date    WBC 16.05 (H) 04/10/2024    HGB 10.2 (L) 04/10/2024    HCT 31.4 (L) 04/10/2024    MCV 91 04/10/2024     (L) 04/10/2024     Lab Results   Component Value Date    GLUCOSE 158 (H) 04/09/2024    CALCIUM 8.4 04/10/2024    K 4.6 04/10/2024    CO2 19 (L) 04/10/2024     (H) 04/10/2024    BUN 22 04/10/2024    CREATININE 0.94 04/10/2024     Lab Results   Component Value Date    INR 1.82 (H) 04/09/2024    INR 1.16 04/02/2024    INR 1.07 03/05/2024    PROTIME 20.8 (H) 04/09/2024    PROTIME 14.7 (H) 04/02/2024    PROTIME 14.1 03/05/2024       Lipid Panel: No results found for: \"CHOL\",     Blood Culture:   Lab Results   Component Value Date    BLOODCX No Growth After 5 Days. 06/05/2023   ,   Urinalysis:   Lab Results   Component Value Date    COLORU Yellow 01/11/2024    CLARITYU Turbid 01/11/2024    SPECGRAV 1.016 01/11/2024    PHUR 6.5 01/11/2024    LEUKOCYTESUR Large (A) 01/11/2024    NITRITE Negative 01/11/2024    GLUCOSEU Negative 01/11/2024    KETONESU Negative 01/11/2024    BILIRUBINUR Negative 01/11/2024    BLOODU Small (A) 01/11/2024   ,   Urine Culture:   Lab Results   Component Value Date    URINECX >100,000 cfu/ml Escherichia coli (A) 01/11/2024   ,   Wound Culure: No results found for: \"WOUNDCULT\"    Imaging:  XR chest portable   Final Result by Maverick George MD (04/10 1014)   The Walker-Ashleigh line appears to have been retracted somewhat with the tip now projecting in the region of the main pulmonary artery. No other significant " changes from prior.            Workstation performed: [id number]         XR chest portable ICU   Final Result by Mehdi Duke MD (04/09 2057)      Status post open heart surgery, without evidence of pneumothorax or focal infiltrate.      Endotracheal tube tip 5.7 cm above the errol            Workstation performed: [id number]         VAS limited iliac-femoral evaluation for Impella Device    (Results Pending)   XR chest portable    (Results Pending)         Physical Exam:    General appearance:  intubated/sedated ill appearing  Skin: Skin color, texture, turgor normal. No rashes or lesions  Neurologic: unable to assess, sedated  HEENT: ETT in place  Lungs: mechanically ventilated SCMV 350/6/40%  Chest wall: substernal chest tubes in place connected to atrium. Midline chest incision dressed c/d/i  Heart: normal rate and rhythm   Abdomen: soft, non distended  Extremities: RLE with knee immobilizer in place  Pulse exam: R peroneal signal present, L PT signal      Laureen Rosa MD  4/10/2024

## 2024-04-10 NOTE — RESPIRATORY THERAPY NOTE
Health Maintenance Due   Topic Date Due   • Hepatitis B Vaccine (1 of 3 - 3-dose series) Never done   • TDaP Pregnancy Vaccine  07/30/2022   • COVID-19 Vaccine (3 - Booster for Pfizer series) 08/07/2022       Patient is due for topics as listed above but is not proceeding with Immunization(s) COVID-19, Dtap/Tdap/Td and Hep B at this time. Education provided for Immunization(s) Dtap/Tdap/Td.    Rhogam injection given.  Patient tolerated without incident.    RT Ventilator Management Note  Julia Perry 68 y.o. female MRN: [id number]  Unit/Bed#: [facility] RO[facility] Encounter: [id number]      Daily Screen         4/9/2024  1258 4/10/2024  0713          Patient safety screen outcome:: Failed Failed      Not Ready for Weaning due to:: -- Underline problem not resolved                Physical Exam:   Respiratory Pattern: Assisted  Chest Assessment: Chest expansion symmetrical  Bilateral Breath Sounds: Clear, Diminished      Resp Comments: (P) pt ventilated with ambu bag at 100% during transport from  to cath lab.

## 2024-04-10 NOTE — RESPIRATORY THERAPY NOTE
RT Ventilator Management Note  Julia Perry 68 y.o. female MRN: [id number]  Unit/Bed#: [facility] Encounter: [id number]      Daily Screen         4/9/2024  1258 4/10/2024  0713          Patient safety screen outcome:: Failed Failed      Not Ready for Weaning due to:: -- Underline problem not resolved                Physical Exam:   Respiratory Pattern: Assisted  Chest Assessment: Chest expansion symmetrical  Bilateral Breath Sounds: Clear, Diminished      Resp Comments: (P) pt remains on transport vent at this time during cath procedure and placement of impella device.

## 2024-04-10 NOTE — PROGRESS NOTES
[facility]  Progress Note: Critical Care   Date of Service: 4/11/2024  Hospital Day: 2  Name: Julia Perry I  MRN: [id number]  Unit/Bed#: [facility] 414-01      Assessment/Plan     Impressions:  MVCAD s/p CABG x3, LIMA to LAD, SVG to OM2, SVG to RPDA (4/9), RAFFY x 2 to OM (4/10)  Post-op cardiogenic shock s/p Impella placement   ICM (EF 15%)  HTN  HLD  B/l carotid stenosis s/p L CEA 3/24  Pituitary Macroadenoma with elevated prolactin  Hypercalcemia  UC    Neuro:   PRN medications: Fentanyl 50mcg, Sedation Medications: Fentanyl 50 and Precedex 1.2, and RASS Goal: RASS Goal: -2 Light Sedation  Cardiac Surgery Pain Control: ATC tylenol and PRN oxycodone 2.5/5mg  Delirium precautions  Regulate sleep/wake cycle  CAM-ICU daily  Trend neuro exam      CV:   Cardiac Surgery Hemodynamic Monitoring: MAP goal >65 CI >2.2 Continue pulmonary artery catheter for hemodynamic monitoring  Continue central line due to vasoactive medications Continue arterial line for blood pressure monitoring and/or frequent blood gas draws  Follow rhythm on telemetry  Critical Care Infusions : Levophed 4 mcg/min and Primacor 0.25 mcg/kg/min  Beta Blocker Plan: Hold beta blocker secondary to cardiogenic shock  Epicardial pacing wire plan : Epicardial pacing wires in place. Will pace as needed for bradycardia or cardiac output   V wire only   Post op cardiac surgery medications:    mg QD  Statin: Lipitor 80 mg qHS  Amiodarone 200 mg PO q8 hours   Brilinta 90mg BID (s/p RAFFY x 2 to OM on 4/9)  Impella day 2   P5  Purge heparin  Start systemic ACS Heparin gtt     Lung:   Acute respiratory failure requiring mechanical ventilation AC/VC 15/350/6/40%. Secondary to cardiogenic shock/hemodynamic instability  Continue incentive spirometry/coughing/deep breathing exercises.   Wean supplemental oxygen as tolerated for saturation > 90%  SBT Plan: plan for SAT/SBT today with goal to extubate  VAP bundle ordered  Chest tube  output remains persistently high; Continue chest tubes to suction today    GI:   Continue PPI for stress ulcer prophylaxis  Continue bowel regimen  Trend abdominal exam and bowel function    FEN:   Diuresis Plan: PRN, Lasix 40mg x 1 this AM with goal net negative   Nutrition plan: start tube feedings if unable to extubate   Replenish electrolytes with goals: K >4.0, Mag >2.0, and Phos >3.0    :   [place] Plan: Continue for accurate I/O monitoring for 48 hours  Trend UOP and BUN/creat  Strict I and O     ID:   Trend temps and WBC count  Maintain normothermia    Heme:   Trend hgb and plts    Endo:   Continue insulin infusion at this time for glucose control.    MSK/Skin:  Mobility goal:   PT/OT consult as medically appropriate   Frequent turning and pressure off-loading  Local wound care as needed    Disposition:  Critical care    ICU Core Measures     Vented Patient  VAP Bundle  VAP bundle ordered     A: Assess, Prevent, and Manage Pain Has pain been assessed? Yes  Need for changes to pain regimen? No   B: Both Spontaneous Awakening Trials (SATs) and Spontaneous Breathing Trials (SBTs) Plan to perform spontaneous awakening trial today? Yes   Plan to perform spontaneous breathing trial today? Yes   Obvious barriers to extubation? Yes   C: Choice of Sedation RASS Goal: -1 Drowsy  Need for changes to sedation or analgesia regimen? No   D: Delirium CAM-ICU: Unable to perform secondary to Acute cognitive dysfunction   E: Early Mobility  Plan for early mobility? No   F: Family Engagement Plan for family engagement today? Yes       Review of Invasive Devices:    [place] Plan: Continue for accurate I/O monitoring for 48 hours  Central access plan: Medications requiring central line Hemodynamic monitoring  [place] Plan: Keep arterial line for hemodynamic monitoring    Prophylaxis:  VTE VTE covered by:  heparin (porcine), Intravenous, 11.8 Units/kg/hr at 04/11/24 0847  heparin (porcine) 6,250 Units in dextrose 5 % 500 mL purge  solution, Device, 17.3 mL/hr at 04/11/24 0600       Stress Ulcer  covered bypantoprazole (PROTONIX) EC tablet 40 mg [[id number]]          Significant 24hr Events      24 Hour Events/HPI: POD # 2 s/p CABG x 3 LIMA to LAD, SVG to OM2, and SVG to RPDA. POD #1 s/p RAFFY x 2 to OM and Impella placement. Echo 4/10-EF 14%, severe global hypokinesis with regional variation. On Levo 6mcg and Milrinone 0.25mcg, Epi and Vaso off. Overnight urine turned dark/reddish with concerns for hemolysis.  from 361. Impella turned down to P5 from P7. Urine clear yellow this morning. Remains intubated and sedated with fentanyl and precedex.        Subjective   Review of Systems   Unable to perform ROS: Intubated (sedated)        Objective     Medications:  Scheduled Continuous   acetaminophen, 975 mg, Q8H LINDA  amiodarone, 200 mg, Q8H LINDA  aspirin, 325 mg, Daily  atorvastatin, 80 mg, Daily With Dinner  chlorhexidine, 15 mL, BID  Cholecalciferol, 1,000 Units, Daily  magnesium gluconate, 500 mg, Daily  mupirocin, 1 Application, Q12H LINDA  pantoprazole, 40 mg, Early Morning  polyethylene glycol, 17 g, Daily  ticagrelor, 90 mg, Q12H LINDA      dexmedetomidine, 0.1-1.2 mcg/kg/hr, Last Rate: 0.9 mcg/kg/hr (04/11/24 0951)  fentaNYL, 50 mcg/hr, Last Rate: 50 mcg/hr (04/11/24 0600)  heparin (porcine), 3-20 Units/kg/hr (Order-Specific), Last Rate: 11.8 Units/kg/hr (04/11/24 0847)  heparin (porcine) 6,250 Units in dextrose 5 % 500 mL purge solution, 3-20 mL/hr, Last Rate: 17.3 mL/hr (04/11/24 0600)  insulin regular (HumuLIN R,NovoLIN R) 1 Units/mL in sodium chloride 0.9 % 100 mL infusion, 0.3-21 Units/hr, Last Rate: 1 Units/hr (04/11/24 0600)  milrinone (Primacor) infusion, 0.25 mcg/kg/min, Last Rate: 0.25 mcg/kg/min (04/11/24 0600)  norepinephrine, 1-30 mcg/min, Last Rate: 6 mcg/min (04/11/24 0731)  sodium chloride, 20 mL/hr, Last Rate: 20 mL/hr (04/11/24 0600)         Vitals: Invasive Monitoring       Most Recent Min/Max in 24hrs   Temp 99.1 °F  (37.3 °C) Temp  Min: 99.1 °F (37.3 °C)  Max: 102.4 °F (39.1 °C)   Pulse 90 Pulse  Min: 85  Max: 108   Resp 14 Resp  Min: 11  Max: 35   BP 90/50 BP  Min: 87/54  Max: 118/76   O2 Sat 100 % SpO2  Min: 85 %  Max: 100 %   O2 Device: Ventilator  Nasal Cannula O2 Flow Rate (L/min): 2 L/min Arterial Line  [place] BP 77/50  Arterial Line BP  Min: 64/52  Max: 107/74   MAP (!) 59 mmHg  Arterial Line MAP (mmHg)  Min: 57 mmHg  Max: 82 mmHg     PA Catheter   Most Recent  Min/Max in 24hrs    PAP 32/10 PAP  Min: 23/9  Max: 47/8   CVP 8 mmHg CVP (mean)  Min: 3 mmHg  Max: 64 mmHg   CI 2.6 L/min/m2  CI (L/min/m2)  Min: 1.3 L/min/m2  Max: 3.4 L/min/m2   SVR 1044 (dyne*sec)/cm5 SVR (dyne*sec)/cm5  Min: 726 (dyne*sec)/cm5  Max: 1752 (dyne*sec)/cm5        I/O Chest tube output (if present)     Intake/Output Summary (Last 24 hours) at 4/11/2024 1018  Last data filed at 4/11/2024 0600  Gross per 24 hour   Intake 1913.93 ml   Output 1425 ml   Net 488.93 ml     BM: PTA  Weight change: -0.056 kg (-2 oz)     Mediastinal tubes:  70 mL/8hr  230 mL/24hr   Pleural tubes: 20 mL/8hr  300 mL/24hr        Physical Exam   Physical Exam  Eyes:      General: Lids are normal. Scleral icterus present.      Extraocular Movements: Extraocular movements intact.      Conjunctiva/sclera: Conjunctivae normal.      Pupils:      Right eye: Pupil is sluggish.      Left eye: Pupil is sluggish.      Comments: Pupils irregularly shaped bilaterally, sluggish    Skin:     General: Skin is dry.      Capillary Refill: Capillary refill takes more than 3 seconds.   HENT:      Head: Normocephalic and atraumatic.   Neck:      Trachea: Trachea normal.   Cardiovascular:      Rate and Rhythm: Normal rate and regular rhythm.      Pulses:           Radial pulses are 1+ on the right side and 1+ on the left side.        Dorsalis pedis pulses are 0 on the right side and 0 on the left side.        Posterior tibial pulses are detected w/ Doppler on the right side and detected w/ Doppler on  the left side.      Heart sounds: Normal heart sounds, S1 normal and S2 normal.      Comments: Generalized non pitting edema. RLE cool with cap refill 8 seconds. Impella site R groin soft, dry, intact  Musculoskeletal:      Cervical back: Neck supple.      Comments: CUELLO    Abdominal: General: Bowel sounds are normal.      Palpations: Abdomen is soft.   Constitutional:       Appearance: She is ill-appearing.      Interventions: She is sedated, intubated and restrained.   Pulmonary:      Effort: Pulmonary effort is normal. She is intubated.      Breath sounds: Normal breath sounds.   Chest:      Comments: Mediastinal incision C/D/I w/ acticoat. CTs w/ serousanguinous output   Psychiatric:      Comments: Periods of agitation    Neurological:      GCS: GCS eye subscore is 2. GCS verbal subscore is 1. GCS motor subscore is 5.   Genitourinary/Anorectal:     Comments: Stiles in place w/ clear yellow drainage          Diagnostic Studies      04/11/24 CXR: ETT 6.7cm above errol, will advance by 2cm, otherwise lines and and tubes stable, no pneumo. Moderate pulmonary vascular congestion.    This was personally reviewed by myself in PACS.         Labs:  CBC    Recent Labs     04/10/24  1526 04/11/24  0406   WBC 16.05* 18.50*   HGB 10.2* 9.2*   HCT 31.4* 28.5*   * 106*   BANDSPCT 2  --      BMP    Recent Labs     04/11/24  0006 04/11/24  0406   SODIUM 141 138   K 4.8 4.4   * 109*   CO2 19* 19*   AGAP 10 10   BUN 22 23   CREATININE 0.97 0.93   CALCIUM 8.3* 8.2*        Baseline Creat: 0.9   Coags    Recent Labs     04/10/24  1613 04/11/24  0839   INR 2.18* 1.64*   PTT >210* 32              Additional Electrolytes  Recent Labs     04/10/24  1526 04/11/24  0406   MG 2.5 2.4   PHOS 2.7 2.6   CAIONIZED 1.16 1.18          Blood Gas    Recent Labs     04/10/24  1204 04/10/24  1600   PHART 7.397 7.379   KAJ7RUI 32.5* 31.2*   PO2ART 179.2* 163.1*   YTC7BPD 19.5* 18.0*   BEART -4.5 -6.2   SOURCE Line, Arterial  --      Recent  Labs     04/10/24  1204 04/10/24  1809 04/11/24  0406   PHVEN 7.335   < > 7.336   EHI6RQB 41.2*   < > 39.1*   PO2VEN 39.3   < > 43.3   PHA2UGT 21.5*   < > 20.4*   BEVEN -4.1   < > -5.0   H5LYQYR 71.1   < > 77.4   SOURCE Line, Arterial  --   --     < > = values in this interval not displayed.    LFTs  Recent Labs     04/10/24  0510 04/11/24  0406   ALT 12 9   AST 35 54*   ALKPHOS 42 46   ALB 3.6 3.4*   TBILI 0.62 1.66*       Infectious    No recent results     No recent results Glucose  Recent Labs     04/10/24  1204 04/10/24  1613 04/11/24  0006 04/11/24  0406   GLUC 158* 185* 161* 174*        Collaborative bedside rounds performed with cardiac surgery attending, critical care attending and bedside RN.     ROYAL Lee

## 2024-04-10 NOTE — PROGRESS NOTES
[facility]  Progress Note: Critical Care   Date of Service: 4/10/2024  Hospital Day: 1  Name: Julia Perry I  MRN: [id number]  Unit/Bed#: [facility] 414-01      Assessment/Plan     Impressions:  MVCAD s/p CABG x3, LIMA to LAD, SVG to OM2, SVG to RPDA  ICM (EF 30%)  HTN  HLD  B/l carotid stenosis s/p L CEA 3/24  Pituitary Macroadenoma with elevated prolactin  Hypercalcemia  UC    Neuro:   Sedation Medications: Precedex .5 and RASS Goal: RASS Goal: 0 Alert and Calm  Cardiac Surgery Pain Control: ATC tylenol and PRN oxycodone 2.5/5mg  Delirium precautions  Regulate sleep/wake cycle  CAM-ICU daily  Trend neuro exam      CV:   Cardiac Surgery Hemodynamic Monitoring: MAP goal >65 CI >2.2 Continue pulmonary artery catheter for hemodynamic monitoring  Continue central line due to vasoactive medications Continue arterial line for blood pressure monitoring and/or frequent blood gas draws  Follow rhythm on telemetry  Critical Care Infusions : Levophed 6 (as high as 17 overnight) mcg/min, Vasopressin .04 Units/min, Epinephrine 3 mcg/min, and Primacor .38 mcg/kg/min  Beta Blocker Plan: Hold beta blocker secondary to hypotension/hemodynamic instability  Epicardial pacing wire plan : Epicardial pacing wires in place. Will pace as needed for bradycardia or cardiac output  V wire only  Post op cardiac surgery medications:   ASA 81 mg QD  Statin: Lipitor 80 mg qHS  Amiodarone 200 mg PO q8 hours   Did receive x150mg bolus of amiodarone overnight for frequent PVC's associated with hypotension - had a good response to bolus, no drip initiated  Has received a total of 500cc of LR and 1L albumin for poor C.I with associated low filling pressures    Lung:   Acute post-op pulmonary insufficiency requiring continuous CMV ventilation. Secondary to poor inspiratory effort secondary to pain and pulmonary vascular congestion  SBT Plan: Would like to trial SBT today however patient pulls very low TV and becomes  very tachypneac on PSV  Wean vent  as tolerated, possibly re-trial ASV today  Chest tube output remains persistently high; Continue chest tubes to suction today    GI:   Continue PPI for stress ulcer prophylaxis  Continue bowel regimen  Trend abdominal exam and bowel function    FEN:   Diuresis Plan: PRN  Nutrition plan: as tolerated  Replenish electrolytes with goals: K >4.0, Mag >2.0, and Phos >3.0    :   [place] Plan: Continue for accurate I/O monitoring for 48 hours  Trend UOP and BUN/creat  Strict I and O     ID:   Trend temps and WBC count  Maintain normothermia    Heme:   Trend hgb and plts    Endo:   Continue insulin infusion at this time for glucose control.      MSK/Skin:  Mobility goal:   PT/OT consult as medically appropriate   Frequent turning and pressure off-loading  Local wound care as needed    Disposition:  Critical care    ICU Core Measures     Vented Patient  VAP Bundle  VAP bundle ordered     A: Assess, Prevent, and Manage Pain Has pain been assessed? Yes  Need for changes to pain regimen? No   B: Both Spontaneous Awakening Trials (SATs) and Spontaneous Breathing Trials (SBTs) Plan to perform spontaneous awakening trial today? Modified and patient remained on precedex   Plan to perform spontaneous breathing trial today? Yes   Obvious barriers to extubation? No   C: Choice of Sedation RASS Goal: 0 Alert and Calm  Need for changes to sedation or analgesia regimen? No   D: Delirium CAM-ICU: Negative   E: Early Mobility  Plan for early mobility? Yes   F: Family Engagement Plan for family engagement today? Yes       Antibiotic Review: Post op requirements     Review of Invasive Devices:    [place] Plan: Continue for accurate I/O monitoring for 48 hours  Central access plan: Patient has multiple central venous catheters.  Medications requiring central line Hemodynamic monitoring  [place] Plan: Keep arterial line for hemodynamic monitoring, frequent ABGs, and frequent labs    Prophylaxis:  VTE VTE covered  by:  fondaparinux, Subcutaneous       Stress Ulcer  covered bypantoprazole (PROTONIX) EC tablet 40 mg [[id number]]          Significant 24hr Events      24 Hour Events/HPI: POD # 1 s/p CABG 3, LIMA to LAD, SVG to OM2, SVG to RPDA. Post operatively, the patient came out on Milrinone .25, Epi 4, Levo 2, and vaso .04. Initially she had poor C.I that responded to volume administration, but on going hypotension that required increases in her levophed requirements. However overnight, after 1L of IVF, the patient continued to have poor C.I and elevated SVR so her milrinone was increased to .38 at approx 7pm. Initially the patient responded well to this and her vaso was able to be turned off, her levo was down to 2, and her epi was weaned to 3. However at approx 2am, the patient had an acute worsening in her cardiac index with associated hypotension. Her levo requirements increased to as high as 17 and vaso was restarted. Her C.I continues to remain below 2 and SVR is mildly elevated. She has received an additional 500cc of albumin overnight. Her UOP also tapered off overnight and her ABG revealed a metabolic acidosis with a base defict of 5. She otherwise is very anxious, wakes up, and follows commands, however every attempt to wean her to PSV or ASV has failed s/s low TV and tachypnea.   Critical Care Infusions : Levophed 6 mcg/min, Vasopressin .04 Units/min, Epinephrine 3 mcg/min, and Primacor .38 mcg/kg/min   Subjective   Review of Systems   Unable to perform ROS: Intubated   Psychiatric/Behavioral:  Positive for agitation.         Objective     Medications:  Scheduled Continuous   acetaminophen, 650 mg, Q6H While awake  amiodarone 150 mg in dextrose 5 % 100 mL IV bolus, 150 mg, Once  amiodarone, 200 mg, Q8H LINDA  aspirin, 325 mg, Daily  atorvastatin, 80 mg, Daily With Dinner  cefazolin, 2,000 mg, Q8H  chlorhexidine, 15 mL, BID  Cholecalciferol, 1,000 Units, Daily  fondaparinux, 2.5 mg, Daily  magnesium gluconate, 500 mg,  Daily  mupirocin, 1 Application, Q12H LINDA  pantoprazole, 40 mg, Early Morning  polyethylene glycol, 17 g, Daily      dexmedetomidine, 0.1-0.7 mcg/kg/hr, Last Rate: 0.5 mcg/kg/hr (04/10/24 0603)  epinephrine, 1-10 mcg/min, Last Rate: 3 mcg/min (04/10/24 0204)  insulin regular (HumuLIN R,NovoLIN R) 1 Units/mL in sodium chloride 0.9 % 100 mL infusion, 0.3-21 Units/hr, Last Rate: 2 Units/hr (04/10/24 0526)  milrinone (Primacor) infusion, 0.38 mcg/kg/min, Last Rate: 0.38 mcg/kg/min (04/10/24 0221)  norepinephrine, 1-30 mcg/min, Last Rate: 10 mcg/min (04/10/24 0005)  sodium chloride, 20 mL/hr, Last Rate: 20 mL/hr (04/09/24 1315)  vasopressin, 0.04 Units/min, Last Rate: 0.04 Units/min (04/10/24 6724)         Vitals: Invasive Monitoring       Most Recent Min/Max in 24hrs   Temp 99.7 °F (37.6 °C) Temp  Min: 97.5 °F (36.4 °C)  Max: 99.7 °F (37.6 °C)   Pulse 101 Pulse  Min: 96  Max: 114   Resp (!) 30 Resp  Min: 8  Max: 45   /56 BP  Min: 93/59  Max: 111/56   O2 Sat 99 % SpO2  Min: 98 %  Max: 100 %   O2 Device: Ventilator  Nasal Cannula O2 Flow Rate (L/min): 2 L/min Arterial Line  [address] BP 67/43  Arterial Line BP  Min: 67/43  Max: 104/60   MAP (!) 55 mmHg  Arterial Line MAP (mmHg)  Min: 54 mmHg  Max: 76 mmHg     PA Catheter   Most Recent  Min/Max in 24hrs    PAP 29/15 PAP  Min: 24/9  Max: 40/20   CVP 9 mmHg CVP (mean)  Min: 3 mmHg  Max: 64 mmHg   CI 1.3 L/min/m2  CI (L/min/m2)  Min: 1.3 L/min/m2  Max: 2.2 L/min/m2   SVR 1577 (dyne*sec)/cm5 SVR (dyne*sec)/cm5  Min: 909 (dyne*sec)/cm5  Max: 1752 (dyne*sec)/cm5        I/O Chest tube output (if present)     Intake/Output Summary (Last 24 hours) at 4/10/2024 0618  Last data filed at 4/10/2024 0600  Gross per 24 hour   Intake 5966.31 ml   Output 3465 ml   Net 2501.31 ml     UOP: 35-45cc/hour    BM: 0 in the last 24 hours  Weight change: 14.9 kg (32 lb 15.3 oz)     Mediastinal tubes:  110 mL/8hr  350 mL/24hr   Pleural tubes: 120 mL/8hr  490 mL/24hr        Physical Exam   Physical  Exam  Vitals and nursing note reviewed.   Eyes:      General: No dysconjugate gaze     Pupils: Pupils are equal, round, and reactive to light.   Skin:     General: Skin is cool and dry.      Comments: BLLE are cool up to midcalf   Neck:      Vascular: Central line present. No JVD.   Cardiovascular:      Rate and Rhythm: Normal rate and regular rhythm.      Pulses: Normal pulses.      Heart sounds: No murmur heard.     Friction rub present. No gallop.      Comments: Friction rub has improved from prior.   Tachycardia has improved from yesterday, now in the high 90's.   Musculoskeletal:      Right lower leg: Trace Edema present.      Left lower leg: No edema.   Abdominal:      Palpations: Abdomen is soft.      Tenderness: There is no abdominal tenderness. There is no guarding.      Comments: R groin arterial line intact, no ecchymosis or hematoma present   Constitutional:       General: She is not in acute distress.  Pulmonary:      Effort: Pulmonary effort is normal. No accessory muscle usage, respiratory distress or accessory muscle usage.      Breath sounds: Normal breath sounds. No wheezing or rales.      Comments: On PSV or ASV, patient is tachypneac with increased WOB.   On CMV, patient is more comfortable, pulling appropriate volues  Neurological:      General: No focal deficit present.      Mental Status: She is alert. She is agitated.      Motor: Strength full and intact in all extremities.   Genitourinary/Anorectal:  Stiles present.        Diagnostic Studies      04/10/24 CXR: Possibly small R apical PTX, mildly increased from yesterday. Otherwise remainder of tubes and wires in good position, no obvious hemothorax   This was personally reviewed by myself in PACS.  04/10/24 EKG: Wide complex tachycardia, incomplete LBBB, relatively unchanged from post op, no obvious ST segment elevations.   This was personally reviewed by myself.         Labs:  CBC    Recent Labs     04/09/24  1305 04/09/24  1307  04/09/24  2159 04/10/24  0510   WBC  --   --   --  14.48*   HGB 11.7   < > 11.1* 10.6*   HCT 35.8   < > 34.5* 32.5*     --   --  141*    < > = values in this interval not displayed.     BMP    Recent Labs     04/10/24  0129 04/10/24  0510   SODIUM 141 141   K 4.4 4.4   * 111*   CO2 21 22   AGAP 9 8   BUN 23 23   CREATININE 0.95 0.95   CALCIUM 8.1* 8.0*        Baseline Creat: .8    Coags    Recent Labs     04/09/24  1305   INR 1.82*   PTT 29              Additional Electrolytes  Recent Labs     04/09/24  1307 04/10/24  0129 04/10/24  0510   MG  --  2.9* 2.7   PHOS  --  2.9  --    CAIONIZED 1.17 1.16  --           Blood Gas    Recent Labs     04/10/24  0523   PHART 7.376   ZIW8FDT 36.4   PO2ART 156.6*   [id number] 20.9*   BEART -3.8   SOURCE Line, Arterial     Recent Labs     04/10/24  0129 04/10/24  0214 04/10/24  0523   PHVEN 7.276*  --   --    UPQ5WFQ 42.8  --   --    PO2VEN 45.2*  --   --    GGR9GKC 19.5*  --   --    BEVEN -7.0  --   --    F8QDEIW 76.1  --   --    SOURCE  --    < > Line, Arterial    < > = values in this interval not displayed.    LFTs  Recent Labs     04/10/24  0510   ALT 12   AST 35   ALKPHOS 42   ALB 3.6   TBILI 0.62       Infectious    No recent results     No recent results Glucose  Recent Labs     04/09/24  1305 04/10/24  0129 04/10/24  0510   GLUC 158* 175* 211*        Collaborative bedside rounds performed with cardiac surgery attending, critical care attending and bedside RN.     Brandon Up PA-C

## 2024-04-10 NOTE — RESPIRATORY THERAPY NOTE
RT Ventilator Management Note  Juila Perry 68 y.o. female MRN: [id number]  Unit/Bed#: [facility] 414-01 Encounter: [id number]      Daily Screen         4/9/2024  1258 4/10/2024  0713          Patient safety screen outcome:: Failed Failed      Not Ready for Weaning due to:: -- Underline problem not resolved                Physical Exam:   Respiratory Pattern: Assisted  Chest Assessment: Chest expansion symmetrical  Bilateral Breath Sounds: Clear, Diminished      Resp Comments: pt transported from cath lab back to [facility] at this time and is placed back on c3 ventilator on current settings.

## 2024-04-11 ENCOUNTER — APPOINTMENT (INPATIENT)
Dept: RADIOLOGY | Facility: HOSPITAL | Age: 69
DRG: 215 | End: 2024-04-11
Payer: COMMERCIAL

## 2024-04-11 LAB
ALBUMIN SERPL BCP-MCNC: 3.4 G/DL (ref 3.5–5)
ALP SERPL-CCNC: 46 U/L (ref 34–104)
ALT SERPL W P-5'-P-CCNC: 9 U/L (ref 7–52)
ANION GAP SERPL CALCULATED.3IONS-SCNC: 10 MMOL/L (ref 4–13)
ANION GAP SERPL CALCULATED.3IONS-SCNC: 10 MMOL/L (ref 4–13)
ANION GAP SERPL CALCULATED.3IONS-SCNC: 11 MMOL/L (ref 4–13)
ANION GAP SERPL CALCULATED.3IONS-SCNC: 8 MMOL/L (ref 4–13)
ANION GAP SERPL CALCULATED.3IONS-SCNC: 9 MMOL/L (ref 4–13)
APTT PPP: 100 SECONDS (ref 23–37)
APTT PPP: 136 SECONDS (ref 23–37)
APTT PPP: 32 SECONDS (ref 23–37)
AST SERPL W P-5'-P-CCNC: 54 U/L (ref 13–39)
ATRIAL RATE: 108 BPM
BASE EX.OXY STD BLDV CALC-SCNC: 69.2 % (ref 60–80)
BASE EX.OXY STD BLDV CALC-SCNC: 77.2 % (ref 60–80)
BASE EX.OXY STD BLDV CALC-SCNC: 77.4 % (ref 60–80)
BASE EX.OXY STD BLDV CALC-SCNC: 80.2 % (ref 60–80)
BASE EXCESS BLDV CALC-SCNC: -3.9 MMOL/L
BASE EXCESS BLDV CALC-SCNC: -5 MMOL/L
BASE EXCESS BLDV CALC-SCNC: -5.4 MMOL/L
BASE EXCESS BLDV CALC-SCNC: -5.7 MMOL/L
BILIRUB DIRECT SERPL-MCNC: 0.47 MG/DL (ref 0–0.2)
BILIRUB SERPL-MCNC: 1.66 MG/DL (ref 0.2–1)
BODY TEMPERATURE: 100.8 DEGREES FEHRENHEIT
BUN SERPL-MCNC: 21 MG/DL (ref 5–25)
BUN SERPL-MCNC: 22 MG/DL (ref 5–25)
BUN SERPL-MCNC: 23 MG/DL (ref 5–25)
CA-I BLD-SCNC: 1.18 MMOL/L (ref 1.12–1.32)
CALCIUM SERPL-MCNC: 8.2 MG/DL (ref 8.4–10.2)
CALCIUM SERPL-MCNC: 8.2 MG/DL (ref 8.4–10.2)
CALCIUM SERPL-MCNC: 8.3 MG/DL (ref 8.4–10.2)
CALCIUM SERPL-MCNC: 8.3 MG/DL (ref 8.4–10.2)
CALCIUM SERPL-MCNC: 8.4 MG/DL (ref 8.4–10.2)
CHLORIDE SERPL-SCNC: 109 MMOL/L (ref 96–108)
CHLORIDE SERPL-SCNC: 110 MMOL/L (ref 96–108)
CHLORIDE SERPL-SCNC: 111 MMOL/L (ref 96–108)
CHLORIDE SERPL-SCNC: 112 MMOL/L (ref 96–108)
CHLORIDE SERPL-SCNC: 112 MMOL/L (ref 96–108)
CO2 SERPL-SCNC: 19 MMOL/L (ref 21–32)
CO2 SERPL-SCNC: 19 MMOL/L (ref 21–32)
CO2 SERPL-SCNC: 20 MMOL/L (ref 21–32)
CO2 SERPL-SCNC: 20 MMOL/L (ref 21–32)
CO2 SERPL-SCNC: 21 MMOL/L (ref 21–32)
CREAT SERPL-MCNC: 0.89 MG/DL (ref 0.6–1.3)
CREAT SERPL-MCNC: 0.93 MG/DL (ref 0.6–1.3)
CREAT SERPL-MCNC: 0.94 MG/DL (ref 0.6–1.3)
CREAT SERPL-MCNC: 0.94 MG/DL (ref 0.6–1.3)
CREAT SERPL-MCNC: 0.97 MG/DL (ref 0.6–1.3)
ERYTHROCYTE [DISTWIDTH] IN BLOOD BY AUTOMATED COUNT: 17.6 % (ref 11.6–15.1)
GFR SERPL CREATININE-BSD FRML MDRD: 60 ML/MIN/1.73SQ M
GFR SERPL CREATININE-BSD FRML MDRD: 62 ML/MIN/1.73SQ M
GFR SERPL CREATININE-BSD FRML MDRD: 62 ML/MIN/1.73SQ M
GFR SERPL CREATININE-BSD FRML MDRD: 63 ML/MIN/1.73SQ M
GFR SERPL CREATININE-BSD FRML MDRD: 67 ML/MIN/1.73SQ M
GLUCOSE SERPL-MCNC: 140 MG/DL (ref 65–140)
GLUCOSE SERPL-MCNC: 144 MG/DL (ref 65–140)
GLUCOSE SERPL-MCNC: 150 MG/DL (ref 65–140)
GLUCOSE SERPL-MCNC: 151 MG/DL (ref 65–140)
GLUCOSE SERPL-MCNC: 152 MG/DL (ref 65–140)
GLUCOSE SERPL-MCNC: 154 MG/DL (ref 65–140)
GLUCOSE SERPL-MCNC: 154 MG/DL (ref 65–140)
GLUCOSE SERPL-MCNC: 157 MG/DL (ref 65–140)
GLUCOSE SERPL-MCNC: 158 MG/DL (ref 65–140)
GLUCOSE SERPL-MCNC: 158 MG/DL (ref 65–140)
GLUCOSE SERPL-MCNC: 161 MG/DL (ref 65–140)
GLUCOSE SERPL-MCNC: 164 MG/DL (ref 65–140)
GLUCOSE SERPL-MCNC: 168 MG/DL (ref 65–140)
GLUCOSE SERPL-MCNC: 174 MG/DL (ref 65–140)
GLUCOSE SERPL-MCNC: 174 MG/DL (ref 65–140)
GLUCOSE SERPL-MCNC: 175 MG/DL (ref 65–140)
GLUCOSE SERPL-MCNC: 176 MG/DL (ref 65–140)
GLUCOSE SERPL-MCNC: 188 MG/DL (ref 65–140)
HCO3 BLDV-SCNC: 19.7 MMOL/L (ref 24–30)
HCO3 BLDV-SCNC: 19.7 MMOL/L (ref 24–30)
HCO3 BLDV-SCNC: 20.4 MMOL/L (ref 24–30)
HCO3 BLDV-SCNC: 21 MMOL/L (ref 24–30)
HCT VFR BLD AUTO: 28.5 % (ref 34.8–46.1)
HGB BLD-MCNC: 9.2 G/DL (ref 11.5–15.4)
INR PPP: 1.64 (ref 0.84–1.19)
LDH SERPL-CCNC: 513 U/L (ref 140–271)
LDH SERPL-CCNC: 535 U/L (ref 140–271)
MAGNESIUM SERPL-MCNC: 2.3 MG/DL (ref 1.9–2.7)
MAGNESIUM SERPL-MCNC: 2.4 MG/DL (ref 1.9–2.7)
MAGNESIUM SERPL-MCNC: 2.6 MG/DL (ref 1.9–2.7)
MCH RBC QN AUTO: 29.6 PG (ref 26.8–34.3)
MCHC RBC AUTO-ENTMCNC: 32.3 G/DL (ref 31.4–37.4)
MCV RBC AUTO: 92 FL (ref 82–98)
O2 CT BLDV-SCNC: 10 ML/DL
O2 CT BLDV-SCNC: 10.1 ML/DL
O2 CT BLDV-SCNC: 10.7 ML/DL
O2 CT BLDV-SCNC: 11 ML/DL
P AXIS: 85 DEGREES
PCO2 BLD: 39.1 MM HG (ref 42–50)
PCO2 BLDV: 36.5 MM HG (ref 42–50)
PCO2 BLDV: 37.1 MM HG (ref 42–50)
PCO2 BLDV: 37.7 MM HG (ref 42–50)
PCO2 BLDV: 39.1 MM HG (ref 42–50)
PH BLD: 7.35 [PH] (ref 7.3–7.4)
PH BLDV: 7.33 [PH] (ref 7.3–7.4)
PH BLDV: 7.34 [PH] (ref 7.3–7.4)
PH BLDV: 7.35 [PH] (ref 7.3–7.4)
PH BLDV: 7.37 [PH] (ref 7.3–7.4)
PHOSPHATE SERPL-MCNC: 2.6 MG/DL (ref 2.3–4.1)
PLATELET # BLD AUTO: 106 THOUSANDS/UL (ref 149–390)
PMV BLD AUTO: 10.7 FL (ref 8.9–12.7)
PO2 BLDV: 36.1 MM HG (ref 35–45)
PO2 BLDV: 42.6 MM HG (ref 35–45)
PO2 BLDV: 43.3 MM HG (ref 35–45)
PO2 BLDV: 45.3 MM HG (ref 35–45)
PO2 VENOUS TEMP CORRECTED: 39.3 MM HG (ref 35–45)
POTASSIUM SERPL-SCNC: 3.9 MMOL/L (ref 3.5–5.3)
POTASSIUM SERPL-SCNC: 4.1 MMOL/L (ref 3.5–5.3)
POTASSIUM SERPL-SCNC: 4.3 MMOL/L (ref 3.5–5.3)
POTASSIUM SERPL-SCNC: 4.4 MMOL/L (ref 3.5–5.3)
POTASSIUM SERPL-SCNC: 4.8 MMOL/L (ref 3.5–5.3)
PR INTERVAL: 178 MS
PROT SERPL-MCNC: 4.9 G/DL (ref 6.4–8.4)
PROTHROMBIN TIME: 19.2 SECONDS (ref 11.6–14.5)
QRS AXIS: 108 DEGREES
QRSD INTERVAL: 118 MS
QT INTERVAL: 374 MS
QTC INTERVAL: 501 MS
RBC # BLD AUTO: 3.11 MILLION/UL (ref 3.81–5.12)
SODIUM SERPL-SCNC: 138 MMOL/L (ref 135–147)
SODIUM SERPL-SCNC: 140 MMOL/L (ref 135–147)
SODIUM SERPL-SCNC: 140 MMOL/L (ref 135–147)
SODIUM SERPL-SCNC: 141 MMOL/L (ref 135–147)
SODIUM SERPL-SCNC: 142 MMOL/L (ref 135–147)
T WAVE AXIS: 35 DEGREES
VENT- AC: AC
VENTRICULAR RATE: 108 BPM
WBC # BLD AUTO: 18.5 THOUSAND/UL (ref 4.31–10.16)

## 2024-04-11 PROCEDURE — 85027 COMPLETE CBC AUTOMATED: CPT | Performed by: INTERNAL MEDICINE

## 2024-04-11 PROCEDURE — 83615 LACTATE (LD) (LDH) ENZYME: CPT | Performed by: PHYSICIAN ASSISTANT

## 2024-04-11 PROCEDURE — 80076 HEPATIC FUNCTION PANEL: CPT | Performed by: NURSE PRACTITIONER

## 2024-04-11 PROCEDURE — 83735 ASSAY OF MAGNESIUM: CPT

## 2024-04-11 PROCEDURE — 85730 THROMBOPLASTIN TIME PARTIAL: CPT | Performed by: STUDENT IN AN ORGANIZED HEALTH CARE EDUCATION/TRAINING PROGRAM

## 2024-04-11 PROCEDURE — 99291 CRITICAL CARE FIRST HOUR: CPT | Performed by: ANESTHESIOLOGY

## 2024-04-11 PROCEDURE — 85730 THROMBOPLASTIN TIME PARTIAL: CPT | Performed by: NURSE PRACTITIONER

## 2024-04-11 PROCEDURE — 83735 ASSAY OF MAGNESIUM: CPT | Performed by: NURSE PRACTITIONER

## 2024-04-11 PROCEDURE — 85610 PROTHROMBIN TIME: CPT | Performed by: NURSE PRACTITIONER

## 2024-04-11 PROCEDURE — 82330 ASSAY OF CALCIUM: CPT | Performed by: INTERNAL MEDICINE

## 2024-04-11 PROCEDURE — 93005 ELECTROCARDIOGRAM TRACING: CPT

## 2024-04-11 PROCEDURE — 84100 ASSAY OF PHOSPHORUS: CPT | Performed by: INTERNAL MEDICINE

## 2024-04-11 PROCEDURE — 80048 BASIC METABOLIC PNL TOTAL CA: CPT | Performed by: ANESTHESIOLOGY

## 2024-04-11 PROCEDURE — 99024 POSTOP FOLLOW-UP VISIT: CPT | Performed by: STUDENT IN AN ORGANIZED HEALTH CARE EDUCATION/TRAINING PROGRAM

## 2024-04-11 PROCEDURE — 80048 BASIC METABOLIC PNL TOTAL CA: CPT | Performed by: INTERNAL MEDICINE

## 2024-04-11 PROCEDURE — 82805 BLOOD GASES W/O2 SATURATION: CPT | Performed by: INTERNAL MEDICINE

## 2024-04-11 PROCEDURE — 71045 X-RAY EXAM CHEST 1 VIEW: CPT

## 2024-04-11 PROCEDURE — 93010 ELECTROCARDIOGRAM REPORT: CPT | Performed by: INTERNAL MEDICINE

## 2024-04-11 PROCEDURE — 94003 VENT MGMT INPAT SUBQ DAY: CPT

## 2024-04-11 PROCEDURE — 94664 DEMO&/EVAL PT USE INHALER: CPT

## 2024-04-11 PROCEDURE — 83735 ASSAY OF MAGNESIUM: CPT | Performed by: INTERNAL MEDICINE

## 2024-04-11 PROCEDURE — 99024 POSTOP FOLLOW-UP VISIT: CPT | Performed by: SURGERY

## 2024-04-11 PROCEDURE — 94760 N-INVAS EAR/PLS OXIMETRY 1: CPT

## 2024-04-11 PROCEDURE — 82948 REAGENT STRIP/BLOOD GLUCOSE: CPT

## 2024-04-11 RX ORDER — MAGNESIUM SULFATE HEPTAHYDRATE 40 MG/ML
2 INJECTION, SOLUTION INTRAVENOUS ONCE
Status: COMPLETED | OUTPATIENT
Start: 2024-04-11 | End: 2024-04-11

## 2024-04-11 RX ORDER — MAGNESIUM SULFATE HEPTAHYDRATE 40 MG/ML
2 INJECTION, SOLUTION INTRAVENOUS ONCE
Qty: 50 ML | Refills: 0 | Status: COMPLETED | OUTPATIENT
Start: 2024-04-11 | End: 2024-04-11

## 2024-04-11 RX ORDER — HALOPERIDOL 5 MG/ML
2 INJECTION INTRAMUSCULAR ONCE
Status: DISCONTINUED | OUTPATIENT
Start: 2024-04-11 | End: 2024-04-11

## 2024-04-11 RX ORDER — FUROSEMIDE 10 MG/ML
40 INJECTION INTRAMUSCULAR; INTRAVENOUS ONCE
Status: COMPLETED | OUTPATIENT
Start: 2024-04-12 | End: 2024-04-12

## 2024-04-11 RX ORDER — HEPARIN SODIUM 10000 [USP'U]/100ML
3-20 INJECTION, SOLUTION INTRAVENOUS
Status: DISCONTINUED | OUTPATIENT
Start: 2024-04-11 | End: 2024-04-12

## 2024-04-11 RX ORDER — FUROSEMIDE 10 MG/ML
40 INJECTION INTRAMUSCULAR; INTRAVENOUS ONCE
Status: COMPLETED | OUTPATIENT
Start: 2024-04-11 | End: 2024-04-11

## 2024-04-11 RX ORDER — FENTANYL CITRATE 50 UG/ML
50 INJECTION, SOLUTION INTRAMUSCULAR; INTRAVENOUS ONCE
Status: COMPLETED | OUTPATIENT
Start: 2024-04-11 | End: 2024-04-11

## 2024-04-11 RX ORDER — METOCLOPRAMIDE HYDROCHLORIDE 5 MG/ML
10 INJECTION INTRAMUSCULAR; INTRAVENOUS ONCE
Status: COMPLETED | OUTPATIENT
Start: 2024-04-11 | End: 2024-04-11

## 2024-04-11 RX ORDER — HALOPERIDOL 5 MG/ML
2 INJECTION INTRAMUSCULAR ONCE
Status: COMPLETED | OUTPATIENT
Start: 2024-04-11 | End: 2024-04-11

## 2024-04-11 RX ORDER — POTASSIUM CHLORIDE 14.9 MG/ML
20 INJECTION INTRAVENOUS ONCE
Status: COMPLETED | OUTPATIENT
Start: 2024-04-11 | End: 2024-04-11

## 2024-04-11 RX ORDER — DEXMEDETOMIDINE HYDROCHLORIDE 4 UG/ML
.1-1.2 INJECTION, SOLUTION INTRAVENOUS
Status: DISCONTINUED | OUTPATIENT
Start: 2024-04-11 | End: 2024-04-11

## 2024-04-11 RX ADMIN — AMIODARONE HYDROCHLORIDE 200 MG: 200 TABLET ORAL at 21:13

## 2024-04-11 RX ADMIN — FENTANYL CITRATE 50 MCG: 50 INJECTION INTRAMUSCULAR; INTRAVENOUS at 00:36

## 2024-04-11 RX ADMIN — HEPARIN SODIUM 16.5 ML/HR: 5000 INJECTION INTRAVENOUS; SUBCUTANEOUS at 17:58

## 2024-04-11 RX ADMIN — ACETAMINOPHEN 975 MG: 325 TABLET, FILM COATED ORAL at 21:13

## 2024-04-11 RX ADMIN — ONDANSETRON 4 MG: 2 INJECTION INTRAMUSCULAR; INTRAVENOUS at 18:03

## 2024-04-11 RX ADMIN — HALOPERIDOL LACTATE 2 MG: 5 INJECTION, SOLUTION INTRAMUSCULAR at 22:53

## 2024-04-11 RX ADMIN — TICAGRELOR 90 MG: 90 TABLET ORAL at 08:03

## 2024-04-11 RX ADMIN — FUROSEMIDE 40 MG: 10 INJECTION, SOLUTION INTRAMUSCULAR; INTRAVENOUS at 08:37

## 2024-04-11 RX ADMIN — NOREPINEPHRINE BITARTRATE 6 MCG/MIN: 1 INJECTION, SOLUTION, CONCENTRATE INTRAVENOUS at 02:20

## 2024-04-11 RX ADMIN — CHLORHEXIDINE GLUCONATE 0.12% ORAL RINSE 15 ML: 1.2 LIQUID ORAL at 18:03

## 2024-04-11 RX ADMIN — CHLORHEXIDINE GLUCONATE 0.12% ORAL RINSE 15 ML: 1.2 LIQUID ORAL at 08:03

## 2024-04-11 RX ADMIN — Medication 500 MG: at 08:04

## 2024-04-11 RX ADMIN — AMIODARONE HYDROCHLORIDE 200 MG: 200 TABLET ORAL at 06:06

## 2024-04-11 RX ADMIN — FENTANYL CITRATE 50 MCG: 50 INJECTION INTRAMUSCULAR; INTRAVENOUS at 04:16

## 2024-04-11 RX ADMIN — MUPIROCIN 1 APPLICATION: 20 OINTMENT TOPICAL at 08:03

## 2024-04-11 RX ADMIN — SODIUM CHLORIDE 2 UNITS/HR: 9 INJECTION, SOLUTION INTRAVENOUS at 12:30

## 2024-04-11 RX ADMIN — TRIMETHOBENZAMIDE HYDROCHLORIDE 200 MG: 100 INJECTION INTRAMUSCULAR at 21:24

## 2024-04-11 RX ADMIN — FENTANYL CITRATE 50 MCG: 50 INJECTION INTRAMUSCULAR; INTRAVENOUS at 04:15

## 2024-04-11 RX ADMIN — POLYETHYLENE GLYCOL 3350 17 G: 17 POWDER, FOR SOLUTION ORAL at 08:03

## 2024-04-11 RX ADMIN — DEXMEDETOMIDINE HYDROCHLORIDE 1 MCG/KG/HR: 4 INJECTION, SOLUTION INTRAVENOUS at 08:36

## 2024-04-11 RX ADMIN — MUPIROCIN 1 APPLICATION: 20 OINTMENT TOPICAL at 21:14

## 2024-04-11 RX ADMIN — FENTANYL CITRATE 50 MCG: 50 INJECTION INTRAMUSCULAR; INTRAVENOUS at 00:03

## 2024-04-11 RX ADMIN — NOREPINEPHRINE BITARTRATE 10 MCG/MIN: 1 INJECTION, SOLUTION, CONCENTRATE INTRAVENOUS at 19:39

## 2024-04-11 RX ADMIN — HEPARIN SODIUM 11.8 UNITS/KG/HR: 10000 INJECTION, SOLUTION INTRAVENOUS at 08:47

## 2024-04-11 RX ADMIN — HYDROMORPHONE HYDROCHLORIDE 0.5 MG: 1 INJECTION, SOLUTION INTRAMUSCULAR; INTRAVENOUS; SUBCUTANEOUS at 06:06

## 2024-04-11 RX ADMIN — HYDROMORPHONE HYDROCHLORIDE 0.5 MG: 1 INJECTION, SOLUTION INTRAMUSCULAR; INTRAVENOUS; SUBCUTANEOUS at 00:33

## 2024-04-11 RX ADMIN — FUROSEMIDE 40 MG: 10 INJECTION, SOLUTION INTRAMUSCULAR; INTRAVENOUS at 21:33

## 2024-04-11 RX ADMIN — ENOXAPARIN SODIUM 40 MG: 40 INJECTION SUBCUTANEOUS at 08:03

## 2024-04-11 RX ADMIN — POTASSIUM CHLORIDE 20 MEQ: 14.9 INJECTION, SOLUTION INTRAVENOUS at 14:02

## 2024-04-11 RX ADMIN — DEXMEDETOMIDINE HYDROCHLORIDE 0.5 MCG/KG/HR: 4 INJECTION, SOLUTION INTRAVENOUS at 02:20

## 2024-04-11 RX ADMIN — MILRINONE LACTATE IN DEXTROSE 0.13 MCG/KG/MIN: 200 INJECTION, SOLUTION INTRAVENOUS at 17:58

## 2024-04-11 RX ADMIN — ASPIRIN 325 MG ORAL TABLET 325 MG: 325 PILL ORAL at 08:03

## 2024-04-11 RX ADMIN — METOCLOPRAMIDE 10 MG: 5 INJECTION, SOLUTION INTRAMUSCULAR; INTRAVENOUS at 19:16

## 2024-04-11 RX ADMIN — Medication 1000 UNITS: at 08:03

## 2024-04-11 RX ADMIN — NOREPINEPHRINE BITARTRATE 9 MCG/MIN: 1 INJECTION, SOLUTION, CONCENTRATE INTRAVENOUS at 13:13

## 2024-04-11 RX ADMIN — PANTOPRAZOLE SODIUM 40 MG: 40 TABLET, DELAYED RELEASE ORAL at 06:06

## 2024-04-11 RX ADMIN — MAGNESIUM SULFATE HEPTAHYDRATE 2 G: 40 INJECTION, SOLUTION INTRAVENOUS at 19:21

## 2024-04-11 RX ADMIN — ACETAMINOPHEN 975 MG: 325 TABLET, FILM COATED ORAL at 06:06

## 2024-04-11 RX ADMIN — MAGNESIUM SULFATE HEPTAHYDRATE 2 G: 40 INJECTION, SOLUTION INTRAVENOUS at 06:32

## 2024-04-11 RX ADMIN — TICAGRELOR 90 MG: 90 TABLET ORAL at 21:13

## 2024-04-11 NOTE — RESPIRATORY THERAPY NOTE
RT Ventilator Management Note  Julia Perry 68 y.o. female MRN: [id number]  Unit/Bed#: [facility] 414-01 Encounter: [id number]      Daily Screen         4/11/2024  0756 4/11/2024  1020          Patient safety screen outcome:: Failed Passed (P)       Not Ready for Weaning due to:: Underline problem not resolved --                Physical Exam:   Assessment Type: Assess only  General Appearance: Sedated  Respiratory Pattern: Assisted  Chest Assessment: Chest expansion symmetrical  Bilateral Breath Sounds: (P) Clear      Resp Comments: (P) pt suctioned orally and down ett. pt cuff deflated and pt extubated without incident.  no stridor noted.  pt placed on 6lpm humidified nc o2. pt able to phonate.  will continue to monitor pt per protocol.

## 2024-04-11 NOTE — PHYSICAL THERAPY NOTE
Physical Therapy Cancellation Note     04/11/24 0800   PT Last Visit   PT Visit Date 04/11/24   Note Type   Note type Cancelled Session   Cancel Reasons Intubated/sedated       PT orders received and chart reviewed. At this time, pt still intubated/sedated. Will continue to follow as medically appropriate.     Alan Castillo PT, DPT

## 2024-04-11 NOTE — PROGRESS NOTES
Vascular surgery note    Pt s/e. Remains in ICU intubated, on multiple pressors and requiring Impella support    On exam, pt with Right femoral impella. Access site c/d/I, no bleeding, no hematoma. Left groin soft, no hematoma; B/L LE with poor cap refill but appear adequately perfused     RLE dop signal in Peroneal  LLE dop signal in PT    Cont ICU care  Cont NV chks  Remove impella when able  No further vascular surgery interventions planned  Please do not hesitate to contact with concerns or questions

## 2024-04-11 NOTE — RESPIRATORY THERAPY NOTE
RT Ventilator Management Note  Julia Perry 68 y.o. female MRN: [id number]  Unit/Bed#: [facility] 414-01 Encounter: [id number]      Daily Screen         4/10/2024  0713 4/11/2024  0756          Patient safety screen outcome:: Failed Failed      Not Ready for Weaning due to:: Underline problem not resolved Underline problem not resolved                Physical Exam:   Assessment Type: Assess only  General Appearance: Sedated  Respiratory Pattern: Assisted  Chest Assessment: Chest expansion symmetrical  Bilateral Breath Sounds: Clear, Diminished      Resp Comments: (P) pt remains on current [facility] vent settings.  ett  advanced by 2 cm  per md order.  pt suctioned down ett. will instruct pt on IS once extubated.

## 2024-04-11 NOTE — PROGRESS NOTES
Progress Note - Cardiothoracic Surgery   Julia Perry 68 y.o. female MRN: [id number]  Unit/Bed#: [facility] 414-01 Encounter: [id number]      POD # 2 s/p CABGx3    Pt seen/examined.  Interval history and data reviewed with critical care team.  Patient went for cardiac cath and Impella placement yesterday. Grafts patent but poor flow in RCA graft - likely due to poor runoff from poor target vessel. Remaining OM was also stented open. Patient remains hemodynamically stable with good MVO2, normal lactate. Now off epi. Remains on milrinone 0.25 and levo. Making urine and normal Cr. Was extubated late morning.        Medications:   Scheduled Meds:  Current Facility-Administered Medications   Medication Dose Route Frequency Provider Last Rate    acetaminophen  650 mg Rectal Q4H PRN [facility], DO      acetaminophen  975 mg Oral Q8H [facility] Yumiko Loo PA-C      amiodarone  200 mg Oral Q8H [facility]      aspirin  [facility]      atorvastatin  80 mg Oral Daily With Dinner [facility] Ambrose, DO      bisacodyl  10 mg Rectal Daily PRN [facility] Ambrose, DO      chlorhexidine  15 mL Mouth/Throat BID [facility] Ambrose, DO      Cholecalciferol  1,000 Units Oral Daily [facility] Ambrose, DO      dexmedetomidine  0.1-1.2 mcg/kg/hr Intravenous Titrated Yumiko Loo PA-C Stopped (04/11/24 1040)    fentaNYL  50 mcg/hr Intravenous Continuous [facility], DO Stopped (04/11/24 1030)    fentaNYL  50 mcg Intravenous Q1H PRN [facility], DO      heparin (porcine)  3-20 Units/kg/hr (Order-Specific) Intravenous Titrated Geri Spironello V, CRNP 11.8 Units/kg/hr (04/11/24 0847)    heparin (porcine) 6,250 Units in dextrose 5 % 500 mL purge solution  3-20 mL/hr Device Titrated Delisa Grecsek, CRNP 17.3 mL/hr (04/11/24 0600)    HYDROmorphone  0.5 mg Intravenous Q2H PRN [facility], DO      insulin regular (HumuLIN R,NovoLIN R) 1 Units/mL in sodium chloride 0.9 % 100 mL infusion   "0.3-21 Units/hr Intravenous Titrated [place], DO 2 Units/hr (04/11/24 1159)    magnesium gluconate  500 mg Oral Daily [place] Amb[place], DO      milrinone (Primacor) infusion  0.25 mcg/kg/min Intravenous Continuous Yumiko Loo PA-C 0.25 mcg/kg/min (04/11/24 0600)    mupirocin  1 Application Nasal Q12H LINDA [place] Ambrose, DO      norepinephrine  1-30 mcg/min Intravenous Titrated [place] Ambrose, DO 8 mcg/min (04/11/24 1100)    ondansetron  4 mg Intravenous Q6H PRN [place] Ambrose, DO      oxyCODONE  2.5 mg Oral Q4H PRN [place] Ambrose, DO      Or    oxyCODONE  5 mg Oral Q4H PRN [place] Ambrose, DO      pantoprazole  40 mg Oral Early Morning [place] Ambrose, DO      polyethylene glycol  17 g Oral Daily [place] Ambrose, DO      sodium chloride  20 mL/hr Intravenous Continuous [place] Ambrose, DO 20 mL/hr (04/11/24 0600)    ticagrelor  90 mg Oral Q12H [facility] ROYAL Aguilera       Continuous Infusions:dexmedetomidine, 0.1-1.2 mcg/kg/hr, Last Rate: Stopped (04/11/24 1040)  fentaNYL, 50 mcg/hr, Last Rate: Stopped (04/11/24 1030)  heparin (porcine), 3-20 Units/kg/hr (Order-Specific), Last Rate: 11.8 Units/kg/hr (04/11/24 0847)  heparin (porcine) 6,250 Units in dextrose 5 % 500 mL purge solution, 3-20 mL/hr, Last Rate: 17.3 mL/hr (04/11/24 0600)  insulin regular (HumuLIN R,NovoLIN R) 1 Units/mL in sodium chloride 0.9 % 100 mL infusion, 0.3-21 Units/hr, Last Rate: 2 Units/hr (04/11/24 1159)  milrinone (Primacor) infusion, 0.25 mcg/kg/min, Last Rate: 0.25 mcg/kg/min (04/11/24 0600)  norepinephrine, 1-30 mcg/min, Last Rate: 8 mcg/min (04/11/24 1100)  sodium chloride, 20 mL/hr, Last Rate: 20 mL/hr (04/11/24 0600)      PRN Meds:.  acetaminophen    bisacodyl    fentaNYL    HYDROmorphone    ondansetron    oxyCODONE **OR** oxyCODONE    Vitals: Blood pressure 106/56, pulse (!) 106, temperature 99 °F (37.2 °C), temperature source Core, resp. rate 12, height 5' 4\" (1.626 m), weight 87.5 kg (193 lb), " SpO2 100%.,Body mass index is 33.13 kg/m².  I/O last 24 hours:  In: 2992.8 [I.V.:2652.8; NG/GT:290; IV Piggyback:50]  Out: 2030 [Urine:1200; Emesis/NG output:250; Chest Tube:580]  Invasive Devices       Central Venous Catheter Line  Duration             CVC Central Lines 04/09/24 2 days    Introducer 04/09/24 2 days              Peripheral Intravenous Line  Duration             Peripheral IV 04/09/24 Right Forearm 2 days              Arterial Line  Duration             Arterial Line 04/09/24 Left Radial 2 days              Line  Duration             Pacer Wires 2 days    Pacer Wires 2 days    Arterial Sheath Other (Comment) Right Femoral <1 day              Drain  Duration             Chest Tube 1 Left Pleural 28 Fr. 2 days    Chest Tube 2 Right Pleural 28 Fr. 2 days    Chest Tube 3 Posterior;Mediastinal 24 Fr. 2 days    Chest Tube 4 Mediastinal;Anterior 28 Fr. 2 days    Urethral Catheter Latex;Straight-tip 16 Fr. 2 days                      Lab, Imaging and other studies:   Results from last 7 days   Lab Units 04/11/24  0406 04/10/24  1526 04/10/24  0748 04/10/24  0510   WBC Thousand/uL 18.50* 16.05*  --  14.48*   HEMOGLOBIN g/dL 9.2* 10.2* 10.2* 10.6*   HEMATOCRIT % 28.5* 31.4*  --  32.5*   PLATELETS Thousands/uL 106* 127*  --  141*     Results from last 7 days   Lab Units 04/11/24  0406 04/11/24  0006 04/10/24  1613 04/09/24  1801 04/09/24  1307   POTASSIUM mmol/L 4.4 4.8 4.5   < >  --    CHLORIDE mmol/L 109* 112* 110*   < >  --    CO2 mmol/L 19* 19* 18*   < >  --    CO2, I-STAT mmol/L  --   --   --   --  22   BUN mg/dL 23 22 21   < >  --    CREATININE mg/dL 0.93 0.97 0.86   < >  --    GLUCOSE, ISTAT mg/dl  --   --   --   --  158*   CALCIUM mg/dL 8.2* 8.3* 8.1*   < >  --     < > = values in this interval not displayed.     Results from last 7 days   Lab Units 04/11/24  0839 04/10/24  1613 04/10/24  1526   INR  1.64* 2.18* 2.22*   PTT seconds 32 >210* >210*     Recent Labs     04/10/24  1600   PHART 7.379    YPT9JOB 18.0*   PO2ART 163.1*   RST3EMU 31.2*   BEART -6.2           Plan:    Continue impella - will likely need a few days of support  Wean pressors as able  Incentive spirometry  Gentle diuresis today. Has received significant volume since admission.  Keep tubes/wires      SIGNATURE: Josh Castellanos MD  DATE: April 11, 2024  TIME: 12:03 PM

## 2024-04-11 NOTE — RESPIRATORY THERAPY NOTE
04/11/24 0345   Respiratory Assessment   Resp Comments No adverse events overnight.   Vent Information   Vent type Gurrola C3   Gurrola Vent Mode (S)CMV   $ Vent Daily Charge-Subsequent Yes   $ Pulse Oximetry Spot Check Charge Completed   (S)CMV Settings   Resp Rate (BPM) 15 BPM   VT (mL) 350 mL   FIO2 (%) 35 %   PEEP (cmH2O) 6 cmH2O   Insp Time (%) 0.8 %   Flow Trigger (LPM) 3   Humidification Heater   Heater Temperature (Set) 95 °F (35 °C)   (S)CMV Actuals   Resp Rate (BPM) 18 BPM   VT (mL) 357   MV 6.4   MAP (cmH2O) 7.6 cmH2O   Peak Pressure (cmH2O) 18 cmH2O   (S)CMV Alarms   High Peak Pressure (cmH2O) 40   Low Pressure (cmH2O) 5 cm H2O   High Resp Rate (BPM) 40 BPM   Low Resp Rate (BPM) 8 BPM   High MV (L/min) 16 L/min   Low MV (L/min) 3 L/min   Apnea Time (s) 20 S   Maintenance   Water bag changed No   Circuit changed No

## 2024-04-11 NOTE — OCCUPATIONAL THERAPY NOTE
Occupational Therapy Cancel Note        Patient Name: Julia Perry  Today's Date: 4/11/2024 04/11/24 0805   OT Last Visit   OT Visit Date 04/11/24   Note Type   Note type Cancelled Session   Cancel Reasons Intubated/sedated   Additional Comments Chart reviewed, pt remains intubated. Will continue to follow as medically appropriate.       Richelle Leos, ESLIE, OTR/L

## 2024-04-11 NOTE — RESPIRATORY THERAPY NOTE
RT Ventilator Management Note  Julia Perry 68 y.o. female MRN: [id number]  Unit/Bed#: [facility] 414-01 Encounter: [id number]      Daily Screen         4/11/2024  0756 4/11/2024  1020          Patient safety screen outcome:: Failed Passed (P)       Not Ready for Weaning due to:: Underline problem not resolved --                Physical Exam:   Assessment Type: Assess only  General Appearance: Sedated  Respiratory Pattern: Assisted  Chest Assessment: Chest expansion symmetrical  Bilateral Breath Sounds: Clear, Diminished      Resp Comments: (P) pt placed on spont by md.

## 2024-04-12 ENCOUNTER — APPOINTMENT (INPATIENT)
Dept: RADIOLOGY | Facility: HOSPITAL | Age: 69
DRG: 215 | End: 2024-04-12
Payer: COMMERCIAL

## 2024-04-12 ENCOUNTER — APPOINTMENT (INPATIENT)
Dept: NON INVASIVE DIAGNOSTICS | Facility: HOSPITAL | Age: 69
DRG: 215 | End: 2024-04-12
Payer: COMMERCIAL

## 2024-04-12 LAB
ALBUMIN SERPL BCP-MCNC: 3.2 G/DL (ref 3.5–5)
ALP SERPL-CCNC: 49 U/L (ref 34–104)
ALT SERPL W P-5'-P-CCNC: 10 U/L (ref 7–52)
ANION GAP SERPL CALCULATED.3IONS-SCNC: 10 MMOL/L (ref 4–13)
ANION GAP SERPL CALCULATED.3IONS-SCNC: 10 MMOL/L (ref 4–13)
ANION GAP SERPL CALCULATED.3IONS-SCNC: 8 MMOL/L (ref 4–13)
ANION GAP SERPL CALCULATED.3IONS-SCNC: 9 MMOL/L (ref 4–13)
APTT PPP: 30 SECONDS (ref 23–37)
APTT PPP: 71 SECONDS (ref 23–37)
APTT PPP: 76 SECONDS (ref 23–37)
AST SERPL W P-5'-P-CCNC: 35 U/L (ref 13–39)
ATRIAL RATE: 115 BPM
BASE EX.OXY STD BLDV CALC-SCNC: 72 % (ref 60–80)
BASE EX.OXY STD BLDV CALC-SCNC: 76 % (ref 60–80)
BASE EX.OXY STD BLDV CALC-SCNC: 97 % (ref 60–80)
BASE EX.OXY STD BLDV CALC-SCNC: 97.4 % (ref 60–80)
BASE EXCESS BLDA CALC-SCNC: -3.7 MMOL/L
BASE EXCESS BLDV CALC-SCNC: -3 MMOL/L
BASE EXCESS BLDV CALC-SCNC: -4.4 MMOL/L
BASE EXCESS BLDV CALC-SCNC: -4.5 MMOL/L
BASE EXCESS BLDV CALC-SCNC: -5 MMOL/L
BILIRUB SERPL-MCNC: 1.12 MG/DL (ref 0.2–1)
BSA FOR ECHO PROCEDURE: 1.84 M2
BUN SERPL-MCNC: 19 MG/DL (ref 5–25)
BUN SERPL-MCNC: 20 MG/DL (ref 5–25)
BUN SERPL-MCNC: 20 MG/DL (ref 5–25)
BUN SERPL-MCNC: 21 MG/DL (ref 5–25)
CA-I BLD-SCNC: 1.17 MMOL/L (ref 1.12–1.32)
CALCIUM ALBUM COR SERPL-MCNC: 8.7 MG/DL (ref 8.3–10.1)
CALCIUM SERPL-MCNC: 7.4 MG/DL (ref 8.4–10.2)
CALCIUM SERPL-MCNC: 7.8 MG/DL (ref 8.4–10.2)
CALCIUM SERPL-MCNC: 8.1 MG/DL (ref 8.4–10.2)
CALCIUM SERPL-MCNC: 8.4 MG/DL (ref 8.4–10.2)
CHLORIDE SERPL-SCNC: 110 MMOL/L (ref 96–108)
CHLORIDE SERPL-SCNC: 111 MMOL/L (ref 96–108)
CHLORIDE SERPL-SCNC: 112 MMOL/L (ref 96–108)
CHLORIDE SERPL-SCNC: 113 MMOL/L (ref 96–108)
CO2 SERPL-SCNC: 19 MMOL/L (ref 21–32)
CO2 SERPL-SCNC: 21 MMOL/L (ref 21–32)
CO2 SERPL-SCNC: 22 MMOL/L (ref 21–32)
CO2 SERPL-SCNC: 22 MMOL/L (ref 21–32)
CREAT SERPL-MCNC: 0.86 MG/DL (ref 0.6–1.3)
CREAT SERPL-MCNC: 0.93 MG/DL (ref 0.6–1.3)
CREAT SERPL-MCNC: 0.97 MG/DL (ref 0.6–1.3)
CREAT SERPL-MCNC: 0.99 MG/DL (ref 0.6–1.3)
ERYTHROCYTE [DISTWIDTH] IN BLOOD BY AUTOMATED COUNT: 17.8 % (ref 11.6–15.1)
FIBRINOGEN PPP-MCNC: 404 MG/DL (ref 207–520)
GFR SERPL CREATININE-BSD FRML MDRD: 59 ML/MIN/1.73SQ M
GFR SERPL CREATININE-BSD FRML MDRD: 60 ML/MIN/1.73SQ M
GFR SERPL CREATININE-BSD FRML MDRD: 63 ML/MIN/1.73SQ M
GFR SERPL CREATININE-BSD FRML MDRD: 70 ML/MIN/1.73SQ M
GLUCOSE SERPL-MCNC: 108 MG/DL (ref 65–140)
GLUCOSE SERPL-MCNC: 129 MG/DL (ref 65–140)
GLUCOSE SERPL-MCNC: 135 MG/DL (ref 65–140)
GLUCOSE SERPL-MCNC: 138 MG/DL (ref 65–140)
GLUCOSE SERPL-MCNC: 142 MG/DL (ref 65–140)
GLUCOSE SERPL-MCNC: 149 MG/DL (ref 65–140)
GLUCOSE SERPL-MCNC: 153 MG/DL (ref 65–140)
GLUCOSE SERPL-MCNC: 154 MG/DL (ref 65–140)
GLUCOSE SERPL-MCNC: 155 MG/DL (ref 65–140)
GLUCOSE SERPL-MCNC: 156 MG/DL (ref 65–140)
GLUCOSE SERPL-MCNC: 158 MG/DL (ref 65–140)
GLUCOSE SERPL-MCNC: 160 MG/DL (ref 65–140)
GLUCOSE SERPL-MCNC: 167 MG/DL (ref 65–140)
GLUCOSE SERPL-MCNC: 169 MG/DL (ref 65–140)
GLUCOSE SERPL-MCNC: 90 MG/DL (ref 65–140)
HCO3 BLDA-SCNC: 21 MMOL/L (ref 22–28)
HCO3 BLDV-SCNC: 19 MMOL/L (ref 24–30)
HCO3 BLDV-SCNC: 20.4 MMOL/L (ref 24–30)
HCO3 BLDV-SCNC: 20.5 MMOL/L (ref 24–30)
HCO3 BLDV-SCNC: 21.1 MMOL/L (ref 24–30)
HCT VFR BLD AUTO: 27.3 % (ref 34.8–46.1)
HGB BLD-MCNC: 7.5 G/DL (ref 11.5–15.4)
HGB BLD-MCNC: 8.4 G/DL (ref 11.5–15.4)
INR PPP: 1.63 (ref 0.84–1.19)
INR PPP: 1.63 (ref 0.84–1.19)
LDH SERPL-CCNC: 442 U/L (ref 140–271)
MAGNESIUM SERPL-MCNC: 2.4 MG/DL (ref 1.9–2.7)
MCH RBC QN AUTO: 28.8 PG (ref 26.8–34.3)
MCHC RBC AUTO-ENTMCNC: 30.8 G/DL (ref 31.4–37.4)
MCV RBC AUTO: 94 FL (ref 82–98)
NRBC BLD AUTO-RTO: 0 /100 WBCS
O2 CT BLDA-SCNC: 12.3 ML/DL (ref 16–23)
O2 CT BLDV-SCNC: 10 ML/DL
O2 CT BLDV-SCNC: 10.6 ML/DL
O2 CT BLDV-SCNC: 11.4 ML/DL
O2 CT BLDV-SCNC: 9.5 ML/DL
OXYHGB MFR BLDA: 95.4 % (ref 94–97)
P AXIS: 87 DEGREES
PCO2 BLDA: 36.6 MM HG (ref 36–44)
PCO2 BLDV: 28.7 MM HG (ref 42–50)
PCO2 BLDV: 30.4 MM HG (ref 42–50)
PCO2 BLDV: 38.9 MM HG (ref 42–50)
PCO2 BLDV: 40.7 MM HG (ref 42–50)
PH BLDA: 7.38 [PH] (ref 7.35–7.45)
PH BLDV: 7.33 [PH] (ref 7.3–7.4)
PH BLDV: 7.34 [PH] (ref 7.3–7.4)
PH BLDV: 7.44 [PH] (ref 7.3–7.4)
PH BLDV: 7.45 [PH] (ref 7.3–7.4)
PHOSPHATE SERPL-MCNC: 2.1 MG/DL (ref 2.3–4.1)
PLATELET # BLD AUTO: 89 THOUSANDS/UL (ref 149–390)
PLATELET # BLD AUTO: 91 THOUSANDS/UL (ref 149–390)
PMV BLD AUTO: 10.6 FL (ref 8.9–12.7)
PMV BLD AUTO: 10.7 FL (ref 8.9–12.7)
PO2 BLDA: 89.5 MM HG (ref 75–129)
PO2 BLDV: 120.8 MM HG (ref 35–45)
PO2 BLDV: 136.2 MM HG (ref 35–45)
PO2 BLDV: 40.1 MM HG (ref 35–45)
PO2 BLDV: 43.5 MM HG (ref 35–45)
POTASSIUM SERPL-SCNC: 3.6 MMOL/L (ref 3.5–5.3)
POTASSIUM SERPL-SCNC: 3.9 MMOL/L (ref 3.5–5.3)
POTASSIUM SERPL-SCNC: 4.2 MMOL/L (ref 3.5–5.3)
POTASSIUM SERPL-SCNC: 4.3 MMOL/L (ref 3.5–5.3)
PR INTERVAL: 168 MS
PROT SERPL-MCNC: 4.9 G/DL (ref 6.4–8.4)
PROTHROMBIN TIME: 19.1 SECONDS (ref 11.6–14.5)
PROTHROMBIN TIME: 19.1 SECONDS (ref 11.6–14.5)
QRS AXIS: 113 DEGREES
QRSD INTERVAL: 122 MS
QT INTERVAL: 340 MS
QTC INTERVAL: 470 MS
RBC # BLD AUTO: 2.92 MILLION/UL (ref 3.81–5.12)
SL CV ECHO LV IMPELLA CANNULA LENGTH: 6 CM
SL CV LV EF: 15
SODIUM SERPL-SCNC: 141 MMOL/L (ref 135–147)
SODIUM SERPL-SCNC: 142 MMOL/L (ref 135–147)
SPECIMEN SOURCE: ABNORMAL
T WAVE AXIS: 13 DEGREES
TRICUSPID ANNULAR PLANE SYSTOLIC EXCURSION: 1 CM
VENTRICULAR RATE: 115 BPM
WBC # BLD AUTO: 18.84 THOUSAND/UL (ref 4.31–10.16)

## 2024-04-12 PROCEDURE — P9016 RBC LEUKOCYTES REDUCED: HCPCS

## 2024-04-12 PROCEDURE — 82805 BLOOD GASES W/O2 SATURATION: CPT | Performed by: INTERNAL MEDICINE

## 2024-04-12 PROCEDURE — 93325 DOPPLER ECHO COLOR FLOW MAPG: CPT

## 2024-04-12 PROCEDURE — C1760 CLOSURE DEV, VASC: HCPCS | Performed by: INTERNAL MEDICINE

## 2024-04-12 PROCEDURE — 99152 MOD SED SAME PHYS/QHP 5/>YRS: CPT | Performed by: INTERNAL MEDICINE

## 2024-04-12 PROCEDURE — 85049 AUTOMATED PLATELET COUNT: CPT | Performed by: NURSE PRACTITIONER

## 2024-04-12 PROCEDURE — 85384 FIBRINOGEN ACTIVITY: CPT | Performed by: NURSE PRACTITIONER

## 2024-04-12 PROCEDURE — C1769 GUIDE WIRE: HCPCS | Performed by: INTERNAL MEDICINE

## 2024-04-12 PROCEDURE — 85730 THROMBOPLASTIN TIME PARTIAL: CPT | Performed by: NURSE PRACTITIONER

## 2024-04-12 PROCEDURE — 99153 MOD SED SAME PHYS/QHP EA: CPT | Performed by: INTERNAL MEDICINE

## 2024-04-12 PROCEDURE — 85610 PROTHROMBIN TIME: CPT | Performed by: NURSE PRACTITIONER

## 2024-04-12 PROCEDURE — 80048 BASIC METABOLIC PNL TOTAL CA: CPT | Performed by: NURSE PRACTITIONER

## 2024-04-12 PROCEDURE — 93308 TTE F-UP OR LMTD: CPT

## 2024-04-12 PROCEDURE — 99024 POSTOP FOLLOW-UP VISIT: CPT | Performed by: STUDENT IN AN ORGANIZED HEALTH CARE EDUCATION/TRAINING PROGRAM

## 2024-04-12 PROCEDURE — 93325 DOPPLER ECHO COLOR FLOW MAPG: CPT | Performed by: INTERNAL MEDICINE

## 2024-04-12 PROCEDURE — 82805 BLOOD GASES W/O2 SATURATION: CPT | Performed by: NURSE PRACTITIONER

## 2024-04-12 PROCEDURE — 93321 DOPPLER ECHO F-UP/LMTD STD: CPT | Performed by: INTERNAL MEDICINE

## 2024-04-12 PROCEDURE — 83615 LACTATE (LD) (LDH) ENZYME: CPT | Performed by: PHYSICIAN ASSISTANT

## 2024-04-12 PROCEDURE — 83735 ASSAY OF MAGNESIUM: CPT | Performed by: NURSE PRACTITIONER

## 2024-04-12 PROCEDURE — 85018 HEMOGLOBIN: CPT | Performed by: NURSE PRACTITIONER

## 2024-04-12 PROCEDURE — 82948 REAGENT STRIP/BLOOD GLUCOSE: CPT

## 2024-04-12 PROCEDURE — 80053 COMPREHEN METABOLIC PANEL: CPT | Performed by: NURSE PRACTITIONER

## 2024-04-12 PROCEDURE — 33992 RMVL PERQ LEFT HEART VAD: CPT | Performed by: INTERNAL MEDICINE

## 2024-04-12 PROCEDURE — 94664 DEMO&/EVAL PT USE INHALER: CPT

## 2024-04-12 PROCEDURE — 93321 DOPPLER ECHO F-UP/LMTD STD: CPT

## 2024-04-12 PROCEDURE — 94760 N-INVAS EAR/PLS OXIMETRY 1: CPT

## 2024-04-12 PROCEDURE — 02PA3RZ REMOVAL OF SHORT-TERM EXTERNAL HEART ASSIST SYSTEM FROM HEART, PERCUTANEOUS APPROACH: ICD-10-PCS | Performed by: ANESTHESIOLOGY

## 2024-04-12 PROCEDURE — 85027 COMPLETE CBC AUTOMATED: CPT | Performed by: NURSE PRACTITIONER

## 2024-04-12 PROCEDURE — 84100 ASSAY OF PHOSPHORUS: CPT | Performed by: NURSE PRACTITIONER

## 2024-04-12 PROCEDURE — 85730 THROMBOPLASTIN TIME PARTIAL: CPT | Performed by: STUDENT IN AN ORGANIZED HEALTH CARE EDUCATION/TRAINING PROGRAM

## 2024-04-12 PROCEDURE — 71045 X-RAY EXAM CHEST 1 VIEW: CPT

## 2024-04-12 PROCEDURE — 99291 CRITICAL CARE FIRST HOUR: CPT | Performed by: ANESTHESIOLOGY

## 2024-04-12 PROCEDURE — 82330 ASSAY OF CALCIUM: CPT | Performed by: NURSE PRACTITIONER

## 2024-04-12 PROCEDURE — 80048 BASIC METABOLIC PNL TOTAL CA: CPT | Performed by: INTERNAL MEDICINE

## 2024-04-12 PROCEDURE — 93010 ELECTROCARDIOGRAM REPORT: CPT | Performed by: INTERNAL MEDICINE

## 2024-04-12 PROCEDURE — 93308 TTE F-UP OR LMTD: CPT | Performed by: INTERNAL MEDICINE

## 2024-04-12 RX ORDER — ASPIRIN 81 MG/1
324 TABLET, CHEWABLE ORAL ONCE
Status: CANCELLED | OUTPATIENT
Start: 2024-04-12 | End: 2024-04-12

## 2024-04-12 RX ORDER — DOCUSATE SODIUM 100 MG/1
100 CAPSULE, LIQUID FILLED ORAL 2 TIMES DAILY
Status: DISCONTINUED | OUTPATIENT
Start: 2024-04-12 | End: 2024-04-23 | Stop reason: HOSPADM

## 2024-04-12 RX ORDER — FENTANYL CITRATE 50 UG/ML
INJECTION, SOLUTION INTRAMUSCULAR; INTRAVENOUS CODE/TRAUMA/SEDATION MEDICATION
Status: DISCONTINUED | OUTPATIENT
Start: 2024-04-12 | End: 2024-04-12 | Stop reason: HOSPADM

## 2024-04-12 RX ORDER — FUROSEMIDE 10 MG/ML
40 INJECTION INTRAMUSCULAR; INTRAVENOUS ONCE
Status: COMPLETED | OUTPATIENT
Start: 2024-04-12 | End: 2024-04-12

## 2024-04-12 RX ORDER — MILRINONE LACTATE 0.2 MG/ML
0.13 INJECTION, SOLUTION INTRAVENOUS CONTINUOUS
Status: DISCONTINUED | OUTPATIENT
Start: 2024-04-12 | End: 2024-04-14

## 2024-04-12 RX ORDER — PHENYLEPHRINE HYDROCHLORIDE 10 MG/ML
INJECTION INTRAVENOUS CODE/TRAUMA/SEDATION MEDICATION
Status: DISCONTINUED | OUTPATIENT
Start: 2024-04-12 | End: 2024-04-12 | Stop reason: HOSPADM

## 2024-04-12 RX ORDER — SODIUM CHLORIDE 9 MG/ML
125 INJECTION, SOLUTION INTRAVENOUS CONTINUOUS
Status: CANCELLED | OUTPATIENT
Start: 2024-04-12

## 2024-04-12 RX ORDER — ASPIRIN 325 MG
325 TABLET ORAL DAILY
Status: DISCONTINUED | OUTPATIENT
Start: 2024-04-12 | End: 2024-04-17

## 2024-04-12 RX ORDER — POTASSIUM CHLORIDE 29.8 MG/ML
40 INJECTION INTRAVENOUS ONCE
Status: COMPLETED | OUTPATIENT
Start: 2024-04-12 | End: 2024-04-12

## 2024-04-12 RX ORDER — CEFAZOLIN SODIUM 2 G/50ML
2000 SOLUTION INTRAVENOUS ONCE
Status: COMPLETED | OUTPATIENT
Start: 2024-04-12 | End: 2024-04-12

## 2024-04-12 RX ORDER — ONDANSETRON 2 MG/ML
INJECTION INTRAMUSCULAR; INTRAVENOUS CODE/TRAUMA/SEDATION MEDICATION
Status: DISCONTINUED | OUTPATIENT
Start: 2024-04-12 | End: 2024-04-12 | Stop reason: HOSPADM

## 2024-04-12 RX ADMIN — MILRINONE LACTATE IN DEXTROSE 0.25 MCG/KG/MIN: 200 INJECTION, SOLUTION INTRAVENOUS at 22:16

## 2024-04-12 RX ADMIN — MILRINONE LACTATE IN DEXTROSE 0.25 MCG/KG/MIN: 200 INJECTION, SOLUTION INTRAVENOUS at 09:29

## 2024-04-12 RX ADMIN — MUPIROCIN 1 APPLICATION: 20 OINTMENT TOPICAL at 09:17

## 2024-04-12 RX ADMIN — TICAGRELOR 90 MG: 90 TABLET ORAL at 09:17

## 2024-04-12 RX ADMIN — AMIODARONE HYDROCHLORIDE 200 MG: 200 TABLET ORAL at 22:04

## 2024-04-12 RX ADMIN — MUPIROCIN 1 APPLICATION: 20 OINTMENT TOPICAL at 22:04

## 2024-04-12 RX ADMIN — ACETAMINOPHEN 975 MG: 325 TABLET, FILM COATED ORAL at 22:04

## 2024-04-12 RX ADMIN — CHLORHEXIDINE GLUCONATE 0.12% ORAL RINSE 15 ML: 1.2 LIQUID ORAL at 18:01

## 2024-04-12 RX ADMIN — ASPIRIN 325 MG ORAL TABLET 325 MG: 325 PILL ORAL at 09:17

## 2024-04-12 RX ADMIN — CHLORHEXIDINE GLUCONATE 0.12% ORAL RINSE 15 ML: 1.2 LIQUID ORAL at 09:17

## 2024-04-12 RX ADMIN — ATORVASTATIN CALCIUM 80 MG: 80 TABLET, FILM COATED ORAL at 18:01

## 2024-04-12 RX ADMIN — Medication 1000 UNITS: at 09:17

## 2024-04-12 RX ADMIN — POTASSIUM CHLORIDE 40 MEQ: 29.8 INJECTION, SOLUTION INTRAVENOUS at 00:49

## 2024-04-12 RX ADMIN — Medication 500 MG: at 09:17

## 2024-04-12 RX ADMIN — AMIODARONE HYDROCHLORIDE 200 MG: 200 TABLET ORAL at 14:15

## 2024-04-12 RX ADMIN — ACETAMINOPHEN 975 MG: 325 TABLET, FILM COATED ORAL at 14:15

## 2024-04-12 RX ADMIN — PANTOPRAZOLE SODIUM 40 MG: 40 TABLET, DELAYED RELEASE ORAL at 05:46

## 2024-04-12 RX ADMIN — PERFLUTREN 0.6 ML/MIN: 6.52 INJECTION, SUSPENSION INTRAVENOUS at 10:52

## 2024-04-12 RX ADMIN — NOREPINEPHRINE BITARTRATE 10 MCG/MIN: 1 INJECTION, SOLUTION, CONCENTRATE INTRAVENOUS at 03:24

## 2024-04-12 RX ADMIN — ONDANSETRON 4 MG: 2 INJECTION INTRAMUSCULAR; INTRAVENOUS at 05:49

## 2024-04-12 RX ADMIN — FUROSEMIDE 40 MG: 10 INJECTION, SOLUTION INTRAMUSCULAR; INTRAVENOUS at 12:39

## 2024-04-12 RX ADMIN — SODIUM CHLORIDE 20 ML/HR: 0.45 INJECTION, SOLUTION INTRAVENOUS at 01:06

## 2024-04-12 RX ADMIN — DOCUSATE SODIUM 100 MG: 100 CAPSULE, LIQUID FILLED ORAL at 18:01

## 2024-04-12 RX ADMIN — POTASSIUM PHOSPHATE, MONOBASIC POTASSIUM PHOSPHATE, DIBASIC 9 MMOL: 224; 236 INJECTION, SOLUTION, CONCENTRATE INTRAVENOUS at 07:03

## 2024-04-12 RX ADMIN — CEFAZOLIN SODIUM 2000 MG: 2 SOLUTION INTRAVENOUS at 14:59

## 2024-04-12 RX ADMIN — FUROSEMIDE 40 MG: 10 INJECTION, SOLUTION INTRAMUSCULAR; INTRAVENOUS at 02:56

## 2024-04-12 RX ADMIN — TICAGRELOR 90 MG: 90 TABLET ORAL at 22:04

## 2024-04-12 RX ADMIN — SODIUM CHLORIDE 1 UNITS/HR: 9 INJECTION, SOLUTION INTRAVENOUS at 03:56

## 2024-04-12 RX ADMIN — NOREPINEPHRINE BITARTRATE 12 MCG/MIN: 1 INJECTION, SOLUTION, CONCENTRATE INTRAVENOUS at 01:06

## 2024-04-12 RX ADMIN — DOCUSATE SODIUM 100 MG: 100 CAPSULE, LIQUID FILLED ORAL at 09:17

## 2024-04-12 NOTE — QUICK NOTE
Pt seen and examined. Extubated resting comfortably in bed. Currently on Hep gtt, 7 of levo. Groin site c/d/I no hematoma, lower b/l extremities appear well perfused.  LLE PT/DP signal. RLE peroneal signal.    Cont neurovascular checks  Remove impella when able  Care per ICU

## 2024-04-12 NOTE — PROGRESS NOTES
[facility]  Progress Note: Critical Care   Date of Service: 4/13/2024  Hospital Day: 4  Name: Julia Perry I  MRN: [id number]  Unit/Bed#: [facility] 414-01      Assessment/Plan     Impressions:  MVCAD s/p CABG x3, LIMA to LAD, SVG to OM2, SVG to RPDA (4/9), RAFFY x 2 to OM (4/10)  Post-op cardiogenic shock s/p Impella placement   ICM (EF 15%)  DM2  HTN  HLD  B/l carotid stenosis s/p L CEA 3/24  Pituitary Macroadenoma with elevated prolactin  Hypercalcemia  UC    Neuro:   Cardiac Surgery Pain Control: ATC tylenol and PRN oxycodone 2.5/5mg  Delirium precautions  Regulate sleep/wake cycle  CAM-ICU daily  Trend neuro exam    CV:   Cardiac Surgery Hemodynamic Monitoring: MAP goal >65 CI >2.2 Continue pulmonary artery catheter for hemodynamic monitoring  Continue central line due to vasoactive medications Continue arterial line for blood pressure monitoring and/or frequent blood gas draws  Follow rhythm on telemetry  Critical Care Infusions : Levophed 6 mcg/min and Primacor 0.25 mcg/kg/min  Beta Blocker Plan: Hold beta blocker secondary to cardiogenic shock  Epicardial pacing wire plan : No longer needed, discontinue  Post op cardiac surgery medications:    mg QD  Statin: Lipitor 80 mg qHS  Amiodarone 200 mg PO q8 hours   Brilinta 90mg BID     Lung:   Good room air saturation   Continue incentive spirometry/coughing/deep breathing exercises.   Chest tube output remains persistently high; Continue chest tubes to suction today    GI:   Continue PPI for stress ulcer prophylaxis  Continue bowel regimen  Trend abdominal exam and bowel function    FEN:   Diuresis Plan: PRN Lasix to maintain net negative balance   Nutrition plan: as tolerated  Replenish electrolytes with goals: K >4.0, Mag >2.0, and Phos >3.0    :   Stiles Plan: Continue for accurate I/O monitoring for 48 hours  Trend UOP and BUN/creat  Strict I and O     ID:   Trend temps and WBC count  Maintain normothermia    Heme:    Trend hgb and plts    Endo:   Start SSI coverage ACHS  DMII  Consult endo    MSK/Skin:  Mobility goal:   PT/OT consult as medically appropriate   Frequent turning and pressure off-loading  Local wound care as needed    Disposition:  Critical care    ICU Core Measures     A: Assess, Prevent, and Manage Pain Has pain been assessed? Yes  Need for changes to pain regimen? No   B: Both SAT/SAT  N/A   C: Choice of Sedation RASS Goal: N/A patient not on sedation  Need for changes to sedation or analgesia regimen? NA   D: Delirium CAM-ICU: Negative   E: Early Mobility  Plan for early mobility? Yes   F: Family Engagement Plan for family engagement today? Yes       Review of Invasive Devices:    Stiles Plan: Continue for accurate I/O monitoring for 48 hours  Central access plan: Medications requiring central line Hemodynamic monitoring  Orwell Plan: Keep arterial line for hemodynamic monitoring    Prophylaxis:  VTE arixtra   Stress Ulcer  covered bypantoprazole (PROTONIX) EC tablet 40 mg [[id number]]          Significant 24hr Events      24 Hour Events/HPI: POD # 4 s/p CABG x 3 LIMA to LAD, SVG to OM2, and SVG to RPDA. POD #3 s/p RAFFY x 2 to OM and Impella placement. Impella D/C yesterday. 1 unit PRBC post Impella D/C for Hgb 7.5. Levo 6mcg, Milirinone resumed at 0.25mcg prior to impella coming out.      Subjective   Review of Systems   Respiratory:  Positive for cough.    Gastrointestinal:  Positive for nausea.   All other systems reviewed and are negative.       Objective     Medications:  Scheduled Continuous   acetaminophen, 975 mg, Q8H LINDA  amiodarone, 200 mg, Q8H LINDA  aspirin, 325 mg, Daily  atorvastatin, 80 mg, Daily With Dinner  chlorhexidine, 15 mL, BID  Cholecalciferol, 1,000 Units, Daily  docusate sodium, 100 mg, BID  fondaparinux, 2.5 mg, Q24H  insulin lispro, 1-5 Units, TID AC  insulin lispro, 1-5 Units, HS  magnesium gluconate, 500 mg, Daily  mupirocin, 1 Application, Q12H LINDA  pantoprazole, 40 mg, Early  Morning  polyethylene glycol, 17 g, Daily  potassium phosphate, 21 mmol, Once  ticagrelor, 90 mg, Q12H LINDA      milrinone (Primacor) infusion, 0.13 mcg/kg/min, Last Rate: 0.13 mcg/kg/min (04/13/24 0336)  norepinephrine, 1-30 mcg/min, Last Rate: 4 mcg/min (04/13/24 1229)         Vitals: Invasive Monitoring       Most Recent Min/Max in 24hrs   Temp 99.7 °F (37.6 °C) Temp  Min: 99.5 °F (37.5 °C)  Max: 100 °F (37.8 °C)   Pulse (!) 114 Pulse  Min: 114  Max: 128   Resp 22 Resp  Min: 10  Max: 24   /65 BP  Min: 78/48  Max: 116/65   O2 Sat 95 % SpO2  Min: 93 %  Max: 97 %   O2 Device: None (Room air)  Nasal Cannula O2 Flow Rate (L/min): 4 L/min Arterial Line  Mariel BP 81/64  Arterial Line BP  Min: 67/48  Max: 105/50   MAP 73 mmHg  Arterial Line MAP (mmHg)  Min: 51 mmHg  Max: 73 mmHg     PA Catheter   Most Recent  Min/Max in 24hrs    PAP 33/11 PAP  Min: 18/11  Max: 68/10   CVP 15 mmHg CVP (mean)  Min: -1 mmHg  Max: 64 mmHg   CI 2 L/min/m2  CI (L/min/m2)  Min: 1.3 L/min/m2  Max: 3.5 L/min/m2   SVR 1567 (dyne*sec)/cm5 SVR (dyne*sec)/cm5  Min: 594 (dyne*sec)/cm5  Max: 1752 (dyne*sec)/cm5        I/O Chest tube output (if present)     Intake/Output Summary (Last 24 hours) at 4/13/2024 1230  Last data filed at 4/13/2024 0800  Gross per 24 hour   Intake 1293.84 ml   Output 3260 ml   Net -1966.16 ml     BM: 4/12  Weight change: -0.174 kg (-6.1 oz)     Mediastinal tubes:  170 mL/8hr  500 mL/24hr   Pleural tubes: 120 mL/8hr  380 mL/24hr        Physical Exam   Physical Exam  Eyes:      General: Lids are normal.      Extraocular Movements: Extraocular movements intact.      Conjunctiva/sclera: Conjunctivae normal.      Pupils: Pupils are equal, round, and reactive to light.   Skin:     General: Skin is warm and dry.      Capillary Refill: Capillary refill takes less than 2 seconds.      Coloration: Skin is pale.      Comments: R groin ecchymotic, site soft    HENT:      Head: Normocephalic and atraumatic.   Neck:      Trachea:  Trachea normal.   Cardiovascular:      Rate and Rhythm: Regular rhythm. Tachycardia present.      Pulses:           Radial pulses are 2+ on the right side and 2+ on the left side.        Dorsalis pedis pulses are 1+ on the right side and 1+ on the left side.      Heart sounds: S1 normal and S2 normal.      Friction rub present.   Musculoskeletal:      Cervical back: Full passive range of motion without pain, normal range of motion and neck supple.      Comments: Normal ROM, normal strength   Abdominal: General: Bowel sounds are normal.      Palpations: Abdomen is soft.   Constitutional:       General: She is awake.      Appearance: Normal appearance. She is well-developed.   Pulmonary:      Effort: Pulmonary effort is normal.      Breath sounds: Normal breath sounds.   Chest:      Comments: Mediastinal incision SELVIN. Chest tubes with serousanguinous drainage   Psychiatric:         Attention and Perception: Attention and perception normal.         Mood and Affect: Mood and affect normal.         Speech: Speech normal.         Behavior: Behavior normal. Behavior is cooperative.         Thought Content: Thought content normal.         Cognition and Memory: Cognition and memory normal.         Judgment: Judgment normal.   Neurological:      General: No focal deficit present.      Mental Status: She is alert and oriented to person, place, and time.      GCS: GCS eye subscore is 4. GCS verbal subscore is 5. GCS motor subscore is 6.   Genitourinary/Anorectal:     Comments: Stiles in place draining clear yellow urine          Diagnostic Studies      No new imaging          Labs:  CBC    Recent Labs     04/12/24  0421 04/12/24  1643 04/13/24  0414   WBC 18.84*  --  13.37*   HGB 8.4* 7.5* 9.3*   HCT 27.3*  --  29.6*   PLT 91* 89* 91*     BMP    Recent Labs     04/13/24  0002 04/13/24  0415   SODIUM 141 142   K 3.8 3.7   * 109*   CO2 22 22   AGAP 9 11   BUN 19 18   CREATININE 0.94 0.92   CALCIUM 7.8* 7.9*        Baseline  Creat: 0.9   Coags    Recent Labs     04/12/24  1643 04/13/24  0414   INR 1.63* 1.55*   PTT 30 27              Additional Electrolytes  Recent Labs     04/12/24  0421 04/13/24  0414   MG 2.4 1.7*   PHOS 2.1* 1.9*   CAIONIZED 1.17 1.13          Blood Gas    Recent Labs     04/12/24 0422   PHART 7.377   ROD4OYA 36.6   PO2ART 89.5   [id number] 21.0*   BEART -3.7   SOURCE Line, Arterial     Recent Labs     04/12/24  0422 04/12/24  0610 04/13/24 0415   PHVEN  --    < > 7.395   [id number]  --    < > 35.5*   PO2VEN  --    < > 36.7   [id number]  --    < > 21.3*   BEVEN  --    < > -3.1   [id number]  --    < > 69.3   SOURCE Line, Arterial  --   --     < > = values in this interval not displayed.    LFTs  Recent Labs     04/12/24 0421 04/13/24 0414   ALT 10 8   AST 35 20   ALKPHOS 49 48   ALB 3.2* 3.0*   TBILI 1.12* 1.24*       Infectious    No recent results     No recent results Glucose  Recent Labs     04/12/24  1158 04/12/24 1643 04/13/24  0002 04/13/24 0415   GLUC 149* 167* 122 143*        Collaborative bedside rounds performed with cardiac surgery attending, critical care attending and bedside RN.     ROYAL Lee

## 2024-04-12 NOTE — PHYSICAL THERAPY NOTE
PHYSICAL THERAPY NOTE          Patient Name: Julia Perry  Today's Date: 4/12/2024 04/12/24 0801   PT Last Visit   PT Visit Date 04/12/24   Note Type   Note type Cancelled Session   Cancel Reasons Medical status       Chart reviewed. Pt w/ impella in place, not appropriate for OOB at this time. PT will follow to see as medically appropriate.     Nikole Villafuerte, PT, DPT

## 2024-04-12 NOTE — PROGRESS NOTES
[facility]  Progress Note: Critical Care   Date of Service: 4/12/2024  Hospital Day: 3  Name: Julia Perry I  MRN: [id number]  Unit/Bed#: PPHP 414-01      Assessment/Plan     Impressions:  MVCAD s/p CABG x3, LIMA to LAD, SVG to OM2, SVG to RPDA (4/9), RAFFY x 2 to OM (4/10)  Post-op cardiogenic shock s/p Impella placement   ICM (EF 15%)  HTN  HLD  B/l carotid stenosis s/p L CEA 3/24  Pituitary Macroadenoma with elevated prolactin  Hypercalcemia  UC    Neuro:   Cardiac Surgery Pain Control: ATC tylenol and PRN oxycodone 2.5/5mg  Delirium precautions  Regulate sleep/wake cycle  CAM-ICU daily  Trend neuro exam      CV:   Cardiac Surgery Hemodynamic Monitoring: MAP goal >65 CI >2.2 Continue pulmonary artery catheter for hemodynamic monitoring  Continue central line due to vasoactive medications Continue arterial line for blood pressure monitoring and/or frequent blood gas draws  Follow rhythm on telemetry  Critical Care Infusions : Levophed 7 mcg/min  Beta Blocker Plan: Hold beta blocker secondary to cardiogenic shock   Epicardial pacing wire plan : Epicardial pacing wires in place. Will pace as needed for bradycardia or cardiac output   Post op cardiac surgery medications:    mg QD  Statin: Lipitor 80 mg qHS  Amiodarone 200 mg PO q8 hours   Impella D3  P5  Heparin purge solution  Systemic ACS Heparin gtt   PTT 76    Lung:   Good room air saturation   Continue incentive spirometry/coughing/deep breathing exercises.   Chest tube output remains persistently high; Continue chest tubes to suction today    GI:   Continue PPI for stress ulcer prophylaxis  Continue bowel regimen  Trend abdominal exam and bowel function    FEN:   Diuresis Plan: PRN Lasix to achieve net negative balance   Nutrition plan: as tolerated  Replenish electrolytes with goals: K >4.0, Mag >2.0, and Phos >3.0    :   Stiles Plan: Continue for accurate I/O monitoring for 48 hours  Trend UOP and BUN/creat  Strict  I and O     ID:   Trend temps and WBC count  Maintain normothermia    Heme:   Trend hgb and plts    Endo:   Continue insulin infusion at this time for glucose control.      MSK/Skin:  Mobility goal:   PT/OT consult as medically appropriate   Frequent turning and pressure off-loading  Local wound care as needed    Disposition:  Critical care    ICU Core Measures     A: Assess, Prevent, and Manage Pain Has pain been assessed? Yes  Need for changes to pain regimen? No   B: Both SAT/SAT  N/A   C: Choice of Sedation RASS Goal: N/A patient not on sedation  Need for changes to sedation or analgesia regimen? NA   D: Delirium CAM-ICU: Negative   E: Early Mobility  Plan for early mobility? Yes   F: Family Engagement Plan for family engagement today? Yes       Review of Invasive Devices:    Go Plan: Continue for accurate I/O monitoring for 48 hours  Central access plan: Medications requiring central line Hemodynamic monitoring  Mariel Plan: Keep arterial line for hemodynamic monitoring    Prophylaxis:  VTE VTE covered by:  heparin (porcine), Intravenous, 6.8 Units/kg/hr at 04/12/24 0501  heparin (porcine) 6,250 Units in dextrose 5 % 500 mL purge solution, Device, 16.5 mL/hr at 04/11/24 1758       Stress Ulcer  covered bypantoprazole (PROTONIX) EC tablet 40 mg [[id number]]          Significant 24hr Events      24 Hour Events/HPI: POD # 3 s/p CABG x 3 LIMA to LAD, SVG to OM2, and SVG to RPDA. POD #2 s/p RAFFY x 2 to OM and Impella placement. Extubated to NC yesterday. Milrinone weaned off. Remains on Levo 7mcg. Impella at P5. 40mg IV Lasix x 3 over the last 24 hours to achieve net negative balance.      Subjective   Review of Systems   Respiratory:  Positive for cough.         Productive    All other systems reviewed and are negative.       Objective     Medications:  Scheduled Continuous   acetaminophen, 975 mg, Q8H LINDA  amiodarone, 200 mg, Q8H LINDA  aspirin, 325 mg, Daily  atorvastatin, 80 mg, Daily With Dinner  chlorhexidine,  15 mL, BID  Cholecalciferol, 1,000 Units, Daily  docusate sodium, 100 mg, BID  magnesium gluconate, 500 mg, Daily  mupirocin, 1 Application, Q12H LINDA  pantoprazole, 40 mg, Early Morning  polyethylene glycol, 17 g, Daily  potassium phosphate, 9 mmol, Once  ticagrelor, 90 mg, Q12H LINDA      heparin (porcine), 3-20 Units/kg/hr (Order-Specific), Last Rate: 6.8 Units/kg/hr (04/12/24 0501)  heparin (porcine) 6,250 Units in dextrose 5 % 500 mL purge solution, 3-20 mL/hr, Last Rate: 16.5 mL/hr (04/11/24 1758)  insulin regular (HumuLIN R,NovoLIN R) 1 Units/mL in sodium chloride 0.9 % 100 mL infusion, 0.3-21 Units/hr, Last Rate: 0.5 Units/hr (04/12/24 0600)  norepinephrine, 1-30 mcg/min, Last Rate: 7 mcg/min (04/12/24 0613)  sodium chloride, 20 mL/hr, Last Rate: 20 mL/hr (04/12/24 0106)         Vitals: Invasive Monitoring       Most Recent Min/Max in 24hrs   Temp 99.7 °F (37.6 °C) Temp  Min: 98.8 °F (37.1 °C)  Max: 99.7 °F (37.6 °C)   Pulse (!) 116 Pulse  Min: 90  Max: 116   Resp (!) 28 Resp  Min: 7  Max: 32   /54 BP  Min: 88/50  Max: 112/57   O2 Sat 94 % SpO2  Min: 93 %  Max: 100 %   O2 Device: None (Room air)  Nasal Cannula O2 Flow Rate (L/min): 4 L/min Arterial Line  [place] BP 91/70  Arterial Line BP  Min: 59/44  Max: 91/70   MAP 78 mmHg  Arterial Line MAP (mmHg)  Min: 50 mmHg  Max: 78 mmHg     PA Catheter   Most Recent  Min/Max in 24hrs    PAP 68/10 PAP  Min: 23/9  Max: 68/10   CVP 30 mmHg CVP (mean)  Min: 3 mmHg  Max: 64 mmHg   CI 2.6 L/min/m2  CI (L/min/m2)  Min: 1.3 L/min/m2  Max: 3.5 L/min/m2   SVR 1117 (dyne*sec)/cm5 SVR (dyne*sec)/cm5  Min: 594 (dyne*sec)/cm5  Max: 1752 (dyne*sec)/cm5        I/O Chest tube output (if present)     Intake/Output Summary (Last 24 hours) at 4/12/2024 0722  Last data filed at 4/12/2024 0600  Gross per 24 hour   Intake 2551.18 ml   Output 2353 ml   Net 198.18 ml     BM: PTA  Weight change: -8.444 kg (-18 lb 9.9 oz)     Mediastinal tubes:  60 mL/8hr  150 mL/24hr   Pleural tubes (L and  R) 60 mL/8hr  230 mL/24hr        Physical Exam   Physical Exam  Eyes:      General: Lids are normal.      Extraocular Movements: Extraocular movements intact.      Conjunctiva/sclera: Conjunctivae normal.      Comments: Pupils irregularly shaped bilaterally    Skin:     General: Skin is warm and dry.      Capillary Refill: Capillary refill takes less than 2 seconds.      Coloration: Skin is pale.      Comments: R groin impella site soft, dressing with old dried blood   HENT:      Head: Normocephalic and atraumatic.   Neck:      Trachea: Trachea normal.   Cardiovascular:      Rate and Rhythm: Normal rate and regular rhythm.      Pulses:           Radial pulses are 1+ on the right side and 1+ on the left side.        Dorsalis pedis pulses are detected w/ Doppler on the right side and detected w/ Doppler on the left side.        Posterior tibial pulses are detected w/ Doppler on the right side and detected w/ Doppler on the left side.      Heart sounds: S1 normal and S2 normal.      Friction rub present.   Musculoskeletal:      Cervical back: Full passive range of motion without pain, normal range of motion and neck supple.      Comments: CUELLO, normal strength   Abdominal: General: Bowel sounds are decreased.      Palpations: Abdomen is soft.   Constitutional:       General: She is awake.      Appearance: She is well-developed. She is ill-appearing.   Pulmonary:      Effort: Pulmonary effort is normal.      Breath sounds: Decreased breath sounds and rales present.   Chest:      Comments: Mediastinal incision C/D/I w/ acticoat. CTs w/ serousanguinous drainage.   Psychiatric:         Mood and Affect: Affect is labile.         Speech: Speech normal.         Behavior: Behavior normal.         Cognition and Memory: Cognition and memory normal.         Judgment: Judgment normal.   Neurological:      General: No focal deficit present.      Mental Status: She is alert and oriented to person, place, and time.      GCS: GCS eye  subscore is 4. GCS verbal subscore is 5. GCS motor subscore is 6.   Genitourinary/Anorectal:     Comments: Stiles in place with clear yellow urine          Diagnostic Studies      04/12/24 CXR: pending .   This was personally reviewed by myself in PACS.         Labs:  CBC    Recent Labs     04/10/24  1526 04/11/24  0406 04/12/24 0421   WBC 16.05* 18.50* 18.84*   HGB 10.2* 9.2* 8.4*   HCT 31.4* 28.5* 27.3*   * 106* 91*   BANDSPCT 2  --   --      BMP    Recent Labs     04/12/24  0007 04/12/24 0421   SODIUM 141 142   K 3.6 4.2   * 110*   CO2 19* 22   AGAP 9 10   BUN 20 21   CREATININE 0.86 0.93   CALCIUM 7.4* 8.1*        Baseline Creat: 0.9   Coags    Recent Labs     04/11/24  0839 04/11/24  1508 04/11/24  2156 04/12/24 0421   INR 1.64*  --   --  1.63*   PTT 32   < > 136* 76*    < > = values in this interval not displayed.              Additional Electrolytes  Recent Labs     04/11/24  0406 04/11/24  1154 04/11/24  1919 04/12/24 0421   MG 2.4   < > 2.3 2.4   PHOS 2.6  --   --  2.1*   CAIONIZED 1.18  --   --  1.17    < > = values in this interval not displayed.          Blood Gas    Recent Labs     04/12/24 0422   PHART 7.377   ZAT1UWK 36.6   PO2ART 89.5   IEB6ZWA 21.0*   BEART -3.7   SOURCE Line, Arterial     Recent Labs     04/12/24  0422 04/12/24  0610   PHVEN  --  7.333   EXH6PGC  --  40.7*   PO2VEN  --  40.1   HPQ8LUE  --  21.1*   BEVEN  --  -4.4   U2WJBZU  --  72.0   SOURCE Line, Arterial  --     LFTs  Recent Labs     04/11/24  0406 04/12/24 0421   ALT 9 10   AST 54* 35   ALKPHOS 46 49   ALB 3.4* 3.2*   TBILI 1.66* 1.12*       Infectious    No recent results     No recent results Glucose  Recent Labs     04/11/24  1819 04/11/24  1919 04/12/24  0007 04/12/24  0421   GLUC 158* 151* 160* 156*        Collaborative bedside rounds performed with cardiac surgery attending, critical care attending and bedside RN.     ROYAL Lee

## 2024-04-12 NOTE — OCCUPATIONAL THERAPY NOTE
Occupational Therapy CX        Patient Name: Julia Perry  Today's Date: 4/12/2024 04/12/24 0945   OT Last Visit   OT Visit Date 04/12/24   Note Type   Note type Cancelled Session   Cancel Reasons Medical status   Additional Comments Chart reviewed. Pt w/ impella in place, not appropriate for OOB at this time. OT will follow to see as medically appropriate.         Marlene Lomeli MS, OTR/L

## 2024-04-13 LAB
ABO GROUP BLD BPU: NORMAL
ALBUMIN SERPL BCP-MCNC: 3 G/DL (ref 3.5–5)
ALP SERPL-CCNC: 48 U/L (ref 34–104)
ALT SERPL W P-5'-P-CCNC: 8 U/L (ref 7–52)
ANION GAP SERPL CALCULATED.3IONS-SCNC: 11 MMOL/L (ref 4–13)
ANION GAP SERPL CALCULATED.3IONS-SCNC: 7 MMOL/L (ref 4–13)
ANION GAP SERPL CALCULATED.3IONS-SCNC: 9 MMOL/L (ref 4–13)
APTT PPP: 27 SECONDS (ref 23–37)
AST SERPL W P-5'-P-CCNC: 20 U/L (ref 13–39)
ATRIAL RATE: 100 BPM
BASE EX.OXY STD BLDV CALC-SCNC: 63.1 % (ref 60–80)
BASE EX.OXY STD BLDV CALC-SCNC: 69.3 % (ref 60–80)
BASE EX.OXY STD BLDV CALC-SCNC: 71.4 % (ref 60–80)
BASE EXCESS BLDV CALC-SCNC: -1 MMOL/L
BASE EXCESS BLDV CALC-SCNC: -3.1 MMOL/L
BASE EXCESS BLDV CALC-SCNC: -3.1 MMOL/L
BASOPHILS # BLD AUTO: 0.03 THOUSANDS/ÂΜL (ref 0–0.1)
BASOPHILS NFR BLD AUTO: 0 % (ref 0–1)
BILIRUB DIRECT SERPL-MCNC: 0.33 MG/DL (ref 0–0.2)
BILIRUB SERPL-MCNC: 1.24 MG/DL (ref 0.2–1)
BPU ID: NORMAL
BUN SERPL-MCNC: 15 MG/DL (ref 5–25)
BUN SERPL-MCNC: 18 MG/DL (ref 5–25)
BUN SERPL-MCNC: 19 MG/DL (ref 5–25)
CA-I BLD-SCNC: 1.13 MMOL/L (ref 1.12–1.32)
CALCIUM SERPL-MCNC: 7.8 MG/DL (ref 8.4–10.2)
CALCIUM SERPL-MCNC: 7.8 MG/DL (ref 8.4–10.2)
CALCIUM SERPL-MCNC: 7.9 MG/DL (ref 8.4–10.2)
CHLORIDE SERPL-SCNC: 109 MMOL/L (ref 96–108)
CHLORIDE SERPL-SCNC: 109 MMOL/L (ref 96–108)
CHLORIDE SERPL-SCNC: 110 MMOL/L (ref 96–108)
CO2 SERPL-SCNC: 22 MMOL/L (ref 21–32)
CO2 SERPL-SCNC: 22 MMOL/L (ref 21–32)
CO2 SERPL-SCNC: 25 MMOL/L (ref 21–32)
CREAT SERPL-MCNC: 0.77 MG/DL (ref 0.6–1.3)
CREAT SERPL-MCNC: 0.92 MG/DL (ref 0.6–1.3)
CREAT SERPL-MCNC: 0.94 MG/DL (ref 0.6–1.3)
CROSSMATCH: NORMAL
EOSINOPHIL # BLD AUTO: 0 THOUSAND/ÂΜL (ref 0–0.61)
EOSINOPHIL NFR BLD AUTO: 0 % (ref 0–6)
ERYTHROCYTE [DISTWIDTH] IN BLOOD BY AUTOMATED COUNT: 16.9 % (ref 11.6–15.1)
GFR SERPL CREATININE-BSD FRML MDRD: 62 ML/MIN/1.73SQ M
GFR SERPL CREATININE-BSD FRML MDRD: 64 ML/MIN/1.73SQ M
GFR SERPL CREATININE-BSD FRML MDRD: 80 ML/MIN/1.73SQ M
GLUCOSE SERPL-MCNC: 112 MG/DL (ref 65–140)
GLUCOSE SERPL-MCNC: 122 MG/DL (ref 65–140)
GLUCOSE SERPL-MCNC: 122 MG/DL (ref 65–140)
GLUCOSE SERPL-MCNC: 124 MG/DL (ref 65–140)
GLUCOSE SERPL-MCNC: 127 MG/DL (ref 65–140)
GLUCOSE SERPL-MCNC: 128 MG/DL (ref 65–140)
GLUCOSE SERPL-MCNC: 135 MG/DL (ref 65–140)
GLUCOSE SERPL-MCNC: 141 MG/DL (ref 65–140)
GLUCOSE SERPL-MCNC: 143 MG/DL (ref 65–140)
GLUCOSE SERPL-MCNC: 146 MG/DL (ref 65–140)
HCO3 BLDV-SCNC: 21.3 MMOL/L (ref 24–30)
HCO3 BLDV-SCNC: 21.6 MMOL/L (ref 24–30)
HCO3 BLDV-SCNC: 23.9 MMOL/L (ref 24–30)
HCT VFR BLD AUTO: 29.6 % (ref 34.8–46.1)
HGB BLD-MCNC: 9.3 G/DL (ref 11.5–15.4)
IMM GRANULOCYTES # BLD AUTO: 0.08 THOUSAND/UL (ref 0–0.2)
IMM GRANULOCYTES NFR BLD AUTO: 1 % (ref 0–2)
INR PPP: 1.55 (ref 0.84–1.19)
LYMPHOCYTES # BLD AUTO: 1.5 THOUSANDS/ÂΜL (ref 0.6–4.47)
LYMPHOCYTES NFR BLD AUTO: 11 % (ref 14–44)
MAGNESIUM SERPL-MCNC: 1.7 MG/DL (ref 1.9–2.7)
MCH RBC QN AUTO: 29.1 PG (ref 26.8–34.3)
MCHC RBC AUTO-ENTMCNC: 31.4 G/DL (ref 31.4–37.4)
MCV RBC AUTO: 93 FL (ref 82–98)
MONOCYTES # BLD AUTO: 1.3 THOUSAND/ÂΜL (ref 0.17–1.22)
MONOCYTES NFR BLD AUTO: 10 % (ref 4–12)
NEUTROPHILS # BLD AUTO: 10.46 THOUSANDS/ÂΜL (ref 1.85–7.62)
NEUTS SEG NFR BLD AUTO: 78 % (ref 43–75)
NRBC BLD AUTO-RTO: 0 /100 WBCS
O2 CT BLDV-SCNC: 8.6 ML/DL
O2 CT BLDV-SCNC: 9.8 ML/DL
O2 CT BLDV-SCNC: 9.9 ML/DL
P AXIS: 83 DEGREES
PCO2 BLDV: 35.5 MM HG (ref 42–50)
PCO2 BLDV: 37.1 MM HG (ref 42–50)
PCO2 BLDV: 40.4 MM HG (ref 42–50)
PH BLDV: 7.38 [PH] (ref 7.3–7.4)
PH BLDV: 7.39 [PH] (ref 7.3–7.4)
PH BLDV: 7.39 [PH] (ref 7.3–7.4)
PHOSPHATE SERPL-MCNC: 1.9 MG/DL (ref 2.3–4.1)
PLATELET # BLD AUTO: 91 THOUSANDS/UL (ref 149–390)
PMV BLD AUTO: 11.3 FL (ref 8.9–12.7)
PO2 BLDV: 33.8 MM HG (ref 35–45)
PO2 BLDV: 36.7 MM HG (ref 35–45)
PO2 BLDV: 38.1 MM HG (ref 35–45)
POTASSIUM SERPL-SCNC: 3.7 MMOL/L (ref 3.5–5.3)
POTASSIUM SERPL-SCNC: 3.7 MMOL/L (ref 3.5–5.3)
POTASSIUM SERPL-SCNC: 3.8 MMOL/L (ref 3.5–5.3)
PR INTERVAL: 186 MS
PROT SERPL-MCNC: 4.6 G/DL (ref 6.4–8.4)
PROTHROMBIN TIME: 18.3 SECONDS (ref 11.6–14.5)
QRS AXIS: 111 DEGREES
QRSD INTERVAL: 122 MS
QT INTERVAL: 378 MS
QTC INTERVAL: 487 MS
RBC # BLD AUTO: 3.2 MILLION/UL (ref 3.81–5.12)
SODIUM SERPL-SCNC: 141 MMOL/L (ref 135–147)
SODIUM SERPL-SCNC: 141 MMOL/L (ref 135–147)
SODIUM SERPL-SCNC: 142 MMOL/L (ref 135–147)
T WAVE AXIS: 13 DEGREES
UNIT DISPENSE STATUS: NORMAL
UNIT PRODUCT CODE: NORMAL
UNIT PRODUCT VOLUME: 350 ML
UNIT RH: NORMAL
VENTRICULAR RATE: 100 BPM
WBC # BLD AUTO: 13.37 THOUSAND/UL (ref 4.31–10.16)

## 2024-04-13 PROCEDURE — 94760 N-INVAS EAR/PLS OXIMETRY 1: CPT

## 2024-04-13 PROCEDURE — 82330 ASSAY OF CALCIUM: CPT | Performed by: NURSE PRACTITIONER

## 2024-04-13 PROCEDURE — 83735 ASSAY OF MAGNESIUM: CPT | Performed by: NURSE PRACTITIONER

## 2024-04-13 PROCEDURE — NC001 PR NO CHARGE: Performed by: NURSE PRACTITIONER

## 2024-04-13 PROCEDURE — 85610 PROTHROMBIN TIME: CPT | Performed by: NURSE PRACTITIONER

## 2024-04-13 PROCEDURE — 80076 HEPATIC FUNCTION PANEL: CPT | Performed by: NURSE PRACTITIONER

## 2024-04-13 PROCEDURE — 80048 BASIC METABOLIC PNL TOTAL CA: CPT | Performed by: NURSE PRACTITIONER

## 2024-04-13 PROCEDURE — 85730 THROMBOPLASTIN TIME PARTIAL: CPT | Performed by: NURSE PRACTITIONER

## 2024-04-13 PROCEDURE — 84100 ASSAY OF PHOSPHORUS: CPT | Performed by: NURSE PRACTITIONER

## 2024-04-13 PROCEDURE — 99291 CRITICAL CARE FIRST HOUR: CPT | Performed by: ANESTHESIOLOGY

## 2024-04-13 PROCEDURE — 82805 BLOOD GASES W/O2 SATURATION: CPT | Performed by: NURSE PRACTITIONER

## 2024-04-13 PROCEDURE — 94664 DEMO&/EVAL PT USE INHALER: CPT

## 2024-04-13 PROCEDURE — 82948 REAGENT STRIP/BLOOD GLUCOSE: CPT

## 2024-04-13 PROCEDURE — 85025 COMPLETE CBC W/AUTO DIFF WBC: CPT | Performed by: NURSE PRACTITIONER

## 2024-04-13 RX ORDER — ALBUMIN, HUMAN INJ 5% 5 %
12.5 SOLUTION INTRAVENOUS ONCE
Status: COMPLETED | OUTPATIENT
Start: 2024-04-13 | End: 2024-04-13

## 2024-04-13 RX ORDER — CALCIUM GLUCONATE 20 MG/ML
1 INJECTION, SOLUTION INTRAVENOUS ONCE
Status: COMPLETED | OUTPATIENT
Start: 2024-04-13 | End: 2024-04-13

## 2024-04-13 RX ORDER — POTASSIUM CHLORIDE 29.8 MG/ML
40 INJECTION INTRAVENOUS ONCE
Status: COMPLETED | OUTPATIENT
Start: 2024-04-13 | End: 2024-04-14

## 2024-04-13 RX ORDER — INSULIN LISPRO 100 [IU]/ML
1-5 INJECTION, SOLUTION INTRAVENOUS; SUBCUTANEOUS
Status: DISCONTINUED | OUTPATIENT
Start: 2024-04-13 | End: 2024-04-23 | Stop reason: HOSPADM

## 2024-04-13 RX ORDER — MAGNESIUM SULFATE HEPTAHYDRATE 40 MG/ML
2 INJECTION, SOLUTION INTRAVENOUS ONCE
Status: COMPLETED | OUTPATIENT
Start: 2024-04-13 | End: 2024-04-13

## 2024-04-13 RX ORDER — FONDAPARINUX SODIUM 2.5 MG/.5ML
2.5 INJECTION SUBCUTANEOUS EVERY 24 HOURS
Status: DISCONTINUED | OUTPATIENT
Start: 2024-04-13 | End: 2024-04-23 | Stop reason: HOSPADM

## 2024-04-13 RX ORDER — FUROSEMIDE 10 MG/ML
40 INJECTION INTRAMUSCULAR; INTRAVENOUS ONCE
Status: COMPLETED | OUTPATIENT
Start: 2024-04-13 | End: 2024-04-13

## 2024-04-13 RX ORDER — ALBUMIN, HUMAN INJ 5% 5 %
SOLUTION INTRAVENOUS
Status: COMPLETED
Start: 2024-04-13 | End: 2024-04-13

## 2024-04-13 RX ADMIN — POLYETHYLENE GLYCOL 3350 17 G: 17 POWDER, FOR SOLUTION ORAL at 08:51

## 2024-04-13 RX ADMIN — ACETAMINOPHEN 975 MG: 325 TABLET, FILM COATED ORAL at 14:36

## 2024-04-13 RX ADMIN — POTASSIUM CHLORIDE 40 MEQ: 29.8 INJECTION, SOLUTION INTRAVENOUS at 21:17

## 2024-04-13 RX ADMIN — ATORVASTATIN CALCIUM 80 MG: 80 TABLET, FILM COATED ORAL at 16:34

## 2024-04-13 RX ADMIN — ONDANSETRON 4 MG: 2 INJECTION INTRAMUSCULAR; INTRAVENOUS at 09:06

## 2024-04-13 RX ADMIN — TICAGRELOR 90 MG: 90 TABLET ORAL at 08:53

## 2024-04-13 RX ADMIN — MAGNESIUM SULFATE HEPTAHYDRATE 2 G: 40 INJECTION, SOLUTION INTRAVENOUS at 07:26

## 2024-04-13 RX ADMIN — Medication 1000 UNITS: at 08:51

## 2024-04-13 RX ADMIN — DOCUSATE SODIUM 100 MG: 100 CAPSULE, LIQUID FILLED ORAL at 18:17

## 2024-04-13 RX ADMIN — MUPIROCIN 1 APPLICATION: 20 OINTMENT TOPICAL at 21:17

## 2024-04-13 RX ADMIN — ALBUMIN, HUMAN INJ 5% 12.5 G: 5 SOLUTION at 10:32

## 2024-04-13 RX ADMIN — TICAGRELOR 90 MG: 90 TABLET ORAL at 21:17

## 2024-04-13 RX ADMIN — AMIODARONE HYDROCHLORIDE 200 MG: 200 TABLET ORAL at 14:36

## 2024-04-13 RX ADMIN — NOREPINEPHRINE BITARTRATE 6 MCG/MIN: 1 INJECTION, SOLUTION, CONCENTRATE INTRAVENOUS at 04:48

## 2024-04-13 RX ADMIN — AMIODARONE HYDROCHLORIDE 200 MG: 200 TABLET ORAL at 21:17

## 2024-04-13 RX ADMIN — CALCIUM GLUCONATE 1 G: 20 INJECTION, SOLUTION INTRAVENOUS at 08:49

## 2024-04-13 RX ADMIN — DOCUSATE SODIUM 100 MG: 100 CAPSULE, LIQUID FILLED ORAL at 08:51

## 2024-04-13 RX ADMIN — ACETAMINOPHEN 975 MG: 325 TABLET, FILM COATED ORAL at 05:28

## 2024-04-13 RX ADMIN — AMIODARONE HYDROCHLORIDE 200 MG: 200 TABLET ORAL at 05:28

## 2024-04-13 RX ADMIN — Medication 500 MG: at 08:53

## 2024-04-13 RX ADMIN — CHLORHEXIDINE GLUCONATE 0.12% ORAL RINSE 15 ML: 1.2 LIQUID ORAL at 08:53

## 2024-04-13 RX ADMIN — ALBUMIN (HUMAN) 12.5 G: 12.5 INJECTION, SOLUTION INTRAVENOUS at 10:32

## 2024-04-13 RX ADMIN — PANTOPRAZOLE SODIUM 40 MG: 40 TABLET, DELAYED RELEASE ORAL at 05:28

## 2024-04-13 RX ADMIN — ONDANSETRON 4 MG: 2 INJECTION INTRAMUSCULAR; INTRAVENOUS at 04:56

## 2024-04-13 RX ADMIN — MUPIROCIN 1 APPLICATION: 20 OINTMENT TOPICAL at 08:51

## 2024-04-13 RX ADMIN — POTASSIUM PHOSPHATE, MONOBASIC POTASSIUM PHOSPHATE, DIBASIC 21 MMOL: 224; 236 INJECTION, SOLUTION, CONCENTRATE INTRAVENOUS at 09:40

## 2024-04-13 RX ADMIN — ASPIRIN 325 MG ORAL TABLET 325 MG: 325 PILL ORAL at 08:51

## 2024-04-13 RX ADMIN — FUROSEMIDE 40 MG: 10 INJECTION, SOLUTION INTRAMUSCULAR; INTRAVENOUS at 02:21

## 2024-04-13 RX ADMIN — ACETAMINOPHEN 975 MG: 325 TABLET, FILM COATED ORAL at 21:17

## 2024-04-13 RX ADMIN — FONDAPARINUX SODIUM 2.5 MG: 2.5 INJECTION, SOLUTION SUBCUTANEOUS at 08:51

## 2024-04-13 NOTE — PROCEDURES
Procedure: Epicardial Wire Removal    04/13/24    Patient was returned to bed. EPW x 2 d/c'd in typical fashion.  No immediate complications. Site dressed with split gauze and acticoat. Patient and nurse aware of mandatory 1 hour bedrest protocol. Vital signs ordered Q 15 minutes for one hour as per protocol.    ROYAL Lee

## 2024-04-13 NOTE — PROGRESS NOTES
[facility]  Progress Note: Critical Care   Date of Service: 4/14/2024  Hospital Day: 5  Name: Julia Perry I  MRN: [id number]  Unit/Bed#: PPHP 414-01      Assessment/Plan     Impressions:  MVCAD s/p CABG x3, LIMA to LAD, SVG to OM2, SVG to RPDA (4/9), RAFFY x 2 to OM (4/10)  Post-op cardiogenic shock s/p Impella placement   ICM (EF 15%)  New onset Afib RVR  DM2  HTN  HLD  B/l carotid stenosis s/p L CEA 3/24  Pituitary Macroadenoma with elevated prolactin  Hypercalcemia  UC    Neuro:   Cardiac Surgery Pain Control: ATC tylenol and PRN oxycodone 2.5/5mg  Delirium precautions  Regulate sleep/wake cycle  CAM-ICU daily  Trend neuro exam     CV:   Cardiac Surgery Hemodynamic Monitoring: MAP goal >65 CI >2.2 Continue pulmonary artery catheter for hemodynamic monitoring  Continue central line due to vasoactive medications Continue arterial line for blood pressure monitoring and/or frequent blood gas draws  Follow rhythm on telemetry  Critical Care Infusions : Nhan 100mcg and Primacor 0.13 mcg/kg/min  Beta Blocker Plan: Hold beta blocker secondary to cardiogenic shock  Epicardial pacing wire plan: D/C 4/13  S/p Impella 4/10-4/12  RAFFY x 2 to OM (4/10)   New onset Afib RVR   Amio bolus + gtt   Post op cardiac surgery medications:    mg QD  Statin: Lipitor 80 mg qHS  Amiodarone 200 mg PO q8 hours   Brilinita 90mg BID       Lung:   Good room air saturation   Continue incentive spirometry/coughing/deep breathing exercises.   Chest tube output remains persistently high; Continue chest tubes to suction today     GI:   Continue PPI for stress ulcer prophylaxis  Continue bowel regimen  Trend abdominal exam and bowel function     FEN:   Diuresis Plan: PRN Lasix to maintain net negative balance   Nutrition plan: as tolerated  Replenish electrolytes with goals: K >4.0, Mag >2.0, and Phos >3.0     :   Stiles Plan: Continue for accurate I/O monitoring for 48 hours  Trend UOP and BUN/creat  Strict  I and O     ID:   Trend temps and WBC count  Maintain normothermia     Heme:   Trend hgb and plts     Endo:   DMII  SSI coverage ACHS  Endo consulted      MSK/Skin:  Mobility goal:   PT/OT consult as medically appropriate   Frequent turning and pressure off-loading  Local wound care as needed     Disposition:  Critical care    ICU Core Measures     A: Assess, Prevent, and Manage Pain Has pain been assessed? Yes  Need for changes to pain regimen? No   B: Both SAT/SAT  N/A   C: Choice of Sedation RASS Goal: N/A patient not on sedation  Need for changes to sedation or analgesia regimen? NA   D: Delirium CAM-ICU: Negative   E: Early Mobility  Plan for early mobility? Yes   F: Family Engagement Plan for family engagement today? Yes       Review of Invasive Devices:    Stiles Plan: Continue for accurate I/O monitoring for 48 hours  Central access plan: Medications requiring central line Hemodynamic monitoring  Prophylaxis:  VTE VTE covered by:  fondaparinux, Subcutaneous, 2.5 mg at 04/13/24 0851       Stress Ulcer  covered bypantoprazole (PROTONIX) EC tablet 40 mg [[id number]]          Significant 24hr Events      24 Hour Events/HPI: POD # 5 s/p CABG x 3 LIMA to LAD, SVG to OM2, and SVG to RPDA. POD #4 s/p RAFFY x 2 to OM and Impella placement. Milrinone 0.13mcg, 250mL 5% albumin yesterday for drop in CI and high SVR with improvement. Levo weaned off. Lasix 40mg IV x 1 at 0100 for low UO with good response. OOB to chair this AM and then went into new onset Afib RVR with rates 60-180s and acute drop in SBP 60s. Patient asymptomatic. Amio bolus + gtt, total of 4g of IV Mag given. Initially on Levo 10 which was switched to Nhan 100. SBP remained soft and HR did not improve and therefore patient was sync cardioverted x 1 at 200J which successfully converted her to sinus tachycardia with rate around 115 which has been her baseline. She was given 50mcg fentanyl and 2mg versed for procedure and tolerated well. She remains on Nhan but  with downtrending requirements.      Luke was updated on this morning's events via phone and then updated again at bedside when he arrived.         Subjective   Review of Systems   All other systems reviewed and are negative.       Objective     Medications:  Scheduled Continuous   acetaminophen, 975 mg, Q8H LINDA  amiodarone, 200 mg, Q8H LINDA  aspirin, 325 mg, Daily  atorvastatin, 80 mg, Daily With Dinner  chlorhexidine, 15 mL, BID  Cholecalciferol, 1,000 Units, Daily  docusate sodium, 100 mg, BID  fondaparinux, 2.5 mg, Q24H  insulin lispro, 1-5 Units, TID AC  insulin lispro, 1-5 Units, HS  magnesium gluconate, 500 mg, Daily  magnesium sulfate, 2 g, Once  mupirocin, 1 Application, Q12H LINDA  pantoprazole, 40 mg, Early Morning  polyethylene glycol, 17 g, Daily  potassium phosphate, 9 mmol, Once  ticagrelor, 90 mg, Q12H LINDA      amiodarone (CORDARONE) 900 mg in dextrose 5 % 500 mL infusion, 1 mg/min, Last Rate: 1 mg/min (04/14/24 0648)   Followed by  amiodarone (CORDARONE) 900 mg in dextrose 5 % 500 mL infusion, 0.5 mg/min  milrinone (Primacor) infusion, 0.13 mcg/kg/min, Last Rate: 0.13 mcg/kg/min (04/14/24 0522)  phenylephine,  mcg/min, Last Rate: 60 mcg/min (04/14/24 9629)         Vitals: Invasive Monitoring       Most Recent Min/Max in 24hrs   Temp 99.5 °F (37.5 °C) Temp  Min: 99 °F (37.2 °C)  Max: 99.9 °F (37.7 °C)   Pulse (!) 114 Pulse  Min: 110  Max: 118   Resp 17 Resp  Min: 15  Max: 22   /60 BP  Min: 84/52  Max: 116/65   O2 Sat 96 % SpO2  Min: 93 %  Max: 97 %   O2 Device: Nasal cannula  Nasal Cannula O2 Flow Rate (L/min): 2 L/min     PA Catheter   Most Recent  Min/Max in 24hrs    PAP 25/10 PAP  Min: 15/1  Max: 68/10   CVP 3 CVP (mean)  Min: -6 mmHg  Max: 64 mmHg   CI 1.9 L/min/m2  CI (L/min/m2)  Min: 1.3 L/min/m2  Max: 3.5 L/min/m2   SVR 1800 (dyne*sec)/cm5 SVR (dyne*sec)/cm5  Min: 594 (dyne*sec)/cm5  Max: 1800 (dyne*sec)/cm5        I/O Chest tube output (if present)     Intake/Output Summary  (Last 24 hours) at 2024 0836  Last data filed at 2024 0600  Gross per 24 hour   Intake 1111.18 ml   Output 3060 ml   Net -1948.82 ml     BM:   Weight change: -3.826 kg (-8 lb 7 oz)     Mediastinal tubes:  120 mL/8hr  250 mL/24hr   Pleural tubes: 100 mL/8hr  410 mL/24hr        Physical Exam   Physical Exam  Eyes:      General: Lids are normal.      Extraocular Movements: Extraocular movements intact.      Conjunctiva/sclera: Conjunctivae normal.      Pupils: Pupils are equal, round, and reactive to light.   Skin:     General: Skin is warm and dry.      Capillary Refill: Capillary refill takes less than 2 seconds.      Coloration: Skin is pale.   HENT:      Head: Normocephalic and atraumatic.   Neck:      Trachea: Trachea normal.   Cardiovascular:      Rate and Rhythm: Tachycardia present. Rhythm irregularly irregular.      Pulses: Normal pulses.           Radial pulses are 2+ on the right side and 2+ on the left side.        Dorsalis pedis pulses are 2+ on the right side and 2+ on the left side.      Heart sounds: Normal heart sounds, S1 normal and S2 normal.   Musculoskeletal:      Cervical back: Full passive range of motion without pain, normal range of motion and neck supple.      Right lower le+ Edema present.      Left lower le+ Edema present.      Comments: Normal ROM, normal strength   Abdominal: General: Bowel sounds are normal.      Palpations: Abdomen is soft.   Constitutional:       General: She is awake.      Appearance: Normal appearance. She is well-developed.   Pulmonary:      Effort: Pulmonary effort is normal.      Breath sounds: Normal breath sounds.   Chest:      Comments: Mediastinal incision SELVIN. Chest tubes with serous drainage  Psychiatric:         Attention and Perception: Attention and perception normal.         Mood and Affect: Mood and affect normal.         Speech: Speech normal.         Behavior: Behavior normal. Behavior is cooperative.         Thought Content:  Thought content normal.         Cognition and Memory: Cognition and memory normal.         Judgment: Judgment normal.   Neurological:      General: No focal deficit present.      Mental Status: She is alert and oriented to person, place, and time.      GCS: GCS eye subscore is 4. GCS verbal subscore is 5. GCS motor subscore is 6.   Genitourinary/Anorectal:     Comments: Stiles with clear green urine          Diagnostic Studies      04/14/24 EKG: Afib RVR-rate 171. Right axis deviation. Intraventricular conduction delay with   Repeat EKG post cardioversion: sinus tachycardia-rate 115, right axis deviation, intraventricular conduction delay with   This was personally reviewed by myself.         Labs:  CBC    Recent Labs     04/13/24  0414 04/14/24  0403   WBC 13.37* 10.81*   HGB 9.3* 9.6*   HCT 29.6* 29.8*   PLT 91* 98*     BMP    Recent Labs     04/13/24  1633 04/14/24  0403   SODIUM 141 143   K 3.7 4.0   * 106   CO2 25 27   AGAP 7 10   BUN 15 12   CREATININE 0.77 0.61   CALCIUM 7.8* 7.9*        Baseline Creat: 0.9   Coags    Recent Labs     04/12/24  1643 04/13/24  0414   INR 1.63* 1.55*   PTT 30 27              Additional Electrolytes  Recent Labs     04/13/24  0414 04/14/24  0403   MG 1.7* 1.6*   PHOS 1.9* 2.2*   CAIONIZED 1.13  --           Blood Gas    No recent results    Recent Labs     04/14/24  0403   PHVEN 7.418*   EPQ9CRG 43.4   PO2VEN 28.3*   DFW9FCG 27.4   BEVEN 2.6   [id number] 53.7*    LFTs  Recent Labs     04/13/24  0414 04/14/24  0403   ALT 8 7   AST 20 16   ALKPHOS 48 46   ALB 3.0* 3.0*   TBILI 1.24* 1.33*       Infectious    No recent results     No recent results Glucose  Recent Labs     04/13/24  0002 04/13/24  0415 04/13/24  1633 04/14/24  0403   GLUC 122 143* 141* 136        Collaborative bedside rounds performed with cardiac surgery attending, critical care attending and bedside RN.    Critical care time: 45 minutes      ROYAL Lee

## 2024-04-14 PROBLEM — E83.52 HYPERCALCEMIA: Status: RESOLVED | Noted: 2024-02-15 | Resolved: 2024-04-14

## 2024-04-14 PROBLEM — T81.11XA CARDIOGENIC POSTOPERATIVE SHOCK (HCC): Status: ACTIVE | Noted: 2024-04-08

## 2024-04-14 PROBLEM — D62 ABLA (ACUTE BLOOD LOSS ANEMIA): Status: ACTIVE | Noted: 2024-04-14

## 2024-04-14 PROBLEM — I48.91 NEW ONSET ATRIAL FIBRILLATION (HCC): Status: ACTIVE | Noted: 2024-04-14

## 2024-04-14 LAB
ALBUMIN SERPL BCP-MCNC: 3 G/DL (ref 3.5–5)
ALP SERPL-CCNC: 46 U/L (ref 34–104)
ALT SERPL W P-5'-P-CCNC: 7 U/L (ref 7–52)
ANION GAP SERPL CALCULATED.3IONS-SCNC: 10 MMOL/L (ref 4–13)
ANION GAP SERPL CALCULATED.3IONS-SCNC: 8 MMOL/L (ref 4–13)
AST SERPL W P-5'-P-CCNC: 16 U/L (ref 13–39)
ATRIAL RATE: 115 BPM
ATRIAL RATE: 133 BPM
ATRIAL RATE: 156 BPM
ATRIAL RATE: 271 BPM
BASE EX.OXY STD BLDV CALC-SCNC: 53.7 % (ref 60–80)
BASE EX.OXY STD BLDV CALC-SCNC: 61.2 % (ref 60–80)
BASE EXCESS BLDV CALC-SCNC: 1.2 MMOL/L
BASE EXCESS BLDV CALC-SCNC: 2.6 MMOL/L
BILIRUB DIRECT SERPL-MCNC: 0.41 MG/DL (ref 0–0.2)
BILIRUB SERPL-MCNC: 1.33 MG/DL (ref 0.2–1)
BUN SERPL-MCNC: 12 MG/DL (ref 5–25)
BUN SERPL-MCNC: 12 MG/DL (ref 5–25)
CALCIUM SERPL-MCNC: 7.9 MG/DL (ref 8.4–10.2)
CALCIUM SERPL-MCNC: 8.3 MG/DL (ref 8.4–10.2)
CHLORIDE SERPL-SCNC: 103 MMOL/L (ref 96–108)
CHLORIDE SERPL-SCNC: 106 MMOL/L (ref 96–108)
CO2 SERPL-SCNC: 27 MMOL/L (ref 21–32)
CO2 SERPL-SCNC: 27 MMOL/L (ref 21–32)
CREAT SERPL-MCNC: 0.61 MG/DL (ref 0.6–1.3)
CREAT SERPL-MCNC: 0.69 MG/DL (ref 0.6–1.3)
ERYTHROCYTE [DISTWIDTH] IN BLOOD BY AUTOMATED COUNT: 17 % (ref 11.6–15.1)
GFR SERPL CREATININE-BSD FRML MDRD: 90 ML/MIN/1.73SQ M
GFR SERPL CREATININE-BSD FRML MDRD: 94 ML/MIN/1.73SQ M
GLUCOSE SERPL-MCNC: 133 MG/DL (ref 65–140)
GLUCOSE SERPL-MCNC: 136 MG/DL (ref 65–140)
GLUCOSE SERPL-MCNC: 144 MG/DL (ref 65–140)
GLUCOSE SERPL-MCNC: 151 MG/DL (ref 65–140)
GLUCOSE SERPL-MCNC: 163 MG/DL (ref 65–140)
HCO3 BLDV-SCNC: 26.5 MMOL/L (ref 24–30)
HCO3 BLDV-SCNC: 27.4 MMOL/L (ref 24–30)
HCT VFR BLD AUTO: 29.8 % (ref 34.8–46.1)
HGB BLD-MCNC: 9.6 G/DL (ref 11.5–15.4)
MAGNESIUM SERPL-MCNC: 1.6 MG/DL (ref 1.9–2.7)
MCH RBC QN AUTO: 29.7 PG (ref 26.8–34.3)
MCHC RBC AUTO-ENTMCNC: 32.2 G/DL (ref 31.4–37.4)
MCV RBC AUTO: 92 FL (ref 82–98)
NON VENT ROOM AIR: 21 %
O2 CT BLDV-SCNC: 7.9 ML/DL
O2 CT BLDV-SCNC: 8.1 ML/DL
P AXIS: 89 DEGREES
PCO2 BLDV: 43.4 MM HG (ref 42–50)
PCO2 BLDV: 45.3 MM HG (ref 42–50)
PH BLDV: 7.38 [PH] (ref 7.3–7.4)
PH BLDV: 7.42 [PH] (ref 7.3–7.4)
PHOSPHATE SERPL-MCNC: 2.2 MG/DL (ref 2.3–4.1)
PLATELET # BLD AUTO: 98 THOUSANDS/UL (ref 149–390)
PMV BLD AUTO: 11 FL (ref 8.9–12.7)
PO2 BLDV: 28.3 MM HG (ref 35–45)
PO2 BLDV: 33.8 MM HG (ref 35–45)
POTASSIUM SERPL-SCNC: 3.9 MMOL/L (ref 3.5–5.3)
POTASSIUM SERPL-SCNC: 4 MMOL/L (ref 3.5–5.3)
PR INTERVAL: 170 MS
PROT SERPL-MCNC: 4.8 G/DL (ref 6.4–8.4)
QRS AXIS: 119 DEGREES
QRS AXIS: 126 DEGREES
QRS AXIS: 132 DEGREES
QRS AXIS: 135 DEGREES
QRSD INTERVAL: 116 MS
QRSD INTERVAL: 118 MS
QRSD INTERVAL: 120 MS
QRSD INTERVAL: 126 MS
QT INTERVAL: 276 MS
QT INTERVAL: 276 MS
QT INTERVAL: 288 MS
QT INTERVAL: 346 MS
QTC INTERVAL: 451 MS
QTC INTERVAL: 454 MS
QTC INTERVAL: 478 MS
QTC INTERVAL: 485 MS
RBC # BLD AUTO: 3.23 MILLION/UL (ref 3.81–5.12)
SODIUM SERPL-SCNC: 138 MMOL/L (ref 135–147)
SODIUM SERPL-SCNC: 143 MMOL/L (ref 135–147)
T WAVE AXIS: -36 DEGREES
T WAVE AXIS: -42 DEGREES
T WAVE AXIS: -44 DEGREES
T WAVE AXIS: 38 DEGREES
VENTRICULAR RATE: 115 BPM
VENTRICULAR RATE: 161 BPM
VENTRICULAR RATE: 163 BPM
VENTRICULAR RATE: 171 BPM
WBC # BLD AUTO: 10.81 THOUSAND/UL (ref 4.31–10.16)

## 2024-04-14 PROCEDURE — 80076 HEPATIC FUNCTION PANEL: CPT | Performed by: NURSE PRACTITIONER

## 2024-04-14 PROCEDURE — 85027 COMPLETE CBC AUTOMATED: CPT | Performed by: NURSE PRACTITIONER

## 2024-04-14 PROCEDURE — 97163 PT EVAL HIGH COMPLEX 45 MIN: CPT

## 2024-04-14 PROCEDURE — 97167 OT EVAL HIGH COMPLEX 60 MIN: CPT

## 2024-04-14 PROCEDURE — 93005 ELECTROCARDIOGRAM TRACING: CPT

## 2024-04-14 PROCEDURE — 99291 CRITICAL CARE FIRST HOUR: CPT | Performed by: NURSE PRACTITIONER

## 2024-04-14 PROCEDURE — 94664 DEMO&/EVAL PT USE INHALER: CPT

## 2024-04-14 PROCEDURE — 80048 BASIC METABOLIC PNL TOTAL CA: CPT | Performed by: NURSE PRACTITIONER

## 2024-04-14 PROCEDURE — 94760 N-INVAS EAR/PLS OXIMETRY 1: CPT

## 2024-04-14 PROCEDURE — 92960 CARDIOVERSION ELECTRIC EXT: CPT | Performed by: NURSE PRACTITIONER

## 2024-04-14 PROCEDURE — 93010 ELECTROCARDIOGRAM REPORT: CPT | Performed by: INTERNAL MEDICINE

## 2024-04-14 PROCEDURE — 99223 1ST HOSP IP/OBS HIGH 75: CPT | Performed by: INTERNAL MEDICINE

## 2024-04-14 PROCEDURE — 82805 BLOOD GASES W/O2 SATURATION: CPT | Performed by: NURSE PRACTITIONER

## 2024-04-14 PROCEDURE — 83735 ASSAY OF MAGNESIUM: CPT | Performed by: NURSE PRACTITIONER

## 2024-04-14 PROCEDURE — 82948 REAGENT STRIP/BLOOD GLUCOSE: CPT

## 2024-04-14 PROCEDURE — 84100 ASSAY OF PHOSPHORUS: CPT | Performed by: NURSE PRACTITIONER

## 2024-04-14 PROCEDURE — 99024 POSTOP FOLLOW-UP VISIT: CPT | Performed by: THORACIC SURGERY (CARDIOTHORACIC VASCULAR SURGERY)

## 2024-04-14 PROCEDURE — 5A2204Z RESTORATION OF CARDIAC RHYTHM, SINGLE: ICD-10-PCS | Performed by: ANESTHESIOLOGY

## 2024-04-14 RX ORDER — MILRINONE LACTATE 0.2 MG/ML
0.25 INJECTION, SOLUTION INTRAVENOUS CONTINUOUS
Status: DISCONTINUED | OUTPATIENT
Start: 2024-04-14 | End: 2024-04-17

## 2024-04-14 RX ORDER — AMIODARONE HYDROCHLORIDE 150 MG/3ML
INJECTION, SOLUTION INTRAVENOUS
Status: COMPLETED
Start: 2024-04-14 | End: 2024-04-14

## 2024-04-14 RX ORDER — ALBUMIN (HUMAN) 12.5 G/50ML
25 SOLUTION INTRAVENOUS ONCE
Status: COMPLETED | OUTPATIENT
Start: 2024-04-14 | End: 2024-04-14

## 2024-04-14 RX ORDER — MAGNESIUM SULFATE HEPTAHYDRATE 40 MG/ML
4 INJECTION, SOLUTION INTRAVENOUS ONCE
Status: DISCONTINUED | OUTPATIENT
Start: 2024-04-14 | End: 2024-04-14

## 2024-04-14 RX ORDER — FUROSEMIDE 10 MG/ML
40 INJECTION INTRAMUSCULAR; INTRAVENOUS ONCE
Status: COMPLETED | OUTPATIENT
Start: 2024-04-14 | End: 2024-04-14

## 2024-04-14 RX ORDER — MIDAZOLAM HYDROCHLORIDE 2 MG/2ML
2 INJECTION, SOLUTION INTRAMUSCULAR; INTRAVENOUS ONCE
Status: COMPLETED | OUTPATIENT
Start: 2024-04-14 | End: 2024-04-14

## 2024-04-14 RX ORDER — FENTANYL CITRATE/PF 50 MCG/ML
SYRINGE (ML) INJECTION
Status: COMPLETED
Start: 2024-04-14 | End: 2024-04-14

## 2024-04-14 RX ORDER — MAGNESIUM SULFATE HEPTAHYDRATE 40 MG/ML
2 INJECTION, SOLUTION INTRAVENOUS ONCE
Qty: 50 ML | Refills: 0 | Status: COMPLETED | OUTPATIENT
Start: 2024-04-14 | End: 2024-04-14

## 2024-04-14 RX ORDER — CALCIUM GLUCONATE 20 MG/ML
1 INJECTION, SOLUTION INTRAVENOUS ONCE
Status: COMPLETED | OUTPATIENT
Start: 2024-04-14 | End: 2024-04-14

## 2024-04-14 RX ORDER — FENTANYL CITRATE 50 UG/ML
50 INJECTION, SOLUTION INTRAMUSCULAR; INTRAVENOUS ONCE
Status: COMPLETED | OUTPATIENT
Start: 2024-04-14 | End: 2024-04-14

## 2024-04-14 RX ORDER — PHENYLEPHRINE HYDROCHLORIDE 10 MG/ML
INJECTION INTRAVENOUS
Status: COMPLETED
Start: 2024-04-14 | End: 2024-04-14

## 2024-04-14 RX ORDER — ALBUMIN, HUMAN INJ 5% 5 %
12.5 SOLUTION INTRAVENOUS ONCE
Status: COMPLETED | OUTPATIENT
Start: 2024-04-14 | End: 2024-04-14

## 2024-04-14 RX ADMIN — TICAGRELOR 90 MG: 90 TABLET ORAL at 22:00

## 2024-04-14 RX ADMIN — PHENYLEPHRINE HYDROCHLORIDE 60 MCG/MIN: 50 INJECTION INTRAVENOUS at 20:13

## 2024-04-14 RX ADMIN — DEXTROSE 150 MG: 50 INJECTION, SOLUTION INTRAVENOUS at 06:47

## 2024-04-14 RX ADMIN — MUPIROCIN 1 APPLICATION: 20 OINTMENT TOPICAL at 08:46

## 2024-04-14 RX ADMIN — FONDAPARINUX SODIUM 2.5 MG: 2.5 INJECTION, SOLUTION SUBCUTANEOUS at 08:46

## 2024-04-14 RX ADMIN — CHLORHEXIDINE GLUCONATE 0.12% ORAL RINSE 15 ML: 1.2 LIQUID ORAL at 17:30

## 2024-04-14 RX ADMIN — MILRINONE LACTATE IN DEXTROSE 0.13 MCG/KG/MIN: 200 INJECTION, SOLUTION INTRAVENOUS at 01:38

## 2024-04-14 RX ADMIN — AMIODARONE HYDROCHLORIDE 200 MG: 200 TABLET ORAL at 15:00

## 2024-04-14 RX ADMIN — DOCUSATE SODIUM 100 MG: 100 CAPSULE, LIQUID FILLED ORAL at 08:47

## 2024-04-14 RX ADMIN — ACETAMINOPHEN 975 MG: 325 TABLET, FILM COATED ORAL at 06:08

## 2024-04-14 RX ADMIN — PANTOPRAZOLE SODIUM 40 MG: 40 TABLET, DELAYED RELEASE ORAL at 06:08

## 2024-04-14 RX ADMIN — AMIODARONE HYDROCHLORIDE 1 MG/MIN: 50 INJECTION, SOLUTION INTRAVENOUS at 06:48

## 2024-04-14 RX ADMIN — FENTANYL CITRATE 50 MCG: 50 INJECTION, SOLUTION INTRAMUSCULAR; INTRAVENOUS at 07:04

## 2024-04-14 RX ADMIN — CALCIUM GLUCONATE 1 G: 20 INJECTION, SOLUTION INTRAVENOUS at 07:08

## 2024-04-14 RX ADMIN — AMIODARONE HYDROCHLORIDE 150 MG: 50 INJECTION, SOLUTION INTRAVENOUS at 07:04

## 2024-04-14 RX ADMIN — ATORVASTATIN CALCIUM 80 MG: 80 TABLET, FILM COATED ORAL at 16:49

## 2024-04-14 RX ADMIN — POTASSIUM PHOSPHATE, MONOBASIC POTASSIUM PHOSPHATE, DIBASIC 9 MMOL: 224; 236 INJECTION, SOLUTION, CONCENTRATE INTRAVENOUS at 09:45

## 2024-04-14 RX ADMIN — ALBUMIN (HUMAN) 12.5 G: 12.5 INJECTION, SOLUTION INTRAVENOUS at 15:15

## 2024-04-14 RX ADMIN — MAGNESIUM SULFATE HEPTAHYDRATE 2 G: 40 INJECTION, SOLUTION INTRAVENOUS at 07:43

## 2024-04-14 RX ADMIN — FUROSEMIDE 40 MG: 10 INJECTION, SOLUTION INTRAMUSCULAR; INTRAVENOUS at 22:00

## 2024-04-14 RX ADMIN — INSULIN LISPRO 1 UNITS: 100 INJECTION, SOLUTION INTRAVENOUS; SUBCUTANEOUS at 22:33

## 2024-04-14 RX ADMIN — Medication 500 MG: at 08:48

## 2024-04-14 RX ADMIN — MIDAZOLAM 2 MG: 1 INJECTION INTRAMUSCULAR; INTRAVENOUS at 07:03

## 2024-04-14 RX ADMIN — AMIODARONE HYDROCHLORIDE 200 MG: 200 TABLET ORAL at 06:08

## 2024-04-14 RX ADMIN — ASPIRIN 325 MG ORAL TABLET 325 MG: 325 PILL ORAL at 08:46

## 2024-04-14 RX ADMIN — CHLORHEXIDINE GLUCONATE 0.12% ORAL RINSE 15 ML: 1.2 LIQUID ORAL at 08:46

## 2024-04-14 RX ADMIN — MAGNESIUM SULFATE HEPTAHYDRATE 2 G: 40 INJECTION, SOLUTION INTRAVENOUS at 06:00

## 2024-04-14 RX ADMIN — TICAGRELOR 90 MG: 90 TABLET ORAL at 08:46

## 2024-04-14 RX ADMIN — FENTANYL CITRATE 50 MCG: 50 INJECTION INTRAMUSCULAR; INTRAVENOUS at 07:04

## 2024-04-14 RX ADMIN — PHENYLEPHRINE HYDROCHLORIDE 10 MG: 10 INJECTION INTRAVENOUS at 07:05

## 2024-04-14 RX ADMIN — PHENYLEPHRINE HYDROCHLORIDE 100 MCG/MIN: 50 INJECTION INTRAVENOUS at 06:45

## 2024-04-14 RX ADMIN — ALBUMIN (HUMAN) 25 G: 0.25 INJECTION, SOLUTION INTRAVENOUS at 11:52

## 2024-04-14 RX ADMIN — ACETAMINOPHEN 975 MG: 325 TABLET, FILM COATED ORAL at 22:22

## 2024-04-14 RX ADMIN — Medication 1000 UNITS: at 08:47

## 2024-04-14 RX ADMIN — POLYETHYLENE GLYCOL 3350 17 G: 17 POWDER, FOR SOLUTION ORAL at 08:47

## 2024-04-14 RX ADMIN — FUROSEMIDE 40 MG: 10 INJECTION, SOLUTION INTRAMUSCULAR; INTRAVENOUS at 01:13

## 2024-04-14 RX ADMIN — MILRINONE LACTATE IN DEXTROSE 0.13 MCG/KG/MIN: 200 INJECTION, SOLUTION INTRAVENOUS at 05:22

## 2024-04-14 RX ADMIN — AMIODARONE HYDROCHLORIDE 200 MG: 200 TABLET ORAL at 22:22

## 2024-04-14 RX ADMIN — DOCUSATE SODIUM 100 MG: 100 CAPSULE, LIQUID FILLED ORAL at 17:30

## 2024-04-14 NOTE — PROCEDURES
Procedural Sedation    Date/Time: 4/14/2024 7:00 AM    Performed by: ROYAL Malagon  Authorized by: ROYAL Malagon    Immediate pre-procedure details:     Reassessment: Patient reassessed immediately prior to procedure      Reviewed: vital signs and relevant labs/tests      Verified: bag valve mask available, emergency equipment available, intubation equipment available, IV patency confirmed, oxygen available and reversal medications available    Procedure details (see MAR for exact dosages):     Sedation start time:  4/14/2024 7:00 AM    Preoxygenation:  Nonrebreather mask    Sedation:  Midazolam    Analgesia:  Fentanyl    Intra-procedure monitoring:  Blood pressure monitoring, frequent LOC assessments, cardiac monitor, continuous pulse oximetry and frequent vital sign checks    Intra-procedure events: none      Intra-procedure management:  Supplemental oxygen    Sedation end time:  4/14/2024 7:05 AM    Total sedation time (minutes):  5  Post-procedure details:     Post-sedation assessment completed:  4/14/2024 8:00 AM    Attendance: Constant attendance by certified staff until patient recovered      Post-sedation assessments completed and reviewed: airway patency, cardiovascular function, mental status, respiratory function and temperature      Patient tolerance:  Tolerated well, no immediate complications  Comments:      Patient lethargic post sedation but wakes easily to voice and answers questions appropriately

## 2024-04-14 NOTE — PLAN OF CARE
Problem: PHYSICAL THERAPY ADULT  Goal: Performs mobility at highest level of function for planned discharge setting.  See evaluation for individualized goals.  Description: Treatment/Interventions: Functional transfer training, LE strengthening/ROM, Elevations, Therapeutic exercise, Endurance training, Equipment eval/education, Bed mobility, Gait training, Spoke to nursing, OT, Family  Equipment Recommended: Walker       See flowsheet documentation for full assessment, interventions and recommendations.  Note: Prognosis: Good  Problem List: Decreased strength, Decreased endurance, Impaired balance, Decreased mobility  Assessment: Pt is 68 y.o. female admitted with Dx of Multivessel coronary artery disease and underwent Coronary artery bypass grafting x 3 with left internal mammary artery to left anterior descending, saphenous vein graft to obtuse marginal 2, saphenous vein graft to right posterior descending artery on 4/9/2024; postop course included: cardiogenic shock s/p Impella placement (now removed), a-fib w/ RVR. Pt 's comorbidities affecting POC include: CHF (congestive heart failure), HTN, and  Diabetes mellitus and personal factors of: KYLE. Pt's clinical presentation is currently unstable/unpredictable which is evident in ongoing telemetry monitoring while in a critical care unit w/ Jolon Ashleigh catheter, supplemental O2 needs, abnormal lab values being monitored/trending, CT in place and inability to progress further w/ mobilization at this time. Pt presents w/ postop guarding, generalized weakness, incl decreased LE strength, decreased functional endurance and activity tolerance, and impaired balance w/ associated gait deviations (a few steps at bedside) requiring (A)x 2 for OOB to chair transition. Will cont to follow pt in PT for progressive mobilization to address above functional deficits and to max level of (I), endurance, and safety.  D/C recommendations are outlined below. Will cont to follow until  then.        Rehab Resource Intensity Level, PT: I (Maximum Resource Intensity)    See flowsheet documentation for full assessment.

## 2024-04-14 NOTE — CONSULTS
"Consultation - Julia Perry 68 y.o. female MRN: [id number]    Unit/Bed#: [facility] 414-01 Encounter: [id number]      Assessment/Plan     Assessment:  This is a 68 y.o.-year-old female with history of longstanding type 2 diabetes mellitus, hypertension, hyperlipidemia, bilateral carotid stenosis s/p , ulcerative colitis, nonfunctioning pituitary macroadenoma, new onset A-fib, CAD admitted for CABG x 3 performed on 4/10.  HbA1c: 6.4  Medications: Metformin 500 mg twice daily    Plan:    With poor oral intake and current pressor requirement, continue on insulin infusion today.  Will reevaluate tomorrow and make decision on transitioning to basal bolus subcutaneous insulin regimen based on hemodynamic stability and improvement in oral intake.  Monitor for hypoglycemia and treat according to protocol    Patient seen at bedside and management plan discussed with attending physician.  Thank you for letting us participate in the care of your patient, please do not hesitate to reach out with questions.  Please Tigertext questions to the clinician covering the \"[id number]\" Role. Thank you.    CC: Diabetes Consult    History of Present Illness     HPI: Julia Perry is a 68 y.o. year old female with past medical history of longstanding type 2 diabetes mellitus, hypertension, hyperlipidemia, bilateral carotid stenosis s/p , ulcerative colitis, nonfunctioning pituitary macroadenoma, new onset A-fib, CAD admitted for CABG x 3 performed on 4/10.  Endocrinology has been consulted for management of glycemic control while inpatient.  Patient followed in February with Dr. Lau and endocrinology for evaluation for pituitary macroadenoma found incidentally on CT imaging for left carotid artery occlusion.  Had only mild prolactin elevation at 27 due to stalk effect and macroadenoma determined to be nonfunctioning.  Reports that she has longstanding history of well-controlled type 2 diabetes mellitus on oral metformin managed by " primary care physician with history of retinopathy in addition to CAD, unsure of other complications.  Denies neuropathy.  Reports decreased appetite, does not feel like having lunch.  Denies vomiting.  Currently on neosynephrine and milrinone infusion    Inpatient consult to Endocrinology  Consult performed by: Ana Anders MD  Consult ordered by: ROYAL Malagon          Review of Systems  10 point ROS performed, negative except stated above in HPI    Historical Information   Past Medical History:   Diagnosis Date    CHF (congestive heart failure) (HCC) 4/8/2024    Colitis     Diabetes mellitus (HCC)     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HCC)     PONV (postoperative nausea and vomiting)      Past Surgical History:   Procedure Laterality Date    CARDIAC CATHETERIZATION Left 01/17/2024    Procedure: Cardiac Left Heart Cath;  Surgeon: Juan A Sousa MD;  Location: [facility];  Service: Cardiology    CARDIAC CATHETERIZATION  01/17/2024    Procedure: Cardiac catheterization;  Surgeon: Juan A Sousa MD;  [facility];  Service: Cardiology    CARDIAC CATHETERIZATION Left 4/10/2024    Procedure: Cardiac Left Heart Cath;  Surgeon: Viktor Bee MD;  [facility];  Service: Cardiology    CARDIAC CATHETERIZATION N/A 4/10/2024    Procedure: Cardiac Coronary Angiogram;  Surgeon: Viktor Bee MD;  [facility];  Service: Cardiology    CARDIAC CATHETERIZATION N/A 4/10/2024    Procedure: Cardiac Impella Insertion;  Surgeon: Viktor Bee MD;  [facility];  Service: Cardiology    CARDIAC CATHETERIZATION N/A 4/10/2024    Procedure: Cardiac pci;  Surgeon: Viktor Bee MD;  [facility]: [facility];  Service: Cardiology    COLONOSCOPY  12/06/2023    HYSTERECTOMY      ~1990    [place] CORONARY ARTERY BYP W/VEIN & ARTERY GRAFT 3 VEIN N/A 4/9/2024    Procedure: CORONARY ARTERY BYPASS GRAFT (CABG) x3 VESSELS, LIMA - LAD, LEFT LEG EVH/SVG - PDA  AND  OM, W/ELOISA;  Surgeon: Josh Castellanos MD;  Location: [facility];  Service: Cardiac Surgery    CT TEAEC W/PATCH GRF CAROTID VERTB SUBCLAV NECK INC Left 03/04/2024    Procedure: LEFT ENDARTERECTOMY ARTERY CAROTID;  Surgeon: Janeth De Oliveira DO;  Location: [facility];  Service: Vascular     Social History   Social History     Substance and Sexual Activity   Alcohol Use Never     Social History     Substance and Sexual Activity   Drug Use Never     Social History     Tobacco Use   Smoking Status Former    Types: Cigarettes    Passive exposure: Past   Smokeless Tobacco Never   Tobacco Comments    Quit 1970's     Family History: No family history on file.    Meds/Allergies   Current Facility-Administered Medications   Medication Dose Route Frequency Provider Last Rate Last Admin    acetaminophen (TYLENOL) rectal suppository 650 mg  650 mg Rectal Q4H PRN Geri Spironello V, CRNP        acetaminophen (TYLENOL) tablet 975 mg  975 mg Oral Q8H LINDA Geri Spironello V, CRNP   975 mg at 04/14/24 0608    albumin human (FLEXBUMIN) 25 % injection 25 g  25 g Intravenous Once Geri Spironello V, CRNP        amiodarone (CORDARONE) 900 mg in dextrose 5 % 500 mL infusion  1 mg/min Intravenous Continuous Geri Spironello V, CRNP 33.3 mL/hr at 04/14/24 0648 1 mg/min at 04/14/24 0648    Followed by    amiodarone (CORDARONE) 900 mg in dextrose 5 % 500 mL infusion  0.5 mg/min Intravenous Continuous Geri Spironello V, CRNP        amiodarone tablet 200 mg  200 mg Oral Q8H [facility] Geri Spironello V, CRNP   200 mg at 04/14/24 0608    aspirin tablet 325 mg  325 mg Oral Daily Geri Spironello V, CRNP   325 mg at 04/14/24 0846    atorvastatin (LIPITOR) tablet 80 mg  80 mg Oral Daily With Dinner Geri Spironello V, CRNP   80 mg at 04/13/24 1634    bisacodyl (DULCOLAX) rectal suppository 10 mg  10 mg Rectal Daily PRN Geri Spironello V, CRNP        chlorhexidine (PERIDEX) 0.12 % oral rinse 15 mL  15 mL Mouth/Throat BID  Geri Spironello V, CRNP   15 mL at 04/14/24 0846    Cholecalciferol (VITAMIN D3) tablet 1,000 Units  1,000 Units Oral Daily Geri Spironello V, CRNP   1,000 Units at 04/14/24 0847    docusate sodium (COLACE) capsule 100 mg  100 mg Oral BID Geri Washburno V, CRNP   100 mg at 04/14/24 0847    fondaparinux (ARIXTRA) subcutaneous injection 2.5 mg  2.5 mg Subcutaneous Q24H Geri Spironello V, CRNP   2.5 mg at 04/14/24 0846    HYDROmorphone (DILAUDID) injection 0.5 mg  0.5 mg Intravenous Q2H PRN Geri Villedanello V, CRNP   0.5 mg at 04/11/24 0606    insulin lispro (HumALOG/ADMELOG) 100 units/mL subcutaneous injection 1-5 Units  1-5 Units Subcutaneous TID AC Geri Washburno V, CRNP        insulin lispro (HumALOG/ADMELOG) 100 units/mL subcutaneous injection 1-5 Units  1-5 Units Subcutaneous HS Geri Washburno V, CRNP        magnesium gluconate (MAGONATE) tablet 500 mg  500 mg Oral Daily Geri Slaughter V, CRNP   500 mg at 04/14/24 0848    milrinone (PRIMACOR) 20 mg in 100 mL infusion (premix)  0.13 mcg/kg/min Intravenous Continuous Flex Childers PA-C 3 mL/hr at 04/14/24 0522 0.13 mcg/kg/min at 04/14/24 0522    ondansetron (ZOFRAN) injection 4 mg  4 mg Intravenous Q6H PRN Geri Washburno V, CRNP   4 mg at 04/13/24 0906    oxyCODONE (ROXICODONE) split tablet 2.5 mg  2.5 mg Oral Q4H PRN Geri Slaughter V, CRNP        Or    oxyCODONE (ROXICODONE) IR tablet 5 mg  5 mg Oral Q4H PRN Geri Washburno V, CRNP        pantoprazole (PROTONIX) EC tablet 40 mg  40 mg Oral Early Morning Geri Washburno V, CRNP   40 mg at 04/14/24 0608    phenylephrine (TRI-SYNEPHRINE) 50 mg (STANDARD CONCENTRATION) in sodium chloride 0.9% 250 mL   mcg/min Intravenous Titrated Geri Spironello V, CRNP 15 mL/hr at 04/14/24 1024 50 mcg/min at 04/14/24 1024    polyethylene glycol (MIRALAX) packet 17 g  17 g Oral Daily Geri Spironello V, CRNP   17 g at 04/14/24 0847    potassium phosphates 9 mmol  "in sodium chloride 0.9 % 100 mL Infusion  9 mmol Intravenous Once Geri Spironello V, CRNP 50 mL/hr at 04/14/24 0945 9 mmol at 04/14/24 0945    ticagrelor (BRILINTA) tablet 90 mg  90 mg Oral Q12H LINDA Geri Spironello V, CRNP   90 mg at 04/14/24 0846    trimethobenzamide (TIGAN) IM injection 200 mg  200 mg Intramuscular Q6H PRN Geri Spironello V, CRNP   200 mg at 04/11/24 2124     Allergies   Allergen Reactions    Valdecoxib Other (See Comments)     Patient is unaware of this allergy       Objective   Vitals: Blood pressure 104/55, pulse 102, temperature 99.1 °F (37.3 °C), temperature source Core, resp. rate 19, height 5' 4\" (1.626 m), weight 75.1 kg (165 lb 9.1 oz), SpO2 99%.    Intake/Output Summary (Last 24 hours) at 4/14/2024 1139  Last data filed at 4/14/2024 1000  Gross per 24 hour   Intake 1543.04 ml   Output 3305 ml   Net -1761.96 ml     Invasive Devices       Central Venous Catheter Line  Duration             CVC Central Lines 04/09/24 5 days    Introducer 04/09/24 5 days              Drain  Duration             Chest Tube 1 Left Pleural 28 Fr. 5 days    Chest Tube 2 Right Pleural 28 Fr. 5 days    Chest Tube 3 Posterior;Mediastinal 24 Fr. 5 days    Chest Tube 4 Mediastinal;Anterior 28 Fr. 5 days    Urethral Catheter Latex;Straight-tip 16 Fr. 5 days                    Physical Exam  Constitutional:       General: She is not in acute distress.     Appearance: She is ill-appearing.      Comments: Appears tired   HENT:      Head: Normocephalic.   Eyes:      Conjunctiva/sclera: Conjunctivae normal.   Pulmonary:      Effort: Pulmonary effort is normal.   Musculoskeletal:      Comments: Lying in bed, moves extremities   Skin:     General: Skin is dry.   Neurological:      Mental Status: She is alert.      Comments: Oriented to baseline   Psychiatric:         Mood and Affect: Mood normal.           Lab Results:   Lab Results   Component Value Date    HGBA1C 6.4 (H) 12/28/2023         Lab Results " "  Component Value Date    WBC 10.81 (H) 04/14/2024    HGB 9.6 (L) 04/14/2024    HCT 29.8 (L) 04/14/2024    MCV 92 04/14/2024    PLT 98 (L) 04/14/2024     Lab Results   Component Value Date/Time    BUN 12 04/14/2024 04:03 AM    BUN 14 08/17/2022 03:07 PM    K 4.0 04/14/2024 04:03 AM    K 4.4 08/17/2022 03:07 PM     04/14/2024 04:03 AM     08/17/2022 03:07 PM    CO2 27 04/14/2024 04:03 AM    CO2 22 04/09/2024 01:07 PM    CO2 27 08/17/2022 03:07 PM    CREATININE 0.61 04/14/2024 04:03 AM    CREATININE 0.78 08/17/2022 03:07 PM    AST 16 04/14/2024 04:03 AM    ALT 7 04/14/2024 04:03 AM    TP 4.8 (L) 04/14/2024 04:03 AM    ALB 3.0 (L) 04/14/2024 04:03 AM     No results for input(s): \"CHOL\", \"HDL\", \"LDL\", \"TRIG\", \"VLDL\" in the last 72 hours.  No results found for: \"MICROALBUR\", \"HTPY74URA\"  POC Glucose (mg/dl)   Date Value   04/14/2024 144 (H)   04/13/2024 135   04/13/2024 124   04/13/2024 112   04/13/2024 122   04/13/2024 146 (H)   04/13/2024 128   04/13/2024 127   04/12/2024 90   04/12/2024 129     Portions of the record may have been created with voice recognition software.    "

## 2024-04-14 NOTE — DISCHARGE INSTRUCTIONS
Instructions Following Open Heart Surgery      Protect your sternum (breast bone). Wires are placed during surgery to hold the sternum together. Do not lift anything heavier than 10 pounds for the first four weeks after surgery. (For example, a gallon of milk weighs 8 pounds) When you are seen for a routine postop check, you will be cleared to lift up to 25 lbs. Following three months from the time of surgery, you may lift without any restrictions.     Hug a pillow to your chest or cross your arms over your chest when you laugh, sneeze, or cough. You may resume sexual activity when you feel ready. Do not put any excessive pressure on your chest.    Be careful when you get into or out of a chair or bed.  Hug a pillow or cross your arms when you stand or sit. Do not twist as you move. Use only your legs to sit and stand. You may need a raised toilet seat if you have trouble standing up without using your arms.     No sleeping on side for the first 4 weeks. Limit daytime naps, to prevent difficulty sleeping at night.    Women: Wear a clean surgical bra every day.     Do not play sports that use your shoulder.  Examples include tennis and golf.    Do not drive until cleared by surgeon. Typically cleared to drive after one month, determination will be made at routine postop appointment. When a passenger in the car, wear a seat belt.    Continue you breathing exercises throughout the day with incentive spirometry    Activity: Your goal is to go on frequent walks, slowly increasing your activity level. Walking will help prevent leg swelling and prevent blood clots. When you start outpatient cardiac rehab in one month, you will be given additional instructions and a formal exercise plan. It is OK to go up steps, with help.     You may resume sexual activity when you are comfortable. Do not put excessive pressure on your chest.     Weigh yourself daily in the morning and keep of log of your weight. If your weight  increases more than 2 lbs per day or more than 5 lbs in a week, call your surgeon's office.    Keep your legs elevated when sitting. Pressure stockings may be worn to prevent significant leg swelling.     You may get routine vaccines any time after open heart surgery    Do not smoke:  Nicotine can damage blood vessels and make it more difficult to heal. Do not use e-cigarettes or smokeless tobacco in place of cigarettes or to help you quit. They still contain nicotine. Ask your primary care provider for information if you currently smoke and need help quitting.     Incision/Wound Care:    Shower daily. Gently wash your incision with warm water and mild soap. Do not scrub the area. Gently pat the area dry with a clean towel. If you have a bandage, dry the area and put on a new, clean bandage. Change your bandage at least daily, or if it gets wet or dirty. Always wash your hands before you care for your wound. Do not apply ointments, creams, lotions, powders, etc. If you develop signs of an infection (fever > 101, redness, warm skin, or drainage) please call your surgeon's office.     Do not pick at or touch puncture sites or incisions. Allow and scabs to come off on their own.     It is normal to have some drainage from the chest tube sites. Apply clean/dry dressings, until this resolves.    You may have discomfort or numbness along the incision for 3-4 weeks. It is normal to occasionally experience brief sharp shooing pains, tightness, or pulling sensations.    Do not take a bath or swim until cleared by surgeon.    As your incision heals, sometimes small tender lumps or pimple-like bumps develop which is due to your body attempting to “dissolve” the suture (stiches).     Call your local emergency number (891 in the [place]) or have someone call if:   You have squeezing, pressure, or pain in your chest or back, lightheadedness or sudden cold sweat  Short of breath that does not go away with rest  You cough up blood.  You  have a fast heartbeat that flutters. Or palpitations  You faint.  Signs of a stroke:  Numbness or drooping on one side of your face. Weakness in an arm or leg. Confusion or difficulty speaking. Dizziness, a severe headache, or vision loss    Call your surgeons office if:   You have bleeding that does not stop even after you apply pressure for 5 minutes.  You have redness, tenderness, or signs of infection at incision (pain, swelling, odor, or green/yellow discharge from incision site)  You have a severe headache.  You have a fever higher than 101°F (38.4°C).  You urinate less, or not at all.  You have persistent nausea, vomiting, or diarrhea  You hear persistent or worsening crunching or grinding in your sternum.  You have questions or concerns about your condition or care.      Diet:    Eat heart-healthy foods, low in unhealthy fats and sodium (salt). A heart healthy diet helps improve your cholesterol levels and lowers your risk for heart disease and stroke. You will receive formal education on healthy eating and label reading during your cardiac rehab in one month.   Choose foods that contain healthy fats, such as soybean, canola, olive, corn, and sunflower oils.  Limit unhealthy fats. Examples include:  Cholesterol  is found in animal foods, such as eggs and lobster, and in dairy products made from whole milk.    Saturated fat  is found in meats, such as salazar and hamburger. It is also found in chicken or turkey skin, whole milk, and butter.     Trans fat  is found in packaged foods, such as potato chips and cookies. It is also in some fried foods, and shortening. Do not eat foods that contain trans fats.  Get 20 to 30 grams of fiber each day.  Fruits, vegetables, whole-grain foods, and legumes (cooked beans) are good sources of fiber.  Low Sodium: Limit to 2,000 mg or less each day, unless otherwise directed. Choose low-sodium or no-salt-added foods. Add little or no salt to food you prepare. Use herbs and  spices in place of salt.  Foods high in sodium include frozen dinners, macaroni and cheese, instant potatoes, canned vegetables, cured or smoked meats, hot dogs, salazar and sausage, ketchup, barbecue sauce, salad dressing, pickles, olives, soy sauce, and miso.     Fluid Restriction: Fluid restriction after hospital discharge is advised. Limit your fluid intake to no more than 8 cups of liquid (8oz = 1cup) or 2000 mL per day.    Limit/Do not drink alcohol. Alcohol can damage heart and raise your blood pressure. The general recommended limit is 1 drink a day for women 21 or older and for men 65 or older. Do not have more than 3 drinks within 24 hours or 7 within a week. A drink of alcohol is 12 oz of beer, 5 oz of wine, or 1½ oz of liquor.        Narcotic Safety     What do I need to know about narcotic safety?    A narcotic is a type of medicine used to treat pain. When you are discharged from the hospital, you will be prescribed a narcotic called oxycodone. Pain control and management may help you rest, heal, and return to your daily activities. Do not take more than the recommended amount. Too much can cause a life-threatening overdose. Do not continue to take it after your pain stops.     How do I use narcotics safely?   Take prescribed narcotics exactly as directed.  You may develop tolerance. This means you keep needing higher doses to get the same effect. You may also develop narcotic use disorder. This means you are not able to control your narcotic use.  Do not give narcotics to others or take narcotics that belong to someone else.  Do not mix narcotics with other medicines or alcohol.  The combination can cause an overdose, or cause you to stop breathing.   Do not drive or operate heavy machinery after you use a narcotic.    Talk to your healthcare provider if you have any side effects.  Side effects include nausea, sleepiness, itching, and trouble thinking clearly.     What can I do to manage constipation?   Constipation is the most common side effect of narcotic medicine. Constipation is when you have hard, dry bowel movements, or you go longer than usual between bowel movements. The following are ways you can prevent or relieve constipation:  Drink liquids as allowed.  Drinking extra liquids will help soften and move your bowels. You should drink up to your maximum fluid allotment of 2 liters.    Eat high-fiber foods.  This may help decrease constipation by adding bulk to your bowel movements. High-fiber foods include fruits, vegetables, whole-grain breads and cereals, and beans  Supplements. You have been prescribed a fiber supplement called polyethylene glycol (Stephanie lax) and a stool softener called docusate sodium (Colace). You should take these for as long as you continue narcotic medications.  Exercise regularly.  Regular physical activity can help stimulate your intestines. Walking is a good exercise to prevent or relieve constipation. Ask which exercises are best for you.    How do I store narcotics safely?   Store narcotics where others cannot easily get them.  Keep them in a locked cabinet or secure area. Do not  keep them in a purse or other bag you carry with you. A person may be looking for something else and find the narcotics.    Make sure narcotics are stored out of the reach of children.  A child can easily overdose on narcotics. Narcotics may look like candy to a small child.

## 2024-04-14 NOTE — PLAN OF CARE
Problem: OCCUPATIONAL THERAPY ADULT  Goal: Performs self-care activities at highest level of function for planned discharge setting.  See evaluation for individualized goals.  Description: Treatment Interventions: ADL retraining, Functional transfer training, Endurance training, UE strengthening/ROM, Cognitive reorientation, Patient/family training, Equipment evaluation/education, Continued evaluation, Cardiac education, Activityengagement, Energy conservation          See flowsheet documentation for full assessment, interventions and recommendations.   Outcome: Progressing  Note: Limitation: Decreased ADL status, Decreased Safe judgement during ADL, Decreased cognition, Decreased endurance, Decreased self-care trans, Decreased high-level ADLs  Prognosis: Fair  Assessment: Pt is a 68 y.o. female who was admitted to [facility] on 4/9/2024 with Coronary artery disease involving native coronary artery, s/p CABG x 3, post-op cardiogenic shock s/p impella placement, s/p cardioversion. Pt seen for an OT evaluation per active OT orders and RN request for pt OOB in chair, [place] and CT in place.  Pt  has a past medical history of CHF (congestive heart failure) ([facility]), Colitis, Diabetes mellitus (HCC), 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HCC), and PONV (postoperative nausea and vomiting). Pt lives with spouse in a 2  with 2 [facility], uses a [facility] setup with tub shower with shower chair and standard toilet. Pta, pt was independent w/ ADL/IADL and functional mobility, was (+) driving and was not using any DME at baseline. Currently, pt is Mod Ax1 for UB ADL, Max Ax1 for LB ADL, and completed transfers w Mod Ax2. Pt currently presents with impairments in the following categories -steps to enter environment, difficulty performing ADLS, and limited insight into deficits activity tolerance, endurance, standing balance/tolerance, UE strength, UE ROM, arousal, insight, safety , judgement , attention , and sequencing . These impairments,  as well as pt's fatigue, SOB, [place], pain, cardiac/sternal precautions, and risk for falls  limit pt's ability to safely engage in all baseline areas of occupation, includinggrooming, bathing, dressing, toileting, and functional mobility/transfers.    The patient's raw score on the AM-PAC Daily Activity Inpatient Short Form is 13. A raw score of less than 19 suggests the patient may benefit from discharge to post-acute rehabilitation services. Please refer to the recommendation of the Occupational Therapist for safe discharge planning. Pt would benefit from continued acute OT services throughout hospital course and following D/C. Plan for OT interventions 2-3x per week. From OT standpoint, recommend level II services upon D/C. Pt was left seated in bedside chair with all needs within reach.     Rehab Resource Intensity Level, OT: II (Moderate Resource Intensity)

## 2024-04-14 NOTE — PROCEDURES
Electrical Cardioversion    Date/Time: 4/14/2024 7:00 AM    Performed by: ROYAL Malagon  Authorized by: ROYAL Malagon    Verbal consent obtained?: Yes    Written consent obtained?: No    Emergent situation    Risks and benefits: Risks, benefits and alternatives were discussed    Consent given by:  Patient  Patient states understanding of procedure being performed: Yes    Patient identity confirmed:  Verbally with patient and arm band  Time out: Immediately prior to the procedure a time out was called    Patient sedated: Yes    Sedation type: moderate (conscious) sedation    Sedation:  Midazolam  Analgesia:  Fentanyl  Sedation start:  4/14/2024 7:00 AM  Sedation end:  4/14/2024 7:05 AM  Vital signs: Vital signs monitored during sedation    Cardioversion basis:  Emergent  Pre-procedure rhythm:  Atrial fibrillation  Position: Patient was placed in a supine position    Chest area exposed: Chest area was exposed    Electrodes:  Pads  Electrodes placed:  Anterior-lateral  Number of attempts:  1  Attempt 1:     Attempt 1 mode:  Synchronous    Attempt 1 waveform:  Biphasic    Attempt 1 shock (Joules):  200    Attempt 1 outcome:  Conversion to normal sinus rhythm  Post-procedure rhythm:  Normal sinus rhythm  Complications: no complications    Patient tolerance:  Patient tolerated the procedure well with no immediate complications

## 2024-04-14 NOTE — OCCUPATIONAL THERAPY NOTE
Occupational Therapy Evaluation     Patient Name: Julia Perry  Today's Date: 4/14/2024  Problem List  Principal Problem:    Coronary artery disease involving native coronary artery  Active Problems:    DMII (diabetes mellitus, type 2) (HCC)    Hypomagnesemia    Ulcerative pancolitis without complication (HCC)    Ischemic cardiomyopathy    Mixed hyperlipidemia    Stenosis of left carotid artery    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HCC)    Hyperprolactinemia (HCC)    S/P carotid endarterectomy    Cardiogenic postoperative shock (HCC)    PONV (postoperative nausea and vomiting)    S/P CABG x 3    New onset atrial fibrillation (HCC)    ABLA (acute blood loss anemia)    Past Medical History  Past Medical History:   Diagnosis Date    CHF (congestive heart failure) (HCC) 4/8/2024    Colitis     Diabetes mellitus (HCC)     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HCC)     PONV (postoperative nausea and vomiting)      Past Surgical History  Past Surgical History:   Procedure Laterality Date    CARDIAC CATHETERIZATION Left 01/17/2024    Procedure: Cardiac Left Heart Cath;  Surgeon: Juan A Sousa MD;  [facility];  Service: Cardiology    CARDIAC CATHETERIZATION  01/17/2024    Procedure: Cardiac catheterization;  Surgeon: Juan A Sousa MD;  [facility];  Service: Cardiology    CARDIAC CATHETERIZATION Left 4/10/2024    Procedure: Cardiac Left Heart Cath;  Surgeon: Viktor Bee MD;  [facility];  Service: Cardiology    CARDIAC CATHETERIZATION N/A 4/10/2024    Procedure: Cardiac Coronary Angiogram;  Surgeon: Viktor Bee MD;  [facility];  Service: Cardiology    CARDIAC CATHETERIZATION N/A 4/10/2024    Procedure: Cardiac Impella Insertion;  Surgeon: Viktor Bee MD;  [facility];  Service: Cardiology    CARDIAC CATHETERIZATION N/A 4/10/2024    Procedure: Cardiac pci;  Surgeon: Viktor Bee MD;  [facility];  Service: Cardiology     COLONOSCOPY  12/06/2023    HYSTERECTOMY      ~1990    [place] CORONARY ARTERY BYP W/VEIN & ARTERY GRAFT 3 VEIN N/A 4/9/2024    Procedure: CORONARY ARTERY BYPASS GRAFT (CABG) x3 VESSELS, [place] - LAD, LEFT LEG EVH/SVG - PDA  AND OM, W/ELOISA;  Surgeon: Josh Castellanos MD;  [facility];  Service: Cardiac Surgery    [place] TEAEC W/PATCH GRF CAROTID VERTB SUBCLAV NECK INC Left 03/04/2024    Procedure: LEFT ENDARTERECTOMY ARTERY CAROTID;  Surgeon: Janeth De Oliveira DO;  [facility];  Service: Vascular             04/14/24 1349   OT Last Visit   OT Visit Date 04/14/24   Note Type   Note type Evaluation   Pain Assessment   Pain Assessment Tool 0-10   Pain Score No Pain   Restrictions/Precautions   Weight Bearing Precautions Per Order No   Other Precautions Cardiac/sternal;Cognitive;Multiple lines;Telemetry;O2;Fall Risk  (Bg, CT)   Home Living   Type of Home House   Home Layout Two level;Able to live on main level with bedroom/bathroom;Performs ADLs on one level;Stairs to enter with rails  (2 KYLE)   Bathroom Shower/Tub Tub/shower unit   Bathroom Toilet Standard   Bathroom Equipment Shower chair   Bathroom Accessibility Accessible   Additional Comments Pt lives with spouse in a 2 SH with 2 KYLE, uses a 1st fl setup with tub shower with shower chair and standard toilet   Prior Function   Level of [place] Independent with ADLs;Independent with functional mobility;Independent with IADLS   Lives With Spouse   Receives Help From Family   IADLs Independent with driving;Independent with meal prep;Independent with medication management  (although pt rarely drives)   Falls in the last 6 months 0   Vocational Retired   Lifestyle   Autonomy Pta pt I with ADL, most IADL and functional mobility, (+)  although per spouse pt rarely drives   Reciprocal Relationships supportive spouse   Service to Others retired   Intrinsic Gratification enjoys reading, time with her kids   General   Family/Caregiver Present Yes  "  Subjective   Subjective \"I don't really feel any pain\"   ADL   Where Assessed Chair   Eating Assistance 5  Supervision/Setup   Grooming Assistance 3  Moderate Assistance   UB Bathing Assistance 3  Moderate Assistance   LB Bathing Assistance 2  Maximal Assistance   UB Dressing Assistance 3  Moderate Assistance   LB Dressing Assistance 2  Maximal Assistance   Toileting Assistance  2  Maximal Assistance   Functional Assistance 3  Moderate Assistance   Bed Mobility   Supine to Sit 3  Moderate assistance   Additional items Assist x 1;Increased time required;Verbal cues;LE management;HOB elevated   Additional Comments Pt greeted in bed, left in chair with all needs within reach   Transfers   Sit to Stand 3  Moderate assistance   Additional items Assist x 2;Increased time required;Verbal cues   Stand to Sit 3  Moderate assistance   Additional items Assist x 2;Increased time required;Verbal cues   Stand pivot 3  Moderate assistance  (bed>chair)   Additional items Assist x 2;Increased time required;Verbal cues   Additional Comments with B HHA   Functional Mobility   Additional Comments Deferred further mobility, [place] in place, see SPT bed>chair   Balance   Static Sitting Fair -   Dynamic Sitting Poor +   Static Standing Poor   Dynamic Standing Poor -   Activity Tolerance   Activity Tolerance Patient limited by fatigue   Medical Staff Made Aware Co-eval with DPT due to high medical complexity, assist from restorative   Nurse Made Aware RN cleared for therapy and assisted   RUE Assessment   RUE Assessment X  (with limited AROM at this time, will continue to assess)   LUE Assessment   LUE Assessment X  (with limited AROM at this time, will continue to assess)   Hand Function   Gross Motor Coordination Functional   Fine Motor Coordination Functional   Sensation   Light Touch No apparent deficits   Cognition   Overall Cognitive Status Impaired   Arousal/Participation Cooperative   Attention Attends with cues to redirect "   Orientation Level Oriented to person;Oriented to place;Oriented to situation   Memory Decreased recall of precautions;Decreased recall of recent events   Following Commands Follows one step commands with increased time or repetition   Comments Pt cooperative to therapy although confused at times throughout session, a questionable historian reporting she used a walker to walk earlier today, with decreased insight to deficits, will continue to assess.   Assessment   Limitation Decreased ADL status;Decreased Safe judgement during ADL;Decreased cognition;Decreased endurance;Decreased self-care trans;Decreased high-level ADLs   Prognosis Fair   Assessment Pt is a 68 y.o. female who was admitted to [facility] on 4/9/2024 with Coronary artery disease involving native coronary artery, s/p CABG x 3, post-op cardiogenic shock s/p impella placement, s/p cardioversion. Pt seen for an OT evaluation per active OT orders and RN request for pt OOB in chair, [place] and CT in place.  Pt  has a past medical history of CHF (congestive heart failure) ([facility]), Colitis, Diabetes mellitus (HCC), 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HCC), and PONV (postoperative nausea and vomiting). Pt lives with spouse in a 2  with 2 KYLE, uses a [facility] setup with tub shower with shower chair and standard toilet. Pta, pt was independent w/ ADL/IADL and functional mobility, was (+) driving and was not using any DME at baseline. Currently, pt is Mod Ax1 for UB ADL, Max Ax1 for LB ADL, and completed transfers w Mod Ax2. Pt currently presents with impairments in the following categories -steps to enter environment, difficulty performing ADLS, and limited insight into deficits activity tolerance, endurance, standing balance/tolerance, UE strength, UE ROM, arousal, insight, safety , judgement , attention , and sequencing . These impairments, as well as pt's fatigue, SOB, [place], pain, cardiac/sternal precautions, and risk for falls  limit pt's ability to safely engage  in all baseline areas of occupation, includinggrooming, bathing, dressing, toileting, and functional mobility/transfers.    The patient's raw score on the AM-PAC Daily Activity Inpatient Short Form is 13. A raw score of less than 19 suggests the patient may benefit from discharge to post-acute rehabilitation services. Please refer to the recommendation of the Occupational Therapist for safe discharge planning. Pt would benefit from continued acute OT services throughout hospital course and following D/C. Plan for OT interventions 2-3x per week. From OT standpoint, recommend level II services upon D/C. Pt was left seated in bedside chair with all needs within reach.   Goals   Patient Goals to get better   Plan   Treatment Interventions ADL retraining;Functional transfer training;Endurance training;UE strengthening/ROM;Cognitive reorientation;Patient/family training;Equipment evaluation/education;Continued evaluation;Cardiac education;Activityengagement;Energy conservation   Goal Expiration Date 04/28/24   OT Frequency 2-3x/wk   Discharge Recommendation   Rehab Resource Intensity Level, OT II (Moderate Resource Intensity)   AM-PAC Daily Activity Inpatient   Lower Body Dressing 2   Bathing 2   Toileting 2   Upper Body Dressing 2   Grooming 2   Eating 3   Daily Activity Raw Score 13   Daily Activity Standardized Score (Calc for Raw Score >=11) 32.03   AM-PAC Applied Cognition Inpatient   Following a Speech/Presentation 3   Understanding Ordinary Conversation 3   Taking Medications 2   Remembering Where Things Are Placed or Put Away 2   Remembering List of 4-5 Errands 2   Taking Care of Complicated Tasks 2   Applied Cognition Raw Score 14   Applied Cognition Standardized Score 32.02   Modified [place] Scale   Modified [place] Scale 4   End of Consult   Education Provided Yes;Family or social support of family present for education by provider   Patient Position at End of Consult Bedside chair;All needs within reach   Nurse  Communication Nurse aware of consult     OT Goals    - Pt will be Min A  with LB ADL by end of hospital course.    - Pt will be Supervision with UB ADL by end of hospital course.    - Pt will be Min A  with all functional transfers required for patient safety by end of hospital course.    - Pt will be Min A  with functional mobility to/from bathroom for increased independence with toileting tasks.    - Pt activity tolerance will increase to 30 minutes in order to safely engage in ADL and transfers.     - Pt standing tolerance will increase to 3 minutes  in order to promote sink side ADLs and IADL activities.    - Pt will consistently follow one-step directions with min to no vc or prompting.     - Pt will attend to functional tasks for 10 minutes with min to no vc for attention/redirection.    - Pt will attend to and complete ongoing cognitive assessment in order to inform safe discharge planning.    - Pt will recall all cardiac precautions during OT sessions to promote safety following hospital stay.    - Pt and spouse will verbalize and demonstrate understanding to cardiac packet following education in order to promote safety following hospital stay.        Richelle Leos, ELSIE, OTR/L

## 2024-04-14 NOTE — PHYSICAL THERAPY NOTE
Physical Therapy Evaluation     Patient's Name: Julia Perry    Admitting Diagnosis  Coronary artery disease involving native coronary artery of native heart with other form of angina pectoris (HCC) [I25.118]    Problem List  Patient Active Problem List   Diagnosis    Colitis    History of biliary dyskinesia    DMII (diabetes mellitus, type 2) (HCC)    Gallbladder polyp    Leiomyoma of uterus    Acquired right foot drop    Hypomagnesemia    Hair loss    Polyp of ascending colon    Ulcerative pancolitis without complication (HCC)    Coronary artery disease involving native coronary artery    Ischemic cardiomyopathy    Mixed hyperlipidemia    Stenosis of left carotid artery    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HCC)    Hyperprolactinemia (HCC)    S/P carotid endarterectomy    Cardiogenic postoperative shock (HCC)    PONV (postoperative nausea and vomiting)    S/P CABG x 3    New onset atrial fibrillation (HCC)    ABLA (acute blood loss anemia)       Past Medical History  Past Medical History:   Diagnosis Date    CHF (congestive heart failure) (HCC) 4/8/2024    Colitis     Diabetes mellitus (HCC)     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HCC)     PONV (postoperative nausea and vomiting)        Past Surgical History  Past Surgical History:   Procedure Laterality Date    CARDIAC CATHETERIZATION Left 01/17/2024    Procedure: Cardiac Left Heart Cath;  Surgeon: Juan A Sousa MD;  [facility];  Service: Cardiology    CARDIAC CATHETERIZATION  01/17/2024    Procedure: Cardiac catheterization;  Surgeon: Juan A Sousa MD;  [facility];  Service: Cardiology    CARDIAC CATHETERIZATION Left 4/10/2024    Procedure: Cardiac Left Heart Cath;  Surgeon: Viktor Bee MD;  [facility];  Service: Cardiology    CARDIAC CATHETERIZATION N/A 4/10/2024    Procedure: Cardiac Coronary Angiogram;  Surgeon: Viktor Bee MD;  [facility];  Service: Cardiology    CARDIAC CATHETERIZATION N/A  4/10/2024    Procedure: Cardiac Impella Insertion;  Surgeon: Viktor Bee MD;  [facility];  Service: Cardiology    CARDIAC CATHETERIZATION N/A 4/10/2024    Procedure: Cardiac pci;  Surgeon: Viktor Bee MD;  [facility];  Service: Cardiology    COLONOSCOPY  12/06/2023    HYSTERECTOMY      ~1990    [place] CORONARY ARTERY BYP W/VEIN & ARTERY GRAFT 3 VEIN N/A 4/9/2024    Procedure: CORONARY ARTERY BYPASS GRAFT (CABG) x3 VESSELS, LIMA - LAD, LEFT LEG EVH/SVG - PDA  AND OM, W/ELOISA;  Surgeon: Josh Castellanos MD;  [facility];  Service: Cardiac Surgery    [place] TEAEC W/PATCH GRF CAROTID VERTB SUBCLAV NECK INC Left 03/04/2024    Procedure: LEFT ENDARTERECTOMY ARTERY CAROTID;  Surgeon: Janeth De Oliveira DO;  [facility];  Service: Vascular            04/14/24 1350   PT Last Visit   PT Visit Date 04/14/24   Note Type   Note type Evaluation   Pain Assessment   Pain Assessment Tool 0-10   Pain Score No Pain   Restrictions/Precautions   Braces or Orthoses   (none)   Other Precautions Cardiac/sternal;Multiple lines;Telemetry;O2;Fall Risk  (Ash catheter; CT)   Home Living   Type of Home House   Home Layout Two level;Able to live on main level with bedroom/bathroom  (1st floor set-up w/ 2 KYLE w/ hand rail)   Prior Function   Level of [place] Independent with functional mobility  (amb w/o AD)   Lives With Spouse   Receives Help From Family   General   Additional Pertinent History cleared for assessment by nsg (present during the session)   Family/Caregiver Present Yes   Cognition   Overall Cognitive Status Impaired   Arousal/Participation Cooperative   Attention Attends with cues to redirect   Orientation Level Oriented to person;Oriented to place;Oriented to situation   Memory Decreased recall of recent events;Decreased recall of precautions   Following Commands Follows one step commands with increased time or repetition   Subjective   Subjective Alert; in bed;  somewhat flat affect; agreeable to mobilize   RUE Assessment   RUE Assessment X  (decreased AROM shld)   LUE Assessment   LUE Assessment X  (decreased AROM shld)   RLE Assessment   RLE Assessment WFL  (AROM)   Strength RLE   RLE Overall Strength   (fair/ fair +)   LLE Assessment   LLE Assessment WFL  (AROM)   Strength LLE   LLE Overall Strength   (fair/ fair +)   Bed Mobility   Supine to Sit 3  Moderate assistance   Additional items Assist x 1;Increased time required;Verbal cues;LE management   Transfers   Sit to Stand 3  Moderate assistance   Additional items Assist x 2;Increased time required;Verbal cues   Stand to Sit 3  Moderate assistance   Additional items Assist x 2;Increased time required;Verbal cues   Stand pivot 3  Moderate assistance   Additional items Assist x 2;Increased time required;Verbal cues  (bed to chair to the (R) side)   Ambulation/Elevation   Gait pattern Excessively slow;Short stride;Inconsistent sara;R Knee Rylie   Gait Assistance 3  Moderate assist   Additional items Assist x 2;Verbal cues;Tactile cues   Assistive Device Other (Comment)  (HHAx2; rw next visit)   Distance 3 ft total distance for bed to chair transition   Balance   Static Sitting Fair -   Dynamic Sitting Poor +   Static Standing Poor   Dynamic Standing Poor -   Ambulatory Zero   Activity Tolerance   Activity Tolerance Patient limited by fatigue   Medical Staff Made Aware Co-eval performed w/ OTR due to complexity of medical status   Nurse Made Aware spoke to STEPHANIE Grover   Assessment   Prognosis Good   Problem List Decreased strength;Decreased endurance;Impaired balance;Decreased mobility   Assessment Pt is 68 y.o. female admitted with Dx of Multivessel coronary artery disease and underwent Coronary artery bypass grafting x 3 with left internal mammary artery to left anterior descending, saphenous vein graft to obtuse marginal 2, saphenous vein graft to right posterior descending artery on 4/9/2024; postop course included:  cardiogenic shock s/p Impella placement (now removed), a-fib w/ RVR. Pt 's comorbidities affecting POC include: CHF (congestive heart failure), HTN, and  Diabetes mellitus and personal factors of: KYLE. Pt's clinical presentation is currently unstable/unpredictable which is evident in ongoing telemetry monitoring while in a critical care unit w/ [place] catheter, supplemental O2 needs, abnormal lab values being monitored/trending, CT in place and inability to progress further w/ mobilization at this time. Pt presents w/ postop guarding, generalized weakness, incl decreased LE strength, decreased functional endurance and activity tolerance, and impaired balance w/ associated gait deviations (a few steps at bedside) requiring (A)x 2 for OOB to chair transition. Will cont to follow pt in PT for progressive mobilization to address above functional deficits and to max level of (I), endurance, and safety.  D/C recommendations are outlined below. Will cont to follow until then.   Goals   Patient Goals to fel better   STG Expiration Date 04/24/24   Short Term Goal #1 7-10 days. Pt will amb 150 ft w/ rw, mod (I) in order to facilitate safe return to premorbid environment and to initiate return to community amb status. Pt will negotiate 2 steps w/ hand rail and SPC PRN, (S)x1 in order to assure safe navigation in and out of the premorbid living environment. Pt will achieve mod (I) level w/ bed mob in order to facilitate safety with OOB and back to bed transitions in own living environment. Pt will perform transfers w/ mod (I) to assure (I) and safety w/ functional mobility/transitions w/ all aspects of mobility/locomotion. Pt will participate in LE therex and balance activities to max progression w/ mobility skills.   PT Treatment Day 0   Plan   Treatment/Interventions Functional transfer training;LE strengthening/ROM;Elevations;Therapeutic exercise;Endurance training;Equipment eval/education;Bed mobility;Gait training;Spoke  to nursing;OT;Family   PT Frequency 4-6x/wk   Discharge Recommendation   Rehab Resource Intensity Level, PT I (Maximum Resource Intensity)   Equipment Recommended Walker   AM-PAC Basic Mobility Inpatient   Turning in Flat Bed Without Bedrails 2   Lying on Back to Sitting on Edge of Flat Bed Without Bedrails 2   Moving Bed to Chair 1   Standing Up From Chair Using Arms 1   Walk in Room 1   Climb 3-5 Stairs With Railing 1   Basic Mobility Inpatient Raw Score 8   Turning Head Towards Sound 4   Follow Simple Instructions 4   Low Function Basic Mobility Raw Score  16   Low Function Basic Mobility Standardized Score  25.72   [facility] Highest Level Of Mobility   -[facility] Goal 3: Sit at edge of bed   -HLM Achieved 4: Move to chair/commode   Modified [place] Scale   Modified [place] Scale 4   End of Consult   Patient Position at End of Consult Bedside chair;All needs within reach       Lobo Jain, PT

## 2024-04-14 NOTE — PROGRESS NOTES
Progress Note - Cardiothoracic Surgery   Julia Perry 68 y.o. female MRN: [id number]  Unit/Bed#: [facility] 414-01 Encounter: [id number]      POD # 5 s/p CABGx3    Pt seen/examined.  Interval history and data reviewed with critical care team.  She was off pressors but had an episode of a.fib with RVR in the 180 reuiring escalation of pressors support and cardioversion  Back on NSR  Milrinone 0.125  Nhan 100      Medications:   Scheduled Meds:  Current Facility-Administered Medications   Medication Dose Route Frequency Provider Last Rate    acetaminophen  650 mg Rectal Q4H PRN Geri Spironello V, CRNP      acetaminophen  975 mg Oral Q8H LINDA Geri Spironello V, CRNP      amiodarone (CORDARONE) 900 mg in dextrose 5 % 500 mL infusion  1 mg/min Intravenous Continuous Geri Spironello V, CRNP 1 mg/min (04/14/24 0648)    Followed by    amiodarone (CORDARONE) 900 mg in dextrose 5 % 500 mL infusion  0.5 mg/min Intravenous Continuous Geri Spironello V, CRNP      amiodarone  200 mg Oral Q8H LINDA Geri Spironello V, CRNP      aspirin  325 mg Oral Daily Geri Spironello V, CRNP      atorvastatin  80 mg Oral Daily With Dinner Geri Spironello V, CRNP      bisacodyl  10 mg Rectal Daily PRN Geri Spironello V, CRNP      chlorhexidine  15 mL Mouth/Throat BID Geri Spironello V, CRNP      Cholecalciferol  1,000 Units Oral Daily Geri Spironello V, CRNP      docusate sodium  100 mg Oral BID Geri Spironello V, CRNP      fondaparinux  2.5 mg Subcutaneous Q24H Geri Spironello V, CRNP      HYDROmorphone  0.5 mg Intravenous Q2H PRN Geri Spironello V, CRNP      insulin lispro  1-5 Units Subcutaneous TID AC Geri Spironello V, CRNP      insulin lispro  1-5 Units Subcutaneous HS Geri Spironello V, CRNP      magnesium gluconate  500 mg Oral Daily Geri Spironello V, CRNP      milrinone (Primacor) infusion  0.13 mcg/kg/min Intravenous Continuous Flex Childers PA-C 0.13 mcg/kg/min (04/14/24 0522)  "   ondansetron  4 mg Intravenous Q6H PRN Geri Spironello V, CRNP      oxyCODONE  2.5 mg Oral Q4H PRN Geri Spironello V, CRNP      Or    oxyCODONE  5 mg Oral Q4H PRN Geri Spironello V, CRNP      pantoprazole  40 mg Oral Early Morning Geri Spironello V, CRNP      phenylephine   mcg/min Intravenous Titrated Geri Spironello V, CRNP 50 mcg/min (04/14/24 1024)    polyethylene glycol  17 g Oral Daily Geri Spironello V, CRNP      ticagrelor  90 mg Oral Q12H LINDA Geri Spironello V, CRNP      trimethobenzamide  200 mg Intramuscular Q6H PRN Geri Spironello V, CRNP       Continuous Infusions:amiodarone (CORDARONE) 900 mg in dextrose 5 % 500 mL infusion, 1 mg/min, Last Rate: 1 mg/min (04/14/24 0648)   Followed by  amiodarone (CORDARONE) 900 mg in dextrose 5 % 500 mL infusion, 0.5 mg/min  milrinone (Primacor) infusion, 0.13 mcg/kg/min, Last Rate: 0.13 mcg/kg/min (04/14/24 0522)  phenylephine,  mcg/min, Last Rate: 50 mcg/min (04/14/24 1024)      PRN Meds:.  acetaminophen    bisacodyl    HYDROmorphone    ondansetron    oxyCODONE **OR** oxyCODONE    trimethobenzamide    Vitals: Blood pressure 104/55, pulse 102, temperature 99.1 °F (37.3 °C), temperature source Core, resp. rate 19, height 5' 4\" (1.626 m), weight 75.1 kg (165 lb 9.1 oz), SpO2 99%.,Body mass index is 28.42 kg/m².  I/O last 24 hours:  In: 1734.8 [P.O.:120; I.V.:664.8; IV Piggyback:950]  Out: 3500 [Urine:2780; Chest Tube:720]  Invasive Devices       Central Venous Catheter Line  Duration             CVC Central Lines 04/09/24 5 days    Introducer 04/09/24 5 days              Drain  Duration             Chest Tube 1 Left Pleural 28 Fr. 5 days    Chest Tube 2 Right Pleural 28 Fr. 5 days    Chest Tube 3 Posterior;Mediastinal 24 Fr. 5 days    Chest Tube 4 Mediastinal;Anterior 28 Fr. 5 days    Urethral Catheter Latex;Straight-tip 16 Fr. 5 days                      Lab, Imaging and other studies:   Results from last 7 days   Lab Units " 04/14/24  0403 04/13/24  0414 04/12/24  1643 04/12/24  0421   WBC Thousand/uL 10.81* 13.37*  --  18.84*   HEMOGLOBIN g/dL 9.6* 9.3* 7.5* 8.4*   HEMATOCRIT % 29.8* 29.6*  --  27.3*   PLATELETS Thousands/uL 98* 91* 89* 91*     Results from last 7 days   Lab Units 04/14/24  0403 04/13/24  1633 04/13/24  0415 04/09/24  1801 04/09/24  1307   POTASSIUM mmol/L 4.0 3.7 3.7   < >  --    CHLORIDE mmol/L 106 109* 109*   < >  --    CO2 mmol/L 27 25 22   < >  --    CO2, I-STAT mmol/L  --   --   --   --  22   BUN mg/dL 12 15 18   < >  --    CREATININE mg/dL 0.61 0.77 0.92   < >  --    GLUCOSE, ISTAT mg/dl  --   --   --   --  158*   CALCIUM mg/dL 7.9* 7.8* 7.9*   < >  --     < > = values in this interval not displayed.     Results from last 7 days   Lab Units 04/13/24  0414 04/12/24  1643 04/12/24  1103 04/12/24  0421   INR  1.55* 1.63*  --  1.63*   PTT seconds 27 30 71* 76*     Recent Labs     04/12/24 0422   PHART 7.377   GLD3AFN 21.0*   PO2ART 89.5   LVU0SFO 36.6   BEART -3.7           Plan:    Continue Amio drip  Continue MIlrone 0.125  Wean Nhan as tolerated  Diuresis PRN      SIGNATURE: [place] MD  DATE: April 14, 2024  TIME: 11:57 AM

## 2024-04-15 LAB
ANION GAP SERPL CALCULATED.3IONS-SCNC: 10 MMOL/L (ref 4–13)
ANION GAP SERPL CALCULATED.3IONS-SCNC: 7 MMOL/L (ref 4–13)
BASE EX.OXY STD BLDV CALC-SCNC: 49.7 % (ref 60–80)
BASE EX.OXY STD BLDV CALC-SCNC: 56.9 % (ref 60–80)
BASE EX.OXY STD BLDV CALC-SCNC: 59.7 % (ref 60–80)
BASE EXCESS BLDV CALC-SCNC: 1.5 MMOL/L
BASE EXCESS BLDV CALC-SCNC: 3.5 MMOL/L
BASE EXCESS BLDV CALC-SCNC: 5 MMOL/L
BUN SERPL-MCNC: 10 MG/DL (ref 5–25)
BUN SERPL-MCNC: 11 MG/DL (ref 5–25)
CA-I BLD-SCNC: 1.15 MMOL/L (ref 1.12–1.32)
CALCIUM SERPL-MCNC: 8 MG/DL (ref 8.4–10.2)
CALCIUM SERPL-MCNC: 8.1 MG/DL (ref 8.4–10.2)
CHLORIDE SERPL-SCNC: 100 MMOL/L (ref 96–108)
CHLORIDE SERPL-SCNC: 99 MMOL/L (ref 96–108)
CO2 SERPL-SCNC: 26 MMOL/L (ref 21–32)
CO2 SERPL-SCNC: 29 MMOL/L (ref 21–32)
CREAT SERPL-MCNC: 0.62 MG/DL (ref 0.6–1.3)
CREAT SERPL-MCNC: 0.63 MG/DL (ref 0.6–1.3)
ERYTHROCYTE [DISTWIDTH] IN BLOOD BY AUTOMATED COUNT: 17.3 % (ref 11.6–15.1)
GFR SERPL CREATININE-BSD FRML MDRD: 93 ML/MIN/1.73SQ M
GFR SERPL CREATININE-BSD FRML MDRD: 93 ML/MIN/1.73SQ M
GLUCOSE SERPL-MCNC: 114 MG/DL (ref 65–140)
GLUCOSE SERPL-MCNC: 125 MG/DL (ref 65–140)
GLUCOSE SERPL-MCNC: 131 MG/DL (ref 65–140)
GLUCOSE SERPL-MCNC: 132 MG/DL (ref 65–140)
GLUCOSE SERPL-MCNC: 156 MG/DL (ref 65–140)
GLUCOSE SERPL-MCNC: 164 MG/DL (ref 65–140)
HCO3 BLDV-SCNC: 26 MMOL/L (ref 24–30)
HCO3 BLDV-SCNC: 27.9 MMOL/L (ref 24–30)
HCO3 BLDV-SCNC: 29.8 MMOL/L (ref 24–30)
HCT VFR BLD AUTO: 28.7 % (ref 34.8–46.1)
HGB BLD-MCNC: 9.2 G/DL (ref 11.5–15.4)
MAGNESIUM SERPL-MCNC: 1.7 MG/DL (ref 1.9–2.7)
MCH RBC QN AUTO: 29.9 PG (ref 26.8–34.3)
MCHC RBC AUTO-ENTMCNC: 32.1 G/DL (ref 31.4–37.4)
MCV RBC AUTO: 93 FL (ref 82–98)
O2 CT BLDV-SCNC: 7.1 ML/DL
O2 CT BLDV-SCNC: 8 ML/DL
O2 CT BLDV-SCNC: 8.6 ML/DL
PCO2 BLDV: 40.7 MM HG (ref 42–50)
PCO2 BLDV: 41.3 MM HG (ref 42–50)
PCO2 BLDV: 45.4 MM HG (ref 42–50)
PH BLDV: 7.42 [PH] (ref 7.3–7.4)
PH BLDV: 7.43 [PH] (ref 7.3–7.4)
PH BLDV: 7.45 [PH] (ref 7.3–7.4)
PHOSPHATE SERPL-MCNC: 2.8 MG/DL (ref 2.3–4.1)
PLATELET # BLD AUTO: 164 THOUSANDS/UL (ref 149–390)
PMV BLD AUTO: 11.1 FL (ref 8.9–12.7)
PO2 BLDV: 27.8 MM HG (ref 35–45)
PO2 BLDV: 31.1 MM HG (ref 35–45)
PO2 BLDV: 31.8 MM HG (ref 35–45)
POTASSIUM SERPL-SCNC: 3.7 MMOL/L (ref 3.5–5.3)
POTASSIUM SERPL-SCNC: 4.3 MMOL/L (ref 3.5–5.3)
RBC # BLD AUTO: 3.08 MILLION/UL (ref 3.81–5.12)
SODIUM SERPL-SCNC: 135 MMOL/L (ref 135–147)
SODIUM SERPL-SCNC: 136 MMOL/L (ref 135–147)
WBC # BLD AUTO: 13.24 THOUSAND/UL (ref 4.31–10.16)

## 2024-04-15 PROCEDURE — 84100 ASSAY OF PHOSPHORUS: CPT | Performed by: NURSE PRACTITIONER

## 2024-04-15 PROCEDURE — 82805 BLOOD GASES W/O2 SATURATION: CPT | Performed by: NURSE PRACTITIONER

## 2024-04-15 PROCEDURE — 99233 SBSQ HOSP IP/OBS HIGH 50: CPT | Performed by: ANESTHESIOLOGY

## 2024-04-15 PROCEDURE — 82805 BLOOD GASES W/O2 SATURATION: CPT | Performed by: PHYSICIAN ASSISTANT

## 2024-04-15 PROCEDURE — 80048 BASIC METABOLIC PNL TOTAL CA: CPT | Performed by: NURSE PRACTITIONER

## 2024-04-15 PROCEDURE — 85027 COMPLETE CBC AUTOMATED: CPT | Performed by: NURSE PRACTITIONER

## 2024-04-15 PROCEDURE — 83735 ASSAY OF MAGNESIUM: CPT | Performed by: NURSE PRACTITIONER

## 2024-04-15 PROCEDURE — 82330 ASSAY OF CALCIUM: CPT | Performed by: NURSE PRACTITIONER

## 2024-04-15 PROCEDURE — 82948 REAGENT STRIP/BLOOD GLUCOSE: CPT

## 2024-04-15 RX ORDER — POTASSIUM CHLORIDE 20 MEQ/1
20 TABLET, EXTENDED RELEASE ORAL 2 TIMES DAILY
Status: DISCONTINUED | OUTPATIENT
Start: 2024-04-15 | End: 2024-04-21

## 2024-04-15 RX ORDER — FUROSEMIDE 10 MG/ML
40 INJECTION INTRAMUSCULAR; INTRAVENOUS
Status: DISCONTINUED | OUTPATIENT
Start: 2024-04-15 | End: 2024-04-16

## 2024-04-15 RX ORDER — MAGNESIUM SULFATE HEPTAHYDRATE 40 MG/ML
2 INJECTION, SOLUTION INTRAVENOUS ONCE
Status: COMPLETED | OUTPATIENT
Start: 2024-04-15 | End: 2024-04-15

## 2024-04-15 RX ADMIN — CHLORHEXIDINE GLUCONATE 0.12% ORAL RINSE 15 ML: 1.2 LIQUID ORAL at 08:20

## 2024-04-15 RX ADMIN — MILRINONE LACTATE IN DEXTROSE 0.25 MCG/KG/MIN: 200 INJECTION, SOLUTION INTRAVENOUS at 18:23

## 2024-04-15 RX ADMIN — Medication 1000 UNITS: at 08:20

## 2024-04-15 RX ADMIN — INSULIN LISPRO 1 UNITS: 100 INJECTION, SOLUTION INTRAVENOUS; SUBCUTANEOUS at 21:16

## 2024-04-15 RX ADMIN — Medication 500 MG: at 08:25

## 2024-04-15 RX ADMIN — PHENYLEPHRINE HYDROCHLORIDE 90 MCG/MIN: 50 INJECTION INTRAVENOUS at 09:14

## 2024-04-15 RX ADMIN — ACETAMINOPHEN 975 MG: 325 TABLET, FILM COATED ORAL at 06:15

## 2024-04-15 RX ADMIN — AMIODARONE HYDROCHLORIDE 200 MG: 200 TABLET ORAL at 21:13

## 2024-04-15 RX ADMIN — PHENYLEPHRINE HYDROCHLORIDE 60 MCG/MIN: 50 INJECTION INTRAVENOUS at 19:28

## 2024-04-15 RX ADMIN — INSULIN LISPRO 1 UNITS: 100 INJECTION, SOLUTION INTRAVENOUS; SUBCUTANEOUS at 11:39

## 2024-04-15 RX ADMIN — AMIODARONE HYDROCHLORIDE 0.5 MG/MIN: 50 INJECTION, SOLUTION INTRAVENOUS at 04:33

## 2024-04-15 RX ADMIN — PANTOPRAZOLE SODIUM 40 MG: 40 TABLET, DELAYED RELEASE ORAL at 06:15

## 2024-04-15 RX ADMIN — FONDAPARINUX SODIUM 2.5 MG: 2.5 INJECTION, SOLUTION SUBCUTANEOUS at 08:20

## 2024-04-15 RX ADMIN — FUROSEMIDE 40 MG: 10 INJECTION, SOLUTION INTRAMUSCULAR; INTRAVENOUS at 08:31

## 2024-04-15 RX ADMIN — BISACODYL 10 MG: 10 SUPPOSITORY RECTAL at 16:46

## 2024-04-15 RX ADMIN — ATORVASTATIN CALCIUM 80 MG: 80 TABLET, FILM COATED ORAL at 16:52

## 2024-04-15 RX ADMIN — AMIODARONE HYDROCHLORIDE 200 MG: 200 TABLET ORAL at 06:15

## 2024-04-15 RX ADMIN — POTASSIUM CHLORIDE 20 MEQ: 1500 TABLET, EXTENDED RELEASE ORAL at 08:20

## 2024-04-15 RX ADMIN — TICAGRELOR 90 MG: 90 TABLET ORAL at 21:13

## 2024-04-15 RX ADMIN — CHLORHEXIDINE GLUCONATE 0.12% ORAL RINSE 15 ML: 1.2 LIQUID ORAL at 21:13

## 2024-04-15 RX ADMIN — MAGNESIUM SULFATE HEPTAHYDRATE 2 G: 40 INJECTION, SOLUTION INTRAVENOUS at 06:15

## 2024-04-15 RX ADMIN — DOCUSATE SODIUM 100 MG: 100 CAPSULE, LIQUID FILLED ORAL at 17:00

## 2024-04-15 RX ADMIN — ASPIRIN 325 MG ORAL TABLET 325 MG: 325 PILL ORAL at 08:20

## 2024-04-15 RX ADMIN — POTASSIUM CHLORIDE 20 MEQ: 1500 TABLET, EXTENDED RELEASE ORAL at 17:00

## 2024-04-15 RX ADMIN — TICAGRELOR 90 MG: 90 TABLET ORAL at 08:20

## 2024-04-15 RX ADMIN — MILRINONE LACTATE IN DEXTROSE 0.25 MCG/KG/MIN: 200 INJECTION, SOLUTION INTRAVENOUS at 02:06

## 2024-04-15 RX ADMIN — FUROSEMIDE 40 MG: 10 INJECTION, SOLUTION INTRAMUSCULAR; INTRAVENOUS at 16:37

## 2024-04-15 RX ADMIN — POTASSIUM PHOSPHATE, MONOBASIC POTASSIUM PHOSPHATE, DIBASIC 30 MMOL: 224; 236 INJECTION, SOLUTION, CONCENTRATE INTRAVENOUS at 07:56

## 2024-04-15 RX ADMIN — AMIODARONE HYDROCHLORIDE 200 MG: 200 TABLET ORAL at 14:28

## 2024-04-15 RX ADMIN — DOCUSATE SODIUM 100 MG: 100 CAPSULE, LIQUID FILLED ORAL at 08:20

## 2024-04-15 NOTE — WOUND OSTOMY CARE
Consult Note - Wound   Julia Perry 68 y.o. female MRN: [id number]  Unit/Bed#: [facility] 414-01 Encounter: [id number]        History and Present Illness:  Patient is a 69 yo female that was admitted to [facility] for treatment of coronary artery disease involving native coronary artery. Per H&P, Patient has a PMH of CAD, HLD, DM2, chronic combined systolic & diastolic CHF, intracranial stenosis, pituitary macroadenoma, hyperprolactinemia, hypercalcemia and ulcerative colitis.. Patient is a mod assist for turning and repositioning. Patient reports continence of bowel and bladder is managed via internal urinary catheter. 2 chest tubes in place. On assessment, patient is seen sitting OOB in recliner. Family member present at bedside.      Wound Care was consulted for lower extremity wounds     Assessment Findings:   B/L heels and b/l sacro-buttocks are dry intact and nikolay with no skin loss or wounds present. Recommend preventative Hydraguard Cream and proper offloading/ repositioning.      Right Medial/ Anterior Thigh: patient reports area was previously a blister. Area is now intact, dry pink scab. No skin loss or drainage present. Recommend 3m no sting skin prep to area.   Left Medial/ Anterior Thigh: intact, linear in shape pink fluid filled blister. No skin loss or drainage noted. Patient reports that blister occurred from hicks catheter stat lock. Recommend adaptic and foam dressing to area.     No induration, fluctuance, odor, warmth/temperature differences, redness, or purulence noted to the above noted wounds and skin areas assessed. New dressings applied per orders listed below. Patient tolerated well- no s/s of non-verbal pain or discomfort observed during the encounter. Bedside nurse aware of plan of care. See flow sheets for more detailed assessment findings.      Orders listed below and wound care will continue to follow, call or tiger text with questions.     Skin Care Plan:  1-Left Medial/Anterior Thigh  Blister: cleanse with NSS and pat dry. Apply adaptic to wound bed and cover with silicone bordered foam dressing. Shadi with T for Treatment and change every other day or PRN soilage/displacement.   2-Turn/reposition q2h or when medically stable for pressure re-distribution on skin .  3-Elevate heels to offload pressure.  4-Moisturize skin daily with skin nourishing cream  5-Ehob cushion in chair when out of bed.  6-Preventative Hydraguard to bilateral sacro-buttocks and heels BID and PRN.   7-Apply 3M No Sting Skin Prep to right medial/anterior thigh scab daily.       Wounds:  Wound 04/12/24 Skin Tear Laceration Thigh Anterior;Right (Active)   Wound Image   04/15/24 1132   Wound Description Intact;Pink;Dry 04/15/24 1500   Wilda-wound Assessment Intact 04/15/24 1500   Wound Length (cm) 0 cm 04/15/24 1500   Wound Width (cm) 0 cm 04/15/24 1500   Wound Depth (cm) 0 cm 04/15/24 1500   Wound Surface Area (cm^2) 0 cm^2 04/15/24 1500   Wound Volume (cm^3) 0 cm^3 04/15/24 1500   Calculated Wound Volume (cm^3) 0 cm^3 04/15/24 1500   Drainage Amount None 04/15/24 1500   Treatments Cleansed;Site care 04/15/24 1500   Dressing Other (Comment) 04/15/24 1500   Dressing Changed New 04/15/24 1500   Patient Tolerance Tolerated well 04/15/24 1500   Dressing Status Clean;Dry;Intact 04/15/24 1500       Wound 04/13/24 Other (comment) Thigh Anterior;Left (Active)   Wound Image   04/15/24 1133   Wound Description Intact;Pink 04/15/24 1500   Wilda-wound Assessment Fragile 04/15/24 1500   Wound Length (cm) 0.5 cm 04/15/24 1133   Wound Width (cm) 6.5 cm 04/15/24 1133   Wound Depth (cm) 0 cm 04/15/24 1133   Wound Surface Area (cm^2) 3.25 cm^2 04/15/24 1133   Wound Volume (cm^3) 0 cm^3 04/15/24 1133   Calculated Wound Volume (cm^3) 0 cm^3 04/15/24 1133   Drainage Amount None 04/15/24 1500   Non-staged Wound Description Not applicable 04/15/24 1500   Treatments Cleansed;Site care 04/15/24 1500   Dressing Non adherent;Foam, Silicon (eg. Allevyn,  etc) 04/15/24 1500   Dressing Changed New 04/15/24 1500   Patient Tolerance Tolerated well 04/15/24 1500   Dressing Status Clean;Dry;Intact 04/15/24 1500               Rosalina Cardenas RN, BSN, CWOCN

## 2024-04-15 NOTE — RESPIRATORY THERAPY NOTE
RT Protocol Note  Julia Perry 68 y.o. female MRN: [id number]  Unit/Bed#: [facility] 414-01 Encounter: [id number]    Assessment    Principal Problem:    Coronary artery disease involving native coronary artery  Active Problems:    DMII (diabetes mellitus, type 2) (HCC)    Hypomagnesemia    Ulcerative pancolitis without complication (HCC)    Ischemic cardiomyopathy    Mixed hyperlipidemia    Stenosis of left carotid artery    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HCC)    Hyperprolactinemia (HCC)    S/P carotid endarterectomy    Cardiogenic postoperative shock (HCC)    PONV (postoperative nausea and vomiting)    S/P CABG x 3    New onset atrial fibrillation (HCC)    ABLA (acute blood loss anemia)      Home Pulmonary Medications:  NA       Past Medical History:   Diagnosis Date    CHF (congestive heart failure) (HCC) 4/8/2024    Colitis     Diabetes mellitus (HCC)     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HCC)     PONV (postoperative nausea and vomiting)      Social History     Socioeconomic History    Marital status: /Civil Union     Spouse name: Not on file    Number of children: Not on file    Years of education: Not on file    Highest education level: Not on file   Occupational History    Not on file   Tobacco Use    Smoking status: Former     Types: Cigarettes     Passive exposure: Past    Smokeless tobacco: Never    Tobacco comments:     Quit 1970's   Vaping Use    Vaping status: Never Used   Substance and Sexual Activity    Alcohol use: Never    Drug use: Never    Sexual activity: Not on file   Other Topics Concern    Not on file   Social History Narrative    Not on file     Social Determinants of Health     Financial Resource Strain: Patient Declined (8/18/2023)    Overall Financial Resource Strain (CARDIA)     Difficulty of Paying Living Expenses: Patient declined   Food Insecurity: No Food Insecurity (4/11/2024)    Hunger Vital Sign     Worried About Running Out of Food in the Last Year: Never true     Ran Out of Food in the Last Year:  "Never true   Transportation Needs: No Transportation Needs (4/11/2024)    PRAPARE - Transportation     Lack of Transportation (Medical): No     Lack of Transportation (Non-Medical): No   Physical Activity: Not on file   Stress: Not on file   Social Connections: Not on file   Intimate Partner Violence: Not on file   Housing Stability: Low Risk  (4/11/2024)    Housing Stability Vital Sign     Unable to Pay for Housing in the Last Year: No     Number of Places Lived in the Last Year: 1     Unstable Housing in the Last Year: No       Subjective         Objective    Physical Exam:        Vitals:  Blood pressure 110/58, pulse 101, temperature 99.5 °F (37.5 °C), temperature source Core, resp. rate 19, height 5' 4\" (1.626 m), weight 76.3 kg (168 lb 3.4 oz), SpO2 96%.    Results from last 7 days   Lab Units 04/14/24  1716 04/12/24  0422 04/10/24  1600   PH ART   --  7.377 7.379   PCO2 ART mm Hg  --  36.6 31.2*   PO2 ART mm Hg  --  89.5 163.1*   HCO3 ART mmol/L  --  21.0* 18.0*   BASE EXC ART mmol/L  --  -3.7 -6.2   O2 CONTENT ART mL/dL  --  12.3* 14.8*   O2 HGB, ARTERIAL %  --  95.4 98.6*   ABG SOURCE   --  Line, Arterial  --    TIFF TEST   --   --  No   NON VENT ROOM AIR % 21  --   --        Imaging and other studies:     O2 Device: RA     Plan    Respiratory Plan: Vent/NIV/HFNC  Airway Clearance Plan: Incentive Spirometer     Resp Comments: (P) Pt has been on aw clearance w/ IS since 4/9.  She is achieving appropriate goals.  Will d/c rt aw clearance @ this time.   "

## 2024-04-15 NOTE — PROGRESS NOTES
[facility]  Progress Note: Critical Care   Date of Service: 4/15/2024  Hospital Day: 6  Name: Julia Perry I  MRN: [id number]  Unit/Bed#: [facility] 414-01      Assessment/Plan     Impressions:  MVCAD s/p CABGx3 and RAFFY x2  Post Op Cardiogenic shock s/p impella placement  ICM ( EF15%)  New Onset A. FIb RVR  HTN  HLD  DM2  B/L Carotid stenosis s/p L CEA 3/24  Pituitary Macroadenoma with elevated prolactin  Hypercalcemia  UC    Neuro:   Cardiac Surgery Pain Control: ATC tylenol and PRN oxycodone 2.5/5mg  Delirium precautions  Regulate sleep/wake cycle  CAM-ICU daily  Trend neuro exam      CV:   Cardiac Surgery Hemodynamic Monitoring: MAP goal >65 CI >2.2 Continue pulmonary artery catheter for hemodynamic monitoring  Continue central line due to vasoactive medications Continue arterial line for blood pressure monitoring and/or frequent blood gas draws  Follow rhythm on telemetry  Critical Care Infusions : Neosynephrine 110 mcg/min and Primacor 0.25 mcg/kg/min  Consider weaning to 0.13 today, d/c [place]?  Beta Blocker Plan: Hold beta blocker secondary to hypotension  Epicardial pacing wire plan : Epicardial wires not in place  Post op cardiac surgery medications:    mg QD, Brilinta 90   Statin: Lipitor 80 mg qHS  Amiodarone 200 mg PO q8 hours   New Onset A. Fib with RVR- remains on Amio Drip, hold AC for now     Lung:   Good room air saturation   Chest tube output remains persistently high; Continue chest tubes to suction today    GI:   Continue PPI for stress ulcer prophylaxis  Continue bowel regimen  Trend abdominal exam and bowel function    FEN:   Diuresis Plan: Lasix 40mg IV BID  Nutrition plan: as tolerated  Replenish electrolytes with goals: K >4.0, Mag >2.0, and Phos >3.0    :   Stiles Plan: Continue for accurate I/O monitoring for 48 hours  Trend UOP and BUN/creat  Strict I and O     ID:   Trend temps and WBC count  Maintain normothermia    Heme:   Trend hgb and  plts    Endo:   SSI, endo following    MSK/Skin:  Mobility goal:   PT/OT consult as medically appropriate   Frequent turning and pressure off-loading  Local wound care as needed    Disposition:  Critical care    ICU Core Measures     A: Assess, Prevent, and Manage Pain Has pain been assessed? Yes  Need for changes to pain regimen? No   B: Both SAT/SAT  N/A   C: Choice of Sedation RASS Goal: 0 Alert and Calm  Need for changes to sedation or analgesia regimen? No   D: Delirium CAM-ICU: Negative   E: Early Mobility  Plan for early mobility? Yes   F: Family Engagement Plan for family engagement today? Yes       Review of Invasive Devices:    Stiles Plan: Continue for accurate I/O monitoring for 48 hours  Central access plan: Patient has multiple central venous catheters.       Prophylaxis:  VTE VTE covered by:  fondaparinux, Subcutaneous, 2.5 mg at 04/14/24 0846       Stress Ulcer  covered bypantoprazole (PROTONIX) EC tablet 40 mg [[id number]]          Significant 24hr Events      24 Hour Events/HPI: POD # 6 s/p CABG x2. PPD #5 RAFFY x 2 to OM    Weekend: Impella DC on Friday.  Milrinone at 0.25 thereafter.  Echo EF 20 % with global systolic dysfunction. Weaned to 0.13 on Saturday.  Received as needed diuresis with appropriate response.  On Sunday morning patient went into A-fib with RVR requiring amnio bolus and drip, no conversion with persistent hypotension requiring cardioversion x 1.  Tolerated well.  Persistent low cardiac index on Sunday requiring up titration of milrinone to 0.25.  ON: Lasix 40 overnight for low urine output.  Milrinone remains at 0.25 with CI of 2  Critical Care Infusions : Neosynephrine 110 mcg/min and Primacor 0.25 mcg/kg/min   Subjective   Review of Systems   All other systems reviewed and are negative.       Objective     Medications:  Scheduled Continuous   acetaminophen, 975 mg, Q8H LINDA  amiodarone, 200 mg, Q8H LINDA  aspirin, 325 mg, Daily  atorvastatin, 80 mg, Daily With  Dinner  chlorhexidine, 15 mL, BID  Cholecalciferol, 1,000 Units, Daily  docusate sodium, 100 mg, BID  fondaparinux, 2.5 mg, Q24H  insulin lispro, 1-5 Units, TID AC  insulin lispro, 1-5 Units, HS  magnesium gluconate, 500 mg, Daily  magnesium sulfate, 2 g, Once  pantoprazole, 40 mg, Early Morning  polyethylene glycol, 17 g, Daily  potassium phosphate, 30 mmol, Once  ticagrelor, 90 mg, Q12H LINDA      amiodarone (CORDARONE) 900 mg in dextrose 5 % 500 mL infusion, 0.5 mg/min, Last Rate: 0.5 mg/min (04/15/24 0433)  milrinone (Primacor) infusion, 0.25 mcg/kg/min, Last Rate: 0.25 mcg/kg/min (04/15/24 0206)  phenylephine,  mcg/min, Last Rate: 90 mcg/min (04/15/24 0615)         Vitals: Invasive Monitoring       Most Recent Min/Max in 24hrs   Temp 99.5 °F (37.5 °C) Temp  Min: 98.6 °F (37 °C)  Max: 99.9 °F (37.7 °C)   Pulse 101 Pulse  Min: 96  Max: 162   Resp 19 Resp  Min: 16  Max: 21   /58 BP  Min: 89/52  Max: 122/60   O2 Sat 96 % SpO2  Min: 94 %  Max: 100 %   O2 Device: None (Room air)  Nasal Cannula O2 Flow Rate (L/min): 2 L/min Arterial Line  [place] BP 75/63  No data recorded   MAP 75 mmHg  Arterial Line MAP (mmHg)  Min: 74 mmHg  Max: 85 mmHg     PA Catheter   Most Recent  Min/Max in 24hrs    PAP 20/12 PAP  Min: 14/4  Max: 68/10   CVP 2 mmHg CVP (mean)  Min: -6 mmHg  Max: 64 mmHg   CI 2 L/min/m2  CI (L/min/m2)  Min: 1.3 L/min/m2  Max: 3.5 L/min/m2   SVR 1679 (dyne*sec)/cm5 SVR (dyne*sec)/cm5  Min: 594 (dyne*sec)/cm5  Max: 2181 (dyne*sec)/cm5        I/O Chest tube output (if present)     Intake/Output Summary (Last 24 hours) at 4/15/2024 0649  Last data filed at 4/15/2024 0600  Gross per 24 hour   Intake 2065.94 ml   Output 3150 ml   Net -1084.06 ml     UOP: 190/hour    BM: BM x1 in the last 24 hours  Weight change: 1.2 kg (2 lb 10.3 oz)     Mediastinal tubes:  280 mL/8hr  440 mL/24hr   Pleural tubes: 160 mL/8hr  370 mL/24hr        Physical Exam   Physical Exam  Eyes:      Extraocular Movements: Extraocular  movements intact.      Pupils: Pupils are equal, round, and reactive to light.   Skin:     General: Skin is warm.   HENT:      Head: Normocephalic.      Mouth/Throat:      Mouth: Mucous membranes are moist.   Cardiovascular:      Rate and Rhythm: Normal rate and regular rhythm.      Pulses: Normal pulses.   Musculoskeletal:      Right lower leg: No edema.      Left lower leg: No edema.   Abdominal:      Palpations: Abdomen is soft.   Constitutional:       Appearance: She is well-developed and well-nourished.   Pulmonary:      Effort: Pulmonary effort is normal.      Breath sounds: Normal breath sounds.   Neurological:      General: No focal deficit present.      Mental Status: She is alert and oriented to person, place and time.      Motor: Strength full and intact in all extremities.   Genitourinary/Anorectal:  Stiles present.        Diagnostic Studies               Labs:  CBC    Recent Labs     04/14/24  0403 04/15/24  0428   WBC 10.81* 13.24*   HGB 9.6* 9.2*   HCT 29.8* 28.7*   PLT 98* 164     BMP    Recent Labs     04/14/24  1716 04/15/24  0428   SODIUM 138 136   K 3.9 3.7    100   CO2 27 29   AGAP 8 7   BUN 12 11   CREATININE 0.69 0.63   CALCIUM 8.3* 8.1*        Baseline Creat: 0.9   Coags    No recent results           Additional Electrolytes  Recent Labs     04/14/24  0403 04/15/24  0428   MG 1.6* 1.7*   PHOS 2.2* 2.8   CAIONIZED  --  1.15          Blood Gas    No recent results  Recent Labs     04/15/24  0428   PHVEN 7.435*   [id number] 45.4   PO2VEN 31.1*   [id number] 29.8   BEVEN 5.0   [id number] 59.7*    LFTs  Recent Labs     04/14/24  0403   ALT 7   AST 16   ALKPHOS 46   ALB 3.0*   TBILI 1.33*       Infectious    No recent results     No recent results Glucose  Recent Labs     04/13/24  1633 04/14/24  0403 04/14/24  1716 04/15/24  0428   GLUC 141* 136 151* 125        Collaborative bedside rounds performed with cardiac surgery attending, critical care attending and bedside RN.     Timoteo Rasmussen PA-C

## 2024-04-15 NOTE — QUICK NOTE
"Fingerstick blood glucose trends reviewed and do not reveal hypoglycemia.     Patient's appetite remains poor and she continues to require vasopressor support.     Continue correctional scale algorithm 2 with meals and at bedtime. Recommend adjusting diet to carbohydrate restricted level 2. Avoid hypoglycemia and treat per protocol.    Please Tigertext questions to the clinician covering the \"[id number]\" Role. Thank you.  "

## 2024-04-15 NOTE — DISCHARGE INSTR - OTHER ORDERS
Skin care plans:  1-Hydraguard to bilateral sacrum, buttock and heels BID and PRN  2-Elevate heels to offload pressure.  3-Ehob cushion in chair when out of bed.  4-Moisturize skin daily with skin nourishing cream.  5-Right anterior thigh- Cleanse wound with NSS, pat dry. Apply Adaptic over wound bed and then calcium alginate with silver, cover with Silicone foam dressing. Change every other day and as needed for soilage/displacement.  6-Right hip- Cleanse wound with NSS, pat dry. Apply Silver gel over wound bed and cover with Silicone foam dressing. Change every other day and as needed for soilage/displacement.

## 2024-04-16 LAB
ANION GAP SERPL CALCULATED.3IONS-SCNC: 7 MMOL/L (ref 4–13)
ANION GAP SERPL CALCULATED.3IONS-SCNC: 7 MMOL/L (ref 4–13)
BASE EX.OXY STD BLDV CALC-SCNC: 53.3 % (ref 60–80)
BASE EX.OXY STD BLDV CALC-SCNC: 57.5 % (ref 60–80)
BASE EXCESS BLDV CALC-SCNC: 4.6 MMOL/L
BASE EXCESS BLDV CALC-SCNC: 5.7 MMOL/L
BUN SERPL-MCNC: 10 MG/DL (ref 5–25)
BUN SERPL-MCNC: 14 MG/DL (ref 5–25)
CA-I BLD-SCNC: 1.1 MMOL/L (ref 1.12–1.32)
CALCIUM SERPL-MCNC: 8 MG/DL (ref 8.4–10.2)
CALCIUM SERPL-MCNC: 8.6 MG/DL (ref 8.4–10.2)
CHLORIDE SERPL-SCNC: 94 MMOL/L (ref 96–108)
CHLORIDE SERPL-SCNC: 99 MMOL/L (ref 96–108)
CO2 SERPL-SCNC: 28 MMOL/L (ref 21–32)
CO2 SERPL-SCNC: 29 MMOL/L (ref 21–32)
CREAT SERPL-MCNC: 0.64 MG/DL (ref 0.6–1.3)
CREAT SERPL-MCNC: 0.73 MG/DL (ref 0.6–1.3)
ERYTHROCYTE [DISTWIDTH] IN BLOOD BY AUTOMATED COUNT: 17.4 % (ref 11.6–15.1)
GFR SERPL CREATININE-BSD FRML MDRD: 85 ML/MIN/1.73SQ M
GFR SERPL CREATININE-BSD FRML MDRD: 92 ML/MIN/1.73SQ M
GLUCOSE SERPL-MCNC: 123 MG/DL (ref 65–140)
GLUCOSE SERPL-MCNC: 128 MG/DL (ref 65–140)
GLUCOSE SERPL-MCNC: 142 MG/DL (ref 65–140)
GLUCOSE SERPL-MCNC: 154 MG/DL (ref 65–140)
GLUCOSE SERPL-MCNC: 169 MG/DL (ref 65–140)
GLUCOSE SERPL-MCNC: 194 MG/DL (ref 65–140)
HCO3 BLDV-SCNC: 29.1 MMOL/L (ref 24–30)
HCO3 BLDV-SCNC: 30 MMOL/L (ref 24–30)
HCT VFR BLD AUTO: 31.3 % (ref 34.8–46.1)
HGB BLD-MCNC: 9.8 G/DL (ref 11.5–15.4)
INR PPP: 1.23 (ref 0.84–1.19)
MAGNESIUM SERPL-MCNC: 1.7 MG/DL (ref 1.9–2.7)
MCH RBC QN AUTO: 29.4 PG (ref 26.8–34.3)
MCHC RBC AUTO-ENTMCNC: 31.3 G/DL (ref 31.4–37.4)
MCV RBC AUTO: 94 FL (ref 82–98)
O2 CT BLDV-SCNC: 8 ML/DL
O2 CT BLDV-SCNC: 8.4 ML/DL
PCO2 BLDV: 42.3 MM HG (ref 42–50)
PCO2 BLDV: 42.7 MM HG (ref 42–50)
PH BLDV: 7.45 [PH] (ref 7.3–7.4)
PH BLDV: 7.47 [PH] (ref 7.3–7.4)
PHOSPHATE SERPL-MCNC: 3.4 MG/DL (ref 2.3–4.1)
PLATELET # BLD AUTO: 239 THOUSANDS/UL (ref 149–390)
PMV BLD AUTO: 10.6 FL (ref 8.9–12.7)
PO2 BLDV: 28.6 MM HG (ref 35–45)
PO2 BLDV: 31.9 MM HG (ref 35–45)
POTASSIUM SERPL-SCNC: 4 MMOL/L (ref 3.5–5.3)
POTASSIUM SERPL-SCNC: 4.2 MMOL/L (ref 3.5–5.3)
PROTHROMBIN TIME: 15.4 SECONDS (ref 11.6–14.5)
RBC # BLD AUTO: 3.33 MILLION/UL (ref 3.81–5.12)
SODIUM SERPL-SCNC: 130 MMOL/L (ref 135–147)
SODIUM SERPL-SCNC: 134 MMOL/L (ref 135–147)
WBC # BLD AUTO: 12.7 THOUSAND/UL (ref 4.31–10.16)

## 2024-04-16 PROCEDURE — 82805 BLOOD GASES W/O2 SATURATION: CPT | Performed by: NURSE PRACTITIONER

## 2024-04-16 PROCEDURE — 85610 PROTHROMBIN TIME: CPT | Performed by: PHYSICIAN ASSISTANT

## 2024-04-16 PROCEDURE — 97116 GAIT TRAINING THERAPY: CPT

## 2024-04-16 PROCEDURE — 85027 COMPLETE CBC AUTOMATED: CPT | Performed by: PHYSICIAN ASSISTANT

## 2024-04-16 PROCEDURE — 99233 SBSQ HOSP IP/OBS HIGH 50: CPT | Performed by: PHYSICIAN ASSISTANT

## 2024-04-16 PROCEDURE — 84100 ASSAY OF PHOSPHORUS: CPT | Performed by: PHYSICIAN ASSISTANT

## 2024-04-16 PROCEDURE — 83735 ASSAY OF MAGNESIUM: CPT | Performed by: PHYSICIAN ASSISTANT

## 2024-04-16 PROCEDURE — 82330 ASSAY OF CALCIUM: CPT | Performed by: PHYSICIAN ASSISTANT

## 2024-04-16 PROCEDURE — 80048 BASIC METABOLIC PNL TOTAL CA: CPT | Performed by: NURSE PRACTITIONER

## 2024-04-16 PROCEDURE — 82948 REAGENT STRIP/BLOOD GLUCOSE: CPT

## 2024-04-16 PROCEDURE — 97530 THERAPEUTIC ACTIVITIES: CPT

## 2024-04-16 RX ORDER — FUROSEMIDE 10 MG/ML
40 INJECTION INTRAMUSCULAR; INTRAVENOUS
Status: DISCONTINUED | OUTPATIENT
Start: 2024-04-16 | End: 2024-04-18

## 2024-04-16 RX ORDER — ACETAMINOPHEN 325 MG/1
975 TABLET ORAL EVERY 8 HOURS PRN
Status: DISCONTINUED | OUTPATIENT
Start: 2024-04-16 | End: 2024-04-23 | Stop reason: HOSPADM

## 2024-04-16 RX ADMIN — AMIODARONE HYDROCHLORIDE 200 MG: 200 TABLET ORAL at 05:52

## 2024-04-16 RX ADMIN — Medication 12.5 MG: at 11:26

## 2024-04-16 RX ADMIN — Medication 1000 UNITS: at 09:02

## 2024-04-16 RX ADMIN — Medication 12.5 MG: at 21:07

## 2024-04-16 RX ADMIN — INSULIN LISPRO 1 UNITS: 100 INJECTION, SOLUTION INTRAVENOUS; SUBCUTANEOUS at 16:05

## 2024-04-16 RX ADMIN — TICAGRELOR 90 MG: 90 TABLET ORAL at 09:02

## 2024-04-16 RX ADMIN — ASPIRIN 325 MG ORAL TABLET 325 MG: 325 PILL ORAL at 09:02

## 2024-04-16 RX ADMIN — FONDAPARINUX SODIUM 2.5 MG: 2.5 INJECTION, SOLUTION SUBCUTANEOUS at 08:02

## 2024-04-16 RX ADMIN — FUROSEMIDE 40 MG: 10 INJECTION, SOLUTION INTRAMUSCULAR; INTRAVENOUS at 17:16

## 2024-04-16 RX ADMIN — DOCUSATE SODIUM 100 MG: 100 CAPSULE, LIQUID FILLED ORAL at 17:16

## 2024-04-16 RX ADMIN — POTASSIUM CHLORIDE 20 MEQ: 1500 TABLET, EXTENDED RELEASE ORAL at 17:16

## 2024-04-16 RX ADMIN — CHLORHEXIDINE GLUCONATE 0.12% ORAL RINSE 15 ML: 1.2 LIQUID ORAL at 09:08

## 2024-04-16 RX ADMIN — CHLORHEXIDINE GLUCONATE 0.12% ORAL RINSE 15 ML: 1.2 LIQUID ORAL at 17:16

## 2024-04-16 RX ADMIN — AMIODARONE HYDROCHLORIDE 0.5 MG/MIN: 50 INJECTION, SOLUTION INTRAVENOUS at 11:26

## 2024-04-16 RX ADMIN — POTASSIUM CHLORIDE 20 MEQ: 1500 TABLET, EXTENDED RELEASE ORAL at 08:01

## 2024-04-16 RX ADMIN — AMIODARONE HYDROCHLORIDE 200 MG: 200 TABLET ORAL at 21:07

## 2024-04-16 RX ADMIN — AMIODARONE HYDROCHLORIDE 200 MG: 200 TABLET ORAL at 14:49

## 2024-04-16 RX ADMIN — TICAGRELOR 90 MG: 90 TABLET ORAL at 21:07

## 2024-04-16 RX ADMIN — ONDANSETRON 4 MG: 2 INJECTION INTRAMUSCULAR; INTRAVENOUS at 08:01

## 2024-04-16 RX ADMIN — ATORVASTATIN CALCIUM 80 MG: 80 TABLET, FILM COATED ORAL at 16:04

## 2024-04-16 RX ADMIN — MILRINONE LACTATE IN DEXTROSE 0.25 MCG/KG/MIN: 200 INJECTION, SOLUTION INTRAVENOUS at 11:26

## 2024-04-16 RX ADMIN — INSULIN LISPRO 1 UNITS: 100 INJECTION, SOLUTION INTRAVENOUS; SUBCUTANEOUS at 21:13

## 2024-04-16 RX ADMIN — Medication 500 MG: at 09:07

## 2024-04-16 RX ADMIN — FUROSEMIDE 40 MG: 10 INJECTION, SOLUTION INTRAMUSCULAR; INTRAVENOUS at 12:30

## 2024-04-16 RX ADMIN — FUROSEMIDE 40 MG: 10 INJECTION, SOLUTION INTRAMUSCULAR; INTRAVENOUS at 07:48

## 2024-04-16 RX ADMIN — PANTOPRAZOLE SODIUM 40 MG: 40 TABLET, DELAYED RELEASE ORAL at 05:52

## 2024-04-16 NOTE — PLAN OF CARE
Problem: PHYSICAL THERAPY ADULT  Goal: Performs mobility at highest level of function for planned discharge setting.  See evaluation for individualized goals.  Description: Treatment/Interventions: Functional transfer training, LE strengthening/ROM, Elevations, Therapeutic exercise, Endurance training, Equipment eval/education, Bed mobility, Gait training, Spoke to nursing, OT, Family  Equipment Recommended: Walker       See flowsheet documentation for full assessment, interventions and recommendations.  Outcome: Progressing  Note: Prognosis: Good  Problem List: Decreased strength, Decreased endurance, Impaired balance, Decreased mobility  Assessment: Pt seen for PT treatment session this date. Therapy session focused on gait training and transfer training in order to improve overall mobility and independence. Pt requires min A x2 for STS from chair and min A for ambulation 10-15' w/ RW. Pt demonstrates improving endurance by increasing ambulation distance to 10-15'. Ambulation limited by pt reporting increased fatigue and lightheadedness w/ pt requiring seated rest breaks in between ambulation trials. Pt making good progress toward goals. Pt was left sitting in chair at the end of PT session with all needs in reach. Pt would benefit from continued PT services while in hospital to address remaining limitations. PT to continue treating pt and recommends acute rehab. The patient's AM-PAC Basic Mobility Inpatient Short Form Raw Score is 13. A Raw score of less than or equal to 16 suggests the patient may benefit from discharge to post-acute rehabilitation services. Please also refer to the recommendation of the Physical Therapist for safe discharge planning.        Rehab Resource Intensity Level, PT: I (Maximum Resource Intensity)    See flowsheet documentation for full assessment.

## 2024-04-16 NOTE — PHYSICAL THERAPY NOTE
PHYSICAL THERAPY NOTE        Patient Name: Julia Perry  Today's Date: 4/16/2024 04/16/24 1346   PT Last Visit   PT Visit Date 04/16/24   Note Type   Note Type Treatment   Pain Assessment   Pain Assessment Tool 0-10   Pain Score No Pain   Restrictions/Precautions   Weight Bearing Precautions Per Order No   Other Precautions Cardiac/sternal;Multiple lines;Telemetry;Fall Risk;Pain  (CT to suction)   General   Chart Reviewed Yes   Response to Previous Treatment Patient with no complaints from previous session.   Family/Caregiver Present Yes  (Spouse present for end of PT session)   Cognition   Overall Cognitive Status WFL   Arousal/Participation Alert;Responsive;Cooperative   Attention Within functional limits   Orientation Level Oriented X4   Memory Decreased recall of recent events;Decreased recall of precautions   Following Commands Follows one step commands without difficulty   Comments pt very pleasant and cooperative t/o PT session   Bed Mobility   Supine to Sit Unable to assess   Sit to Supine Unable to assess   Additional Comments Pt sitting in chair t/o PT session   Transfers   Sit to Stand 4  Minimal assistance   Additional items Increased time required;Verbal cues;Assist x 2   Stand to Sit 4  Minimal assistance   Additional items Increased time required;Verbal cues;Assist x 2   Additional Comments transfers w/ RW.   Ambulation/Elevation   Gait pattern Forward Flexion;Decreased foot clearance;Shuffling;Short stride;Excessively slow   Gait Assistance 4  Minimal assist   Additional items Assist x 1;Verbal cues   Assistive Device Rolling walker   Distance 15' + 10'   Ambulation/Elevation Additional Comments Pt defers further ambulation stating fatigue/weakness. Will continue to progress next PT session   Balance   Static Sitting Fair +   Dynamic Sitting Fair   Static Standing Fair -   Dynamic Standing Poor +   Ambulatory Poor  +   Endurance Deficit   Endurance Deficit Yes   Endurance Deficit Description fatigue, generalized weakness, deconditioning   Activity Tolerance   Activity Tolerance Patient limited by fatigue;Patient tolerated treatment well   Medical Staff Made Aware RT Jelena   Nurse Made Aware RN cleared and updated   Assessment   Prognosis Good   Problem List Decreased strength;Decreased endurance;Impaired balance;Decreased mobility   Assessment Pt seen for PT treatment session this date. Therapy session focused on gait training and transfer training in order to improve overall mobility and independence. Pt requires min A x2 for STS from chair and min A for ambulation 10-15' w/ RW. Pt demonstrates improving endurance by increasing ambulation distance to 10-15'. Ambulation limited by pt reporting increased fatigue and lightheadedness w/ pt requiring seated rest breaks in between ambulation trials. Pt making good progress toward goals. Pt was left sitting in chair at the end of PT session with all needs in reach. Pt would benefit from continued PT services while in hospital to address remaining limitations. PT to continue treating pt and recommends acute rehab. The patient's AM-PAC Basic Mobility Inpatient Short Form Raw Score is 13. A Raw score of less than or equal to 16 suggests the patient may benefit from discharge to post-acute rehabilitation services. Please also refer to the recommendation of the Physical Therapist for safe discharge planning.   Goals   Patient Goals to get better   STG Expiration Date 04/25/24   PT Treatment Day 1   Plan   Treatment/Interventions LE strengthening/ROM;Functional transfer training;Therapeutic exercise;Elevations;Endurance training;Patient/family training;Equipment eval/education;Bed mobility;Gait training;Spoke to nursing;Spoke to case management;OT   Progress Progressing toward goals   PT Frequency 4-6x/wk   Discharge Recommendation   Rehab Resource Intensity Level, PT I (Maximum  Resource Intensity)   Equipment Recommended Walker   Walker Package Recommended Wheeled walker   AM-PAC Basic Mobility Inpatient   Turning in Flat Bed Without Bedrails 3   Lying on Back to Sitting on Edge of Flat Bed Without Bedrails 2   Moving Bed to Chair 2   Standing Up From Chair Using Arms 2   Walk in Room 2   Climb 3-5 Stairs With Railing 2   Basic Mobility Inpatient Raw Score 13   Basic Mobility Standardized Score 33.99   [facility] Highest Level Of Mobility   -HL Goal 4: Move to chair/commode   -HL Achieved 6: Walk 10 steps or more   Education   Education Provided Mobility training;Assistive device   Patient Demonstrates acceptance/verbal understanding   End of Consult   Patient Position at End of Consult Bedside chair;All needs within reach  (CT connected to suction)     Chasidy Tolbert PT, DPT

## 2024-04-16 NOTE — PROGRESS NOTES
[facility]  Progress Note: Critical Care   Date of Service: 4/16/2024  Hospital Day: 7  Name: Julia Perry I  MRN: [id number]  Unit/Bed#: [facility]PHP 414-01      Assessment/Plan     Impressions:  MVCAD s/p CABGx3 and DESx2  Post Op Cardiogenic Shock s/p impella placement  ICM (EF 15%)  New Onset A. FIb RVR  HTN  HLD  DM2  B/l carotid stenosis s/p L CEA 3/24  Pituitary Macroadenoma with elevated prolactin  Hypercalcemia  UC    Neuro:   Cardiac Surgery Pain Control: ATC tylenol and PRN oxycodone 2.5/5mg  Delirium precautions  Regulate sleep/wake cycle  CAM-ICU daily  Trend neuro exam      CV:   Cardiac Surgery Hemodynamic Monitoring: MAP goal >65 CI >2.2 Continue pulmonary artery catheter for hemodynamic monitoring  Continue central line due to vasoactive medications Continue arterial line for blood pressure monitoring and/or frequent blood gas draws  Follow rhythm on telemetry  Critical Care Infusions : Primacor 0.25 mcg/kg/min  Beta Blocker Plan: Hold beta blocker secondary to hypotension  Epicardial pacing wire plan : Epicardial wires not in place  Post op cardiac surgery medications:    mg QD  Statin: Lipitor 80 mg qHS  Amiodarone 200 mg PO q8 hours   A. Fib with RVR- remains on Amio drip- Currently NSR    Lung:   Good room air saturation   Chest tube output remains persistently high; Continue chest tubes to suction today    GI:   Continue PPI for stress ulcer prophylaxis  Continue bowel regimen  Trend abdominal exam and bowel function    FEN:   Diuresis Plan: Lasix 40mg IV BID  Nutrition plan: as tolerated  Replenish electrolytes with goals: K >4.0, Mag >2.0, and Phos >3.0    :   Stiles Plan: Continue for accurate I/O monitoring for 48 hours  Trend UOP and BUN/creat  Strict I and O     ID:   Trend temps and WBC count  Maintain normothermia    Heme:   Trend hgb and plts    Endo:   Endocrine has been consulted. Continue home insulin teaching.      MSK/Skin:  Mobility goal:    PT/OT consult as medically appropriate   Frequent turning and pressure off-loading  Local wound care as needed    Disposition:  Critical care    ICU Core Measures     A: Assess, Prevent, and Manage Pain Has pain been assessed? Yes  Need for changes to pain regimen? No   B: Both SAT/SAT  N/A   C: Choice of Sedation RASS Goal: 0 Alert and Calm  Need for changes to sedation or analgesia regimen? No   D: Delirium CAM-ICU: Negative   E: Early Mobility  Plan for early mobility? Yes   F: Family Engagement Plan for family engagement today? Yes       Review of Invasive Devices:    Stiles Plan: Continue for accurate I/O monitoring for 48 hours  Central access plan: Patient has multiple central venous catheters.       Prophylaxis:  VTE VTE covered by:  fondaparinux, Subcutaneous, 2.5 mg at 04/15/24 0820       Stress Ulcer  covered bypantoprazole (PROTONIX) EC tablet 40 mg [[id number]]          Significant 24hr Events      24 Hour Events/HPI: POD # 7 s/p CABG x3. PPD6 s/p DESx2 to OM.   Attempted Milrinone wean to 0.13 with drop in CI and SVO2 with corresponding drop in UOP and deteriorating physical exam. Titrated back to 0.25. Weaned of Nhan Responding to 40 Lasix BID.  Critical Care Infusions : Primacor 0.25 mcg/kg/min   Subjective   Review of Systems   Constitutional:  Negative for chills and fever.   HENT:  Negative for ear pain and sore throat.    Eyes:  Negative for pain and visual disturbance.   Respiratory:  Negative for cough and shortness of breath.    Cardiovascular:  Negative for chest pain and palpitations.   Gastrointestinal:  Negative for abdominal pain and vomiting.   Genitourinary:  Negative for dysuria and hematuria.   Musculoskeletal:  Negative for arthralgias and back pain.   Skin:  Negative for color change and rash.   Neurological:  Negative for seizures and syncope.   All other systems reviewed and are negative.       Objective     Medications:  Scheduled Continuous   acetaminophen, 975 mg, Q8H  LINDA  amiodarone, 200 mg, Q8H LINDA  aspirin, 325 mg, Daily  atorvastatin, 80 mg, Daily With Dinner  chlorhexidine, 15 mL, BID  Cholecalciferol, 1,000 Units, Daily  docusate sodium, 100 mg, BID  fondaparinux, 2.5 mg, Q24H  furosemide, 40 mg, BID (diuretic)  insulin lispro, 1-5 Units, TID AC  insulin lispro, 1-5 Units, HS  magnesium gluconate, 500 mg, Daily  pantoprazole, 40 mg, Early Morning  polyethylene glycol, 17 g, Daily  potassium chloride, 20 mEq, BID  ticagrelor, 90 mg, Q12H LINDA      amiodarone (CORDARONE) 900 mg in dextrose 5 % 500 mL infusion, 0.5 mg/min, Last Rate: 0.5 mg/min (04/16/24 0600)  milrinone (Primacor) infusion, 0.25 mcg/kg/min, Last Rate: 0.25 mcg/kg/min (04/16/24 0600)  phenylephine,  mcg/min, Last Rate: Stopped (04/16/24 0255)         Vitals: Invasive Monitoring       Most Recent Min/Max in 24hrs   Temp 99.7 °F (37.6 °C) Temp  Min: 98.9 °F (37.2 °C)  Max: 99.7 °F (37.6 °C)   Pulse 102 Pulse  Min: 90  Max: 104   Resp 21 Resp  Min: 17  Max: 22   BP (!) 85/53 BP  Min: 85/53  Max: 114/58   O2 Sat 99 % SpO2  Min: 95 %  Max: 100 %   O2 Device: None (Room air)  Nasal Cannula O2 Flow Rate (L/min): 2 L/min Arterial Line  Mariel BP 75/63  No data recorded   MAP 75 mmHg  No data recorded     PA Catheter   Most Recent  Min/Max in 24hrs    PAP 21/14 PAP  Min: 13/6  Max: 68/10   CVP 2 mmHg CVP (mean)  Min: -6 mmHg  Max: 64 mmHg   CI 1.6 L/min/m2  CI (L/min/m2)  Min: 1.3 L/min/m2  Max: 3.5 L/min/m2   SVR 1829 (dyne*sec)/cm5 SVR (dyne*sec)/cm5  Min: 594 (dyne*sec)/cm5  Max: 2181 (dyne*sec)/cm5        I/O Chest tube output (if present)     Intake/Output Summary (Last 24 hours) at 4/16/2024 0623  Last data filed at 4/16/2024 0600  Gross per 24 hour   Intake 1957.24 ml   Output 2900 ml   Net -942.76 ml     UOP: 100/hour    BM: 1 BM in the last 24 hours  Weight change: -3.1 kg (-6 lb 13.4 oz)     Mediastinal tubes:  150 mL/8hr  695 mL/24hr   Pleural tubes: 50 mL/8hr  160 mL/24hr        Physical Exam   Physical  Exam  Vitals and nursing note reviewed.   Eyes:      Extraocular Movements: Extraocular movements intact.      Pupils: Pupils are equal, round, and reactive to light.   Skin:     General: Skin is warm.   HENT:      Head: Normocephalic.      Mouth/Throat:      Mouth: Mucous membranes are moist.   Neck:      Vascular: Central line present.   Cardiovascular:      Rate and Rhythm: Normal rate and regular rhythm.      Pulses: Normal pulses.      Heart sounds: No murmur heard.     No friction rub. No gallop.   Musculoskeletal:         General: Normal range of motion.      Right lower leg: No edema.      Left lower leg: No edema.   Abdominal:      Palpations: Abdomen is soft.   Constitutional:       Appearance: She is well-developed and well-nourished.   Pulmonary:      Effort: Pulmonary effort is normal.      Breath sounds: Normal breath sounds.   Neurological:      General: No focal deficit present.      Mental Status: She is alert and oriented to person, place and time. Mental status is at baseline.      Motor: gross motor function is at baseline for patient. Strength full and intact in all extremities.   Genitourinary/Anorectal:  Stiles present.        Diagnostic Studies               Labs:  CBC    Recent Labs     04/15/24  0428 04/16/24  0502   WBC 13.24* 12.70*   HGB 9.2* 9.8*   HCT 28.7* 31.3*    239     BMP    Recent Labs     04/15/24  1642 04/16/24  0356   SODIUM 135 134*   K 4.3 4.2   CL 99 99   CO2 26 28   AGAP 10 7   BUN 10 10   CREATININE 0.62 0.64   CALCIUM 8.0* 8.0*        Baseline Creat: 0.9   Coags    Recent Labs     04/16/24  0502   INR 1.23*              Additional Electrolytes  Recent Labs     04/15/24  0428 04/16/24  0502   MG 1.7*  --    PHOS 2.8  --    CAIONIZED 1.15 1.10*          Blood Gas    No recent results  Recent Labs     04/16/24  0356   PHVEN 7.468*   SRL4HOT 42.3   PO2VEN 28.6*   RUT4WEO 30.0   BEVEN 5.7   H2GQQUY 53.3*    LFTs  No recent results    Infectious    No recent results      No recent results Glucose  Recent Labs     04/14/24  1716 04/15/24  0428 04/15/24  1642 04/16/24  0356   GLUC 151* 125 132 123        Collaborative bedside rounds performed with cardiac surgery attending, critical care attending and bedside RN.     Timoteo Rasmussen PA-C

## 2024-04-17 LAB
ANION GAP SERPL CALCULATED.3IONS-SCNC: 6 MMOL/L (ref 4–13)
ARTERIAL PATENCY WRIST A: NO
BASE EX.OXY STD BLDV CALC-SCNC: 61.9 % (ref 60–80)
BASE EXCESS BLDV CALC-SCNC: 9 MMOL/L
BODY TEMPERATURE: 98.7 DEGREES FEHRENHEIT
BUN SERPL-MCNC: 13 MG/DL (ref 5–25)
CA-I BLD-SCNC: 1.11 MMOL/L (ref 1.12–1.32)
CALCIUM SERPL-MCNC: 8 MG/DL (ref 8.4–10.2)
CHLORIDE SERPL-SCNC: 91 MMOL/L (ref 96–108)
CO2 SERPL-SCNC: 32 MMOL/L (ref 21–32)
CREAT SERPL-MCNC: 0.76 MG/DL (ref 0.6–1.3)
ERYTHROCYTE [DISTWIDTH] IN BLOOD BY AUTOMATED COUNT: 16.9 % (ref 11.6–15.1)
GFR SERPL CREATININE-BSD FRML MDRD: 81 ML/MIN/1.73SQ M
GLUCOSE SERPL-MCNC: 117 MG/DL (ref 65–140)
GLUCOSE SERPL-MCNC: 126 MG/DL (ref 65–140)
GLUCOSE SERPL-MCNC: 156 MG/DL (ref 65–140)
GLUCOSE SERPL-MCNC: 160 MG/DL (ref 65–140)
GLUCOSE SERPL-MCNC: 189 MG/DL (ref 65–140)
HCO3 BLDV-SCNC: 34.1 MMOL/L (ref 24–30)
HCT VFR BLD AUTO: 27.6 % (ref 34.8–46.1)
HGB BLD-MCNC: 8.7 G/DL (ref 11.5–15.4)
MAGNESIUM SERPL-MCNC: 1.5 MG/DL (ref 1.9–2.7)
MCH RBC QN AUTO: 29.3 PG (ref 26.8–34.3)
MCHC RBC AUTO-ENTMCNC: 31.5 G/DL (ref 31.4–37.4)
MCV RBC AUTO: 93 FL (ref 82–98)
NASAL CANNULA: 2
O2 CT BLDV-SCNC: 8.4 ML/DL
PCO2 BLD: 50.5 MM HG (ref 42–50)
PCO2 BLDV: 50.3 MM HG (ref 42–50)
PH BLD: 7.45 [PH] (ref 7.3–7.4)
PH BLDV: 7.45 [PH] (ref 7.3–7.4)
PHOSPHATE SERPL-MCNC: 3.3 MG/DL (ref 2.3–4.1)
PLATELET # BLD AUTO: 287 THOUSANDS/UL (ref 149–390)
PMV BLD AUTO: 10.4 FL (ref 8.9–12.7)
PO2 BLDV: 32.9 MM HG (ref 35–45)
PO2 VENOUS TEMP CORRECTED: 33.1 MM HG (ref 35–45)
POTASSIUM SERPL-SCNC: 3.9 MMOL/L (ref 3.5–5.3)
RBC # BLD AUTO: 2.97 MILLION/UL (ref 3.81–5.12)
SODIUM SERPL-SCNC: 129 MMOL/L (ref 135–147)
WBC # BLD AUTO: 11.1 THOUSAND/UL (ref 4.31–10.16)

## 2024-04-17 PROCEDURE — 99222 1ST HOSP IP/OBS MODERATE 55: CPT | Performed by: INTERNAL MEDICINE

## 2024-04-17 PROCEDURE — 97530 THERAPEUTIC ACTIVITIES: CPT

## 2024-04-17 PROCEDURE — 85027 COMPLETE CBC AUTOMATED: CPT | Performed by: STUDENT IN AN ORGANIZED HEALTH CARE EDUCATION/TRAINING PROGRAM

## 2024-04-17 PROCEDURE — 80048 BASIC METABOLIC PNL TOTAL CA: CPT | Performed by: NURSE PRACTITIONER

## 2024-04-17 PROCEDURE — 83735 ASSAY OF MAGNESIUM: CPT | Performed by: STUDENT IN AN ORGANIZED HEALTH CARE EDUCATION/TRAINING PROGRAM

## 2024-04-17 PROCEDURE — 82805 BLOOD GASES W/O2 SATURATION: CPT | Performed by: NURSE PRACTITIONER

## 2024-04-17 PROCEDURE — 99024 POSTOP FOLLOW-UP VISIT: CPT | Performed by: THORACIC SURGERY (CARDIOTHORACIC VASCULAR SURGERY)

## 2024-04-17 PROCEDURE — 82330 ASSAY OF CALCIUM: CPT | Performed by: STUDENT IN AN ORGANIZED HEALTH CARE EDUCATION/TRAINING PROGRAM

## 2024-04-17 PROCEDURE — 82948 REAGENT STRIP/BLOOD GLUCOSE: CPT

## 2024-04-17 PROCEDURE — 84100 ASSAY OF PHOSPHORUS: CPT | Performed by: STUDENT IN AN ORGANIZED HEALTH CARE EDUCATION/TRAINING PROGRAM

## 2024-04-17 RX ORDER — ASPIRIN 81 MG/1
81 TABLET, CHEWABLE ORAL DAILY
Status: DISCONTINUED | OUTPATIENT
Start: 2024-04-18 | End: 2024-04-23 | Stop reason: HOSPADM

## 2024-04-17 RX ORDER — FONDAPARINUX SODIUM 2.5 MG/.5ML
2.5 INJECTION SUBCUTANEOUS DAILY
Status: DISCONTINUED | OUTPATIENT
Start: 2024-04-17 | End: 2024-04-17

## 2024-04-17 RX ORDER — MILRINONE LACTATE 0.2 MG/ML
0.13 INJECTION, SOLUTION INTRAVENOUS CONTINUOUS
Status: DISPENSED | OUTPATIENT
Start: 2024-04-17 | End: 2024-04-19

## 2024-04-17 RX ORDER — MAGNESIUM SULFATE HEPTAHYDRATE 40 MG/ML
4 INJECTION, SOLUTION INTRAVENOUS ONCE
Status: COMPLETED | OUTPATIENT
Start: 2024-04-17 | End: 2024-04-17

## 2024-04-17 RX ORDER — MIDODRINE HYDROCHLORIDE 5 MG/1
5 TABLET ORAL
Status: DISCONTINUED | OUTPATIENT
Start: 2024-04-17 | End: 2024-04-20

## 2024-04-17 RX ADMIN — TICAGRELOR 90 MG: 90 TABLET ORAL at 20:48

## 2024-04-17 RX ADMIN — TICAGRELOR 90 MG: 90 TABLET ORAL at 08:02

## 2024-04-17 RX ADMIN — Medication 12.5 MG: at 08:02

## 2024-04-17 RX ADMIN — FONDAPARINUX SODIUM 2.5 MG: 2.5 INJECTION, SOLUTION SUBCUTANEOUS at 08:03

## 2024-04-17 RX ADMIN — ASPIRIN 325 MG ORAL TABLET 325 MG: 325 PILL ORAL at 08:01

## 2024-04-17 RX ADMIN — CHLORHEXIDINE GLUCONATE 0.12% ORAL RINSE 15 ML: 1.2 LIQUID ORAL at 17:56

## 2024-04-17 RX ADMIN — POTASSIUM CHLORIDE 20 MEQ: 1500 TABLET, EXTENDED RELEASE ORAL at 17:56

## 2024-04-17 RX ADMIN — PHENYLEPHRINE HYDROCHLORIDE 20 MCG/MIN: 50 INJECTION INTRAVENOUS at 09:06

## 2024-04-17 RX ADMIN — Medication 12.5 MG: at 20:48

## 2024-04-17 RX ADMIN — MIDODRINE HYDROCHLORIDE 5 MG: 5 TABLET ORAL at 16:38

## 2024-04-17 RX ADMIN — INSULIN LISPRO 1 UNITS: 100 INJECTION, SOLUTION INTRAVENOUS; SUBCUTANEOUS at 11:19

## 2024-04-17 RX ADMIN — AMIODARONE HYDROCHLORIDE 200 MG: 200 TABLET ORAL at 21:02

## 2024-04-17 RX ADMIN — POTASSIUM CHLORIDE 20 MEQ: 1500 TABLET, EXTENDED RELEASE ORAL at 08:02

## 2024-04-17 RX ADMIN — FUROSEMIDE 40 MG: 10 INJECTION, SOLUTION INTRAMUSCULAR; INTRAVENOUS at 11:19

## 2024-04-17 RX ADMIN — INSULIN LISPRO 1 UNITS: 100 INJECTION, SOLUTION INTRAVENOUS; SUBCUTANEOUS at 16:39

## 2024-04-17 RX ADMIN — INSULIN LISPRO 1 UNITS: 100 INJECTION, SOLUTION INTRAVENOUS; SUBCUTANEOUS at 21:02

## 2024-04-17 RX ADMIN — MAGNESIUM SULFATE HEPTAHYDRATE 4 G: 40 INJECTION, SOLUTION INTRAVENOUS at 07:55

## 2024-04-17 RX ADMIN — ATORVASTATIN CALCIUM 80 MG: 80 TABLET, FILM COATED ORAL at 16:38

## 2024-04-17 RX ADMIN — CHLORHEXIDINE GLUCONATE 0.12% ORAL RINSE 15 ML: 1.2 LIQUID ORAL at 08:04

## 2024-04-17 RX ADMIN — Medication 500 MG: at 08:03

## 2024-04-17 RX ADMIN — PANTOPRAZOLE SODIUM 40 MG: 40 TABLET, DELAYED RELEASE ORAL at 05:40

## 2024-04-17 RX ADMIN — AMIODARONE HYDROCHLORIDE 200 MG: 200 TABLET ORAL at 15:06

## 2024-04-17 RX ADMIN — FUROSEMIDE 40 MG: 10 INJECTION, SOLUTION INTRAMUSCULAR; INTRAVENOUS at 17:16

## 2024-04-17 RX ADMIN — FUROSEMIDE 40 MG: 10 INJECTION, SOLUTION INTRAMUSCULAR; INTRAVENOUS at 05:41

## 2024-04-17 RX ADMIN — Medication 1000 UNITS: at 08:02

## 2024-04-17 RX ADMIN — MILRINONE LACTATE IN DEXTROSE 0.25 MCG/KG/MIN: 200 INJECTION, SOLUTION INTRAVENOUS at 04:38

## 2024-04-17 RX ADMIN — DOCUSATE SODIUM 100 MG: 100 CAPSULE, LIQUID FILLED ORAL at 08:02

## 2024-04-17 RX ADMIN — MIDODRINE HYDROCHLORIDE 5 MG: 5 TABLET ORAL at 11:19

## 2024-04-17 RX ADMIN — AMIODARONE HYDROCHLORIDE 200 MG: 200 TABLET ORAL at 05:40

## 2024-04-17 RX ADMIN — DOCUSATE SODIUM 100 MG: 100 CAPSULE, LIQUID FILLED ORAL at 17:56

## 2024-04-17 NOTE — PLAN OF CARE
Problem: OCCUPATIONAL THERAPY ADULT  Goal: Performs self-care activities at highest level of function for planned discharge setting.  See evaluation for individualized goals.  Description: Treatment Interventions: ADL retraining, Functional transfer training, Endurance training, UE strengthening/ROM, Cognitive reorientation, Patient/family training, Equipment evaluation/education, Continued evaluation, Cardiac education, Activityengagement, Energy conservation          See flowsheet documentation for full assessment, interventions and recommendations.   Outcome: Progressing  Note: Limitation: Decreased ADL status, Decreased Safe judgement during ADL, Decreased cognition, Decreased endurance, Decreased self-care trans, Decreased high-level ADLs  Prognosis: Fair  Assessment: Pt is a 67 yo female who actively participated in skilled OT session on 4/17/2024. Pt seen with PT to increase safety, decrease fall risk, and maximize functional/occupational performance 2* medical complexity which is a regression from pt's functional baseline. Treatment focused to improve functional transfers with fall prevention strategies, static/dynamic balance, postural/trunk control, proper body mechanics, functional use of b/l UE's, safety awareness, and overall increased activity tolerance in ADL/IADL/leisure tasks. Upon arrival, pt found sitting upright in recliner and was agreeable to OT session. Pt performed STS with Min A x1 w/ RW and completed household functional mobility distances w/ Min A x1 with x2-3 present for line management, safety, and chair follow. During functional mobility, pt demonstrating posterior sway in which returned to seated position, BP reading 77/49. RN present. Pt returned to seated position w/ b/l LE elevated. At the end of the session, pt was left sitting upright in recliner with all functional needs in reach. Pt demonstrates gradual functional improvements towards OT goals however continues to be  functioning below occupational baseline and is still limited by the following limitations/impairments which were addressed through skilled instruction: cardiac precautions, generalized weakness, balance, endurance/activity tolerance, postural/trunk control, strength, pain, and safety awareness. At this time, recommend discharge to post-acute rehab when medically stable. The patient's raw score on the -PAC Daily Activity Inpatient Short Form is 14. A raw score of less than 19 suggests the patient may benefit from discharge to post-acute rehabilitation services. Please refer to the recommendation of the Occupational Therapist for safe discharge planning.  Established OT goals will be continued 2-3x/wk to address acute care needs and underlying performance skills to maximize occupational performance and safety to return to OF.     Rehab Resource Intensity Level, OT: II (Moderate Resource Intensity)

## 2024-04-17 NOTE — PROGRESS NOTES
Cardiology Follow Up    Julia Perry  1955  [id number]  [facility] [address] [address] [phone]    1. Coronary artery disease involving native coronary artery of native heart with other form of angina pectoris (HCC)  CBC and Platelet      2. Ischemic cardiomyopathy  Basic metabolic panel    Echo follow up/limited w/ contrast if indicated      3. Postoperative cardiogenic shock, initial encounter ([facility])        4. New onset atrial fibrillation (HCC)        5. S/P carotid endarterectomy        6. S/P CABG x 3  CBC and Platelet      7. Mixed hyperlipidemia        8. Type 2 diabetes mellitus with hyperglycemia, unspecified whether long term insulin use (HCC)        9. Hypertension, unspecified type        10. Bilateral carotid artery stenosis        11. Valvular heart disease            Discussion/Summary:    CAD s/p CABG  CABG x 3 with LIMA-LAD, SVG-OM1 and SVG-PDA on 4-9-2024 with subsequent cardiogenic shock requiring pressor/inotropic support and subsequent impella placement from 4/10-4/12/2024  RAFFY to OM2 on 4-12/2024 as well  Today she offers no c/o chest pain  GDMT: aspirin/statin/beta blocker and Brilinta   Patient notes she has not been taking Birlinta since discharge as her local pharmacy did not have it in stock; our office staff contacted her local pharmacy today and they confirm they have it filled and ready for pickup--- patient instructed to  Brilinta and start this evening and her and her  were educated on importance of adherence to DAPT    Ischemic cardiomyopathy  Post op cardiogenic shock noted requiring pressors/inotropic/impella support  Pre op LVEF 30%; POD#1 LVEF 14%; repeat limited echo 4- 15-20%; final echo during admit on 4-: EF 20% with mild reduction in RV systolic function  She is on lasix 40 mg QD; weight at discharge on 4- was 157  lbs and creat was 0.84  Weight today is 160 pounds--- she has +1 bilateral lower extremity edema but states that she feels very dry with dry mouth and dry skin  I will order BMP today to evaluate renal function and potassium and contact the patient regarding continued diuretic dosing  Dr. Ceballos did see patient with me today and he has asked her to drink roughly 60 ounces of fluid a day  GDMT was limited due to hypotension requiring midodrine at discharge; she is on metoprolol succinate 12.5 mg QD and Jardiance 10 mg QD---with support of midodrine 10 mg TID  Low sodium diet reinforced    Valvular heart disease  Moderate MR and mild-moderate TR noted on echo  Continue diuretics    PAF  New onset afib on 4- with hemodynamic instability requiring DC CV and IV amiodarone  Discharged on amiodarone 200 mg BID x 1 week then 200 mg QD (can start daily on 5-1-2024)  She had been on IV heparin for anticoagulation as an inpatient but was not discharged on oral anticoagulation given she is on DAPT and this was Ok'd by Dr Ceballos today  Rhythm today is normal sinus rhythm      Essential HTN  Now with hypotension since CABG  Currently requiring midodrine 10 mg TID to support GDMT for CM  BP today is 112/58  Patient noticing some lightheadedness with standing and position change but this may be secondary to dehydration; will order BMP    Hyperlipidemia  Lipid panel from January 2024  Triglycerides 135  HDL 47    Discharged on increased dose of Crestor 40 mg QD    DM  HgbA1c 6.4 in December 2023  Defer to PCP    Carotid stenosis  Vascular study preoperatively showed 70-99% left ICA stenosis now status post left-sided carotid endarterectomy in early March 2024  <50% right-sided ICA stenosis    She will return to the office in 1 month to follow up with Dr Ceballos    Interval History:   Julia Perry is a 68 y.o. female With PMH of CAD now s/p recent CABG x 3 with postoperative cardiogenic shock requiring  pressor/inotrope/Impella support now with most recent LVEF 20%, ischemic cardiomyopathy, postoperative A-fib, HTN, HLD, DM, bilateral carotid stenosis status post left carotid endarterectomy, pituitary macroadenoma and ulcerative colitis who returns to the office today for post hospital office visit.    The patient was seen by Dr Ceballos in the office for initial consultation in January 2024 secondary to presumed new cardiomyopathy noted on echo with regional WMA. Patient had noted cardiac symptoms that were overall mild for some time. She underwent LHC which showed multivessel disease and CT surgery team recommended CABG.  During preoperative workup she was noted to have 70 to 99% blockage of left internal carotid artery and less than 50% right carotid artery.  She underwent left-sided carotid endarterectomy in early March 2024.  She underwent CABG with LIMA to LAD, SVG to PDA and SVG to OM on 4/9/2024.  Postop day #1 repeat echocardiogram performed secondary to patient requiring continued support with pressors and inotropic medications (cardiogenic shock picture) with EF noted to be 14%.  Patient returned to cardiac Cath Lab and Impella was placed for ventricular support.  LIMA to LAD noted to be open, SVG to small PDA noted to be patent and SVG to OM also patent.  It is noted that OM 2 was a larger vessel and not bypassed.  There was a 99% mid circumflex stenosis noted with stent placement from mid circumflex into OM 2.  The patient continued to progress and Impella was eventually removed on 4/12/2024 but she was continued on milrinone and Levophed at that time.  Repeat limited echocardiogram shows LVEF 15 to 20%.  Early 4/14/2024 patient noted to be in A-fib/RVR with hemodynamic instability requiring urgent DC cardioversion with amnio bolus and infusion. She was eventually stabilized off pressor and inotropic support and placed on PO diuretics.   Dr Ceballos evaluates patient with me today.  Today she notes that  she continues to feel weak and deconditioned and we have encouraged her to attend cardiac rehab once cleared by CT surgery. She has no cardiac complaints but she has also been rather sedentary. She will also be having VNA services come to her home and she may need PT as well. Her heart rhythm is regular on exam. She did have post op afib and is on metoprolol and amiodarone. She is not on oral anticoagulation but is on DAPT and this have been approved by Dr Ceballos for now.. Lungs CTA B/L. Her sternal incision and chest tube sites are healing well with no signs of infection. She has ecchymosis in right groin at site of Impella insertion but no hematoma. She states she did have some bleeding in the right thigh at the area the Impella was secure to skin with some evidence of skin breakdown. This was redressed in office today with no active bleeding and no signs of infection.  She is wearing LifeVest.  She does have +1 bilateral lower extremity edema but does note that she feels very dry with very dry mouth and skin.  I have ordered a BMP to evaluate renal function and determine continued diuretic dosing.  I did order CBC and her hemoglobin has dropped slightly from 9.4 at discharge to 8.6 today.  This may be from the bleeding she experienced from the right thigh and we will continue to monitor her.  Her blood pressure is 112/58 today and she remains on midodrine 10 mg 3 times daily to support goal-directed medical therapy for cardiomyopathy and CAD and the use of metoprolol 12.5 mg once daily. She is also on Jardiance. This hypotension is precluding our ability to add any other goal-directed medical therapy.  We will order repeat echocardiogram in 3 months.  She is on an aspirin but notes that she has not gotten Brilinta and has not been taking it since her discharge home because her pharmacy did not have it in stock.  Our staff did contact the pharmacy today and they confirmed the medication is filled and waiting  jarod and I did educate the patient and her  on the importance of adherence to DAPT. She remains on amiodarone 200 mg BID until 5-1-2024 and then she can reduce this dose to 200 mg once daily.  She is on this medication for postoperative atrial fibrillation.  However, she was not discharged on any oral anticoagulation and this was okayed by Dr. Ceballos today.  The patient and her  were extensively educated on the reasoning for the medications that she is currently on.  She will follow-up with CT surgery on 5/7/2024 and will hopefully be cleared for cardiac rehab.  The patient will follow-up with Dr. Ceballos in 1 month.    Medical Problems       Problem List       Colitis    History of biliary dyskinesia    DMII (diabetes mellitus, type 2) (HCC)      Lab Results   Component Value Date    HGBA1C 6.4 (H) 12/28/2023         Gallbladder polyp    Leiomyoma of uterus    Acquired right foot drop    Hypomagnesemia    Hair loss    Polyp of ascending colon    Ulcerative pancolitis without complication (HCC)    Coronary artery disease involving native coronary artery    Ischemic cardiomyopathy    Mixed hyperlipidemia    Stenosis of left carotid artery    Overview Signed 3/6/2024  7:17 AM by Toya Titus MD     S/P CEA 3/4/2024           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HCC)    Hyperprolactinemia (HCC)    S/P carotid endarterectomy    Cardiogenic postoperative shock (HCC)    PONV (postoperative nausea and vomiting)    S/P CABG x 3    New onset atrial fibrillation (HCC)    ABLA (acute blood loss anemia)        Past Medical History:   Diagnosis Date    CHF (congestive heart failure) (HCC) 4/8/2024    Colitis     Diabetes mellitus (HCC)     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HCC)     PONV (postoperative nausea and vomiting)      Social History     Socioeconomic History    Marital status: /Civil Union     Spouse name: Not on file    Number of children: Not on file    Years of education: Not on file    Highest education  level: Not on file   Occupational History    Not on file   Tobacco Use    Smoking status: Former     Types: Cigarettes     Passive exposure: Past    Smokeless tobacco: Never    Tobacco comments:     Quit 1970's   Vaping Use    Vaping status: Never Used   Substance and Sexual Activity    Alcohol use: Never    Drug use: Never    Sexual activity: Not on file   Other Topics Concern    Not on file   Social History Narrative    Not on file     Social Determinants of Health     Financial Resource Strain: Patient Declined (8/18/2023)    Overall Financial Resource Strain (CARDIA)     Difficulty of Paying Living Expenses: Patient declined   Food Insecurity: No Food Insecurity (4/11/2024)    Hunger Vital Sign     Worried About Running Out of Food in the Last Year: Never true     Ran Out of Food in the Last Year: Never true   Transportation Needs: No Transportation Needs (4/11/2024)    PRAPARE - Transportation     Lack of Transportation (Medical): No     Lack of Transportation (Non-Medical): No   Physical Activity: Not on file   Stress: Not on file   Social Connections: Not on file   Intimate Partner Violence: Not on file   Housing Stability: Low Risk  (4/11/2024)    Housing Stability Vital Sign     Unable to Pay for Housing in the Last Year: No     Number of Places Lived in the Last Year: 1     Unstable Housing in the Last Year: No      History reviewed. No pertinent family history.  Past Surgical History:   Procedure Laterality Date    CARDIAC CATHETERIZATION Left 01/17/2024    Procedure: Cardiac Left Heart Cath;  Surgeon: Juan A Sousa MD;  Location: [facility];  Service: Cardiology    CARDIAC CATHETERIZATION  01/17/2024    Procedure: Cardiac catheterization;  Surgeon: Juan A Sousa MD;  [facility]: [facility];  Service: Cardiology    CARDIAC CATHETERIZATION Left 4/10/2024    Procedure: Cardiac Left Heart Cath;  Surgeon: Viktor Bee MD;  [facility]: [facility];  Service: Cardiology    CARDIAC  CATHETERIZATION N/A 4/10/2024    Procedure: Cardiac Coronary Angiogram;  Surgeon: Viktor Bee MD;  Location: [facility];  Service: Cardiology    CARDIAC CATHETERIZATION N/A 4/10/2024    Procedure: Cardiac Impella Insertion;  Surgeon: Viktor Bee MD;  [facility]: [facility];  Service: Cardiology    CARDIAC CATHETERIZATION N/A 4/10/2024    Procedure: Cardiac pci;  Surgeon: Viktor Bee MD;  [facility];  Service: Cardiology    CARDIAC CATHETERIZATION N/A 4/12/2024    Procedure: Cardiac Impella Removal;  Surgeon: Viktor Bee MD;  [facility]: [facility];  Service: Cardiology    COLONOSCOPY  12/06/2023    HYSTERECTOMY      ~1990    IR PICC REPOSITION  4/18/2024    NJ CORONARY ARTERY BYP W/VEIN & ARTERY GRAFT 3 VEIN N/A 4/9/2024    Procedure: CORONARY ARTERY BYPASS GRAFT (CABG) x3 VESSELS, LIMA - LAD, LEFT LEG EVH/SVG - PDA  AND OM, W/ELOISA;  Surgeon: Josh Castellanos MD;  Location: [facility];  Service: Cardiac Surgery    NJ TEAEC W/PATCH GRF CAROTID VERTB SUBCLAV NECK INC Left 03/04/2024    Procedure: LEFT ENDARTERECTOMY ARTERY CAROTID;  Surgeon: Janeth De Oliveira DO;  Location: [facility];  Service: Vascular       Current Outpatient Medications:     acetaminophen (TYLENOL) 325 mg tablet, Take 2 tablets (650 mg total) by mouth every 6 (six) hours as needed for mild pain, Disp: , Rfl:     Alcohol Swabs 70 % PADS, May substitute brand based on insurance coverage. Check glucose TID., Disp: 100 each, Rfl: 0    amiodarone 200 mg tablet, Take 200 mg bid x 1 week then 200 mg daily, Disp: 37 tablet, Rfl: 1    aspirin 81 mg chewable tablet, Chew 1 tablet (81 mg total) daily, Disp: 90 tablet, Rfl: 3    cholecalciferol (VITAMIN D3) 1,000 units tablet, Take 1,000 Units by mouth daily, Disp: , Rfl:     docusate sodium (COLACE) 100 mg capsule, Take 1 capsule (100 mg total) by mouth 2 (two) times a day, Disp: , Rfl:     Empagliflozin (JARDIANCE) 10 MG TABS tablet, Take 1 tablet  (10 mg total) by mouth daily, Disp: 30 tablet, Rfl: 2    furosemide (LASIX) 40 mg tablet, Take 1 tablet (40 mg total) by mouth daily, Disp: 30 tablet, Rfl: 2    glucose blood test strip, Use 1 each daily as needed Use as instructed, Disp: , Rfl:     magnesium gluconate (MAGONATE) 500 mg tablet, Take 1 tablet (500 mg total) by mouth in the morning (Patient taking differently: Take 400 mg by mouth in the morning), Disp: 90 tablet, Rfl: 0    metoprolol succinate (TOPROL-XL) 25 mg 24 hr tablet, Take 0.5 tablets (12.5 mg total) by mouth daily, Disp: 30 tablet, Rfl: 2    midodrine (PROAMATINE) 10 MG tablet, Take 1 tablet (10 mg total) by mouth 3 (three) times a day before meals, Disp: 90 tablet, Rfl: 2    OneTouch Delica Lancets 33G MISC, May substitute brand based on insurance coverage. Check glucose TID., Disp: 100 each, Rfl: 0    pantoprazole (PROTONIX) 40 mg tablet, Take 1 tablet (40 mg total) by mouth daily in the early morning, Disp: 30 tablet, Rfl: 0    potassium chloride (Klor-Con M20) 20 mEq tablet, Take 1 tablet (20 mEq total) by mouth daily, Disp: 30 tablet, Rfl: 1    rosuvastatin (CRESTOR) 40 MG tablet, Take 1 tablet (40 mg total) by mouth daily, Disp: 30 tablet, Rfl: 2    metFORMIN (GLUCOPHAGE) 500 mg tablet, 2 pills bid Do not start before January 18, 2024. (Patient not taking: Reported on 4/26/2024), Disp: , Rfl:     oxyCODONE (ROXICODONE) 5 immediate release tablet, Take 2.5 every 6 hours as needed for moderate pain or take 5 mg every 8 hours for severe pain (Patient not taking: Reported on 4/26/2024), Disp: 30 tablet, Rfl: 0    ticagrelor (BRILINTA) 90 MG, Take 1 tablet (90 mg total) by mouth every 12 (twelve) hours (Patient not taking: Reported on 4/26/2024), Disp: 60 tablet, Rfl: 2  Allergies   Allergen Reactions    Valdecoxib Other (See Comments)     Patient is unaware of this allergy       Labs:     Chemistry        Component Value Date/Time    K 4.0 04/23/2024 0437    K 4.4 08/17/2022 1507    CL 97  "04/23/2024 0437     08/17/2022 1507    CO2 32 04/23/2024 0437    CO2 22 04/09/2024 1307    CO2 27 08/17/2022 1507    BUN 17 04/23/2024 0437    BUN 14 08/17/2022 1507    CREATININE 0.84 04/23/2024 0437    CREATININE 0.78 08/17/2022 1507        Component Value Date/Time    CALCIUM 9.1 04/23/2024 0437    CALCIUM 10.0 08/17/2022 1507    ALKPHOS 66 04/23/2024 0437    AST 23 04/23/2024 0437    ALT 12 04/23/2024 0437            No results found for: \"CHOL\"  Lab Results   Component Value Date    HDL 47 (L) 01/11/2024    HDL 44 06/02/2021    HDL 44 07/24/2018     Lab Results   Component Value Date    LDLCALC 130 (H) 01/11/2024    LDLCALC 156 (H) 06/02/2021    LDLCALC 141 (H) 07/24/2018     Lab Results   Component Value Date    TRIG 135 01/11/2024    TRIG 204 (H) 06/02/2021    TRIG 140 07/24/2018     No results found for: \"CHOLHDL\"    Imaging: XR chest portable ICU    Result Date: 4/12/2024  Narrative: XR CHEST PORTABLE ICU INDICATION: post op cardiogenic shock with impella. COMPARISON: 4/11/2024 FINDINGS: Patient is status post extubation and removal of the feeding tube. There is a right-sided central line with tip in SVC. The tip of the [place]-Ashleigh catheter is terminating in the pulmonary outflow tract. There are mediastinal and pleural drains. There is mild pulmonary edema. There are small bilateral pleural effusions. Normal cardiomediastinal silhouette. Status post sternotomy. Normal upper abdomen.     Impression: Cardiomegaly with mild pulmonary edema and small effusions. No pneumothorax identified. Postoperative chest with interval extubation and removal of the NG tube. Workstation performed: [id number]     Cardiac catheterization    Result Date: 4/12/2024  Narrative: Impella removal 100 cc bleeding     Echo follow up/limited w/ contrast if indicated    Result Date: 4/12/2024  Narrative:   Left Ventricle: The left ventricular ejection fraction is 15-20%. Systolic function is severely reduced. There is severe global " hypokinesis with regional variation. There is an impella cannula present with an intraventricular length of 6.0 cm.   IVS: There is abnormal septal motion consistent with post-operative status.   Right Ventricle: Right ventricle is not well visualized. Systolic function is at least moderately reduced. A catheter wire is present.   Prior TTE study available for comparison. Prior study date: 4/10/2024. Changes noted when compared to prior study. Changes include: Impella is now present. . Unable to properly see the Impella canula tip but it is at least 6 cm from aortic valve annulus.     XR chest portable    Result Date: 4/11/2024  Narrative: XR CHEST PORTABLE INDICATION: Impella. COMPARISON: 4/10/2024 FINDINGS: Impella device is present overlying the heart. Lines and tubes otherwise unchanged. Clear lungs. No pneumothorax or pleural effusion. Normal cardiomediastinal silhouette. Bones are unremarkable for age. Normal upper abdomen.     Impression: No acute cardiopulmonary disease. Workstation performed: [id number]     Cardiac catheterization    Result Date: 4/11/2024  Narrative:   Prox Cx lesion is 40% stenosed.   Mid LAD lesion is 90% stenosed.   Mid RCA lesion is 80% stenosed.   Dist RCA lesion is 90% stenosed.   RPDA lesion is 100% stenosed.   Dist LAD lesion is 80% stenosed.   Mid Cx lesion is 99% stenosed.   The angioplasty was pre-stent angioplasty. Cardiogenic Shock Moderately elevated LVEDP Patent LIMA-LAD(lad diseased distally) Patent SVG-PDA (very small PDA) Patent SVG-OM1 (Larger OM2 not grafted) PCI mLCX into OM2 Impella support/left on (pre closure with perclose x 2) Adequate flow into Right dFA and SFA post procedure     VAS limited iliac-femoral evaluation for Impella Device    Result Date: 4/10/2024  Narrative:  [facility] REPORT CLINICAL: Indications: Patient presents to evaluate the Ilio-Femoral arteries prior to impella device insertion. Operative History: 2024-03-04 Left CEA Risk  Factors: The patient has history of diabetes, left ICA stenosis, hyperlipidemia, CAD, colitis, CHF, ischemic cardiomyopathy, and previous smoking (quit >10 years ago).  Height:  64 inches.  Weight:  193 lbs. Clinical: Brachial BP: Right:  IV site.  Left:  112/64 mmHg.  FINDINGS:  Unilateral     PSV (cm/s)  AP Diam  Sup-Anisa Ao                     1.5  Ds Inf-Rell Ao          83            Right                  Segment Impression  PSV (cm/s)  AP Diam  Prox ELVIN                                          101      0.9  Dist ELVIN                                                   0.7  Prox. EIA              Not visualized                           Dist EIA               Not visualized                           Common Femoral Artery  Not visualized                           Prox SFA                                          108      0.5   Left                   PSV (cm/s)  AP Diam  Prox ELVIN                      101      1.0  Dist ELVIN                               1.0  Prox. EIA                     127      0.5  Dist EIA                      115      0.7  Common Femoral Artery         117      0.7  Prox SFA                       97      0.5     CONCLUSION: Impression AORTA SCREENING: The distal abdominal aorta is patent and normal in caliber measuring  1.5 cm.  RIGHT SIDE: Evaluation shows a widely patent SFA and ELVIN without evidence of hemodynamically stenosis. Vessel Diameters range from 0.5 cm to 0.9 cm.  LEFT SIDE: Evaluation shows a widely patent SFA, CFA, EIA, and ELVIN without evidence of hemodynamically stenosis. Vessel Diameters range from 0.5 cm to 1.0 cm  Technical findings posted on EPIC.  SIGNATURE: Electronically Signed by: SHANNAN GRULLON MD, RPVI on 2024-04-10 04:44:09 PM    XR chest portable    Result Date: 4/10/2024  Narrative: XR CHEST PORTABLE INDICATION: Post Open Heart surgery. COMPARISON: 4/9/2024 FINDINGS: The [place]leigh line appears slightly retracted from the prior study with the tip now in the region of  the main pulmonary artery. Other lines and tubes appear otherwise stable. Clear lungs. No pneumothorax or pleural effusion. Cardiomediastinal silhouette stable. Postop changes of CABG. Bones are unremarkable for age. Normal upper abdomen.     Impression: The [place]-Ashleigh line appears to have been retracted somewhat with the tip now projecting in the region of the main pulmonary artery. No other significant changes from prior. Workstation performed: [id number]     Echo follow up/limited w/ contrast if indicated    Result Date: 4/10/2024  Narrative:   Left Ventricle: Left ventricular cavity size is moderately dilated. The left ventricular ejection fraction is 14% by biplane measurement. Systolic function is severely reduced. There is severe global hypokinesis with regional variation.   Tricuspid Valve: There is mild regurgitation.   Pericardium: Pericardium was not well visualized. There is no pericardial effusion in limited views.   Prior TTE study available for comparison. Prior study date: 1/3/2024. Changes noted when compared to prior study. Changes include: LVEF is further reduced..     XR chest portable ICU    Result Date: 4/9/2024  Narrative: XR CHEST PORTABLE ICU INDICATION: Status post open heart surgery. COMPARISON: 4/3/2024 FINDINGS: Status post median sternotomy. [place]-Ashleigh catheter tip in the proximal right pulmonary artery, central line tip at cavoatrial junction, chest tubes are present, endotracheal tube tip 5.7 cm above the errol. Clear lungs. No pneumothorax or pleural effusion. The cardiac silhouette is without change from the prior study. Bones are unremarkable for age. Normal upper abdomen.     Impression: Status post open heart surgery, without evidence of pneumothorax or focal infiltrate. Endotracheal tube tip 5.7 cm above the errol Workstation performed: [id number]     ELOISA intraop interventional w/realtime guidance of cardiac procedures    Result Date: 4/9/2024  Narrative: This order contains the  linked images for the ELOISA that was performed by the Anesthesiologist.  Please see the  CARDIAC ELOISA ANESTHESIA procedure for results.    ELOISA Anesthesia    Result Date: 4/9/2024  Narrative: Saida Maya MD     4/9/2024 12:00 PM Procedure Performed: ELOISA Anesthesia Start Time:  4/9/2024 8:04 AM Preanesthesia Checklist Patient identified, IV assessed, risks and benefits discussed, monitors and equipment assessed, procedure being performed at surgeon's request and anesthesia consent obtained. Procedure  Type of ELOISA: complete ELOISA with interpretation. Images Saved: ultrasound permanent image saved.   Location performed: OR. Intubated.  Heart visualized. Insertion of ELOISA Probe:  Easy. Probe Type:  Epiaortic and multiplane. Modalities:  Color flow mapping, 3D, pulse wave Doppler and continuous wave Doppler. Echocardiographic and Doppler Measurements PREPROCEDURE LEFT VENTRICLE: Systolic Function: severely impaired. Ejection Fraction: 25-30%. Cavity size: dilated. Cavity Dimension: 6 cm.  Regional Wall Motion Abnormalities: Akinetic inferoseptal wall. Severely hypokinetic anterior, anteroseptal  inferior walls. Moderately hypokinetic anterolateral and inferolateral walls. RIGHT VENTRICLE: Systolic Function: moderately impaired.  Cavity size mildly dilated. No hypertrophy  AORTIC VALVE: Leaflets: normal and trileaflet. Leaflet motions normal and normal. Stenosis: none.     Regurgitation: none.  MITRAL VALVE: Leaflets: normal. Leaflet Motions: restricted. Regurgitation: mild and Mild to moderate.   Stenosis: none.   TRICUSPID VALVE: Leaflets: normal.   PULMONIC VALVE: Leaflets: normal. Regurgitation: trace. Stenosis: none. ASCENDING AORTA: Size:  normal.  Dissection not present.  AORTIC ARCH: Size:  normal.  dissection not present. Grade 3: atheroma protruding < 0.5 cm into lumen. DESCENDING AORTA: Size: normal.  Dissection not present. Grade 3: atheroma protruding < 0.5 cm into lumen. RIGHT ATRIUM: Size:  normal. No  spontaneous echo contrast. LEFT ATRIUM: Size: dilated. No spontaneous echo contrast. LEFT ATRIAL APPENDAGE: Size: normal. No spontaneous echo contrast ATRIAL SEPTUM: Intra-atrial septal morphology: normal.  VENTRICULAR SEPTUM: Intra-ventricular septum morphology: normal. EPIAORTIC: Plaque Thickness: 0-5 mm. OTHER FINDINGS: Pericardium:  normal. Pleural Effusion:  bilateral. POSTPROCEDURE LEFT VENTRICLE: Unchanged .    RIGHT VENTRICLE: Systolic Function: mildly depressed. AORTIC VALVE: Unchanged .      MITRAL VALVE: Unchanged .    TRICUSPID VALVE: Unchanged .   PULMONIC VALVE: Unchanged   ATRIA: Unchanged .   AORTA: Unchanged . REMOVAL: Probe Removal: atraumatic.      US bedside procedure    Result Date: 4/9/2024  Narrative: [id number]    XR chest pa & lateral    Result Date: 4/2/2024  Narrative: CHEST INDICATION:   Atherosclerotic heart disease of native coronary artery with other forms of angina pectoris. Ischemic cardiomyopathy. COMPARISON:  None. EXAM PERFORMED/VIEWS:  XR CHEST PA & LATERAL FINDINGS: Cardiomediastinal silhouette appears unremarkable. Bilateral small pleural effusions have increased slightly from prior study of 12/11/2023. Mild secondary atelectasis in the lung bases adjacent effusions. No pulmonary edema or focal consolidation. No pneumothorax. Osseous structures appear within normal limits for patient age.     Impression: Slight increase in bilateral small pleural effusions with passive atelectasis of the adjacent lower lobes. No pulmonary edema or airspace infiltrate Electronically signed: 04/02/2024 05:16 PM Jarvis Jama MD      Review of Systems   Constitutional: Positive for malaise/fatigue. Negative for chills and fever.   HENT:  Negative for congestion.    Cardiovascular:  Negative for chest pain, dyspnea on exertion, leg swelling, orthopnea and palpitations.   Respiratory:  Negative for cough, shortness of breath (no SOB at rest) and wheezing.   "  Endocrine: Negative.    Hematologic/Lymphatic: Negative.    Skin: Negative.    Musculoskeletal: Negative.    Gastrointestinal:  Negative for bloating, abdominal pain, nausea and vomiting.   Genitourinary: Negative.    Neurological:  Negative for dizziness and light-headedness.   Psychiatric/Behavioral: Negative.     All other systems reviewed and are negative.      Vitals:    04/26/24 1256   BP: 112/58   Pulse: 80   SpO2: 99%     Vitals:    04/26/24 1256   Weight: 72.9 kg (160 lb 12.8 oz)     Height: 5' 4\" (162.6 cm)   Body mass index is 27.6 kg/m².    Physical Exam:  Physical Exam  Vitals reviewed.   Constitutional:       General: She is not in acute distress.  HENT:      Head: Normocephalic and atraumatic.      Mouth/Throat:      Mouth: Mucous membranes are moist.   Cardiovascular:      Rate and Rhythm: Normal rate and regular rhythm.      Heart sounds: Normal heart sounds, S1 normal and S2 normal. No murmur heard.  Pulmonary:      Effort: Pulmonary effort is normal. No respiratory distress.      Breath sounds: Normal breath sounds.   Abdominal:      General: Bowel sounds are normal. There is no distension.      Palpations: Abdomen is soft.   Musculoskeletal:         General: Normal range of motion.      Cervical back: Normal range of motion and neck supple.      Right lower leg: No edema.      Left lower leg: No edema.   Skin:     General: Skin is warm and dry.      Comments: Ecchymosis of right groin at area of Impella insertion site    Area of skin breakdown with scabbing on right thigh that is redressed in the office    Sternal incision and chest tube sites are clean dry and intact and healing well   Neurological:      Mental Status: She is alert and oriented to person, place, and time.   Psychiatric:         Mood and Affect: Mood normal.         "

## 2024-04-17 NOTE — PHYSICAL THERAPY NOTE
PHYSICAL THERAPY NOTE          Patient Name: Julia Perry  Today's Date: 4/17/2024 04/17/24 1055   PT Last Visit   PT Visit Date 04/17/24   Note Type   Note Type Treatment   Pain Assessment   Pain Assessment Tool 0-10   Pain Score No Pain   Restrictions/Precautions   Other Precautions Cardiac/sternal;Multiple lines;Telemetry;Fall Risk  (CT)   General   Chart Reviewed Yes   Additional Pertinent History cleared for Tx session by CC team   Response to Previous Treatment Patient with no complaints from previous session.   Family/Caregiver Present No   Cognition   Overall Cognitive Status WFL   Arousal/Participation Alert;Cooperative   Attention Attends with cues to redirect   Orientation Level Oriented to person;Oriented to place;Oriented to situation   Memory Decreased recall of recent events;Decreased recall of precautions   Following Commands Follows one step commands without difficulty   Subjective   Subjective Alert; in the chair; agreeable to mobilize   Transfers   Sit to Stand 3  Moderate assistance   Additional items Assist x 1;Increased time required;Verbal cues  (2 trials)   Stand to Sit 3  Moderate assistance   Additional items Assist x 1;Increased time required;Verbal cues  (2 trials)   Ambulation/Elevation   Gait pattern Excessively slow;Short stride;Inconsistent sara   Gait Assistance 3  Moderate assist   Additional items Assist x 1;Verbal cues;Tactile cues   Assistive Device Rolling walker   Distance 40 ft; chair ride back to room   Balance   Static Sitting Fair   Dynamic Sitting Fair -   Static Standing Poor +   Dynamic Standing Poor   Ambulatory Poor   Activity Tolerance   Activity Tolerance (S)  Patient limited by fatigue;Treatment limited secondary to medical complications (Comment)  (during amb, noted increased fwd sway and somewhat increased latency of responses; assisted to chair and signif decline in BP  was noted; RN aware; chair ride back to room and reclined w/ LE elevated; BP back to baseline.)   Medical Staff Made Aware Co-Tx for mobility performed w/ OTR due to complexity of medical status   Nurse Made Aware spoke to STEPHANIE Laird   Exercises   Hip Abduction Sitting;15 reps;AAROM;Bilateral   Knee AROM Short Arc Quad Sitting;15 reps;AAROM;AROM;Bilateral   Ankle Pumps Sitting;15 reps;AROM;Bilateral   Assessment   Prognosis Good   Problem List Decreased strength;Decreased endurance;Impaired balance;Decreased mobility   Assessment Pt cont to gradually improve in functional mobility skills ambulating further distance w/ rw; pt still remains to be generally weak and deconditioned w/ mod balance impairment, decreased tolerance to mobilization (decline in BP noted during amb) and associated mobility deficits requiring mod (A) to assure safety; D/C recommendations are listed below; will follow   Goals   Patient Goals to go home   STG Expiration Date 04/25/24   PT Treatment Day 2   Plan   Treatment/Interventions Functional transfer training;LE strengthening/ROM;Therapeutic exercise;Endurance training;Bed mobility;Gait training;Spoke to nursing;Spoke to case management;OT   Progress Progressing toward goals   PT Frequency 4-6x/wk   Discharge Recommendation   Rehab Resource Intensity Level, PT I (Maximum Resource Intensity)   Equipment Recommended Walker   Walker Package Recommended Wheeled walker   Change/add to Walker Package? No   AM-PAC Basic Mobility Inpatient   Turning in Flat Bed Without Bedrails 3   Lying on Back to Sitting on Edge of Flat Bed Without Bedrails 2   Moving Bed to Chair 2   Standing Up From Chair Using Arms 2   Walk in Room 2   Climb 3-5 Stairs With Railing 1   Basic Mobility Inpatient Raw Score 12   Basic Mobility Standardized Score 32.23   [facility] Highest Level Of Mobility   -[facility] Goal 4: Move to chair/commode   -HLM Achieved 7: Walk 25 feet or more   Education   Education Provided Mobility  training;Assistive device   Patient Demonstrates verbal understanding;Reinforcement needed   End of Consult   Patient Position at End of Consult Bedside chair;All needs within reach       Lobo Jain

## 2024-04-17 NOTE — CONSULTS
Consultation - Advanced Heart Failure  Julia Perry 68 y.o. female MRN: [id number]  Unit/Bed#: [facility] 414-01 Encounter: [id number]          Inpatient consult to Heart Failure Service     Date/Time  4/17/2024 3:37 PM     Performed by  Flores Matos DO   Authorized by  Jeri Rubin PA-C           PCP: Toya Titus MD   Outpatient Cardiologist: Dr. Ceballos     History of Present Illness   Physician Requesting Consult: Josh Castellanos MD  Reason for Consult / Principal Problem: Heart failure reduced EF     HPI: Julia Perry is a 68 y.o. year old female with a history of type 2 diabetes, ulcerative colitis, previous left carotid endarterectomy, ischemic cardiomyopathy who presented to for elective CABG. patient underwent CABG x 3 LIMA to LAD, SVG to OM 1, SVG to RPDA on 4/9/2024.  Postoperatively patient required increasing pressor support and had elevated SVR and low cardiac index.  Due to increasing pressor support Impella was placed on 4/10/2024.  During this cath grafts were all patent and there was stenosis to the mid left circumflex and OM 2 which was 99%.  This was stented with a drug-eluting stent when Impella was placed.  Impella was removed on postop day 3.  Patient did have an episode of atrial fibrillation with RVR requiring escalation of pressors.  She was cardioverted and placed on amiodarone drip.  This was on postop day 4 into postop day 5.  Milrinone was up to 0.25 however was intermittently weaned down to 0.13 with decreasing cardiac output.  Follow-up echo 4/12/2024 demonstrated EF of 15 to 20%, RV moderately reduced function.      Review of Systems   Constitutional:  Positive for fatigue. Negative for chills and fever.   HENT:  Negative for ear pain and sore throat.    Eyes:  Negative for pain and visual disturbance.   Respiratory:  Positive for shortness of breath. Negative for cough.    Cardiovascular:  Positive for leg swelling. Negative for chest pain and palpitations.    Gastrointestinal:  Negative for abdominal pain and vomiting.   Genitourinary:  Negative for dysuria and hematuria.   Musculoskeletal:  Negative for arthralgias and back pain.   Skin:  Negative for color change and rash.   Neurological:  Negative for seizures and syncope.   All other systems reviewed and are negative.        Historical Information   Past Medical History:   Diagnosis Date    CHF (congestive heart failure) (HCC) 4/8/2024    Colitis     Diabetes mellitus (HCC)     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HCC)     PONV (postoperative nausea and vomiting)      Past Surgical History:   Procedure Laterality Date    CARDIAC CATHETERIZATION Left 01/17/2024    Procedure: Cardiac Left Heart Cath;  Surgeon: Juan A Sousa MD;  Location: [facility];  Service: Cardiology    CARDIAC CATHETERIZATION  01/17/2024    Procedure: Cardiac catheterization;  Surgeon: Juan A Sousa MD;  [facility]: [facility];  Service: Cardiology    CARDIAC CATHETERIZATION Left 4/10/2024    Procedure: Cardiac Left Heart Cath;  Surgeon: Viktor Bee MD;  [facility];  Service: Cardiology    CARDIAC CATHETERIZATION N/A 4/10/2024    Procedure: Cardiac Coronary Angiogram;  Surgeon: Viktor Bee MD;  [facility]: [facility];  Service: Cardiology    CARDIAC CATHETERIZATION N/A 4/10/2024    Procedure: Cardiac Impella Insertion;  Surgeon: Viktor Bee MD;  [facility]: [facility];  Service: Cardiology    CARDIAC CATHETERIZATION N/A 4/10/2024    Procedure: Cardiac pci;  Surgeon: Viktor Bee MD;  [facility];  Service: Cardiology    CARDIAC CATHETERIZATION N/A 4/12/2024    Procedure: Cardiac Impella Removal;  Surgeon: Viktor Bee MD;  [facility]: [facility];  Service: Cardiology    COLONOSCOPY  12/06/2023    HYSTERECTOMY      ~1990    [place] CORONARY ARTERY BYP W/VEIN & ARTERY GRAFT 3 VEIN N/A 4/9/2024    Procedure: CORONARY ARTERY BYPASS GRAFT (CABG) x3 VESSELS, LIMA - LAD, LEFT LEG  EVH/SVG - PDA  AND OM, W/ELOISA;  Surgeon: Josh Castellanos MD;  Location: [facility];  Service: Cardiac Surgery    AK TEAEC W/PATCH GRF CAROTID VERTB SUBCLAV NECK INC Left 03/04/2024    Procedure: LEFT ENDARTERECTOMY ARTERY CAROTID;  Surgeon: Janeth De Oliveira DO;  Location: [facility];  Service: Vascular     Social History     Substance and Sexual Activity   Alcohol Use Never     Social History     Substance and Sexual Activity   Drug Use Never     Social History     Tobacco Use   Smoking Status Former    Types: Cigarettes    Passive exposure: Past   Smokeless Tobacco Never   Tobacco Comments    Quit 1970's     Family History: No family history on file.    Meds/Allergies   Hospital Medications:   Current Facility-Administered Medications   Medication Dose Route Frequency    acetaminophen (TYLENOL) tablet 975 mg  975 mg Oral Q8H PRN    amiodarone tablet 200 mg  200 mg Oral Q8H LINDA    [START ON 4/18/2024] aspirin chewable tablet 81 mg  81 mg Oral Daily    atorvastatin (LIPITOR) tablet 80 mg  80 mg Oral Daily With Dinner    chlorhexidine (PERIDEX) 0.12 % oral rinse 15 mL  15 mL Mouth/Throat BID    Cholecalciferol (VITAMIN D3) tablet 1,000 Units  1,000 Units Oral Daily    docusate sodium (COLACE) capsule 100 mg  100 mg Oral BID    fondaparinux (ARIXTRA) subcutaneous injection 2.5 mg  2.5 mg Subcutaneous Q24H    furosemide (LASIX) injection 40 mg  40 mg Intravenous TID (diuretic)    insulin lispro (HumALOG/ADMELOG) 100 units/mL subcutaneous injection 1-5 Units  1-5 Units Subcutaneous TID AC    insulin lispro (HumALOG/ADMELOG) 100 units/mL subcutaneous injection 1-5 Units  1-5 Units Subcutaneous HS    magnesium gluconate (MAGONATE) tablet 500 mg  500 mg Oral Daily    magnesium sulfate 4 g/100 mL IVPB (premix) 4 g  4 g Intravenous Once    metoprolol tartrate (LOPRESSOR) partial tablet 12.5 mg  12.5 mg Oral Q12H LINDA    midodrine (PROAMATINE) tablet 5 mg  5 mg Oral TID AC    milrinone (PRIMACOR) 20 mg in 100 mL  "infusion (premix)  0.13 mcg/kg/min Intravenous Continuous    ondansetron (ZOFRAN) injection 4 mg  4 mg Intravenous Q6H PRN    oxyCODONE (ROXICODONE) split tablet 2.5 mg  2.5 mg Oral Q4H PRN    Or    oxyCODONE (ROXICODONE) IR tablet 5 mg  5 mg Oral Q4H PRN    pantoprazole (PROTONIX) EC tablet 40 mg  40 mg Oral Early Morning    phenylephrine (TRI-SYNEPHRINE) 50 mg (STANDARD CONCENTRATION) in sodium chloride 0.9% 250 mL   mcg/min Intravenous Titrated    polyethylene glycol (MIRALAX) packet 17 g  17 g Oral Daily    potassium chloride (Klor-Con M20) CR tablet 20 mEq  20 mEq Oral BID    ticagrelor (BRILINTA) tablet 90 mg  90 mg Oral Q12H LINDA    trimethobenzamide (TIGAN) IM injection 200 mg  200 mg Intramuscular Q6H PRN     Home Medications:   Medications Prior to Admission   Medication    acetaminophen (TYLENOL) 325 mg tablet    aspirin 81 mg chewable tablet    furosemide (LASIX) 40 mg tablet    metoprolol succinate (TOPROL-XL) 25 mg 24 hr tablet    rosuvastatin (CRESTOR) 40 MG tablet    Alcohol Swabs 70 % PADS    b complex vitamins capsule    cholecalciferol (VITAMIN D3) 1,000 units tablet    glucose blood test strip    magnesium gluconate (MAGONATE) 500 mg tablet    metFORMIN (GLUCOPHAGE) 500 mg tablet    OneTouch Delica Lancets 33G MISC       Allergies   Allergen Reactions    Valdecoxib Other (See Comments)     Patient is unaware of this allergy       Objective   Vitals: Blood pressure (!) 78/44, pulse 85, temperature 98.8 °F (37.1 °C), temperature source Oral, resp. rate 16, height 5' 4\" (1.626 m), weight 72.8 kg (160 lb 7.9 oz), SpO2 97%.  Orthostatic Blood Pressures      Flowsheet Row Most Recent Value   Blood Pressure 78/44 filed at 04/17/2024 0900   Patient Position - Orthostatic VS Lying filed at 04/17/2024 0800              Invasive Devices       Central Venous Catheter Line  Duration             CVC Central Lines 04/09/24 8 days              Drain  Duration             Urethral Catheter Latex;Straight-tip " "16 Fr. 8 days    Chest Tube 1 Left Pleural 28 Fr. 7 days    Chest Tube 2 Right Pleural 28 Fr. 7 days    Chest Tube 3 Posterior;Mediastinal 24 Fr. 7 days    Chest Tube 4 Mediastinal;Anterior 28 Fr. 7 days                    Physical Exam    GEN: Julia Perry appears well, alert and oriented x 3, pleasant and cooperative   HEENT:  Normocephalic, atraumatic, anicteric, moist mucous membranes  NECK: JVP 10 cm H2O. Left CEA well healing scar   HEART: Regular rate and rhythm, normal S1 and S2, no murmurs, clicks, friction rub noted    LUNGS: Diffuse crackles    ABDOMEN:  Normoactive bowel sounds, soft, no tenderness, no distention  EXTREMITIES: peripheral pulses palpable; 3+ bilateral lower extremity edema   NEURO: no gross focal findings; cranial nerves grossly intact   SKIN:  Dry, intact, warm to touch    Lab Results: I have personally reviewed pertinent lab results.    CBC with diff:   Results from last 7 days   Lab Units 04/17/24  0402   WBC Thousand/uL 11.10*   RBC Million/uL 2.97*   HEMOGLOBIN g/dL 8.7*   HEMATOCRIT % 27.6*   MCV fL 93   MCH pg 29.3   MCHC g/dL 31.5   RDW % 16.9*   MPV fL 10.4   PLATELETS Thousands/uL 287     CMP:   Results from last 7 days   Lab Units 04/17/24  0402 04/14/24  1716 04/14/24  0403   SODIUM mmol/L 129*   < > 143   CHLORIDE mmol/L 91*   < > 106   CO2 mmol/L 32   < > 27   BUN mg/dL 13   < > 12   CREATININE mg/dL 0.76   < > 0.61   CALCIUM mg/dL 8.0*   < > 7.9*   AST U/L  --   --  16   ALT U/L  --   --  7   ALK PHOS U/L  --   --  46   EGFR ml/min/1.73sq m 81   < > 94    < > = values in this interval not displayed.     HS Troponin: No results found for: \"HSTNI\", \"HSTNI0\", \"HSTNI2\", \"HSTNI4\"  BNP:   Results from last 7 days   Lab Units 04/17/24  0402   POTASSIUM mmol/L 3.9   CHLORIDE mmol/L 91*   CO2 mmol/L 32   BUN mg/dL 13   CREATININE mg/dL 0.76   CALCIUM mg/dL 8.0*   EGFR ml/min/1.73sq m 81     Coags:   Results from last 7 days   Lab Units 04/16/24  0502 04/13/24  0414   PTT seconds  -- "  27   INR  1.23* 1.55*     TSH:     Magnesium:   Results from last 7 days   Lab Units 04/17/24  0402   MAGNESIUM mg/dL 1.5*     Lipid Profile:         Results from last 7 days   Lab Units 04/17/24  0402 04/16/24  1623 04/16/24  0356   POTASSIUM mmol/L 3.9 4.0 4.2   CO2 mmol/L 32 29 28   CHLORIDE mmol/L 91* 94* 99   BUN mg/dL 13 14 10   CREATININE mg/dL 0.76 0.73 0.64     Results from last 7 days   Lab Units 04/17/24  0402 04/16/24  0502 04/15/24  0428   HEMOGLOBIN g/dL 8.7* 9.8* 9.2*   HEMATOCRIT % 27.6* 31.3* 28.7*   PLATELETS Thousands/uL 287 239 164     Results from last 7 days   Lab Units 04/13/24  0414 04/12/24  1643 04/12/24  1103   PTT seconds 27 30 71*             Imaging: I have personally reviewed pertinent reports.   and I have personally reviewed pertinent films in PACS    EKG:   Date: 4/14/2024  Interpretation: Sinus tachycardia with rate of 115, LBBB, , Qtc 478      Previous STRESS TEST:  No results found for this or any previous visit.     No results found for this or any previous visit.    No results found for this or any previous visit.      Previous Cath/PCI:  No results found for this or any previous visit.      ECHO:  Follow up echo 4/12/2024    Interpretation Summary         Left Ventricle: The left ventricular ejection fraction is 15-20%. Systolic function is severely reduced. There is severe global hypokinesis with regional variation. There is an impella cannula present with an intraventricular length of 6.0 cm.    IVS: There is abnormal septal motion consistent with post-operative status.    Right Ventricle: Right ventricle is not well visualized. Systolic function is at least moderately reduced. A catheter wire is present.    Prior TTE study available for comparison. Prior study date: 4/10/2024. Changes noted when compared to prior study. Changes include: Impella is now present. .     Unable to properly see the Impella canula tip but it is at least 6 cm from aortic valve annulus.       Findings    Left Ventricle The left ventricular ejection fraction is 15-20%. Systolic function is severely reduced. There is severe global hypokinesis with regional variation. There is an impella cannula present with an intraventricular length of 6.0 cm.   Interventricular Septum There is abnormal septal motion consistent with post-operative status.   Right Ventricle Right ventricle is not well visualized. Systolic function is at least  moderately reduced. A catheter wire is present.   Left Atrium The atrium is normal in size.   Right Atrium The atrium is normal in size.   Aortic Valve The leaflets are not thickened. The leaflets are not calcified. The leaflets exhibit normal mobility. There is no evidence of regurgitation. The aortic valve has no significant stenosis. There is Impella canula going thru AV.   Mitral Valve Mitral valve structure is normal.  There is trace regurgitation. There is no evidence of stenosis.   Tricuspid Valve The tricuspid valve was not well visualized.   Pulmonic Valve The pulmonic valve was not assessed.     Right Ventricle Measurements      Dimensions   Tricuspid annular plane systolic excursion 1 cm                  HOLTER  No results found for this or any previous visit.        Assessment/Plan     Assessment:    Multivessel coronary artery disease status post CABG x 3 on 4/9/2024  - CABG x 3 LIMA to LAD, SVG to OM 1, SVG to RPDA on 4/9/2024  - Postoperatively patient was in cardiogenic shock with increasing pressor requirements and eventually required Impella support  - Cardiac cath 4/10/2024 when Impella was being placed demonstrated patent bypass grafts 99% mid circumflex into OM 2 which was stented at the time with drug-eluting stent  - Patient remains on aspirin, statin, amiodarone  - Management per CT surgery    Postop cardiogenic shock  - Postoperatively patient was in cardiogenic shock and had increasing pressor requirements and eventually required Impella support  - Impella  was removed on 4/12/2024  - Patient has been on and off Nhan-Synephrine for the last 24 to 48 hours  - Currently on milrinone 0.13 and Nhan-Synephrine 50  - Last recorded cardiac output and index were 3.2 L/min / 1.8 L/min/m² respectively.  This was while on milrinone 0.25 and Nhan-Synephrine  - SpO2 this morning was 61.9 while on milrinone 0.25  - [place] was removed  - Patient is warm on exam and remains volume overloaded  - Markers of endorgan perfusion including creatinine, urine output are within normal    Heart failure reduced EF, ischemic cardiomyopathy  - Etiology: Ischemic cardiomyopathy  - TTE 1/3/2024 demonstrated EF of 30%, severely reduced systolic function, akinesis basal inferoseptal, basal inferior, mid inferoseptal and mid inferior with hypokinesis of remaining segments.  Left atrial enlargement, mild to moderate mitral regurg, mild PI, small pericardial effusion, LVIDD 5.4 cm.  - Follow-up TTE 4/12/2024 demonstrated EF 15 to 20% with severe global hypokinesis and regional variation  - Volume status: Patient appears volume overloaded on the basis of 3+ lower extremity edema, elevated JVP and crackles throughout lung fields, hyponatremia   - Intake and output: -2.2 L in last 24 hours  - Weight: Currently 72.8 kg which is down approximately half a kilogram from yesterday    Neurohormonal Blockade:  --Beta-Blocker: metoprolol tartrate 12.5 mg BID   --ACEi, ARB or ARNi: Currently on hold in the setting of pressor support  --SVR reduction: Currently on hold in the setting of pressor support  --Aldosterone Receptor Blocker: Currently on hold in the setting of pressor support  --SGLT2 Inhibitor: Currently on hold in the setting of pressor support  --Diuretic: Lasix 40 mg IV 3 times daily     Sudden Cardiac Death Risk Reduction:  --ICD: Will need to see EF after 3 months of GDMT and revascularization     Electrical Resynchronization:  --Candidacy for BiV device:      Advanced Therapies (if appropriate):  -  Inotrope: Milrinone 0.13      Outpatient GDMT: Metoprolol succinate 25 mg daily, Lasix 40 mg daily    New onset atrial fibrillation  - Patient went into A-fib with RVR and became hypotensive in the postoperative setting and required cardioversion x 1  - Currently on amiodarone drip as well as amiodarone 200 mg 3 times daily  - Currently in normal sinus rhythm    Hypertension  - Currently hypotensive in the setting of milrinone use and cardiogenic shock   - requiring Nhan-Synephrine    Hyperlipidemia  - Continue on statin    Diabetes mellitus  Bilateral carotid artery stenosis status post left CEA 3/24  Pituitary macroadenoma with elevated prolactin  Hypercalcemia  Ulcerative colitis      Plan:  - continue milrinone 0.13 mcg/kg/min while monitoring SpO2, attempt to wean if possible  - wean Nhan-Synephrine as able  - Continue diuresis with Lasix 40 mg IV 3 times daily with goal of net -2 to 4 L  - Fluid restriction 1800 cc  - Sodium restriction 2 g  - Strict I's and O's  - Daily weights  - May want to consider holding beta-blocker in the setting of hypotension requiring pressors    Case discussed and reviewed with Dr. Dominguez and is pending their agreement of the assessment and plan.     Thank you for involving us in the care of your patient.  ==========================================================================================  ** Please Note: Fluency DirectDictation voice to text software may have been used in the creation of this document. **

## 2024-04-17 NOTE — PLAN OF CARE
Problem: PAIN - ADULT  Goal: Verbalizes/displays adequate comfort level or baseline comfort level  Description: Interventions:  - Encourage patient to monitor pain and request assistance  - Assess pain using appropriate pain scale  - Administer analgesics based on type and severity of pain and evaluate response  - Implement non-pharmacological measures as appropriate and evaluate response  - Consider cultural and social influences on pain and pain management  - Notify physician/advanced practitioner if interventions unsuccessful or patient reports new pain  Outcome: Progressing     Problem: CARDIOVASCULAR - ADULT  Goal: Maintains optimal cardiac output and hemodynamic stability  Description: INTERVENTIONS:  - Monitor I/O, vital signs and rhythm  - Monitor for S/S and trends of decreased cardiac output  - Administer and titrate ordered vasoactive medications to optimize hemodynamic stability  - Assess quality of pulses, skin color and temperature  - Assess for signs of decreased coronary artery perfusion  - Instruct patient to report change in severity of symptoms  Outcome: Progressing  Goal: Absence of cardiac dysrhythmias or at baseline rhythm  Description: INTERVENTIONS:  - Continuous cardiac monitoring, vital signs, obtain 12 lead EKG if ordered  - Administer antiarrhythmic and heart rate control medications as ordered  - Monitor electrolytes and administer replacement therapy as ordered  Outcome: Progressing     Problem: RESPIRATORY - ADULT  Goal: Achieves optimal ventilation and oxygenation  Description: INTERVENTIONS:  - Assess for changes in respiratory status  - Assess for changes in mentation and behavior  - Position to facilitate oxygenation and minimize respiratory effort  - Oxygen administered by appropriate delivery if ordered  - Initiate smoking cessation education as indicated  - Encourage broncho-pulmonary hygiene including cough, deep breathe, Incentive Spirometry  - Assess the need for suctioning  and aspirate as needed  - Assess and instruct to report SOB or any respiratory difficulty  - Respiratory Therapy support as indicated  Outcome: Progressing

## 2024-04-17 NOTE — OCCUPATIONAL THERAPY NOTE
Occupational Therapy Progress Note     Patient Name: Julia Perry  Today's Date: 4/17/2024  Problem List  Principal Problem:    Coronary artery disease involving native coronary artery  Active Problems:    DMII (diabetes mellitus, type 2) (HCC)    Hypomagnesemia    Ulcerative pancolitis without complication (HCC)    Ischemic cardiomyopathy    Mixed hyperlipidemia    Stenosis of left carotid artery    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HCC)    Hyperprolactinemia (HCC)    S/P carotid endarterectomy    Cardiogenic postoperative shock (HCC)    PONV (postoperative nausea and vomiting)    S/P CABG x 3    New onset atrial fibrillation (HCC)    ABLA (acute blood loss anemia)            04/17/24 1027   OT Last Visit   OT Visit Date 04/17/24   Note Type   Note Type Treatment   Pain Assessment   Pain Assessment Tool 0-10   Pain Score No Pain   Hospital Pain Intervention(s) Repositioned;Ambulation/increased activity;Emotional support   Restrictions/Precautions   Weight Bearing Precautions Per Order No   Other Precautions Cardiac/sternal;Multiple lines;Telemetry;Fall Risk  (2 chest tubes, hicks)   Lifestyle   Autonomy Pta pt I with ADL, most IADL and functional mobility, (+)  although per spouse pt rarely drives   Reciprocal Relationships supportive spouse   Service to Others retired   Intrinsic Gratification enjoys reading, time with her kids   ADL   Where Assessed Chair   Bed Mobility   Supine to Sit Unable to assess   Sit to Supine Unable to assess   Additional Comments Upon arrival, pt found sitting upright in recliner; @ end of session, pt left sitting upright in recliner with all functional needs in reach   Transfers   Sit to Stand 4  Minimal assistance   Additional items Assist x 1;Armrests;Increased time required;Verbal cues   Stand to Sit 4  Minimal assistance   Additional items Assist x 1;Armrests;Increased time required;Verbal cues   Additional Comments w/ RW; x2-3 present for line management/chair follow   Functional  "Mobility   Functional Mobility 4  Minimal assistance   Additional Comments Pt completed household functional mobility distances w/ Min A x1 with x2-3 present for line management/chair follow; during functional mobility, pt w/ 1 episode of blank starring/posterior sway in which pt returned to seated position, BP reading 77/49, RN present   Additional items Rolling walker   Subjective   Subjective \"I want to improve.\"   Cognition   Overall Cognitive Status WFL   Arousal/Participation Alert;Responsive;Arousable;Cooperative   Attention Within functional limits   Orientation Level Oriented X4   Memory Within functional limits   Following Commands Follows one step commands without difficulty   Comments Pt pleasant and cooperative; appears motivated to participate with therapy/towards functional progress   Activity Tolerance   Activity Tolerance Patient tolerated treatment well;Treatment limited secondary to medical complications (Comment);Other (Comment)  (Hypotension, BP reading 77/49 during functional mobility, returned to seated position with b/l LE elevated, RN present)   Medical Staff Made Aware STAR Diamond; RN present, PCA   Assessment   Assessment Pt is a 69 yo female who actively participated in skilled OT session on 4/17/2024. Pt seen with PT to increase safety, decrease fall risk, and maximize functional/occupational performance 2* medical complexity which is a regression from pt's functional baseline. Treatment focused to improve functional transfers with fall prevention strategies, static/dynamic balance, postural/trunk control, proper body mechanics, functional use of b/l UE's, safety awareness, and overall increased activity tolerance in ADL/IADL/leisure tasks. Upon arrival, pt found sitting upright in recliner and was agreeable to OT session. Pt performed STS with Min A x1 w/ RW and completed household functional mobility distances w/ Min A x1 with x2-3 present for line management, safety, and chair follow. " During functional mobility, pt demonstrating posterior sway in which returned to seated position, BP reading 77/49. RN present. Pt returned to seated position w/ b/l LE elevated. At the end of the session, pt was left sitting upright in recliner with all functional needs in reach. Pt demonstrates gradual functional improvements towards OT goals however continues to be functioning below occupational baseline and is still limited by the following limitations/impairments which were addressed through skilled instruction: cardiac precautions, generalized weakness, balance, endurance/activity tolerance, postural/trunk control, strength, pain, and safety awareness. At this time, recommend discharge to post-acute rehab when medically stable. The patient's raw score on the AM-PAC Daily Activity Inpatient Short Form is 14. A raw score of less than 19 suggests the patient may benefit from discharge to post-acute rehabilitation services. Please refer to the recommendation of the Occupational Therapist for safe discharge planning.  Established OT goals will be continued 2-3x/wk to address acute care needs and underlying performance skills to maximize occupational performance and safety to return to [facility].   Plan   Treatment Interventions ADL retraining;Functional transfer training;UE strengthening/ROM;Endurance training;Patient/family training;Equipment evaluation/education;Compensatory technique education;Continued evaluation;Energy conservation;Activityengagement   Goal Expiration Date 04/28/24   OT Treatment Day 1   OT Frequency 2-3x/wk   Discharge Recommendation   Rehab Resource Intensity Level, OT II (Moderate Resource Intensity)   AM-PAC Daily Activity Inpatient   Lower Body Dressing 2   Bathing 2   Toileting 2   Upper Body Dressing 2   Grooming 3   Eating 3   Daily Activity Raw Score 14   Daily Activity Standardized Score (Calc for Raw Score >=11) 33.39   AM-PAC Applied Cognition Inpatient   Following a Speech/Presentation  3   Understanding Ordinary Conversation 4   Taking Medications 3   Remembering Where Things Are Placed or Put Away 3   Remembering List of 4-5 Errands 2   Taking Care of Complicated Tasks 2   Applied Cognition Raw Score 17   Applied Cognition Standardized Score 36.52   End of Consult   Education Provided Yes   Patient Position at End of Consult Bedside chair;Bed/Chair alarm activated;All needs within reach   Nurse Communication Nurse aware of consult       Johanny Sanchez MS, OTR/L

## 2024-04-17 NOTE — PLAN OF CARE
Problem: PHYSICAL THERAPY ADULT  Goal: Performs mobility at highest level of function for planned discharge setting.  See evaluation for individualized goals.  Description: Treatment/Interventions: Functional transfer training, LE strengthening/ROM, Elevations, Therapeutic exercise, Endurance training, Equipment eval/education, Bed mobility, Gait training, Spoke to nursing, OT, Family  Equipment Recommended: Walker       See flowsheet documentation for full assessment, interventions and recommendations.  Outcome: Progressing  Note: Prognosis: Good  Problem List: Decreased strength, Decreased endurance, Impaired balance, Decreased mobility  Assessment: Pt cont to gradually improve in functional mobility skills ambulating further distance w/ rw; pt still remains to be generally weak and deconditioned w/ mod balance impairment, decreased tolerance to mobilization (decline in BP noted during amb) and associated mobility deficits requiring mod (A) to assure safety; D/C recommendations are listed below; will follow        Rehab Resource Intensity Level, PT: I (Maximum Resource Intensity)    See flowsheet documentation for full assessment.

## 2024-04-17 NOTE — PROGRESS NOTES
Progress Note - Cardiothoracic Surgery   Julia Perry 68 y.o. female MRN: [id number]  Unit/Bed#: [facility] 414-01 Encounter: [id number]    Coronary artery disease. S/P coronary artery bypass grafting; POD # 8  POD6 s/p RAFFY to OM and Impella (impella removed 4/12)    24 Hour Events: No overnight events.  Remains on Milrinone 0.25, swan removed.  Ambulated with PT assist x 2 at 15 feet    Medications:   Scheduled Meds:  Current Facility-Administered Medications   Medication Dose Route Frequency Provider Last Rate    acetaminophen  650 mg Rectal Q4H PRN Geri Spironello V, CRNP      acetaminophen  975 mg Oral Q8H PRN Brook Carr MD      amiodarone (CORDARONE) 900 mg in dextrose 5 % 500 mL infusion  0.5 mg/min Intravenous Continuous Geri Spironello V, CRNP 0.5 mg/min (04/16/24 1126)    amiodarone  200 mg Oral Q8H LINDA Geri Spironello V, CRNP      aspirin  325 mg Oral Daily Geri Spironello V, CRNP      atorvastatin  80 mg Oral Daily With Dinner Geri Spironello V, CRNP      bisacodyl  10 mg Rectal Daily PRN Geri Spironello V, CRNP      chlorhexidine  15 mL Mouth/Throat BID Geri Spironello V, CRNP      Cholecalciferol  1,000 Units Oral Daily Geri Spironello V, CRNP      docusate sodium  100 mg Oral BID Geri Spironello V, CRNP      fondaparinux  2.5 mg Subcutaneous Q24H Geri Spironello V, CRNP      furosemide  40 mg Intravenous TID (diuretic) Timoteo Rasmussen PA-C      HYDROmorphone  0.5 mg Intravenous Q2H PRN Geri Spironello V, CRNP      insulin lispro  1-5 Units Subcutaneous TID AC Geri Spironello V, CRNP      insulin lispro  1-5 Units Subcutaneous HS Geri Spironello V, CRNP      magnesium gluconate  500 mg Oral Daily Geri Spironello V, CRNP      metoprolol tartrate  12.5 mg Oral Q12H [facility] Timoteo Rasmussen PA-C      milrinone (Primacor) infusion  0.25 mcg/kg/min Intravenous Continuous Timoteo Rasmussen PA-C 0.25 mcg/kg/min (04/17/24 5529)    ondansetron  4 mg Intravenous Q6H  PRN Geri Spironello V, CRNP      oxyCODONE  2.5 mg Oral Q4H PRN Geri Spironello V, CRNP      Or    oxyCODONE  5 mg Oral Q4H PRN Geri Spironello V, CRNP      pantoprazole  40 mg Oral Early Morning Geri Spironello V, CRNP      phenylephine   mcg/min Intravenous Titrated Geri Spironello V, CRNP Stopped (04/16/24 1615)    polyethylene glycol  17 g Oral Daily Geri Spironello V, CRNP      potassium chloride  20 mEq Oral BID Timoteo Rasmussen PA-C      ticagrelor  90 mg Oral Q12H LINDA Geri Spironello V, CRNP      trimethobenzamide  200 mg Intramuscular Q6H PRN Geri Spironello V, CRNP       Continuous Infusions:amiodarone (CORDARONE) 900 mg in dextrose 5 % 500 mL infusion, 0.5 mg/min, Last Rate: 0.5 mg/min (04/16/24 1126)  milrinone (Primacor) infusion, 0.25 mcg/kg/min, Last Rate: 0.25 mcg/kg/min (04/17/24 0438)  phenylephine,  mcg/min, Last Rate: Stopped (04/16/24 1615)      PRN Meds:.  acetaminophen    acetaminophen    bisacodyl    HYDROmorphone    ondansetron    oxyCODONE **OR** oxyCODONE    trimethobenzamide    Vitals:   Vitals:    04/17/24 0200 04/17/24 0400 04/17/24 0541 04/17/24 0600   BP: 94/50 96/54  102/56   BP Location:  Left arm     Pulse: 87 92  97   Resp: 16 (!) 28  20   Temp:  98.7 °F (37.1 °C)     TempSrc:  Oral     SpO2: 100% 99%  96%   Weight:   72.8 kg (160 lb 7.9 oz)    Height:           Hemodynamics:  PAP: (18-25)/(12-17) 18/12  CO:  [3.2 L/min] 3.2 L/min  CI:  [1.8 L/min/m2] 1.8 L/min/m2    Respiratory:   SpO2: SpO2: 96 %; 2 LPM    Intake/Output:   I/O         04/15 0701  04/16 0700 04/16 0701  04/17 0700 04/17 0701 04/18 0700    P.O. 560 560     I.V. (mL/kg) 1076.2 (14.7) 578.5 (7.9)     IV Piggyback 321.1      Total Intake(mL/kg) 1957.2 (26.7) 1138.5 (15.6)     Urine (mL/kg/hr) 1965 (1.1) 2785 (1.6)     Stool 0      Chest Tube 935 590     Total Output 2900 3375     Net -942.8 -2236.5            Unmeasured Stool Occurrence 2 x              Chest tube  Output:    Mediastinal tubes: 120 mL/8 hours  320 mL/24 hours   Pleural tubes: 210 mL/8 hours  270 mL/24 hours     Weights:   Weight (last 2 days)       Date/Time Weight    04/17/24 0541 72.8 (160.5)    04/16/24 0554 73.2 (161.38)    04/15/24 0600 76.3 (168.21)          Results:   Results from last 7 days   Lab Units 04/17/24  0402 04/16/24  0502 04/15/24  0428   WBC Thousand/uL 11.10* 12.70* 13.24*   HEMOGLOBIN g/dL 8.7* 9.8* 9.2*   HEMATOCRIT % 27.6* 31.3* 28.7*   PLATELETS Thousands/uL 287 239 164     Results from last 7 days   Lab Units 04/17/24  0402 04/16/24  1623 04/16/24  0356   SODIUM mmol/L 129* 130* 134*   POTASSIUM mmol/L 3.9 4.0 4.2   CHLORIDE mmol/L 91* 94* 99   CO2 mmol/L 32 29 28   BUN mg/dL 13 14 10   CREATININE mg/dL 0.76 0.73 0.64   CALCIUM mg/dL 8.0* 8.6 8.0*     Results from last 7 days   Lab Units 04/16/24  0502 04/13/24  0414 04/12/24  1643 04/12/24  1103   INR  1.23* 1.55* 1.63*  --    PTT seconds  --  27 30 71*     Point of care glucose: 117-194    Invasive Lines/Tubes:  Invasive Devices       Central Venous Catheter Line  Duration             CVC Central Lines 04/09/24 7 days              Drain  Duration             Chest Tube 1 Left Pleural 28 Fr. 7 days    Chest Tube 2 Right Pleural 28 Fr. 7 days    Chest Tube 3 Posterior;Mediastinal 24 Fr. 7 days    Chest Tube 4 Mediastinal;Anterior 28 Fr. 7 days    Urethral Catheter Latex;Straight-tip 16 Fr. 7 days                    Physical Exam:    General: No acute distress, Alert, and Normal appearance, deconditioned, feeing herself breakfast this morning  HEENT/NECK:  Normocephalic. Atraumatic.  No jugular venous distention.    Cardiac: Regular rate and rhythm  Pulmonary:  Breath sounds clear bilaterally  Abdomen:  Non-tender, Non-distended, and Normal bowel sounds  Incisions: Sternum is stable.  Incision is clean, dry, and intact.  and Saphenectomy incison is clean, dry, and intact.   Extremities: Extremities warm/dry and 2+ edema B/L  Neuro:  Alert and oriented X 3  Skin: Warm/Dry, without rashes or lesions.    Assessment:  Principal Problem:    Coronary artery disease involving native coronary artery  Active Problems:    DMII (diabetes mellitus, type 2) (HCC)    Hypomagnesemia    Ulcerative pancolitis without complication (HCC)    Ischemic cardiomyopathy    Mixed hyperlipidemia    Stenosis of left carotid artery    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HCC)    Hyperprolactinemia (HCC)    S/P carotid endarterectomy    Cardiogenic postoperative shock (HCC)    PONV (postoperative nausea and vomiting)    S/P CABG x 3    New onset atrial fibrillation (HCC)    ABLA (acute blood loss anemia)     Coronary artery disease. S/P coronary artery bypass grafting; POD # 8  POD6 s/p RAFFY to OM and Impella    Plan:    Cardiac: LVEF 15%, remains on Milrinone for support, decrease to 0.13 and maintain.  Heart failure consult pending.  No further atrial fibrillation since 4/14 had DCCV - d/c IV amiodarone continue po dosing   Brilinta for OM RAFFY   On ASA, statin therapy  Metoprolol 12.5 mg bid     Pulmonary:     Good Room air oxygen saturation; Continue incentive spirometry/Coughing/Deep breathing exercises    Serous drainage from all chest tubes     Renal:     Post op Creatinine stable; Follow up labs prn     Intake/Output net: -2236 mL/24 hours    Diuretic Regimen:continue lasix TID dosing for negative balance      Neuro:    Neurologically intact; No active issues     Incisional pain well controlled    GI:    Controlled carbohydrate diet level two/Cardiac diet, with 1800 mL fluid restriction    Tolerating diet without complaint    Continue stool softeners and prn suppository    Continue GI prophylaxis    Endo:     Endocrinology on board, continues on IV insulin     Hematology:     Post-operative acute blood loss anemia; Hemoglobin 8.7; trend prn    Disposition:    Transfer to telemetry today       VTE Pharmacologic Prophylaxis: Fondaparinux (Arixtra)  VTE Mechanical Prophylaxis:  sequential compression device    Collaborative rounds completed with supervising physician and with the bedside nurse    SIGNATURE: Jeri Rubin PA-C  DATE: April 17, 2024  TIME: 7:04 AM

## 2024-04-18 ENCOUNTER — APPOINTMENT (INPATIENT)
Dept: RADIOLOGY | Facility: HOSPITAL | Age: 69
DRG: 215 | End: 2024-04-18
Payer: COMMERCIAL

## 2024-04-18 ENCOUNTER — PATIENT OUTREACH (OUTPATIENT)
Dept: CARDIOLOGY CLINIC | Facility: CLINIC | Age: 69
End: 2024-04-18

## 2024-04-18 PROBLEM — I44.7 LBBB (LEFT BUNDLE BRANCH BLOCK): Status: ACTIVE | Noted: 2024-04-18

## 2024-04-18 PROBLEM — I51.9 SEVERE LEFT VENTRICULAR SYSTOLIC DYSFUNCTION: Status: ACTIVE | Noted: 2024-04-18

## 2024-04-18 PROBLEM — I51.89 SEVERE LEFT VENTRICULAR SYSTOLIC DYSFUNCTION: Status: ACTIVE | Noted: 2024-04-18

## 2024-04-18 LAB
ANION GAP SERPL CALCULATED.3IONS-SCNC: 7 MMOL/L (ref 4–13)
BASE EX.OXY STD BLDV CALC-SCNC: 62.6 % (ref 60–80)
BASE EXCESS BLDV CALC-SCNC: 9.3 MMOL/L
BUN SERPL-MCNC: 16 MG/DL (ref 5–25)
CALCIUM SERPL-MCNC: 8.2 MG/DL (ref 8.4–10.2)
CHLORIDE SERPL-SCNC: 90 MMOL/L (ref 96–108)
CO2 SERPL-SCNC: 34 MMOL/L (ref 21–32)
CREAT SERPL-MCNC: 0.77 MG/DL (ref 0.6–1.3)
ERYTHROCYTE [DISTWIDTH] IN BLOOD BY AUTOMATED COUNT: 16.8 % (ref 11.6–15.1)
GFR SERPL CREATININE-BSD FRML MDRD: 80 ML/MIN/1.73SQ M
GLUCOSE SERPL-MCNC: 122 MG/DL (ref 65–140)
GLUCOSE SERPL-MCNC: 126 MG/DL (ref 65–140)
GLUCOSE SERPL-MCNC: 164 MG/DL (ref 65–140)
GLUCOSE SERPL-MCNC: 184 MG/DL (ref 65–140)
GLUCOSE SERPL-MCNC: 185 MG/DL (ref 65–140)
HCO3 BLDV-SCNC: 34.6 MMOL/L (ref 24–30)
HCT VFR BLD AUTO: 28.2 % (ref 34.8–46.1)
HGB BLD-MCNC: 9 G/DL (ref 11.5–15.4)
MAGNESIUM SERPL-MCNC: 1.8 MG/DL (ref 1.9–2.7)
MCH RBC QN AUTO: 29.7 PG (ref 26.8–34.3)
MCHC RBC AUTO-ENTMCNC: 31.9 G/DL (ref 31.4–37.4)
MCV RBC AUTO: 93 FL (ref 82–98)
O2 CT BLDV-SCNC: 8.8 ML/DL
PCO2 BLDV: 51.8 MM HG (ref 42–50)
PH BLDV: 7.44 [PH] (ref 7.3–7.4)
PHOSPHATE SERPL-MCNC: 3.2 MG/DL (ref 2.3–4.1)
PLATELET # BLD AUTO: 396 THOUSANDS/UL (ref 149–390)
PMV BLD AUTO: 9.9 FL (ref 8.9–12.7)
PO2 BLDV: 33.7 MM HG (ref 35–45)
POTASSIUM SERPL-SCNC: 3.9 MMOL/L (ref 3.5–5.3)
RBC # BLD AUTO: 3.03 MILLION/UL (ref 3.81–5.12)
SODIUM SERPL-SCNC: 131 MMOL/L (ref 135–147)
WBC # BLD AUTO: 10.25 THOUSAND/UL (ref 4.31–10.16)

## 2024-04-18 PROCEDURE — 76000 FLUOROSCOPY <1 HR PHYS/QHP: CPT

## 2024-04-18 PROCEDURE — 99024 POSTOP FOLLOW-UP VISIT: CPT | Performed by: PHYSICIAN ASSISTANT

## 2024-04-18 PROCEDURE — C1751 CATH, INF, PER/CENT/MIDLINE: HCPCS

## 2024-04-18 PROCEDURE — 02H633Z INSERTION OF INFUSION DEVICE INTO RIGHT ATRIUM, PERCUTANEOUS APPROACH: ICD-10-PCS | Performed by: RADIOLOGY

## 2024-04-18 PROCEDURE — 84100 ASSAY OF PHOSPHORUS: CPT | Performed by: PHYSICIAN ASSISTANT

## 2024-04-18 PROCEDURE — 97535 SELF CARE MNGMENT TRAINING: CPT

## 2024-04-18 PROCEDURE — 99232 SBSQ HOSP IP/OBS MODERATE 35: CPT | Performed by: INTERNAL MEDICINE

## 2024-04-18 PROCEDURE — 97530 THERAPEUTIC ACTIVITIES: CPT

## 2024-04-18 PROCEDURE — 80048 BASIC METABOLIC PNL TOTAL CA: CPT | Performed by: PHYSICIAN ASSISTANT

## 2024-04-18 PROCEDURE — 97116 GAIT TRAINING THERAPY: CPT

## 2024-04-18 PROCEDURE — 36569 INSJ PICC 5 YR+ W/O IMAGING: CPT

## 2024-04-18 PROCEDURE — C1769 GUIDE WIRE: HCPCS

## 2024-04-18 PROCEDURE — 85027 COMPLETE CBC AUTOMATED: CPT | Performed by: PHYSICIAN ASSISTANT

## 2024-04-18 PROCEDURE — NC001 PR NO CHARGE: Performed by: RADIOLOGY

## 2024-04-18 PROCEDURE — 82948 REAGENT STRIP/BLOOD GLUCOSE: CPT

## 2024-04-18 PROCEDURE — 83735 ASSAY OF MAGNESIUM: CPT | Performed by: PHYSICIAN ASSISTANT

## 2024-04-18 PROCEDURE — 82805 BLOOD GASES W/O2 SATURATION: CPT | Performed by: PHYSICIAN ASSISTANT

## 2024-04-18 PROCEDURE — 36597 REPOSITION VENOUS CATHETER: CPT

## 2024-04-18 RX ORDER — MAGNESIUM SULFATE HEPTAHYDRATE 40 MG/ML
2 INJECTION, SOLUTION INTRAVENOUS ONCE
Status: COMPLETED | OUTPATIENT
Start: 2024-04-18 | End: 2024-04-18

## 2024-04-18 RX ORDER — FUROSEMIDE 10 MG/ML
40 INJECTION INTRAMUSCULAR; INTRAVENOUS
Status: DISCONTINUED | OUTPATIENT
Start: 2024-04-18 | End: 2024-04-18

## 2024-04-18 RX ORDER — FUROSEMIDE 10 MG/ML
40 INJECTION INTRAMUSCULAR; INTRAVENOUS
Status: DISCONTINUED | OUTPATIENT
Start: 2024-04-18 | End: 2024-04-20

## 2024-04-18 RX ORDER — METOPROLOL SUCCINATE 25 MG/1
12.5 TABLET, EXTENDED RELEASE ORAL DAILY
Status: DISCONTINUED | OUTPATIENT
Start: 2024-04-18 | End: 2024-04-23 | Stop reason: HOSPADM

## 2024-04-18 RX ORDER — LANOLIN ALCOHOL/MO/W.PET/CERES
400 CREAM (GRAM) TOPICAL 2 TIMES DAILY
Status: DISCONTINUED | OUTPATIENT
Start: 2024-04-18 | End: 2024-04-23 | Stop reason: HOSPADM

## 2024-04-18 RX ADMIN — FUROSEMIDE 40 MG: 10 INJECTION, SOLUTION INTRAMUSCULAR; INTRAVENOUS at 05:24

## 2024-04-18 RX ADMIN — MILRINONE LACTATE IN DEXTROSE 0.13 MCG/KG/MIN: 200 INJECTION, SOLUTION INTRAVENOUS at 08:32

## 2024-04-18 RX ADMIN — MIDODRINE HYDROCHLORIDE 5 MG: 5 TABLET ORAL at 06:00

## 2024-04-18 RX ADMIN — AMIODARONE HYDROCHLORIDE 200 MG: 200 TABLET ORAL at 13:37

## 2024-04-18 RX ADMIN — POTASSIUM CHLORIDE 20 MEQ: 1500 TABLET, EXTENDED RELEASE ORAL at 08:25

## 2024-04-18 RX ADMIN — DOCUSATE SODIUM 100 MG: 100 CAPSULE, LIQUID FILLED ORAL at 17:17

## 2024-04-18 RX ADMIN — POTASSIUM CHLORIDE 20 MEQ: 1500 TABLET, EXTENDED RELEASE ORAL at 17:17

## 2024-04-18 RX ADMIN — METOPROLOL SUCCINATE 12.5 MG: 25 TABLET, FILM COATED, EXTENDED RELEASE ORAL at 08:25

## 2024-04-18 RX ADMIN — MIDODRINE HYDROCHLORIDE 5 MG: 5 TABLET ORAL at 11:42

## 2024-04-18 RX ADMIN — DOCUSATE SODIUM 100 MG: 100 CAPSULE, LIQUID FILLED ORAL at 08:25

## 2024-04-18 RX ADMIN — INSULIN LISPRO 1 UNITS: 100 INJECTION, SOLUTION INTRAVENOUS; SUBCUTANEOUS at 11:42

## 2024-04-18 RX ADMIN — Medication 1000 UNITS: at 08:25

## 2024-04-18 RX ADMIN — TICAGRELOR 90 MG: 90 TABLET ORAL at 08:25

## 2024-04-18 RX ADMIN — MAGNESIUM OXIDE TAB 400 MG (241.3 MG ELEMENTAL MG) 400 MG: 400 (241.3 MG) TAB at 13:37

## 2024-04-18 RX ADMIN — MAGNESIUM SULFATE HEPTAHYDRATE 2 G: 40 INJECTION, SOLUTION INTRAVENOUS at 13:37

## 2024-04-18 RX ADMIN — FONDAPARINUX SODIUM 2.5 MG: 2.5 INJECTION, SOLUTION SUBCUTANEOUS at 08:25

## 2024-04-18 RX ADMIN — CHLORHEXIDINE GLUCONATE 0.12% ORAL RINSE 15 ML: 1.2 LIQUID ORAL at 08:25

## 2024-04-18 RX ADMIN — INSULIN LISPRO 1 UNITS: 100 INJECTION, SOLUTION INTRAVENOUS; SUBCUTANEOUS at 17:16

## 2024-04-18 RX ADMIN — PHENYLEPHRINE HYDROCHLORIDE 25 MCG/MIN: 50 INJECTION INTRAVENOUS at 02:45

## 2024-04-18 RX ADMIN — MAGNESIUM OXIDE TAB 400 MG (241.3 MG ELEMENTAL MG) 400 MG: 400 (241.3 MG) TAB at 21:53

## 2024-04-18 RX ADMIN — INSULIN LISPRO 1 UNITS: 100 INJECTION, SOLUTION INTRAVENOUS; SUBCUTANEOUS at 21:53

## 2024-04-18 RX ADMIN — AMIODARONE HYDROCHLORIDE 200 MG: 200 TABLET ORAL at 21:52

## 2024-04-18 RX ADMIN — FUROSEMIDE 40 MG: 10 INJECTION, SOLUTION INTRAMUSCULAR; INTRAVENOUS at 17:16

## 2024-04-18 RX ADMIN — MIDODRINE HYDROCHLORIDE 5 MG: 5 TABLET ORAL at 17:17

## 2024-04-18 RX ADMIN — AMIODARONE HYDROCHLORIDE 200 MG: 200 TABLET ORAL at 05:24

## 2024-04-18 RX ADMIN — Medication 500 MG: at 08:44

## 2024-04-18 RX ADMIN — ATORVASTATIN CALCIUM 80 MG: 80 TABLET, FILM COATED ORAL at 17:17

## 2024-04-18 RX ADMIN — PANTOPRAZOLE SODIUM 40 MG: 40 TABLET, DELAYED RELEASE ORAL at 05:24

## 2024-04-18 RX ADMIN — TICAGRELOR 90 MG: 90 TABLET ORAL at 21:53

## 2024-04-18 RX ADMIN — ASPIRIN 81 MG CHEWABLE TABLET 81 MG: 81 TABLET CHEWABLE at 08:25

## 2024-04-18 NOTE — PROGRESS NOTES
Advanced Heart Failure / Pulmonary Hypertension Service Progress Note    Julia Perry 68 y.o. female   MRN: [id number]  Unit/Bed#: [facility] 401-01; Encounter: [id number]    Assessment:  Principal Problem:    Coronary artery disease involving native coronary artery  Active Problems:    DMII (diabetes mellitus, type 2) (HCC)    Hypomagnesemia    Ulcerative pancolitis without complication (HCC)    Ischemic cardiomyopathy    Mixed hyperlipidemia    Stenosis of left carotid artery    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HCC)    Hyperprolactinemia (HCC)    S/P carotid endarterectomy    Cardiogenic postoperative shock (HCC)    PONV (postoperative nausea and vomiting)    S/P CABG x 3    New onset atrial fibrillation (HCC)    ABLA (acute blood loss anemia)      Subjective:   Patient seen and examined. No significant events overnight.Continues to require Milrinone at 0.13. Nhan stopped at 6:31AM.     Objective:   Intake/ Output: 755/255 5/-2.2 L in 24 hours, -6.2 L since admission  Weight: 162 pounds, up from 160  SpO2 62.6    Chu's:  Cardiac output 5.8 L/min  Cardiac index 3.2 L/min/m²    Lab Results   Component Value Date    LVEF 15 04/12/2024    BNP 3,164 (H) 04/10/2024     (H) 12/28/2023       NYHA Class III., Stage C-D  Patient is currently volume overloaded.    Today's Plan:  Wean Milrinone as tolerated and repeat VBG to assess cardiac output off Milrinone   Continue with Lasix 40mg IV BID   Continue with Amio   Aforementioned discussed with CTS.         Plan:  MV CAD status post CABG x 3 (4/9/2024)- LIMA to LAD, SVG to OM1, SVG to RPDA  Cardiogenic shock- [facility]CAI SHOCK STAGE D-C  -Presented for elective CABG-4/9/2024, developed cardiogenic shock with increasing requirements and eventually was placed on Impella. Now status post Impella explant on 4/12/2024  -Left heart cath post CABG on 4/10/2024, 99% mid circumflex into OM 2 that was stented  -Currently on milrinone, has been on Nhan-Synephrine for 48 hours,  -Status: Warm  "on exam, appears euvolemic    HFrEF- LVEF 15-20%  - Event history: Cardiogenic shock post CABG   - Etiology: ICM  - Status: NYHA Class III-IV/ ACC C; warm, volume up 3+ JAYLAN, +JVD  - Studies:   TTE 1/3/2024 demonstrated EF of 30%, severely reduced systolic function, akinesis basal inferoseptal, basal inferior, mid inferoseptal and mid inferior with hypokinesis of remaining segments.  Left atrial enlargement, mild to moderate mitral regurg, mild PI, small pericardial effusion, LVIDD 5.4 cm.  Follow-up TTE 4/12/2024 demonstrated EF 15 to 20% with severe global hypokinesis and regional variation     - Neurohormonal Blockade/GDMT:  --Beta-Blocker: Metoprolol succinate 12.5mg QD (outpatient on metoprolol succinate 25 mg daily)  --ACEi, ARB or ARNi: Held  --Aldosterone Receptor Blocker: Currently on hold  --SGLT2 Inhibitor: Currently on hold  --Diuretic: Furosemide 40 mg IV 3 times daily  -- Home diuretic: Furosemide 40 mg daily    - Sudden Cardiac Death Risk Reduction:  --ICD: LVEF to be reassessed in 90 days, will discuss LifeVest on discharge    - Electrical Resynchronization: Narrow QRS, 126 MS    - Advanced Therapies (if appropriate):  --Inotrope: Currently on milrinone at 0.13, TBD-on midodrine 5 mg 3 times daily as well    - Diet:  --2g sodium diet  --2000 ml fluid restriction    New diagnosis of atrial fibrillation- postop  -Currently on amiodarone 200 mg 3 times daily  Hypertension, currently hypotensive on midodrine 5 mg 3 times daily  Hyperlipidemia-   DM2  Bilateral carotid artery stenosis status post left CEA  Ulcerative colitis      Thank you for the opportunity to participate in the care of this patient.  Prince Cohn MD  Cardiology fellow-PGY5  Advanced Heart Failure  [facility]    Vitals:   Blood pressure 93/51, pulse 87, temperature 98.3 °F (36.8 °C), temperature source Oral, resp. rate 18, height 5' 4\" (1.626 m), weight 73.8 kg (162 lb 11.2 oz), SpO2 97%.    Body mass index " is 27.93 kg/m².    I/O last 3 completed shifts:  In: 1025 [P.O.:240; I.V.:685; IV Piggyback:100]  Out: 4700 [Urine:3930; Chest Tube:770]    Wt Readings from Last 10 Encounters:   04/18/24 73.8 kg (162 lb 11.2 oz)   04/02/24 74.6 kg (164 lb 6.4 oz)   03/19/24 74.4 kg (164 lb)   03/19/24 74.4 kg (164 lb)   03/18/24 73.9 kg (163 lb)   03/04/24 73.9 kg (162 lb 14.7 oz)   02/15/24 71.1 kg (156 lb 12.8 oz)   02/05/24 71.2 kg (157 lb)   01/31/24 70.8 kg (156 lb)   01/30/24 72.1 kg (159 lb)     Vitals:    04/18/24 0530 04/18/24 0544 04/18/24 0600 04/18/24 0812   BP: 96/55  101/50 93/51   BP Location:    Left arm   Pulse: 90  90 87   Resp:    18   Temp:    98.3 °F (36.8 °C)   TempSrc:    Oral   SpO2:   96% 97%   Weight:  73.8 kg (162 lb 11.2 oz)     Height:           Physical Exam  GEN: Julia Perry appears well, alert and oriented x 3, pleasant and cooperative   HEENT:  Normocephalic, atraumatic, anicteric, moist mucous membranes  NECK: JVP 10 cm H2O. Left CEA well healing scar   HEART: Regular rate and rhythm, normal S1 and S2, no murmurs, clicks, friction rub noted    LUNGS: Diffuse crackles    ABDOMEN:  Normoactive bowel sounds, soft, no tenderness, no distention  EXTREMITIES: peripheral pulses palpable; 3+ bilateral lower extremity edema   NEURO: no gross focal findings; cranial nerves grossly intact   SKIN:  Dry, intact, warm to touch    Current Facility-Administered Medications:     acetaminophen (TYLENOL) tablet 975 mg, 975 mg, Oral, Q8H PRN, Timoteo Rasmussen PA-C    amiodarone tablet 200 mg, 200 mg, Oral, Q8H LINDA, Timoteo Rasmussen PA-C, 200 mg at 04/18/24 0524    aspirin chewable tablet 81 mg, 81 mg, Oral, Daily, Timoteo Rasmussen PA-C, 81 mg at 04/18/24 0825    atorvastatin (LIPITOR) tablet 80 mg, 80 mg, Oral, Daily With Dinner, Timoteo Rasmussen PA-C, 80 mg at 04/17/24 1638    chlorhexidine (PERIDEX) 0.12 % oral rinse 15 mL, 15 mL, Mouth/Throat, BID, Timoteo Rasmussen PA-C, 15 mL at 04/18/24 0825    Cholecalciferol  (VITAMIN D3) tablet 1,000 Units, 1,000 Units, Oral, Daily, Timoteo Rasmussen PA-C, 1,000 Units at 04/18/24 0825    docusate sodium (COLACE) capsule 100 mg, 100 mg, Oral, BID, Timoteo Rasmussen PA-C, 100 mg at 04/18/24 0825    fondaparinux (ARIXTRA) subcutaneous injection 2.5 mg, 2.5 mg, Subcutaneous, Q24H, Timoteo Rasmussen PA-C, 2.5 mg at 04/18/24 0825    furosemide (LASIX) injection 40 mg, 40 mg, Intravenous, TID (diuretic), Timoteo Rasmussen PA-C, 40 mg at 04/18/24 0524    insulin lispro (HumALOG/ADMELOG) 100 units/mL subcutaneous injection 1-5 Units, 1-5 Units, Subcutaneous, TID AC, 1 Units at 04/17/24 1639 **AND** Fingerstick Glucose (POCT), , , TID AC, Timoteo Rasmussen PA-C    insulin lispro (HumALOG/ADMELOG) 100 units/mL subcutaneous injection 1-5 Units, 1-5 Units, Subcutaneous, HS, Timoteo Rasmussen PA-C, 1 Units at 04/17/24 2102    magnesium gluconate (MAGONATE) tablet 500 mg, 500 mg, Oral, Daily, Timoteo Rasmussen PA-C, 500 mg at 04/18/24 0844    metoprolol succinate (TOPROL-XL) 24 hr tablet 12.5 mg, 12.5 mg, Oral, Daily, Isrrael Rojas PA-C, 12.5 mg at 04/18/24 0825    midodrine (PROAMATINE) tablet 5 mg, 5 mg, Oral, TID AC, Timoteo Rasmussen PA-C, 5 mg at 04/18/24 0600    milrinone (PRIMACOR) 20 mg in 100 mL infusion (premix), 0.13 mcg/kg/min, Intravenous, Continuous, Timoteo Rasmussen PA-C, Last Rate: 3 mL/hr at 04/18/24 0832, 0.13 mcg/kg/min at 04/18/24 0832    ondansetron (ZOFRAN) injection 4 mg, 4 mg, Intravenous, Q6H PRN, Timoteo Rasmussen PA-C, 4 mg at 04/16/24 0801    oxyCODONE (ROXICODONE) split tablet 2.5 mg, 2.5 mg, Oral, Q4H PRN **OR** oxyCODONE (ROXICODONE) IR tablet 5 mg, 5 mg, Oral, Q4H PRN, Timoteo Rasmussen PA-C    pantoprazole (PROTONIX) EC tablet 40 mg, 40 mg, Oral, Early Morning, Timoteo Rasmussen PA-C, 40 mg at 04/18/24 0524    phenylephrine (TRI-SYNEPHRINE) 50 mg (STANDARD CONCENTRATION) in sodium chloride 0.9% 250 mL,  mcg/min, Intravenous, Titrated, Timoteo Rasmussen PA-C, Stopped at 04/18/24 0631     polyethylene glycol (MIRALAX) packet 17 g, 17 g, Oral, Daily, Timoteo Rasmussen PA-C, 17 g at 04/14/24 0847    potassium chloride (Klor-Con M20) CR tablet 20 mEq, 20 mEq, Oral, BID, Timoteo Rasmussen PA-C, 20 mEq at 04/18/24 0825    ticagrelor (BRILINTA) tablet 90 mg, 90 mg, Oral, Q12H LINDA, Timoteo Rasmussen PA-C, 90 mg at 04/18/24 0825    trimethobenzamide (TIGAN) IM injection 200 mg, 200 mg, Intramuscular, Q6H PRN, Timoteo Rasmussen PA-C, 200 mg at 04/11/24 2124    Labs & Results:      Results from last 7 days   Lab Units 04/18/24  0427 04/17/24  0402 04/16/24  0502   WBC Thousand/uL 10.25* 11.10* 12.70*   HEMOGLOBIN g/dL 9.0* 8.7* 9.8*   HEMATOCRIT % 28.2* 27.6* 31.3*   PLATELETS Thousands/uL 396* 287 239         Results from last 7 days   Lab Units 04/18/24  0427 04/17/24  0402 04/16/24  1623 04/14/24  1716 04/14/24  0403 04/13/24  0415 04/13/24  0414 04/12/24  1158 04/12/24  0421   POTASSIUM mmol/L 3.9 3.9 4.0   < > 4.0   < >  --    < > 4.2   CHLORIDE mmol/L 90* 91* 94*   < > 106   < >  --    < > 110*   CO2 mmol/L 34* 32 29   < > 27   < >  --    < > 22   BUN mg/dL 16 13 14   < > 12   < >  --    < > 21   CREATININE mg/dL 0.77 0.76 0.73   < > 0.61   < >  --    < > 0.93   CALCIUM mg/dL 8.2* 8.0* 8.6   < > 7.9*   < >  --    < > 8.1*   ALK PHOS U/L  --   --   --   --  46  --  48  --  49   ALT U/L  --   --   --   --  7  --  8  --  10   AST U/L  --   --   --   --  16  --  20  --  35    < > = values in this interval not displayed.     Results from last 7 days   Lab Units 04/16/24  0502 04/13/24  0414 04/12/24  1643   INR  1.23* 1.55* 1.63*

## 2024-04-18 NOTE — PROCEDURES
04/18/24    Procedure: Mediastinal chest tube removal    Mediastinal chest tubes removed in routine fashion without incident.  Insertion site dressed with Acticoat.  Julia Perry tolerated the procedure well.  Nurse notified.    SIGNATURE: Tahira White PA-C  DATE: April 18, 2024  TIME: 10:05 AM

## 2024-04-18 NOTE — PLAN OF CARE
Problem: PHYSICAL THERAPY ADULT  Goal: Performs mobility at highest level of function for planned discharge setting.  See evaluation for individualized goals.  Description: Treatment/Interventions: Functional transfer training, LE strengthening/ROM, Elevations, Therapeutic exercise, Endurance training, Equipment eval/education, Bed mobility, Gait training, Spoke to nursing, OT, Family  Equipment Recommended: Walker       See flowsheet documentation for full assessment, interventions and recommendations.  Outcome: Progressing  Note: Prognosis: Good  Problem List: Decreased strength, Decreased endurance, Impaired balance, Decreased mobility  Assessment: Pt was able to progress w/ amb distances today requiring min (A)x1 for amb to assure safety; pt still remains to be generally guarded w/ prox >distal LE weakness and overall deconditioning requiring mod (A)x1 for transfers and mod (A)x2 for bed mob; rest periods provided as needed and pt remained in NAD. D/C recommendations are listed below; will follow.        Rehab Resource Intensity Level, PT: I (Maximum Resource Intensity)    See flowsheet documentation for full assessment.

## 2024-04-18 NOTE — PHYSICAL THERAPY NOTE
PHYSICAL THERAPY NOTE          Patient Name: Julia Perry  Today's Date: 4/18/2024 04/18/24 0922   PT Last Visit   PT Visit Date 04/18/24   Note Type   Note Type Treatment   Pain Assessment   Pain Assessment Tool 0-10   Pain Score No Pain   Restrictions/Precautions   Other Precautions Cardiac/sternal;Multiple lines;Telemetry   General   Chart Reviewed Yes   Additional Pertinent History cleared for Tx session by nsg   Response to Previous Treatment Patient with no complaints from previous session.   Family/Caregiver Present Yes   Cognition   Overall Cognitive Status WFL   Arousal/Participation Alert;Cooperative   Attention Attends with cues to redirect   Orientation Level Oriented to person;Oriented to place;Oriented to situation   Memory Decreased recall of precautions   Following Commands Follows one step commands without difficulty   Subjective   Subjective Alert; in the chair; agreeable to mobilize   Bed Mobility   Sit to Supine 3  Moderate assistance   Additional items Assist x 2;Increased time required;Verbal cues;LE management   Transfers   Sit to Stand 3  Moderate assistance   Additional items Assist x 1;Increased time required;Verbal cues   Stand to Sit 3  Moderate assistance   Additional items Assist x 1;Increased time required;Verbal cues   Ambulation/Elevation   Gait pattern Excessively slow;Short stride;Inconsistent sara   Gait Assistance 4  Minimal assist   Additional items Assist x 1;Verbal cues;Tactile cues   Assistive Device Rolling walker   Distance 2 X 130 ft w/ extended seated rest period in between   Balance   Static Sitting Fair   Dynamic Sitting Fair -   Static Standing Poor +   Dynamic Standing Poor +   Ambulatory Poor +   Activity Tolerance   Activity Tolerance Patient limited by fatigue  (BP stable)   Medical Staff Made Aware Co-Tx performed w/ OTR due to complexity of medical status   Nurse Made  Aware spoke to Vikki RN   Exercises   Hip Abduction Supine;15 reps;AAROM;Bilateral   Knee AROM Short Arc Quad Supine;15 reps;AAROM;AROM;Bilateral   Ankle Pumps Supine;15 reps;AAROM;AROM;Bilateral   Assessment   Prognosis Good   Problem List Decreased strength;Decreased endurance;Impaired balance;Decreased mobility   Assessment Pt was able to progress w/ amb distances today requiring min (A)x1 for amb to assure safety; pt still remains to be generally guarded w/ prox >distal LE weakness and overall deconditioning requiring mod (A)x1 for transfers and mod (A)x2 for bed mob; rest periods provided as needed and pt remained in NAD. D/C recommendations are listed below; will follow.   Goals   Patient Goals to get better   STG Expiration Date 04/25/24   PT Treatment Day 3   Plan   Treatment/Interventions Functional transfer training;LE strengthening/ROM;Elevations;Therapeutic exercise;Endurance training;Bed mobility;Gait training;Spoke to nursing;Spoke to case management;OT;Family   Progress Progressing toward goals   PT Frequency 4-6x/wk   Discharge Recommendation   Rehab Resource Intensity Level, PT I (Maximum Resource Intensity)   Equipment Recommended Walker   Walker Package Recommended Wheeled walker   Change/add to Walker Package? No   AM-PAC Basic Mobility Inpatient   Turning in Flat Bed Without Bedrails 3   Lying on Back to Sitting on Edge of Flat Bed Without Bedrails 2   Moving Bed to Chair 3   Standing Up From Chair Using Arms 2   Walk in Room 3   Climb 3-5 Stairs With Railing 2   Basic Mobility Inpatient Raw Score 15   Basic Mobility Standardized Score 36.97   [facility] Highest Level Of Mobility   -[facility] Goal 4: Move to chair/commode   -[facility] Achieved 8: Walk 250 feet ot more   Education   Education Provided Mobility training;Home exercise program;Assistive device   Patient Demonstrates verbal understanding;Reinforcement needed   End of Consult   Patient Position at End of Consult Supine;All needs within  gaby Jain

## 2024-04-18 NOTE — PROGRESS NOTES
Patient admitted to [facility]; Advanced Heart Failure Census.     Outpatient Advanced Heart Failure LCSW completed electronic chart review and rounded with the HF Team. Spouse at bedside. Pt on IV Milrinone.   Inpt CM noted that referral has been placed to [facility] first choice.    Referral for outpatient social work not entered at this time.   Please enter referral if outpatient resources are needed in the future.

## 2024-04-18 NOTE — PROGRESS NOTES
Progress Note - Cardiothoracic Surgery   Julia Perry 68 y.o. female MRN: [id number]  Unit/Bed#: [facility] 401-01 Encounter: [id number]    Severe MVCAD/ischemic CMP S/P CABG ; POD # 9  S/p DESx2 to OM and Impella POD #7 (impella removed 4/12)    24 Hour Events: transferred out of ICU. Remains on milrinone 0.125, last VBG O2 62. Intermittently on Nhan for hypotension, off at 0245 AM. Started on midodrine 5mg TID yest. Hgb 9.0, Cr 0.77, SR 80s. /50 this AM off nhan. Net -2240.     Medications:   Scheduled Meds:  Current Facility-Administered Medications   Medication Dose Route Frequency Provider Last Rate    acetaminophen  975 mg Oral Q8H PRN Timoteo Rasmussen PA-C      amiodarone  200 mg Oral Q8H LINDA Timoteo Rasmussen PA-C      aspirin  81 mg Oral Daily Tmioteo Rasmussen PA-C      atorvastatin  80 mg Oral Daily With Dinner Timoteo Rasmussen PA-C      chlorhexidine  15 mL Mouth/Throat BID Timoteo Rasmussen PA-C      Cholecalciferol  1,000 Units Oral Daily Timoteo Rasmussen PA-C      docusate sodium  100 mg Oral BID Timoteo Rasmussen PA-C      fondaparinux  2.5 mg Subcutaneous Q24H Timoteo Rasmussen PA-C      furosemide  40 mg Intravenous TID (diuretic) Timoteo Rasmussen PA-C      insulin lispro  1-5 Units Subcutaneous TID AC Timoteo Rasmussen PA-C      insulin lispro  1-5 Units Subcutaneous HS Timoteo Rasmussen PA-C      magnesium gluconate  500 mg Oral Daily Timoteo Rasmussen PA-C      metoprolol tartrate  12.5 mg Oral Q12H LINDA Timoteo Rasmussen PA-C      midodrine  5 mg Oral TID AC Timoteo Rasmussen PA-C      milrinone (Primacor) infusion  0.13 mcg/kg/min Intravenous Continuous Timoteo Rasmussen PA-C 0.13 mcg/kg/min (04/17/24 0923)    ondansetron  4 mg Intravenous Q6H PRN Timoteo Rasmussen PA-C      oxyCODONE  2.5 mg Oral Q4H PRN Timoteo Mathiesen, PA-C      Or    oxyCODONE  5 mg Oral Q4H PRN Timoteo Rasmussen PA-C      pantoprazole  40 mg Oral Early Morning Timoteo Rasmussen PA-C      phenylephine   mcg/min Intravenous Titrated Timoteo Rasmussen PA-C  25 mcg/min (04/18/24 0245)    polyethylene glycol  17 g Oral Daily Timoteo Rasmussen PA-C      potassium chloride  20 mEq Oral BID Timoteo Rasmussen PA-C      ticagrelor  90 mg Oral Q12H LINDA Timoteo Rasmussen PA-C      trimethobenzamide  200 mg Intramuscular Q6H PRN Timoteo Rasmussen PA-C       Continuous Infusions:milrinone (Primacor) infusion, 0.13 mcg/kg/min, Last Rate: 0.13 mcg/kg/min (04/17/24 0923)  phenylephine,  mcg/min, Last Rate: 25 mcg/min (04/18/24 0245)      PRN Meds:.  acetaminophen    ondansetron    oxyCODONE **OR** oxyCODONE    trimethobenzamide    Vitals:   Vitals:    04/18/24 0000 04/18/24 0209 04/18/24 0430 04/18/24 0544   BP: 90/55 95/58 92/54    BP Location:       Pulse: 84 82 86    Resp:   12    Temp:  99.2 °F (37.3 °C)     TempSrc:  Oral     SpO2: 96% 95% 96%    Weight:    73.8 kg (162 lb 11.2 oz)   Height:           Telemetry: NSR; Heart Rate: 80s-90s    Respiratory:   SpO2: SpO2: 96 %; Room Air    Intake/Output:     Intake/Output Summary (Last 24 hours) at 4/18/2024 0625  Last data filed at 4/18/2024 0500  Gross per 24 hour   Intake 744.49 ml   Output 2555 ml   Net -1810.51 ml        Chest tube Output:    Mediastinal tubes: 80 mL/8 hours  170 mL/24 hours   Pleural tubes: 90 mL/8 hours  270 mL/24 hours     Weights:   Weight (last 2 days)       Date/Time Weight    04/18/24 0544 73.8 (162.7)    04/17/24 0541 72.8 (160.5)    04/16/24 0554 73.2 (161.38)              Results:   Results from last 7 days   Lab Units 04/18/24  0427 04/17/24  0402 04/16/24  0502   WBC Thousand/uL 10.25* 11.10* 12.70*   HEMOGLOBIN g/dL 9.0* 8.7* 9.8*   HEMATOCRIT % 28.2* 27.6* 31.3*   PLATELETS Thousands/uL 396* 287 239     Results from last 7 days   Lab Units 04/18/24  0427 04/17/24  0402 04/16/24  1623   SODIUM mmol/L 131* 129* 130*   POTASSIUM mmol/L 3.9 3.9 4.0   CHLORIDE mmol/L 90* 91* 94*   CO2 mmol/L 34* 32 29   BUN mg/dL 16 13 14   CREATININE mg/dL 0.77 0.76 0.73   CALCIUM mg/dL 8.2* 8.0* 8.6     Results from  last 7 days   Lab Units 04/16/24  0502 04/13/24  0414 04/12/24  1643 04/12/24  1103   INR  1.23* 1.55* 1.63*  --    PTT seconds  --  27 30 71*       Point of care glucose: 126-189    Studies:  Echo 4/12:  LVEF 15-20%, severe LV dysfnx, severe global HK  Impella cannula present  At least moderate RV dysnfx    pCXR 4/12:  Cardiomegaly with mild pulmonary edema and small effusions. No pneumothorax identified.  Postoperative chest with interval extubation and removal of the NG tube.    I have personally reviewed pertinent reports.   and I have personally reviewed pertinent films in PACS    Invasive Lines/Tubes:  Invasive Devices       Central Venous Catheter Line  Duration             CVC Central Lines 04/09/24 8 days              Drain  Duration             Chest Tube 1 Left Pleural 28 Fr. 8 days    Chest Tube 2 Right Pleural 28 Fr. 8 days    Chest Tube 3 Posterior;Mediastinal 24 Fr. 8 days    Chest Tube 4 Mediastinal;Anterior 28 Fr. 8 days    Urethral Catheter Latex;Straight-tip 16 Fr. 8 days                    Physical Exam:    General: No acute distress and Alert  HEENT/NECK:  Normocephalic. Atraumatic.  No jugular venous distention.    Cardiac: Regular rate and rhythm  Pulmonary:  Breath sounds clear bilaterally and Breath sounds diminished in the bases bilaterally   Abdomen:  Non-tender, Non-distended, and Normal bowel sounds  Incisions: Sternum is stable.  Incision is clean, dry, and intact.  and Saphenectomy incison is clean, dry, and intact.   Extremities: Extremities warm/dry and Trace-1+ edema B/L  Neuro: Alert and oriented X 3 and No focal deficits  Skin: Warm/Dry, without rashes or lesions.    Assessment:  Principal Problem:    Coronary artery disease involving native coronary artery  Active Problems:    DMII (diabetes mellitus, type 2) ([facility])    Hypomagnesemia    Ulcerative pancolitis without complication ([facility])    Ischemic cardiomyopathy    Mixed hyperlipidemia    Stenosis of left carotid artery     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HCC)    Hyperprolactinemia (HCC)    S/P carotid endarterectomy    Cardiogenic postoperative shock (HCC)    PONV (postoperative nausea and vomiting)    S/P CABG x 3    New onset atrial fibrillation (HCC)    ABLA (acute blood loss anemia)       Severe MVCAD/ischemic CMP S/P CABG ; POD # 9  S/p DESx2 to OM and Impella POD #7 (impella removed 4/12)    Plan:    Cardiac:     Elective surgical admission  Chronic systolic & diastolic CHF  Ischemic CMP with EF 15-20%  Severe LV dysnfx  Moderate RV dysnx  Chronic LBBB    Post op low cardiac output  - continue milrinone at 0.125 for now,   - will discuss weaning off soon    Post op hypotension  - on midodrine 5mg TID, continue    NSR with LBBB  Post op afib s/p rapid afib/unstable s/p DCCV, s/p IV amio load    Change lopressor to toprol XL12.5mg daily (low EF)    Hold ACE inhibitor/ARB secondary to hypotension    Continue Amiodarone, 200 mg PO TID    Hold GDMT for HF when able    Heart failure team now follow    Continue ASA and Statin therapy    Epicardial pacing wires have been removed    Maintain central IV access today for lack of peripheral access  PICC line has been ordered    Continue Arixtra for DVT prophylaxis    Pulmonary:     Good Room air oxygen saturation; Continue incentive spirometry/Coughing/Deep breathing exercises    Post op VDRF resolved    Chest tube output remains persistently moderate; Continue pleural chest tubes to suction today, d/c mediastinal tubes    Renal:     Post op Creatinine stable; Follow up labs prn     Intake/Output net: -2240 mL/24 hours    Diuretic Regimen:  Decrease to IV Lasix, 40 mg BID  Continue Potassium Chloride 20 mEq PO BID    D/C hicks later today    Neuro:    Neurologically intact; No active issues     Incisional pain well controlled  - cont current pain regimen    GI:    Controlled carbohydrate diet level two/Cardiac diet, with 1800 mL fluid restriction    Tolerating diet without complaint  + Flatus  + BM  postoperatively    Continue stool softeners and prn suppository    Continue GI prophylaxis    Endo:     DM2 on oral meds, Pre-Op Hgb A1C: 6.4    Patient has been transitioned from continuous insulin infusion to intermittent subcutaneous dosing  Serum glucose well controlled on insulin sliding scale coverage  Endocrinology following daily   Will consider jardiance vs farxiga prior to d/c    7    Hematology:     Post-operative acute blood loss anemia; Hemoglobin 10.2; trend    8.   Disposition:        Following daily PT/OT recommendations regarding home vs. rehab when medically cleared for discharge    Not yet medically cleared for discharge, pending milrinone wean and transition off IV diuresis, and tube removal      VTE Pharmacologic Prophylaxis: Fondaparinux (Arixtra)  VTE Mechanical Prophylaxis: sequential compression device    Collaborative rounds completed with supervising physician  Plan of care discussed with bedside nurse    SIGNATURE: Isrrael Rojas PA-C  DATE: April 18, 2024  TIME: 6:25 AM

## 2024-04-18 NOTE — PROCEDURES
Venous Access Line Insertion    Date/Time: 4/18/2024 6:21 PM    Performed by: Harrison Peraza  Authorized by: Josh Castellanos MD    Patient location:  IR  Other Assisting Provider: No    Consent:     Consent obtained:  Verbal and written    Consent given by:  Patient    Risks discussed:  Arterial puncture, bleeding, infection, incorrect placement, nerve damage and pneumothorax    Alternatives discussed:  Delayed treatment  Universal protocol:     Procedure explained and questions answered to patient or proxy's satisfaction: yes      Immediately prior to procedure, a time out was called: yes      Relevant documents present and verified: yes      Test results available and properly labeled: yes      Radiology Images displayed and confirmed.  If images not available, report reviewed: yes      Required blood products, implants, devices, and special equipment available: yes      Site/side marked: yes      Patient identity confirmed:  Verbally with patient, arm band and hospital-assigned identification number  Pre-procedure details:     Hand hygiene: Hand hygiene performed prior to insertion      Sterile barrier technique: All elements of maximal sterile technique followed      Skin preparation:  ChloraPrep    Skin preparation agent: Skin preparation agent completely dried prior to procedure    Procedure details:     Complex Venous Access Line Type: PICC      Complex Venous Access Line Indications: medications requiring central line      Catheter tip vessel location: atriocaval junction      Orientation:  Right    Location:  Basilic    Procedural supplies:  Double lumen    Catheter size:  5 Fr    Total catheter length (cm):  38    Catheter out on skin (cm):  0    Max flow rate:  999    Arm circumference:  28    Patient evaluated for contraindications to access (i.e. fistula, thrombosis, etc): Yes      Site selection rationale:  Largest most patent vessel    Approach: percutaneous technique used      Patient  position:  Flat    Ultrasound image availability:  Images available in PACS    Sterile ultrasound techniques: Sterile gel and sterile probe covers were used      Number of attempts:  1    Successful placement: yes      Landmarks identified: yes      Cath access vessel: placed under fluoro.  Anesthesia (see MAR for exact dosages):     Anesthesia method:  None  Post-procedure details:     Post-procedure:  Securement device placed and dressing applied    Assessment:  Blood return through all ports and free fluid flow    Post-procedure complications: none      Patient tolerance of procedure:  Tolerated well, no immediate complications    Observer: Yes      Observer name:  Christie Lee RN

## 2024-04-18 NOTE — OCCUPATIONAL THERAPY NOTE
Occupational Therapy Progress Note     Patient Name: Julia Perry  Today's Date: 4/18/2024  Problem List  Principal Problem:    Coronary artery disease involving native coronary artery  Active Problems:    DMII (diabetes mellitus, type 2) (HCC)    Hypomagnesemia    Ulcerative pancolitis without complication (HCC)    Ischemic cardiomyopathy    Mixed hyperlipidemia    Stenosis of left carotid artery    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HCC)    Hyperprolactinemia (HCC)    S/P carotid endarterectomy    Cardiogenic postoperative shock (HCC)    PONV (postoperative nausea and vomiting)    S/P CABG x 3    New onset atrial fibrillation (HCC)    ABLA (acute blood loss anemia)    LBBB (left bundle branch block)    Severe left ventricular systolic dysfunction            04/18/24 0927   OT Last Visit   OT Visit Date 04/18/24   Note Type   Note Type Treatment   Pain Assessment   Pain Assessment Tool 0-10   Pain Score No Pain   Restrictions/Precautions   Other Precautions Cardiac/sternal;Multiple lines;Telemetry;Fall Risk   Lifestyle   Autonomy Pta pt I with ADL, most IADL and functional mobility, (+)  although per spouse pt rarely drives   Reciprocal Relationships supportive spouse   Service to Others retired   Intrinsic Gratification enjoys reading, time with her kids   ADL   Where Assessed Chair   LB Dressing Assistance 2  Maximal Assistance   LB Dressing Deficit Don/doff R sock;Don/doff L sock  (adjust socks)   Bed Mobility   Sit to Supine 3  Moderate assistance   Additional items Assist x 2;Increased time required;Verbal cues;LE management   Additional Comments Pt greeted in chair, left in bed with all needs within reach and spouse present   Transfers   Sit to Stand 3  Moderate assistance   Additional items Assist x 1;Verbal cues;Increased time required   Stand to Sit 3  Moderate assistance   Additional items Assist x 1;Increased time required;Verbal cues   Additional Comments with RW   Functional Mobility   Functional Mobility  4  Minimal assistance   Additional Comments Pt performs short household distance mobility with MIN A x 1 with RW and chair follow with seated rest breaks   Additional items Rolling walker   Cognition   Overall Cognitive Status WFL   Arousal/Participation Alert;Cooperative   Attention Attends with cues to redirect   Orientation Level Oriented to person;Oriented to place;Oriented to situation   Memory Decreased recall of precautions   Following Commands Follows one step commands without difficulty   Comments Pt cooperative to therapy, with improving cognition. Pt and spouse voicing concerns regarding going to rehab with all questions answered.   Additional Activities   Additional Activities Other (Comment)  (Cardiac education)   Additional Activities Comments Pt and spouse provided s/p cardiac sx education packet, reviewed w/ OT, discussed cardiac/sternal precautions, lifestyle modifications, post hospitalization IADL management, environmental temperature control, emotional regulation and management, lifting/driving restrictions, energy conservation techniques, mobility schedule, incisional management, VNA, and cardiac rehabilitation initiation upon clearance per physician at follow up. Pt and spouse reviewed education packet and acknowledged reception of such.   Activity Tolerance   Activity Tolerance Patient tolerated treatment well;Patient limited by fatigue   Medical Staff Made Aware Co-tx with DPT due to medical complexity, RN cleared for therapy   Assessment   Assessment Pt seen for OT treatment session day 2 on this date focused on ADL retraining, functional transfers and mobility, energy conservation, functional endurance, recall of safety precautions, and patient education. Pt was greeted in chair and was cooperative throughout session. Following session, pt was left in bed with all needs within reach. Pt continues to be limited by functional status related to ADLs and transfers requiring MAX A for LB  dressing, MOD A for functional transfers and MIN A for functional mobility.  Pt and spouse voiced their concerns regarding decision making process for rehab vs home with assistance, all questions answered, CM made aware of concerns.   The patient's raw score on the AM-PAC Daily Activity Inpatient Short Form is 16. A raw score of less than 19 suggests the patient may benefit from discharge to post-acute rehabilitation services. Please refer to the recommendation of the Occupational Therapist for safe discharge planning. Pt would benefit from continued acute OT intervention with plan to continue OT treatment sessions 2-3x per week. Recommend d/c to level I services.   Plan   Treatment Interventions ADL retraining;Functional transfer training;Endurance training;Patient/family training;Continued evaluation;Cardiac education;Energy conservation   Goal Expiration Date 04/28/24   OT Treatment Day 2   OT Frequency 2-3x/wk   Discharge Recommendation   Rehab Resource Intensity Level, OT I (Maximum Resource Intensity)   AM-PAC Daily Activity Inpatient   Lower Body Dressing 2   Bathing 2   Toileting 2   Upper Body Dressing 3   Grooming 3   Eating 4   Daily Activity Raw Score 16   Daily Activity Standardized Score (Calc for Raw Score >=11) 35.96   AM-PAC Applied Cognition Inpatient   Following a Speech/Presentation 3   Understanding Ordinary Conversation 4   Taking Medications 3   Remembering Where Things Are Placed or Put Away 3   Remembering List of 4-5 Errands 3   Taking Care of Complicated Tasks 3   Applied Cognition Raw Score 19   Applied Cognition Standardized Score 39.77   Modified [place] Scale   Modified [place] Scale 4   End of Consult   Education Provided Yes;Family or social support of family present for education by provider   Patient Position at End of Consult Supine;All needs within reach   Nurse Communication Nurse aware of consult       ELSIE Rizo, OTR/L

## 2024-04-18 NOTE — CASE MANAGEMENT
Case Management Discharge Planning Note    Patient name Julia Perry  [id number]  : 1955 Date 2024       Current Admission Date: 2024  Current Admission Diagnosis:Coronary artery disease involving native coronary artery   Patient Active Problem List    Diagnosis Date Noted    LBBB (left bundle branch block) 2024    Severe left ventricular systolic dysfunction 2024    New onset atrial fibrillation (HCC) 2024    ABLA (acute blood loss anemia) 2024    S/P CABG x 3 2024    Cardiogenic postoperative shock (HCC) 2024    PONV (postoperative nausea and vomiting) 2024    S/P carotid endarterectomy 2024    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HCC) 02/15/2024    Hyperprolactinemia (HCC) 02/15/2024    Stenosis of left carotid artery 2024    Coronary artery disease involving native coronary artery 2024    Ischemic cardiomyopathy 2024    Mixed hyperlipidemia 2024    Ulcerative pancolitis without complication (HCC) 2024    Polyp of ascending colon 2023    Acquired right foot drop 2023    Hypomagnesemia 2023    Hair loss 2023    Colitis 2023    History of biliary dyskinesia 2023    DMII (diabetes mellitus, type 2) (HCC) 2023    Gallbladder polyp 2022    Leiomyoma of uterus 2013      LOS (days): 9  Geometric Mean LOS (GMLOS) (days): 30.3  Days to GMLOS:21     OBJECTIVE:  Risk of Unplanned Readmission Score: 22.6         Current admission status: Inpatient   Preferred Pharmacy:   [id number] - [place], [place] - [address] [address]  Phone: [phone] Fax: [phone]    [facility] - [place] - [address]  [address]  [place] [address]  Phone: [phone] Fax: [phone]    Primary Care Provider: Toya Titus MD    Primary Insurance: [employer]   REP  Secondary Insurance:     DISCHARGE DETAILS:    Discharge planning discussed with:: pt and pt spouse at bedside  Freedom of Choice: Yes  Comments - Freedom of Choice: CM dicussed PT/OT recommendation for Acute Rehab with pt at bedside. Pt and pt spouse are agreeable. [facility] is pt first choice. Referral submitted via aidin. CM answered all of pt and pt spouse questions/concerns. CM will continue to follow as needed.  CM contacted family/caregiver?: Yes  Were Treatment Team discharge recommendations reviewed with patient/caregiver?: Yes  Did patient/caregiver verbalize understanding of patient care needs?: N/A- going to facility  Were patient/caregiver advised of the risks associated with not following Treatment Team discharge recommendations?: Yes    Contacts  Patient Contacts: Luke Perry  Relationship to Patient:: Family  Contact Method: Phone  Phone Number: home [phone], cell [phone]  Reason/Outcome: Continuity of Care, Emergency Contact, Discharge Planning    Requested Home Health Care         Is the patient interested in HHC at discharge?: No    DME Referral Provided  Referral made for DME?: No    Other Referral/Resources/Interventions Provided:  Interventions: Acute Rehab  Referral Comments: [facility] Acute rehab referrals submitted via [employer]. [facility] is 1st choice. CM will continue to follow as needed.         Treatment Team Recommendation: Acute Rehab  Discharge Destination Plan:: Acute Rehab  Transport at Discharge : [facility] Ambulance                                      Additional Comments: CM dicussed PT/OT recommendation for Acute Rehab with pt at bedside. Pt and pt spouse are agreeable. [facility] is pt first choice. Referral submitted via aidin. CM answered all of pt and pt spouse questions/concerns. CM will continue to follow as needed.

## 2024-04-18 NOTE — PLAN OF CARE
Problem: OCCUPATIONAL THERAPY ADULT  Goal: Performs self-care activities at highest level of function for planned discharge setting.  See evaluation for individualized goals.  Description: Treatment Interventions: ADL retraining, Functional transfer training, Endurance training, UE strengthening/ROM, Cognitive reorientation, Patient/family training, Equipment evaluation/education, Continued evaluation, Cardiac education, Activityengagement, Energy conservation          See flowsheet documentation for full assessment, interventions and recommendations.   Outcome: Progressing  Note: Limitation: Decreased ADL status, Decreased Safe judgement during ADL, Decreased cognition, Decreased endurance, Decreased self-care trans, Decreased high-level ADLs  Prognosis: Fair  Assessment: Pt seen for OT treatment session day 2 on this date focused on ADL retraining, functional transfers and mobility, energy conservation, functional endurance, recall of safety precautions, and patient education. Pt was greeted in chair and was cooperative throughout session. Following session, pt was left in bed with all needs within reach. Pt continues to be limited by functional status related to ADLs and transfers requiring MAX A for LB dressing, MOD A for functional transfers and MIN A for functional mobility.  Pt and spouse voiced their concerns regarding decision making process for rehab vs home with assistance, all questions answered, CM made aware of concerns.   The patient's raw score on the AM-PAC Daily Activity Inpatient Short Form is 16. A raw score of less than 19 suggests the patient may benefit from discharge to post-acute rehabilitation services. Please refer to the recommendation of the Occupational Therapist for safe discharge planning. Pt would benefit from continued acute OT intervention with plan to continue OT treatment sessions 2-3x per week. Recommend d/c to level I services.     Rehab Resource Intensity Level, OT: I  (Maximum Resource Intensity)

## 2024-04-18 NOTE — PROCEDURES
Insert Complex Venous Access Line    Date/Time: 4/18/2024 10:04 AM    Performed by: Vane Art RN  Authorized by: Timoteo Rasmussen PA-C    Patient location:  Bedside  Other Assisting Provider: Yes (comment) (Dia JACKSON)    Consent:     Consent obtained:  Written    Consent given by:  Patient    Risks discussed:  Arterial puncture, bleeding, infection, incorrect placement, nerve damage and pneumothorax    Alternatives discussed:  No treatment, delayed treatment and observation  Universal protocol:     Procedure explained and questions answered to patient or proxy's satisfaction: yes      Immediately prior to procedure, a time out was called: yes      Relevant documents present and verified: yes      Test results available and properly labeled: yes      Radiology Images displayed and confirmed.  If images not available, report reviewed: yes      Required blood products, implants, devices, and special equipment available: yes      Site/side marked: yes      Patient identity confirmed:  Verbally with patient, arm band, provided demographic data and hospital-assigned identification number  Pre-procedure details:     Hand hygiene: Hand hygiene performed prior to insertion      Sterile barrier technique: All elements of maximal sterile technique followed      Skin preparation:  ChloraPrep    Skin preparation agent: Skin preparation agent completely dried prior to procedure    Procedure details:     Complex Venous Access Line Type: PICC      Complex Venous Access Line Indications: medications requiring central line      Orientation:  Right    Location:  Basilic    Procedural supplies:  Double lumen    Catheter size:  5 Fr    Total catheter length (cm):  38    Catheter out on skin (cm):  8    Max flow rate:  999 mL/hr    Arm circumference:  28    Patient evaluated for contraindications to access (i.e. fistula, thrombosis, etc): Yes      Site selection rationale:  Largest, most accessible vein    Approach:  percutaneous technique used      Patient position:  Flat    Ultrasound image availability:  Not saved    Sterile ultrasound techniques: Sterile gel and sterile probe covers were used      Number of attempts:  1    Successful placement: no      Landmarks identified: yes      Cath access vessel: Needs repositioning in IR. Order placed.  Anesthesia (see MAR for exact dosages):     Anesthesia method:  Local infiltration    Local anesthetic:  Lidocaine 1% w/o epi (2 mL administered)  Post-procedure details:     Post-procedure:  Dressing applied and securement device placed    Assessment:  Blood return through all ports and free fluid flow    Post-procedure complications: none      Patient tolerance of procedure:  Tolerated well, no immediate complications

## 2024-04-19 ENCOUNTER — APPOINTMENT (INPATIENT)
Dept: NON INVASIVE DIAGNOSTICS | Facility: HOSPITAL | Age: 69
DRG: 215 | End: 2024-04-19
Payer: COMMERCIAL

## 2024-04-19 LAB
ANION GAP SERPL CALCULATED.3IONS-SCNC: 7 MMOL/L (ref 4–13)
APICAL FOUR CHAMBER EJECTION FRACTION: 17 %
BASE EX.OXY STD BLDV CALC-SCNC: 46.9 % (ref 60–80)
BASE EX.OXY STD BLDV CALC-SCNC: 50.9 % (ref 60–80)
BASE EXCESS BLDV CALC-SCNC: 7.9 MMOL/L
BASE EXCESS BLDV CALC-SCNC: 9 MMOL/L
BSA FOR ECHO PROCEDURE: 1.79 M2
BUN SERPL-MCNC: 17 MG/DL (ref 5–25)
CALCIUM SERPL-MCNC: 8.9 MG/DL (ref 8.4–10.2)
CHLORIDE SERPL-SCNC: 88 MMOL/L (ref 96–108)
CO2 SERPL-SCNC: 35 MMOL/L (ref 21–32)
CREAT SERPL-MCNC: 0.82 MG/DL (ref 0.6–1.3)
ERYTHROCYTE [DISTWIDTH] IN BLOOD BY AUTOMATED COUNT: 16.6 % (ref 11.6–15.1)
GFR SERPL CREATININE-BSD FRML MDRD: 74 ML/MIN/1.73SQ M
GLUCOSE SERPL-MCNC: 131 MG/DL (ref 65–140)
GLUCOSE SERPL-MCNC: 134 MG/DL (ref 65–140)
GLUCOSE SERPL-MCNC: 150 MG/DL (ref 65–140)
GLUCOSE SERPL-MCNC: 158 MG/DL (ref 65–140)
GLUCOSE SERPL-MCNC: 161 MG/DL (ref 65–140)
HCO3 BLDV-SCNC: 32.9 MMOL/L (ref 24–30)
HCO3 BLDV-SCNC: 34.2 MMOL/L (ref 24–30)
HCT VFR BLD AUTO: 29.7 % (ref 34.8–46.1)
HGB BLD-MCNC: 9.5 G/DL (ref 11.5–15.4)
LEFT VENTRICLE DIASTOLIC VOLUME (MOD BIPLANE): 124 ML
LEFT VENTRICLE DIASTOLIC VOLUME INDEX (MOD BIPLANE): 69.3 ML/M2
LEFT VENTRICLE SYSTOLIC VOLUME (MOD BIPLANE): 98 ML
LEFT VENTRICLE SYSTOLIC VOLUME INDEX (MOD BIPLANE): 54.7 ML/M2
LV EF: 20 %
MCH RBC QN AUTO: 29.5 PG (ref 26.8–34.3)
MCHC RBC AUTO-ENTMCNC: 32 G/DL (ref 31.4–37.4)
MCV RBC AUTO: 92 FL (ref 82–98)
MV E'TISSUE VEL-SEP: 4 CM/S
O2 CT BLDV-SCNC: 6.9 ML/DL
O2 CT BLDV-SCNC: 7.2 ML/DL
PCO2 BLDV: 48.4 MM HG (ref 42–50)
PCO2 BLDV: 50.6 MM HG (ref 42–50)
PH BLDV: 7.45 [PH] (ref 7.3–7.4)
PH BLDV: 7.45 [PH] (ref 7.3–7.4)
PLATELET # BLD AUTO: 471 THOUSANDS/UL (ref 149–390)
PMV BLD AUTO: 9.7 FL (ref 8.9–12.7)
PO2 BLDV: 26.9 MM HG (ref 35–45)
PO2 BLDV: 28.2 MM HG (ref 35–45)
POTASSIUM SERPL-SCNC: 4.1 MMOL/L (ref 3.5–5.3)
RBC # BLD AUTO: 3.22 MILLION/UL (ref 3.81–5.12)
SL CV LV EF: 20
SODIUM SERPL-SCNC: 130 MMOL/L (ref 135–147)
TRICUSPID ANNULAR PLANE SYSTOLIC EXCURSION: 1.2 CM
WBC # BLD AUTO: 11.82 THOUSAND/UL (ref 4.31–10.16)

## 2024-04-19 PROCEDURE — 80048 BASIC METABOLIC PNL TOTAL CA: CPT | Performed by: PHYSICIAN ASSISTANT

## 2024-04-19 PROCEDURE — 93325 DOPPLER ECHO COLOR FLOW MAPG: CPT | Performed by: INTERNAL MEDICINE

## 2024-04-19 PROCEDURE — 93308 TTE F-UP OR LMTD: CPT

## 2024-04-19 PROCEDURE — 93321 DOPPLER ECHO F-UP/LMTD STD: CPT | Performed by: INTERNAL MEDICINE

## 2024-04-19 PROCEDURE — 85027 COMPLETE CBC AUTOMATED: CPT | Performed by: PHYSICIAN ASSISTANT

## 2024-04-19 PROCEDURE — 99024 POSTOP FOLLOW-UP VISIT: CPT | Performed by: PHYSICIAN ASSISTANT

## 2024-04-19 PROCEDURE — 82805 BLOOD GASES W/O2 SATURATION: CPT | Performed by: PHYSICIAN ASSISTANT

## 2024-04-19 PROCEDURE — 82948 REAGENT STRIP/BLOOD GLUCOSE: CPT

## 2024-04-19 PROCEDURE — 93308 TTE F-UP OR LMTD: CPT | Performed by: INTERNAL MEDICINE

## 2024-04-19 PROCEDURE — 99232 SBSQ HOSP IP/OBS MODERATE 35: CPT | Performed by: INTERNAL MEDICINE

## 2024-04-19 RX ADMIN — PERFLUTREN 0.6 ML/MIN: 6.52 INJECTION, SUSPENSION INTRAVENOUS at 16:33

## 2024-04-19 RX ADMIN — POTASSIUM CHLORIDE 20 MEQ: 1500 TABLET, EXTENDED RELEASE ORAL at 09:24

## 2024-04-19 RX ADMIN — ATORVASTATIN CALCIUM 80 MG: 80 TABLET, FILM COATED ORAL at 17:43

## 2024-04-19 RX ADMIN — AMIODARONE HYDROCHLORIDE 200 MG: 200 TABLET ORAL at 21:32

## 2024-04-19 RX ADMIN — POTASSIUM CHLORIDE 20 MEQ: 1500 TABLET, EXTENDED RELEASE ORAL at 17:43

## 2024-04-19 RX ADMIN — ASPIRIN 81 MG CHEWABLE TABLET 81 MG: 81 TABLET CHEWABLE at 09:39

## 2024-04-19 RX ADMIN — INSULIN LISPRO 1 UNITS: 100 INJECTION, SOLUTION INTRAVENOUS; SUBCUTANEOUS at 11:39

## 2024-04-19 RX ADMIN — INSULIN LISPRO 1 UNITS: 100 INJECTION, SOLUTION INTRAVENOUS; SUBCUTANEOUS at 17:47

## 2024-04-19 RX ADMIN — AMIODARONE HYDROCHLORIDE 200 MG: 200 TABLET ORAL at 17:02

## 2024-04-19 RX ADMIN — AMIODARONE HYDROCHLORIDE 200 MG: 200 TABLET ORAL at 06:11

## 2024-04-19 RX ADMIN — METOPROLOL SUCCINATE 12.5 MG: 25 TABLET, FILM COATED, EXTENDED RELEASE ORAL at 09:24

## 2024-04-19 RX ADMIN — Medication 1000 UNITS: at 09:24

## 2024-04-19 RX ADMIN — PANTOPRAZOLE SODIUM 40 MG: 40 TABLET, DELAYED RELEASE ORAL at 06:11

## 2024-04-19 RX ADMIN — MAGNESIUM OXIDE TAB 400 MG (241.3 MG ELEMENTAL MG) 400 MG: 400 (241.3 MG) TAB at 09:24

## 2024-04-19 RX ADMIN — MIDODRINE HYDROCHLORIDE 5 MG: 5 TABLET ORAL at 17:43

## 2024-04-19 RX ADMIN — MAGNESIUM OXIDE TAB 400 MG (241.3 MG ELEMENTAL MG) 400 MG: 400 (241.3 MG) TAB at 21:32

## 2024-04-19 RX ADMIN — DOCUSATE SODIUM 100 MG: 100 CAPSULE, LIQUID FILLED ORAL at 17:43

## 2024-04-19 RX ADMIN — TICAGRELOR 90 MG: 90 TABLET ORAL at 09:24

## 2024-04-19 RX ADMIN — TICAGRELOR 90 MG: 90 TABLET ORAL at 21:32

## 2024-04-19 RX ADMIN — DOCUSATE SODIUM 100 MG: 100 CAPSULE, LIQUID FILLED ORAL at 09:24

## 2024-04-19 RX ADMIN — FONDAPARINUX SODIUM 2.5 MG: 2.5 INJECTION, SOLUTION SUBCUTANEOUS at 09:24

## 2024-04-19 RX ADMIN — MIDODRINE HYDROCHLORIDE 5 MG: 5 TABLET ORAL at 11:39

## 2024-04-19 RX ADMIN — MIDODRINE HYDROCHLORIDE 5 MG: 5 TABLET ORAL at 06:11

## 2024-04-19 RX ADMIN — INSULIN LISPRO 1 UNITS: 100 INJECTION, SOLUTION INTRAVENOUS; SUBCUTANEOUS at 21:32

## 2024-04-19 RX ADMIN — FUROSEMIDE 40 MG: 10 INJECTION, SOLUTION INTRAMUSCULAR; INTRAVENOUS at 09:22

## 2024-04-19 NOTE — PROGRESS NOTES
Advanced Heart Failure / Pulmonary Hypertension Service Progress Note    Julia Perry 68 y.o. female   MRN: [id number]  Unit/Bed#: [facility] 401-01; Encounter: [id number]    Assessment:  Principal Problem:    Coronary artery disease involving native coronary artery  Active Problems:    DMII (diabetes mellitus, type 2) (HCC)    Hypomagnesemia    Ulcerative pancolitis without complication (HCC)    Ischemic cardiomyopathy    Mixed hyperlipidemia    Stenosis of left carotid artery    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HCC)    Hyperprolactinemia (HCC)    S/P carotid endarterectomy    Cardiogenic postoperative shock (HCC)    PONV (postoperative nausea and vomiting)    S/P CABG x 3    New onset atrial fibrillation (HCC)    ABLA (acute blood loss anemia)    LBBB (left bundle branch block)    Severe left ventricular systolic dysfunction      Subjective:   Patient seen and examined. Patient reports feeling well. Milrinone was discontinued last night. Progressively worsening hypoNa on labs. Mg of 1.8. BP remained soft 90-96/51-55    Objective:   Intake/ Output: 564/2452/-1.9L in 24 hours, -8.01 Ls since admission  Weight: 162 pound  SpO2 47 off milrinone     Chu's(4/19/2024) off milrinone:  Cardiac output 3.7 L/min  Cardiac index 2 L/min/m²      Lab Results   Component Value Date    LVEF 15 04/12/2024    BNP 3,164 (H) 04/10/2024     (H) 12/28/2023       NYHA Class III., Stage C-D  Patient is currently volume overloaded.    Today's Plan:  Repeat VBG this afternoon to guide inotrope therapy.   Continue with Lasix 40mg IV BID   Continue with Amio   I had the opportunity to engage in a detailed discussion with the patient and her  about the potential need for LifeVest.  We explored both the advantages and drawbacks extensively.  The family had previously conducted some research online, and according to the patient's , there are multiple nationwide lawsuit alleging inappropriate shocks and questionable efficacy  regarding the device ability to significantly reduce mortality.  Consequently, they expressed reservations about proceeding with obtaining the LifeVest.  However, they indicated a desire to further consider their options and plan to discuss their concerns directly with the [facility] representative. Order has been placed and request was sent to [facility].         Plan:  MV CAD status post CABG x 3 (4/9/2024)- LIMA to LAD, SVG to OM1, SVG to RPDA  Cardiogenic shock- SCAI SHOCK STAGE D-C  -Presented for elective CABG-4/9/2024, developed cardiogenic shock with increasing requirements and eventually was placed on Impella. Now status post Impella explant on 4/12/2024  -Left heart cath post CABG on 4/10/2024, 99% mid circumflex into OM 2 that was stented  -Milrinone discontinued on 4/19/2024 at midnight.   -Status: Warm on exam, appears euvolemic    HFrEF- LVEF 15-20%  - Event history: Cardiogenic shock post CABG   - Etiology: ICM  - Status: NYHA Class III-IV/ ACC C; warm, volume up 3+ JAYLAN, +JVD  - Studies:   TTE 1/3/2024 demonstrated EF of 30%, severely reduced systolic function, akinesis basal inferoseptal, basal inferior, mid inferoseptal and mid inferior with hypokinesis of remaining segments.  Left atrial enlargement, mild to moderate mitral regurg, mild PI, small pericardial effusion, LVIDD 5.4 cm.  Follow-up TTE 4/12/2024 demonstrated EF 15 to 20% with severe global hypokinesis and regional variation     - Neurohormonal Blockade/GDMT:  --Beta-Blocker: Metoprolol succinate 12.5mg QD (outpatient on metoprolol succinate 25 mg daily)  --ACEi, ARB or ARNi: Held  --Aldosterone Receptor Blocker: Currently on hold  --SGLT2 Inhibitor: Currently on hold- Will discuss Jardiance vs Farxiga prior to discharge  --Diuretic: Furosemide 40 mg IV 2 times daily  -- Home diuretic: Furosemide 40 mg daily    - Sudden Cardiac Death Risk Reduction:  --ICD: LVEF to be reassessed in 90 days, will discuss LifeVest on discharge    - Electrical  "Resynchronization: LBBB with QRS 126ms     - Advanced Therapies (if appropriate):  --Inotrope: Currently on milrinone at 0.13, TBD-on midodrine 5 mg 3 times daily as well    - Diet:  --2g sodium diet  --2000 ml fluid restriction    New diagnosis of atrial fibrillation- postop  -Currently on amiodarone 200 mg 3 times daily  Hypertension, currently hypotensive on midodrine 5 mg 3 times daily  Hyperlipidemia-   DM2  Bilateral carotid artery stenosis status post left CEA  Ulcerative colitis      Thank you for the opportunity to participate in the care of this patient.  Prince Cohn MD  Cardiology fellow-PGY5  Advanced Heart Failure  [facility]    Vitals:   Blood pressure 96/51, pulse 89, temperature 98 °F (36.7 °C), temperature source Oral, resp. rate 18, height 5' 4\" (1.626 m), weight 73.6 kg (162 lb 4.1 oz), SpO2 91%.    Body mass index is 27.85 kg/m².    I/O last 3 completed shifts:  In: 780.9 [P.O.:460; I.V.:170.9; IV Piggyback:150]  Out: 3842 [Urine:3552; Chest Tube:290]    Wt Readings from Last 10 Encounters:   04/19/24 73.6 kg (162 lb 4.1 oz)   04/02/24 74.6 kg (164 lb 6.4 oz)   03/19/24 74.4 kg (164 lb)   03/19/24 74.4 kg (164 lb)   03/18/24 73.9 kg (163 lb)   03/04/24 73.9 kg (162 lb 14.7 oz)   02/15/24 71.1 kg (156 lb 12.8 oz)   02/05/24 71.2 kg (157 lb)   01/31/24 70.8 kg (156 lb)   01/30/24 72.1 kg (159 lb)     Vitals:    04/18/24 2300 04/19/24 0209 04/19/24 0456 04/19/24 0717   BP: 90/51 92/55  96/51   BP Location: Left arm   Left arm   Pulse: 87 92  89   Resp: 16   18   Temp: 98.3 °F (36.8 °C) 98.1 °F (36.7 °C)  98 °F (36.7 °C)   TempSrc: Oral   Oral   SpO2: 95%   91%   Weight:   73.6 kg (162 lb 4.1 oz)    Height:           Physical Exam  GEN: Julia Perry appears well, alert and oriented x 3, pleasant and cooperative   HEENT:  Normocephalic, atraumatic, anicteric, moist mucous membranes  NECK: JVP 10 cm H2O. Left CEA well healing scar   HEART: Regular rate and rhythm, normal " S1 and S2, no murmurs, clicks, friction rub noted    LUNGS: Diffuse crackles    ABDOMEN:  Normoactive bowel sounds, soft, no tenderness, no distention  EXTREMITIES: peripheral pulses palpable; 3+ bilateral lower extremity edema   NEURO: no gross focal findings; cranial nerves grossly intact   SKIN:  Dry, intact, warm to touch    Current Facility-Administered Medications:     acetaminophen (TYLENOL) tablet 975 mg, 975 mg, Oral, Q8H PRN, Timoteo Rasmussen PA-C    amiodarone tablet 200 mg, 200 mg, Oral, Q8H LINDA, Timoteo Rasmussen PA-C, 200 mg at 04/19/24 0611    aspirin chewable tablet 81 mg, 81 mg, Oral, Daily, Timoteo Rasmussen PA-C, 81 mg at 04/18/24 0825    atorvastatin (LIPITOR) tablet 80 mg, 80 mg, Oral, Daily With Dinner, Timoteo Rasmussen PA-C, 80 mg at 04/18/24 1717    chlorhexidine (PERIDEX) 0.12 % oral rinse 15 mL, 15 mL, Mouth/Throat, BID, Timoteo Rasmussen PA-C, 15 mL at 04/18/24 0825    Cholecalciferol (VITAMIN D3) tablet 1,000 Units, 1,000 Units, Oral, Daily, Timoteo Rasmussen PA-C, 1,000 Units at 04/18/24 0825    docusate sodium (COLACE) capsule 100 mg, 100 mg, Oral, BID, Timoteo Rasmussen PA-C, 100 mg at 04/18/24 1717    fondaparinux (ARIXTRA) subcutaneous injection 2.5 mg, 2.5 mg, Subcutaneous, Q24H, Timoteo Rasmussen PA-C, 2.5 mg at 04/18/24 0825    furosemide (LASIX) injection 40 mg, 40 mg, Intravenous, BID (diuretic), Isrrael Rojas PA-C, 40 mg at 04/18/24 1716    insulin lispro (HumALOG/ADMELOG) 100 units/mL subcutaneous injection 1-5 Units, 1-5 Units, Subcutaneous, TID AC, 1 Units at 04/18/24 1716 **AND** Fingerstick Glucose (POCT), , , TID AC, Timoteo Rasmussen PA-C    insulin lispro (HumALOG/ADMELOG) 100 units/mL subcutaneous injection 1-5 Units, 1-5 Units, Subcutaneous, HS, Timoteo Rasmussen PA-C, 1 Units at 04/18/24 2153    magnesium Oxide (MAG-OX) tablet 400 mg, 400 mg, Oral, BID, Jeri Rubin PA-C, 400 mg at 04/18/24 2153    metoprolol succinate (TOPROL-XL) 24 hr tablet 12.5 mg, 12.5 mg, Oral, Daily,  Isrrael Rojas PA-C, 12.5 mg at 04/18/24 0825    midodrine (PROAMATINE) tablet 5 mg, 5 mg, Oral, TID AC, Timoteo Rasmussen PA-C, 5 mg at 04/19/24 0611    ondansetron (ZOFRAN) injection 4 mg, 4 mg, Intravenous, Q6H PRN, Timoteo Rasmussen PA-C, 4 mg at 04/16/24 0801    oxyCODONE (ROXICODONE) split tablet 2.5 mg, 2.5 mg, Oral, Q4H PRN **OR** oxyCODONE (ROXICODONE) IR tablet 5 mg, 5 mg, Oral, Q4H PRN, Timoteo Rasmussen PA-C    pantoprazole (PROTONIX) EC tablet 40 mg, 40 mg, Oral, Early Morning, Timoteo Rasmussen PA-C, 40 mg at 04/19/24 0611    phenylephrine (TRI-SYNEPHRINE) 50 mg (STANDARD CONCENTRATION) in sodium chloride 0.9% 250 mL,  mcg/min, Intravenous, Titrated, Timoteo Rasmussen PA-C, Stopped at 04/18/24 0631    polyethylene glycol (MIRALAX) packet 17 g, 17 g, Oral, Daily, Timoteo Rasmussen PA-C, 17 g at 04/14/24 0847    potassium chloride (Klor-Con M20) CR tablet 20 mEq, 20 mEq, Oral, BID, Timoteo Rasmussen PA-C, 20 mEq at 04/18/24 1717    sodium bicarbonate 8.4 % injection **ADS Override Pull**, , , ,     ticagrelor (BRILINTA) tablet 90 mg, 90 mg, Oral, Q12H LINDA, Timoteo Rasmussen PA-C, 90 mg at 04/18/24 2153    trimethobenzamide (TIGAN) IM injection 200 mg, 200 mg, Intramuscular, Q6H PRN, Timoteo Rasmussen PA-C, 200 mg at 04/11/24 2124    Labs & Results:      Results from last 7 days   Lab Units 04/19/24  0502 04/18/24  0427 04/17/24  0402   WBC Thousand/uL 11.82* 10.25* 11.10*   HEMOGLOBIN g/dL 9.5* 9.0* 8.7*   HEMATOCRIT % 29.7* 28.2* 27.6*   PLATELETS Thousands/uL 471* 396* 287         Results from last 7 days   Lab Units 04/19/24  0502 04/18/24  0427 04/17/24  0402 04/14/24  1716 04/14/24  0403 04/13/24  0415 04/13/24  0414   POTASSIUM mmol/L 4.1 3.9 3.9   < > 4.0   < >  --    CHLORIDE mmol/L 88* 90* 91*   < > 106   < >  --    CO2 mmol/L 35* 34* 32   < > 27   < >  --    BUN mg/dL 17 16 13   < > 12   < >  --    CREATININE mg/dL 0.82 0.77 0.76   < > 0.61   < >  --    CALCIUM mg/dL 8.9 8.2* 8.0*   < > 7.9*   < >  --     ALK PHOS U/L  --   --   --   --  46  --  48   ALT U/L  --   --   --   --  7  --  8   AST U/L  --   --   --   --  16  --  20    < > = values in this interval not displayed.     Results from last 7 days   Lab Units 04/16/24  0502 04/13/24  0414 04/12/24  1643   INR  1.23* 1.55* 1.63*

## 2024-04-19 NOTE — PROGRESS NOTES
Progress Note - Cardiothoracic Surgery   Julia Perry 68 y.o. female MRN: [id number]  Unit/Bed#: [facility] 401-01 Encounter: [id number]    Severe MVCAD/ischemic CMP S/P CABG ; POD # 10  S/p DESx2 to OM and Impella POD #8 (impella removed 4/12)    24 Hour Events: IR placed PICC yest. Mediastinal tubes removed yest and pleural kept in place. milrinone off at midnight. SR 80s-90s. 95% on RA. Left pleural drainage improved. Hgb 9.9, Cr stable 0.63, net negative 1887. VBG this AM 46. Otherwise feels well. Only c/o poor sleep. Denies Abd pain/n/v.  Ambulated with PT yest.     Medications:   Scheduled Meds:  Current Facility-Administered Medications   Medication Dose Route Frequency Provider Last Rate    acetaminophen  975 mg Oral Q8H PRN Timoteo Rasmussen PA-C      amiodarone  200 mg Oral Q8H LINDA Timoteo Rasmussen PA-C      aspirin  81 mg Oral Daily Timoteo Rasmussen PA-C      atorvastatin  80 mg Oral Daily With Dinner Timoteo Rasmussen PA-C      chlorhexidine  15 mL Mouth/Throat BID Timoteo Rasmussen PA-C      Cholecalciferol  1,000 Units Oral Daily Timoteo Rasmussen PA-C      docusate sodium  100 mg Oral BID Timoteo Rasmussen PA-C      fondaparinux  2.5 mg Subcutaneous Q24H Timoteo Rasmussen PA-C      furosemide  40 mg Intravenous BID (diuretic) Isrrael Rojas PA-C      insulin lispro  1-5 Units Subcutaneous TID AC Timoteo Rasmussen PA-C      insulin lispro  1-5 Units Subcutaneous HS Timoteo Rasmussen PA-C      magnesium Oxide  400 mg Oral BID Jeri Rubin PA-C      metoprolol succinate  12.5 mg Oral Daily Isrrael Rojas PA-C      midodrine  5 mg Oral TID AC Timoteo Rasmussen PA-C      ondansetron  4 mg Intravenous Q6H PRN Timoteo Rasmussen PA-C      oxyCODONE  2.5 mg Oral Q4H PRN Timoteo Rasmussen PA-C      Or    oxyCODONE  5 mg Oral Q4H PRN Timoteo Rasmussen PA-C      pantoprazole  40 mg Oral Early Morning Timoteo Rasmussen PA-C      phenylephine   mcg/min Intravenous Titrated Timoteo Rasmussen PA-C Stopped (04/18/24 0631)    polyethylene  glycol  17 g Oral Daily Timoteo Rsamussen PA-C      potassium chloride  20 mEq Oral BID Timoteo Rasmussen PA-C      sodium bicarbonate           ticagrelor  90 mg Oral Q12H LINDA Timoteo Rasmussen PA-C      trimethobenzamide  200 mg Intramuscular Q6H PRN Timoteo Rasmussen PA-C       Continuous Infusions:phenylephine,  mcg/min, Last Rate: Stopped (04/18/24 0631)      PRN Meds:.  acetaminophen    ondansetron    oxyCODONE **OR** oxyCODONE    sodium bicarbonate    trimethobenzamide    Vitals:   Vitals:    04/18/24 2300 04/19/24 0209 04/19/24 0456 04/19/24 0717   BP: 90/51 92/55  96/51   BP Location: Left arm   Left arm   Pulse: 87 92  89   Resp: 16   18   Temp: 98.3 °F (36.8 °C) 98.1 °F (36.7 °C)  98 °F (36.7 °C)   TempSrc: Oral   Oral   SpO2: 95%   91%   Weight:   73.6 kg (162 lb 4.1 oz)    Height:           Telemetry: NSR; Heart Rate: 80s-90s    Respiratory:   SpO2: SpO2: 91 %; Room Air    Intake/Output:     Intake/Output Summary (Last 24 hours) at 4/19/2024 0847  Last data filed at 4/19/2024 0501  Gross per 24 hour   Intake 324.5 ml   Output 2452 ml   Net -2127.5 ml        Chest tube Output:         Pleural tubes: 90 mL/8 hours  130 mL/24 hours     Weights:   Weight (last 2 days)       Date/Time Weight    04/19/24 0456 73.6 (162.26)    04/18/24 0544 73.8 (162.7)    04/17/24 0541 72.8 (160.5)              Results:   Results from last 7 days   Lab Units 04/19/24  0502 04/18/24  0427 04/17/24  0402   WBC Thousand/uL 11.82* 10.25* 11.10*   HEMOGLOBIN g/dL 9.5* 9.0* 8.7*   HEMATOCRIT % 29.7* 28.2* 27.6*   PLATELETS Thousands/uL 471* 396* 287     Results from last 7 days   Lab Units 04/19/24  0502 04/18/24  0427 04/17/24  0402   SODIUM mmol/L 130* 131* 129*   POTASSIUM mmol/L 4.1 3.9 3.9   CHLORIDE mmol/L 88* 90* 91*   CO2 mmol/L 35* 34* 32   BUN mg/dL 17 16 13   CREATININE mg/dL 0.82 0.77 0.76   CALCIUM mg/dL 8.9 8.2* 8.0*     Results from last 7 days   Lab Units 04/16/24  0502 04/13/24  0414 04/12/24  1643 04/12/24  1103    INR  1.23* 1.55* 1.63*  --    PTT seconds  --  27 30 71*       Point of care glucose: 126-189    Studies:  Echo 4/12:  LVEF 15-20%, severe LV dysfnx, severe global HK  Impella cannula present  At least moderate RV dysnfx    pCXR 4/12:  Cardiomegaly with mild pulmonary edema and small effusions. No pneumothorax identified.  Postoperative chest with interval extubation and removal of the NG tube.    I have personally reviewed pertinent reports.   and I have personally reviewed pertinent films in PACS    Invasive Lines/Tubes:  Invasive Devices       Peripherally Inserted Central Catheter Line  Duration             PICC Line 04/18/24 <1 day              Drain  Duration             Chest Tube 1 Left Pleural 28 Fr. 9 days    Chest Tube 2 Right Pleural 28 Fr. 9 days                    Physical Exam:    General: No acute distress and Alert  HEENT/NECK:  Normocephalic. Atraumatic.   Cardiac: Regular rate and rhythm and No murmurs/rubs/gallops  Pulmonary:  Breath sounds clear bilaterally, Breath sounds diminished in the bases bilaterally , and No rales/rhonchi/wheezes  Abdomen:  Non-tender, Non-distended, and Normal bowel sounds  Incisions: Sternum is stable.  Incision is clean, dry, and intact.  and Saphenectomy incison is clean, dry, and intact.   Extremities: Extremities warm/dry and 1+ edema B/L  Neuro: Alert and oriented X 3 and No focal deficits  Skin: Warm/Dry, without rashes or lesions.     Assessment:  Principal Problem:    Coronary artery disease involving native coronary artery  Active Problems:    DMII (diabetes mellitus, type 2) (HCC)    Hypomagnesemia    Ulcerative pancolitis without complication (HCC)    Ischemic cardiomyopathy    Mixed hyperlipidemia    Stenosis of left carotid artery    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HCC)    Hyperprolactinemia (HCC)    S/P carotid endarterectomy    Cardiogenic postoperative shock ([facility])    PONV (postoperative nausea and vomiting)    S/P CABG x 3    New onset atrial fibrillation  (HCC)    ABLA (acute blood loss anemia)    LBBB (left bundle branch block)    Severe left ventricular systolic dysfunction       Severe MVCAD/ischemic CMP S/P CABG ; POD # 10  S/p DESx2 to OM and Impella POD #8 (impella removed 4/12)    Plan:    Cardiac:     Elective surgical admission  Chronic systolic & diastolic CHF  Ischemic CMP with EF 15-20%  Severe LV dysnfx  Moderate RV dysnx  Chronic LBBB      Post op low cardiac output  - milrinone off at midnight last PM  - VBG this AM 46, repeat tomorrow    Post op hypotension  - on midodrine 5mg TID, continue    Check echo for LifeVest now that off primacor    NSR with LBBB  Post op afib s/p rapid afib/unstable s/p DCCV 4/14, s/p IV amio load    Cont toprol XL12.5mg daily (low EF)    Hold ACE inhibitor/ARB secondary to hypotension    Continue Amiodarone, 200 mg PO TID    Hold further GDMT for HF, restart as able as BP allows    Heart failure team now following    Continue ASA/Brilinta (post op PCI/RAFFY to OM) and Statin therapy    Epicardial pacing wires have been removed    Maintain central IV access today for lack of peripheral access  PICC line has been placed     Continue Arixtra for DVT prophylaxis    Pulmonary:     Good Room air oxygen saturation; Continue incentive spirometry/Coughing/Deep breathing exercises    Post op VDRF resolved    Left pleural chest tube output improved, d/c today    Renal:     Post op Creatinine stable; Follow up labs prn     Intake/Output net: -2240 mL/24 hours    Diuretic Regimen:  Continue IV Lasix, 40 mg BID  Continue Potassium Chloride 20 mEq PO BID    Stiles removed 4/18, voiding    Neuro:    Neurologically intact; No active issues     Incisional pain well controlled  - cont current pain regimen    GI:    Controlled carbohydrate diet level two/Cardiac diet, with 1800 mL fluid restriction    Tolerating diet without complaint  + Flatus  + BM postoperatively    Continue stool softeners and prn suppository    Continue GI  prophylaxis    Endo:     DM2 on oral meds, Pre-Op Hgb A1C: 6.4    Patient has been transitioned from continuous insulin infusion to intermittent subcutaneous dosing  Serum glucose well controlled on insulin sliding scale coverage  Endocrinology following daily   Will consider jardiance vs farxiga prior to d/c    7    Hematology:     Post-operative acute blood loss anemia; Hemoglobin 10.2; trend    8.   Disposition:        Following daily PT/OT recommendations regarding home vs. rehab when medically cleared for discharge    LifeVest will likely be needed and recommended   - unclear if patient and  would be amenable    Rehab planning    Not yet medically cleared for discharge, pending milrinone wean and transition off IV diuresis      VTE Pharmacologic Prophylaxis: Fondaparinux (Arixtra)  VTE Mechanical Prophylaxis: sequential compression device    Collaborative rounds completed with supervising physician  Plan of care discussed with bedside nurse    SIGNATURE: Isrrael Rojas PA-C  DATE: April 19, 2024  TIME: 8:47 AM

## 2024-04-19 NOTE — PROGRESS NOTES
Heart Failure/ Pulmonary Hypertension Progress Note - Julia Perry 68 y.o. female MRN: [id number]    Unit/Bed#: [facility] 401-01 Encounter: [id number]      Assessment:    Principal Problem:    Coronary artery disease involving native coronary artery  Active Problems:    DMII (diabetes mellitus, type 2) (HCC)    Hypomagnesemia    Ulcerative pancolitis without complication (HCC)    Ischemic cardiomyopathy    Mixed hyperlipidemia    Stenosis of left carotid artery    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HCC)    Hyperprolactinemia (HCC)    S/P carotid endarterectomy    Cardiogenic postoperative shock (HCC)    PONV (postoperative nausea and vomiting)    S/P CABG x 3    New onset atrial fibrillation (HCC)    ABLA (acute blood loss anemia)    LBBB (left bundle branch block)    Severe left ventricular systolic dysfunction      Subjective:   Patient seen and examined.  Hypotensive overnight, briefly back on Nhan. Feeling better every day.     Objective:   Intake/ Output: 720/1650/-930  Weight: 163  Tele: SR  MAPs: 65-75 mmHg.     Assessment/Plan:  CAD s/p CABG x 3 LIMA to LAD, SVG to OM 1, SVG to RPDA on 4/9/2024   -Cardiogenic shock post op with increasing inotrope and pressor requirement  -now stable but with need for Midodrine   -no anginal symptoms.   -rest of management per CTS team.     Cardiac cath 4/10/2024 when Impella was being placed demonstrated patent bypass grafts 99% mid circumflex into OM 2 which was stented at the time with drug-eluting stent   Impella was removed on 4/12/2024   On and off phenylephrine     VBG 4/18/24: 63% on milrinone 0.13mcg/kg/min, Hb 9, Chu CI 3.1  VBG 4/17/24: 62% on milrinone 0.25mcg/kg/min, Hb 8.7, Chu CI 2.84     Heart failure with reduced ejection fraction  -Etiology: ischemic  -Warm on exam, volume status improved. Transitioning to oral diuretic regimen today.  -ScVO2 stable off inotrope, though hypotensive overnight. Midodrine dose increased today  -If adding SGLT2i, may be able to send out on  "lower diuretic dose. Would need price check first, defer to CTS team or can start as outpatient.  - Further optimization of GDMT as outpatient.     Chu'(4/19/2024) off milrinone:  Cardiac output 3.7 L/min  Cardiac index 2 L/min/m²     Echocardiogram 4/12/24 (on Impella)  LVEF 15-20%  RV not well visualized, appeared normal size with moderate to severely reduced function on my review     Echo 4/10/24:  LVEF 15%  Unable to assess RV     Intra-op ELOISA 4/9/24:  LVEF 25-30%, unchanged post  RV mildly dilated, mildly reduced function post     Echo 1/30/24:  LVEF: 30%  LVIDd: 5.4cm  RV: normal size and systolic function  MR: mild to moderate  PASP: unable to estimate  RVOT: no notching  Other: regional wall motion abnormalities. Small pericardial effusion     Neurohormonal Blockade: Midodrine 10 mg TID  --Beta-Blocker: metoprolol succinate 12.5mg daily  --ACEi, ARB or ARNi:  --Aldosterone Receptor Blocker:  --SGLT2 Inhibitor:  --Diuretic: lasix 40mg IV BID--->Torsemide 20 mg BID     Sudden Cardiac Death Risk Reduction:  --ICD: LifeVest candidate, refusing.      Electrical Resynchronization:  --Candidacy for BiV device: LBBB 116-126 msec     Advanced Therapies (if appropriate):  --Inotrope: milrinone 0.13mcg/kg/min, D/C 4/19     New onset afib  Went into A-fib with RVR and became hypotensive in the postoperative setting and required cardioversion x 1, currently in sinus rhythm    Rhythm: On IV and oral amio load  Rate: metoprolol as above  AC: none, on dapt    Hyperlipidemia  DM  Bilateral carotid artery stenosis status post left CEA 3/24  Pituitary macroadenoma with elevated prolactin  Hypercalcemia  Ulcerative colitis    Review of Systems   All other systems reviewed and are negative.       Central Line (day, reason):  Stiles catheter (day, reason):    Vitals: Blood pressure 96/54, pulse 89, temperature 99 °F (37.2 °C), temperature source Oral, resp. rate 18, height 5' 4\" (1.626 m), weight 73.6 kg (162 lb 4.1 oz), SpO2 " 96%., Body mass index is 27.85 kg/m²., I/O last 3 completed shifts:  In: 780.9 [P.O.:460; I.V.:170.9; IV Piggyback:150]  Out: 3842 [Urine:3552; Chest Tube:290]  I/O this shift:  In: -   Out: 600 [Urine:600]  Wt Readings from Last 3 Encounters:   04/19/24 73.6 kg (162 lb 4.1 oz)   04/02/24 74.6 kg (164 lb 6.4 oz)   03/19/24 74.4 kg (164 lb)       Intake/Output Summary (Last 24 hours) at 4/19/2024 1404  Last data filed at 4/19/2024 1230  Gross per 24 hour   Intake 80.5 ml   Output 2412 ml   Net -2331.5 ml     I/O last 3 completed shifts:  In: 780.9 [P.O.:460; I.V.:170.9; IV Piggyback:150]  Out: 3842 [Urine:3552; Chest Tube:290]  .       Physical Exam:  Vitals:    04/19/24 0456 04/19/24 0717 04/19/24 0924 04/19/24 1106   BP:  96/51 96/53 96/54   BP Location:  Left arm  Left arm   Pulse:  89 86 89   Resp:  18  18   Temp:  98 °F (36.7 °C)  99 °F (37.2 °C)   TempSrc:  Oral  Oral   SpO2:  91%  96%   Weight: 73.6 kg (162 lb 4.1 oz)      Height:           GEN: Julia Perry appears well, alert and oriented x 3, pleasant and cooperative   HEENT: pupils equal, round, and reactive to light; extraocular muscles intact  NECK: supple, no carotid bruits   HEART: regular rhythm, normal S1 and S2, no murmurs, clicks, gallops or rubs, JVP is  at mid neck  LUNGS: coarse, slightly diminished  ABDOMEN: normal bowel sounds, soft, no tenderness, no distention  EXTREMITIES: peripheral pulses normal; no clubbing, cyanosis, trace-+1 BLLE edema  NEURO: no focal findings   SKIN: normal without suspicious lesions on exposed skin      Current Facility-Administered Medications:     acetaminophen (TYLENOL) tablet 975 mg, 975 mg, Oral, Q8H PRN, Timoteo Mathiesen, PA-C    amiodarone tablet 200 mg, 200 mg, Oral, Q8H LINDA, Timoteo Rasmussen PA-C, 200 mg at 04/19/24 0611    aspirin chewable tablet 81 mg, 81 mg, Oral, Daily, Timoteo Rasmussen PA-C, 81 mg at 04/19/24 0939    atorvastatin (LIPITOR) tablet 80 mg, 80 mg, Oral, Daily With Dinner, Timoteo Rasmussen PA-C,  80 mg at 04/18/24 1717    chlorhexidine (PERIDEX) 0.12 % oral rinse 15 mL, 15 mL, Mouth/Throat, BID, Timoteo Rasmussen PA-C, 15 mL at 04/18/24 0825    Cholecalciferol (VITAMIN D3) tablet 1,000 Units, 1,000 Units, Oral, Daily, Timoteo Rasmussen PA-C, 1,000 Units at 04/19/24 0924    docusate sodium (COLACE) capsule 100 mg, 100 mg, Oral, BID, Timoteo Rasmussen PA-C, 100 mg at 04/19/24 0924    fondaparinux (ARIXTRA) subcutaneous injection 2.5 mg, 2.5 mg, Subcutaneous, Q24H, Timoteo Rasmussen PA-C, 2.5 mg at 04/19/24 0924    furosemide (LASIX) injection 40 mg, 40 mg, Intravenous, BID (diuretic), Isrrael Rojas PA-C, 40 mg at 04/19/24 0922    insulin lispro (HumALOG/ADMELOG) 100 units/mL subcutaneous injection 1-5 Units, 1-5 Units, Subcutaneous, TID AC, 1 Units at 04/19/24 1139 **AND** Fingerstick Glucose (POCT), , , TID AC, Timoteo Rasmussen PA-C    insulin lispro (HumALOG/ADMELOG) 100 units/mL subcutaneous injection 1-5 Units, 1-5 Units, Subcutaneous, HS, Timoteo Rasmussen PA-C, 1 Units at 04/18/24 2153    magnesium Oxide (MAG-OX) tablet 400 mg, 400 mg, Oral, BID, Jeri Rubin PA-C, 400 mg at 04/19/24 0924    metoprolol succinate (TOPROL-XL) 24 hr tablet 12.5 mg, 12.5 mg, Oral, Daily, Isrrael Rojas PA-C, 12.5 mg at 04/19/24 0924    midodrine (PROAMATINE) tablet 5 mg, 5 mg, Oral, TID AC, Timoteo Rasmussen PA-C, 5 mg at 04/19/24 1139    ondansetron (ZOFRAN) injection 4 mg, 4 mg, Intravenous, Q6H PRN, Timoteo Rasmussen PA-C, 4 mg at 04/16/24 0801    oxyCODONE (ROXICODONE) split tablet 2.5 mg, 2.5 mg, Oral, Q4H PRN **OR** oxyCODONE (ROXICODONE) IR tablet 5 mg, 5 mg, Oral, Q4H PRN, Timoteo Rasmussen PA-C    pantoprazole (PROTONIX) EC tablet 40 mg, 40 mg, Oral, Early Morning, Timoteo Rasmussen PA-C, 40 mg at 04/19/24 0611    phenylephrine (TRI-SYNEPHRINE) 50 mg (STANDARD CONCENTRATION) in sodium chloride 0.9% 250 mL,  mcg/min, Intravenous, Titrated, Timoteo Rasmussen PA-C, Stopped at 04/18/24 0631    polyethylene glycol (MIRALAX)  packet 17 g, 17 g, Oral, Daily, Timoteo Rasmussen PA-C, 17 g at 04/14/24 0847    potassium chloride (Klor-Con M20) CR tablet 20 mEq, 20 mEq, Oral, BID, Timoteo Rasmussen PA-C, 20 mEq at 04/19/24 0924    ticagrelor (BRILINTA) tablet 90 mg, 90 mg, Oral, Q12H LINDA, Timoteo Rasmussen PA-C, 90 mg at 04/19/24 0924    trimethobenzamide (TIGAN) IM injection 200 mg, 200 mg, Intramuscular, Q6H PRN, Timoteo Rasmussen PA-C, 200 mg at 04/11/24 2124      Labs & Results:        Results from last 7 days   Lab Units 04/19/24  0502 04/18/24 0427 04/17/24  0402   WBC Thousand/uL 11.82* 10.25* 11.10*   HEMOGLOBIN g/dL 9.5* 9.0* 8.7*   HEMATOCRIT % 29.7* 28.2* 27.6*   PLATELETS Thousands/uL 471* 396* 287         Results from last 7 days   Lab Units 04/19/24  0502 04/18/24  0427 04/17/24  0402 04/14/24  1716 04/14/24  0403 04/13/24  0415 04/13/24  0414   POTASSIUM mmol/L 4.1 3.9 3.9   < > 4.0   < >  --    CHLORIDE mmol/L 88* 90* 91*   < > 106   < >  --    CO2 mmol/L 35* 34* 32   < > 27   < >  --    BUN mg/dL 17 16 13   < > 12   < >  --    CREATININE mg/dL 0.82 0.77 0.76   < > 0.61   < >  --    CALCIUM mg/dL 8.9 8.2* 8.0*   < > 7.9*   < >  --    ALK PHOS U/L  --   --   --   --  46  --  48   ALT U/L  --   --   --   --  7  --  8   AST U/L  --   --   --   --  16  --  20    < > = values in this interval not displayed.     Results from last 7 days   Lab Units 04/16/24  0502 04/13/24  0414 04/12/24  1643   INR  1.23* 1.55* 1.63*         Counseling / Coordination of Care    Thank you for the opportunity to participate in the care of this patient.    Ella PAIGE

## 2024-04-19 NOTE — ARC ADMISSION
Reviewed patient's case - patient is preapproved for ARC pending medical stability, functional progress, insurance authorization and bed availability. Additionally, patient needs to demonstrate ability to tolerate aggressive rehab program. She will need Cardiac intervention for EF 15-20% as well (per CM, plan to discuss LifeVest at D/C). CM has been updated. Will continue to follow at this time.

## 2024-04-19 NOTE — QUICK NOTE
"Fingerstick blood glucose trends reviewed and do not reveal hypoglycemia. Her appetite is improving.    Given low EF, discussed potential use of SGLT2 inhibitor therapy for cardiovascular risk reduction and glycemic control including side effects. Patient states that she does not take Metformin if her fasting glucose is <150 mg/dL and is hesitant to add another agent as she does not feel comfortable with her blood glucose running low. Discussed inpatient vs. outpatient glycemic goals to which she expressed understanding, but is still concerned about risk of hypoglycemia associated with use of multiple diabetes medications. Patient will think about addition of SGLT2 inhibitor to her current regimen. I encouraged outpatient follow-up with Dr. Lau for continued monitoring of her blood glucose.    Please Tigertext questions to the clinician covering the \"[id number]\" Role. Thank you.  "

## 2024-04-19 NOTE — PROCEDURES
04/19/24    Procedure: Chest tube removal    Chest tubes removed in routine fashion without incident.  Insertion site dressed with Acticoat.  Julia Perry tolerated the procedure well.  Nurse notified.    SIGNATURE: Rosalina Leslie PA-C  DATE: April 19, 2024  TIME: 8:29 AM

## 2024-04-19 NOTE — ARC ADMISSION
ARC  met with patient at bedside. Reviewed ARC program, acute rehab criteria and approval process, insurance authorization process, as well as ARC locations and preferences. Patient's preferred ARC location is [facility] and second choice is [facility].  ARC Rehab folder left with patient and all questions answered. Patient was made aware that ARC Reviewer will keep their  updated regarding referral status.

## 2024-04-20 LAB
ANION GAP SERPL CALCULATED.3IONS-SCNC: 6 MMOL/L (ref 4–13)
BASE EX.OXY STD BLDV CALC-SCNC: 58.2 % (ref 60–80)
BASE EXCESS BLDV CALC-SCNC: 8.9 MMOL/L
BUN SERPL-MCNC: 18 MG/DL (ref 5–25)
CALCIUM SERPL-MCNC: 8.6 MG/DL (ref 8.4–10.2)
CHLORIDE SERPL-SCNC: 89 MMOL/L (ref 96–108)
CO2 SERPL-SCNC: 34 MMOL/L (ref 21–32)
CREAT SERPL-MCNC: 0.8 MG/DL (ref 0.6–1.3)
ERYTHROCYTE [DISTWIDTH] IN BLOOD BY AUTOMATED COUNT: 16.6 % (ref 11.6–15.1)
GFR SERPL CREATININE-BSD FRML MDRD: 76 ML/MIN/1.73SQ M
GLUCOSE SERPL-MCNC: 110 MG/DL (ref 65–140)
GLUCOSE SERPL-MCNC: 147 MG/DL (ref 65–140)
GLUCOSE SERPL-MCNC: 166 MG/DL (ref 65–140)
GLUCOSE SERPL-MCNC: 179 MG/DL (ref 65–140)
GLUCOSE SERPL-MCNC: 96 MG/DL (ref 65–140)
HCO3 BLDV-SCNC: 34 MMOL/L (ref 24–30)
HCT VFR BLD AUTO: 28.2 % (ref 34.8–46.1)
HGB BLD-MCNC: 9 G/DL (ref 11.5–15.4)
MCH RBC QN AUTO: 29.5 PG (ref 26.8–34.3)
MCHC RBC AUTO-ENTMCNC: 31.9 G/DL (ref 31.4–37.4)
MCV RBC AUTO: 93 FL (ref 82–98)
O2 CT BLDV-SCNC: 7.9 ML/DL
OSMOLALITY UR/SERPL-RTO: 284 MMOL/KG (ref 282–298)
OSMOLALITY UR: 284 MMOL/KG
PCO2 BLDV: 49.7 MM HG (ref 42–50)
PH BLDV: 7.45 [PH] (ref 7.3–7.4)
PLATELET # BLD AUTO: 452 THOUSANDS/UL (ref 149–390)
PMV BLD AUTO: 9.5 FL (ref 8.9–12.7)
PO2 BLDV: 31.6 MM HG (ref 35–45)
POTASSIUM SERPL-SCNC: 4 MMOL/L (ref 3.5–5.3)
RBC # BLD AUTO: 3.05 MILLION/UL (ref 3.81–5.12)
SODIUM 24H UR-SCNC: 14 MOL/L
SODIUM SERPL-SCNC: 129 MMOL/L (ref 135–147)
SODIUM SERPL-SCNC: 130 MMOL/L (ref 135–147)
WBC # BLD AUTO: 10.3 THOUSAND/UL (ref 4.31–10.16)

## 2024-04-20 PROCEDURE — 84300 ASSAY OF URINE SODIUM: CPT | Performed by: INTERNAL MEDICINE

## 2024-04-20 PROCEDURE — 99233 SBSQ HOSP IP/OBS HIGH 50: CPT | Performed by: INTERNAL MEDICINE

## 2024-04-20 PROCEDURE — 99024 POSTOP FOLLOW-UP VISIT: CPT | Performed by: THORACIC SURGERY (CARDIOTHORACIC VASCULAR SURGERY)

## 2024-04-20 PROCEDURE — 83930 ASSAY OF BLOOD OSMOLALITY: CPT | Performed by: INTERNAL MEDICINE

## 2024-04-20 PROCEDURE — 85027 COMPLETE CBC AUTOMATED: CPT | Performed by: PHYSICIAN ASSISTANT

## 2024-04-20 PROCEDURE — 84295 ASSAY OF SERUM SODIUM: CPT | Performed by: INTERNAL MEDICINE

## 2024-04-20 PROCEDURE — 80048 BASIC METABOLIC PNL TOTAL CA: CPT | Performed by: PHYSICIAN ASSISTANT

## 2024-04-20 PROCEDURE — 99232 SBSQ HOSP IP/OBS MODERATE 35: CPT | Performed by: NURSE PRACTITIONER

## 2024-04-20 PROCEDURE — 83935 ASSAY OF URINE OSMOLALITY: CPT | Performed by: INTERNAL MEDICINE

## 2024-04-20 PROCEDURE — 82948 REAGENT STRIP/BLOOD GLUCOSE: CPT

## 2024-04-20 PROCEDURE — 82805 BLOOD GASES W/O2 SATURATION: CPT | Performed by: PHYSICIAN ASSISTANT

## 2024-04-20 RX ORDER — BISACODYL 10 MG
10 SUPPOSITORY, RECTAL RECTAL DAILY PRN
Status: DISCONTINUED | OUTPATIENT
Start: 2024-04-20 | End: 2024-04-23 | Stop reason: HOSPADM

## 2024-04-20 RX ORDER — MIDODRINE HYDROCHLORIDE 5 MG/1
10 TABLET ORAL
Status: DISCONTINUED | OUTPATIENT
Start: 2024-04-20 | End: 2024-04-23 | Stop reason: HOSPADM

## 2024-04-20 RX ORDER — TORSEMIDE 20 MG/1
20 TABLET ORAL
Status: DISCONTINUED | OUTPATIENT
Start: 2024-04-20 | End: 2024-04-21

## 2024-04-20 RX ADMIN — AMIODARONE HYDROCHLORIDE 200 MG: 200 TABLET ORAL at 21:37

## 2024-04-20 RX ADMIN — TORSEMIDE 20 MG: 20 TABLET ORAL at 18:17

## 2024-04-20 RX ADMIN — AMIODARONE HYDROCHLORIDE 200 MG: 200 TABLET ORAL at 14:32

## 2024-04-20 RX ADMIN — INSULIN LISPRO 1 UNITS: 100 INJECTION, SOLUTION INTRAVENOUS; SUBCUTANEOUS at 21:49

## 2024-04-20 RX ADMIN — ASPIRIN 81 MG CHEWABLE TABLET 81 MG: 81 TABLET CHEWABLE at 10:00

## 2024-04-20 RX ADMIN — ATORVASTATIN CALCIUM 80 MG: 80 TABLET, FILM COATED ORAL at 18:16

## 2024-04-20 RX ADMIN — MAGNESIUM OXIDE TAB 400 MG (241.3 MG ELEMENTAL MG) 400 MG: 400 (241.3 MG) TAB at 10:00

## 2024-04-20 RX ADMIN — DOCUSATE SODIUM 100 MG: 100 CAPSULE, LIQUID FILLED ORAL at 18:17

## 2024-04-20 RX ADMIN — MAGNESIUM OXIDE TAB 400 MG (241.3 MG ELEMENTAL MG) 400 MG: 400 (241.3 MG) TAB at 21:37

## 2024-04-20 RX ADMIN — TICAGRELOR 90 MG: 90 TABLET ORAL at 21:37

## 2024-04-20 RX ADMIN — PANTOPRAZOLE SODIUM 40 MG: 40 TABLET, DELAYED RELEASE ORAL at 06:16

## 2024-04-20 RX ADMIN — BISACODYL 10 MG: 10 SUPPOSITORY RECTAL at 14:32

## 2024-04-20 RX ADMIN — AMIODARONE HYDROCHLORIDE 200 MG: 200 TABLET ORAL at 06:16

## 2024-04-20 RX ADMIN — MIDODRINE HYDROCHLORIDE 5 MG: 5 TABLET ORAL at 06:16

## 2024-04-20 RX ADMIN — TORSEMIDE 20 MG: 20 TABLET ORAL at 10:00

## 2024-04-20 RX ADMIN — MIDODRINE HYDROCHLORIDE 10 MG: 5 TABLET ORAL at 11:43

## 2024-04-20 RX ADMIN — FONDAPARINUX SODIUM 2.5 MG: 2.5 INJECTION, SOLUTION SUBCUTANEOUS at 10:00

## 2024-04-20 RX ADMIN — Medication 1000 UNITS: at 10:00

## 2024-04-20 RX ADMIN — DOCUSATE SODIUM 100 MG: 100 CAPSULE, LIQUID FILLED ORAL at 10:00

## 2024-04-20 RX ADMIN — POTASSIUM CHLORIDE 20 MEQ: 1500 TABLET, EXTENDED RELEASE ORAL at 18:17

## 2024-04-20 RX ADMIN — METOPROLOL SUCCINATE 12.5 MG: 25 TABLET, FILM COATED, EXTENDED RELEASE ORAL at 10:00

## 2024-04-20 RX ADMIN — POTASSIUM CHLORIDE 20 MEQ: 1500 TABLET, EXTENDED RELEASE ORAL at 10:00

## 2024-04-20 RX ADMIN — MIDODRINE HYDROCHLORIDE 10 MG: 5 TABLET ORAL at 18:17

## 2024-04-20 RX ADMIN — TICAGRELOR 90 MG: 90 TABLET ORAL at 10:00

## 2024-04-20 RX ADMIN — INSULIN LISPRO 1 UNITS: 100 INJECTION, SOLUTION INTRAVENOUS; SUBCUTANEOUS at 16:30

## 2024-04-20 RX ADMIN — Medication 7.5 MG: at 11:43

## 2024-04-20 NOTE — CONSULTS
Consultation - Nephrology   Julia Perry 68 y.o. female MRN: [id number]  Unit/Bed#: [facility] 401-01 Encounter: [id number]    ASSESSMENT AND PLAN:  Patient is 68-year-old female with significant medical issues of diabetes, CAD, hyperlipidemia, underwent CABG with postop cardiogenic shock requiring inotropes, vasopressors, status post Impella which was eventually removed, status postcardiac cath on 4/10/2024.  We are consulted for hyponatremia management.    Hyponatremia  -Likely hypervolemic  -Clinically patient seems mild hypervolemic, was being aggressively diuresed with IV now being transition to p.o. torsemide  -Sodium level 130 on 4/16/2024 and seems to have plateaued around 1 29-1 30 since then.  Serum sodium 129 today without further improvement.  -Currently on fluid restriction 1.8 L/day.  -Will give tolvaptan 7.5 mg once today.  Discussed risk versus benefit with patient and her spouse at bedside.  They verbalized understanding of my discussion with them.  Patient is agreeable to proceed with tolvaptan.  They understands risk of liver toxicity with tolvaptan.  -Repeat serum sodium later today  -Check serum osmolality, urine sodium, urine osmolality    Severe systolic CHF  -Echo shows EF 20%, moderate MR, mild to moderate TR  -Initially IV diuresed, now remains on torsemide 20 mg twice daily along with potassium supplements.    New onset A-fib, status post cardioversion, cardiology follow-up.    CAD, status post CABG on 4/10/2024, hospitalization course complicated by cardiogenic shock requiring inotropes, vasopressors, status post Impella which eventually removed, status post cardiac cath on 4/10/2024.    Hypotension, currently remains on midodrine 10 mg 3 times daily.    Discussed above plan in detail with primary team regarding tolvaptan, repeating sodium level and initial evaluation of hyponatremia and they agree with above recommendations.      HISTORY OF PRESENT ILLNESS:  Requesting Physician: Josh Marin  MD Jasmin  Reason for Consult: Hyponatremia    Julia Perry is a 68 y.o. year old female who was admitted to [facility] after presenting with CAD, status post CABG. A renal consultation is requested today for assistance in the management of hyponatremia.  Old medical records including prior lab values were reviewed from [facility] records.  Patient had CABG and hospitalization course was complicated by postop cardiogenic shock, requiring vasopressors, required Impella, also had cardiac cath.  Overall volume overload requiring IV diuresis.  Now being transition to p.o. torsemide.    Patient denies any obvious history of malignancy.  Denies any nausea or vomiting.  Denies any headache.  Does have leg edema.  Currently remains on room air at the time of my encounter.    PAST MEDICAL HISTORY:  Past Medical History:   Diagnosis Date    CHF (congestive heart failure) (HCC) 4/8/2024    Colitis     Diabetes mellitus (HCC)     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HCC)     PONV (postoperative nausea and vomiting)        PAST SURGICAL HISTORY:  Past Surgical History:   Procedure Laterality Date    CARDIAC CATHETERIZATION Left 01/17/2024    Procedure: Cardiac Left Heart Cath;  Surgeon: Juan A Sousa MD;  Location: [facility];  Service: Cardiology    CARDIAC CATHETERIZATION  01/17/2024    Procedure: Cardiac catheterization;  Surgeon: Juan A Sousa MD;  [facility]: [facility]ARD[facility];  Service: Cardiology    CARDIAC CATHETERIZATION Left 4/10/2024    Procedure: Cardiac Left Heart Cath;  Surgeon: Viktor Bee MD;  [facility]ARD[facility];  Service: Cardiology    CARDIAC CATHETERIZATION N/A 4/10/2024    Procedure: Cardiac Coronary Angiogram;  Surgeon: Viktor Bee MD;  [facility]ARD[facility];  Service: Cardiology    CARDIAC CATHETERIZATION N/A 4/10/2024    Procedure: Cardiac Impella Insertion;  Surgeon: Viktor Bee MD;  [facility]ARD[facility];  Service: Cardiology    CARDIAC CATHETERIZATION N/A 4/10/2024     Procedure: Cardiac pci;  Surgeon: Viktor Bee MD;  [facility];  Service: Cardiology    CARDIAC CATHETERIZATION N/A 4/12/2024    Procedure: Cardiac Impella Removal;  Surgeon: Viktor Bee MD;  [facility]ARD[facility];  Service: Cardiology    COLONOSCOPY  12/06/2023    HYSTERECTOMY      ~1990    IR PICC REPOSITION  4/18/2024    NJ CORONARY ARTERY BYP W/VEIN & ARTERY GRAFT 3 VEIN N/A 4/9/2024    Procedure: CORONARY ARTERY BYPASS GRAFT (CABG) x3 VESSELS, LIMA - LAD, LEFT LEG EVH/SVG - PDA  AND OM, W/ELOISA;  Surgeon: Josh Castellanos MD;  Location: [facility];  Service: Cardiac Surgery    NJ TEAEC W/PATCH GRF CAROTID VERTB SUBCLAV NECK INC Left 03/04/2024    Procedure: LEFT ENDARTERECTOMY ARTERY CAROTID;  Surgeon: Janeth De Oliveira DO;  Location: [facility];  Service: Vascular       ALLERGIES:  Allergies   Allergen Reactions    Valdecoxib Other (See Comments)     Patient is unaware of this allergy       SOCIAL HISTORY:  Social History     Substance and Sexual Activity   Alcohol Use Never     Social History     Substance and Sexual Activity   Drug Use Never     Social History     Tobacco Use   Smoking Status Former    Types: Cigarettes    Passive exposure: Past   Smokeless Tobacco Never   Tobacco Comments    Quit 1970's       FAMILY HISTORY:  No family history on file.    MEDICATIONS:    Current Facility-Administered Medications:     acetaminophen (TYLENOL) tablet 975 mg, 975 mg, Oral, Q8H PRN, Timoteo Rasmussen PA-C    amiodarone tablet 200 mg, 200 mg, Oral, Q8H LINDA, Timoteo Rasmussen PA-C, 200 mg at 04/20/24 0616    aspirin chewable tablet 81 mg, 81 mg, Oral, Daily, Timoteo Rasmussen PA-C, 81 mg at 04/19/24 0939    atorvastatin (LIPITOR) tablet 80 mg, 80 mg, Oral, Daily With Dinner, Timoteo Rasmussen PA-C, 80 mg at 04/19/24 1743    chlorhexidine (PERIDEX) 0.12 % oral rinse 15 mL, 15 mL, Mouth/Throat, BID, Timoteo Rasmussen PA-C, 15 mL at 04/18/24 0825    Cholecalciferol (VITAMIN D3) tablet  1,000 Units, 1,000 Units, Oral, Daily, Timoteo Rasmussen PA-C, 1,000 Units at 04/19/24 0924    docusate sodium (COLACE) capsule 100 mg, 100 mg, Oral, BID, Timoteo Rasmussen PA-C, 100 mg at 04/19/24 1743    fondaparinux (ARIXTRA) subcutaneous injection 2.5 mg, 2.5 mg, Subcutaneous, Q24H, Timoteo Rasmussen PA-C, 2.5 mg at 04/19/24 0924    insulin lispro (HumALOG/ADMELOG) 100 units/mL subcutaneous injection 1-5 Units, 1-5 Units, Subcutaneous, TID AC, 1 Units at 04/19/24 1747 **AND** Fingerstick Glucose (POCT), , , TID AC, Timoteo Rasmussen PA-C    insulin lispro (HumALOG/ADMELOG) 100 units/mL subcutaneous injection 1-5 Units, 1-5 Units, Subcutaneous, HS, Timoteo Rasmussen PA-C, 1 Units at 04/19/24 2132    magnesium Oxide (MAG-OX) tablet 400 mg, 400 mg, Oral, BID, Jeri Rubin PA-C, 400 mg at 04/19/24 2132    metoprolol succinate (TOPROL-XL) 24 hr tablet 12.5 mg, 12.5 mg, Oral, Daily, Isrrael Rojas PA-C, 12.5 mg at 04/19/24 0924    midodrine (PROAMATINE) tablet 10 mg, 10 mg, Oral, TID AC, Isrrael Rojas PA-C    ondansetron (ZOFRAN) injection 4 mg, 4 mg, Intravenous, Q6H PRN, Timoteo Rasmussen PA-C, 4 mg at 04/16/24 0801    oxyCODONE (ROXICODONE) split tablet 2.5 mg, 2.5 mg, Oral, Q4H PRN **OR** oxyCODONE (ROXICODONE) IR tablet 5 mg, 5 mg, Oral, Q4H PRN, Timoteo Rasmussen PA-C    pantoprazole (PROTONIX) EC tablet 40 mg, 40 mg, Oral, Early Morning, Timoteo Rasmussen PA-C, 40 mg at 04/20/24 0616    polyethylene glycol (MIRALAX) packet 17 g, 17 g, Oral, Daily, Timoteo Rasmussen PA-C, 17 g at 04/14/24 0847    potassium chloride (Klor-Con M20) CR tablet 20 mEq, 20 mEq, Oral, BID, Timoteo Rasmussen PA-C, 20 mEq at 04/19/24 1743    ticagrelor (BRILINTA) tablet 90 mg, 90 mg, Oral, Q12H LINDA, Timoteo Rasmussen PA-C, 90 mg at 04/19/24 2132    torsemide (DEMADEX) tablet 20 mg, 20 mg, Oral, BID (diuretic), Isrrael Rojas PA-C    trimethobenzamide (TIGAN) IM injection 200 mg, 200 mg, Intramuscular, Q6H PRN, Timoteo Rasmussen PA-C, 200 mg at  04/11/24 2124    REVIEW OF SYSTEMS:  More than 10 point review of systems were obtained and discussed in length with the patient. Review of systems were negative / unremarkable except mentioned in the HPI section.     PHYSICAL EXAM:  Current Weight: Weight - Scale: 74 kg (163 lb 3.2 oz)  First Weight: Weight - Scale: 72.7 kg (160 lb 2.6 oz)  Vitals:    04/20/24 0716   BP: 110/58   Pulse: 87   Resp: 16   Temp: 97.8 °F (36.6 °C)   SpO2: 96%       Intake/Output Summary (Last 24 hours) at 4/20/2024 0940  Last data filed at 4/20/2024 0740  Gross per 24 hour   Intake 720 ml   Output 1950 ml   Net -1230 ml     Wt Readings from Last 3 Encounters:   04/20/24 74 kg (163 lb 3.2 oz)   04/02/24 74.6 kg (164 lb 6.4 oz)   03/19/24 74.4 kg (164 lb)     Temp Readings from Last 3 Encounters:   04/20/24 97.8 °F (36.6 °C)   04/02/24 (!) 96.8 °F (36 °C) (Tympanic)   03/19/24 (!) 96.9 °F (36.1 °C) (Tympanic)     BP Readings from Last 3 Encounters:   04/20/24 110/58   04/02/24 108/57   03/19/24 94/60     Pulse Readings from Last 3 Encounters:   04/20/24 87   04/02/24 92   03/19/24 88        Physical Examination:  Eyes: Mild conjunctival pallor present  ENT: External examination of ear and nose unremarkable  Neck: No obvious lymphadenopathy appreciated  Respiratory: Bilateral air entry present  CVS: 1-2+ edema in legs  GI: Soft, nondistended  CNS: Active, alert, follows commands  Skin: No new rash  Musculoskeletal: No obvious new gross deformity noted    Invasive Devices:      Lab Results:   Results from last 7 days   Lab Units 04/20/24  0457 04/19/24  0502 04/18/24  0427 04/17/24  0402 04/16/24  1623 04/16/24  0502 04/16/24  0356 04/15/24  1642 04/15/24  0428 04/14/24  1716 04/14/24  0403   WBC Thousand/uL 10.30* 11.82* 10.25* 11.10*  --  12.70*  --   --  13.24*  --  10.81*   HEMOGLOBIN g/dL 9.0* 9.5* 9.0* 8.7*  --  9.8*  --   --  9.2*  --  9.6*   HEMATOCRIT % 28.2* 29.7* 28.2* 27.6*  --  31.3*  --   --  28.7*  --  29.8*   PLATELETS  Thousands/uL 452* 471* 396* 287  --  239  --   --  164  --  98*   POTASSIUM mmol/L 4.0 4.1 3.9 3.9 4.0  --  4.2 4.3 3.7   < > 4.0   CHLORIDE mmol/L 89* 88* 90* 91* 94*  --  99 99 100   < > 106   CO2 mmol/L 34* 35* 34* 32 29  --  28 26 29   < > 27   BUN mg/dL 18 17 16 13 14  --  10 10 11   < > 12   CREATININE mg/dL 0.80 0.82 0.77 0.76 0.73  --  0.64 0.62 0.63   < > 0.61   CALCIUM mg/dL 8.6 8.9 8.2* 8.0* 8.6  --  8.0* 8.0* 8.1*   < > 7.9*   MAGNESIUM mg/dL  --   --  1.8* 1.5*  --  1.7*  --   --  1.7*  --  1.6*   PHOSPHORUS mg/dL  --   --  3.2 3.3  --  3.4  --   --  2.8  --  2.2*    < > = values in this interval not displayed.       Other Studies:   IR PICC reposition   Final Result by German Christensen DO (04/19 1528)   PICC centrally positioned.            Workstation performed: [id number]         XR chest portable ICU   Final Result by Henrique Kenyon MD (04/12 1622)      Cardiomegaly with mild pulmonary edema and small effusions. No pneumothorax identified.      Postoperative chest with interval extubation and removal of the NG tube.      Workstation performed: [id number]         XR chest portable   Final Result by Ritesh Escobedo MD (04/11 1705)      No acute cardiopulmonary disease.            Workstation performed: [id number]         VAS limited iliac-femoral evaluation for Impella Device   Final Result by Fabian Chambers MD (04/10 1644)      XR chest portable   Final Result by Maverick George MD (04/10 1014)   The Letts-Ashleigh line appears to have been retracted somewhat with the tip now projecting in the region of the main pulmonary artery. No other significant changes from prior.            Workstation performed: [id number]         XR chest portable ICU   Final Result by Mehdi Duke MD (04/09 2057)      Status post open heart surgery, without evidence of pneumothorax or focal infiltrate.      Endotracheal tube tip 5.7 cm above the errol            Workstation performed: [id number]        "  Chest x-ray images personally reviewed which shows mild pulmonary congestion, cardiomegaly.    Portions of the record may have been created with voice recognition software. Occasional wrong word or \"sound a like\" substitutions may have occurred due to the inherent limitations of voice recognition software. Read the chart carefully and recognize, using context, where substitutions have occurred.    "

## 2024-04-20 NOTE — PROGRESS NOTES
Progress Note - Cardiothoracic Surgery   Julia Perry 68 y.o. female MRN: [id number]  Unit/Bed#: [facility] 401-01 Encounter: [id number]    Severe MVCAD/ischemic CMP S/P CABG ; POD # 11  S/p DESx2 to OM and Impella POD # 9 (impella removed 4/12)    24 Hour Events: Echo yest off milrinone showed severe LV dysnfx, EF 20% and mild-mod RV dysfnx. Remains off milrinone. VBG yest afternoon improved to 50 and this AM 58. Some low BPs overnight, briefly on franklyn, now off. BP now 90s-100s. Net negative 930. Na down to 129. Cr stable 0.8, Hgb 9.0. SR 80s-90s. On RA.       Medications:   Scheduled Meds:  Current Facility-Administered Medications   Medication Dose Route Frequency Provider Last Rate    acetaminophen  975 mg Oral Q8H PRN Timoteo Rasmussen PA-C      amiodarone  200 mg Oral Q8H LINDA Timoteo Rasmussen PA-C      aspirin  81 mg Oral Daily Timoteo Rasmussen PA-C      atorvastatin  80 mg Oral Daily With Dinner Timoteo Rasmussen PA-C      chlorhexidine  15 mL Mouth/Throat BID Timoteo Rasmussen PA-C      Cholecalciferol  1,000 Units Oral Daily Timoteo Rasmussen PA-C      docusate sodium  100 mg Oral BID Timoteo Rasmussen PA-C      fondaparinux  2.5 mg Subcutaneous Q24H Timoteo Rasmussen PA-C      furosemide  40 mg Intravenous BID (diuretic) Isrrael Rojas PA-C      insulin lispro  1-5 Units Subcutaneous TID AC Timoteo Rasmussen PA-C      insulin lispro  1-5 Units Subcutaneous HS Timoteo Rasmussen PA-C      magnesium Oxide  400 mg Oral BID Jeri Rubin PA-C      metoprolol succinate  12.5 mg Oral Daily Isrrael Rojas PA-C      midodrine  5 mg Oral TID AC Timoteo Rasmussen PA-C      ondansetron  4 mg Intravenous Q6H PRN Timoteo Rasmussen PA-C      oxyCODONE  2.5 mg Oral Q4H PRN Timoteo Rasmussen PA-C      Or    oxyCODONE  5 mg Oral Q4H PRN Timoteo Rasmussen PA-C      pantoprazole  40 mg Oral Early Morning Timoteo Rasmussen PA-C      phenylephine   mcg/min Intravenous Titrated Timoteo Rasmussen PA-C Stopped (04/20/24 0330)    polyethylene glycol  17 g  Oral Daily Timoteo Rasmussen PA-C      potassium chloride  20 mEq Oral BID Timoteo Rasmussen PA-C      ticagrelor  90 mg Oral Q12H LINDA Timtoeo Rasmussen PA-C      trimethobenzamide  200 mg Intramuscular Q6H PRN Timoteo Rasmussen PA-C       Continuous Infusions:phenylephine,  mcg/min, Last Rate: Stopped (04/20/24 0330)      PRN Meds:.  acetaminophen    ondansetron    oxyCODONE **OR** oxyCODONE    trimethobenzamide    Vitals:   Vitals:    04/20/24 0233 04/20/24 0239 04/20/24 0332 04/20/24 0545   BP: 106/61 94/56 106/66    Pulse: 88 88 91    Resp: 15      Temp: 97.6 °F (36.4 °C)      TempSrc:       SpO2: 96% 95% 94%    Weight:    74 kg (163 lb 3.2 oz)   Height:           Telemetry: NSR; Heart Rate: 80s-90s    Respiratory:   SpO2: SpO2: 94 %; Room Air    Intake/Output:     Intake/Output Summary (Last 24 hours) at 4/20/2024 0631  Last data filed at 4/20/2024 0330  Gross per 24 hour   Intake 720 ml   Output 1650 ml   Net -930 ml        Chest tubes have been removed    Weights:   Weight (last 2 days)       Date/Time Weight    04/20/24 0545 74 (163.2)    04/19/24 1622 73.5 (162)    04/19/24 0456 73.6 (162.26)    04/18/24 0544 73.8 (162.7)              Results:   Results from last 7 days   Lab Units 04/20/24 0457 04/19/24  0502 04/18/24  0427   WBC Thousand/uL 10.30* 11.82* 10.25*   HEMOGLOBIN g/dL 9.0* 9.5* 9.0*   HEMATOCRIT % 28.2* 29.7* 28.2*   PLATELETS Thousands/uL 452* 471* 396*     Results from last 7 days   Lab Units 04/20/24  0457 04/19/24  0502 04/18/24  0427   SODIUM mmol/L 129* 130* 131*   POTASSIUM mmol/L 4.0 4.1 3.9   CHLORIDE mmol/L 89* 88* 90*   CO2 mmol/L 34* 35* 34*   BUN mg/dL 18 17 16   CREATININE mg/dL 0.80 0.82 0.77   CALCIUM mg/dL 8.6 8.9 8.2*     Results from last 7 days   Lab Units 04/16/24  0502   INR  1.23*       Point of care glucose: 126-189    Studies:  Echo 4/19:    Left Ventricle: Left ventricular cavity size is dilated. The left ventricular ejection fraction is 20%. Systolic function is  severely reduced. There is severe global hypokinesis with regional variation.    Right Ventricle: Systolic function is mildly to moderately reduced.    Mitral Valve: There is moderate regurgitation.    Tricuspid Valve: There is mild to moderate regurgitation.    Echo 4/12:  LVEF 15-20%, severe LV dysfnx, severe global HK  Impella cannula present  At least moderate RV dysnfx    pCXR 4/12:  Cardiomegaly with mild pulmonary edema and small effusions. No pneumothorax identified.  Postoperative chest with interval extubation and removal of the NG tube.    I have personally reviewed pertinent reports.   and I have personally reviewed pertinent films in PACS    Invasive Lines/Tubes:  Invasive Devices       Peripherally Inserted Central Catheter Line  Duration             PICC Line 04/18/24 1 day                    Physical Exam:    General: No acute distress  HEENT/NECK:  Normocephalic. Atraumatic.    Cardiac: Regular rate and rhythm and No murmurs/rubs/gallops  Pulmonary:  Breath sounds clear bilaterally and Breath sounds diminished in the bases bilaterally   Abdomen:  Non-tender, Non-distended, and Normal bowel sounds  Incisions: Sternum is stable.  Incision is clean, dry, and intact.  and Saphenectomy incison is clean, dry, and intact.   Extremities: Extremities warm/dry and 1+ edema B/L  Neuro: Alert and oriented X 3 and No focal deficits  Skin: Warm/Dry, without rashes or lesions.        Assessment:  Principal Problem:    Coronary artery disease involving native coronary artery  Active Problems:    DMII (diabetes mellitus, type 2) ([facility])    Hypomagnesemia    Ulcerative pancolitis without complication (HCC)    Ischemic cardiomyopathy    Mixed hyperlipidemia    Stenosis of left carotid artery    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[facility])    Hyperprolactinemia ([facility])    S/P carotid endarterectomy    Cardiogenic postoperative shock ([facility])    PONV (postoperative nausea and vomiting)    S/P CABG x 3    New onset atrial fibrillation ([facility])     ABLA (acute blood loss anemia)    LBBB (left bundle branch block)    Severe left ventricular systolic dysfunction       Severe MVCAD/ischemic CMP S/P CABG ; POD # 11  S/p DESx2 to OM and Impella POD #9 (impella removed 4/12)    Plan:    Cardiac:     Elective surgical admission  Chronic systolic & diastolic CHF  Ischemic CMP with EF 15-20%  Severe LV dysnfx  Moderate RV dysnx  Chronic LBBB      Post op low cardiac output  - milrinone off 4/18 at midnight  - VBG 4/19 in PM 50, 4/20 AM 58    Post op hypotension  - on midodrine, increase to 10mg TID    Echo 4/19 off milrinone  LVEF 20%, and mild-mod RV dysfnx    NSR with LBBB  Post op afib s/p rapid afib/unstable s/p DCCV 4/14, s/p IV amio load, no further recurrence    Cont toprol XL12.5mg daily (low EF)    Hold ACE inhibitor/ARB secondary to hypotension    Continue Amiodarone, 200 mg PO TID    Hold further GDMT for HF, restart as able as BP allows    Heart failure team now following    Continue ASA/Brilinta (post op PCI/RAFFY to OM) and Statin therapy    Epicardial pacing wires have been removed    Maintain PICC line for now    Continue Arixtra for DVT prophylaxis    Pulmonary:     Good Room air oxygen saturation; Continue incentive spirometry/Coughing/Deep breathing exercises    Post op VDRF resolved    Chest tubes have been removed    Renal:     Post op Creatinine stable; Follow up labs prn     Intake/Output net: -2240 mL/24 hours    Diuretic Regimen:  Change to torsemide 20mg BID  Continue Potassium Chloride 20 mEq PO BID    Stiles removed 4/18, voiding    Hyponatremia, trending down  Na today 129 (130, 131)   - consult nephrology   - ? samsca      Neuro:    Neurologically intact; No active issues     Incisional pain well controlled  - cont current pain regimen    GI:    Controlled carbohydrate diet level two/Cardiac diet, with 1800 mL fluid restriction    Tolerating diet without complaint  + Flatus  + BM postoperatively    Continue stool softeners and prn  suppository    Continue GI prophylaxis    Endo:     DM2 on oral meds, Pre-Op Hgb A1C: 6.4    Patient has been transitioned from continuous insulin infusion to intermittent subcutaneous dosing  Serum glucose well controlled on insulin sliding scale coverage  Endocrinology following daily   Will consider jardiance vs farxiga prior to d/c    7    Hematology:     Post-operative acute blood loss anemia; Hemoglobin 10.2; trend    8.   Disposition:      Consult nephrology for hyponatremia    Following daily PT/OT recommendations regarding home vs. rehab when medically cleared for discharge    LifeVest will likely be needed and recommended  - unclear if patient and  would be amenable  - willing to discuss with device rep    [facility]    Not yet medically cleared for discharge, pending milrinone wean and transition off IV diuresis      VTE Pharmacologic Prophylaxis: Fondaparinux (Arixtra)  VTE Mechanical Prophylaxis: sequential compression device    Collaborative rounds completed with supervising physician  Plan of care discussed with bedside nurse    SIGNATURE: Isrrael Rojas PA-C  DATE: April 20, 2024  TIME: 6:31 AM

## 2024-04-20 NOTE — CASE MANAGEMENT
Case Management Discharge Planning Note    Patient name Julia Perry  [facility] MRN [id number]  : 1955 Date 2024       Current Admission Date: 2024  Current Admission Diagnosis:Coronary artery disease involving native coronary artery   Patient Active Problem List    Diagnosis Date Noted    LBBB (left bundle branch block) 2024    Severe left ventricular systolic dysfunction 2024    New onset atrial fibrillation (HCC) 2024    ABLA (acute blood loss anemia) 2024    S/P CABG x 3 2024    Cardiogenic postoperative shock (HCC) 2024    PONV (postoperative nausea and vomiting) 2024    S/P carotid endarterectomy 2024    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HCC) 02/15/2024    Hyperprolactinemia (HCC) 02/15/2024    Stenosis of left carotid artery 2024    Coronary artery disease involving native coronary artery 2024    Ischemic cardiomyopathy 2024    Mixed hyperlipidemia 2024    Ulcerative pancolitis without complication (HCC) 2024    Polyp of ascending colon 2023    Acquired right foot drop 2023    Hypomagnesemia 2023    Hair loss 2023    Colitis 2023    History of biliary dyskinesia 2023    DMII (diabetes mellitus, type 2) (HCC) 2023    Gallbladder polyp 2022    Leiomyoma of uterus 2013      LOS (days): 11  Geometric Mean LOS (GMLOS) (days): 30.3  Days to GMLOS:19     OBJECTIVE:  Risk of Unplanned Readmission Score: 20.27         Current admission status: Inpatient   Preferred Pharmacy:   [id number] - [place], [place] - [address] [address]  Phone: [phone] Fax: [phone]    [facility] - [place], [place] - [address]  [address]  [place] [address]  Phone: [phone] Fax: [phone]    Primary Care Provider: Toya Titus MD    Primary Insurance: [employer]  REP  Secondary Insurance:     DISCHARGE DETAILS:                                                                                                 Additional Comments: SL ARC possible Monday. New PICC line today. Pt and  still undecided about Life Vest. EF 20%. CM following.

## 2024-04-21 LAB
ANION GAP SERPL CALCULATED.3IONS-SCNC: 9 MMOL/L (ref 4–13)
BUN SERPL-MCNC: 20 MG/DL (ref 5–25)
CALCIUM SERPL-MCNC: 9.5 MG/DL (ref 8.4–10.2)
CHLORIDE SERPL-SCNC: 94 MMOL/L (ref 96–108)
CO2 SERPL-SCNC: 36 MMOL/L (ref 21–32)
CREAT SERPL-MCNC: 1.01 MG/DL (ref 0.6–1.3)
ERYTHROCYTE [DISTWIDTH] IN BLOOD BY AUTOMATED COUNT: 16.8 % (ref 11.6–15.1)
GFR SERPL CREATININE-BSD FRML MDRD: 57 ML/MIN/1.73SQ M
GLUCOSE SERPL-MCNC: 116 MG/DL (ref 65–140)
GLUCOSE SERPL-MCNC: 134 MG/DL (ref 65–140)
GLUCOSE SERPL-MCNC: 135 MG/DL (ref 65–140)
GLUCOSE SERPL-MCNC: 143 MG/DL (ref 65–140)
GLUCOSE SERPL-MCNC: 179 MG/DL (ref 65–140)
HCT VFR BLD AUTO: 31 % (ref 34.8–46.1)
HGB BLD-MCNC: 9.7 G/DL (ref 11.5–15.4)
MCH RBC QN AUTO: 29.6 PG (ref 26.8–34.3)
MCHC RBC AUTO-ENTMCNC: 31.3 G/DL (ref 31.4–37.4)
MCV RBC AUTO: 95 FL (ref 82–98)
PLATELET # BLD AUTO: 571 THOUSANDS/UL (ref 149–390)
PMV BLD AUTO: 9.6 FL (ref 8.9–12.7)
POTASSIUM SERPL-SCNC: 4.1 MMOL/L (ref 3.5–5.3)
RBC # BLD AUTO: 3.28 MILLION/UL (ref 3.81–5.12)
SODIUM SERPL-SCNC: 139 MMOL/L (ref 135–147)
WBC # BLD AUTO: 13.11 THOUSAND/UL (ref 4.31–10.16)

## 2024-04-21 PROCEDURE — 99232 SBSQ HOSP IP/OBS MODERATE 35: CPT | Performed by: NURSE PRACTITIONER

## 2024-04-21 PROCEDURE — 82948 REAGENT STRIP/BLOOD GLUCOSE: CPT

## 2024-04-21 PROCEDURE — 85027 COMPLETE CBC AUTOMATED: CPT | Performed by: PHYSICIAN ASSISTANT

## 2024-04-21 PROCEDURE — 99024 POSTOP FOLLOW-UP VISIT: CPT | Performed by: PHYSICIAN ASSISTANT

## 2024-04-21 PROCEDURE — 99233 SBSQ HOSP IP/OBS HIGH 50: CPT | Performed by: INTERNAL MEDICINE

## 2024-04-21 PROCEDURE — 80048 BASIC METABOLIC PNL TOTAL CA: CPT | Performed by: PHYSICIAN ASSISTANT

## 2024-04-21 RX ORDER — POTASSIUM CHLORIDE 20 MEQ/1
20 TABLET, EXTENDED RELEASE ORAL DAILY
Status: DISCONTINUED | OUTPATIENT
Start: 2024-04-21 | End: 2024-04-23 | Stop reason: HOSPADM

## 2024-04-21 RX ORDER — TORSEMIDE 20 MG/1
20 TABLET ORAL DAILY
Status: DISCONTINUED | OUTPATIENT
Start: 2024-04-21 | End: 2024-04-23 | Stop reason: HOSPADM

## 2024-04-21 RX ADMIN — METOPROLOL SUCCINATE 12.5 MG: 25 TABLET, FILM COATED, EXTENDED RELEASE ORAL at 10:25

## 2024-04-21 RX ADMIN — AMIODARONE HYDROCHLORIDE 200 MG: 200 TABLET ORAL at 21:11

## 2024-04-21 RX ADMIN — MIDODRINE HYDROCHLORIDE 10 MG: 5 TABLET ORAL at 18:23

## 2024-04-21 RX ADMIN — TICAGRELOR 90 MG: 90 TABLET ORAL at 10:27

## 2024-04-21 RX ADMIN — INSULIN LISPRO 1 UNITS: 100 INJECTION, SOLUTION INTRAVENOUS; SUBCUTANEOUS at 21:11

## 2024-04-21 RX ADMIN — MIDODRINE HYDROCHLORIDE 10 MG: 5 TABLET ORAL at 12:11

## 2024-04-21 RX ADMIN — MAGNESIUM OXIDE TAB 400 MG (241.3 MG ELEMENTAL MG) 400 MG: 400 (241.3 MG) TAB at 21:09

## 2024-04-21 RX ADMIN — DOCUSATE SODIUM 100 MG: 100 CAPSULE, LIQUID FILLED ORAL at 18:23

## 2024-04-21 RX ADMIN — POTASSIUM CHLORIDE 20 MEQ: 1500 TABLET, EXTENDED RELEASE ORAL at 10:28

## 2024-04-21 RX ADMIN — TORSEMIDE 20 MG: 20 TABLET ORAL at 10:28

## 2024-04-21 RX ADMIN — MAGNESIUM OXIDE TAB 400 MG (241.3 MG ELEMENTAL MG) 400 MG: 400 (241.3 MG) TAB at 10:28

## 2024-04-21 RX ADMIN — ASPIRIN 81 MG CHEWABLE TABLET 81 MG: 81 TABLET CHEWABLE at 10:25

## 2024-04-21 RX ADMIN — PANTOPRAZOLE SODIUM 40 MG: 40 TABLET, DELAYED RELEASE ORAL at 06:52

## 2024-04-21 RX ADMIN — Medication 1000 UNITS: at 10:28

## 2024-04-21 RX ADMIN — TICAGRELOR 90 MG: 90 TABLET ORAL at 21:11

## 2024-04-21 RX ADMIN — AMIODARONE HYDROCHLORIDE 200 MG: 200 TABLET ORAL at 06:52

## 2024-04-21 RX ADMIN — ATORVASTATIN CALCIUM 80 MG: 80 TABLET, FILM COATED ORAL at 18:23

## 2024-04-21 RX ADMIN — MIDODRINE HYDROCHLORIDE 10 MG: 5 TABLET ORAL at 06:52

## 2024-04-21 RX ADMIN — DOCUSATE SODIUM 100 MG: 100 CAPSULE, LIQUID FILLED ORAL at 10:25

## 2024-04-21 RX ADMIN — AMIODARONE HYDROCHLORIDE 200 MG: 200 TABLET ORAL at 14:44

## 2024-04-21 RX ADMIN — FONDAPARINUX SODIUM 2.5 MG: 2.5 INJECTION, SOLUTION SUBCUTANEOUS at 10:28

## 2024-04-21 NOTE — PROGRESS NOTES
NEPHROLOGY PROGRESS NOTE   Julia Perry 68 y.o. female MRN: [id number]  Unit/Bed#: [facility] 401-01 Encounter: [id number]  Reason for Consult: hyponatremia    ASSESSMENT AND PLAN:  Patient is 68-year-old female with significant medical issues of diabetes, CAD, hyperlipidemia, underwent CABG with postop cardiogenic shock requiring inotropes, vasopressors, status post Impella which was eventually removed, status postcardiac cath on 4/10/2024.  We are consulted for hyponatremia management.     Hyponatremia  -Likely hypervolemic  -Clinically patient seems mild hypervolemic, was being aggressively diuresed with IV earlier and now remains on p.o. torsemide.    -Status post tolvaptan 7.5 mg one-time dose yesterday.    -Sodium level has improved 139 today.   -Now on fluid restriction 2 L/day.  Continue torsemide 20 mg daily.  -Serum osmolality 284, urine sodium 14, urine osmolality 284.     Severe systolic CHF  -Echo shows EF 20%, moderate MR, mild to moderate TR  -Initially IV diuresed, now remains on torsemide, dose reduced 20 mg daily..     New onset A-fib, status post cardioversion, cardiology follow-up.     CAD, status post CABG on 4/10/2024, hospitalization course complicated by cardiogenic shock requiring inotropes, vasopressors, status post Impella which eventually removed, status post cardiac cath on 4/10/2024.     Hypotension, currently remains on midodrine 10 mg 3 times daily.    Discussed above plan in detail with primary team regarding continuing diuretics, monitoring BMP and they agree with above recommendations.      SUBJECTIVE:  Patient seen and examined at bedside.  Denies any nausea or vomiting.  Feels thirsty.    OBJECTIVE:  Current Weight: Weight - Scale: 71.2 kg (157 lb)  Vitals:    04/21/24 0658   BP: 96/58   Pulse: 85   Resp: 16   Temp: 97.8 °F (36.6 °C)   SpO2: 96%       Intake/Output Summary (Last 24 hours) at 4/21/2024 0838  Last data filed at 4/21/2024 0742  Gross per 24 hour   Intake 478 ml   Output  4025 ml   Net -3547 ml     Wt Readings from Last 3 Encounters:   04/21/24 71.2 kg (157 lb)   04/02/24 74.6 kg (164 lb 6.4 oz)   03/19/24 74.4 kg (164 lb)     Temp Readings from Last 3 Encounters:   04/21/24 97.8 °F (36.6 °C)   04/02/24 (!) 96.8 °F (36 °C) (Tympanic)   03/19/24 (!) 96.9 °F (36.1 °C) (Tympanic)     BP Readings from Last 3 Encounters:   04/21/24 96/58   04/02/24 108/57   03/19/24 94/60     Pulse Readings from Last 3 Encounters:   04/21/24 85   04/02/24 92   03/19/24 88        Physical Examination:  Eyes: Mild conjunctival pallor present  Neck: No obvious lymphadenopathy appreciated  Respiratory: Bilateral air entry present  CVS: 1+ edema in legs  GI: Soft, nondistended  CNS: Active, alert, follows commands  Skin: No new rash  Musculoskeletal: No obvious new gross deformity noted    Medications:    Current Facility-Administered Medications:     acetaminophen (TYLENOL) tablet 975 mg, 975 mg, Oral, Q8H PRN, Timoteo Rasmussen PA-C    amiodarone tablet 200 mg, 200 mg, Oral, Q8H LINDA, Timoteo Rasmussen PA-C, 200 mg at 04/21/24 0652    aspirin chewable tablet 81 mg, 81 mg, Oral, Daily, Timoteo Rasmussen PA-C, 81 mg at 04/20/24 1000    atorvastatin (LIPITOR) tablet 80 mg, 80 mg, Oral, Daily With Dinner, Timoteo Rasmussen PA-C, 80 mg at 04/20/24 1816    bisacodyl (DULCOLAX) rectal suppository 10 mg, 10 mg, Rectal, Daily PRN, Brandon Up PA-C, 10 mg at 04/20/24 1432    chlorhexidine (PERIDEX) 0.12 % oral rinse 15 mL, 15 mL, Mouth/Throat, BID, Timoteo Rasmussen PA-C, 15 mL at 04/18/24 0825    Cholecalciferol (VITAMIN D3) tablet 1,000 Units, 1,000 Units, Oral, Daily, Timoteo Rasmussen PA-C, 1,000 Units at 04/20/24 1000    docusate sodium (COLACE) capsule 100 mg, 100 mg, Oral, BID, Timoteo Rasmussen PA-C, 100 mg at 04/20/24 1817    fondaparinux (ARIXTRA) subcutaneous injection 2.5 mg, 2.5 mg, Subcutaneous, Q24H, Timoteo Rasmussen PA-C, 2.5 mg at 04/20/24 1000    insulin lispro (HumALOG/ADMELOG) 100 units/mL subcutaneous  injection 1-5 Units, 1-5 Units, Subcutaneous, TID AC, 1 Units at 04/20/24 1630 **AND** Fingerstick Glucose (POCT), , , TID AC, Timoteo Rasmussen PA-C    insulin lispro (HumALOG/ADMELOG) 100 units/mL subcutaneous injection 1-5 Units, 1-5 Units, Subcutaneous, HS, Timoteo Rasmussen PA-C, 1 Units at 04/20/24 2149    magnesium Oxide (MAG-OX) tablet 400 mg, 400 mg, Oral, BID, Jeri Rubin PA-C, 400 mg at 04/20/24 2137    metoprolol succinate (TOPROL-XL) 24 hr tablet 12.5 mg, 12.5 mg, Oral, Daily, Isrrael Rojas PA-C, 12.5 mg at 04/20/24 1000    midodrine (PROAMATINE) tablet 10 mg, 10 mg, Oral, TID AC, Isrrael Rojas PA-C, 10 mg at 04/21/24 0652    ondansetron (ZOFRAN) injection 4 mg, 4 mg, Intravenous, Q6H PRN, Timoteo Rasmussen PA-C, 4 mg at 04/16/24 0801    oxyCODONE (ROXICODONE) split tablet 2.5 mg, 2.5 mg, Oral, Q4H PRN **OR** oxyCODONE (ROXICODONE) IR tablet 5 mg, 5 mg, Oral, Q4H PRN, Timoteo Rasmussen PA-C    pantoprazole (PROTONIX) EC tablet 40 mg, 40 mg, Oral, Early Morning, Timoteo Rasmussen PA-C, 40 mg at 04/21/24 0652    polyethylene glycol (MIRALAX) packet 17 g, 17 g, Oral, Daily, Timoteo Rasmussen PA-C, 17 g at 04/14/24 0847    potassium chloride (Klor-Con M20) CR tablet 20 mEq, 20 mEq, Oral, Daily, Isrrael Rojas PA-C    ticagrelor (BRILINTA) tablet 90 mg, 90 mg, Oral, Q12H LINDA, Timoteo Rasmussen PA-C, 90 mg at 04/20/24 2137    torsemide (DEMADEX) tablet 20 mg, 20 mg, Oral, Daily, Isrrael Rojas PA-C    trimethobenzamide (TIGAN) IM injection 200 mg, 200 mg, Intramuscular, Q6H PRN, Timoteo Rasmussen PA-C, 200 mg at 04/11/24 2122    Laboratory Results:  Results from last 7 days   Lab Units 04/21/24  0442 04/20/24  1837 04/20/24  0457 04/19/24  0502 04/18/24  0427 04/17/24  0402 04/16/24  1623 04/16/24  0502 04/16/24  0356 04/15/24  1642 04/15/24  0428   WBC Thousand/uL 13.11*  --  10.30* 11.82* 10.25* 11.10*  --  12.70*  --   --  13.24*   HEMOGLOBIN g/dL 9.7*  --  9.0* 9.5* 9.0* 8.7*  --  9.8*  --   --  9.2*    HEMATOCRIT % 31.0*  --  28.2* 29.7* 28.2* 27.6*  --  31.3*  --   --  28.7*   PLATELETS Thousands/uL 571*  --  452* 471* 396* 287  --  239  --   --  164   SODIUM mmol/L 139 130* 129* 130* 131* 129* 130*  --  134*   < > 136   POTASSIUM mmol/L 4.1  --  4.0 4.1 3.9 3.9 4.0  --  4.2   < > 3.7   CHLORIDE mmol/L 94*  --  89* 88* 90* 91* 94*  --  99   < > 100   CO2 mmol/L 36*  --  34* 35* 34* 32 29  --  28   < > 29   BUN mg/dL 20  --  18 17 16 13 14  --  10   < > 11   CREATININE mg/dL 1.01  --  0.80 0.82 0.77 0.76 0.73  --  0.64   < > 0.63   CALCIUM mg/dL 9.5  --  8.6 8.9 8.2* 8.0* 8.6  --  8.0*   < > 8.1*   MAGNESIUM mg/dL  --   --   --   --  1.8* 1.5*  --  1.7*  --   --  1.7*   PHOSPHORUS mg/dL  --   --   --   --  3.2 3.3  --  3.4  --   --  2.8    < > = values in this interval not displayed.       IR PICC reposition   Final Result by German Christensen DO (04/19 1528)   PICC centrally positioned.            Workstation performed: [id number]         XR chest portable ICU   Final Result by Henrique Kenyon MD (04/12 1622)      Cardiomegaly with mild pulmonary edema and small effusions. No pneumothorax identified.      Postoperative chest with interval extubation and removal of the NG tube.      Workstation performed: [id number]         XR chest portable   Final Result by Ritesh Escobedo MD (04/11 1705)      No acute cardiopulmonary disease.            Workstation performed: [id number]         VAS limited iliac-femoral evaluation for Impella Device   Final Result by Fabian Chambers MD (04/10 1644)      XR chest portable   Final Result by Maverick George MD (04/10 1014)   The [place] line appears to have been retracted somewhat with the tip now projecting in the region of the main pulmonary artery. No other significant changes from prior.            Workstation performed: [id number]         XR chest portable ICU   Final Result by Mehdi Duke MD (04/09 2057)      Status post open heart surgery, without  "evidence of pneumothorax or focal infiltrate.      Endotracheal tube tip 5.7 cm above the errol            Workstation performed: [id number]             Portions of the record may have been created with voice recognition software. Occasional wrong word or \"sound a like\" substitutions may have occurred due to the inherent limitations of voice recognition software. Read the chart carefully and recognize, using context, where substitutions have occurred.    "

## 2024-04-21 NOTE — PROGRESS NOTES
Progress Note - Cardiothoracic Surgery   Julia Perry 68 y.o. female MRN: [id number]  Unit/Bed#: [facility] 401-01 Encounter: [id number]    Severe MVCAD/ischemic CMP S/P CABG ; POD # 12  S/p DESx2 to OM and Impella POD # 10 (impella removed 4/12)    24 Hour Events: Remains SR 80s-90s. On RA. Na up to 139 (129) after Samsca given by nephrology. Significant U/O  response to Samsca and PO torsemide 20mg BID, net - 3000cc. Hgb 9.7, Cr 1.01. BP improved on midodrine 10mg TID. C/O dry mouth and thirsty. Ambulated 3 x yest with help from .       Medications:   Scheduled Meds:  Current Facility-Administered Medications   Medication Dose Route Frequency Provider Last Rate    acetaminophen  975 mg Oral Q8H PRN Timoteo Rasmussen PA-C      amiodarone  200 mg Oral Q8H LINDA Timoteo Rasmussen PA-C      aspirin  81 mg Oral Daily Timoteo Rasmussen PA-C      atorvastatin  80 mg Oral Daily With Dinner Timoteo Rasmussen PA-C      bisacodyl  10 mg Rectal Daily PRN Brandon Up PA-C      chlorhexidine  15 mL Mouth/Throat BID Timoteo Rasmussen PA-C      Cholecalciferol  1,000 Units Oral Daily Timoteo Rasmussen PA-C      docusate sodium  100 mg Oral BID Timoteo Rasmussen PA-C      fondaparinux  2.5 mg Subcutaneous Q24H Timoteo Rasmussen PA-C      insulin lispro  1-5 Units Subcutaneous TID AC Timoteo Rasmussen PA-C      insulin lispro  1-5 Units Subcutaneous HS Timoteo Rasmussen PA-C      magnesium Oxide  400 mg Oral BID Jeri Rubin PA-C      metoprolol succinate  12.5 mg Oral Daily Isrrael Rojas PA-C      midodrine  10 mg Oral TID AC Isrrael Rojas PA-C      ondansetron  4 mg Intravenous Q6H PRN Timoteo Rasmussen PA-C      oxyCODONE  2.5 mg Oral Q4H PRN Timoteo Rasmussen PA-C      Or    oxyCODONE  5 mg Oral Q4H PRN Timoteo Rasmussen PA-C      pantoprazole  40 mg Oral Early Morning Timoteo Rasmussen PA-C      polyethylene glycol  17 g Oral Daily Timoteo Rasmussen PA-C      potassium chloride  20 mEq Oral BID Timoteo Rasmussen PA-C      ticagrelor  90 mg Oral  Q12H LINDA Timoteo Rasmussen PA-C      torsemide  20 mg Oral BID (diuretic) Isrrael Rojas PA-C      trimethobenzamide  200 mg Intramuscular Q6H PRN Timoteo Rasmussen PA-C       Continuous Infusions:     PRN Meds:.  acetaminophen    bisacodyl    ondansetron    oxyCODONE **OR** oxyCODONE    trimethobenzamide    Vitals:   Vitals:    04/20/24 1910 04/20/24 2309 04/21/24 0239 04/21/24 0547   BP: 103/55 100/58 108/63    Pulse: 83 85 89    Resp:   14    Temp:  98.9 °F (37.2 °C) 98.5 °F (36.9 °C)    TempSrc:       SpO2: 97% 97% 98%    Weight:    71.2 kg (157 lb)   Height:           Telemetry: NSR; Heart Rate: 80s-90s    Respiratory:   SpO2: SpO2: 98 %; Room Air    Intake/Output:     Intake/Output Summary (Last 24 hours) at 4/21/2024 0623  Last data filed at 4/20/2024 2303  Gross per 24 hour   Intake 478 ml   Output 3150 ml   Net -2672 ml        Chest tubes have been removed    Weights:   Weight (last 2 days)       Date/Time Weight    04/21/24 0547 71.2 (157)    04/20/24 0545 74 (163.2)    04/19/24 1622 73.5 (162)    04/19/24 0456 73.6 (162.26)              Results:   Results from last 7 days   Lab Units 04/21/24  0442 04/20/24  0457 04/19/24  0502   WBC Thousand/uL 13.11* 10.30* 11.82*   HEMOGLOBIN g/dL 9.7* 9.0* 9.5*   HEMATOCRIT % 31.0* 28.2* 29.7*   PLATELETS Thousands/uL 571* 452* 471*     Results from last 7 days   Lab Units 04/21/24  0442 04/20/24  1837 04/20/24  0457 04/19/24  0502   SODIUM mmol/L 139 130* 129* 130*   POTASSIUM mmol/L 4.1  --  4.0 4.1   CHLORIDE mmol/L 94*  --  89* 88*   CO2 mmol/L 36*  --  34* 35*   BUN mg/dL 20  --  18 17   CREATININE mg/dL 1.01  --  0.80 0.82   CALCIUM mg/dL 9.5  --  8.6 8.9     Results from last 7 days   Lab Units 04/16/24  0502   INR  1.23*       Point of care glucose: 126-189    Studies:  Echo 4/19:    Left Ventricle: Left ventricular cavity size is dilated. The left ventricular ejection fraction is 20%. Systolic function is severely reduced. There is severe global hypokinesis with  regional variation.    Right Ventricle: Systolic function is mildly to moderately reduced.    Mitral Valve: There is moderate regurgitation.    Tricuspid Valve: There is mild to moderate regurgitation.    Echo 4/12:  LVEF 15-20%, severe LV dysfnx, severe global HK  Impella cannula present  At least moderate RV dysnfx    pCXR 4/12:  Cardiomegaly with mild pulmonary edema and small effusions. No pneumothorax identified.  Postoperative chest with interval extubation and removal of the NG tube.    I have personally reviewed pertinent reports.   and I have personally reviewed pertinent films in PACS    Invasive Lines/Tubes:  Invasive Devices       Peripherally Inserted Central Catheter Line  Duration             PICC Line 04/18/24 2 days                    Physical Exam:    General: No acute distress, Alert, and Normal appearance  HEENT/NECK:  Normocephalic. Atraumatic.    Cardiac: Regular rate and rhythm and No murmurs/rubs/gallops  Pulmonary:  Breath sounds clear bilaterally  Abdomen:  Non-tender, Non-distended, and Normal bowel sounds  Incisions: Sternum is stable.  Incision is clean, dry, and intact.  and Saphenectomy incison is clean, dry, and intact.   Extremities: Extremities warm/dry and 1+ edema B/L  Neuro: Alert and oriented X 3 and No focal deficits  Skin: Warm/Dry, without rashes or lesions.           Assessment:  Principal Problem:    Coronary artery disease involving native coronary artery  Active Problems:    DMII (diabetes mellitus, type 2) ([facility])    Hypomagnesemia    Ulcerative pancolitis without complication ([facility])    Ischemic cardiomyopathy    Mixed hyperlipidemia    Stenosis of left carotid artery    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[facility])    Hyperprolactinemia ([facility])    S/P carotid endarterectomy    Cardiogenic postoperative shock ([facility])    PONV (postoperative nausea and vomiting)    S/P CABG x 3    New onset atrial fibrillation (HCC)    ABLA (acute blood loss anemia)    LBBB (left bundle branch block)    Severe  left ventricular systolic dysfunction       Severe MVCAD/ischemic CMP S/P CABG ; POD # 11  S/p DESx2 to OM and Impella POD #9 (impella removed 4/12)    Plan:    Cardiac:     Elective surgical admission  Chronic systolic & diastolic CHF  Ischemic CMP with EF 15-20%  Severe LV dysnfx  Moderate RV dysnx  Chronic LBBB      Post op low cardiac output  - milrinone off 4/18 at midnight  - stable off milrinone >48 hrs    Post op hypotension  - on midodrine, continue 10mg TID    Echo 4/19 off milrinone  LVEF 20%, and mild-mod RV dysfnx   - Lifevest eval pending, for 4/21    NSR with LBBB  Post op afib s/p rapid afib/unstable s/p DCCV 4/14, s/p IV amio load  - no further recurrence    Cont toprol XL12.5mg daily (low EF)    Hold ACE inhibitor/ARB secondary to hypotension    Continue Amiodarone, 200 mg PO TID    Hold further GDMT for HF, restart as able as BP allows    Heart failure team now following    Continue ASA/Brilinta (post op PCI/RAFFY to OM) and Statin therapy    Epicardial pacing wires have been removed    Maintain PICC line for now    Continue Arixtra for DVT prophylaxis    Pulmonary:     Good Room air oxygen saturation; Continue incentive spirometry/Coughing/Deep breathing exercises    Post op VDRF resolved    Chest tubes have been removed    Renal:     Post op Creatinine stable; Follow up labs prn     Intake/Output net: -3000 mL/24 hours    Diuretic Regimen:  Decrease to torsemide 20mg daily  Decrease to Potassium Chloride 20 mEq PO daily    Stiles removed 4/18, voiding    Hyponatremia  Na improved to 139 from 129 after 7.5 mg Samsca 4/20   - nephrology following         Neuro:    Neurologically intact; No active issues     Incisional pain well controlled  - cont current pain regimen    GI:    Controlled carbohydrate diet level two/Cardiac diet,   Decrease fluid restriction given response to Samsca    Tolerating diet without complaint  + Flatus  + BM postoperatively    Continue stool softeners and prn  suppository    Continue GI prophylaxis    Endo:     DM2 on oral meds, Pre-Op Hgb A1C: 6.4    Patient has been transitioned from continuous insulin infusion to intermittent subcutaneous dosing  Serum glucose well controlled on insulin sliding scale coverage  Endocrinology following daily   Will consider jardiance vs farxiga prior to d/c    7    Hematology:     Post op acute blood loss anemia; Hemoglobin 9.7; stable, trend    Post op leukocytosis, reactive, no signs of infection, trend    Post op thrombocytosis, reactive from surgery, trend    8.   Disposition:      Following daily PT/OT recommendations regarding home vs. rehab when medically cleared for discharge    LifeVest will likely be recommended  - unclear if patient and  would be amenable  - willing to discuss with device rep on 4/21    Rehab planning, poss deejay Krishnamurthy on monday      VTE Pharmacologic Prophylaxis: Fondaparinux (Arixtra)  VTE Mechanical Prophylaxis: sequential compression device    Collaborative rounds completed with supervising physician  Plan of care discussed with bedside nurse    SIGNATURE: Isrrael Rojas PA-C  DATE: April 21, 2024  TIME: 6:23 AM

## 2024-04-21 NOTE — PROGRESS NOTES
Heart Failure/ Pulmonary Hypertension Progress Note - Julia Perry 68 y.o. female MRN: [id number]    Unit/Bed#: [facility] 401-01 Encounter: [id number]      Assessment:    Principal Problem:    Coronary artery disease involving native coronary artery  Active Problems:    DMII (diabetes mellitus, type 2) (HCC)    Hypomagnesemia    Ulcerative pancolitis without complication (HCC)    Ischemic cardiomyopathy    Mixed hyperlipidemia    Stenosis of left carotid artery    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HCC)    Hyperprolactinemia (HCC)    S/P carotid endarterectomy    Cardiogenic postoperative shock (HCC)    PONV (postoperative nausea and vomiting)    S/P CABG x 3    New onset atrial fibrillation (HCC)    ABLA (acute blood loss anemia)    LBBB (left bundle branch block)    Severe left ventricular systolic dysfunction      Subjective:   Patient seen and examined.  Robust U.O. over past 24 hrs s/p Samsca. Feeling better, a little dry    Objective:   Intake/ Output: 118/3950/-3.8L  Weight: 157 today, down from 163  Tele: SR  MAPs: 65-75 mmHg.     Assessment/Plan:  CAD s/p CABG x 3 LIMA to LAD, SVG to OM 1, SVG to RPDA on 4/9/2024   -Cardiogenic shock post op with increasing inotrope and pressor requirement  -now stable but with need for Midodrine   -no anginal symptoms.   -rest of management per CTS team.     -[facility] 4/10/2024 when Impella was being placed demonstrated patent bypass grafts 99% mid circumflex into OM 2 which was stented at the time with drug-eluting stent   Impella was removed on 4/12/2024   On and off phenylephrine     VBG 4/18/24: 63% on milrinone 0.13mcg/kg/min, Hb 9, Chu CI 3.1  VBG 4/17/24: 62% on milrinone 0.25mcg/kg/min, Hb 8.7, Chu CI 2.84     Heart failure with reduced ejection fraction  -Etiology: ischemic  -Warm on exam, volume status stable on oral diuretic regimen  -ScVO2 stable off inotrope. MAPs stable on Midodrine  -If adding SGLT2i, may be able to send out on lower diuretic dose. Would need price check  first, defer to CTS team or can start as outpatient.  -Having LifeVest education today with potential fitting prior to discharge to short term rehab  -Will have close cardiology follow up as outpatient with further optimization of GDMT     Chu'(4/19/2024) off milrinone:  Cardiac output 3.7 L/min  Cardiac index 2 L/min/m²     Echocardiogram 4/12/24 (on Impella)  LVEF 15-20%  RV not well visualized, appeared normal size with moderate to severely reduced function on my review     Echo 4/10/24:  LVEF 15%  Unable to assess RV     Intra-op ELOISA 4/9/24:  LVEF 25-30%, unchanged post  RV mildly dilated, mildly reduced function post     Echo 1/30/24:  LVEF: 30%  LVIDd: 5.4cm  RV: normal size and systolic function  MR: mild to moderate  PASP: unable to estimate  RVOT: no notching  Other: regional wall motion abnormalities. Small pericardial effusion     Neurohormonal Blockade: Midodrine 10 mg TID  --Beta-Blocker: metoprolol succinate 12.5mg daily  --ACEi, ARB or ARNi:  --Aldosterone Receptor Blocker:  --SGLT2 Inhibitor:  --Diuretic: lasix 40mg IV BID--->Torsemide 20 mg BID     Sudden Cardiac Death Risk Reduction:  --ICD: LifeVest candidate, refusing.      Electrical Resynchronization:  --Candidacy for BiV device: LBBB 116-126 msec     Advanced Therapies (if appropriate):  --Inotrope: milrinone 0.13mcg/kg/min, D/C 4/19     New onset afib  Went into A-fib with RVR and became hypotensive in the postoperative setting and required cardioversion x 1, currently in sinus rhythm    Rhythm: On IV and oral amio load  Rate: metoprolol as above  AC: none, on dapt    Hyperlipidemia  DM  Bilateral carotid artery stenosis status post left CEA 3/24  Pituitary macroadenoma with elevated prolactin  Hypercalcemia  Ulcerative colitis    Review of Systems   All other systems reviewed and are negative.       Central Line (day, reason):  Stiles catheter (day, reason):    Vitals: Blood pressure 96/58, pulse 85, temperature 97.8 °F (36.6 °C), resp. rate  "16, height 5' 4\" (1.626 m), weight 71.2 kg (157 lb), SpO2 96%., Body mass index is 26.95 kg/m²., I/O last 3 completed shifts:  In: 478 [P.O.:478]  Out: 4550 [Urine:4550]  No intake/output data recorded.  Wt Readings from Last 3 Encounters:   04/21/24 71.2 kg (157 lb)   04/02/24 74.6 kg (164 lb 6.4 oz)   03/19/24 74.4 kg (164 lb)       Intake/Output Summary (Last 24 hours) at 4/21/2024 0724  Last data filed at 4/21/2024 0601  Gross per 24 hour   Intake 118 ml   Output 3950 ml   Net -3832 ml     I/O last 3 completed shifts:  In: 478 [P.O.:478]  Out: 4550 [Urine:4550]  .       Physical Exam:  Vitals:    04/20/24 2309 04/21/24 0239 04/21/24 0547 04/21/24 0658   BP: 100/58 108/63  96/58   Pulse: 85 89  85   Resp:  14  16   Temp: 98.9 °F (37.2 °C) 98.5 °F (36.9 °C)  97.8 °F (36.6 °C)   TempSrc:       SpO2: 97% 98%  96%   Weight:   71.2 kg (157 lb)    Height:           GEN: Julia Perry appears well, alert and oriented x 3, pleasant and cooperative   HEENT: pupils equal, round, and reactive to light; extraocular muscles intact  NECK: supple, no carotid bruits   HEART: regular rhythm, normal S1 and S2, no murmurs, clicks, gallops or rubs, JVP is  down  LUNGS: coarse, slightly diminished  ABDOMEN: normal bowel sounds, soft, no tenderness, no distention  EXTREMITIES: peripheral pulses normal; no clubbing, cyanosis, trace-+1 BLLE edema  NEURO: no focal findings   SKIN: normal without suspicious lesions on exposed skin      Current Facility-Administered Medications:     acetaminophen (TYLENOL) tablet 975 mg, 975 mg, Oral, Q8H PRN, Timoteo Rasmussen PA-C    amiodarone tablet 200 mg, 200 mg, Oral, Q8H LINDA, Timoteo Rasmussen PA-C, 200 mg at 04/21/24 0652    aspirin chewable tablet 81 mg, 81 mg, Oral, Daily, Timoteo Rasmussen PA-C, 81 mg at 04/20/24 1000    atorvastatin (LIPITOR) tablet 80 mg, 80 mg, Oral, Daily With Dinner, Timoteo Rasmussen PA-C, 80 mg at 04/20/24 1816    bisacodyl (DULCOLAX) rectal suppository 10 mg, 10 mg, Rectal, " Daily PRN, Brandon Up PA-C, 10 mg at 04/20/24 1432    chlorhexidine (PERIDEX) 0.12 % oral rinse 15 mL, 15 mL, Mouth/Throat, BID, Timoteo Rasmussen PA-C, 15 mL at 04/18/24 0825    Cholecalciferol (VITAMIN D3) tablet 1,000 Units, 1,000 Units, Oral, Daily, Timoteo Rasmussen PA-C, 1,000 Units at 04/20/24 1000    docusate sodium (COLACE) capsule 100 mg, 100 mg, Oral, BID, Timoteo Rasmussen PA-C, 100 mg at 04/20/24 1817    fondaparinux (ARIXTRA) subcutaneous injection 2.5 mg, 2.5 mg, Subcutaneous, Q24H, Timoteo Rasmussen PA-C, 2.5 mg at 04/20/24 1000    insulin lispro (HumALOG/ADMELOG) 100 units/mL subcutaneous injection 1-5 Units, 1-5 Units, Subcutaneous, TID AC, 1 Units at 04/20/24 1630 **AND** Fingerstick Glucose (POCT), , , TID AC, Timoteo Rasmussen PA-C    insulin lispro (HumALOG/ADMELOG) 100 units/mL subcutaneous injection 1-5 Units, 1-5 Units, Subcutaneous, HS, Timoteo Rasmussen PA-C, 1 Units at 04/20/24 2149    magnesium Oxide (MAG-OX) tablet 400 mg, 400 mg, Oral, BID, Jeri Rubin PA-C, 400 mg at 04/20/24 2137    metoprolol succinate (TOPROL-XL) 24 hr tablet 12.5 mg, 12.5 mg, Oral, Daily, Isrrael Rojas PA-C, 12.5 mg at 04/20/24 1000    midodrine (PROAMATINE) tablet 10 mg, 10 mg, Oral, TID AC, Isrrael Rojas PA-C, 10 mg at 04/21/24 0652    ondansetron (ZOFRAN) injection 4 mg, 4 mg, Intravenous, Q6H PRN, Timoteo Rasmussen PA-C, 4 mg at 04/16/24 0801    oxyCODONE (ROXICODONE) split tablet 2.5 mg, 2.5 mg, Oral, Q4H PRN **OR** oxyCODONE (ROXICODONE) IR tablet 5 mg, 5 mg, Oral, Q4H PRN, Timoteo Rasmussen PA-C    pantoprazole (PROTONIX) EC tablet 40 mg, 40 mg, Oral, Early Morning, Timoteo Rasmussen PA-C, 40 mg at 04/21/24 0652    polyethylene glycol (MIRALAX) packet 17 g, 17 g, Oral, Daily, Timoteo Rasmussne PA-C, 17 g at 04/14/24 0847    potassium chloride (Klor-Con M20) CR tablet 20 mEq, 20 mEq, Oral, Daily, Isrrael Rojas PA-C    ticagrelor (BRILINTA) tablet 90 mg, 90 mg, Oral, Q12H LINDA, Timoteo Rasmussen PA-C, 90 mg at  04/20/24 2137    torsemide (DEMADEX) tablet 20 mg, 20 mg, Oral, Daily, Isrrael Rojas PA-C    trimethobenzamide (TIGAN) IM injection 200 mg, 200 mg, Intramuscular, Q6H PRN, Timoteo Rasmussen PA-C, 200 mg at 04/11/24 2124      Labs & Results:        Results from last 7 days   Lab Units 04/21/24  0442 04/20/24  0457 04/19/24  0502   WBC Thousand/uL 13.11* 10.30* 11.82*   HEMOGLOBIN g/dL 9.7* 9.0* 9.5*   HEMATOCRIT % 31.0* 28.2* 29.7*   PLATELETS Thousands/uL 571* 452* 471*         Results from last 7 days   Lab Units 04/21/24  0442 04/20/24  0457 04/19/24  0502   POTASSIUM mmol/L 4.1 4.0 4.1   CHLORIDE mmol/L 94* 89* 88*   CO2 mmol/L 36* 34* 35*   BUN mg/dL 20 18 17   CREATININE mg/dL 1.01 0.80 0.82   CALCIUM mg/dL 9.5 8.6 8.9     Results from last 7 days   Lab Units 04/16/24  0502   INR  1.23*         Counseling / Coordination of Care    Thank you for the opportunity to participate in the care of this patient.    Ella PAIGE

## 2024-04-22 LAB
ANION GAP SERPL CALCULATED.3IONS-SCNC: 5 MMOL/L (ref 4–13)
BASE EX.OXY STD BLDV CALC-SCNC: 51.4 % (ref 60–80)
BASE EXCESS BLDV CALC-SCNC: 3.3 MMOL/L
BUN SERPL-MCNC: 20 MG/DL (ref 5–25)
CALCIUM SERPL-MCNC: 9.1 MG/DL (ref 8.4–10.2)
CHLORIDE SERPL-SCNC: 95 MMOL/L (ref 96–108)
CO2 SERPL-SCNC: 38 MMOL/L (ref 21–32)
CREAT SERPL-MCNC: 0.95 MG/DL (ref 0.6–1.3)
ERYTHROCYTE [DISTWIDTH] IN BLOOD BY AUTOMATED COUNT: 17 % (ref 11.6–15.1)
GFR SERPL CREATININE-BSD FRML MDRD: 62 ML/MIN/1.73SQ M
GLUCOSE SERPL-MCNC: 110 MG/DL (ref 65–140)
GLUCOSE SERPL-MCNC: 116 MG/DL (ref 65–140)
GLUCOSE SERPL-MCNC: 120 MG/DL (ref 65–140)
GLUCOSE SERPL-MCNC: 144 MG/DL (ref 65–140)
GLUCOSE SERPL-MCNC: 167 MG/DL (ref 65–140)
HCO3 BLDV-SCNC: 27.8 MMOL/L (ref 24–30)
HCT VFR BLD AUTO: 30.7 % (ref 34.8–46.1)
HGB BLD-MCNC: 9.4 G/DL (ref 11.5–15.4)
MCH RBC QN AUTO: 29.2 PG (ref 26.8–34.3)
MCHC RBC AUTO-ENTMCNC: 30.6 G/DL (ref 31.4–37.4)
MCV RBC AUTO: 95 FL (ref 82–98)
O2 CT BLDV-SCNC: 5.7 ML/DL
PCO2 BLDV: 42.4 MM HG (ref 42–50)
PH BLDV: 7.43 [PH] (ref 7.3–7.4)
PLATELET # BLD AUTO: 585 THOUSANDS/UL (ref 149–390)
PMV BLD AUTO: 9.6 FL (ref 8.9–12.7)
PO2 BLDV: 27.7 MM HG (ref 35–45)
POTASSIUM SERPL-SCNC: 3.8 MMOL/L (ref 3.5–5.3)
RBC # BLD AUTO: 3.22 MILLION/UL (ref 3.81–5.12)
SODIUM SERPL-SCNC: 138 MMOL/L (ref 135–147)
WBC # BLD AUTO: 11.05 THOUSAND/UL (ref 4.31–10.16)

## 2024-04-22 PROCEDURE — 99024 POSTOP FOLLOW-UP VISIT: CPT | Performed by: STUDENT IN AN ORGANIZED HEALTH CARE EDUCATION/TRAINING PROGRAM

## 2024-04-22 PROCEDURE — 82948 REAGENT STRIP/BLOOD GLUCOSE: CPT

## 2024-04-22 PROCEDURE — 97530 THERAPEUTIC ACTIVITIES: CPT

## 2024-04-22 PROCEDURE — 97116 GAIT TRAINING THERAPY: CPT

## 2024-04-22 PROCEDURE — 99232 SBSQ HOSP IP/OBS MODERATE 35: CPT | Performed by: INTERNAL MEDICINE

## 2024-04-22 PROCEDURE — 82805 BLOOD GASES W/O2 SATURATION: CPT | Performed by: INTERNAL MEDICINE

## 2024-04-22 PROCEDURE — 97535 SELF CARE MNGMENT TRAINING: CPT

## 2024-04-22 PROCEDURE — 85027 COMPLETE CBC AUTOMATED: CPT | Performed by: PHYSICIAN ASSISTANT

## 2024-04-22 PROCEDURE — 80048 BASIC METABOLIC PNL TOTAL CA: CPT | Performed by: PHYSICIAN ASSISTANT

## 2024-04-22 PROCEDURE — 99232 SBSQ HOSP IP/OBS MODERATE 35: CPT | Performed by: STUDENT IN AN ORGANIZED HEALTH CARE EDUCATION/TRAINING PROGRAM

## 2024-04-22 RX ORDER — POTASSIUM CHLORIDE 20 MEQ/1
20 TABLET, EXTENDED RELEASE ORAL ONCE
Qty: 1 TABLET | Refills: 0 | Status: COMPLETED | OUTPATIENT
Start: 2024-04-22 | End: 2024-04-22

## 2024-04-22 RX ADMIN — INSULIN LISPRO 1 UNITS: 100 INJECTION, SOLUTION INTRAVENOUS; SUBCUTANEOUS at 11:59

## 2024-04-22 RX ADMIN — Medication 1000 UNITS: at 09:49

## 2024-04-22 RX ADMIN — TICAGRELOR 90 MG: 90 TABLET ORAL at 21:28

## 2024-04-22 RX ADMIN — MIDODRINE HYDROCHLORIDE 10 MG: 5 TABLET ORAL at 17:06

## 2024-04-22 RX ADMIN — ASPIRIN 81 MG CHEWABLE TABLET 81 MG: 81 TABLET CHEWABLE at 09:49

## 2024-04-22 RX ADMIN — POTASSIUM CHLORIDE 20 MEQ: 1500 TABLET, EXTENDED RELEASE ORAL at 09:49

## 2024-04-22 RX ADMIN — ATORVASTATIN CALCIUM 80 MG: 80 TABLET, FILM COATED ORAL at 17:06

## 2024-04-22 RX ADMIN — TICAGRELOR 90 MG: 90 TABLET ORAL at 09:49

## 2024-04-22 RX ADMIN — AMIODARONE HYDROCHLORIDE 200 MG: 200 TABLET ORAL at 21:28

## 2024-04-22 RX ADMIN — MIDODRINE HYDROCHLORIDE 10 MG: 5 TABLET ORAL at 12:00

## 2024-04-22 RX ADMIN — BISACODYL 10 MG: 10 SUPPOSITORY RECTAL at 19:35

## 2024-04-22 RX ADMIN — DOCUSATE SODIUM 100 MG: 100 CAPSULE, LIQUID FILLED ORAL at 17:06

## 2024-04-22 RX ADMIN — AMIODARONE HYDROCHLORIDE 200 MG: 200 TABLET ORAL at 15:12

## 2024-04-22 RX ADMIN — POTASSIUM CHLORIDE 20 MEQ: 1500 TABLET, EXTENDED RELEASE ORAL at 12:00

## 2024-04-22 RX ADMIN — AMIODARONE HYDROCHLORIDE 200 MG: 200 TABLET ORAL at 05:10

## 2024-04-22 RX ADMIN — MIDODRINE HYDROCHLORIDE 10 MG: 5 TABLET ORAL at 07:26

## 2024-04-22 RX ADMIN — MAGNESIUM OXIDE TAB 400 MG (241.3 MG ELEMENTAL MG) 400 MG: 400 (241.3 MG) TAB at 09:49

## 2024-04-22 RX ADMIN — DOCUSATE SODIUM 100 MG: 100 CAPSULE, LIQUID FILLED ORAL at 09:49

## 2024-04-22 RX ADMIN — FONDAPARINUX SODIUM 2.5 MG: 2.5 INJECTION, SOLUTION SUBCUTANEOUS at 09:49

## 2024-04-22 RX ADMIN — MAGNESIUM OXIDE TAB 400 MG (241.3 MG ELEMENTAL MG) 400 MG: 400 (241.3 MG) TAB at 21:28

## 2024-04-22 RX ADMIN — PANTOPRAZOLE SODIUM 40 MG: 40 TABLET, DELAYED RELEASE ORAL at 05:10

## 2024-04-22 RX ADMIN — EMPAGLIFLOZIN 10 MG: 10 TABLET, FILM COATED ORAL at 09:49

## 2024-04-22 NOTE — PLAN OF CARE
Problem: PHYSICAL THERAPY ADULT  Goal: Performs mobility at highest level of function for planned discharge setting.  See evaluation for individualized goals.  Description: Treatment/Interventions: Functional transfer training, LE strengthening/ROM, Elevations, Therapeutic exercise, Endurance training, Equipment eval/education, Bed mobility, Gait training, Spoke to nursing, OT, Family  Equipment Recommended: Walker       See flowsheet documentation for full assessment, interventions and recommendations.  Outcome: Progressing  Note: Prognosis: Good  Problem List: Decreased strength, Decreased endurance, Impaired balance, Decreased mobility  Assessment: Pt demonstrated overall improvement in balance, endurance and all aspects of observed mobility progressing to min (A) level w/ transfers, amb w/o AD and on the steps; rest periods provided as needed and pt remained in NAD. Overall, pt still exhibits overall weakness, incl proximal LE strength deficit and general deconditioning affecting her balance and mobility skills requiring  PT to address; D/C recommendations are listed below; will follow        Rehab Resource Intensity Level, PT: I (Maximum Resource Intensity)    See flowsheet documentation for full assessment.

## 2024-04-22 NOTE — PLAN OF CARE
Problem: Prexisting or High Potential for Compromised Skin Integrity  Goal: Skin integrity is maintained or improved  Description: INTERVENTIONS:  - Identify patients at risk for skin breakdown  - Assess and monitor skin integrity  - Assess and monitor nutrition and hydration status  - Monitor labs   - Assess for incontinence   - Turn and reposition patient  - Assist with mobility/ambulation  - Relieve pressure over bony prominences  - Avoid friction and shearing  - Provide appropriate hygiene as needed including keeping skin clean and dry  - Evaluate need for skin moisturizer/barrier cream  - Collaborate with interdisciplinary team   - Patient/family teaching  - Consider wound care consult   Outcome: Progressing     Problem: PAIN - ADULT  Goal: Verbalizes/displays adequate comfort level or baseline comfort level  Description: Interventions:  - Encourage patient to monitor pain and request assistance  - Assess pain using appropriate pain scale  - Administer analgesics based on type and severity of pain and evaluate response  - Implement non-pharmacological measures as appropriate and evaluate response  - Consider cultural and social influences on pain and pain management  - Notify physician/advanced practitioner if interventions unsuccessful or patient reports new pain  Outcome: Progressing     Problem: SAFETY ADULT  Goal: Patient will remain free of falls  Description: INTERVENTIONS:  - Educate patient/family on patient safety including physical limitations  - Instruct patient to call for assistance with activity   - Consult OT/PT to assist with strengthening/mobility   - Keep Call bell within reach  - Keep bed low and locked with side rails adjusted as appropriate  - Keep care items and personal belongings within reach  - Initiate and maintain comfort rounds  - Make Fall Risk Sign visible to staff  - Offer Toileting every 2 Hours, in advance of need  - Initiate/Maintain alarm  - Obtain necessary fall risk  management equipment  - Apply yellow socks and bracelet for high fall risk patients  - Consider moving patient to room near nurses station  Outcome: Progressing  Goal: Maintain or return to baseline ADL function  Description: INTERVENTIONS:  -  Assess patient's ability to carry out ADLs; assess patient's baseline for ADL function and identify physical deficits which impact ability to perform ADLs (bathing, care of mouth/teeth, toileting, grooming, dressing, etc.)  - Assess/evaluate cause of self-care deficits   - Assess range of motion  - Assess patient's mobility; develop plan if impaired  - Assess patient's need for assistive devices and provide as appropriate  - Encourage maximum independence but intervene and supervise when necessary  - Involve family in performance of ADLs  - Assess for home care needs following discharge   - Consider OT consult to assist with ADL evaluation and planning for discharge  - Provide patient education as appropriate  Outcome: Progressing  Goal: Maintains/Returns to pre admission functional level  Description: INTERVENTIONS:  - Perform AM-PAC 6 Click Basic Mobility/ Daily Activity assessment daily.  - Set and communicate daily mobility goal to care team and patient/family/caregiver.   - Collaborate with rehabilitation services on mobility goals if consulted  - Perform Range of Motion 3 times a day.  - Reposition patient every 2 hours.  - Dangle patient 3 times a day  - Stand patient 3 times a day  - Ambulate patient 3 times a day  - Out of bed to chair 3 times a day   - Out of bed for meals 3 times a day  - Out of bed for toileting  - Record patient progress and toleration of activity level   Outcome: Progressing     Problem: CARDIOVASCULAR - ADULT  Goal: Maintains optimal cardiac output and hemodynamic stability  Description: INTERVENTIONS:  - Monitor I/O, vital signs and rhythm  - Monitor for S/S and trends of decreased cardiac output  - Administer and titrate ordered vasoactive  medications to optimize hemodynamic stability  - Assess quality of pulses, skin color and temperature  - Assess for signs of decreased coronary artery perfusion  - Instruct patient to report change in severity of symptoms  Outcome: Progressing  Goal: Absence of cardiac dysrhythmias or at baseline rhythm  Description: INTERVENTIONS:  - Continuous cardiac monitoring, vital signs, obtain 12 lead EKG if ordered  - Administer antiarrhythmic and heart rate control medications as ordered  - Monitor electrolytes and administer replacement therapy as ordered  Outcome: Progressing     Problem: RESPIRATORY - ADULT  Goal: Achieves optimal ventilation and oxygenation  Description: INTERVENTIONS:  - Assess for changes in respiratory status  - Assess for changes in mentation and behavior  - Position to facilitate oxygenation and minimize respiratory effort  - Oxygen administered by appropriate delivery if ordered  - Initiate smoking cessation education as indicated  - Encourage broncho-pulmonary hygiene including cough, deep breathe, Incentive Spirometry  - Assess the need for suctioning and aspirate as needed  - Assess and instruct to report SOB or any respiratory difficulty  - Respiratory Therapy support as indicated  Outcome: Progressing

## 2024-04-22 NOTE — PLAN OF CARE
Problem: OCCUPATIONAL THERAPY ADULT  Goal: Performs self-care activities at highest level of function for planned discharge setting.  See evaluation for individualized goals.  Description: Treatment Interventions: ADL retraining, Functional transfer training, Endurance training, UE strengthening/ROM, Cognitive reorientation, Patient/family training, Equipment evaluation/education, Continued evaluation, Cardiac education, Activityengagement, Energy conservation          See flowsheet documentation for full assessment, interventions and recommendations.   Outcome: Progressing  Note: Limitation: Decreased ADL status, Decreased Safe judgement during ADL, Decreased cognition, Decreased endurance, Decreased self-care trans, Decreased high-level ADLs  Prognosis: Fair  Assessment: Pt seen for OT treatment session day 3 on this date focused on ADL retraining, functional transfers and mobility, energy conservation, functional endurance, recall of safety precautions, and patient education. Pt was greeted in chair and was cooperative throughout session. Following session, pt was left in chair with chair alarm on and all needs within reach. Pt continues to be limited by functional status related to ADLs and transfers requiring MAX A for LB dressing, MOD A for LB bathing and MIN A for seated UB bathing, although demonstrates improvements in functional mobility without AD performing with MIN A x 1 with HHA. The patient's raw score on the AM-PAC Daily Activity Inpatient Short Form is 17. A raw score of less than 19 suggests the patient may benefit from discharge to post-acute rehabilitation services. Please refer to the recommendation of the Occupational Therapist for safe discharge planning. Pt would benefit from continued acute OT intervention with plan to continue OT treatment sessions 2-3x per week. Recommend d/c to level I services.     Rehab Resource Intensity Level, OT: I (Maximum Resource Intensity)

## 2024-04-22 NOTE — ARC ADMISSION
Notified by CM that patient has received her LifeVest, and is medically stable for discharge.     Have requested updated PT/OT to assess patient's tolerance, given hypotension over the weekend. CM has been updated. Will continue to follow at this time.

## 2024-04-22 NOTE — OCCUPATIONAL THERAPY NOTE
Occupational Therapy Progress Note     Patient Name: Julia Perry  Today's Date: 4/22/2024  Problem List  Principal Problem:    Coronary artery disease involving native coronary artery  Active Problems:    DMII (diabetes mellitus, type 2) (HCC)    Hypomagnesemia    Ulcerative pancolitis without complication (HCC)    Ischemic cardiomyopathy    Mixed hyperlipidemia    Stenosis of left carotid artery    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HCC)    Hyperprolactinemia (HCC)    S/P carotid endarterectomy    Cardiogenic postoperative shock (HCC)    PONV (postoperative nausea and vomiting)    S/P CABG x 3    New onset atrial fibrillation (HCC)    ABLA (acute blood loss anemia)    LBBB (left bundle branch block)    Severe left ventricular systolic dysfunction            04/22/24 1342   OT Last Visit   OT Visit Date 04/22/24   Note Type   Note Type Treatment   Pain Assessment   Pain Assessment Tool 0-10   Pain Score No Pain   Restrictions/Precautions   Other Precautions Cardiac/sternal;Telemetry;Fall Risk   Lifestyle   Autonomy Pta pt I with ADL, most IADL and functional mobility, (+)  although per spouse pt rarely drives   Reciprocal Relationships supportive spouse   Service to Others retired   Intrinsic Gratification enjoys reading, time with her kids   ADL   Where Assessed Chair   UB Bathing Comments Pt reporting she took a seated shower earlier with assist from spouse and PCA reporting MIN A for UB bathing   LB Bathing Comments Pt reporting she took a seated shower earlier with assist from spouse and PCA with MOD A for LB bathing   LB Dressing Assistance 2  Maximal Assistance   LB Dressing Deficit Don/doff R sock;Don/doff L sock   Toileting Assistance  4  Minimal Assistance   Toileting Deficit Supervison/safety;Perineal hygiene   Toileting Comments Pt requiring assist for post. hygiene   Bed Mobility   Additional Comments Pt greeted OOB in chair, left in chair with spouse present and all needs within reach   Transfers   Sit to  Stand 4  Minimal assistance   Additional items Assist x 1;Verbal cues   Stand to Sit 4  Minimal assistance   Additional items Assist x 1;Verbal cues   Toilet transfer 5  Supervision   Additional items Verbal cues;Commode   Additional Comments HHA   Functional Mobility   Functional Mobility 4  Minimal assistance   Additional Comments Pt performs short household distance mobility with MIN A x 1 with HHA and chair follow requiring multiple seated rest breaks   Additional items Hand hold assistance   Cognition   Overall Cognitive Status WFL   Arousal/Participation Alert;Cooperative   Attention Attends with cues to redirect   Orientation Level Oriented to person;Oriented to place;Oriented to situation   Memory Decreased recall of precautions   Following Commands Follows one step commands without difficulty   Comments Pt cooperative to therapy   Activity Tolerance   Activity Tolerance Patient limited by fatigue   Medical Staff Made Aware Co-tx with DPT due to medical complexity, RN cleared   Assessment   Assessment Pt seen for OT treatment session day 3 on this date focused on ADL retraining, functional transfers and mobility, energy conservation, functional endurance, recall of safety precautions, and patient education. Pt was greeted in chair and was cooperative throughout session. Following session, pt was left in chair with chair alarm on and all needs within reach. Pt continues to be limited by functional status related to ADLs and transfers requiring MAX A for LB dressing, MOD A for LB bathing and MIN A for seated UB bathing, although demonstrates improvements in functional mobility without AD performing with MIN A x 1 with HHA. The patient's raw score on the -PAC Daily Activity Inpatient Short Form is 17. A raw score of less than 19 suggests the patient may benefit from discharge to post-acute rehabilitation services. Please refer to the recommendation of the Occupational Therapist for safe discharge planning. Pt  would benefit from continued acute OT intervention with plan to continue OT treatment sessions 2-3x per week. Recommend d/c to level I services.   Plan   Treatment Interventions ADL retraining;Functional transfer training;Endurance training;Patient/family training;Continued evaluation;Energy conservation   Goal Expiration Date 04/28/24   OT Treatment Day 3   OT Frequency 2-3x/wk   Discharge Recommendation   Rehab Resource Intensity Level, OT I (Maximum Resource Intensity)   AM-PAC Daily Activity Inpatient   Lower Body Dressing 2   Bathing 2   Toileting 3   Upper Body Dressing 3   Grooming 3   Eating 4   Daily Activity Raw Score 17   Daily Activity Standardized Score (Calc for Raw Score >=11) 37.26   AM-PAC Applied Cognition Inpatient   Following a Speech/Presentation 3   Understanding Ordinary Conversation 4   Taking Medications 4   Remembering Where Things Are Placed or Put Away 3   Remembering List of 4-5 Errands 3   Taking Care of Complicated Tasks 3   Applied Cognition Raw Score 20   Applied Cognition Standardized Score 41.76   End of Consult   Education Provided Yes;Family or social support of family present for education by provider   Patient Position at End of Consult Bedside chair;All needs within reach   Nurse Communication Nurse aware of consult       ELSIE Rizo, OTR/L

## 2024-04-22 NOTE — PROGRESS NOTES
"  Progress Note - Nephrology   Julia Perry 68 y.o. female MRN: [id number]  Unit/Bed#: [facility] 401-01 Encounter: [id number]      Assessment / Plan:    Hyponatremia  Prior sodium levels have been normal  Hyponatremia likely secondary to volume overload as well as depressed ejection fraction  Status post tolvaptan and on   Sodium level has normalized with diuresis  Contraction metabolic alkalosis  Secondary to diuresis  Hypomagnesemia  Continue oral supplementation and trend levels  Other issues:  CAD-status post CABG on 4/10.  Complicated by cardiogenic shock requiring inotropes/vasopressors.  Status post cath on 4/10  Hypotension-remains on midodrine  New onset [id number] post cardioversion  Systolic CHF-ejection fraction 20% with moderate MR and mild to moderate TR  Diabetes mellitus type 2  Pituitary microadenoma  Ulcerative pancolitis      Plan:  Continue fluid restriction  Continue to diurese  Repeat bilirubin level  Monitor calcium level on vitamin D  Check VBG and consider Diamox depending on pH  Trend bicarbonate level which is increasing, likely due to contraction alkalosis  Keep potassium level greater than 4 to limit arrhythmia potential and help correct metabolic alkalosis  Continue oral magnesium supplementation  Trend renal function closely on loop diuretic and Jardiance and with diuresis.  Repeat BMP in a.m.  Check PVR    Subjective:   The patient was awake and alert.  She denied any chest pain or pressure.  She does have some shortness of breath with movement/exertion.  She denied any abdominal pain, chills, sweats, paroxysmal nocturnal dyspnea orthopnea      Objective:     Vitals: Blood pressure 90/51, pulse 86, temperature 98.5 °F (36.9 °C), resp. rate 17, height 5' 4\" (1.626 m), weight 71.1 kg (156 lb 12 oz), SpO2 96%.,Body mass index is 26.91 kg/m².Temp (24hrs), Av.2 °F (36.8 °C), Min:97.8 °F (36.6 °C), Max:98.5 °F (36.9 °C)      Weight (last 2 days)       Date/Time Weight    24 0600 " "71.1 (156.75)    04/21/24 0547 71.2 (157)    04/20/24 0545 74 (163.2)                   Intake/Output Summary (Last 24 hours) at 4/22/2024 1125  Last data filed at 4/22/2024 0701  Gross per 24 hour   Intake 238 ml   Output 3305 ml   Net -3067 ml     I/O last 24 hours:  In: 598 [P.O.:598]  Out: 3680 [Urine:3680]        Physical Exam    BP 90/51   Pulse 86   Temp 98.5 °F (36.9 °C)   Resp 17   Ht 5' 4\" (1.626 m)   Wt 71.1 kg (156 lb 12 oz)   SpO2 96%   BMI 26.91 kg/m²   Vital signs were reviewed  Constitutional: The patient was awake, alert, pleasant, cooperative and in no apparent distress  Cardiovascular: Jugular venous distention was present, S1-S2, no pericardial friction rub S3 appreciated, there was 2 to 3 mm pitting edema three quarters of the way up to the knees bilaterally  Pulmonary: Adequate inspiratory effort, decreased breath sounds                Invasive Devices       Peripherally Inserted Central Catheter Line  Duration             PICC Line 04/18/24 3 days                    Medications:    Scheduled Meds:  Current Facility-Administered Medications   Medication Dose Route Frequency Provider Last Rate    acetaminophen  975 mg Oral Q8H PRN Timoteo Rasmussen PA-C      amiodarone  200 mg Oral Q8H [facility] Timoteo Rasmussen PA-C      aspirin  81 mg Oral Daily Timoteo Rasmussen PA-C      atorvastatin  80 mg Oral Daily With Dinner Timoteo Rasmussen PA-C      bisacodyl  10 mg Rectal Daily PRN Brandon Up PA-C      chlorhexidine  15 mL Mouth/Throat BID Timoteo Rasmussen PA-C      Cholecalciferol  1,000 Units Oral Daily Timoteo Rasmussen PA-C      docusate sodium  100 mg Oral BID Timoteo Rasmussen PA-C      Empagliflozin  10 mg Oral Daily Jeri Rubin PA-C      fondaparinux  2.5 mg Subcutaneous Q24H Timoteo Rasmussen PA-C      insulin lispro  1-5 Units Subcutaneous TID AC Timoteo Rasmussen PA-C      insulin lispro  1-5 Units Subcutaneous HS Timoteo Rasmussen PA-C      magnesium Oxide  400 mg Oral BID Jeri Torres" KETAN Rubin      metoprolol succinate  12.5 mg Oral Daily Isrrael Rojas PA-C      midodrine  10 mg Oral TID AC Isrrael Rojas PA-C      ondansetron  4 mg Intravenous Q6H PRN Timoteo Rasmussen PA-C      oxyCODONE  2.5 mg Oral Q4H PRN Timoteo Rasmussen PA-C      Or    oxyCODONE  5 mg Oral Q4H PRN Timoteo Rasmussen PA-C      pantoprazole  40 mg Oral Early Morning Timoteo Rasmussen PA-C      polyethylene glycol  17 g Oral Daily Timoteo Rasmussen PA-C      potassium chloride  20 mEq Oral Daily Isrrael Rojas PA-C      potassium chloride  20 mEq Oral Once Jeri Rubin PA-C      ticagrelor  90 mg Oral Q12H [facility] Timoteo Rasmussen PA-C      torsemide  20 mg Oral Daily Isrrael Rojas PA-C      trimethobenzamide  200 mg Intramuscular Q6H PRN Timoteo Rasmussen PA-C         PRN Meds:.  acetaminophen    bisacodyl    ondansetron    oxyCODONE **OR** oxyCODONE    trimethobenzamide    Continuous Infusions:         LAB RESULTS:      Results from last 7 days   Lab Units 04/22/24  0537 04/22/24  0523 04/21/24  0442 04/20/24  0457 04/19/24  0502 04/18/24  0427 04/17/24  0402 04/16/24  1623 04/16/24  0502   WBC Thousand/uL  --  11.05* 13.11* 10.30* 11.82* 10.25* 11.10*  --  12.70*   HEMOGLOBIN g/dL  --  9.4* 9.7* 9.0* 9.5* 9.0* 8.7*  --  9.8*   HEMATOCRIT %  --  30.7* 31.0* 28.2* 29.7* 28.2* 27.6*  --  31.3*   PLATELETS Thousands/uL  --  585* 571* 452* 471* 396* 287  --  239   POTASSIUM mmol/L 3.8  --  4.1 4.0 4.1 3.9 3.9 4.0  --    CHLORIDE mmol/L 95*  --  94* 89* 88* 90* 91* 94*  --    CO2 mmol/L 38*  --  36* 34* 35* 34* 32 29  --    BUN mg/dL 20  --  20 18 17 16 13 14  --    CREATININE mg/dL 0.95  --  1.01 0.80 0.82 0.77 0.76 0.73  --    CALCIUM mg/dL 9.1  --  9.5 8.6 8.9 8.2* 8.0* 8.6  --    MAGNESIUM mg/dL  --   --   --   --   --  1.8* 1.5*  --  1.7*   PHOSPHORUS mg/dL  --   --   --   --   --  3.2 3.3  --  3.4       CUTURES:  Lab Results   Component Value Date    BLOODCX No Growth After 5 Days. 06/05/2023    BLOODCX No Growth After 5  Days. 06/05/2023    URINECX >100,000 cfu/ml Escherichia coli (A) 01/11/2024    URINECX >100,000 cfu/ml Escherichia coli (A) 05/25/2022                 PLEASE NOTE:  This encounter was completed utilizing the theDrop/Fluency Direct Speech Voice Recognition Software. Grammatical errors, random word insertions, pronoun errors and incomplete sentences are occasional consequences of the system due to software limitations, ambient noise and hardware issues.These may be missed by proof reading prior to affixing electronic signature. Any questions or concerns about the content, text or information contained within the body of this dictation should be directly addressed to the physician for clarification. Please do not hesitate to call me directly if you have any any questions or concerns.

## 2024-04-22 NOTE — CASE MANAGEMENT
Case Management Discharge Planning Note    Patient name Julia Perry  [facility] MRN [id number]  : 1955 Date 2024       Current Admission Date: 2024  Current Admission Diagnosis:Coronary artery disease involving native coronary artery   Patient Active Problem List    Diagnosis Date Noted    LBBB (left bundle branch block) 2024    Severe left ventricular systolic dysfunction 2024    New onset atrial fibrillation (HCC) 2024    ABLA (acute blood loss anemia) 2024    S/P CABG x 3 2024    Cardiogenic postoperative shock (HCC) 2024    PONV (postoperative nausea and vomiting) 2024    S/P carotid endarterectomy 2024    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HCC) 02/15/2024    Hyperprolactinemia (HCC) 02/15/2024    Stenosis of left carotid artery 2024    Coronary artery disease involving native coronary artery 2024    Ischemic cardiomyopathy 2024    Mixed hyperlipidemia 2024    Ulcerative pancolitis without complication (HCC) 2024    Polyp of ascending colon 2023    Acquired right foot drop 2023    Hypomagnesemia 2023    Hair loss 2023    Colitis 2023    History of biliary dyskinesia 2023    DMII (diabetes mellitus, type 2) (HCC) 2023    Gallbladder polyp 2022    Leiomyoma of uterus 2013      LOS (days): 13  Geometric Mean LOS (GMLOS) (days): 30.3  Days to GMLOS:17     OBJECTIVE:  Risk of Unplanned Readmission Score: 19.11         Current admission status: Inpatient   Preferred Pharmacy:   [id number] - [place], [place] - [address] [address]  Phone: [phone] Fax: [phone]    [facility] - [place], [place] - [address]  [address]  [place] [address]  Phone: [phone] Fax: [phone]    Primary Care Provider: Toya Titus MD    Primary Insurance: [employer]  Secondary Insurance:     DISCHARGE DETAILS:                                                                                                 Additional Comments: ELBERT ARC reviewed newest PT/OT notes and provided info for obtaining authorization from [facility] for [facility]. Now awaiting auth.

## 2024-04-22 NOTE — PROGRESS NOTES
Progress Note - Cardiothoracic Surgery   Julia Perry 68 y.o. female MRN: [id number]  Unit/Bed#: [facility] 401-01 Encounter: [id number]    Severe MVCAD/ischemic CMP S/P CABG ; POD # 13  S/p DESx2 to OM and Impella POD # 11 (impella removed 4/12)    24 Hour Events: No events overnight.  No complaints.   Appetite is good.  Pain controlled.     Medications:   Scheduled Meds:  Current Facility-Administered Medications   Medication Dose Route Frequency Provider Last Rate    acetaminophen  975 mg Oral Q8H PRN Timoteo Rasmussen PA-C      amiodarone  200 mg Oral Q8H LINDA Timoteo Rasmussen PA-C      aspirin  81 mg Oral Daily Timoteo Rasmussen PA-C      atorvastatin  80 mg Oral Daily With Dinner Timoteo Rasmussen PA-C      bisacodyl  10 mg Rectal Daily PRN Brandon Up PA-C      chlorhexidine  15 mL Mouth/Throat BID Timoteo Rasmussen PA-C      Cholecalciferol  1,000 Units Oral Daily Timoteo Rasmussen PA-C      docusate sodium  100 mg Oral BID Timoteo Rasmussen PA-C      fondaparinux  2.5 mg Subcutaneous Q24H Timoteo Rasmussen PA-C      insulin lispro  1-5 Units Subcutaneous TID AC Timoteo Rasmussen PA-C      insulin lispro  1-5 Units Subcutaneous HS Timoteo Rasmussen PA-C      magnesium Oxide  400 mg Oral BID Jeri Rubin PA-C      metoprolol succinate  12.5 mg Oral Daily Isrrael Rojas PA-C      midodrine  10 mg Oral TID AC Isrrael Rojas PA-C      ondansetron  4 mg Intravenous Q6H PRN Timoteo Rasmussen PA-C      oxyCODONE  2.5 mg Oral Q4H PRN Timoteo Rasmussen PA-C      Or    oxyCODONE  5 mg Oral Q4H PRN Timoteo Rasmussen PA-C      pantoprazole  40 mg Oral Early Morning Timoteo Rasmussen PA-C      polyethylene glycol  17 g Oral Daily Timoteo Rasmussen PA-C      potassium chloride  20 mEq Oral Daily Isrrael Rojas PA-C      ticagrelor  90 mg Oral Q12H LINDA Timoteo Rasmussen PA-C      torsemide  20 mg Oral Daily Isrrael Rojas PA-C      trimethobenzamide  200 mg Intramuscular Q6H PRN Timoteo Rasmussen PA-C       Continuous Infusions:   PRN Meds:.   acetaminophen    bisacodyl    ondansetron    oxyCODONE **OR** oxyCODONE    trimethobenzamide    Vitals:   Vitals:    04/21/24 2238 04/22/24 0322 04/22/24 0600 04/22/24 0713   BP: 112/60 (!) 89/53  90/51   BP Location:       Pulse: 82   86   Resp: 16 16  17   Temp: 98.4 °F (36.9 °C) 98.5 °F (36.9 °C)     TempSrc:       SpO2: 98% 94%  96%   Weight:   71.1 kg (156 lb 12 oz)    Height:           Telemetry: NSR; Heart Rate: 84    Respiratory:   SpO2: SpO2: 96 %; Room Air    Intake/Output:     Intake/Output Summary (Last 24 hours) at 4/22/2024 0728  Last data filed at 4/22/2024 0501  Gross per 24 hour   Intake 478 ml   Output 3680 ml   Net -3202 ml      Weights:   Weight (last 2 days)       Date/Time Weight    04/22/24 0600 71.1 (156.75)    04/21/24 0547 71.2 (157)    04/20/24 0545 74 (163.2)              Results:   Results from last 7 days   Lab Units 04/22/24  0523 04/21/24  0442 04/20/24  0457   WBC Thousand/uL 11.05* 13.11* 10.30*   HEMOGLOBIN g/dL 9.4* 9.7* 9.0*   HEMATOCRIT % 30.7* 31.0* 28.2*   PLATELETS Thousands/uL 585* 571* 452*     Results from last 7 days   Lab Units 04/22/24  0537 04/21/24  0442 04/20/24  1837 04/20/24  0457   SODIUM mmol/L 138 139 130* 129*   POTASSIUM mmol/L 3.8 4.1  --  4.0   CHLORIDE mmol/L 95* 94*  --  89*   CO2 mmol/L 38* 36*  --  34*   BUN mg/dL 20 20  --  18   CREATININE mg/dL 0.95 1.01  --  0.80   CALCIUM mg/dL 9.1 9.5  --  8.6     Results from last 7 days   Lab Units 04/16/24  0502   INR  1.23*     Point of care glucose: 116-179    Invasive Lines/Tubes:  Invasive Devices       Peripherally Inserted Central Catheter Line  Duration             PICC Line 04/18/24 3 days                    Physical Exam:    General: No acute distress, Alert, and Normal appearance  HEENT/NECK:  Normocephalic. Atraumatic.  No jugular venous distention.    Cardiac: Regular rate and rhythm  Pulmonary:  Breath sounds clear bilaterally  Abdomen:  Non-tender, Non-distended, and Normal bowel sounds  Incisions:  Sternum is stable.  Incision is clean, dry, and intact.  and Saphenectomy incison is clean, dry, and intact.   Extremities: Extremities warm/dry and Trace edema B/L  Neuro: Alert and oriented X 3  Skin: Warm/Dry, without rashes or lesions.    Assessment:  Principal Problem:    Coronary artery disease involving native coronary artery  Active Problems:    DMII (diabetes mellitus, type 2) ([facility])    Hypomagnesemia    Ulcerative pancolitis without complication (HCC)    Ischemic cardiomyopathy    Mixed hyperlipidemia    Stenosis of left carotid artery    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HCC)    Hyperprolactinemia (HCC)    S/P carotid endarterectomy    Cardiogenic postoperative shock ([facility])    PONV (postoperative nausea and vomiting)    S/P CABG x 3    New onset atrial fibrillation (HCC)    ABLA (acute blood loss anemia)    LBBB (left bundle branch block)    Severe left ventricular systolic dysfunction       Severe MVCAD/ischemic CMP S/P CABG ; POD # 13  S/p DESx2 to OM and Impella POD # 11 (impella removed 4/12)    Plan:    Cardiac:   LVEF 15 - 20%, requires lifevest on discharge  NSR w LBBB  Off Milrinone since 4/18  ASA and Brilinta for recent RAFFY   Statin therapy on  Continue Toprol XL 12.5 mg daily  Midodrine 10 mg TID   Had PO Afib on 4/14 with successful DCCV on po amiodarone    Pulmonary:     Good Room air oxygen saturation; Continue incentive spirometry/Coughing/Deep breathing exercises    Renal: Neprhology consulted for hyponatremia, now resolved following samsca, fluid restriction, remains on torsemide 20 mg daily   Net negative 3202 ml/24h    Neuro:    Neurologically intact; No active issues     GI:    Cardiac diet    + Flatus  + BM postoperatively    Continue stool softeners and prn suppository    Continue GI prophylaxis    Endo:     Pre-Op Hgb A1C: 6.4  Jardiance added for heart failure diagnosis    7    Hematology:     Post-operative acute blood loss anemia; Hemoglobin 9.4; trend prn    8.   Disposition:         Anticipated discharge date: today pending rehab placement      VTE Pharmacologic Prophylaxis: Fondaparinux (Arixtra)  VTE Mechanical Prophylaxis: sequential compression device    Collaborative rounds completed with supervising physician  Plan of care discussed with bedside nurse    SIGNATURE: Jeri Rubin PA-C  DATE: April 22, 2024  TIME: 7:28 AM

## 2024-04-22 NOTE — PROGRESS NOTES
Advanced Heart Failure / Pulmonary Hypertension Service Progress Note    Julia Perry 68 y.o. female   MRN: [id number]  Unit/Bed#: [facility] 401-01; Encounter: [id number]    Assessment:  Principal Problem:    Coronary artery disease involving native coronary artery  Active Problems:    DMII (diabetes mellitus, type 2) (HCC)    Hypomagnesemia    Ulcerative pancolitis without complication (HCC)    Ischemic cardiomyopathy    Mixed hyperlipidemia    Stenosis of left carotid artery    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HCC)    Hyperprolactinemia (HCC)    S/P carotid endarterectomy    Cardiogenic postoperative shock (HCC)    PONV (postoperative nausea and vomiting)    S/P CABG x 3    New onset atrial fibrillation (HCC)    ABLA (acute blood loss anemia)    LBBB (left bundle branch block)    Severe left ventricular systolic dysfunction      Interval history:   Patient seen and examined. Patient reports feeling well, has no acute complaints.  She is doing well on Torsemide 20 mg PO, off milrinone infusion, on Midodrine 10mg TID.      Objective:   Intake/ Output: 478/3680/-3202 L in 24 hours, -15.7 L since admission  Weight: 156 pound, down from 163 lbs  CR. 0.95    Lab Results   Component Value Date    LVEF 20 04/19/2024    BNP 3,164 (H) 04/10/2024     (H) 12/28/2023       NYHA Class III., Stage C-D  Patient is currently close to euvolemic state     Today's Plan:  - Continue with current diuretic regimen   - F/u with Rachelle Beltre 3 days post discharge, appointment currently set for 4/25  - Outpatient follow up with Dr. Gavin   - Replace potassium         Plan:  MV CAD status post CABG x 3 (4/9/2024)- LIMA to LAD, SVG to OM1, SVG to RPDA  Cardiogenic shock- SCAI SHOCK STAGE D-C  -Presented for elective CABG-4/9/2024, developed cardiogenic shock with increasing requirements and eventually was placed on Impella. Now status post Impella explant on 4/12/2024  -Left heart cath post CABG on 4/10/2024, 99% mid circumflex into OM 2  "that was stented  -Milrinone discontinued on 4/19/2024 at midnight.   -Status: Warm on exam, appears euvolemic  - Repeat SVO2s over the weekend were acceptable, plan to discharge without milrinone     HFrEF- LVEF 15-20%  - Event history: Cardiogenic shock post CABG   - Etiology: ICM  - Status: NYHA Class III-IV/ ACC C; warm, volume up 3+ JAYLAN, +JVD  - Studies:   TTE 1/3/2024 demonstrated EF of 30%, severely reduced systolic function, akinesis basal inferoseptal, basal inferior, mid inferoseptal and mid inferior with hypokinesis of remaining segments.  Left atrial enlargement, mild to moderate mitral regurg, mild PI, small pericardial effusion, LVIDD 5.4 cm.  Follow-up TTE 4/12/2024 demonstrated EF 15 to 20% with severe global hypokinesis and regional variation     - Neurohormonal Blockade/GDMT:  --Beta-Blocker: Metoprolol succinate 12.5mg QD (outpatient on metoprolol succinate 25 mg daily)  --ACEi, ARB or ARNi: Held  --Aldosterone Receptor Blocker: Currently on hold  --SGLT2 Inhibitor: Jardiance 10mg QD   --Diuretic: Torsemide 20mg QD   -- Home diuretic: Furosemide 40 mg daily    - Sudden Cardiac Death Risk Reduction:  --ICD: LifeVest for now, need for ICD TBD     - Electrical Resynchronization: LBBB with QRS 126ms     - Advanced Therapies (if appropriate):  --Inotrope: Off Milrinone since 4/19  - Diet:  --2g sodium diet  --2000 ml fluid restriction    New diagnosis of atrial fibrillation- postop  - In SR now   -Currently on amiodarone 200 mg 3 times daily  Hypertension, currently hypotensive on midodrine 5 mg 3 times daily  Hyperlipidemia-   DM2  Bilateral carotid artery stenosis status post left CEA  Ulcerative colitis      Thank you for the opportunity to participate in the care of this patient.  Prince Cohn MD  Cardiology fellow-PGY5  Advanced Heart Failure  [facility]    Vitals:   Blood pressure 90/51, pulse 86, temperature 98.5 °F (36.9 °C), resp. rate 17, height 5' 4\" (1.626 m), " weight 71.1 kg (156 lb 12 oz), SpO2 96%.    Body mass index is 26.91 kg/m².    I/O last 3 completed shifts:  In: 478 [P.O.:478]  Out: 6380 [Urine:6380]    Wt Readings from Last 10 Encounters:   04/22/24 71.1 kg (156 lb 12 oz)   04/02/24 74.6 kg (164 lb 6.4 oz)   03/19/24 74.4 kg (164 lb)   03/19/24 74.4 kg (164 lb)   03/18/24 73.9 kg (163 lb)   03/04/24 73.9 kg (162 lb 14.7 oz)   02/15/24 71.1 kg (156 lb 12.8 oz)   02/05/24 71.2 kg (157 lb)   01/31/24 70.8 kg (156 lb)   01/30/24 72.1 kg (159 lb)     Vitals:    04/21/24 2238 04/22/24 0322 04/22/24 0600 04/22/24 0713   BP: 112/60 (!) 89/53  90/51   BP Location:       Pulse: 82   86   Resp: 16 16  17   Temp: 98.4 °F (36.9 °C) 98.5 °F (36.9 °C)     TempSrc:       SpO2: 98% 94%  96%   Weight:   71.1 kg (156 lb 12 oz)    Height:           Physical Exam  GEN: Julia Perry appears well, alert and oriented x 3, pleasant and cooperative   HEENT:  Normocephalic, atraumatic, anicteric, moist mucous membranes  NECK: mild JVD   HEART: Regular rate and rhythm, normal S1 and S2, no murmurs, clicks, friction rub noted    LUNGS: CTA bilaterally   ABDOMEN:  Normoactive bowel sounds, soft, no tenderness, no distention  EXTREMITIES: peripheral pulses palpable; 1+ bilateral lower extremity edema   NEURO: no gross focal findings; cranial nerves grossly intact   SKIN:  Dry, intact, warm to touch    Current Facility-Administered Medications:     acetaminophen (TYLENOL) tablet 975 mg, 975 mg, Oral, Q8H PRN, Timoteo Rasmussen PA-C    amiodarone tablet 200 mg, 200 mg, Oral, Q8H LINDA, Timoteo Rasmussen PA-C, 200 mg at 04/22/24 0510    aspirin chewable tablet 81 mg, 81 mg, Oral, Daily, Timoteo Rasmussen PA-C, 81 mg at 04/21/24 1025    atorvastatin (LIPITOR) tablet 80 mg, 80 mg, Oral, Daily With Dinner, Timoteo Rasmussen PA-C, 80 mg at 04/21/24 1823    bisacodyl (DULCOLAX) rectal suppository 10 mg, 10 mg, Rectal, Daily PRN, Brandon Up PA-C, 10 mg at 04/20/24 1432    chlorhexidine (PERIDEX) 0.12 %  oral rinse 15 mL, 15 mL, Mouth/Throat, BID, Timoteo Rasmussen PA-C, 15 mL at 04/18/24 0825    Cholecalciferol (VITAMIN D3) tablet 1,000 Units, 1,000 Units, Oral, Daily, Timoteo Rasmussen PA-C, 1,000 Units at 04/21/24 1028    docusate sodium (COLACE) capsule 100 mg, 100 mg, Oral, BID, Timoteo Rasmussen PA-C, 100 mg at 04/21/24 1823    Empagliflozin (JARDIANCE) tablet 10 mg, 10 mg, Oral, Daily, Jeri Rubin PA-C    fondaparinux (ARIXTRA) subcutaneous injection 2.5 mg, 2.5 mg, Subcutaneous, Q24H, Timoteo Rasmussen PA-C, 2.5 mg at 04/21/24 1028    insulin lispro (HumALOG/ADMELOG) 100 units/mL subcutaneous injection 1-5 Units, 1-5 Units, Subcutaneous, TID AC, 1 Units at 04/20/24 1630 **AND** Fingerstick Glucose (POCT), , , TID AC, Timoteo Rasmussen PA-C    insulin lispro (HumALOG/ADMELOG) 100 units/mL subcutaneous injection 1-5 Units, 1-5 Units, Subcutaneous, HS, Timoteo Rasmussen PA-C, 1 Units at 04/21/24 2111    magnesium Oxide (MAG-OX) tablet 400 mg, 400 mg, Oral, BID, Jeri Rubin PA-C, 400 mg at 04/21/24 2109    metoprolol succinate (TOPROL-XL) 24 hr tablet 12.5 mg, 12.5 mg, Oral, Daily, Isrrael Rojas PA-C, 12.5 mg at 04/21/24 1025    midodrine (PROAMATINE) tablet 10 mg, 10 mg, Oral, TID AC, Isrrael Rojas PA-C, 10 mg at 04/22/24 0726    ondansetron (ZOFRAN) injection 4 mg, 4 mg, Intravenous, Q6H PRN, Timoteo Rasmussen PA-C, 4 mg at 04/16/24 0801    oxyCODONE (ROXICODONE) split tablet 2.5 mg, 2.5 mg, Oral, Q4H PRN **OR** oxyCODONE (ROXICODONE) IR tablet 5 mg, 5 mg, Oral, Q4H PRN, Timoteo Rasmussen PA-C    pantoprazole (PROTONIX) EC tablet 40 mg, 40 mg, Oral, Early Morning, Timoteo Rasmussen PA-C, 40 mg at 04/22/24 0510    polyethylene glycol (MIRALAX) packet 17 g, 17 g, Oral, Daily, Timoteo Rasmussen PA-C, 17 g at 04/14/24 0847    potassium chloride (Klor-Con M20) CR tablet 20 mEq, 20 mEq, Oral, Daily, Isrrael Rojas PA-C, 20 mEq at 04/21/24 1028    ticagrelor (BRILINTA) tablet 90 mg, 90 mg, Oral, Q12H Timoteo MCKEON  KETAN Rasmussen, 90 mg at 04/21/24 2111    torsemide (DEMADEX) tablet 20 mg, 20 mg, Oral, Daily, Isrrael Rojas PA-C, 20 mg at 04/21/24 1028    trimethobenzamide (TIGAN) IM injection 200 mg, 200 mg, Intramuscular, Q6H PRN, Timoteo Rasmussen PA-C, 200 mg at 04/11/24 2124    Labs & Results:      Results from last 7 days   Lab Units 04/22/24  0523 04/21/24  0442 04/20/24  0457   WBC Thousand/uL 11.05* 13.11* 10.30*   HEMOGLOBIN g/dL 9.4* 9.7* 9.0*   HEMATOCRIT % 30.7* 31.0* 28.2*   PLATELETS Thousands/uL 585* 571* 452*         Results from last 7 days   Lab Units 04/22/24  0537 04/21/24  0442 04/20/24  0457   POTASSIUM mmol/L 3.8 4.1 4.0   CHLORIDE mmol/L 95* 94* 89*   CO2 mmol/L 38* 36* 34*   BUN mg/dL 20 20 18   CREATININE mg/dL 0.95 1.01 0.80   CALCIUM mg/dL 9.1 9.5 8.6     Results from last 7 days   Lab Units 04/16/24  0502   INR  1.23*

## 2024-04-22 NOTE — PHYSICAL THERAPY NOTE
PHYSICAL THERAPY NOTE          Patient Name: Julia Perry  Today's Date: 4/22/2024 04/22/24 1351   PT Last Visit   PT Visit Date 04/22/24   Note Type   Note Type Treatment   Pain Assessment   Pain Assessment Tool 0-10   Pain Score No Pain   Restrictions/Precautions   Other Precautions Cardiac/sternal;Telemetry   General   Chart Reviewed Yes   Response to Previous Treatment Patient with no complaints from previous session.   Family/Caregiver Present Yes   Cognition   Overall Cognitive Status WFL   Arousal/Participation Alert;Cooperative   Attention Attends with cues to redirect   Orientation Level Oriented to person;Oriented to place;Oriented to situation   Memory Decreased recall of precautions   Following Commands Follows one step commands without difficulty   Subjective   Subjective Alert; in the chair; agreeable to mobilize   Transfers   Sit to Stand 4  Minimal assistance   Additional items Assist x 1;Verbal cues   Stand to Sit 4  Minimal assistance   Additional items Assist x 1;Verbal cues   Ambulation/Elevation   Gait pattern Wide FANI;Inconsistent sara;Excessively slow;Short stride   Gait Assistance 4  Minimal assist   Additional items Assist x 1;Verbal cues;Tactile cues   Assistive Device None;Other (Comment)  (hand hold (A) -> none)   Distance 50 ft + 80 ft + 50 ft + 70 ft + 10 ft w/ seated rest periods and steps negotiation in between   Stair Management Assistance 4  Minimal assist   Additional items Assist x 1;Verbal cues;Tactile cues  (reviewed sequencing and UE precaution on the hand rail)   Stair Management Technique One rail L;Step to pattern;Backward;Sideways;Nonreciprocal   Number of Stairs 3  (1 step x 3 trials)   Balance   Static Sitting Fair +   Dynamic Sitting Fair   Static Standing Fair -   Dynamic Standing Fair -   Ambulatory Poor +   Activity Tolerance   Activity Tolerance Patient tolerated treatment  well   Medical Staff Made Aware Co-Tx for mobility performed w/ OTR due to complexity of medical status   Exercises   Knee AROM Long Arc Quad Sitting;20 reps;AROM;Bilateral   Ankle Pumps Sitting;20 reps;AROM;Bilateral   Marching Sitting;10 reps;AROM;Bilateral   Assessment   Prognosis Good   Problem List Decreased strength;Decreased endurance;Impaired balance;Decreased mobility   Assessment Pt demonstrated overall improvement in balance, endurance and all aspects of observed mobility progressing to min (A) level w/ transfers, amb w/o AD and on the steps; rest periods provided as needed and pt remained in NAD. Overall, pt still exhibits overall weakness, incl proximal LE strength deficit and general deconditioning affecting her balance and mobility skills requiring  PT to address; D/C recommendations are listed below; will follow   Goals   Patient Goals to get stronger   STG Expiration Date 04/25/24   PT Treatment Day 4   Plan   Treatment/Interventions Functional transfer training;LE strengthening/ROM;Elevations;Therapeutic exercise;Endurance training;Bed mobility;Gait training;Spoke to nursing;Spoke to case management;OT;Family   Progress Progressing toward goals   PT Frequency 4-6x/wk   Discharge Recommendation   Rehab Resource Intensity Level, PT I (Maximum Resource Intensity)   Equipment Recommended Other (Comment)  (r/o trial of SPC)   AM-PAC Basic Mobility Inpatient   Turning in Flat Bed Without Bedrails 3   Lying on Back to Sitting on Edge of Flat Bed Without Bedrails 3   Moving Bed to Chair 3   Standing Up From Chair Using Arms 3   Walk in Room 3   Climb 3-5 Stairs With Railing 3   Basic Mobility Inpatient Raw Score 18   Basic Mobility Standardized Score 41.05   [facility] Highest Level Of Mobility   -HLM Goal 6: Walk 10 steps or more   -HLM Achieved 8: Walk 250 feet ot more   Education   Education Provided Mobility training;Home exercise program   Patient Demonstrates verbal understanding;Reinforcement  needed   End of Consult   Patient Position at End of Consult Bedside chair;All needs within reach       Lobo Jain

## 2024-04-22 NOTE — CASE MANAGEMENT
[facility] received request for authorization from Care Manager.  Authorization request for: Acute Rehab  Facility Name: [facility]  NPI: [id number]  Facility MD:  Ashley DePadua   NPI: [id number]  Authorization initiated by contacting insurance:  [employer]  Via: [place] [facility]   Clinicals submitted via: fax #  [phone]  Pending Reference #: [id number]    Care Manager notified:  Aracely Gonzalez

## 2024-04-23 ENCOUNTER — HOME HEALTH ADMISSION (OUTPATIENT)
Dept: HOME HEALTH SERVICES | Facility: HOME HEALTHCARE | Age: 69
End: 2024-04-23
Payer: COMMERCIAL

## 2024-04-23 ENCOUNTER — TRANSITIONAL CARE MANAGEMENT (OUTPATIENT)
Dept: FAMILY MEDICINE CLINIC | Facility: CLINIC | Age: 69
End: 2024-04-23

## 2024-04-23 VITALS
RESPIRATION RATE: 18 BRPM | DIASTOLIC BLOOD PRESSURE: 51 MMHG | HEART RATE: 79 BPM | TEMPERATURE: 97.9 F | HEIGHT: 64 IN | BODY MASS INDEX: 26.95 KG/M2 | SYSTOLIC BLOOD PRESSURE: 95 MMHG | WEIGHT: 157.85 LBS | OXYGEN SATURATION: 98 %

## 2024-04-23 LAB
ALBUMIN SERPL BCP-MCNC: 3.1 G/DL (ref 3.5–5)
ALP SERPL-CCNC: 66 U/L (ref 34–104)
ALT SERPL W P-5'-P-CCNC: 12 U/L (ref 7–52)
ANION GAP SERPL CALCULATED.3IONS-SCNC: 9 MMOL/L (ref 4–13)
AST SERPL W P-5'-P-CCNC: 23 U/L (ref 13–39)
BILIRUB SERPL-MCNC: 1.06 MG/DL (ref 0.2–1)
BUN SERPL-MCNC: 17 MG/DL (ref 5–25)
CALCIUM ALBUM COR SERPL-MCNC: 9.8 MG/DL (ref 8.3–10.1)
CALCIUM SERPL-MCNC: 9.1 MG/DL (ref 8.4–10.2)
CHLORIDE SERPL-SCNC: 97 MMOL/L (ref 96–108)
CO2 SERPL-SCNC: 32 MMOL/L (ref 21–32)
CREAT SERPL-MCNC: 0.84 MG/DL (ref 0.6–1.3)
GFR SERPL CREATININE-BSD FRML MDRD: 72 ML/MIN/1.73SQ M
GLUCOSE SERPL-MCNC: 109 MG/DL (ref 65–140)
GLUCOSE SERPL-MCNC: 110 MG/DL (ref 65–140)
GLUCOSE SERPL-MCNC: 169 MG/DL (ref 65–140)
MAGNESIUM SERPL-MCNC: 2.1 MG/DL (ref 1.9–2.7)
PHOSPHATE SERPL-MCNC: 3.6 MG/DL (ref 2.3–4.1)
POTASSIUM SERPL-SCNC: 4 MMOL/L (ref 3.5–5.3)
PROT SERPL-MCNC: 5.5 G/DL (ref 6.4–8.4)
SODIUM SERPL-SCNC: 138 MMOL/L (ref 135–147)

## 2024-04-23 PROCEDURE — 99024 POSTOP FOLLOW-UP VISIT: CPT | Performed by: PHYSICIAN ASSISTANT

## 2024-04-23 PROCEDURE — 84100 ASSAY OF PHOSPHORUS: CPT | Performed by: INTERNAL MEDICINE

## 2024-04-23 PROCEDURE — 80053 COMPREHEN METABOLIC PANEL: CPT | Performed by: INTERNAL MEDICINE

## 2024-04-23 PROCEDURE — 83735 ASSAY OF MAGNESIUM: CPT | Performed by: INTERNAL MEDICINE

## 2024-04-23 PROCEDURE — 82948 REAGENT STRIP/BLOOD GLUCOSE: CPT

## 2024-04-23 PROCEDURE — 99232 SBSQ HOSP IP/OBS MODERATE 35: CPT | Performed by: INTERNAL MEDICINE

## 2024-04-23 RX ORDER — FUROSEMIDE 40 MG/1
40 TABLET ORAL DAILY
Qty: 30 TABLET | Refills: 2 | Status: SHIPPED | OUTPATIENT
Start: 2024-04-23

## 2024-04-23 RX ORDER — POTASSIUM CHLORIDE 20 MEQ/1
20 TABLET, EXTENDED RELEASE ORAL DAILY
Qty: 30 TABLET | Refills: 1 | Status: SHIPPED | OUTPATIENT
Start: 2024-04-24

## 2024-04-23 RX ORDER — OXYCODONE HYDROCHLORIDE 5 MG/1
TABLET ORAL
Qty: 30 TABLET | Refills: 0 | Status: SHIPPED | OUTPATIENT
Start: 2024-04-23 | End: 2024-05-03

## 2024-04-23 RX ORDER — PANTOPRAZOLE SODIUM 40 MG/1
40 TABLET, DELAYED RELEASE ORAL
Qty: 30 TABLET | Refills: 0 | Status: SHIPPED | OUTPATIENT
Start: 2024-04-24

## 2024-04-23 RX ORDER — MIDODRINE HYDROCHLORIDE 10 MG/1
10 TABLET ORAL
Qty: 90 TABLET | Refills: 2 | Status: SHIPPED | OUTPATIENT
Start: 2024-04-23

## 2024-04-23 RX ORDER — ROSUVASTATIN CALCIUM 40 MG/1
40 TABLET, COATED ORAL DAILY
Qty: 30 TABLET | Refills: 2 | Status: SHIPPED | OUTPATIENT
Start: 2024-04-23

## 2024-04-23 RX ORDER — AMIODARONE HYDROCHLORIDE 200 MG/1
TABLET ORAL
Qty: 37 TABLET | Refills: 1 | Status: SHIPPED | OUTPATIENT
Start: 2024-04-23

## 2024-04-23 RX ORDER — METOPROLOL SUCCINATE 25 MG/1
12.5 TABLET, EXTENDED RELEASE ORAL DAILY
Qty: 30 TABLET | Refills: 2 | Status: SHIPPED | OUTPATIENT
Start: 2024-04-24

## 2024-04-23 RX ORDER — DOCUSATE SODIUM 100 MG/1
100 CAPSULE, LIQUID FILLED ORAL 2 TIMES DAILY
COMMUNITY
Start: 2024-04-23

## 2024-04-23 RX ADMIN — Medication 1000 UNITS: at 09:11

## 2024-04-23 RX ADMIN — DOCUSATE SODIUM 100 MG: 100 CAPSULE, LIQUID FILLED ORAL at 09:11

## 2024-04-23 RX ADMIN — MAGNESIUM OXIDE TAB 400 MG (241.3 MG ELEMENTAL MG) 400 MG: 400 (241.3 MG) TAB at 09:11

## 2024-04-23 RX ADMIN — POTASSIUM CHLORIDE 20 MEQ: 1500 TABLET, EXTENDED RELEASE ORAL at 09:11

## 2024-04-23 RX ADMIN — INSULIN LISPRO 1 UNITS: 100 INJECTION, SOLUTION INTRAVENOUS; SUBCUTANEOUS at 11:24

## 2024-04-23 RX ADMIN — MIDODRINE HYDROCHLORIDE 10 MG: 5 TABLET ORAL at 11:24

## 2024-04-23 RX ADMIN — METOPROLOL SUCCINATE 12.5 MG: 25 TABLET, FILM COATED, EXTENDED RELEASE ORAL at 09:11

## 2024-04-23 RX ADMIN — TORSEMIDE 20 MG: 20 TABLET ORAL at 09:11

## 2024-04-23 RX ADMIN — MIDODRINE HYDROCHLORIDE 10 MG: 5 TABLET ORAL at 06:00

## 2024-04-23 RX ADMIN — EMPAGLIFLOZIN 10 MG: 10 TABLET, FILM COATED ORAL at 11:24

## 2024-04-23 RX ADMIN — TICAGRELOR 90 MG: 90 TABLET ORAL at 09:11

## 2024-04-23 RX ADMIN — FONDAPARINUX SODIUM 2.5 MG: 2.5 INJECTION, SOLUTION SUBCUTANEOUS at 09:11

## 2024-04-23 RX ADMIN — AMIODARONE HYDROCHLORIDE 200 MG: 200 TABLET ORAL at 05:56

## 2024-04-23 RX ADMIN — PANTOPRAZOLE SODIUM 40 MG: 40 TABLET, DELAYED RELEASE ORAL at 05:56

## 2024-04-23 RX ADMIN — ASPIRIN 81 MG CHEWABLE TABLET 81 MG: 81 TABLET CHEWABLE at 09:11

## 2024-04-23 NOTE — CASE MANAGEMENT
CM received notification from Physician Advisor:    Mellissa Merino      Denial Upheld   Level of Care: Acute Rehab  Rationale for denial upheld: patient does not meet for medical complexity at this time and needs can be met at subacute rehab facility.  Insurer: [employer]  Insurer's Medical Director:Dr. Pittman  Date outcome received: 4/23  Facility:[facility]     Care Manager notified: Aracely Gonzalez

## 2024-04-23 NOTE — CASE MANAGEMENT
Case Management Discharge Planning Note    Patient name Julia Perry  [id number]  : 1955 Date 2024       Current Admission Date: 2024  Current Admission Diagnosis:Coronary artery disease involving native coronary artery   Patient Active Problem List    Diagnosis Date Noted    LBBB (left bundle branch block) 2024    Severe left ventricular systolic dysfunction 2024    New onset atrial fibrillation (HCC) 2024    ABLA (acute blood loss anemia) 2024    S/P CABG x 3 2024    Cardiogenic postoperative shock (HCC) 2024    PONV (postoperative nausea and vomiting) 2024    S/P carotid endarterectomy 2024    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HCC) 02/15/2024    Hyperprolactinemia (HCC) 02/15/2024    Stenosis of left carotid artery 2024    Coronary artery disease involving native coronary artery 2024    Ischemic cardiomyopathy 2024    Mixed hyperlipidemia 2024    Ulcerative pancolitis without complication (HCC) 2024    Polyp of ascending colon 2023    Acquired right foot drop 2023    Hypomagnesemia 2023    Hair loss 2023    Colitis 2023    History of biliary dyskinesia 2023    DMII (diabetes mellitus, type 2) (HCC) 2023    Gallbladder polyp 2022    Leiomyoma of uterus 2013      LOS (days): 14  Geometric Mean LOS (GMLOS) (days): 30.3  Days to GMLOS:16.1     OBJECTIVE:  Risk of Unplanned Readmission Score: 20.92         Current admission status: Inpatient   Preferred Pharmacy:   [id number] - [place], [place] - [address] [address]  Phone: [phone] Fax: [phone]    [facility] - [place] - [address]  [address]  [place] [address]  Phone: [phone] Fax: [phone]    Primary Care Provider: Toya Titus MD    Primary Insurance: [employer] REP  Secondary Insurance:     DISCHARGE DETAILS:                                Requested Home Health Care         Is the patient interested in HHC at discharge?: Yes  Home Health Discipline requested:: Nursing  Home Health Agency Name:: [facility]  Home Health Follow-Up Provider:: PCP  Home Health Services Needed:: Post-Op Care and Assessment, Evaluate Functional Status and Safety, Diabetes Management  Homebound Criteria Met:: Requires the Assistance of Another Person for Safe Ambulation or to Leave the Home  Supporting Clincal Findings:: Limited Endurance, Fatigues Easliy in Short Distances    DME Referral Provided  Referral made for DME?: No    Other Referral/Resources/Interventions Provided:  Interventions: HHC  Referral Comments: Denies by [facility] for ARC. Plan is to discharge home with  and [facility] only nursing. Refusing home PT.    Would you like to participate in our [facility] service program?  : No - Declined    Treatment Team Recommendation: Home with Home Health Care  Discharge Destination Plan:: Home with Home Health Care  Transport at Discharge : Family                                      Additional Comments: Denied by [facility] for rehab. They chose [facility] for nursing, refused PT.  Home today.

## 2024-04-23 NOTE — PROGRESS NOTES
Progress Note - Nephrology   Julia Perry 68 y.o. female MRN: [id number]  Unit/Bed#: [facility] 401-01 Encounter: [id number]      Assessment / Plan:    Hyponatremia  Prior sodium levels have been normal  Hyponatremia likely secondary to volume overload as well as depressed ejection fraction  Status post tolvaptan and on 4/20  Sodium level has normalized with diuresis and has remained normal  Reviewed concept of solid and fluid body weight with the patient and her  and how to check for peripheral edema as an outpatient  Note, the patient did not receive her torsemide on 4/22 due to parameters not being met  Contraction metabolic alkalosis  Secondary to diuresis  Bicarbonate level has improved today  Hypomagnesemia  Continue oral supplementation and trend levels  Other issues:  CAD-status post CABG on 4/10.  Complicated by cardiogenic shock requiring inotropes/vasopressors.  Status post cath on 4/10  Hypotension-remains on midodrine  New onset M-rvo-[id number] post cardioversion  Systolic CHF-ejection fraction 20% with moderate MR and mild to moderate TR  Diabetes mellitus type 2  Pituitary microadenoma  Ulcerative pancolitis   mL/22        Plan:  Continue fluid restriction -optimally would decrease fluid restriction somewhat to 1500 to 1800 mL but the patient is having difficulty on 2 L/day and likely will not fluid restrict much as an outpatient given her thirst  Continue to diurese-currently on torsemide daily and Jardiance  Monitor calcium level on vitamin D  Consider Diamox if bicarbonate level increases  Continue to trend bicarbonate level  Try and maintain potassium level at 4 or greater  Continue oral magnesium supplementation  Repeat BMP in a.m.  Would make sure that Jardiance is covered as an outpatient by her insurance.  If not, she will need higher dose of torsemide              Subjective:   The patient was seen and examined.  Her  was at bedside.  She denied shortness of breath, chest pain or  "pressure, abdominal pain, paroxysmal nocturnal dyspnea, orthopnea.  She has been ambulating in the hallway with her .      Objective:     Vitals: Blood pressure (!) 86/48, pulse 82, temperature 97.9 °F (36.6 °C), resp. rate 18, height 5' 4\" (1.626 m), weight 71.6 kg (157 lb 13.6 oz), SpO2 96%.,Body mass index is 27.09 kg/m².Temp (24hrs), Av.2 °F (36.8 °C), Min:97.7 °F (36.5 °C), Max:98.4 °F (36.9 °C)      Weight (last 2 days)       Date/Time Weight    24 0600 71.6 (157.85)    24 0600 71.1 (156.75)    24 0547 71.2 (157)                   Intake/Output Summary (Last 24 hours) at 2024 1111  Last data filed at 2024 0800  Gross per 24 hour   Intake 1300 ml   Output 3075 ml   Net -1775 ml     I/O last 24 hours:  In: 1420 [P.O.:1420]  Out: 3375 [Urine:3375]        Physical Exam    BP (!) 86/48   Pulse 82   Temp 97.9 °F (36.6 °C)   Resp 18   Ht 5' 4\" (1.626 m)   Wt 71.6 kg (157 lb 13.6 oz)   SpO2 96%   BMI 27.09 kg/m²   Vital signs were reviewed  Constitutional: The patient was awake, alert, pleasant, cooperative and in no apparent distress  Cardiovascular: Jugular venous distention was present, S1-S2, no pericardial friction rub S3 appreciated, peripheral edema remains significant for perhaps slightly improved  Pulmonary: Adequate inspiratory effort, lungs were clear with some scattered crackles at the bases                Invasive Devices       Peripherally Inserted Central Catheter Line  Duration             PICC Line 24 Right Brachial 4 days                    Medications:    Scheduled Meds:  Current Facility-Administered Medications   Medication Dose Route Frequency Provider Last Rate    acetaminophen  975 mg Oral Q8H PRN Timoteo Rasmussen PA-C      amiodarone  200 mg Oral Q8H LINDA Timoteo Rasmussen PA-C      aspirin  81 mg Oral Daily Timoteo Rasmussen PA-C      atorvastatin  80 mg Oral Daily With Dinner Timoteo Rasmussen PA-C      bisacodyl  10 mg Rectal Daily PRNINA DOLL" KETAN Up      chlorhexidine  15 mL Mouth/Throat BID Timoteo Rasmussen PA-C      Cholecalciferol  1,000 Units Oral Daily Timoteo Rasmussen PA-C      docusate sodium  100 mg Oral BID Timoteo Rasmussen PA-C      Empagliflozin  10 mg Oral Daily Jeri Rubin PA-C      fondaparinux  2.5 mg Subcutaneous Q24H Timoteo Rasmussen PA-C      insulin lispro  1-5 Units Subcutaneous TID AC Timoteo Rasmussen PA-C      insulin lispro  1-5 Units Subcutaneous HS Timoteo Rasmussen PA-C      magnesium Oxide  400 mg Oral BID Jeri Rubin PA-C      metoprolol succinate  12.5 mg Oral Daily Isrrael Rojas PA-C      midodrine  10 mg Oral TID AC Isrrael Rojas PA-C      ondansetron  4 mg Intravenous Q6H PRN Timoteo Rasmussen PA-C      oxyCODONE  2.5 mg Oral Q4H PRN Timoteo Rasmussen PA-C      Or    oxyCODONE  5 mg Oral Q4H PRN Timoteo Rasmussen PA-C      pantoprazole  40 mg Oral Early Morning Timoteo Rasmussen PA-C      polyethylene glycol  17 g Oral Daily Timoteo Rasmussen PA-C      potassium chloride  20 mEq Oral Daily Isrrael Rojas PA-C      ticagrelor  90 mg Oral Q12H [facility] Timoteo Rasmussen PA-C      torsemide  20 mg Oral Daily Isrrael Rojas PA-C      trimethobenzamide  200 mg Intramuscular Q6H PRN Timoteo Rasmussen PA-C         PRN Meds:.  acetaminophen    bisacodyl    ondansetron    oxyCODONE **OR** oxyCODONE    trimethobenzamide    Continuous Infusions:         LAB RESULTS:      Results from last 7 days   Lab Units 04/23/24  0437 04/22/24  0537 04/22/24  0523 04/21/24  0442 04/20/24  0457 04/19/24  0502 04/18/24  0427 04/17/24  0402   WBC Thousand/uL  --   --  11.05* 13.11* 10.30* 11.82* 10.25* 11.10*   HEMOGLOBIN g/dL  --   --  9.4* 9.7* 9.0* 9.5* 9.0* 8.7*   HEMATOCRIT %  --   --  30.7* 31.0* 28.2* 29.7* 28.2* 27.6*   PLATELETS Thousands/uL  --   --  585* 571* 452* 471* 396* 287   POTASSIUM mmol/L 4.0 3.8  --  4.1 4.0 4.1 3.9 3.9   CHLORIDE mmol/L 97 95*  --  94* 89* 88* 90* 91*   CO2 mmol/L 32 38*  --  36* 34* 35* 34* 32   BUN mg/dL 17  20  --  20 18 17 16 13   CREATININE mg/dL 0.84 0.95  --  1.01 0.80 0.82 0.77 0.76   CALCIUM mg/dL 9.1 9.1  --  9.5 8.6 8.9 8.2* 8.0*   ALK PHOS U/L 66  --   --   --   --   --   --   --    ALT U/L 12  --   --   --   --   --   --   --    AST U/L 23  --   --   --   --   --   --   --    MAGNESIUM mg/dL 2.1  --   --   --   --   --  1.8* 1.5*   PHOSPHORUS mg/dL 3.6  --   --   --   --   --  3.2 3.3       CUTURES:  Lab Results   Component Value Date    BLOODCX No Growth After 5 Days. 06/05/2023    BLOODCX No Growth After 5 Days. 06/05/2023    URINECX >100,000 cfu/ml Escherichia coli (A) 01/11/2024    URINECX >100,000 cfu/ml Escherichia coli (A) 05/25/2022                 PLEASE NOTE:  This encounter was completed utilizing the [employer]/Fluency Direct Speech Voice Recognition Software. Grammatical errors, random word insertions, pronoun errors and incomplete sentences are occasional consequences of the system due to software limitations, ambient noise and hardware issues.These may be missed by proof reading prior to affixing electronic signature. Any questions or concerns about the content, text or information contained within the body of this dictation should be directly addressed to the physician for clarification. Please do not hesitate to call me directly if you have any any questions or concerns.

## 2024-04-23 NOTE — CASE MANAGEMENT
Support Center has received INTENT TO DENY for Acute Rehab Authorization.   Insurance: [employer]   Called in by Rep: Kathy   Intent to Deny Reason: does not meet CMS guidelines  Facility: [facility]   Pending Auth #: [id number]   Peer to Peer Phone#:   [phone] opt [phone]  Deadline: 4/23 @ 4:00p  Physician Advisor Group Liaisons notified    Care Manager notified: Aracely Gonzalez

## 2024-04-23 NOTE — WOUND OSTOMY CARE
Progress Note - Wound   Julia Perry 68 y.o. female MRN: [id number]  Unit/Bed#: [facility] 401-01 Encounter: [id number]      Assessment:   Patient admitted to [facility] due to coronary artery disease. History of CHF, diabetes. Wound care follow-up fr left medial thigh and right anterior thigh wounds. Patient agreeable to assessment, is alert and oriented x4, continent of bowel and bladder, is OOB to chair with EHOB waffle cushion in place, is an assist with care.     1. Patient declined assessment of sacrum and buttock.    2. Right abdominal pannus- skin is dry, intact, blanchable.    3. Right hip- Unclear etiology. Wound is oval in shape, true depth unknown, approx. 40% dry brown eschar and 60% yellow adhered slough, with no drainage noted. Wilda-wound is dry, intact, blanchable hyperpigmented skin.    4. Right anterior thigh wound- Is a ruptured blister. Wound is oval in shape, full-thickness, 100% beefy red granulation tissue with old adhered drainage, with moderate amount of sanguineous drainage noted. Wilda-wound is dry, intact, no redness.    5. Left thigh wound on assessment has resolved, skin is dry, intact, with purple ecchymosis.     6. Bilateral heels- skin is dry, intact, blanchable.    Educated patient on importance of frequent offloading of pressure via turning, repositioning, and weight-shifting. Verbalized understanding of plan of care.    No induration, fluctuance, odor, warmth, redness, or purulence noted to the above noted wounds. New dressings applied. Patient tolerated well, reports mild pain to the wounds. Primary nurse aware of plan of care. See flow sheets for more detailed assessment findings. Will follow along.    Skin care plans:  1-Hydraguard to bilateral sacrum, buttock and heels BID and PRN  2-Elevate heels to offload pressure.  3-Ehob cushion in chair when out of bed.  4-Moisturize skin daily with skin nourishing cream.  5-Right anterior thigh- Cleanse wound with NSS, pat dry. Apply Adaptic over wound  bed and then calcium alginate with silver, cover with Silicone foam dressing. Change every other day and as needed for soilage/displacement.  6-Right hip- Cleanse wound with NSS, pat dry. Apply Silver gel over wound bed and cover with Silicone foam dressing. Change every other day and as needed for soilage/displacement.      Wound 04/13/24 Other (comment) Thigh Anterior;Left (Active)   Wound Image   04/23/24 1315   Wound Description Dry;Intact 04/23/24 1315   Wilda-wound Assessment Dry;Intact 04/23/24 1315   Wound Length (cm) 0 cm 04/23/24 1315   Wound Width (cm) 0 cm 04/23/24 1315   Wound Depth (cm) 0 cm 04/23/24 1315   Wound Surface Area (cm^2) 0 cm^2 04/23/24 1315   Wound Volume (cm^3) 0 cm^3 04/23/24 1315   Calculated Wound Volume (cm^3) 0 cm^3 04/23/24 1315   Wound 04/16/24 Thigh Anterior;Right (Active)   Wound Image   04/23/24 1313   Wound Description Beefy red;Granulation tissue;Drainage 04/23/24 1313   Wilda-wound Assessment Dry;Intact 04/23/24 1313   Wound Length (cm) 2 cm 04/23/24 1313   Wound Width (cm) 5 cm 04/23/24 1313   Wound Depth (cm) 0.1 cm 04/23/24 1313   Wound Surface Area (cm^2) 10 cm^2 04/23/24 1313   Wound Volume (cm^3) 1 cm^3 04/23/24 1313   Calculated Wound Volume (cm^3) 1 cm^3 04/23/24 1313   Drainage Amount Moderate 04/23/24 1313   Drainage Description Sanguineous 04/23/24 1313   Non-staged Wound Description Full thickness 04/23/24 1313   Treatments Cleansed;Irrigation with NSS;Site care 04/23/24 1313   Dressing Calcium Alginate with Silver;Foam, Silicon (eg. Allevyn, etc) 04/23/24 1313   Wound packed? No 04/23/24 1313   Dressing Changed Changed 04/23/24 1313   Patient Tolerance Tolerated well 04/23/24 1313   Dressing Status Clean;Dry;Intact 04/23/24 1313       Wound 04/23/24 Hip Right;Lateral (Active)   Wound Image   04/23/24 1315   Wound Description Yellow;Brown 04/23/24 1315   Wilda-wound Assessment Dry;Intact;Hyperpigmented 04/23/24 1315   Wound Length (cm) 1 cm 04/23/24 1315   Wound  Width (cm) 1.7 cm 04/23/24 1315   Wound Depth (cm) 0.1 cm 04/23/24 1315   Wound Surface Area (cm^2) 1.7 cm^2 04/23/24 1315   Wound Volume (cm^3) 0.17 cm^3 04/23/24 1315   Calculated Wound Volume (cm^3) 0.17 cm^3 04/23/24 1315   Drainage Amount None 04/23/24 1315   Non-staged Wound Description Not applicable 04/23/24 1315   Treatments Cleansed;Site care 04/23/24 1315   Dressing Foam, Silicon (eg. Allevyn, etc) 04/23/24 1315   Wound packed? No 04/23/24 1315   Dressing Changed Reinforced 04/23/24 1315   Patient Tolerance Tolerated well 04/23/24 1315   Dressing Status Clean;Dry;Intact 04/23/24 1315       Call or tigertext with any questions  Wound Care will continue to follow    Stefanie YANEZN RN CWON  Wound and Ostomy care

## 2024-04-23 NOTE — DISCHARGE SUMMARY
Discharge Summary - Cardiothoracic Surgery   Julia Perry 68 y.o. female MRN: [id number]  Unit/Bed#: [facility] 401-01 Encounter: [id number]    Admission Date: 4/9/2024     Discharge Date: 04/23/24    Admitting Diagnosis: Coronary artery disease involving native coronary artery of native heart with other form of angina pectoris (HCC) [I25.118]    Primary Discharge Diagnosis:   Multivessel coronary artery disease  S/P Coronary artery bypass grafting x 3 with left internal mammary artery to left anterior descending, saphenous vein graft to obtuse marginal 2, saphenous vein graft to right posterior descending artery.     Secondary Discharge Diagnosis:   CAD, ICMP (EF 30%), HLD, NIDDM2, chronic combined systolic & diastolic CHF, IVCD, B/L carotid stenosis s/p L CEA (3/4/24), intracranial stenosis, pituitary macroadenoma, hyperprolactinemia, hypercalcemia, ulcerative colitis.    Attending: Josh Castellanos    Consulting Physician(s):   Cardiology  Endocrinology  Medical critical care    Procedures Performed:   Procedure(s):  Cardiac Impella Removal     Hospital Course:   4/9: Elective admission for CABG x 3.   Intraoperative blood products include: 2 U PRBC.  No significant postoperative bleeding.  Transferred to ICU without inotropic or pressor support/supported with vaso at 0.04, milrinone at 0.25, epi at 4, and levo at 2.      4/10: Low CI overnight, given volume and increase in inotropes/pressor support without resolution.  STAT echo, VBG this AM.  Currently on Epi 3, Levo 6, Vaso 0.04 and Milrinone 0.5. Remains intubated, not weaning well with low Vt and tachypnea, remains on CMV. Pending Echo and stat labs may need IABP or Impella for support. LVEF down to 14% on echo to go to cath lab.  PM: succesful RAFFY x 2 to OM and Impella placed in cath lab, flow at p9, hemodynamics/CI improved, pressors/inotropes able to weaned down partially. Maintain on vent.      4/11: on vent FiO2 40%, impella at P5 (down from P7 due to  hematuria/hemolysis), SR 90s, Epi off,  levo at 6, Milrinone at 0.25, wean levo,  hgb 9.2, Cr 0.9, u/o borderline.  vascular following given cool LE with Impella, not grossly ischemic. Start systemic heparin gtt. Keep CTs/PWs. Wean vent as able. Start diuresis for goal net negative.      4/12: Limited echo yesterday with improved RV. Extubated to NC yesterday. Milrinone weaned off. Remains on Levo 7mcg. Impella at P5. 40mg IV Lasix x 3 over the last 24 hours to achieve net negative balance, net + 200, continue PRN lasix for goal net neg.. ST in 110s, BP 91/70. Echo this AM, restart milrinone at 0.25, wean impella down to P1 with recheck of echo. PM: Taken to cath lab for Impella removal.     4/13: Given 1 PRBC s/p Impella removal, Hb 9.2 this AM, heparin gtt discontinued w/ Impella. Milrinone restarted s/p Impella removal, on 0.25, CI 2.6, wean to 0.13, maintain swan/cordis. On Levo 7, hold beta blocker, wean as tolerated. Given Lasix 40mg IV x1 to get net (-), net (+) 198.18cc/24hrs, continue Lasix PRN but attempt to get net (-), maintain hicks catheter. ST 110s. Keep CTs & EPWs. Maintain insulin gtt, consult endocrinology. Keep in ICU.     4/14: Given 250cc albumin for low CI/high SVR, remains on milrinone 0.13, last CI 1.9, maintain milrinone & sawn/cordis/a line, trial IVF to help CI. Levo weaned to off. Given Lasix 40mg IV x1 for low UOP w/ response, maintain hicks catheter, net (-) 1948.82cc/24hrs, Lasix PRN. When OOB to chair this AM had a fib w/ RVR w/ acute drop in SBP, given amio bolus/gtt, given IV Mg, started on levo 10 but transitioned to franklyn at 100 however was cardioverted x1 to ST w/ rates in 110s which is what she was prior to a fib episode, wean franklyn as able & if off then start beta blocker, hold on AC for now. On 2LNC, wean as tolerated. Maintain CTs . Plt 98 from 91. Hold beta blocker. Keep in ICU.     4/15: Lasix 40 overnight for low urine output.  Milrinone remains at 0.25 with CI of 2. Remains  supported with Neosynephrine 110 mcg/min. Wean Nhan as able. Decrease Primacor to 0.13. Start Lasix 40 mg IV BID. Maintain [place]. Maintain ICU level of care.      4/16: Unsuccessful wean from Milrinone yesterday, keep on 0.25 today.  D/C swan.  Lasb stable.  Increase Lasix to TID at minimum for negative 2L balance. Trial metoprolol 12.5 mg bid. Maintain Iv amiodarone.  Keep in the ICU.      4/17: No overnight events.  Remains on Milrinone 0.25, swan removed. Decrease to 0.13 and maintain.  Heart failure consult pending. D/C IV amio. Continue lasix TID dosing for negative balance. Transfer to step down. PICC team consulted for PICC placement.     4/18: Remains on milrinone 0.125, last VBG O2 62. Intermittently on Nahn for hypotension, off at 0245 AM. Hgb 9.0, Cr 0.77, SR 80s. RA. /50 this AM off nhan. Net -2240. Nhan off in AM, cont midodrine 5mg TID. Cont milrinone at 0.125 today, decrease lasix to 40 IV BID, change lopressor to toprol XL 12.5mg daily. D/C hicks later today. HF team consulted and following. PICC line unsuccessful at bedside, for IR placement. D/C mediastinal tubes, keep left pleural.      4/19:  milrinone off at midnight. SR 80s-90s. 95% on RA.SBP 90s-100s. Left pleural drainage improved. D/C left pleural CT. Hgb 9.9, Cr stable 0.63, net negative 1887. VBG this AM 46. Otherwise feels well. Only c/o poor sleep. Denies Abd pain/n/v.  Ambulated with PT yest. Cont toprol XL 12.5 daily, cont lasix 40 IV BID. Check echo while off milrinone today, lifevest planning.  Recheck VBG later today per HF.  Rehab planning. 4/20: Echo yest off milrinone showed severe LV dysnfx, EF 20% and mild-mod RV dysfnx. Remains off milrinone. VBG yest afternoon improved to 50 and this AM 58. Some low BPs overnight, briefly on nhan, now off. BP now 90s-100s. Net negative 930. Na down to 129. Cr stable 0.8, Hgb 9.0. SR 80s-90s. On RA. Increase midodrine to 10mg TID. Change IV lasix to PO torsemide 20mg BID. Cont PT/OT. Consult  nephrology for worsening hyponatremia,give Samsca 7.5mg x 1.      4/21: Remains SR 80s-90s. On RA. Na up to 139 (129) after Samsca given by nephrology. Significant U/O  response to Samsca and PO torsemide 20mg BID, net - 3000cc. Hgb 9.7, Cr 1.01. BP improved on midodrine 10mg TID. C/O dry mouth and thirsty. Ambulated 3 x yest with help from .      Decrease fluid restriction, decrease torsemide to 20mg daily. Cont Toprol XL 12.5mg daily, cont midodrine 10mg TID. Pt and  to discuss with [employer] rep. Rehab eval tomorrow.      4/22: Doing well.  Na 138.  Diuresing well.  Stable for D/C To [facility], awaiting authorization to [facility].      4/23: No issues.  Denied by insurance for [facility].  Going home with [facility]. Wearing lifevest (although she does not like the idea of wearing one). She has some social issues with medications at home.    Condition at Discharge:   good     Discharge Physical Exam:    Please see the documented physical exam from this morning's progress note for details.    Discharge Data:  Results from last 7 days   Lab Units 04/22/24  0523 04/21/24  0442 04/20/24  0457   WBC Thousand/uL 11.05* 13.11* 10.30*   HEMOGLOBIN g/dL 9.4* 9.7* 9.0*   HEMATOCRIT % 30.7* 31.0* 28.2*   PLATELETS Thousands/uL 585* 571* 452*     Results from last 7 days   Lab Units 04/23/24  0437 04/22/24  0537 04/21/24  0442   POTASSIUM mmol/L 4.0 3.8 4.1   CHLORIDE mmol/L 97 95* 94*   CO2 mmol/L 32 38* 36*   BUN mg/dL 17 20 20   CREATININE mg/dL 0.84 0.95 1.01   CALCIUM mg/dL 9.1 9.1 9.5           Discharge instructions/Information to patient and family:   See after visit summary for information provided to patient and family.      Julia Perry was educated on restrictions regarding driving and lifting, and techniques of proper incisional care.  They were specifically counselled on signs and symptoms of an incisional infection, and advised to contact our service immediately should they develop fevers, sweats, chill, redness or  drainage at the site of any incisions.    Provisions for Follow-Up Care:  See after visit summary for information related to follow-up care and any pertinent home health orders.      Disposition:  Home    Planned Readmission:   No    Discharge Medications:  See after visit summary for reconciled discharge medications provided to patient and family.      Julia Perry was provided contact information and scheduled a follow up appointment with Josh Castellanos  Additionally, follow up appointments have been scheduled for their primary care physician and primary cardiologist.  Contact information was provided.    Julia Perry was counseled on the importance of avoiding tobacco products.  As with all patients whom have undergone open heart surgery, tobacco cessation medication was contraindicated at the time of discharge.     ACE/ARB was Contraindicated secondary to hypotension    Beta Blocker was Prescribed at discharge    Aspirin was Prescribed at discharge    Statin was Prescribed at discharge      The patient was discharged on ongoing diuretic therapy with Furosemide, 40 mg, PO QD and Potassium Chloride 20 mEq, PO QD.  They were advised to continue these medications, unless otherwise directed.     Narcotic pain medication was prescribed in the form of percocet.  Prior to prescribing, their prescription profile was reviewed on the [facility] of health prescription drug monitoring program.    The patient was informed that following their postoperative surgical evaluation, they will be referred to outpatient cardiac rehabilitation.  They were counseled that this program is run by specialists who will help them safely strengthen their heart and prevent more heart disease.  Cardiac rehabilitation will include exercise, relaxation, stress management, and heart-healthy nutrition.  Caregivers will also check to make sure their medication regimen is working.    During this admission, the patient was questioned on their use of  tobacco, alcohol, and illicit/non-prescription drug use in the  previous 24 months. Julia Perry states that they have not used any of these substances in this time frame.    I spent 30 minutes discharging the patient. This time was spent on the day of discharge. I had direct contact with the patient on the day of discharge. Additional documentation is required if more than 30 minutes were spent on discharge.     SIGNATURE: Jeri Rubin PA-C  DATE: April 23, 2024  TIME: 12:45 PM

## 2024-04-23 NOTE — DISCHARGE INSTR - AVS FIRST PAGE
Instructions Following Open Heart Surgery      Protect your sternum (breast bone). Wires are placed during surgery to hold the sternum together. Do not lift anything heavier than 10 pounds for the first four weeks after surgery. (For example, a gallon of milk weighs 8 pounds) When you are seen for a routine postop check, you will be cleared to lift up to 25 lbs. Following three months from the time of surgery, you may lift without any restrictions.     Hug a pillow to your chest or cross your arms over your chest when you laugh, sneeze, or cough. You may resume sexual activity when you feel ready. Do not put any excessive pressure on your chest.    Be careful when you get into or out of a chair or bed.  Hug a pillow or cross your arms when you stand or sit. Do not twist as you move. Use only your legs to sit and stand. You may need a raised toilet seat if you have trouble standing up without using your arms.     No sleeping on side for the first 4 weeks. Limit daytime naps, to prevent difficulty sleeping at night.    Women: Wear a clean surgical bra every day.     Do not play sports that use your shoulder.  Examples include tennis and golf.    Do not drive until cleared by surgeon. Typically cleared to drive after one month, determination will be made at routine postop appointment. When a passenger in the car, wear a seat belt.    Continue you breathing exercises throughout the day with incentive spirometry    Activity: Your goal is to go on frequent walks, slowly increasing your activity level. Walking will help prevent leg swelling and prevent blood clots. When you start outpatient cardiac rehab in one month, you will be given additional instructions and a formal exercise plan. It is OK to go up steps, with help.     You may resume sexual activity when you are comfortable. Do not put excessive pressure on your chest.     Weigh yourself daily in the morning and keep of log of your weight. If your weight  increases more than 2 lbs per day or more than 5 lbs in a week, call your surgeon's office.    Keep your legs elevated when sitting. Pressure stockings may be worn to prevent significant leg swelling.     You may get routine vaccines any time after open heart surgery    Do not smoke:  Nicotine can damage blood vessels and make it more difficult to heal. Do not use e-cigarettes or smokeless tobacco in place of cigarettes or to help you quit. They still contain nicotine. Ask your primary care provider for information if you currently smoke and need help quitting.     Incision/Wound Care:    Shower daily. Gently wash your incision with warm water and mild soap. Do not scrub the area. Gently pat the area dry with a clean towel. If you have a bandage, dry the area and put on a new, clean bandage. Change your bandage at least daily, or if it gets wet or dirty. Always wash your hands before you care for your wound. Do not apply ointments, creams, lotions, powders, etc. If you develop signs of an infection (fever > 101, redness, warm skin, or drainage) please call your surgeon's office.     Do not pick at or touch puncture sites or incisions. Allow and scabs to come off on their own.     It is normal to have some drainage from the chest tube sites. Apply clean/dry dressings, until this resolves.    You may have discomfort or numbness along the incision for 3-4 weeks. It is normal to occasionally experience brief sharp shooing pains, tightness, or pulling sensations.    Do not take a bath or swim until cleared by surgeon.    As your incision heals, sometimes small tender lumps or pimple-like bumps develop which is due to your body attempting to “dissolve” the suture (stiches).     Call your local emergency number (191 in the [place]) or have someone call if:   You have squeezing, pressure, or pain in your chest or back, lightheadedness or sudden cold sweat  Short of breath that does not go away with rest  You cough up blood.  You  have a fast heartbeat that flutters. Or palpitations  You faint.  Signs of a stroke:  Numbness or drooping on one side of your face. Weakness in an arm or leg. Confusion or difficulty speaking. Dizziness, a severe headache, or vision loss    Call your surgeons office if:   You have bleeding that does not stop even after you apply pressure for 5 minutes.  You have redness, tenderness, or signs of infection at incision (pain, swelling, odor, or green/yellow discharge from incision site)  You have a severe headache.  You have a fever higher than 101°F (38.4°C).  You urinate less, or not at all.  You have persistent nausea, vomiting, or diarrhea  You hear persistent or worsening crunching or grinding in your sternum.  You have questions or concerns about your condition or care.      Diet:    Eat heart-healthy foods, low in unhealthy fats and sodium (salt). A heart healthy diet helps improve your cholesterol levels and lowers your risk for heart disease and stroke. You will receive formal education on healthy eating and label reading during your cardiac rehab in one month.   Choose foods that contain healthy fats, such as soybean, canola, olive, corn, and sunflower oils.  Limit unhealthy fats. Examples include:  Cholesterol  is found in animal foods, such as eggs and lobster, and in dairy products made from whole milk.    Saturated fat  is found in meats, such as salazar and hamburger. It is also found in chicken or turkey skin, whole milk, and butter.     Trans fat  is found in packaged foods, such as potato chips and cookies. It is also in some fried foods, and shortening. Do not eat foods that contain trans fats.  Get 20 to 30 grams of fiber each day.  Fruits, vegetables, whole-grain foods, and legumes (cooked beans) are good sources of fiber.  Low Sodium: Limit to 2,000 mg or less each day, unless otherwise directed. Choose low-sodium or no-salt-added foods. Add little or no salt to food you prepare. Use herbs and  spices in place of salt.  Foods high in sodium include frozen dinners, macaroni and cheese, instant potatoes, canned vegetables, cured or smoked meats, hot dogs, salazar and sausage, ketchup, barbecue sauce, salad dressing, pickles, olives, soy sauce, and miso.     Fluid Restriction: Fluid restriction after hospital discharge is advised. Limit your fluid intake to no more than 8 cups of liquid (8oz = 1cup) or 2000 mL per day.    Limit/Do not drink alcohol. Alcohol can damage heart and raise your blood pressure. The general recommended limit is 1 drink a day for women 21 or older and for men 65 or older. Do not have more than 3 drinks within 24 hours or 7 within a week. A drink of alcohol is 12 oz of beer, 5 oz of wine, or 1½ oz of liquor.        Narcotic Safety     What do I need to know about narcotic safety?    A narcotic is a type of medicine used to treat pain. When you are discharged from the hospital, you will be prescribed a narcotic called oxycodone. Pain control and management may help you rest, heal, and return to your daily activities. Do not take more than the recommended amount. Too much can cause a life-threatening overdose. Do not continue to take it after your pain stops.     How do I use narcotics safely?   Take prescribed narcotics exactly as directed.  You may develop tolerance. This means you keep needing higher doses to get the same effect. You may also develop narcotic use disorder. This means you are not able to control your narcotic use.  Do not give narcotics to others or take narcotics that belong to someone else.  Do not mix narcotics with other medicines or alcohol.  The combination can cause an overdose, or cause you to stop breathing.   Do not drive or operate heavy machinery after you use a narcotic.    Talk to your healthcare provider if you have any side effects.  Side effects include nausea, sleepiness, itching, and trouble thinking clearly.     What can I do to manage constipation?   Constipation is the most common side effect of narcotic medicine. Constipation is when you have hard, dry bowel movements, or you go longer than usual between bowel movements. The following are ways you can prevent or relieve constipation:  Drink liquids as allowed.  Drinking extra liquids will help soften and move your bowels. You should drink up to your maximum fluid allotment of 2 liters.    Eat high-fiber foods.  This may help decrease constipation by adding bulk to your bowel movements. High-fiber foods include fruits, vegetables, whole-grain breads and cereals, and beans  Supplements. You have been prescribed a fiber supplement called polyethylene glycol (Stephanie lax) and a stool softener called docusate sodium (Colace). You should take these for as long as you continue narcotic medications.  Exercise regularly.  Regular physical activity can help stimulate your intestines. Walking is a good exercise to prevent or relieve constipation. Ask which exercises are best for you.    How do I store narcotics safely?   Store narcotics where others cannot easily get them.  Keep them in a locked cabinet or secure area. Do not  keep them in a purse or other bag you carry with you. A person may be looking for something else and find the narcotics.    Make sure narcotics are stored out of the reach of children.  A child can easily overdose on narcotics. Narcotics may look like candy to a small child.

## 2024-04-23 NOTE — ARC ADMISSION
Noted per DSC and CM that insurance denied patient for inpatient acute rehab. Patient/family prefer for discharge home, and patient will D/C home today with [facility] services. Please notify with any changes.

## 2024-04-23 NOTE — CASE MANAGEMENT
Support Center has received DENIAL for Acute Rehab Authorization.   Insurance: [employer]  Called in by Rep: Natalee  Denial Reason: does not meet CMS guidelines  Facility: [facility]  Denial #: [id number]    Appeal P#   [phone]   /Appeal F# [phone]    Care Manager notified: Aracely Gonzalez

## 2024-04-23 NOTE — PROGRESS NOTES
Progress Note - Cardiothoracic Surgery   Julia Perry 68 y.o. female MRN: [id number]  Unit/Bed#: [facility] 401-01 Encounter: [id number]    Severe MVCAD/ischemic CMP S/P CABG ; POD # 14  S/p DESx2 to OM and Impella POD # 12 (impella removed 4/12)    24 Hour Events: No events overnight.  No complaints this morning.  Showered yesterday.  Tolerating diet.  Pain controlled.     Medications:   Scheduled Meds:  Current Facility-Administered Medications   Medication Dose Route Frequency Provider Last Rate    acetaminophen  975 mg Oral Q8H PRN Timoteo Rasmussen PA-C      amiodarone  200 mg Oral Q8H LINDA Timoteo Rasmussen PA-C      aspirin  81 mg Oral Daily Timoteo Rasmussen PA-C      atorvastatin  80 mg Oral Daily With Dinner Timoteo Rasmussen PA-C      bisacodyl  10 mg Rectal Daily PRN Brandon Up PA-C      chlorhexidine  15 mL Mouth/Throat BID Timoteo Rasmussen PA-C      Cholecalciferol  1,000 Units Oral Daily Timoteo Rasmussen PA-C      docusate sodium  100 mg Oral BID Timoteo Rasmussen PA-C      Empagliflozin  10 mg Oral Daily Jeri Rubin PA-C      fondaparinux  2.5 mg Subcutaneous Q24H Timoteo Rasmussen PA-C      insulin lispro  1-5 Units Subcutaneous TID AC Timoteo Rasmussen PA-C      insulin lispro  1-5 Units Subcutaneous HS Timoteo Rasmussen PA-C      magnesium Oxide  400 mg Oral BID Jeri Rubin PA-C      metoprolol succinate  12.5 mg Oral Daily Isrrael Rojas PA-C      midodrine  10 mg Oral TID AC Isrrael Rojas PA-C      ondansetron  4 mg Intravenous Q6H PRN Timoteo Rasmussen PA-C      oxyCODONE  2.5 mg Oral Q4H PRN Timoteo Rasmussen PA-C      Or    oxyCODONE  5 mg Oral Q4H PRN Timoteo Rasmussen PA-C      pantoprazole  40 mg Oral Early Morning Timoteo Rasmussen PA-C      polyethylene glycol  17 g Oral Daily Timoteo Rasmussen PA-C      potassium chloride  20 mEq Oral Daily Isrrael Rojas PA-C      ticagrelor  90 mg Oral Q12H [facility] Timoteo Rasmussen PA-C      torsemide  20 mg Oral Daily Isrrael Rojas PA-C      trimethobenzamide  200  mg Intramuscular Q6H PRN Timoteo Rasmussen PA-C       Continuous Infusions:   PRN Meds:.  acetaminophen    bisacodyl    ondansetron    oxyCODONE **OR** oxyCODONE    trimethobenzamide    Vitals:   Vitals:    04/22/24 2312 04/23/24 0554 04/23/24 0600 04/23/24 0702   BP: 116/63 92/59  112/62   Pulse: 83 84  83   Resp: 16   20   Temp: 97.7 °F (36.5 °C) 98.4 °F (36.9 °C)     TempSrc:       SpO2: 97% 96%  96%   Weight:   71.6 kg (157 lb 13.6 oz)    Height:           Telemetry: NSR; Heart Rate: 85    Respiratory:   SpO2: SpO2: 96 %; Room Air    Intake/Output:     Intake/Output Summary (Last 24 hours) at 4/23/2024 0739  Last data filed at 4/23/2024 0444  Gross per 24 hour   Intake 900 ml   Output 3175 ml   Net -2275 ml      Weights:   Weight (last 2 days)       Date/Time Weight    04/23/24 0600 71.6 (157.85)    04/22/24 0600 71.1 (156.75)    04/21/24 0547 71.2 (157)              Results:   Results from last 7 days   Lab Units 04/22/24  0523 04/21/24  0442 04/20/24  0457   WBC Thousand/uL 11.05* 13.11* 10.30*   HEMOGLOBIN g/dL 9.4* 9.7* 9.0*   HEMATOCRIT % 30.7* 31.0* 28.2*   PLATELETS Thousands/uL 585* 571* 452*     Results from last 7 days   Lab Units 04/23/24  0437 04/22/24  0537 04/21/24  0442   SODIUM mmol/L 138 138 139   POTASSIUM mmol/L 4.0 3.8 4.1   CHLORIDE mmol/L 97 95* 94*   CO2 mmol/L 32 38* 36*   BUN mg/dL 17 20 20   CREATININE mg/dL 0.84 0.95 1.01   CALCIUM mg/dL 9.1 9.1 9.5     Point of care glucose: 110-167    Invasive Lines/Tubes:  Invasive Devices       Peripherally Inserted Central Catheter Line  Duration             PICC Line 04/18/24 Right Brachial 4 days                    Physical Exam:    General: No acute distress, Alert, and Normal appearance  HEENT/NECK:  Normocephalic. Atraumatic.  No jugular venous distention.    Cardiac: Regular rate and rhythm  Pulmonary:  Breath sounds clear bilaterally  Abdomen:  Non-tender, Non-distended, and Normal bowel sounds  Incisions: Sternum is stable.  Incision is  clean, dry, and intact.  and Saphenectomy incison is clean, dry, and intact.   Extremities: Extremities warm/dry and Trace edema B/L  Neuro: Alert and oriented X 3  Skin: Warm/Dry, without rashes or lesions.    Assessment:  Principal Problem:    Coronary artery disease involving native coronary artery  Active Problems:    DMII (diabetes mellitus, type 2) ([facility])    Hypomagnesemia    Ulcerative pancolitis without complication (HCC)    Ischemic cardiomyopathy    Mixed hyperlipidemia    Stenosis of left carotid artery    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HCC)    Hyperprolactinemia (HCC)    S/P carotid endarterectomy    Cardiogenic postoperative shock ([facility])    PONV (postoperative nausea and vomiting)    S/P CABG x 3    New onset atrial fibrillation (HCC)    ABLA (acute blood loss anemia)    LBBB (left bundle branch block)    Severe left ventricular systolic dysfunction     Severe MVCAD/ischemic CMP S/P CABG ; POD # 14  S/p DESx2 to OM and Impella POD # 12 (impella removed 4/12)    Plan:    Cardiac:   LVEF 15% lifevest at discharge   NSR with LBBB with normotension on midodrine 10 mg TID  Off Milrinone therapy since 4/18 stable VBG yesterday   Diuresing well on bid torsemide   No further PAF since DCCV on 4/14  On Toprol xl 12.5 mg daily   Continue prophylactic Amiodarone, 200 mg PO TID  Continue ASA and Statin therapy    Epicardial pacing wires have been removed    PICC line in place for access, d/c when rehab ready     Continue Arixtra for DVT prophylaxis    Pulmonary:     Good Room air oxygen saturation; Continue incentive spirometry/Coughing/Deep breathing exercises    Chest tubes have been discontinued    Renal:     Post op Creatinine stable; Follow up labs prn     Intake/Output net: -2275 mL/24 hours    Diuretic Regimen:Torsemide 20 mg bid, Kdur 20 meq po bid    Neuro:    Neurologically intact; No active issues     GI:    Cardiac diet, with 1800 mL fluid restriction    Tolerating diet without complaint    Continue stool  softeners and prn suppository    Continue GI prophylaxis    Endo: no issues     7    Hematology:   Pre-Op Hgb A1C: 6.4  Jardiance added for heart failure diagnosis    8.   Disposition:        Anticipated discharge date: when insurance authorization for [facility]       VTE Pharmacologic Prophylaxis: Fondaparinux (Arixtra)  VTE Mechanical Prophylaxis: sequential compression device    Collaborative rounds completed with supervising physician  Plan of care discussed with bedside nurse    SIGNATURE: Jeri Rubin PA-C  DATE: April 23, 2024  TIME: 7:39 AM

## 2024-04-24 ENCOUNTER — HOME CARE VISIT (OUTPATIENT)
Dept: HOME HEALTH SERVICES | Facility: HOME HEALTHCARE | Age: 69
End: 2024-04-24

## 2024-04-24 NOTE — UTILIZATION REVIEW
NOTIFICATION OF ADMISSION DISCHARGE   This is a Notification of Discharge from [facility]. Please be advised that this patient has been discharge from our facility. Below you will find the admission and discharge date and time including the patient’s disposition.   UTILIZATION REVIEW CONTACT:  Star Ivan  Utilization   Network Utilization Review Department  Phone: [phone] x carefully listen to the prompts. All voicemails are confidential.  Email: [email or web address]     ADMISSION INFORMATION  PRESENTATION DATE: 4/9/2024  5:17 AM  OBERVATION ADMISSION DATE:   INPATIENT ADMISSION DATE: 4/9/24  7:04 AM   DISCHARGE DATE: 4/23/2024  2:19 PM   DISPOSITION:Home with Home Health Care    Network Utilization Review Department  ATTENTION: Please call with any questions or concerns to [phone] and carefully listen to the prompts so that you are directed to the right person. All voicemails are confidential.   For Discharge needs, contact Care Management DC Support Team at [phone] opt. [phone]  Send all requests for admission clinical reviews, approved or denied determinations and any other requests to dedicated fax number below belonging to the campus where the patient is receiving treatment. List of dedicated fax numbers for the Facilities:  FACILITY NAME UR FAX NUMBER   ADMISSION DENIALS (Administrative/Medical Necessity) [phone]   DISCHARGE SUPPORT TEAM ([facility]) [phone]   PARENT CHILD HEALTH (Maternity/NICU/Pediatrics) [phone]   [facility] [phone]   [facility] [phone]   [facility] [phone]   [facility] [phone]   [facility] [phone]   [facility] [phone]   [facility] [phone]   [facility]  [phone]   [facility] [phone]   [facility] [phone]   [facility] [phone]   [facility] [phone]

## 2024-04-26 ENCOUNTER — TELEPHONE (OUTPATIENT)
Dept: CARDIOLOGY CLINIC | Facility: HOSPITAL | Age: 69
End: 2024-04-26

## 2024-04-26 ENCOUNTER — APPOINTMENT (OUTPATIENT)
Dept: LAB | Facility: HOSPITAL | Age: 69
End: 2024-04-26
Payer: COMMERCIAL

## 2024-04-26 ENCOUNTER — OFFICE VISIT (OUTPATIENT)
Dept: CARDIOLOGY CLINIC | Facility: HOSPITAL | Age: 69
End: 2024-04-26
Payer: COMMERCIAL

## 2024-04-26 VITALS
DIASTOLIC BLOOD PRESSURE: 58 MMHG | HEIGHT: 64 IN | WEIGHT: 160.8 LBS | BODY MASS INDEX: 27.45 KG/M2 | OXYGEN SATURATION: 99 % | SYSTOLIC BLOOD PRESSURE: 112 MMHG | HEART RATE: 80 BPM

## 2024-04-26 DIAGNOSIS — I25.118 CORONARY ARTERY DISEASE INVOLVING NATIVE CORONARY ARTERY OF NATIVE HEART WITH OTHER FORM OF ANGINA PECTORIS (HCC): ICD-10-CM

## 2024-04-26 DIAGNOSIS — I38 VALVULAR HEART DISEASE: ICD-10-CM

## 2024-04-26 DIAGNOSIS — E78.2 MIXED HYPERLIPIDEMIA: ICD-10-CM

## 2024-04-26 DIAGNOSIS — E11.65 TYPE 2 DIABETES MELLITUS WITH HYPERGLYCEMIA, UNSPECIFIED WHETHER LONG TERM INSULIN USE (HCC): ICD-10-CM

## 2024-04-26 DIAGNOSIS — Z98.890 S/P CAROTID ENDARTERECTOMY: ICD-10-CM

## 2024-04-26 DIAGNOSIS — I65.23 BILATERAL CAROTID ARTERY STENOSIS: ICD-10-CM

## 2024-04-26 DIAGNOSIS — I50.20 SYSTOLIC HEART FAILURE, UNSPECIFIED HF CHRONICITY (HCC): Primary | ICD-10-CM

## 2024-04-26 DIAGNOSIS — I25.5 ISCHEMIC CARDIOMYOPATHY: ICD-10-CM

## 2024-04-26 DIAGNOSIS — I25.118 CORONARY ARTERY DISEASE INVOLVING NATIVE CORONARY ARTERY OF NATIVE HEART WITH OTHER FORM OF ANGINA PECTORIS (HCC): Primary | ICD-10-CM

## 2024-04-26 DIAGNOSIS — Z95.1 S/P CABG X 3: ICD-10-CM

## 2024-04-26 DIAGNOSIS — I48.91 NEW ONSET ATRIAL FIBRILLATION (HCC): ICD-10-CM

## 2024-04-26 DIAGNOSIS — I10 HYPERTENSION, UNSPECIFIED TYPE: ICD-10-CM

## 2024-04-26 DIAGNOSIS — T81.11XA: ICD-10-CM

## 2024-04-26 LAB
ANION GAP SERPL CALCULATED.3IONS-SCNC: 9 MMOL/L (ref 4–13)
BUN SERPL-MCNC: 22 MG/DL (ref 5–25)
CALCIUM SERPL-MCNC: 9.4 MG/DL (ref 8.4–10.2)
CHLORIDE SERPL-SCNC: 95 MMOL/L (ref 96–108)
CO2 SERPL-SCNC: 31 MMOL/L (ref 21–32)
CREAT SERPL-MCNC: 1.38 MG/DL (ref 0.6–1.3)
ERYTHROCYTE [DISTWIDTH] IN BLOOD BY AUTOMATED COUNT: 17.2 % (ref 11.6–15.1)
GFR SERPL CREATININE-BSD FRML MDRD: 39 ML/MIN/1.73SQ M
GLUCOSE SERPL-MCNC: 113 MG/DL (ref 65–140)
HCT VFR BLD AUTO: 28.5 % (ref 34.8–46.1)
HGB BLD-MCNC: 8.6 G/DL (ref 11.5–15.4)
MCH RBC QN AUTO: 28.9 PG (ref 26.8–34.3)
MCHC RBC AUTO-ENTMCNC: 30.2 G/DL (ref 31.4–37.4)
MCV RBC AUTO: 96 FL (ref 82–98)
PLATELET # BLD AUTO: 506 THOUSANDS/UL (ref 149–390)
PMV BLD AUTO: 9.3 FL (ref 8.9–12.7)
POTASSIUM SERPL-SCNC: 4.2 MMOL/L (ref 3.5–5.3)
RBC # BLD AUTO: 2.98 MILLION/UL (ref 3.81–5.12)
SODIUM SERPL-SCNC: 135 MMOL/L (ref 135–147)
WBC # BLD AUTO: 10.13 THOUSAND/UL (ref 4.31–10.16)

## 2024-04-26 PROCEDURE — 80048 BASIC METABOLIC PNL TOTAL CA: CPT

## 2024-04-26 PROCEDURE — 36415 COLL VENOUS BLD VENIPUNCTURE: CPT

## 2024-04-26 PROCEDURE — 85027 COMPLETE CBC AUTOMATED: CPT

## 2024-04-26 PROCEDURE — 99214 OFFICE O/P EST MOD 30 MIN: CPT | Performed by: NURSE PRACTITIONER

## 2024-04-26 NOTE — TELEPHONE ENCOUNTER
Rachelle Herr said Kidney function was up  and would like her to drink more water at least 60 oz , no changes to any medication . Placed a BMP test for her to have done Monday

## 2024-04-27 ENCOUNTER — HOME CARE VISIT (OUTPATIENT)
Dept: HOME HEALTH SERVICES | Facility: HOME HEALTHCARE | Age: 69
End: 2024-04-27
Payer: COMMERCIAL

## 2024-04-27 VITALS
OXYGEN SATURATION: 98 % | DIASTOLIC BLOOD PRESSURE: 58 MMHG | HEART RATE: 81 BPM | RESPIRATION RATE: 20 BRPM | SYSTOLIC BLOOD PRESSURE: 96 MMHG

## 2024-04-27 PROCEDURE — G0299 HHS/HOSPICE OF RN EA 15 MIN: HCPCS

## 2024-04-27 PROCEDURE — 400013 VN SOC

## 2024-04-27 NOTE — Clinical Note
----- Message -----  From: Anneliese Serrano RN  Sent: 4/27/2024   4:07 PM EDT  To: Jeri Rubin PA-C; Norma Shrestha RN; *      [facility] has admitted your patient to Home Health service with the following disciplines:      SN and PT  This report is informational only, no response is needed  Primary focus of home health care Cardiac assess  Patient stated goals of care heal from surgery   Anticipated visit pattern and next visit date 1w4 per patient request. next visit Wed 5/1  See medication list - meds in home differ from AVS NA all meds in home   Significant clinical findings NA   Potential barriers to goal achievement comorbidities  Other pertinent information patient reports she does not take metformin as per her PCP. Patient has follow up with PCP on 5/3 and will discuss removing from med list   Thank you for allowing us to participate in the care of your patient.

## 2024-04-27 NOTE — CASE COMMUNICATION
Ship to  Pt. Home   Ordering MD Dr Titus    Wound 1 lower chest puncture sites from chest tube SP CABG      Full       Frequency  daily   Wound 2 right thigh      Full         Frequency EOD      Saline 100ml [id number] 1 (Not covered by Private Ins)  Gauze 4x4 ST [id number] 14  ABD 5x9 [id number] 14  Border Gauze 778854 14  Alginate with Silver 242557 2  Gauze oil emulsion 574395 7

## 2024-04-27 NOTE — CASE COMMUNICATION
[facility] has admitted your patient to Home Health service with the following disciplines:      SN and PT  This report is informational only, no response is needed  Primary focus of home health care Cardiac assess  Patient stated goals of care heal from surgery   Anticipated visit pattern and next visit date 1w4 per patient request. next visit Wed 5/1  See medication list - meds in home differ from AVS NA all meds in home   Signifi cant clinical findings NA   Potential barriers to goal achievement comorbidities  Other pertinent information patient reports she does not take metformin as per her PCP. Patient has follow up with PCP on 5/3 and will discuss removing from med list   Thank you for allowing us to participate in the care of your patient.

## 2024-04-29 ENCOUNTER — APPOINTMENT (OUTPATIENT)
Dept: LAB | Facility: HOSPITAL | Age: 69
End: 2024-04-29
Payer: COMMERCIAL

## 2024-04-29 DIAGNOSIS — I50.20 SYSTOLIC HEART FAILURE, UNSPECIFIED HF CHRONICITY (HCC): ICD-10-CM

## 2024-04-29 LAB
ANION GAP SERPL CALCULATED.3IONS-SCNC: 8 MMOL/L (ref 4–13)
BUN SERPL-MCNC: 21 MG/DL (ref 5–25)
CALCIUM SERPL-MCNC: 9.5 MG/DL (ref 8.4–10.2)
CHLORIDE SERPL-SCNC: 97 MMOL/L (ref 96–108)
CO2 SERPL-SCNC: 30 MMOL/L (ref 21–32)
CREAT SERPL-MCNC: 1.28 MG/DL (ref 0.6–1.3)
GFR SERPL CREATININE-BSD FRML MDRD: 43 ML/MIN/1.73SQ M
GLUCOSE SERPL-MCNC: 100 MG/DL (ref 65–140)
POTASSIUM SERPL-SCNC: 4.5 MMOL/L (ref 3.5–5.3)
SODIUM SERPL-SCNC: 135 MMOL/L (ref 135–147)

## 2024-04-29 PROCEDURE — 36415 COLL VENOUS BLD VENIPUNCTURE: CPT

## 2024-04-29 PROCEDURE — 80048 BASIC METABOLIC PNL TOTAL CA: CPT

## 2024-04-30 ENCOUNTER — HOME CARE VISIT (OUTPATIENT)
Dept: HOME HEALTH SERVICES | Facility: HOME HEALTHCARE | Age: 69
End: 2024-04-30
Payer: COMMERCIAL

## 2024-04-30 ENCOUNTER — TELEPHONE (OUTPATIENT)
Dept: CARDIAC SURGERY | Facility: CLINIC | Age: 69
End: 2024-04-30

## 2024-04-30 VITALS — HEART RATE: 80 BPM | OXYGEN SATURATION: 96 % | DIASTOLIC BLOOD PRESSURE: 56 MMHG | SYSTOLIC BLOOD PRESSURE: 110 MMHG

## 2024-04-30 PROCEDURE — G0151 HHCP-SERV OF PT,EA 15 MIN: HCPCS

## 2024-04-30 NOTE — CASE COMMUNICATION
PT eval on 4/30/24. PT POC for 2wk2 1wk1 for gait/transfer/balance training, edu, LE ther ex/HEp.     Pt initial VS at rest upon arrival: 108/58 L UE, 86 HR (pulse oximeter), 99%.  After mobility, pulse ox 96% and HR 80 (radial). end of session- /56.  no s/s's of cardiac distress.     L foot intact. R foot- all toes- purple/red, + cap refill.   Recommend podiatrist as pt is diabetic and does not have one.     Thank you, Alisha Friend y PT
No

## 2024-04-30 NOTE — TELEPHONE ENCOUNTER
Surgery: Elective CABG x3 on 4/9  Discharged home: 4/23     Cardiology appointment: 4/26 - Brillinta resumed (patient previously not taking), increased daily fluid allowance    Called patient today for routine postop check in. She states that she is doing generally well. She does not have pain, and is not even using Tylenol.   She is ambulating at home several times a day. Had a PT evaluation today.   She is checking her weights daily, and they have been stable. Some LE edema, but not worsening.   She is cleaning all incisions daily. No fevers or chills. One bandage is on her Impella insertion site due to oozing, but this has been stable.   Reports her appetite is gradually improving, some difficulty with constipation - taking stool softener and increasing fluid intake.   Answered questions and reviewed upcoming appointments. Postop appt with Dr. Castellanos is next Tuesday 5/7/24.

## 2024-05-01 ENCOUNTER — HOME CARE VISIT (OUTPATIENT)
Dept: HOME HEALTH SERVICES | Facility: HOME HEALTHCARE | Age: 69
End: 2024-05-01
Payer: COMMERCIAL

## 2024-05-01 VITALS
DIASTOLIC BLOOD PRESSURE: 58 MMHG | HEART RATE: 74 BPM | WEIGHT: 157 LBS | TEMPERATURE: 97.3 F | SYSTOLIC BLOOD PRESSURE: 94 MMHG | BODY MASS INDEX: 26.95 KG/M2 | RESPIRATION RATE: 20 BRPM | OXYGEN SATURATION: 98 %

## 2024-05-01 PROCEDURE — G0299 HHS/HOSPICE OF RN EA 15 MIN: HCPCS

## 2024-05-02 ENCOUNTER — HOME CARE VISIT (OUTPATIENT)
Dept: HOME HEALTH SERVICES | Facility: HOME HEALTHCARE | Age: 69
End: 2024-05-02
Payer: COMMERCIAL

## 2024-05-02 VITALS — SYSTOLIC BLOOD PRESSURE: 108 MMHG | OXYGEN SATURATION: 98 % | DIASTOLIC BLOOD PRESSURE: 58 MMHG | HEART RATE: 84 BPM

## 2024-05-02 PROCEDURE — G0151 HHCP-SERV OF PT,EA 15 MIN: HCPCS

## 2024-05-03 ENCOUNTER — OFFICE VISIT (OUTPATIENT)
Dept: FAMILY MEDICINE CLINIC | Facility: CLINIC | Age: 69
End: 2024-05-03
Payer: COMMERCIAL

## 2024-05-03 VITALS
OXYGEN SATURATION: 97 % | HEIGHT: 64 IN | BODY MASS INDEX: 27.01 KG/M2 | TEMPERATURE: 97.9 F | WEIGHT: 158.2 LBS | DIASTOLIC BLOOD PRESSURE: 56 MMHG | HEART RATE: 79 BPM | SYSTOLIC BLOOD PRESSURE: 102 MMHG

## 2024-05-03 DIAGNOSIS — D50.8 OTHER IRON DEFICIENCY ANEMIA: ICD-10-CM

## 2024-05-03 DIAGNOSIS — D62 ABLA (ACUTE BLOOD LOSS ANEMIA): ICD-10-CM

## 2024-05-03 DIAGNOSIS — E83.52 HYPERCALCEMIA: ICD-10-CM

## 2024-05-03 DIAGNOSIS — I25.5 ISCHEMIC CARDIOMYOPATHY: ICD-10-CM

## 2024-05-03 DIAGNOSIS — N18.31 STAGE 3A CHRONIC KIDNEY DISEASE (HCC): ICD-10-CM

## 2024-05-03 DIAGNOSIS — T81.11XA: ICD-10-CM

## 2024-05-03 DIAGNOSIS — E78.2 MIXED HYPERLIPIDEMIA: ICD-10-CM

## 2024-05-03 DIAGNOSIS — I25.118 CORONARY ARTERY DISEASE INVOLVING NATIVE CORONARY ARTERY OF NATIVE HEART WITH OTHER FORM OF ANGINA PECTORIS (HCC): Primary | ICD-10-CM

## 2024-05-03 DIAGNOSIS — E11.65 TYPE 2 DIABETES MELLITUS WITH HYPERGLYCEMIA, WITHOUT LONG-TERM CURRENT USE OF INSULIN (HCC): ICD-10-CM

## 2024-05-03 DIAGNOSIS — K51.00 ULCERATIVE PANCOLITIS WITHOUT COMPLICATION (HCC): ICD-10-CM

## 2024-05-03 DIAGNOSIS — E83.42 HYPOMAGNESEMIA: ICD-10-CM

## 2024-05-03 DIAGNOSIS — I48.91 NEW ONSET ATRIAL FIBRILLATION (HCC): ICD-10-CM

## 2024-05-03 PROBLEM — K52.9 COLITIS: Status: RESOLVED | Noted: 2023-06-05 | Resolved: 2024-05-03

## 2024-05-03 PROBLEM — Z98.890 PONV (POSTOPERATIVE NAUSEA AND VOMITING): Status: RESOLVED | Noted: 2024-04-08 | Resolved: 2024-05-03

## 2024-05-03 PROBLEM — I51.89 SEVERE LEFT VENTRICULAR SYSTOLIC DYSFUNCTION: Status: RESOLVED | Noted: 2024-04-18 | Resolved: 2024-05-03

## 2024-05-03 PROBLEM — I51.9 SEVERE LEFT VENTRICULAR SYSTOLIC DYSFUNCTION: Status: RESOLVED | Noted: 2024-04-18 | Resolved: 2024-05-03

## 2024-05-03 PROBLEM — R11.2 PONV (POSTOPERATIVE NAUSEA AND VOMITING): Status: RESOLVED | Noted: 2024-04-08 | Resolved: 2024-05-03

## 2024-05-03 PROCEDURE — 99495 TRANSJ CARE MGMT MOD F2F 14D: CPT | Performed by: FAMILY MEDICINE

## 2024-05-03 PROCEDURE — 1111F DSCHRG MED/CURRENT MED MERGE: CPT | Performed by: FAMILY MEDICINE

## 2024-05-03 NOTE — ASSESSMENT & PLAN NOTE
Last echo prior to discharge 20%.  Encourage continued medications as well as daily weights, vital signs etc. and she and her  know to call with any weight gain, increased edema, shortness of breath, palpitations, chest pain, or lightheadedness.   Advise lightheadedness on arising at present most likely secondary to mild dehydration although without orthostatic changes today.  Cont  fluid intake  as allowed by cardiology.

## 2024-05-03 NOTE — PROGRESS NOTES
Assessment & Plan     1. Coronary artery disease involving native coronary artery of native heart with other form of angina pectoris (HCC)  Assessment & Plan:  Amazingly still with us and at present without symptoms.  Encouraged  gradual increase ambulation and definitely participate  in  cardiac rehab.  Continue meds as noted.  Apparently there are couple of meds without refills.  Advise she call cardiology since I think all her medications need to be continued.      2. Ischemic cardiomyopathy  Assessment & Plan:  Last echo prior to discharge 20%.  Encourage continued medications as well as daily weights, vital signs etc. and she and her  know to call with any weight gain, increased edema, shortness of breath, palpitations, chest pain, or lightheadedness.   Advise lightheadedness on arising at present most likely secondary to mild dehydration although without orthostatic changes today.  Cont  fluid intake  as allowed by cardiology.      3. Postoperative cardiogenic shock, initial encounter ([facility])  Assessment & Plan:  Thankfully resolved!!      4. New onset atrial fibrillation ([facility])  Assessment & Plan:  Currently sinus rhythm questionably due to LifeVest.  She will continue same medications      5. Type 2 diabetes mellitus with hyperglycemia, without long-term current use of insulin ([facility])  Assessment & Plan:  Discussed why metformin not a good option for her at this time due to her GFR.  Advised the continuation of Jardiance however to monitor sugars closely and if with elevated sugars or she starts to have sugars less than 100 to call   encouraged to continue diabetic diet.  Lab Results   Component Value Date    HGBA1C 6.4 (H) 12/28/2023       6. ABLA (acute blood loss anemia)  Assessment & Plan:  Patient required 2 units of packed red cells during her surgery and 1 further unit in the ICU.  Recent hemoglobin is still low but improved therefore we will repeat in 1 month      7. Other iron deficiency anemia  -      CBC and differential; Future; Expected date: 06/03/2024  -     TIBC Panel (incl. Iron, TIBC, % Iron Saturation); Future; Expected date: 06/03/2024    8. Hypercalcemia  -     Comprehensive metabolic panel; Future; Expected date: 06/03/2024    9. Stage 3a chronic kidney disease (HCC)  Assessment & Plan:  Lab Results   Component Value Date    EGFR 43 04/29/2024    EGFR 39 04/26/2024    EGFR 72 04/23/2024    CREATININE 1.28 04/29/2024    CREATININE 1.38 (H) 04/26/2024    CREATININE 0.84 04/23/2024   Marked worsening during recent hospitalization although somewhat improved as per last BMP.  Again encouraged good hydration will monitor labs closely.      10. Ulcerative pancolitis without complication (HCC)  Assessment & Plan:  Asymptomatic at present.  She does have follow-up with GI this summer.      11. Mixed hyperlipidemia  Assessment & Plan:  Now on increased dose of Crestor therefore will repeat fasting lipid profile in 2 to 3 months.      12. Hypomagnesemia  -     Magnesium; Future; Expected date: 06/03/2024        Depression Screening and Follow-up Plan: Patient was screened for depression during today's encounter. They screened negative with a PHQ-2 score of 0.      Subjective     Transitional Care Management Review:   Julia Perry is a 68 y.o. female here for TCM follow up.     During the TCM phone call patient stated:  TCM Call     Date and time call was made  4/23/2024  3:00 PM    Hospital care reviewed  Records reviewed    Patient was hospitialized at  [facility]    Date of Admission  04/09/24    Date of discharge  04/23/24    Diagnosis  Coronary artery disease involving native coronary artery    Disposition  Home; Home health services    Were the patients medications reviewed and updated  Yes      TCM Call     Scheduled for follow up?  Yes    Did you obtain your prescribed medications  Yes    Do you need help managing your prescriptions or medications  No    Is transportation to your appointment  needed  No    I have advised the patient to call PCP with any new or worsening symptoms  Ella Cordoba RMA    Are you recieving any outpatient services  No    Are you recieving home care services  Yes    Types of home care services  Nurse visit    Are you using any community resources  No    Current waiver services  No    Have you fallen in the last 12 months  No    Interperter language line needed  No    Counseling  Patient        Patient admitted April 9 to April 23 initially for CABG x 3 however with a very prolonged ICU course due to hypotension; dysrhythmias; worsened ejection fraction; difficulty extubating; worsening anemia;; etc. etc.   she required stent x 2 the day after And very slowly improved with stabilization and ability to be discharged home on the 23rd on multiple meds and with the use of the LifeVest at least for the next 1 to 2 months.  She was reevaluated by cardiology on April 26, at that time to continue same medications as well as daily weights, vital signs, and to call with any increased symptoms, increased weight, increased edema etc.  She is to begin cardiac rehab.  She has had several in-home PT visits however doing quite well therefore probably will be discontinued within the next 1 to 2 weeks.  She is weighing herself daily and in fact has lost several pounds since discharge however appetite has been good.  She is ambulating with a cane still very weak but without complaints of chest pain or shortness of breath.  She does note slight lightheadedness on first arising which dissipates once up and moving.  She is trying to keep hydrated and in fact her cardiologist told her she could drink up to 60 ounces of fluid daily.  She is now on Jardiance in place of metformin, sugars so for both pre and post eating ranging between 101 150.  She is religiously watching her diet as far as sugars as well as salt although she craves a bowl of ice cream which she had last night sugar this a.m. being  "only 99.      Review of Systems    Objective     /56 (BP Location: Left arm, Patient Position: Sitting, Cuff Size: Adult)   Pulse 79   Temp 97.9 °F (36.6 °C) (Tympanic)   Ht 5' 4\" (1.626 m)   Wt 71.8 kg (158 lb 3.2 oz)   SpO2 97%   BMI 27.15 kg/m²      Physical Exam  Vitals and nursing note reviewed.   Constitutional:       General: She is not in acute distress.     Appearance: She is well-developed.      Comments: pale   HENT:      Head: Normocephalic and atraumatic.   Eyes:      Conjunctiva/sclera: Conjunctivae normal.   Neck:      Vascular: Carotid bruit present.      Comments: Positive well-healed left carotid surgical scar  Cardiovascular:      Rate and Rhythm: Normal rate and regular rhythm.      Heart sounds: Murmur heard.      Comments: Rhythm today regular with a rate of 80 with a grade 2/6 blowing systolic murmur over right sternal border with questionable radiation into carotids    calves without TTP repeat blood pressure 110/70 without tilt  Pulmonary:      Effort: Pulmonary effort is normal. No respiratory distress.      Breath sounds: Normal breath sounds.   Abdominal:      Tenderness: There is no abdominal tenderness.   Musculoskeletal:         General: No swelling.      Cervical back: Neck supple.      Right lower le+ Pitting Edema present.      Left lower le+ Pitting Edema present.   Lymphadenopathy:      Cervical: No cervical adenopathy.   Skin:     General: Skin is warm and dry.      Capillary Refill: Capillary refill takes less than 2 seconds.   Neurological:      Mental Status: She is alert.   Psychiatric:         Mood and Affect: Mood normal.       Medications have been reviewed by provider in current encounter    Toya Titus MD         "

## 2024-05-03 NOTE — ASSESSMENT & PLAN NOTE
Lab Results   Component Value Date    EGFR 43 04/29/2024    EGFR 39 04/26/2024    EGFR 72 04/23/2024    CREATININE 1.28 04/29/2024    CREATININE 1.38 (H) 04/26/2024    CREATININE 0.84 04/23/2024   Marked worsening during recent hospitalization although somewhat improved as per last BMP.  Again encouraged good hydration will monitor labs closely.

## 2024-05-03 NOTE — ASSESSMENT & PLAN NOTE
Patient required 2 units of packed red cells during her surgery and 1 further unit in the ICU.  Recent hemoglobin is still low but improved therefore we will repeat in 1 month

## 2024-05-03 NOTE — ASSESSMENT & PLAN NOTE
Amazingly still with us and at present without symptoms.  Encouraged  gradual increase ambulation and definitely participate  in  cardiac rehab.  Continue meds as noted.  Apparently there are couple of meds without refills.  Advise she call cardiology since I think all her medications need to be continued.

## 2024-05-03 NOTE — ASSESSMENT & PLAN NOTE
Discussed why metformin not a good option for her at this time due to her GFR.  Advised the continuation of Jardiance however to monitor sugars closely and if with elevated sugars or she starts to have sugars less than 100 to call   encouraged to continue diabetic diet.  Lab Results   Component Value Date    HGBA1C 6.4 (H) 12/28/2023

## 2024-05-06 PROCEDURE — G0180 MD CERTIFICATION HHA PATIENT: HCPCS | Performed by: STUDENT IN AN ORGANIZED HEALTH CARE EDUCATION/TRAINING PROGRAM

## 2024-05-07 ENCOUNTER — OFFICE VISIT (OUTPATIENT)
Dept: CARDIAC SURGERY | Facility: CLINIC | Age: 69
End: 2024-05-07

## 2024-05-07 ENCOUNTER — HOME CARE VISIT (OUTPATIENT)
Dept: HOME HEALTH SERVICES | Facility: HOME HEALTHCARE | Age: 69
End: 2024-05-07
Payer: COMMERCIAL

## 2024-05-07 VITALS
DIASTOLIC BLOOD PRESSURE: 57 MMHG | OXYGEN SATURATION: 100 % | WEIGHT: 154 LBS | HEART RATE: 78 BPM | SYSTOLIC BLOOD PRESSURE: 118 MMHG | TEMPERATURE: 98.2 F | BODY MASS INDEX: 26.29 KG/M2 | HEIGHT: 64 IN

## 2024-05-07 DIAGNOSIS — Z95.1 S/P CABG X 3: Primary | ICD-10-CM

## 2024-05-07 PROCEDURE — 99024 POSTOP FOLLOW-UP VISIT: CPT | Performed by: STUDENT IN AN ORGANIZED HEALTH CARE EDUCATION/TRAINING PROGRAM

## 2024-05-07 NOTE — LETTER
"May 7, 2024     Toya Titus MD  [address] [place] [address]  Dmitri WOODSON [id number]    Patient: Julia Perry   YOB: 1955   Date of Visit: 5/7/2024       Dear Dr. Titus:    Thank you for referring Julia Perry to me for evaluation. Below are my notes for this consultation.    If you have questions, please do not hesitate to call me. I look forward to following your patient along with you.         Sincerely,        Josh Castellanos MD        CC: DO Waleska Arias PA-C  5/7/2024  4:03 PM  Attested  Procedure: S/P coronary artery bypass grafting, performed on 4/9/24.     History: Julia Perry is a 68 y.o. year old female who presents to our office today for routine post-surgical follow up care. Her course was complicated by postop cardiogenic shock necessitating emergent cath lab for RAFFY x2 to OM and impella placement on 4/10. Impella ultimately removed on 4/13, but patient had ongoing vasoplegia and cardioplegia requiring milrinone and franklyn. She also had low urine output despite use of lasix, and developed hyponatremia which ultimately resolved with Samsca, as advised by nephrology. Her EF remained low at 20%, so she was evaluated for a LifeVest and ultimately discharged home on 4/23.     Patient's prior visit notes with cardiology and PCP reviewed. She is walking at home and had PT evaluate her twice and ultimately discharged her. She has made dietary improvements and is cleaning her incisions daily. She has been wearing her LifeVest per her report, however today her  is holding it during the visit. She has an upcoming appointment at the end of May for cardiac rehab.     Vital Signs:   Vitals:    05/07/24 1453 05/07/24 1455   BP: 118/59 118/57   BP Location: Left arm Right arm   Patient Position: Sitting Sitting   Cuff Size: Standard Standard   Pulse: 78    Temp: 98.2 °F (36.8 °C)    TempSrc: Tympanic    SpO2: 100%    Weight: 69.9 kg (154 lb)    Height: 5' 4\" (1.626 m)  "       Home Medications:   Prior to Admission medications    Medication Sig Start Date End Date Taking? Authorizing Provider   acetaminophen (TYLENOL) 325 mg tablet Take 2 tablets (650 mg total) by mouth every 6 (six) hours as needed for mild pain 3/5/24  Yes ROYAL Rojas   amiodarone 200 mg tablet Take 200 mg bid x 1 week then 200 mg daily 4/23/24  Yes Jeri Rubin PA-C   aspirin 81 mg chewable tablet Chew 1 tablet (81 mg total) daily 1/11/24  Yes Maverick Ceballos DO   cholecalciferol (VITAMIN D3) 1,000 units tablet Take 1,000 Units by mouth daily   Yes Historical Provider, MD   docusate sodium (COLACE) 100 mg capsule Take 1 capsule (100 mg total) by mouth 2 (two) times a day  Patient taking differently: Take 100 mg by mouth 2 (two) times a day Prn. 4/23/24  Yes Jeri Rubin PA-C   Empagliflozin (JARDIANCE) 10 MG TABS tablet Take 1 tablet (10 mg total) by mouth daily 4/24/24  Yes Jeri Rubin PA-C   furosemide (LASIX) 40 mg tablet Take 1 tablet (40 mg total) by mouth daily 4/23/24  Yes Jeri Rubin PA-C   glucose blood test strip Use 1 each daily as needed Use as instructed   Yes Historical Provider, MD   magnesium gluconate (MAGONATE) 500 mg tablet Take 1 tablet (500 mg total) by mouth in the morning  Patient taking differently: Take 400 mg by mouth in the morning 6/8/23 5/7/24 Yes Jean Claude Villatoro PA-C   metoprolol succinate (TOPROL-XL) 25 mg 24 hr tablet Take 0.5 tablets (12.5 mg total) by mouth daily 4/24/24  Yes Jeri Rubin PA-C   midodrine (PROAMATINE) 10 MG tablet Take 1 tablet (10 mg total) by mouth 3 (three) times a day before meals 4/23/24  Yes Jeri Rubin PA-C   OneTouch Delica Lancets 33G MISC May substitute brand based on insurance coverage. Check glucose TID. 6/8/23  Yes Jean Claude Villatoro PA-C   pantoprazole (PROTONIX) 40 mg tablet Take 1 tablet (40 mg total) by mouth daily in the early morning 4/24/24  Yes Jeri Rubin PA-C   potassium chloride  "(Klor-Con M20) 20 mEq tablet Take 1 tablet (20 mEq total) by mouth daily 4/24/24  Yes Jeri Rubin PA-C   rosuvastatin (CRESTOR) 40 MG tablet Take 1 tablet (40 mg total) by mouth daily 4/23/24  Yes Jeri Rubin PA-C   ticagrelor (BRILINTA) 90 MG Take 1 tablet (90 mg total) by mouth every 12 (twelve) hours 4/23/24  Yes Jeri Rubin PA-C   Alcohol Swabs 70 % PADS May substitute brand based on insurance coverage. Check glucose TID.  Patient not taking: Reported on 5/7/2024 6/8/23   Jean Claude Villatoro PA-C       Physical Exam:    HEENT/NECK:  Normocephalic. Atraumatic.  No jugular venous distention.    Cardiac: Regular rate and rhythm  Pulmonary:  Breath sounds clear bilaterally  Abdomen:  Non-tender, Non-distended, and Normal bowel sounds  Incisions: Sternum is stable.  Incision is clean and dry, with tiny open area in the mid sternum, less than 1cm in length, no surrounding erythema, no tunneling and no drainage. Saphenectomy incision c/d/I.    Extremities: Extremities warm/dry and Trace edema B/L  Neuro: Alert and oriented X 3.  Sensation is grossly intact.  No focal deficits.  Skin: Warm/Dry, without rashes or lesions.    Lab Results:               Invalid input(s): \"LABGLOM\"      Lab Results   Component Value Date    HGBA1C 6.4 (H) 12/28/2023     No results found for: \"CKTOTAL\", \"CKMB\", \"CKMBINDEX\", \"TROPONINI\"        Assessment: Coronary artery disease. S/P coronary artery bypass grafting;    Plan:     Julia Perry continues to recover well following surgery.  To date they have made progressive improvements with their physical rehabilitation. At this point I have cleared them to begin outpatient cardiac rehabilitation and have encouraged them to to do so.  Julia Perry has also been cleared to resume driving. I asked that them do so cautiously, in progressive increments. I did remind them of their ongoing lifting restrictions of 25 pounds for an additional 2 months.    Julia Perry has already " been evaluated by their primary care physician cardiologist for ongoing medical care. Julia Perry will be scheduled for follow up care with our office in two weeks for a wound check. They are aware that if this area continues to heal and scab over, that they can cancel this appointment. I have advised them to call with any new concerns that may arise. Julia Perry was comfortable with our recommendations and their questions were answered to their satisfaction.    The patient recently had a screening colonoscopy in December 2023.  Therefore GI referral is not indicated at this time.     Waleska Gee PA-C  05/07/24      Attestation signed by Josh Castellanos MD at 5/7/2024  4:09 PM:  Attending Attestation:    I supervised the Advanced Practitioner.? In addition to personally performing portions of the history and physical exam, I performed, in its entirety, the assessment/plan/medical decision making/counseling/care coordination component of the visit.  I reviewed the Advanced Practitioner's note,  medications prescribed and orders placed.    Medical decision making is detailed below:  Ms. Perry is seen today for routine follow up s/p CABG. She had a complicated postop course, requiring Impella and stenting of OM, but ultimately made slow progress and was discharged home. She has continued to make progress at home. Energy is slowly improving, appetite is improving, and she is gaining energy. There is a focal area in her mid-sternum that is poorly approximated. No surrounding erythema and no drainage - it does not appear infected. We will plan to see her in the office in two weeks for re-evaluation. However, she lives far away and I discussed monitoring the incision with her  (who is very attentive to her care); if incision is scabbed over and looks healthy with no drainage, he will call and cancel the appointment.     Josh Castellanos MD  05/07/24  4:06 PM

## 2024-05-07 NOTE — PROGRESS NOTES
"Procedure: S/P coronary artery bypass grafting, performed on 4/9/24.     History: Julia Perry is a 68 y.o. year old female who presents to our office today for routine post-surgical follow up care. Her course was complicated by postop cardiogenic shock necessitating emergent cath lab for RAFFY x2 to OM and impella placement on 4/10. Impella ultimately removed on 4/13, but patient had ongoing vasoplegia and cardioplegia requiring milrinone and franklyn. She also had low urine output despite use of lasix, and developed hyponatremia which ultimately resolved with Samsca, as advised by nephrology. Her EF remained low at 20%, so she was evaluated for a LifeVest and ultimately discharged home on 4/23.     Patient's prior visit notes with cardiology and PCP reviewed. She is walking at home and had PT evaluate her twice and ultimately discharged her. She has made dietary improvements and is cleaning her incisions daily. She has been wearing her LifeVest per her report, however today her  is holding it during the visit. She has an upcoming appointment at the end of May for cardiac rehab.     Vital Signs:   Vitals:    05/07/24 1453 05/07/24 1455   BP: 118/59 118/57   BP Location: Left arm Right arm   Patient Position: Sitting Sitting   Cuff Size: Standard Standard   Pulse: 78    Temp: 98.2 °F (36.8 °C)    TempSrc: Tympanic    SpO2: 100%    Weight: 69.9 kg (154 lb)    Height: 5' 4\" (1.626 m)        Home Medications:   Prior to Admission medications    Medication Sig Start Date End Date Taking? Authorizing Provider   acetaminophen (TYLENOL) 325 mg tablet Take 2 tablets (650 mg total) by mouth every 6 (six) hours as needed for mild pain 3/5/24  Yes ROYAL Rojas   amiodarone 200 mg tablet Take 200 mg bid x 1 week then 200 mg daily 4/23/24  Yes Jeri Rubin PA-C   aspirin 81 mg chewable tablet Chew 1 tablet (81 mg total) daily 1/11/24  Yes Maverikc Ceballos DO   cholecalciferol (VITAMIN D3) 1,000 units tablet Take " 1,000 Units by mouth daily   Yes Historical Provider, MD   docusate sodium (COLACE) 100 mg capsule Take 1 capsule (100 mg total) by mouth 2 (two) times a day  Patient taking differently: Take 100 mg by mouth 2 (two) times a day Prn. 4/23/24  Yes Jeri Rubin PA-C   Empagliflozin (JARDIANCE) 10 MG TABS tablet Take 1 tablet (10 mg total) by mouth daily 4/24/24  Yes Jeri Rubin PA-C   furosemide (LASIX) 40 mg tablet Take 1 tablet (40 mg total) by mouth daily 4/23/24  Yes Jeri Rubin PA-C   glucose blood test strip Use 1 each daily as needed Use as instructed   Yes Historical Provider, MD   magnesium gluconate (MAGONATE) 500 mg tablet Take 1 tablet (500 mg total) by mouth in the morning  Patient taking differently: Take 400 mg by mouth in the morning 6/8/23 5/7/24 Yes Jean Claude Villatoro PA-C   metoprolol succinate (TOPROL-XL) 25 mg 24 hr tablet Take 0.5 tablets (12.5 mg total) by mouth daily 4/24/24  Yes Jeri Rubin PA-C   midodrine (PROAMATINE) 10 MG tablet Take 1 tablet (10 mg total) by mouth 3 (three) times a day before meals 4/23/24  Yes Jeri Rubin PA-C   OneTouch Delica Lancets 33G MISC May substitute brand based on insurance coverage. Check glucose TID. 6/8/23  Yes Jean Claude Villatoro PA-C   pantoprazole (PROTONIX) 40 mg tablet Take 1 tablet (40 mg total) by mouth daily in the early morning 4/24/24  Yes Jeri Rubin PA-C   potassium chloride (Klor-Con M20) 20 mEq tablet Take 1 tablet (20 mEq total) by mouth daily 4/24/24  Yes Jeri Rubin PA-C   rosuvastatin (CRESTOR) 40 MG tablet Take 1 tablet (40 mg total) by mouth daily 4/23/24  Yes Jeri Rubin PA-C   ticagrelor (BRILINTA) 90 MG Take 1 tablet (90 mg total) by mouth every 12 (twelve) hours 4/23/24  Yes Jeri Rubin PA-C   Alcohol Swabs 70 % PADS May substitute brand based on insurance coverage. Check glucose TID.  Patient not taking: Reported on 5/7/2024 6/8/23   Jean Claude Villatoro PA-C       Physical  "Exam:    HEENT/NECK:  Normocephalic. Atraumatic.  No jugular venous distention.    Cardiac: Regular rate and rhythm  Pulmonary:  Breath sounds clear bilaterally  Abdomen:  Non-tender, Non-distended, and Normal bowel sounds  Incisions: Sternum is stable.  Incision is clean and dry, with tiny open area in the mid sternum, less than 1cm in length, no surrounding erythema, no tunneling and no drainage. Saphenectomy incision c/d/I.    Extremities: Extremities warm/dry and Trace edema B/L  Neuro: Alert and oriented X 3.  Sensation is grossly intact.  No focal deficits.  Skin: Warm/Dry, without rashes or lesions.    Lab Results:               Invalid input(s): \"LABGLOM\"      Lab Results   Component Value Date    HGBA1C 6.4 (H) 12/28/2023     No results found for: \"CKTOTAL\", \"CKMB\", \"CKMBINDEX\", \"TROPONINI\"        Assessment: Coronary artery disease. S/P coronary artery bypass grafting;    Plan:     Julia Perry continues to recover well following surgery.  To date they have made progressive improvements with their physical rehabilitation. At this point I have cleared them to begin outpatient cardiac rehabilitation and have encouraged them to to do so.  Julia Perry has also been cleared to resume driving. I asked that them do so cautiously, in progressive increments. I did remind them of their ongoing lifting restrictions of 25 pounds for an additional 2 months.    Julia Perry has already been evaluated by their primary care physician cardiologist for ongoing medical care. Julia Perry will be scheduled for follow up care with our office in two weeks for a wound check. They are aware that if this area continues to heal and scab over, that they can cancel this appointment. I have advised them to call with any new concerns that may arise. Julia Perry was comfortable with our recommendations and their questions were answered to their satisfaction.    The patient recently had a screening colonoscopy in December 2023.  " Therefore GI referral is not indicated at this time.     Waleska Gee PA-C  05/07/24

## 2024-05-08 ENCOUNTER — VBI (OUTPATIENT)
Dept: ADMINISTRATIVE | Facility: OTHER | Age: 69
End: 2024-05-08

## 2024-05-09 ENCOUNTER — HOME CARE VISIT (OUTPATIENT)
Dept: HOME HEALTH SERVICES | Facility: HOME HEALTHCARE | Age: 69
End: 2024-05-09
Payer: COMMERCIAL

## 2024-05-09 VITALS — DIASTOLIC BLOOD PRESSURE: 64 MMHG | HEART RATE: 83 BPM | SYSTOLIC BLOOD PRESSURE: 112 MMHG | OXYGEN SATURATION: 97 %

## 2024-05-09 VITALS
HEART RATE: 72 BPM | OXYGEN SATURATION: 98 % | DIASTOLIC BLOOD PRESSURE: 56 MMHG | RESPIRATION RATE: 20 BRPM | SYSTOLIC BLOOD PRESSURE: 108 MMHG | TEMPERATURE: 97.3 F

## 2024-05-09 PROCEDURE — G0299 HHS/HOSPICE OF RN EA 15 MIN: HCPCS

## 2024-05-09 PROCEDURE — G0151 HHCP-SERV OF PT,EA 15 MIN: HCPCS

## 2024-05-10 NOTE — CASE COMMUNICATION
Patient is discharged from all [facility] services sooner than expected as all goals acheived of [facility]. She is indep in all mobility and self care, understands cardiac precautions. She has followup with cardiologist next week and cardiac rehab scheduled to vbegin at end of month.  No further skilled intervention needed at this time.

## 2024-05-20 ENCOUNTER — TELEPHONE (OUTPATIENT)
Dept: CARDIAC SURGERY | Facility: CLINIC | Age: 69
End: 2024-05-20

## 2024-05-28 ENCOUNTER — OFFICE VISIT (OUTPATIENT)
Dept: CARDIOLOGY CLINIC | Facility: HOSPITAL | Age: 69
End: 2024-05-28
Payer: COMMERCIAL

## 2024-05-28 ENCOUNTER — TELEPHONE (OUTPATIENT)
Age: 69
End: 2024-05-28

## 2024-05-28 ENCOUNTER — APPOINTMENT (OUTPATIENT)
Dept: LAB | Facility: HOSPITAL | Age: 69
End: 2024-05-28
Payer: COMMERCIAL

## 2024-05-28 VITALS
HEART RATE: 65 BPM | WEIGHT: 155.4 LBS | HEIGHT: 64 IN | OXYGEN SATURATION: 92 % | BODY MASS INDEX: 26.53 KG/M2 | DIASTOLIC BLOOD PRESSURE: 70 MMHG | SYSTOLIC BLOOD PRESSURE: 116 MMHG

## 2024-05-28 DIAGNOSIS — R30.0 DYSURIA: Primary | ICD-10-CM

## 2024-05-28 DIAGNOSIS — R93.1 LVEF <40%: ICD-10-CM

## 2024-05-28 DIAGNOSIS — I65.22 STENOSIS OF LEFT CAROTID ARTERY: ICD-10-CM

## 2024-05-28 DIAGNOSIS — Z95.1 S/P CABG X 3: ICD-10-CM

## 2024-05-28 DIAGNOSIS — E11.65 TYPE 2 DIABETES MELLITUS WITH HYPERGLYCEMIA, WITHOUT LONG-TERM CURRENT USE OF INSULIN (HCC): ICD-10-CM

## 2024-05-28 DIAGNOSIS — I25.118 CORONARY ARTERY DISEASE INVOLVING NATIVE CORONARY ARTERY OF NATIVE HEART WITH OTHER FORM OF ANGINA PECTORIS (HCC): Primary | ICD-10-CM

## 2024-05-28 DIAGNOSIS — E83.52 HYPERCALCEMIA: ICD-10-CM

## 2024-05-28 DIAGNOSIS — I25.5 ISCHEMIC CARDIOMYOPATHY: ICD-10-CM

## 2024-05-28 DIAGNOSIS — Z98.890 S/P CAROTID ENDARTERECTOMY: ICD-10-CM

## 2024-05-28 DIAGNOSIS — D50.8 OTHER IRON DEFICIENCY ANEMIA: ICD-10-CM

## 2024-05-28 DIAGNOSIS — I44.7 LBBB (LEFT BUNDLE BRANCH BLOCK): ICD-10-CM

## 2024-05-28 DIAGNOSIS — E83.42 HYPOMAGNESEMIA: ICD-10-CM

## 2024-05-28 DIAGNOSIS — E78.2 MIXED HYPERLIPIDEMIA: ICD-10-CM

## 2024-05-28 DIAGNOSIS — R30.0 DYSURIA: ICD-10-CM

## 2024-05-28 DIAGNOSIS — I50.9 ACUTE CONGESTIVE HEART FAILURE, UNSPECIFIED HEART FAILURE TYPE (HCC): ICD-10-CM

## 2024-05-28 DIAGNOSIS — I48.91 ATRIAL FIBRILLATION WITH RVR (HCC): ICD-10-CM

## 2024-05-28 LAB
ALBUMIN SERPL BCP-MCNC: 4.3 G/DL (ref 3.5–5)
ALP SERPL-CCNC: 87 U/L (ref 34–104)
ALT SERPL W P-5'-P-CCNC: 23 U/L (ref 7–52)
ANION GAP SERPL CALCULATED.3IONS-SCNC: 7 MMOL/L (ref 4–13)
AST SERPL W P-5'-P-CCNC: 26 U/L (ref 13–39)
BACTERIA UR QL AUTO: ABNORMAL /HPF
BASOPHILS # BLD AUTO: 0.11 THOUSANDS/ÂΜL (ref 0–0.1)
BASOPHILS NFR BLD AUTO: 1 % (ref 0–1)
BILIRUB SERPL-MCNC: 0.58 MG/DL (ref 0.2–1)
BILIRUB UR QL STRIP: NEGATIVE
BUN SERPL-MCNC: 29 MG/DL (ref 5–25)
CALCIUM SERPL-MCNC: 10.9 MG/DL (ref 8.4–10.2)
CHLORIDE SERPL-SCNC: 101 MMOL/L (ref 96–108)
CLARITY UR: CLEAR
CO2 SERPL-SCNC: 31 MMOL/L (ref 21–32)
COLOR UR: COLORLESS
CREAT SERPL-MCNC: 1 MG/DL (ref 0.6–1.3)
EOSINOPHIL # BLD AUTO: 0.23 THOUSAND/ÂΜL (ref 0–0.61)
EOSINOPHIL NFR BLD AUTO: 3 % (ref 0–6)
ERYTHROCYTE [DISTWIDTH] IN BLOOD BY AUTOMATED COUNT: 15.9 % (ref 11.6–15.1)
GFR SERPL CREATININE-BSD FRML MDRD: 58 ML/MIN/1.73SQ M
GLUCOSE SERPL-MCNC: 92 MG/DL (ref 65–140)
GLUCOSE UR STRIP-MCNC: ABNORMAL MG/DL
HCT VFR BLD AUTO: 35.3 % (ref 34.8–46.1)
HGB BLD-MCNC: 10.5 G/DL (ref 11.5–15.4)
HGB UR QL STRIP.AUTO: NEGATIVE
IMM GRANULOCYTES # BLD AUTO: 0.03 THOUSAND/UL (ref 0–0.2)
IMM GRANULOCYTES NFR BLD AUTO: 0 % (ref 0–2)
IRON SATN MFR SERPL: 18 % (ref 15–50)
IRON SERPL-MCNC: 75 UG/DL (ref 50–212)
KETONES UR STRIP-MCNC: NEGATIVE MG/DL
LEUKOCYTE ESTERASE UR QL STRIP: ABNORMAL
LYMPHOCYTES # BLD AUTO: 1.51 THOUSANDS/ÂΜL (ref 0.6–4.47)
LYMPHOCYTES NFR BLD AUTO: 18 % (ref 14–44)
MAGNESIUM SERPL-MCNC: 2.2 MG/DL (ref 1.9–2.7)
MCH RBC QN AUTO: 28.7 PG (ref 26.8–34.3)
MCHC RBC AUTO-ENTMCNC: 29.7 G/DL (ref 31.4–37.4)
MCV RBC AUTO: 96 FL (ref 82–98)
MONOCYTES # BLD AUTO: 0.64 THOUSAND/ÂΜL (ref 0.17–1.22)
MONOCYTES NFR BLD AUTO: 8 % (ref 4–12)
NEUTROPHILS # BLD AUTO: 5.76 THOUSANDS/ÂΜL (ref 1.85–7.62)
NEUTS SEG NFR BLD AUTO: 70 % (ref 43–75)
NITRITE UR QL STRIP: NEGATIVE
NON-SQ EPI CELLS URNS QL MICRO: ABNORMAL /HPF
NRBC BLD AUTO-RTO: 0 /100 WBCS
PH UR STRIP.AUTO: 6.5 [PH]
PLATELET # BLD AUTO: 440 THOUSANDS/UL (ref 149–390)
PMV BLD AUTO: 9.7 FL (ref 8.9–12.7)
POTASSIUM SERPL-SCNC: 4.2 MMOL/L (ref 3.5–5.3)
PROT SERPL-MCNC: 7.9 G/DL (ref 6.4–8.4)
PROT UR STRIP-MCNC: NEGATIVE MG/DL
RBC # BLD AUTO: 3.66 MILLION/UL (ref 3.81–5.12)
RBC #/AREA URNS AUTO: ABNORMAL /HPF
SODIUM SERPL-SCNC: 139 MMOL/L (ref 135–147)
SP GR UR STRIP.AUTO: 1.01 (ref 1–1.03)
TIBC SERPL-MCNC: 422 UG/DL (ref 250–450)
UIBC SERPL-MCNC: 347 UG/DL (ref 155–355)
UROBILINOGEN UR STRIP-ACNC: <2 MG/DL
WBC # BLD AUTO: 8.28 THOUSAND/UL (ref 4.31–10.16)
WBC #/AREA URNS AUTO: ABNORMAL /HPF

## 2024-05-28 PROCEDURE — 87086 URINE CULTURE/COLONY COUNT: CPT

## 2024-05-28 PROCEDURE — 36415 COLL VENOUS BLD VENIPUNCTURE: CPT

## 2024-05-28 PROCEDURE — 83735 ASSAY OF MAGNESIUM: CPT

## 2024-05-28 PROCEDURE — 81001 URINALYSIS AUTO W/SCOPE: CPT

## 2024-05-28 PROCEDURE — 99214 OFFICE O/P EST MOD 30 MIN: CPT | Performed by: INTERNAL MEDICINE

## 2024-05-28 PROCEDURE — 83550 IRON BINDING TEST: CPT

## 2024-05-28 PROCEDURE — 80053 COMPREHEN METABOLIC PANEL: CPT

## 2024-05-28 PROCEDURE — 83540 ASSAY OF IRON: CPT

## 2024-05-28 PROCEDURE — 85025 COMPLETE CBC W/AUTO DIFF WBC: CPT

## 2024-05-28 RX ORDER — MIDODRINE HYDROCHLORIDE 10 MG/1
5 TABLET ORAL
Start: 2024-05-28

## 2024-05-28 NOTE — PROGRESS NOTES
Julia Perry  1955  [id number]  [facility] [address] [address] [phone]    1. Coronary artery disease involving native coronary artery of native heart with other form of angina pectoris (HCC)        2. Ischemic cardiomyopathy        3. LBBB (left bundle branch block)        4. Stenosis of left carotid artery        5. S/P CABG x 3  midodrine (PROAMATINE) 10 MG tablet      6. S/P carotid endarterectomy        7. Mixed hyperlipidemia        8. Type 2 diabetes mellitus with hyperglycemia, without long-term current use of insulin (HCC)  midodrine (PROAMATINE) 10 MG tablet      9. Atrial fibrillation with RVR (HCC)  midodrine (PROAMATINE) 10 MG tablet      10. LVEF <40%  midodrine (PROAMATINE) 10 MG tablet      11. Acute congestive heart failure, unspecified heart failure type (HCC)  midodrine (PROAMATINE) 10 MG tablet          Discussion/Summary:  #1 cardiomyopathy ejection fraction 30%  #2 extensive heavy coronary calcifications  #3 chronic combined systolic and diastolic congestive heart failure  #4 hyperlipidemia  #5 type 2 diabetes mellitus  #6 IVCD  #7 high degree left internal carotid stenosis  #8 intracranial stenosis  #9 pituitary mass    Recommendations: She continues to recover well after surgery.  Blood pressure has been limiting goal-directed medical therapy.  Will try to get her off of midodrine and cut the dose in half to 5 3 times daily and follow-up.  She has an echocardiogram in July to see if she needs an implantable defibrillator or not.  Starting cardiac rehab this week.  Will follow blood pressures closely with rehab.  Coronary disease is stable with no active angina.  Continue LifeVest    Interval History: 68-year-old female with a history of type 2 diabetes and colitis presents for new patient consultation on behalf of Dr. Titus for new cardiomyopathy.  The patient has had shortness of breath, PND, orthopnea,  chest pain since 2023.  She was started on steroids for colitis and had worsening shortness of breath but attributed this to medication side effects.  An echocardiogram was recently obtained which showed an ejection fraction of 30%.  The patient tells me she has had some chest heaviness with walking on and off for several years.  She is a diabetic.  The symptoms have been quite mild.  She has felt more shortness of breath with activity and had occasional lower extremity edema recently.  There is been no syncope.  She has been taking her medications as prescribed.  She is a non-smoker.    Denies any chest pain remains with some mild shortness of breath with physical activity.  There is been no palpitations, lightheadedness, dizziness.  She has been using a LifeVest.  She takes all medications as prescribed.  Following low-sodium intake.    Medical Problems       Problem List       Colitis    History of biliary dyskinesia    DMII (diabetes mellitus, type 2) ([facility])      Lab Results   Component Value Date    HGBA1C 6.4 (H) 12/28/2023         Gallbladder polyp    Leiomyoma of uterus    Acquired right foot drop    Hypomagnesemia    Hair loss    Polyp of ascending colon    Ulcerative pancolitis without complication ([facility])    Type 2 diabetes mellitus with hyperglycemia, unspecified whether long term insulin use ([facility])      Lab Results   Component Value Date    HGBA1C 6.4 (H) 12/28/2023             Past Medical History:   Diagnosis Date    CHF (congestive heart failure) ([facility]) 4/8/2024    Colitis     Diabetes mellitus ([facility])     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[facility])     PONV (postoperative nausea and vomiting)      Social History     Socioeconomic History    Marital status: /Civil Union     Spouse name: Not on file    Number of children: Not on file    Years of education: Not on file    Highest education level: Not on file   Occupational History    Not on file   Tobacco Use    Smoking status: Former     Types: Cigarettes     Start  date: 1975     Passive exposure: Past    Smokeless tobacco: Never    Tobacco comments:     Quit 1970's   Vaping Use    Vaping status: Never Used   Substance and Sexual Activity    Alcohol use: Never    Drug use: Never    Sexual activity: Not on file   Other Topics Concern    Not on file   Social History Narrative    Not on file     Social Determinants of Health     Financial Resource Strain: Patient Declined (8/18/2023)    Overall Financial Resource Strain (CARDIA)     Difficulty of Paying Living Expenses: Patient declined   Food Insecurity: No Food Insecurity (4/11/2024)    Hunger Vital Sign     Worried About Running Out of Food in the Last Year: Never true     Ran Out of Food in the Last Year: Never true   Transportation Needs: No Transportation Needs (4/11/2024)    PRAPARE - Transportation     Lack of Transportation (Medical): No     Lack of Transportation (Non-Medical): No   Physical Activity: Not on file   Stress: Not on file   Social Connections: Not on file   Intimate Partner Violence: Not on file   Housing Stability: Low Risk  (4/11/2024)    Housing Stability Vital Sign     Unable to Pay for Housing in the Last Year: No     Number of Places Lived in the Last Year: 1     Unstable Housing in the Last Year: No      History reviewed. No pertinent family history.  Past Surgical History:   Procedure Laterality Date    CARDIAC CATHETERIZATION Left 01/17/2024    Procedure: Cardiac Left Heart Cath;  Surgeon: Juan A Sousa MD;  Location: [facility];  Service: Cardiology    CARDIAC CATHETERIZATION  01/17/2024    Procedure: Cardiac catheterization;  Surgeon: Juan A Sousa MD;  Location: [facility];  Service: Cardiology    CARDIAC CATHETERIZATION Left 4/10/2024    Procedure: Cardiac Left Heart Cath;  Surgeon: Viktor Bee MD;  [facility]: [facility];  Service: Cardiology    CARDIAC CATHETERIZATION N/A 4/10/2024    Procedure: Cardiac Coronary Angiogram;  Surgeon: Viktor Bee MD;   Location: [facility];  Service: Cardiology    CARDIAC CATHETERIZATION N/A 4/10/2024    Procedure: Cardiac Impella Insertion;  Surgeon: Viktor Bee MD;  Location: [facility];  Service: Cardiology    CARDIAC CATHETERIZATION N/A 4/10/2024    Procedure: Cardiac pci;  Surgeon: Viktor Bee MD;  [facility]: [facility];  Service: Cardiology    CARDIAC CATHETERIZATION N/A 4/12/2024    Procedure: Cardiac Impella Removal;  Surgeon: Viktor Bee MD;  [facility]: [facility];  Service: Cardiology    COLONOSCOPY  12/06/2023    HYSTERECTOMY      ~1990    IR PICC REPOSITION  4/18/2024    SD CORONARY ARTERY BYP W/VEIN & ARTERY GRAFT 3 VEIN N/A 4/9/2024    Procedure: CORONARY ARTERY BYPASS GRAFT (CABG) x3 VESSELS, LIMA - LAD, LEFT LEG EVH/SVG - PDA  AND OM, W/ELOISA;  Surgeon: Josh Castellanos MD;  Location: [facility];  Service: Cardiac Surgery    SD TEAEC W/PATCH GRF CAROTID VERTB SUBCLAV NECK INC Left 03/04/2024    Procedure: LEFT ENDARTERECTOMY ARTERY CAROTID;  Surgeon: Janeth De Oliveira DO;  Location: [facility];  Service: Vascular       Current Outpatient Medications:     acetaminophen (TYLENOL) 325 mg tablet, Take 2 tablets (650 mg total) by mouth every 6 (six) hours as needed for mild pain, Disp: , Rfl:     Alcohol Swabs 70 % PADS, May substitute brand based on insurance coverage. Check glucose TID., Disp: 100 each, Rfl: 0    amiodarone 200 mg tablet, Take 200 mg bid x 1 week then 200 mg daily, Disp: 37 tablet, Rfl: 1    cholecalciferol (VITAMIN D3) 1,000 units tablet, Take 1,000 Units by mouth daily, Disp: , Rfl:     docusate sodium (COLACE) 100 mg capsule, Take 1 capsule (100 mg total) by mouth 2 (two) times a day (Patient taking differently: Take 100 mg by mouth 2 (two) times a day Prn.), Disp: , Rfl:     Empagliflozin (JARDIANCE) 10 MG TABS tablet, Take 1 tablet (10 mg total) by mouth daily, Disp: 30 tablet, Rfl: 2    furosemide (LASIX) 40 mg tablet, Take 1 tablet (40 mg total)  by mouth daily, Disp: 30 tablet, Rfl: 2    glucose blood test strip, Use 1 each daily as needed Use as instructed, Disp: , Rfl:     magnesium gluconate (MAGONATE) 500 mg tablet, Take 1 tablet (500 mg total) by mouth in the morning (Patient taking differently: Take 400 mg by mouth in the morning), Disp: 90 tablet, Rfl: 0    metoprolol succinate (TOPROL-XL) 25 mg 24 hr tablet, Take 0.5 tablets (12.5 mg total) by mouth daily, Disp: 30 tablet, Rfl: 2    midodrine (PROAMATINE) 10 MG tablet, Take 0.5 tablets (5 mg total) by mouth 3 (three) times a day before meals, Disp: , Rfl:     OneTouch Delica Lancets 33G MISC, May substitute brand based on insurance coverage. Check glucose TID., Disp: 100 each, Rfl: 0    potassium chloride (Klor-Con M20) 20 mEq tablet, Take 1 tablet (20 mEq total) by mouth daily, Disp: 30 tablet, Rfl: 1    rosuvastatin (CRESTOR) 40 MG tablet, Take 1 tablet (40 mg total) by mouth daily, Disp: 30 tablet, Rfl: 2    ticagrelor (BRILINTA) 90 MG, Take 1 tablet (90 mg total) by mouth every 12 (twelve) hours, Disp: 60 tablet, Rfl: 2    aspirin 81 mg chewable tablet, Chew 1 tablet (81 mg total) daily, Disp: 90 tablet, Rfl: 3    pantoprazole (PROTONIX) 40 mg tablet, Take 1 tablet (40 mg total) by mouth daily in the early morning (Patient not taking: Reported on 5/28/2024), Disp: 30 tablet, Rfl: 0  Allergies   Allergen Reactions    Valdecoxib Other (See Comments)     Patient is unaware of this allergy       Labs:     Chemistry        Component Value Date/Time    K 4.5 04/29/2024 1222    K 4.4 08/17/2022 1507    CL 97 04/29/2024 1222     08/17/2022 1507    CO2 30 04/29/2024 1222    CO2 22 04/09/2024 1307    CO2 27 08/17/2022 1507    BUN 21 04/29/2024 1222    BUN 14 08/17/2022 1507    CREATININE 1.28 04/29/2024 1222    CREATININE 0.78 08/17/2022 1507        Component Value Date/Time    CALCIUM 9.5 04/29/2024 1222    CALCIUM 10.0 08/17/2022 1507    ALKPHOS 66 04/23/2024 0437    AST 23 04/23/2024 0437    ALT  "12 04/23/2024 0437            No results found for: \"CHOL\"  Lab Results   Component Value Date    HDL 47 (L) 01/11/2024    HDL 44 06/02/2021    HDL 44 07/24/2018     Lab Results   Component Value Date    LDLCALC 130 (H) 01/11/2024    LDLCALC 156 (H) 06/02/2021    LDLCALC 141 (H) 07/24/2018     Lab Results   Component Value Date    TRIG 135 01/11/2024    TRIG 204 (H) 06/02/2021    TRIG 140 07/24/2018     No results found for: \"CHOLHDL\"    Imaging: Echo complete w/ contrast if indicated    Result Date: 1/3/2024  Narrative:   Left Ventricle: Left ventricular cavity size is at the upper limits of normal when indexed for BSA. Wall thickness is mildly increased. There is eccentric hypertrophy. The left ventricular ejection fraction is 30%. Systolic function is severely reduced. Wall motion is normal. Unable to assess diastolic function due to E/A fusion due to tachycardia.   The following segments are akinetic: basal inferoseptal, basal inferior, mid inferoseptal and mid inferior.   The following segments are hypokinetic: basal anterior, basal anteroseptal, basal inferolateral, basal anterolateral, mid anterior, mid anteroseptal, mid inferolateral, mid anterolateral, apical anterior, apical septal, apical inferior, apical lateral and apex.   Left Atrium: The atrium is moderately dilated (42-48 mL/m2).   Mitral Valve: There is mild annular calcification. There is mild to moderate regurgitation.   Pulmonic Valve: There is mild regurgitation.   Pericardium: There is a small pericardial effusion along the right atrial and right ventricular free wall. The fluid exhibits no internal echoes. There is no echocardiographic evidence of tamponade. There is a left pleural effusion.       ECG: Sinus rhythm IVCD      ROS    Vitals:    05/28/24 1210   BP: 116/70   Pulse: 65   SpO2: 92%     Vitals:    05/28/24 1210   Weight: 70.5 kg (155 lb 6.4 oz)     Height: 5' 4\" (162.6 cm)   Body mass index is 26.67 kg/m².    Physical Exam:  Vital " signs reviewed  General:  Alert and cooperative, appears stated age, no acute distress  HEENT:  PERRLA, EOMI, no scleral icterus, no conjunctival pallor  Neck:  No lymphadenopathy, no thyromegaly, no carotid bruits, no elevated JVP  Heart:  Regular rate and rhythm, normal S1/S2, no S3/S4, no murmur, rubs or gallops.  PMI nondisplaced  Lungs:  Clear to auscultation bilaterally, no wheezes rales or rhonchi  Abdomen:  Soft, non-tender, positive bowel sounds, no rebound or guarding,   no organomegaly   Extremities:  Normal range of motion.  No clubbing, cyanosis or edema   Vascular:  2+ pedal pulses  Skin:  No rashes or lesions on exposed skin  Neurologic:  Cranial nerves II-XII grossly intact without focal deficits  Psych:  Normal mood and affect

## 2024-05-28 NOTE — TELEPHONE ENCOUNTER
Patient called she is going for her bloodwork today (around 1:00 pm), but she has been having symptoms of a UTI she believes.   For about a week she's had burning, itchy, more urgency than normal and her back has been achy. She would like to have a urine test to check for an infection.    Please advise, thank you.

## 2024-05-28 NOTE — TELEPHONE ENCOUNTER
Patient states she wants to await the culture. States her symptoms are not terrible so does not want to take abx if they are not truly necessary.

## 2024-05-29 LAB — BACTERIA UR CULT: NORMAL

## 2024-05-29 NOTE — PROGRESS NOTES
CARDIAC REHABILITATION ASSESSMENT AND INDIVIDUALIZED TREATMENT PLAN  INITIAL           Today's date: 2024   # of Exercise Sessions Completed: 1  Patient name: Julia Perry      : 1955  Age: 68 y.o.       MRN: [id number]  Referring Physician: Josh Castellanos,*  Cardiologist: Maverick Ceballos DO  Provider: Khai  Clinician: Erika Sacks, MS        Dr. Ceballos,   Please review the following patient assessment for Julia to begin cardiac rehabilitation post CABGx3.  Please verify you agree with the outlined plan of care and exercise prescription by signing with attached order.    Thank You.      Comments: Patient requires skilled CR to achieve goals and maximum functional capacity. CR medically necessary for secondary prevention.         Dx: CABG x 3    Description of Diagnosis: Coronary artery bypass grafting x 3 (LIMA to LAD, EVH/SVG to PDA, and OM). Angioplasty x 4 and RAFFY x 2 to mid Cx    Date of onset: 2024    Other Cardiac History: Hx Afib/LBBB, CHF (EF 20%), HLD          ASSESSMENT    Medical History:   Past Medical History:   Diagnosis Date    CHF (congestive heart failure) (HCC) 2024    Colitis     Diabetes mellitus (HCC)     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HCC)     PONV (postoperative nausea and vomiting)        Family History:  No family history on file.    Allergies:   Valdecoxib    Current Medications:   Current Outpatient Medications   Medication Sig Dispense Refill    acetaminophen (TYLENOL) 325 mg tablet Take 2 tablets (650 mg total) by mouth every 6 (six) hours as needed for mild pain      Alcohol Swabs 70 % PADS May substitute brand based on insurance coverage. Check glucose TID. 100 each 0    amiodarone 200 mg tablet Take 200 mg bid x 1 week then 200 mg daily 37 tablet 1    aspirin 81 mg chewable tablet Chew 1 tablet (81 mg total) daily 90 tablet 3    cholecalciferol (VITAMIN D3) 1,000 units tablet Take 1,000 Units by mouth daily      docusate sodium (COLACE) 100 mg capsule  Take 1 capsule (100 mg total) by mouth 2 (two) times a day (Patient taking differently: Take 100 mg by mouth 2 (two) times a day Prn.)      Empagliflozin (JARDIANCE) 10 MG TABS tablet Take 1 tablet (10 mg total) by mouth daily 30 tablet 2    furosemide (LASIX) 40 mg tablet Take 1 tablet (40 mg total) by mouth daily 30 tablet 2    glucose blood test strip Use 1 each daily as needed Use as instructed      magnesium gluconate (MAGONATE) 500 mg tablet Take 1 tablet (500 mg total) by mouth in the morning (Patient taking differently: Take 400 mg by mouth in the morning) 90 tablet 0    metoprolol succinate (TOPROL-XL) 25 mg 24 hr tablet Take 0.5 tablets (12.5 mg total) by mouth daily 30 tablet 2    midodrine (PROAMATINE) 10 MG tablet Take 0.5 tablets (5 mg total) by mouth 3 (three) times a day before meals      OneTouch Delica Lancets 33G MISC May substitute brand based on insurance coverage. Check glucose TID. 100 each 0    pantoprazole (PROTONIX) 40 mg tablet Take 1 tablet (40 mg total) by mouth daily in the early morning (Patient not taking: Reported on 5/28/2024) 30 tablet 0    potassium chloride (Klor-Con M20) 20 mEq tablet Take 1 tablet (20 mEq total) by mouth daily 30 tablet 1    rosuvastatin (CRESTOR) 40 MG tablet Take 1 tablet (40 mg total) by mouth daily 30 tablet 2    ticagrelor (BRILINTA) 90 MG Take 1 tablet (90 mg total) by mouth every 12 (twelve) hours 60 tablet 2     No current facility-administered medications for this visit.       Medication compliance: Yes   Comments: Pt reports to be compliant with medications    Physical Limitations: Decreased strength, fatigue    Fall Risk: Low   Comments: Ambulates with a steady gait with no assist device    Cultural needs: None      CAD Risk Factors:  Cholesterol: Yes (Tot 204, )  HTN: No  DM: Type 2  (A1c 6.4%)  Obesity: Yes (BMI 26.67 kg/m2)  Inactivity: Yes      EXERCISE ASSESSMENT:    Initial Fitness Assessment:    Rest: HR 77, /66, SpO2 98%,  "Asx  Exercise: HR 91, /64, SpO2 97%, Asx, 1.73 METs    Reason for Discontinuation: RPE 6  Recovery: HR 72, /62, SpO2 99%, Asx  ECG Interpretation: NSR w/ LBBB      Functional Status Prior to Diagnosis for Treatment:   Occupation: retired  Recreation/Physical Activity: Spend time outdoors  ADL’s: Capable of performing light to moderate ADLs  [place]: Capable of performing light to moderate ADLs  Home exercise equipment:  Bike  Home exercise: None  Other: None    Current Functional Status:  Occupation: retired  Recreation/Physical Activity: Spend time with family  ADL’s:Capable of performing light ADLs only  [place]: Capable of performing light ADLs only  Home exercise: Biking 4 min 4x/day  Other: None    Functional Capacity Screening Tool:  [facility] Activity Status Index:  3.97 METs      PSYCHOSOCIAL ASSESSMENT:    Depression screening:  PHQ-9 = 0    Interpretation:  0 =No Depression  Anxiety screening:  CRISTOBAL-7 = 0    Interpretation: 0-4  = Not anxious    Pt self-report of depression and anxiety   Patient reports they are coping well with good social support and denies depression or anxiety      Self-reported stress level:  3   Stressors:  Health, finances, household care  Stress Management Tools: practice Relaxation Techniques and keep a positive mindset    Quality of Life Screen:  (Higher score indicates disease impact on QOL)  [facility] COOP score: 20/40        Social Support:   spouse, children, and friends  Community/Social Activities: Spend time with family     Psychosocial Assessment as it relates to rehabilitation:   Patient denies issues with his/her family or home life that may affect their rehabilitation efforts.       NUTRITION ASSESSMENT:    Weight:    Wt Readings from Last 1 Encounters:   05/28/24 70.5 kg (155 lb 6.4 oz)        Height:   Ht Readings from Last 1 Encounters:   05/28/24 5' 4\" (1.626 m)       Rate Your Plate Score: 70/81    Diabetes: T2D  A1c: 6.4    last measured: " 12/28/2023    Lipid management: Discussed diet and lipid management and Last lipid profile 1/11/2024  Chol 204    HDL 47      Current Dietary Habits:  Patient reports improved dietary habits since discharge from the hospital. She states she has been eating a lot of salads and drinking one protein shake per day.    Drug/Alcohol Use:   No      OTHER CORE COMPONENT ASSESSMENT:    Tobacco Use:     Pt quit in 1979   and has abstained    Anginal Symptoms:  None   NTG use: No prescription        INDIVIDUALIZED TREATMENT PLAN      Patient will attend 35 monitored exercise sessions beginning 6/3/2024.    See outlined plan of care below for specific patient goals in each component of care.        EXERCISE GOAL and PLAN      SMART Goals:   10% improvement in functional capacity based on max METs achieved in initial fitness assessment  improved DASI score by 10%  increased exercise capacity by 40% based on peak METs tolerated in cardiac rehab exercise session  maintain > 150 minutes per week of moderate intensity exercise    Patient Specific EXERCISE GOALS:       resume ADLs with increased strength, attend rehab regularly, decrease sitting time, and start a home exercise program    Progress toward SMART and personal Exercise goals: Patient just set goals at initial evaluation. Patient will be progressed towards goals as tolerated. Patient is agreeable to attend cardiopulmonary rehab exercise sessions 3x/wk x 36 sessions.    Plan for next 30 days:        The patient was counseled on exercise guidelines to achieve a minimum of 150 mins/wk of moderate intensity (RPE 4-6)   exercise and encouraged to add 1-2 days of exercise on opposite days of cardiac rehab as tolerated.     Current Aerobic Exercise Prescription:      Frequency: 2-3 days/week   Supplement with home exercise 2+ days/wk as tolerated       Minutes: 25-35         METS: 1.5-2.5            HR: RHR +30-40bpm   RPE: 4-6         Modalities: Treadmill, UBE,  NuStep, and Recumbent bike     Exercise workloads will be progressed gradually as tolerated, within limits of patient's ability, and according to the patient's   response to the exercise program.      Aerobic Exercise Prescription - 30 day goal:   Frequency: 2-3 days/week of cardiac rehab       Supplement with home exercise 2+ days/wk as tolerated    Minutes: 35-45       >150 mins/wk of moderate intensity exercise   METS: 2.5-3.5   HR: RHR +30-40bpm     RPE: 4-6   Modalities: Treadmill, UBE, NuStep, and Recumbent bike    Strength training:  CR department is w/o strength equipment. Patient will be intoduced to strength training when new equipment is delivered.     Home Exercise: Bikes 4 min ~4x/day    Group and Individual Education: benefit of exercise for CAD risk factors, home exercise guidelines, AHA guidelines to achieve >150 mins/wk of moderate exercise, RPE scale, and Group class: Risk Factors for Heart Disease     Readiness to change: Preparation:  (Getting ready to change)       NUTRITION GOALS AND PLAN    Weight control:    Starting weight: 155   Current weight:     Nutritional   Reviewed patient's Rate your Plate. Discussed key elements of heart healthy eating. Reviewed patient goals for dietary modifications and their clinical implications.  Reviewed most recent lipid profile.     SMART Goals:   reduced BMI to < 25, LDL <100, CHOL <200, Improved Rate Your Plate score  >58, and 2.5-5%  wt loss    Patient Specific NUTRITION GOALS:     Weight loss, improve diet    Patient's progress toward SMART and personal Nutrition goals:   Patient just set goals at initial evaluation. Patient will be progressed towards goals as tolerated.    Plan for next 30 days:   group class: Reading Food Labels and group Class: Heart Healthy Eating    Measurable goals were based Rate Your Plate Dietary Self-Assessment. These are the areas in which the patient could score higher on the assessment.  Goals include recommendations for a  heart healthy diet based on American Heart Association.    Group and Individual Education:   heart healthy eating principles  low sodium diet  maintaining hydration  nutrition for  lipid management  group class: Heart Healthy Eating  group class:  Label Reading    Readiness to change: Preparation:  (Getting ready to change)       PSYCHOSOCIAL ASSESSMENT AND PLAN    Psychosocial Assessment as it relates to rehabilitation:   Patient denies issues with his/her family or home life that may affect their rehabilitation efforts.     SMART Goals:     Reduce perceived stress to 1-3/10, improved [facility] QOL < 27, feel less tired with more energy, Improved appetite control, and take time to relax    Patient Specific PSYCHOCOSOCIAL GOALS:     spend time with family, worry less    Patient's progress toward SMART and personal Psychosocial goals:   Patient just set goals at initial evaluation. Patient will be progressed towards goals as tolerated.    Plan for next 30 days:   Class: Stress and Your Health, Enroll in [employer], Keep a positive mindset, Enjoy family, and Return to previous social activity    Group and Individual Education: stress management techniques, benefits of enrolling in [employer], depression and CAD, and class:  Stress and Your Health     Information to utilize [employer] was provided as well as contact information for counseling through  Behavioral Health and group psychotherapy groups available.    Readiness to change: Preparation:  (Getting ready to change)       OTHER CORE COMPONENTS GOALS and PLAN      Blood Pressure will be monitored throughout the program and cardiologist will be notified of elevated trends.    Pt will be encouraged to monitor home BP if advised by cardiologist.    Tobacco Intervention:   N/A:  Pt has a remote history of smoking.      SMART Goals:   consistent, controlled resting BP < 130/80 and medication compliance    Patient Specific CORE COMPONENT GOALS:    reduced  dietary sodium <2000mg, medication compliance, and reduce number of medications  needed for BP control    Progress toward SMART and personal Core Component goals:   Patient just set goals at initial evaluation. Patient will be progressed towards goals as tolerated.    Plan for next 30 days:   group class: Understanding Heart Disease, group class: Common Heart Medications, medication compliance, avoid places with second hand smoke, and monitor home BP    Group and Individual Education:  understanding high blood pressure and it's relationship to CAD, components of blood pressure management, group class: Understanding Heart Disease, and group class:  Common Heart Medications    Readiness to change: Preparation:  (Getting ready to change)

## 2024-05-30 ENCOUNTER — CLINICAL SUPPORT (OUTPATIENT)
Dept: CARDIAC REHAB | Facility: HOSPITAL | Age: 69
End: 2024-05-30
Payer: COMMERCIAL

## 2024-05-30 DIAGNOSIS — E78.2 MIXED HYPERLIPIDEMIA: Primary | ICD-10-CM

## 2024-05-30 DIAGNOSIS — Z95.1 S/P CABG X 3: Primary | ICD-10-CM

## 2024-05-30 DIAGNOSIS — D50.8 OTHER IRON DEFICIENCY ANEMIA: Primary | ICD-10-CM

## 2024-05-30 DIAGNOSIS — E11.65 TYPE 2 DIABETES MELLITUS WITH HYPERGLYCEMIA, WITHOUT LONG-TERM CURRENT USE OF INSULIN (HCC): ICD-10-CM

## 2024-05-30 PROCEDURE — 93797 PHYS/QHP OP CAR RHAB WO ECG: CPT

## 2024-06-03 ENCOUNTER — TELEPHONE (OUTPATIENT)
Dept: FAMILY MEDICINE CLINIC | Facility: CLINIC | Age: 69
End: 2024-06-03

## 2024-06-03 ENCOUNTER — CLINICAL SUPPORT (OUTPATIENT)
Dept: CARDIAC REHAB | Facility: HOSPITAL | Age: 69
End: 2024-06-03
Payer: COMMERCIAL

## 2024-06-03 DIAGNOSIS — Z95.1 S/P CABG X 3: ICD-10-CM

## 2024-06-03 PROCEDURE — 93798 PHYS/QHP OP CAR RHAB W/ECG: CPT

## 2024-06-03 NOTE — TELEPHONE ENCOUNTER
SPOKE TO PT ABOUT MAMMO. SHE SAID DR [facility]IMAR [facility] USUALLY ORDERED FOR HER BUT SHE ISN'T SURE IF HE IS STILL PRACTICING.  SHE WOULD LIKE DR MAE TO ORDER IT FOR HER WHEN SHE COMES INTO OUR OFFICE ON 6/27/24

## 2024-06-06 ENCOUNTER — HOSPITAL ENCOUNTER (OUTPATIENT)
Dept: NON INVASIVE DIAGNOSTICS | Facility: HOSPITAL | Age: 69
Discharge: HOME | End: 2024-06-06
Payer: COMMERCIAL

## 2024-06-06 ENCOUNTER — CLINICAL SUPPORT (OUTPATIENT)
Dept: CARDIAC REHAB | Facility: HOSPITAL | Age: 69
End: 2024-06-06
Payer: COMMERCIAL

## 2024-06-06 DIAGNOSIS — Z95.1 S/P CABG X 3: ICD-10-CM

## 2024-06-06 DIAGNOSIS — Z98.890 S/P CAROTID ENDARTERECTOMY: ICD-10-CM

## 2024-06-06 DIAGNOSIS — I65.22 STENOSIS OF LEFT CAROTID ARTERY: ICD-10-CM

## 2024-06-06 PROCEDURE — 93880 EXTRACRANIAL BILAT STUDY: CPT

## 2024-06-06 PROCEDURE — 93798 PHYS/QHP OP CAR RHAB W/ECG: CPT

## 2024-06-07 PROCEDURE — 93880 EXTRACRANIAL BILAT STUDY: CPT | Performed by: SURGERY

## 2024-06-10 ENCOUNTER — CLINICAL SUPPORT (OUTPATIENT)
Dept: CARDIAC REHAB | Facility: HOSPITAL | Age: 69
End: 2024-06-10
Payer: COMMERCIAL

## 2024-06-10 DIAGNOSIS — Z95.1 S/P CABG X 3: ICD-10-CM

## 2024-06-10 PROCEDURE — 93798 PHYS/QHP OP CAR RHAB W/ECG: CPT

## 2024-06-12 ENCOUNTER — CLINICAL SUPPORT (OUTPATIENT)
Dept: CARDIAC REHAB | Facility: HOSPITAL | Age: 69
End: 2024-06-12
Payer: COMMERCIAL

## 2024-06-12 DIAGNOSIS — Z95.1 S/P CABG X 3: ICD-10-CM

## 2024-06-12 PROCEDURE — 93798 PHYS/QHP OP CAR RHAB W/ECG: CPT

## 2024-06-17 ENCOUNTER — TELEPHONE (OUTPATIENT)
Dept: VASCULAR SURGERY | Facility: CLINIC | Age: 69
End: 2024-06-17

## 2024-06-17 ENCOUNTER — APPOINTMENT (OUTPATIENT)
Dept: CARDIAC REHAB | Facility: HOSPITAL | Age: 69
End: 2024-06-17
Payer: COMMERCIAL

## 2024-06-17 NOTE — TELEPHONE ENCOUNTER
Called pt and lmom to inform that maicol will not be in the office tomorrow, 6/18 for her appt and we need to reschedule her, offered her the 9:30am 7/9 with maicol, told her to please give us a call back to confirm this date.

## 2024-06-18 ENCOUNTER — CLINICAL SUPPORT (OUTPATIENT)
Dept: CARDIAC REHAB | Facility: HOSPITAL | Age: 69
End: 2024-06-18
Payer: COMMERCIAL

## 2024-06-18 ENCOUNTER — TELEPHONE (OUTPATIENT)
Age: 69
End: 2024-06-18

## 2024-06-18 DIAGNOSIS — I50.9 ACUTE CONGESTIVE HEART FAILURE, UNSPECIFIED HEART FAILURE TYPE (HCC): ICD-10-CM

## 2024-06-18 DIAGNOSIS — Z95.1 S/P CABG X 3: ICD-10-CM

## 2024-06-18 DIAGNOSIS — E11.65 TYPE 2 DIABETES MELLITUS WITH HYPERGLYCEMIA, WITHOUT LONG-TERM CURRENT USE OF INSULIN (HCC): ICD-10-CM

## 2024-06-18 DIAGNOSIS — I48.91 ATRIAL FIBRILLATION WITH RVR (HCC): ICD-10-CM

## 2024-06-18 DIAGNOSIS — R93.1 LVEF <40%: ICD-10-CM

## 2024-06-18 PROCEDURE — 93798 PHYS/QHP OP CAR RHAB W/ECG: CPT

## 2024-06-18 RX ORDER — POTASSIUM CHLORIDE 20 MEQ/1
20 TABLET, EXTENDED RELEASE ORAL DAILY
Qty: 90 TABLET | Refills: 3 | Status: SHIPPED | OUTPATIENT
Start: 2024-06-18

## 2024-06-18 NOTE — TELEPHONE ENCOUNTER
Spoke with pt, made pt aware of Dr. Ceballos's message. Pt requested refills for potassium. Rx send to provider for approval.

## 2024-06-18 NOTE — TELEPHONE ENCOUNTER
Received call from pt's spouse.  He states pt will be out of 2 of her medications in 4 days, but he wanted to check with Dr. Ceballos if she needs to continue on them as there was prior discussion of weaning pt off the Midodrine.    Pt is taking Midodrine 5 mg TID and Potassium 20 meq daily.  He reports pt's BP to be trending 120's/55-70's.    Please advise if pt to continue both medications at present doses.  He would like refills to be sent to [facility].  Pt's spouse requests call back to confirm the medications/doses, states if he does not answer a detailed message may be left on his mobile or home number.

## 2024-06-19 ENCOUNTER — TELEPHONE (OUTPATIENT)
Age: 69
End: 2024-06-19

## 2024-06-19 ENCOUNTER — CLINICAL SUPPORT (OUTPATIENT)
Dept: CARDIAC REHAB | Facility: HOSPITAL | Age: 69
End: 2024-06-19
Payer: COMMERCIAL

## 2024-06-19 DIAGNOSIS — I50.9 ACUTE CONGESTIVE HEART FAILURE, UNSPECIFIED HEART FAILURE TYPE (HCC): ICD-10-CM

## 2024-06-19 DIAGNOSIS — I48.91 ATRIAL FIBRILLATION WITH RVR (HCC): ICD-10-CM

## 2024-06-19 DIAGNOSIS — Z95.1 S/P CABG X 3: ICD-10-CM

## 2024-06-19 DIAGNOSIS — R93.1 LVEF <40%: ICD-10-CM

## 2024-06-19 DIAGNOSIS — E11.65 TYPE 2 DIABETES MELLITUS WITH HYPERGLYCEMIA, WITHOUT LONG-TERM CURRENT USE OF INSULIN (HCC): ICD-10-CM

## 2024-06-19 PROCEDURE — 93798 PHYS/QHP OP CAR RHAB W/ECG: CPT

## 2024-06-19 RX ORDER — AMIODARONE HYDROCHLORIDE 200 MG/1
TABLET ORAL
Qty: 37 TABLET | Refills: 1 | Status: SHIPPED | OUTPATIENT
Start: 2024-06-19 | End: 2024-06-27 | Stop reason: SDUPTHER

## 2024-06-19 NOTE — TELEPHONE ENCOUNTER
Received call from pt.  Her  called earlier to request a refill of her Amiodarone.  Pt is asking how long she is expected to continue taking it.    Please advise.

## 2024-06-19 NOTE — TELEPHONE ENCOUNTER
Pt contacted Call Center requested refill of their medication.        Doctor Name: Julianspeedysvetlana      Medication Name: amiodarone      Dosage of Med: 200mg      Frequency of Med: take 200 mg daily       Remaining Medication: patient is unsure- thinks 2 or 3 days.       Pharmacy and Location: [facility]         Pt. Preferred Callback Phone Number: [phone]

## 2024-06-24 ENCOUNTER — CLINICAL SUPPORT (OUTPATIENT)
Dept: CARDIAC REHAB | Facility: HOSPITAL | Age: 69
End: 2024-06-24
Payer: COMMERCIAL

## 2024-06-24 DIAGNOSIS — Z95.1 S/P CABG X 3: ICD-10-CM

## 2024-06-24 PROCEDURE — 93798 PHYS/QHP OP CAR RHAB W/ECG: CPT

## 2024-06-26 ENCOUNTER — CLINICAL SUPPORT (OUTPATIENT)
Dept: CARDIAC REHAB | Facility: HOSPITAL | Age: 69
End: 2024-06-26
Payer: COMMERCIAL

## 2024-06-26 DIAGNOSIS — Z95.1 S/P CABG X 3: Primary | ICD-10-CM

## 2024-06-26 PROCEDURE — 93798 PHYS/QHP OP CAR RHAB W/ECG: CPT

## 2024-06-27 ENCOUNTER — TELEPHONE (OUTPATIENT)
Dept: CARDIOLOGY CLINIC | Facility: CLINIC | Age: 69
End: 2024-06-27

## 2024-06-27 ENCOUNTER — OFFICE VISIT (OUTPATIENT)
Dept: FAMILY MEDICINE CLINIC | Facility: CLINIC | Age: 69
End: 2024-06-27
Payer: COMMERCIAL

## 2024-06-27 VITALS
HEART RATE: 72 BPM | WEIGHT: 157 LBS | DIASTOLIC BLOOD PRESSURE: 82 MMHG | TEMPERATURE: 96.8 F | HEIGHT: 64 IN | SYSTOLIC BLOOD PRESSURE: 122 MMHG | BODY MASS INDEX: 26.8 KG/M2 | OXYGEN SATURATION: 99 %

## 2024-06-27 DIAGNOSIS — I25.10 CORONARY ARTERY DISEASE INVOLVING NATIVE CORONARY ARTERY: ICD-10-CM

## 2024-06-27 DIAGNOSIS — N18.31 STAGE 3A CHRONIC KIDNEY DISEASE (HCC): ICD-10-CM

## 2024-06-27 DIAGNOSIS — E11.65 TYPE 2 DIABETES MELLITUS WITH HYPERGLYCEMIA, WITHOUT LONG-TERM CURRENT USE OF INSULIN (HCC): Primary | ICD-10-CM

## 2024-06-27 DIAGNOSIS — R60.9 EDEMA, UNSPECIFIED TYPE: ICD-10-CM

## 2024-06-27 DIAGNOSIS — R93.1 LVEF <40%: ICD-10-CM

## 2024-06-27 DIAGNOSIS — I25.5 ISCHEMIC CARDIOMYOPATHY: ICD-10-CM

## 2024-06-27 DIAGNOSIS — I48.91 ATRIAL FIBRILLATION WITH RVR (HCC): ICD-10-CM

## 2024-06-27 DIAGNOSIS — I50.9 ACUTE CONGESTIVE HEART FAILURE, UNSPECIFIED HEART FAILURE TYPE (HCC): ICD-10-CM

## 2024-06-27 DIAGNOSIS — R29.898 WEAKNESS OF BOTH LOWER EXTREMITIES: ICD-10-CM

## 2024-06-27 DIAGNOSIS — K51.00 ULCERATIVE PANCOLITIS WITHOUT COMPLICATION (HCC): ICD-10-CM

## 2024-06-27 DIAGNOSIS — D35.2 PITUITARY MACROADENOMA (HCC): ICD-10-CM

## 2024-06-27 DIAGNOSIS — I48.91 NEW ONSET ATRIAL FIBRILLATION (HCC): ICD-10-CM

## 2024-06-27 DIAGNOSIS — E83.42 HYPOMAGNESEMIA: ICD-10-CM

## 2024-06-27 DIAGNOSIS — E78.2 MIXED HYPERLIPIDEMIA: ICD-10-CM

## 2024-06-27 DIAGNOSIS — G25.81 RLS (RESTLESS LEGS SYNDROME): ICD-10-CM

## 2024-06-27 DIAGNOSIS — Z95.1 S/P CABG X 3: ICD-10-CM

## 2024-06-27 DIAGNOSIS — N89.8 VAGINAL ITCHING: ICD-10-CM

## 2024-06-27 PROCEDURE — G2211 COMPLEX E/M VISIT ADD ON: HCPCS | Performed by: FAMILY MEDICINE

## 2024-06-27 PROCEDURE — 99215 OFFICE O/P EST HI 40 MIN: CPT | Performed by: FAMILY MEDICINE

## 2024-06-27 RX ORDER — CLOTRIMAZOLE 1 %
1 CREAM WITH APPLICATOR VAGINAL
Qty: 45 G | Refills: 0 | Status: SHIPPED | OUTPATIENT
Start: 2024-06-27

## 2024-06-27 RX ORDER — POTASSIUM CHLORIDE 20 MEQ/1
20 TABLET, EXTENDED RELEASE ORAL DAILY
Qty: 90 TABLET | Refills: 3 | Status: CANCELLED | OUTPATIENT
Start: 2024-06-27

## 2024-06-27 RX ORDER — ROSUVASTATIN CALCIUM 40 MG/1
40 TABLET, COATED ORAL DAILY
Qty: 90 TABLET | Refills: 2 | Status: SHIPPED | OUTPATIENT
Start: 2024-06-27

## 2024-06-27 RX ORDER — METOPROLOL SUCCINATE 25 MG/1
12.5 TABLET, EXTENDED RELEASE ORAL DAILY
Qty: 30 TABLET | Refills: 2 | Status: CANCELLED | OUTPATIENT
Start: 2024-06-27

## 2024-06-27 RX ORDER — AMIODARONE HYDROCHLORIDE 200 MG/1
TABLET ORAL
Qty: 90 TABLET | Refills: 1 | Status: SHIPPED | OUTPATIENT
Start: 2024-06-27

## 2024-06-27 RX ORDER — FUROSEMIDE 40 MG/1
40 TABLET ORAL DAILY
Qty: 30 TABLET | Refills: 2 | Status: CANCELLED | OUTPATIENT
Start: 2024-06-27

## 2024-06-27 NOTE — ASSESSMENT & PLAN NOTE
Encourage patient to monitor glucose throughout the course of the day.  She does have orders for upcoming labs.  I will call her with those reports and as to the any changes of treatment.  Lab Results   Component Value Date    HGBA1C 6.4 (H) 12/28/2023

## 2024-06-27 NOTE — PROGRESS NOTES
Ambulatory Visit  Name: Julia Perry      : 1955      MRN: [id number]  Encounter Provider: Toya Titus MD  Encounter Date: 2024   Encounter department: [facility]    Assessment & Plan   1. Type 2 diabetes mellitus with hyperglycemia, without long-term current use of insulin ([facility])  Assessment & Plan:  Encourage patient to monitor glucose throughout the course of the day.  She does have orders for upcoming labs.  I will call her with those reports and as to the any changes of treatment.  Lab Results   Component Value Date    HGBA1C 6.4 (H) 2023     Orders:  -     Empagliflozin (JARDIANCE) 10 MG TABS tablet; Take 1 tablet (10 mg total) by mouth daily  -     ticagrelor (BRILINTA) 90 MG; Take 1 tablet (90 mg total) by mouth every 12 (twelve) hours  -     Comprehensive metabolic panel; Future  -     amiodarone 200 mg tablet; 1 tablet daily  -     clotrimazole (GYNE-LOTRIMIN) 1 % vaginal cream; Insert 1 applicator into the vagina daily at bedtime  -     Albumin / creatinine urine ratio; Future  -     Magnesium; Future  2. Atrial fibrillation with RVR ([facility])  -     Empagliflozin (JARDIANCE) 10 MG TABS tablet; Take 1 tablet (10 mg total) by mouth daily  -     ticagrelor (BRILINTA) 90 MG; Take 1 tablet (90 mg total) by mouth every 12 (twelve) hours  -     amiodarone 200 mg tablet; 1 tablet daily  3. Acute congestive heart failure, unspecified heart failure type ([facility])  -     Empagliflozin (JARDIANCE) 10 MG TABS tablet; Take 1 tablet (10 mg total) by mouth daily  -     ticagrelor (BRILINTA) 90 MG; Take 1 tablet (90 mg total) by mouth every 12 (twelve) hours  -     amiodarone 200 mg tablet; 1 tablet daily  4. Coronary artery disease involving native coronary artery  Assessment & Plan:  Patient at present without symptomatology.  Discussed  at length different side effects with different medications however present medications are needed to control and hopefully improve her cardiac  situation.  Advised that he discuss the concerns further with cardiology and they have follow-up in 1 month   I spent 45 minutes with this patient and her  answering all of their questions and concerns as noted.  Orders:  -     rosuvastatin (CRESTOR) 40 MG tablet; Take 1 tablet (40 mg total) by mouth daily  5. Ischemic cardiomyopathy  Assessment & Plan:  For repeat echo in 1 month   further treatment as per cardiology.  Likely symptoms at present fairly well-controlled.  6. Ulcerative pancolitis without complication (HCC)  Assessment & Plan:  Does have upcoming follow-up with GI.  Present she denies any GI symptomatology.  7. 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HCC)  Assessment & Plan:  At some point will require follow-up however we will hold till cardiac status was stabilized.  8. Stage 3a chronic kidney disease (HCC)  Assessment & Plan:  Lab Results   Component Value Date    EGFR 58 05/28/2024    EGFR 43 04/29/2024    EGFR 39 04/26/2024    CREATININE 1.00 05/28/2024    CREATININE 1.28 04/29/2024    CREATININE 1.38 (H) 04/26/2024   Markedly improved per last labs.  Encourage continued good hydration.  9. Mixed hyperlipidemia  Assessment & Plan:  Slip for labs.  Cholesterol and LDL very well-controlled definitely discussed with cardiology possibly creasing dose of her statin agent with hopeful relief of symptoms as noted above.  10. Vaginal itching  Assessment & Plan:  Reviewed recent urine culture with patient advised her symptoms most likely due to monilial infection may be related to Jardiance.  Will try to hold off further p.o. medications therefore we will treat with clotrimazole cream at bedtime for 10 days.  Patient knows to call if symptoms unresolved at that time.  With them require pelvic exam  Orders:  -     clotrimazole (GYNE-LOTRIMIN) 1 % vaginal cream; Insert 1 applicator into the vagina daily at bedtime  11. Weakness of both lower extremities  Assessment & Plan:  Questionable etiology with negative exam  today.  I wonder if due to statin agent.  Advised must continue current dose until labs are obtained.  I did advise a trial of added co-Q10 milligrams daily and definitely to continue rehab  12. RLS (restless legs syndrome)  Assessment & Plan:  For symptoms today suggestive of this diagnosis however at present will not add medication to her already long list of meds.  13. New onset atrial fibrillation (HCC)  Assessment & Plan:  Questionably sinus rhythm today with well-controlled rate.  She should continue current medication as per cardiology.  14. S/P CABG x 3  15. LVEF <40%  16. Edema, unspecified type  17. Hypomagnesemia  -     Magnesium; Future       History of Present Illness     Patient has attended approximately 7 sessions of cardiac rehab.  She does planing of shortness of breath with the exercises but denies any chest pain or palpitations.  She is mostly concerned of weakness in her legs.  She had 2 episodes both while going to the bathroom during the night legs feeling that they will give out on her.  She also complains of what sounds like restless legs most likely during the night or at rest.  She denies any pain or numbness of her legs.  She does have chronic recurrent low back pain but denies any radiation of the symptoms.  Reminded patient of an episode 1 to 2 years ago of weakness of her right leg with foot drop in hindsight felt to possibly due to prolonged course of steroids though symptoms now resolved.  Further questioning she does states she has problems going up and down steps due to the weakness in her legs.  He is really not mentioned these problems to the therapist who feel she is doing well with therapy.  She and her  are very concerned about possible side effects from all these medications, hubby repeatedly during visit asking questions regarding her medications and their side effects.  He states he is just very worried about his wife.  Currently he has discussed some of these  "concerns with cardiology.    She did have follow-up with Dr. Ceballos who is gradually bring her midodrine.  She has follow-up in July for repeat echo which time determination will be made regarding an implantable defibrillator.      She is checking her sugars just in the morning usually ranging in the low 100s occasionally with sugar in the 90s.  He has not been checking his sugars any other time of the day.    She admits to occasional feeling of lightheadedness.  She is trying to stay well-hydrated.  She is monitoring her weight on a daily basis.  Currently vital signs at rehab have all been stable averaging between 110s to 130s systolic.    Patient still complaining of occasional vaginal itch without discharge or bleeding.  She did call requesting a urine culture which was negative she really denies any voiding difficulty.  Noticed the Fech she is on Jardiance        Review of Systems    Objective     /82 (BP Location: Right arm, Patient Position: Sitting, Cuff Size: Large)   Pulse 72   Temp (!) 96.8 °F (36 °C) (Tympanic)   Ht 5' 4\" (1.626 m)   Wt 71.2 kg (157 lb)   SpO2 99%   BMI 26.95 kg/m²     Physical Exam  Vitals and nursing note reviewed.   Constitutional:       General: She is not in acute distress.     Appearance: Normal appearance. She is well-developed.   HENT:      Head: Normocephalic and atraumatic.   Eyes:      Conjunctiva/sclera: Conjunctivae normal.   Cardiovascular:      Rate and Rhythm: Normal rate and regular rhythm.      Pulses:           Dorsalis pedis pulses are 2+ on the right side and 2+ on the left side.        Posterior tibial pulses are 1+ on the right side and 1+ on the left side.      Heart sounds: No murmur heard.     Comments: Repeat BP `100/80 without tilt rhythm is questionable regular with occasional ectopic with a rate of 70  Pulmonary:      Effort: Pulmonary effort is normal. No respiratory distress.      Breath sounds: Normal breath sounds.   Abdominal:      " Palpations: Abdomen is soft.      Tenderness: There is no abdominal tenderness.   Musculoskeletal:         General: No swelling.      Cervical back: Neck supple.      Right lower leg: No edema.      Left lower leg: No edema.   Skin:     General: Skin is warm and dry.      Capillary Refill: Capillary refill takes less than 2 seconds.   Neurological:      Mental Status: She is alert.      Motor: No tremor or abnormal muscle tone.      Deep Tendon Reflexes:      Reflex Scores:       Patellar reflexes are 3+ on the right side and 3+ on the left side.       Achilles reflexes are 1+ on the right side and 1+ on the left side.     Comments: Motor strength intact except for questionably questionable mild decreased plantarflexion of right foot   Psychiatric:         Mood and Affect: Mood normal.       Administrative Statements

## 2024-06-27 NOTE — ASSESSMENT & PLAN NOTE
Questionable etiology with negative exam today.  I wonder if due to statin agent.  Advised must continue current dose until labs are obtained.  I did advise a trial of added co-Q10 milligrams daily and definitely to continue rehab

## 2024-06-27 NOTE — ASSESSMENT & PLAN NOTE
Patient at present without symptomatology.  Discussed  at length different side effects with different medications however present medications are needed to control and hopefully improve her cardiac situation.  Advised that he discuss the concerns further with cardiology and they have follow-up in 1 month   I spent 45 minutes with this patient and her  answering all of their questions and concerns as noted.

## 2024-06-27 NOTE — TELEPHONE ENCOUNTER
Caller: Luke    Doctor: Lin    Reason for call: Pt  calling wondering if they can stop the medications amiodarone and midodrine. Does not want to continue taking those medications if not necessary. Would like to discontinue ASAP. States BP was elevated this morning a little. States was only under the impression that she would only be taking these a few months.   States will be running errands today so to call the cell phone listed below. If they don't answer they are requesting that we call the house phone and leave a detailed voicemail.     Call back#: [phone]

## 2024-06-27 NOTE — ASSESSMENT & PLAN NOTE
For symptoms today suggestive of this diagnosis however at present will not add medication to her already long list of meds.

## 2024-06-27 NOTE — ASSESSMENT & PLAN NOTE
Reviewed recent urine culture with patient advised her symptoms most likely due to monilial infection may be related to Jardiance.  Will try to hold off further p.o. medications therefore we will treat with clotrimazole cream at bedtime for 10 days.  Patient knows to call if symptoms unresolved at that time.  With them require pelvic exam

## 2024-06-27 NOTE — ASSESSMENT & PLAN NOTE
For repeat echo in 1 month   further treatment as per cardiology.  Likely symptoms at present fairly well-controlled.

## 2024-06-27 NOTE — ASSESSMENT & PLAN NOTE
Slip for labs.  Cholesterol and LDL very well-controlled definitely discussed with cardiology possibly creasing dose of her statin agent with hopeful relief of symptoms as noted above.

## 2024-06-27 NOTE — ASSESSMENT & PLAN NOTE
Questionably sinus rhythm today with well-controlled rate.  She should continue current medication as per cardiology.

## 2024-06-27 NOTE — PROGRESS NOTES
CARDIAC REHABILITATION ASSESSMENT AND INDIVIDUALIZED TREATMENT PLAN  30 DAY          Today's date: 2024   # of Exercise Sessions Completed: 9  Patient name: Julia Perry      : 1955  Age: 68 y.o.       MRN: [id number]  Referring Physician: Jeir Rubin PA*  Cardiologist: Maverick Ceballos DO  Provider: [place]  Clinician: Erika Sacks, MS Dr. Cutitta,   Please review the following patient assessment for Julia to continue cardiac rehabilitation post CABGx3.  Please verify you agree with the outlined plan of care and exercise prescription by signing with attached order.    Thank You.      Comments: Patient has completed 9 sessions of cardiac rehab. She has been working at a 3.10 MET level. She is compliant in wearing her Life Vest. She will be progressed as tolerated.         Dx: CABG x 3    Description of Diagnosis: Coronary artery bypass grafting x 3 (LIMA to LAD, EVH/SVG to PDA, and OM). Angioplasty x 4 and RAFFY x 2 to mid Cx    Date of onset: 2024    Other Cardiac History: Hx Afib/LBBB, CHF (EF 20%), HLD          ASSESSMENT    Medical History:   Past Medical History:   Diagnosis Date    CHF (congestive heart failure) (HCC) 2024    Colitis     Diabetes mellitus (HCC)     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HCC)     PONV (postoperative nausea and vomiting)        Family History:  No family history on file.    Allergies:   Valdecoxib    Current Medications:   Current Outpatient Medications   Medication Sig Dispense Refill    acetaminophen (TYLENOL) 325 mg tablet Take 2 tablets (650 mg total) by mouth every 6 (six) hours as needed for mild pain      Alcohol Swabs 70 % PADS May substitute brand based on insurance coverage. Check glucose TID. 100 each 0    amiodarone 200 mg tablet Take 200 mg bid x 1 week then 200 mg daily 37 tablet 1    aspirin 81 mg chewable tablet Chew 1 tablet (81 mg total) daily 90 tablet 3    cholecalciferol (VITAMIN D3) 1,000 units tablet Take 1,000 Units by mouth daily       docusate sodium (COLACE) 100 mg capsule Take 1 capsule (100 mg total) by mouth 2 (two) times a day (Patient taking differently: Take 100 mg by mouth 2 (two) times a day Prn.)      Empagliflozin (JARDIANCE) 10 MG TABS tablet Take 1 tablet (10 mg total) by mouth daily 30 tablet 2    furosemide (LASIX) 40 mg tablet Take 1 tablet (40 mg total) by mouth daily 30 tablet 2    glucose blood test strip Use 1 each daily as needed Use as instructed      magnesium gluconate (MAGONATE) 500 mg tablet Take 1 tablet (500 mg total) by mouth in the morning (Patient taking differently: Take 400 mg by mouth in the morning) 90 tablet 0    metoprolol succinate (TOPROL-XL) 25 mg 24 hr tablet Take 0.5 tablets (12.5 mg total) by mouth daily 30 tablet 2    midodrine (PROAMATINE) 10 MG tablet Take 0.5 tablets (5 mg total) by mouth 3 (three) times a day before meals      OneTouch Delica Lancets 33G MISC May substitute brand based on insurance coverage. Check glucose TID. 100 each 0    potassium chloride (Klor-Con M20) 20 mEq tablet Take 1 tablet (20 mEq total) by mouth daily 90 tablet 3    rosuvastatin (CRESTOR) 40 MG tablet Take 1 tablet (40 mg total) by mouth daily 30 tablet 2    ticagrelor (BRILINTA) 90 MG Take 1 tablet (90 mg total) by mouth every 12 (twelve) hours 60 tablet 2     No current facility-administered medications for this visit.       Medication compliance: Yes   Comments: Pt reports to be compliant with medications    Physical Limitations: Decreased strength, fatigue    Fall Risk: Low   Comments: Ambulates with a steady gait with no assist device    Cultural needs: None      CAD Risk Factors:  Cholesterol: Yes (Tot 204, )  HTN: No  DM: Type 2  (A1c 6.4%)  Obesity: Yes (BMI 26.67 kg/m2)  Inactivity: Yes      EXERCISE ASSESSMENT:    Initial Fitness Assessment:    Rest: HR 77, /66, SpO2 98%, Asx  Exercise: HR 91, /64, SpO2 97%, Asx, 1.73 METs    Reason for Discontinuation: RPE 6  Recovery: HR 72, /62,  "SpO2 99%, Asx  ECG Interpretation: NSR w/ LBBB      Functional Status Prior to Diagnosis for Treatment:   Occupation: retired  Recreation/Physical Activity: Spend time outdoors  ADL’s: Capable of performing light to moderate ADLs  [place]: Capable of performing light to moderate ADLs  Home exercise equipment:  Bike  Home exercise: None  Other: None    Current Functional Status:  Occupation: retired  Recreation/Physical Activity: Spend time with family  ADL’s:Capable of performing light ADLs only  [place]: Capable of performing light ADLs only  Home exercise: Biking 4 min 4x/day  Other: None    Functional Capacity Screening Tool:  [facility] Activity Status Index:  3.97 METs      PSYCHOSOCIAL ASSESSMENT:    Depression screening:  PHQ-9 = 0    Interpretation:  0 =No Depression  Anxiety screening:  CRISTOBAL-7 = 0    Interpretation: 0-4  = Not anxious    Pt self-report of depression and anxiety   Patient reports they are coping well with good social support and denies depression or anxiety      Self-reported stress level:  3   Stressors:  Health, finances, household care  Stress Management Tools: practice Relaxation Techniques and keep a positive mindset    Quality of Life Screen:  (Higher score indicates disease impact on QOL)  [facility] COOP score: 20/40        Social Support:   spouse, children, and friends  Community/Social Activities: Spend time with family     Psychosocial Assessment as it relates to rehabilitation:   Patient denies issues with his/her family or home life that may affect their rehabilitation efforts.       NUTRITION ASSESSMENT:    Weight:    Wt Readings from Last 1 Encounters:   05/28/24 70.5 kg (155 lb 6.4 oz)        Height:   Ht Readings from Last 1 Encounters:   05/28/24 5' 4\" (1.626 m)       Rate Your Plate Score: 70/81    Diabetes: T2D  A1c: 6.4    last measured: 12/28/2023    Lipid management: Discussed diet and lipid management and Last lipid profile 1/11/2024  Chol 204    HDL 47  LDL " 130    Current Dietary Habits:  Patient reports improved dietary habits since discharge from the hospital. She states she has been eating a lot of salads and drinking one protein shake per day.    Drug/Alcohol Use:   No      OTHER CORE COMPONENT ASSESSMENT:    Tobacco Use:     Pt quit in 1979   and has abstained    Anginal Symptoms:  None   NTG use: No prescription        INDIVIDUALIZED TREATMENT PLAN      Patient will attend 35 monitored exercise sessions beginning 6/3/2024.    See outlined plan of care below for specific patient goals in each component of care.        EXERCISE GOAL and PLAN      SMART Goals:   10% improvement in functional capacity based on max METs achieved in initial fitness assessment  improved DASI score by 10%  increased exercise capacity by 40% based on peak METs tolerated in cardiac rehab exercise session  maintain > 150 minutes per week of moderate intensity exercise    Patient Specific EXERCISE GOALS:       resume ADLs with increased strength, attend rehab regularly, decrease sitting time, and start a home exercise program    Progress toward SMART and personal Exercise goals: Patient has completed 9 sessions of CR. She is working at a 3.10 MET level. She continues to use a bike at home daily.      Plan for next 30 days:  Continue attending CR 2-3x/week, work towards increasing her MET level to 5.0 or greater, increase HEP      The patient was counseled on exercise guidelines to achieve a minimum of 150 mins/wk of moderate intensity (RPE 4-6)   exercise and encouraged to add 1-2 days of exercise on opposite days of cardiac rehab as tolerated.     Current Aerobic Exercise Prescription:      Frequency: 2-3 days/week   Supplement with home exercise 2+ days/wk as tolerated       Minutes: 45-55         METS: 3.10           HR: RHR +30-40bpm   RPE: 4-6         Modalities: Treadmill, UBE, NuStep, and Recumbent bike     Exercise workloads will be progressed gradually as tolerated, within limits of  patient's ability, and according to the patient's   response to the exercise program.      Aerobic Exercise Prescription - 30 day goal:   Frequency: 2-3 days/week of cardiac rehab       Supplement with home exercise 2+ days/wk as tolerated    Minutes: 45+       >150 mins/wk of moderate intensity exercise   METS: 3.10-4.5   HR: RHR +30-40bpm     RPE: 4-6   Modalities: Treadmill, UBE, NuStep, and Recumbent bike    Strength training:  CR department is w/o strength equipment. Patient will be intoduced to strength training when new equipment is delivered.     Home Exercise: Bikes 4 min ~4x/day    Group and Individual Education: benefit of exercise for CAD risk factors, home exercise guidelines, AHA guidelines to achieve >150 mins/wk of moderate exercise, RPE scale, and Group class: Risk Factors for Heart Disease     Readiness to change: Action:  (Changing behavior)      NUTRITION GOALS AND PLAN    Weight control:    Starting weight: 155   Current weight: 155    Nutritional   Reviewed patient's Rate your Plate. Discussed key elements of heart healthy eating. Reviewed patient goals for dietary modifications and their clinical implications.  Reviewed most recent lipid profile.     SMART Goals:   reduced BMI to < 25, LDL <100, CHOL <200, Improved Rate Your Plate score  >58, and 2.5-5%  wt loss    Patient Specific NUTRITION GOALS:     Weight loss, improve diet    Patient's progress toward SMART and personal Nutrition goals:   No weight loss in this 30 day reporting window. No repeated blood work in this 30 day reporting window. Patient will be educated on healthy eating choices.     Plan for next 30 days:   group class: Reading Food Labels and group Class: Heart Healthy Eating    Measurable goals were based Rate Your Plate Dietary Self-Assessment. These are the areas in which the patient could score higher on the assessment.  Goals include recommendations for a heart healthy diet based on American Heart Association.    Group  and Individual Education:   heart healthy eating principles  low sodium diet  maintaining hydration  nutrition for  lipid management  group class: Heart Healthy Eating  group class:  Label Reading    Readiness to change: Action:  (Changing behavior)      PSYCHOSOCIAL ASSESSMENT AND PLAN    Psychosocial Assessment as it relates to rehabilitation:   Patient denies issues with his/her family or home life that may affect their rehabilitation efforts.     SMART Goals:     Reduce perceived stress to 1-3/10, improved [facility] QOL < 27, feel less tired with more energy, Improved appetite control, and take time to relax    Patient Specific PSYCHOCOSOCIAL GOALS:     spend time with family, worry less    Patient's progress toward SMART and personal Psychosocial goals:   Patient reports a stress level of 3/10. CR staff will continue to provide support and education.     Plan for next 30 days:   Class: Stress and Your Health, Enroll in [employer], Keep a positive mindset, Enjoy family, and Return to previous social activity    Group and Individual Education: stress management techniques, benefits of enrolling in [employer], depression and CAD, and class:  Stress and Your Health     Information to utilize [employer] was provided as well as contact information for counseling through  Behavioral Health and group psychotherapy groups available.    Readiness to change: Action:  (Changing behavior)      OTHER CORE COMPONENTS GOALS and PLAN      Blood Pressure will be monitored throughout the program and cardiologist will be notified of elevated trends.    Pt will be encouraged to monitor home BP if advised by cardiologist.    Tobacco Intervention:   N/A:  Pt has a remote history of smoking.      SMART Goals:   consistent, controlled resting BP < 130/80 and medication compliance    Patient Specific CORE COMPONENT GOALS:    reduced dietary sodium <2000mg, medication compliance, and reduce number of medications  needed for BP  control    Progress toward SMART and personal Core Component goals:   Patient has had consistent resting BPs < 130/80. Patient reports 100% medication compliance. Patient watches her sodium intake.     Plan for next 30 days:   group class: Understanding Heart Disease, group class: Common Heart Medications, medication compliance, avoid places with second hand smoke, and monitor home BP    Group and Individual Education:  understanding high blood pressure and it's relationship to CAD, components of blood pressure management, group class: Understanding Heart Disease, and group class:  Common Heart Medications    Readiness to change: Action:  (Changing behavior)

## 2024-06-27 NOTE — ASSESSMENT & PLAN NOTE
Lab Results   Component Value Date    EGFR 58 05/28/2024    EGFR 43 04/29/2024    EGFR 39 04/26/2024    CREATININE 1.00 05/28/2024    CREATININE 1.28 04/29/2024    CREATININE 1.38 (H) 04/26/2024   Markedly improved per last labs.  Encourage continued good hydration.

## 2024-06-28 ENCOUNTER — TELEPHONE (OUTPATIENT)
Dept: CARDIOLOGY CLINIC | Facility: CLINIC | Age: 69
End: 2024-06-28

## 2024-06-28 ENCOUNTER — CLINICAL SUPPORT (OUTPATIENT)
Dept: CARDIAC REHAB | Facility: HOSPITAL | Age: 69
End: 2024-06-28
Payer: COMMERCIAL

## 2024-06-28 DIAGNOSIS — Z95.1 S/P CABG X 3: ICD-10-CM

## 2024-06-28 PROCEDURE — 93798 PHYS/QHP OP CAR RHAB W/ECG: CPT

## 2024-06-28 NOTE — TELEPHONE ENCOUNTER
Caller: Julia/Luke    Doctor: Lin    Reason for call: Pt calling in regards to life vest. States that she needs a new script for it because her current one runs out July 17. States if she does not have a new script they will charge her the full price. States she is currently paying $721 a month for it and cannot afford to keep paying for it. Pt states [employer] will be faxing paperwork over to our office to have signed.     Call back#: [phone]

## 2024-07-01 ENCOUNTER — LAB (OUTPATIENT)
Dept: LAB | Facility: HOSPITAL | Age: 69
End: 2024-07-01
Payer: COMMERCIAL

## 2024-07-01 ENCOUNTER — CLINICAL SUPPORT (OUTPATIENT)
Dept: CARDIAC REHAB | Facility: HOSPITAL | Age: 69
End: 2024-07-01
Payer: COMMERCIAL

## 2024-07-01 DIAGNOSIS — D50.8 OTHER IRON DEFICIENCY ANEMIA: ICD-10-CM

## 2024-07-01 DIAGNOSIS — E78.2 MIXED HYPERLIPIDEMIA: ICD-10-CM

## 2024-07-01 DIAGNOSIS — Z95.1 S/P CABG X 3: ICD-10-CM

## 2024-07-01 DIAGNOSIS — E83.42 HYPOMAGNESEMIA: ICD-10-CM

## 2024-07-01 DIAGNOSIS — D35.2 PITUITARY MACROADENOMA (HCC): ICD-10-CM

## 2024-07-01 DIAGNOSIS — E11.65 TYPE 2 DIABETES MELLITUS WITH HYPERGLYCEMIA, WITHOUT LONG-TERM CURRENT USE OF INSULIN (HCC): ICD-10-CM

## 2024-07-01 LAB
CHOLEST SERPL-MCNC: 103 MG/DL
CREAT UR-MCNC: 52.7 MG/DL
EST. AVERAGE GLUCOSE BLD GHB EST-MCNC: 134 MG/DL
FERRITIN SERPL-MCNC: 42 NG/ML (ref 11–307)
FSH SERPL-ACNC: 5.7 MIU/ML
HBA1C MFR BLD: 6.3 %
HDLC SERPL-MCNC: 45 MG/DL
IRON SATN MFR SERPL: 12 % (ref 15–50)
IRON SERPL-MCNC: 50 UG/DL (ref 50–212)
LDLC SERPL CALC-MCNC: 36 MG/DL (ref 0–100)
MAGNESIUM SERPL-MCNC: 2.2 MG/DL (ref 1.9–2.7)
MICROALBUMIN UR-MCNC: 41.6 MG/L
MICROALBUMIN/CREAT 24H UR: 79 MG/G CREATININE (ref 0–30)
NONHDLC SERPL-MCNC: 58 MG/DL
PROLACTIN SERPL-MCNC: 5.54 NG/ML (ref 2.74–19.64)
T4 FREE SERPL-MCNC: 0.74 NG/DL (ref 0.61–1.12)
TIBC SERPL-MCNC: 402 UG/DL (ref 250–450)
TRIGL SERPL-MCNC: 110 MG/DL
TSH SERPL DL<=0.05 MIU/L-ACNC: 1.51 UIU/ML (ref 0.45–4.5)
UIBC SERPL-MCNC: 352 UG/DL (ref 155–355)

## 2024-07-01 PROCEDURE — 93798 PHYS/QHP OP CAR RHAB W/ECG: CPT

## 2024-07-01 PROCEDURE — 84305 ASSAY OF SOMATOMEDIN: CPT

## 2024-07-01 PROCEDURE — 83735 ASSAY OF MAGNESIUM: CPT

## 2024-07-01 PROCEDURE — 83036 HEMOGLOBIN GLYCOSYLATED A1C: CPT

## 2024-07-01 PROCEDURE — 84443 ASSAY THYROID STIM HORMONE: CPT

## 2024-07-01 PROCEDURE — 82024 ASSAY OF ACTH: CPT

## 2024-07-01 PROCEDURE — 82570 ASSAY OF URINE CREATININE: CPT

## 2024-07-01 PROCEDURE — 83540 ASSAY OF IRON: CPT

## 2024-07-01 PROCEDURE — 84439 ASSAY OF FREE THYROXINE: CPT

## 2024-07-01 PROCEDURE — 82043 UR ALBUMIN QUANTITATIVE: CPT

## 2024-07-01 PROCEDURE — 36415 COLL VENOUS BLD VENIPUNCTURE: CPT

## 2024-07-01 PROCEDURE — 83001 ASSAY OF GONADOTROPIN (FSH): CPT

## 2024-07-01 PROCEDURE — 84146 ASSAY OF PROLACTIN: CPT

## 2024-07-01 PROCEDURE — 80061 LIPID PANEL: CPT

## 2024-07-01 PROCEDURE — 82728 ASSAY OF FERRITIN: CPT

## 2024-07-01 PROCEDURE — 83550 IRON BINDING TEST: CPT

## 2024-07-02 ENCOUNTER — TELEPHONE (OUTPATIENT)
Dept: CARDIOLOGY CLINIC | Facility: CLINIC | Age: 69
End: 2024-07-02

## 2024-07-02 LAB — ACTH PLAS-MCNC: 14.5 PG/ML (ref 7.2–63.3)

## 2024-07-03 ENCOUNTER — LAB (OUTPATIENT)
Dept: LAB | Facility: HOSPITAL | Age: 69
End: 2024-07-03
Payer: COMMERCIAL

## 2024-07-03 ENCOUNTER — TELEPHONE (OUTPATIENT)
Dept: FAMILY MEDICINE CLINIC | Facility: CLINIC | Age: 69
End: 2024-07-03

## 2024-07-03 ENCOUNTER — TELEPHONE (OUTPATIENT)
Dept: GASTROENTEROLOGY | Facility: CLINIC | Age: 69
End: 2024-07-03

## 2024-07-03 ENCOUNTER — CLINICAL SUPPORT (OUTPATIENT)
Dept: CARDIAC REHAB | Facility: HOSPITAL | Age: 69
End: 2024-07-03
Payer: COMMERCIAL

## 2024-07-03 ENCOUNTER — TELEPHONE (OUTPATIENT)
Dept: ENDOCRINOLOGY | Facility: CLINIC | Age: 69
End: 2024-07-03

## 2024-07-03 ENCOUNTER — TELEPHONE (OUTPATIENT)
Dept: CARDIOLOGY CLINIC | Facility: CLINIC | Age: 69
End: 2024-07-03

## 2024-07-03 DIAGNOSIS — D35.2 PITUITARY MACROADENOMA (HCC): ICD-10-CM

## 2024-07-03 DIAGNOSIS — Z95.1 S/P CABG X 3: ICD-10-CM

## 2024-07-03 LAB — CORTIS AM PEAK SERPL-MCNC: 12.2 UG/DL (ref 6.7–22.6)

## 2024-07-03 PROCEDURE — 36415 COLL VENOUS BLD VENIPUNCTURE: CPT

## 2024-07-03 PROCEDURE — 93798 PHYS/QHP OP CAR RHAB W/ECG: CPT

## 2024-07-03 PROCEDURE — 82533 TOTAL CORTISOL: CPT

## 2024-07-03 NOTE — RESULT ENCOUNTER NOTE
Please inform patient that her cortisol is normal.  She will need follow-up appointment with Dr. Lau in future as there is no appt set up so far

## 2024-07-03 NOTE — TELEPHONE ENCOUNTER
Routed LOV note and echo. Also sent note for next Echo appt on 7/29/24 and no other appts set up yet. Sent to [employer] # [phone].

## 2024-07-03 NOTE — TELEPHONE ENCOUNTER
----- Message from João Dejesus MD sent at 7/1/2024  1:35 PM EDT -----  Hi,    Can you please let her know her blood work came back and her liver enzymes did go up a little. Her inflammatory marker also went up a little. Her blood counts overall looked good. We can discuss further at her upcoming appointment     Thank you!

## 2024-07-03 NOTE — TELEPHONE ENCOUNTER
----- Message from Gina CALVIN sent at 7/3/2024  1:40 PM EDT -----  Notify pt Willa viewed labs since dr Titus away, labs are basically stable, just a little protein in urine. But Kidney fxn on labs was good. She will review on return, but nothing needs to be done right now , except know we will monitor

## 2024-07-03 NOTE — TELEPHONE ENCOUNTER
Called the patient and connected with vm. I left vm informing patient to call back to review result from provider.

## 2024-07-05 ENCOUNTER — TELEPHONE (OUTPATIENT)
Dept: GASTROENTEROLOGY | Facility: CLINIC | Age: 69
End: 2024-07-05

## 2024-07-05 ENCOUNTER — TELEPHONE (OUTPATIENT)
Dept: ENDOCRINOLOGY | Facility: CLINIC | Age: 69
End: 2024-07-05

## 2024-07-05 ENCOUNTER — CLINICAL SUPPORT (OUTPATIENT)
Dept: CARDIAC REHAB | Facility: HOSPITAL | Age: 69
End: 2024-07-05
Payer: COMMERCIAL

## 2024-07-05 ENCOUNTER — TELEPHONE (OUTPATIENT)
Age: 69
End: 2024-07-05

## 2024-07-05 DIAGNOSIS — Z95.1 S/P CABG X 3: ICD-10-CM

## 2024-07-05 PROCEDURE — 93798 PHYS/QHP OP CAR RHAB W/ECG: CPT

## 2024-07-05 NOTE — TELEPHONE ENCOUNTER
Patient called requesting a copy of her most recent lab work 7/1-7/3/2024. NO Stl [facility], pt not interested in d/l the obed.      Print 7/1-7/3 labs. Mail to address--  thank you.     Julia Perry.  [address] [place] [address]   [place] [id number]

## 2024-07-07 LAB — IGF-I SERPL-MCNC: 62 NG/ML (ref 52–196)

## 2024-07-08 ENCOUNTER — CLINICAL SUPPORT (OUTPATIENT)
Dept: CARDIAC REHAB | Facility: HOSPITAL | Age: 69
End: 2024-07-08
Payer: COMMERCIAL

## 2024-07-08 DIAGNOSIS — Z95.1 S/P CABG X 3: ICD-10-CM

## 2024-07-08 PROCEDURE — 93798 PHYS/QHP OP CAR RHAB W/ECG: CPT

## 2024-07-11 ENCOUNTER — CLINICAL SUPPORT (OUTPATIENT)
Dept: CARDIAC REHAB | Facility: HOSPITAL | Age: 69
End: 2024-07-11
Payer: COMMERCIAL

## 2024-07-11 DIAGNOSIS — Z95.1 S/P CABG X 3: ICD-10-CM

## 2024-07-11 PROCEDURE — 93798 PHYS/QHP OP CAR RHAB W/ECG: CPT

## 2024-07-16 ENCOUNTER — CLINICAL SUPPORT (OUTPATIENT)
Dept: CARDIAC REHAB | Facility: HOSPITAL | Age: 69
End: 2024-07-16
Payer: COMMERCIAL

## 2024-07-16 ENCOUNTER — OFFICE VISIT (OUTPATIENT)
Dept: GASTROENTEROLOGY | Facility: CLINIC | Age: 69
End: 2024-07-16
Payer: COMMERCIAL

## 2024-07-16 VITALS
WEIGHT: 153.2 LBS | OXYGEN SATURATION: 97 % | TEMPERATURE: 97.7 F | HEIGHT: 64 IN | BODY MASS INDEX: 26.15 KG/M2 | HEART RATE: 68 BPM | SYSTOLIC BLOOD PRESSURE: 93 MMHG | DIASTOLIC BLOOD PRESSURE: 59 MMHG

## 2024-07-16 DIAGNOSIS — Z95.1 S/P CABG X 3: ICD-10-CM

## 2024-07-16 DIAGNOSIS — K52.9 INFLAMMATORY BOWEL DISEASE: ICD-10-CM

## 2024-07-16 DIAGNOSIS — I50.20 SYSTOLIC HEART FAILURE, UNSPECIFIED HF CHRONICITY (HCC): ICD-10-CM

## 2024-07-16 DIAGNOSIS — K51.00 ULCERATIVE PANCOLITIS WITHOUT COMPLICATION (HCC): Primary | ICD-10-CM

## 2024-07-16 PROCEDURE — 99214 OFFICE O/P EST MOD 30 MIN: CPT | Performed by: INTERNAL MEDICINE

## 2024-07-16 PROCEDURE — 93798 PHYS/QHP OP CAR RHAB W/ECG: CPT

## 2024-07-16 NOTE — PROGRESS NOTES
[facility] - Outpatient Follow-up Note  Julia Perry 68 y.o. female MRN: [id number]  Encounter: [id number]          ASSESSMENT AND PLAN:    Julia Perry is a 68 y.o. female with diabetes, colitis diagnosed June 2023 with concern for chronic active disease consistent with IBD when hospitalized for what appeared to be an acute GI illness previously on mesalamine but stopped secondary to weakness and foot drop as well as hair loss, subsequent colonoscopy with endoscopic healing and significant histologic improvement, since then underwent CABG for coronary artery disease in the setting of cardiomyopathy, now presenting for follow-up.  She feels well from a colitis perspective.     CT chest abdomen pelvis previously with pancolitis and small stable hemangioma.  Colonoscopy December 2023 with benign-appearing    Inflammatory pan colonic polyps but overall no colitis seen in the colon, internal hemorrhoids; biopsies with focal edema and hyperplastic change in the right colon as well as some focal edema and crypt branching with hyperplastic change and rare intraepithelial eosinophil granulocytes in the transverse colon, some focal edema and crypt branching in the transverse and descending colon, left colonic polyps with some mild chronic active colitis with rare cryptitis.    Prior echocardiogram with mildly increased wall thickness and eccentric hypertrophy with EF reduced to 30% and moderately dilated atrium.  Small pericardial effusion noted.  Echocardiogram from April 2024 with EF of 20% and systolic function severely reduced.  Chest x-ray from April 2024 with cardiomegaly with mild pulmonary edema and small effusions, no pneumothorax, postoperative chest with interval extubation and removal of the NG tube.    CMP with elevated BUN to 28 but normal creatinine and relatively normal electrolytes.  AST elevated to 43 and ALT elevated to 51 but otherwise normal liver test.  Lipid profile with HDL  45.  Iron studies with iron saturation slightly low at 12%.  CBC with normal white blood cell count, hemoglobin, MCV, platelets.  A.m. cortisol normal.  Hemoglobin A1c slightly elevated at 6.3.  TSH and free T4 normal.  CRP elevated.    1. Ulcerative pancolitis without complication (HCC)    2. Inflammatory bowel disease    3. Systolic heart failure, unspecified HF chronicity (HCC)        Orders Placed This Encounter   Procedures    Calprotectin,Fecal    C-reactive protein    CBC and differential    Comprehensive metabolic panel     Continue to follow-up with cardiology as well as cardiothoracic surgery as needed  Repeat blood work and stool test ordered today  Consider repeat colonoscopy in 2026  ______________________________________________________________________    SUBJECTIVE:    Julia Perry is a 68 y.o. female who presents with complaint of colitis.     She had a CABG. She does not want an AICD.   She does not have GI symptoms. No colitis symptoms. She limits peanuts. She does not feel bothered by it. Bms are normal. Usually every day. Some constiopation. She takes colace and she uses it when she needs it. No blood or black stools. No abdominal pain. + back pain. No nausea, vomiting. No heartburn. Weight seems stable. Appetite is good.     Wt Readings from Last 3 Encounters:   07/16/24 69.5 kg (153 lb 3.2 oz)   06/27/24 71.2 kg (157 lb)   05/28/24 70.5 kg (155 lb 6.4 oz)           REVIEW OF SYSTEMS IS OTHERWISE NEGATIVE.  10 point ROS reviewed and negative, except as above      Historical Information   Past Medical History:   Diagnosis Date    CHF (congestive heart failure) (HCC) 4/8/2024    Colitis     Diabetes mellitus (HCC)     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HCC)     PONV (postoperative nausea and vomiting)      Past Surgical History:   Procedure Laterality Date    CARDIAC CATHETERIZATION Left 01/17/2024    Procedure: Cardiac Left Heart Cath;  Surgeon: Juan A Sousa MD;  [facility];  Service:  Cardiology    CARDIAC CATHETERIZATION  01/17/2024    Procedure: Cardiac catheterization;  Surgeon: Juan A Sousa MD;  [facility];  Service: Cardiology    CARDIAC CATHETERIZATION Left 4/10/2024    Procedure: Cardiac Left Heart Cath;  Surgeon: Viktor Bee MD;  [facility];  Service: Cardiology    CARDIAC CATHETERIZATION N/A 4/10/2024    Procedure: Cardiac Coronary Angiogram;  Surgeon: Viktor Bee MD;  [facility];  Service: Cardiology    CARDIAC CATHETERIZATION N/A 4/10/2024    Procedure: Cardiac Impella Insertion;  Surgeon: Viktor Bee MD;  [facility];  Service: Cardiology    CARDIAC CATHETERIZATION N/A 4/10/2024    Procedure: Cardiac pci;  Surgeon: Viktor Bee MD;  [facility];  Service: Cardiology    CARDIAC CATHETERIZATION N/A 4/12/2024    Procedure: Cardiac Impella Removal;  Surgeon: Viktor Bee MD;  [facility];  Service: Cardiology    COLONOSCOPY  12/06/2023    HYSTERECTOMY      ~1990    IR PICC REPOSITION  4/18/2024    IN CORONARY ARTERY BYP W/VEIN & ARTERY GRAFT 3 VEIN N/A 4/9/2024    Procedure: CORONARY ARTERY BYPASS GRAFT (CABG) x3 VESSELS, LIMA - LAD, LEFT LEG EVH/SVG - PDA  AND OM, W/ELOISA;  Surgeon: Josh Castellanos MD;  Location:  MAIN OR;  Service: Cardiac Surgery    IN TEAEC W/PATCH GRF CAROTID VERTB SUBCLAV NECK INC Left 03/04/2024    Procedure: LEFT ENDARTERECTOMY ARTERY CAROTID;  Surgeon: Janeth De Oliveira DO;  [facility];  Service: Vascular     Social History   Social History     Substance and Sexual Activity   Alcohol Use Never     Social History     Substance and Sexual Activity   Drug Use Never     Social History     Tobacco Use   Smoking Status Former    Types: Cigarettes    Start date: 1975    Passive exposure: Past   Smokeless Tobacco Never   Tobacco Comments    Quit 1970's     History reviewed. No pertinent family history.    Meds/Allergies  "      Current Outpatient Medications:     acetaminophen (TYLENOL) 325 mg tablet    Alcohol Swabs 70 % PADS    amiodarone 200 mg tablet    aspirin 81 mg chewable tablet    cholecalciferol (VITAMIN D3) 1,000 units tablet    docusate sodium (COLACE) 100 mg capsule    Empagliflozin (JARDIANCE) 10 MG TABS tablet    furosemide (LASIX) 40 mg tablet    glucose blood test strip    metoprolol succinate (TOPROL-XL) 25 mg 24 hr tablet    midodrine (PROAMATINE) 10 MG tablet    OneTouch Delica Lancets 33G MISC    potassium chloride (Klor-Con M20) 20 mEq tablet    rosuvastatin (CRESTOR) 40 MG tablet    ticagrelor (BRILINTA) 90 MG    clotrimazole (GYNE-LOTRIMIN) 1 % vaginal cream    magnesium gluconate (MAGONATE) 500 mg tablet    No Active Allergies          Objective     Blood pressure 93/59, pulse 68, temperature 97.7 °F (36.5 °C), temperature source Tympanic, height 5' 4\" (1.626 m), weight 69.5 kg (153 lb 3.2 oz), SpO2 97%. Body mass index is 26.3 kg/m².    PHYSICAL EXAMINATION:    General Appearance:   Alert, cooperative, no distress   HEENT:  Normocephalic, atraumatic, anicteric. Neck supple, symmetrical, trachea midline.   Lungs:   Equal chest rise and unlabored breathing, normal effort, no coughing.   Cardiovascular:   No visualized JVD.   Abdomen:   No abdominal distension.   Skin:   No jaundice, rashes, or lesions.    Musculoskeletal:   Normal range of motion visualized.   Psych:  Normal affect and normal insight.   Neuro:  Alert and appropriate.         Lab Results:   No visits with results within 1 Day(s) from this visit.   Latest known visit with results is:   Lab on 07/03/2024   Component Date Value    Cortisol - AM 07/03/2024 12.2        Lab Results   Component Value Date    WBC 5.90 07/01/2024    HGB 11.6 07/01/2024    HCT 38.3 07/01/2024    MCV 93 07/01/2024     07/01/2024       Lab Results   Component Value Date    SODIUM 139 07/01/2024    K 4.5 07/01/2024     07/01/2024    CO2 29 07/01/2024    AGAP 6 " 07/01/2024    BUN 28 (H) 07/01/2024    CREATININE 0.93 07/01/2024    GLUC 92 05/28/2024    GLUF 92 07/01/2024    CALCIUM 10.0 07/01/2024    AST 43 (H) 07/01/2024    ALT 51 07/01/2024    ALKPHOS 104 07/01/2024    TP 7.8 07/01/2024    TBILI 0.59 07/01/2024    EGFR 63 07/01/2024       Lab Results   Component Value Date    CRP 8.9 (H) 07/01/2024       Lab Results   Component Value Date    XCM8ZHTNEQZY 1.510 07/01/2024       Lab Results   Component Value Date    IRON 50 07/01/2024    TIBC 402 07/01/2024    FERRITIN 42 07/01/2024       Radiology Results:   No results found.

## 2024-07-18 ENCOUNTER — CLINICAL SUPPORT (OUTPATIENT)
Dept: CARDIAC REHAB | Facility: HOSPITAL | Age: 69
End: 2024-07-18
Payer: COMMERCIAL

## 2024-07-18 DIAGNOSIS — Z95.1 S/P CABG X 3: ICD-10-CM

## 2024-07-18 PROCEDURE — 93798 PHYS/QHP OP CAR RHAB W/ECG: CPT

## 2024-07-22 ENCOUNTER — CLINICAL SUPPORT (OUTPATIENT)
Dept: CARDIAC REHAB | Facility: HOSPITAL | Age: 69
End: 2024-07-22
Payer: COMMERCIAL

## 2024-07-22 ENCOUNTER — OFFICE VISIT (OUTPATIENT)
Dept: CARDIOLOGY CLINIC | Facility: HOSPITAL | Age: 69
End: 2024-07-22
Payer: COMMERCIAL

## 2024-07-22 ENCOUNTER — HOSPITAL ENCOUNTER (OUTPATIENT)
Dept: NON INVASIVE DIAGNOSTICS | Facility: HOSPITAL | Age: 69
Discharge: HOME | End: 2024-07-22
Payer: COMMERCIAL

## 2024-07-22 VITALS
HEIGHT: 64 IN | BODY MASS INDEX: 26.32 KG/M2 | DIASTOLIC BLOOD PRESSURE: 70 MMHG | OXYGEN SATURATION: 96 % | SYSTOLIC BLOOD PRESSURE: 110 MMHG | WEIGHT: 154.2 LBS | HEART RATE: 72 BPM

## 2024-07-22 VITALS
SYSTOLIC BLOOD PRESSURE: 92 MMHG | BODY MASS INDEX: 26.16 KG/M2 | HEIGHT: 64 IN | DIASTOLIC BLOOD PRESSURE: 60 MMHG | WEIGHT: 153.22 LBS | HEART RATE: 86 BPM

## 2024-07-22 DIAGNOSIS — T81.11XA: ICD-10-CM

## 2024-07-22 DIAGNOSIS — E78.2 MIXED HYPERLIPIDEMIA: ICD-10-CM

## 2024-07-22 DIAGNOSIS — R60.9 EDEMA, UNSPECIFIED TYPE: ICD-10-CM

## 2024-07-22 DIAGNOSIS — I44.7 LBBB (LEFT BUNDLE BRANCH BLOCK): ICD-10-CM

## 2024-07-22 DIAGNOSIS — I65.22 STENOSIS OF LEFT CAROTID ARTERY: ICD-10-CM

## 2024-07-22 DIAGNOSIS — I48.91 NEW ONSET ATRIAL FIBRILLATION (HCC): ICD-10-CM

## 2024-07-22 DIAGNOSIS — Z95.1 S/P CABG X 3: ICD-10-CM

## 2024-07-22 DIAGNOSIS — I25.5 ISCHEMIC CARDIOMYOPATHY: ICD-10-CM

## 2024-07-22 DIAGNOSIS — I50.9 ACUTE CONGESTIVE HEART FAILURE, UNSPECIFIED HEART FAILURE TYPE (HCC): ICD-10-CM

## 2024-07-22 DIAGNOSIS — I25.118 CORONARY ARTERY DISEASE INVOLVING NATIVE CORONARY ARTERY OF NATIVE HEART WITH OTHER FORM OF ANGINA PECTORIS (HCC): Primary | ICD-10-CM

## 2024-07-22 LAB
AORTIC ROOT: 3.5 CM
AV LVOT MEAN GRADIENT: 1 MMHG
AV LVOT PEAK GRADIENT: 1 MMHG
BSA FOR ECHO PROCEDURE: 1.75 M2
DOP CALC LVOT AREA: 3.46 CM2
DOP CALC LVOT CARDIAC INDEX: 1.6 L/MIN/M2
DOP CALC LVOT CARDIAC OUTPUT: 2.81 L/MIN
DOP CALC LVOT DIAMETER: 2.1 CM
DOP CALC LVOT PEAK VEL VTI: 10.91 CM
DOP CALC LVOT PEAK VEL: 0.55 M/S
DOP CALC LVOT STROKE INDEX: 21.1 ML/M2
DOP CALC LVOT STROKE VOLUME: 37.77
E WAVE DECELERATION TIME: 123 MS
E/A RATIO: 1.58
FRACTIONAL SHORTENING: 10 (ref 28–44)
INTERVENTRICULAR SEPTUM IN DIASTOLE (PARASTERNAL SHORT AXIS VIEW): 1 CM
INTERVENTRICULAR SEPTUM: 1 CM (ref 0.6–1.1)
LEFT ATRIUM SIZE: 4.8 CM
LEFT INTERNAL DIMENSION IN SYSTOLE: 4.5 CM (ref 2.1–4)
LEFT VENTRICULAR INTERNAL DIMENSION IN DIASTOLE: 5 CM (ref 3.5–6)
LEFT VENTRICULAR POSTERIOR WALL IN END DIASTOLE: 1 CM
LEFT VENTRICULAR STROKE VOLUME: 25 ML
LVSV (TEICH): 25 ML
MITRAL REGURGITATION PEAK VELOCITY: 4.3 M/S
MITRAL VALVE MEAN INFLOW VELOCITY: 3.47 M/S
MITRAL VALVE REGURGITANT PEAK GRADIENT: 74 MMHG
MV E'TISSUE VEL-LAT: 6 CM/S
MV E'TISSUE VEL-SEP: 3 CM/S
MV PEAK A VEL: 0.77 M/S
MV PEAK E VEL: 122 CM/S
MV STENOSIS PRESSURE HALF TIME: 36 MS
MV VALVE AREA P 1/2 METHOD: 6.11
RA PRESSURE ESTIMATED: 8 MMHG
RV PSP: 47 MMHG
SL CV DOP CALC MV VTI RETROGRADE: 160.9 CM
SL CV LV EF: 25
SL CV MV MEAN GRADIENT RETROGRADE: 50 MMHG
SL CV PED ECHO LEFT VENTRICLE DIASTOLIC VOLUME (MOD BIPLANE) 2D: 117 ML
SL CV PED ECHO LEFT VENTRICLE SYSTOLIC VOLUME (MOD BIPLANE) 2D: 92 ML
TR MAX PG: 39 MMHG
TR PEAK VELOCITY: 3.1 M/S
TRICUSPID VALVE PEAK REGURGITATION VELOCITY: 3.12 M/S

## 2024-07-22 PROCEDURE — 99214 OFFICE O/P EST MOD 30 MIN: CPT | Performed by: INTERNAL MEDICINE

## 2024-07-22 PROCEDURE — 93308 TTE F-UP OR LMTD: CPT | Performed by: INTERNAL MEDICINE

## 2024-07-22 PROCEDURE — 93308 TTE F-UP OR LMTD: CPT

## 2024-07-22 PROCEDURE — 93321 DOPPLER ECHO F-UP/LMTD STD: CPT

## 2024-07-22 PROCEDURE — 93325 DOPPLER ECHO COLOR FLOW MAPG: CPT | Performed by: INTERNAL MEDICINE

## 2024-07-22 PROCEDURE — 93325 DOPPLER ECHO COLOR FLOW MAPG: CPT

## 2024-07-22 PROCEDURE — 93798 PHYS/QHP OP CAR RHAB W/ECG: CPT

## 2024-07-22 PROCEDURE — 93321 DOPPLER ECHO F-UP/LMTD STD: CPT | Performed by: INTERNAL MEDICINE

## 2024-07-22 RX ORDER — FUROSEMIDE 40 MG/1
40 TABLET ORAL DAILY
Qty: 30 TABLET | Refills: 2 | Status: SHIPPED | OUTPATIENT
Start: 2024-07-22

## 2024-07-22 RX ORDER — CLOPIDOGREL BISULFATE 75 MG/1
75 TABLET ORAL DAILY
Qty: 90 TABLET | Refills: 3 | Status: SHIPPED | OUTPATIENT
Start: 2024-07-22

## 2024-07-22 NOTE — TELEPHONE ENCOUNTER
Patient called, she is requesting a refill on her Lasix. She is running low and would like it to go to the pharmacy on file.     Please contact patient once script is sent.     Please advise, thank you.

## 2024-07-23 ENCOUNTER — CLINICAL SUPPORT (OUTPATIENT)
Dept: CARDIAC REHAB | Facility: HOSPITAL | Age: 69
End: 2024-07-23
Payer: COMMERCIAL

## 2024-07-23 DIAGNOSIS — Z95.1 S/P CABG X 3: ICD-10-CM

## 2024-07-23 PROCEDURE — 93798 PHYS/QHP OP CAR RHAB W/ECG: CPT

## 2024-07-23 NOTE — PROGRESS NOTES
Julia Perry  1955  [id number]  [facility] [address] [id number] [phone]    1. Coronary artery disease involving native coronary artery of native heart with other form of angina pectoris (HCC)  Ambulatory referral to Cardiac Electrophysiology    clopidogrel (Plavix) 75 mg tablet      2. Ischemic cardiomyopathy        3. LBBB (left bundle branch block)        4. New onset atrial fibrillation (HCC)        5. Stenosis of left carotid artery        6. Postoperative cardiogenic shock, initial encounter ([facility])        7. S/P CABG x 3        8. Mixed hyperlipidemia            Discussion/Summary:  #1 cardiomyopathy ejection fraction 30%  #2 extensive heavy coronary calcifications  #3 chronic combined systolic and diastolic congestive heart failure  #4 hyperlipidemia  #5 type 2 diabetes mellitus  #6 IVCD  #7 high degree left internal carotid stenosis  #8 intracranial stenosis  #9 pituitary mass    Recommendations: She presents today for follow-up visit.  Echocardiogram today shows ejection fraction remains at 25 to 30%.  She has been on goal-directed medical therapy unfortunately she has had hypotension that has precluded further afterload reducing agents.  Further reduce midodrine to once a day goal will be to try to wean this off in the coming weeks.  Blood pressure today is well-controlled.  She is euvolemic on exam.  She still has a LifeVest we talked about an implantable defibrillator she is unsure if she wants to proceed with this.  She tells me however she is going to return the LifeVest as it has been costing her too much money.  Her Brilinta is also not covered we will change her to Plavix we will give her a full load due to recent stent implantation.  Continue dual antiplatelet.  Referral has been made to electrophysiology to me and discuss implantable defibrillator.      Interval History: 68-year-old female with a history of type  2 diabetes and colitis presents for new patient consultation on behalf of Dr. Titus for new cardiomyopathy.  The patient has had shortness of breath, PND, orthopnea, chest pain since 2023.  She was started on steroids for colitis and had worsening shortness of breath but attributed this to medication side effects.  An echocardiogram was recently obtained which showed an ejection fraction of 30%.  The patient tells me she has had some chest heaviness with walking on and off for several years.  She is a diabetic.  The symptoms have been quite mild.  She has felt more shortness of breath with activity and had occasional lower extremity edema recently.  There is been no syncope.  She has been taking her medications as prescribed.  She is a non-smoker.    Since her last visit she has been feeling well she remains active around the house she does have exertional dyspnea.  Unfortunately ejection fraction has not significantly improved.  Blood pressures recently have been in the 90s she has intracranial stenosis and this is limited significant up titration of medications.  She is also been burdened by financial constraints regarding coverage of medications with her insurance.  Currently denies any weight gain, lower extremity edema, PND, orthopnea.    Medical Problems       Problem List       Colitis    History of biliary dyskinesia    DMII (diabetes mellitus, type 2) ([facility])      Lab Results   Component Value Date    HGBA1C 6.3 (H) 07/01/2024         Gallbladder polyp    Leiomyoma of uterus    Acquired right foot drop    Hypomagnesemia    Hair loss    Polyp of ascending colon    Ulcerative pancolitis without complication ([facility])    Type 2 diabetes mellitus with hyperglycemia, unspecified whether long term insulin use ([facility])      Lab Results   Component Value Date    HGBA1C 6.3 (H) 07/01/2024             Past Medical History:   Diagnosis Date    CHF (congestive heart failure) ([facility]) 4/8/2024    Colitis     Diabetes mellitus ([facility])      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HCC)     PONV (postoperative nausea and vomiting)      Social History     Socioeconomic History    Marital status: /Civil Union     Spouse name: Not on file    Number of children: Not on file    Years of education: Not on file    Highest education level: Not on file   Occupational History    Not on file   Tobacco Use    Smoking status: Former     Types: Cigarettes     Start date: 1975     Passive exposure: Past    Smokeless tobacco: Never    Tobacco comments:     Quit 1970's   Vaping Use    Vaping status: Never Used   Substance and Sexual Activity    Alcohol use: Never    Drug use: Never    Sexual activity: Not on file   Other Topics Concern    Not on file   Social History Narrative    Not on file     Social Determinants of Health     Financial Resource Strain: Patient Declined (8/18/2023)    Overall Financial Resource Strain (CARDIA)     Difficulty of Paying Living Expenses: Patient declined   Food Insecurity: No Food Insecurity (4/11/2024)    Hunger Vital Sign     Worried About Running Out of Food in the Last Year: Never true     Ran Out of Food in the Last Year: Never true   Transportation Needs: No Transportation Needs (4/11/2024)    PRAPARE - Transportation     Lack of Transportation (Medical): No     Lack of Transportation (Non-Medical): No   Physical Activity: Not on file   Stress: Not on file   Social Connections: Not on file   Intimate Partner Violence: Not on file   Housing Stability: Low Risk  (4/11/2024)    Housing Stability Vital Sign     Unable to Pay for Housing in the Last Year: No     Number of Times Moved in the Last Year: 1     Homeless in the Last Year: No      No family history on file.  Past Surgical History:   Procedure Laterality Date    CARDIAC CATHETERIZATION Left 01/17/2024    Procedure: Cardiac Left Heart Cath;  Surgeon: Juan A Sousa MD;  [facility];  Service: Cardiology    CARDIAC CATHETERIZATION  01/17/2024    Procedure: Cardiac  catheterization;  Surgeon: Juan A Sousa MD;  [facility];  Service: Cardiology    CARDIAC CATHETERIZATION Left 4/10/2024    Procedure: Cardiac Left Heart Cath;  Surgeon: Viktor Bee MD;  [facility];  Service: Cardiology    CARDIAC CATHETERIZATION N/A 4/10/2024    Procedure: Cardiac Coronary Angiogram;  Surgeon: Viktor Bee MD;  [facility];  Service: Cardiology    CARDIAC CATHETERIZATION N/A 4/10/2024    Procedure: Cardiac Impella Insertion;  Surgeon: Viktor Bee MD;  [facility];  Service: Cardiology    CARDIAC CATHETERIZATION N/A 4/10/2024    Procedure: Cardiac pci;  Surgeon: Viktor Bee MD;  [facility];  Service: Cardiology    CARDIAC CATHETERIZATION N/A 4/12/2024    Procedure: Cardiac Impella Removal;  Surgeon: Viktor Bee MD;  [facility];  Service: Cardiology    COLONOSCOPY  12/06/2023    HYSTERECTOMY      ~1990    IR PICC REPOSITION  4/18/2024    MT CORONARY ARTERY BYP W/VEIN & ARTERY GRAFT 3 VEIN N/A 4/9/2024    Procedure: CORONARY ARTERY BYPASS GRAFT (CABG) x3 VESSELS, LIMA - LAD, LEFT LEG EVH/SVG - PDA  AND OM, W/ELOISA;  Surgeon: Josh Castellanos MD;  [facility];  Service: Cardiac Surgery    MT TEAEC W/PATCH GRF CAROTID VERTB SUBCLAV NECK INC Left 03/04/2024    Procedure: LEFT ENDARTERECTOMY ARTERY CAROTID;  Surgeon: Janeth De Oliveira DO;  [facility];  Service: Vascular       Current Outpatient Medications:     acetaminophen (TYLENOL) 325 mg tablet, Take 2 tablets (650 mg total) by mouth every 6 (six) hours as needed for mild pain, Disp: , Rfl:     Alcohol Swabs 70 % PADS, May substitute brand based on insurance coverage. Check glucose TID., Disp: 100 each, Rfl: 0    amiodarone 200 mg tablet, 1 tablet daily, Disp: 90 tablet, Rfl: 1    aspirin 81 mg chewable tablet, Chew 1 tablet (81 mg total) daily, Disp: 90 tablet, Rfl: 3    cholecalciferol (VITAMIN D3)  1,000 units tablet, Take 1,000 Units by mouth daily, Disp: , Rfl:     clopidogrel (Plavix) 75 mg tablet, Take 1 tablet (75 mg total) by mouth daily Take 8 tabs on day one then 1 tablet daily after, Disp: 90 tablet, Rfl: 3    docusate sodium (COLACE) 100 mg capsule, Take 1 capsule (100 mg total) by mouth 2 (two) times a day, Disp: , Rfl:     Empagliflozin (JARDIANCE) 10 MG TABS tablet, Take 1 tablet (10 mg total) by mouth daily, Disp: 90 tablet, Rfl: 2    glucose blood test strip, Use 1 each daily as needed Use as instructed, Disp: , Rfl:     magnesium gluconate (MAGONATE) 500 mg tablet, Take 1 tablet (500 mg total) by mouth in the morning (Patient taking differently: Take 400 mg by mouth in the morning), Disp: 90 tablet, Rfl: 0    metoprolol succinate (TOPROL-XL) 25 mg 24 hr tablet, Take 0.5 tablets (12.5 mg total) by mouth daily, Disp: 30 tablet, Rfl: 2    midodrine (PROAMATINE) 10 MG tablet, Take 0.5 tablets (5 mg total) by mouth 3 (three) times a day before meals (Patient taking differently: Take 2.5 mg by mouth 3 (three) times a day before meals), Disp: , Rfl:     OneTouch Delica Lancets 33G MISC, May substitute brand based on insurance coverage. Check glucose TID., Disp: 100 each, Rfl: 0    potassium chloride (Klor-Con M20) 20 mEq tablet, Take 1 tablet (20 mEq total) by mouth daily, Disp: 90 tablet, Rfl: 3    rosuvastatin (CRESTOR) 40 MG tablet, Take 1 tablet (40 mg total) by mouth daily, Disp: 90 tablet, Rfl: 2    clotrimazole (GYNE-LOTRIMIN) 1 % vaginal cream, Insert 1 applicator into the vagina daily at bedtime (Patient not taking: Reported on 7/16/2024), Disp: 45 g, Rfl: 0    furosemide (LASIX) 40 mg tablet, Take 1 tablet (40 mg total) by mouth daily, Disp: 30 tablet, Rfl: 2  No Known Allergies      Labs:     Chemistry        Component Value Date/Time    K 4.5 07/01/2024 0936    K 4.4 08/17/2022 1507     07/01/2024 0936     08/17/2022 1507    CO2 29 07/01/2024 0936    CO2 22 04/09/2024 1307     "CO2 27 08/17/2022 1507    BUN 28 (H) 07/01/2024 0936    BUN 14 08/17/2022 1507    CREATININE 0.93 07/01/2024 0936    CREATININE 0.78 08/17/2022 1507        Component Value Date/Time    CALCIUM 10.0 07/01/2024 0936    CALCIUM 10.0 08/17/2022 1507    ALKPHOS 104 07/01/2024 0936    AST 43 (H) 07/01/2024 0936    ALT 51 07/01/2024 0936            No results found for: \"CHOL\"  Lab Results   Component Value Date    HDL 45 (L) 07/01/2024    HDL 47 (L) 01/11/2024    HDL 44 06/02/2021     Lab Results   Component Value Date    LDLCALC 36 07/01/2024    LDLCALC 130 (H) 01/11/2024    LDLCALC 156 (H) 06/02/2021     Lab Results   Component Value Date    TRIG 110 07/01/2024    TRIG 135 01/11/2024    TRIG 204 (H) 06/02/2021     No results found for: \"CHOLHDL\"    Imaging: Echo complete w/ contrast if indicated    Result Date: 1/3/2024  Narrative:   Left Ventricle: Left ventricular cavity size is at the upper limits of normal when indexed for BSA. Wall thickness is mildly increased. There is eccentric hypertrophy. The left ventricular ejection fraction is 30%. Systolic function is severely reduced. Wall motion is normal. Unable to assess diastolic function due to E/A fusion due to tachycardia.   The following segments are akinetic: basal inferoseptal, basal inferior, mid inferoseptal and mid inferior.   The following segments are hypokinetic: basal anterior, basal anteroseptal, basal inferolateral, basal anterolateral, mid anterior, mid anteroseptal, mid inferolateral, mid anterolateral, apical anterior, apical septal, apical inferior, apical lateral and apex.   Left Atrium: The atrium is moderately dilated (42-48 mL/m2).   Mitral Valve: There is mild annular calcification. There is mild to moderate regurgitation.   Pulmonic Valve: There is mild regurgitation.   Pericardium: There is a small pericardial effusion along the right atrial and right ventricular free wall. The fluid exhibits no internal echoes. There is no echocardiographic " "evidence of tamponade. There is a left pleural effusion.       ECG: Sinus rhythm IVCD      ROS    Vitals:    07/22/24 1444   BP: 110/70   Pulse: 72   SpO2: 96%     Vitals:    07/22/24 1444   Weight: 69.9 kg (154 lb 3.2 oz)     Height: 5' 4\" (162.6 cm)   Body mass index is 26.47 kg/m².    Physical Exam:  Vital signs reviewed  General:  Alert and cooperative, appears stated age, no acute distress  HEENT:  PERRLA, EOMI, no scleral icterus, no conjunctival pallor  Neck:  No lymphadenopathy, no thyromegaly, no carotid bruits, no elevated JVP  Heart:  Regular rate and rhythm, normal S1/S2, no S3/S4, no murmur, rubs or gallops.  PMI nondisplaced  Lungs:  Clear to auscultation bilaterally, no wheezes rales or rhonchi  Abdomen:  Soft, non-tender, positive bowel sounds, no rebound or guarding,   no organomegaly   Extremities:  Normal range of motion.  No clubbing, cyanosis or edema   Vascular:  2+ pedal pulses  Skin:  No rashes or lesions on exposed skin  Neurologic:  Cranial nerves II-XII grossly intact without focal deficits  Psych:  Normal mood and affect          "

## 2024-07-24 ENCOUNTER — APPOINTMENT (OUTPATIENT)
Dept: CARDIAC REHAB | Facility: HOSPITAL | Age: 69
End: 2024-07-24
Payer: COMMERCIAL

## 2024-07-25 ENCOUNTER — CLINICAL SUPPORT (OUTPATIENT)
Dept: CARDIAC REHAB | Facility: HOSPITAL | Age: 69
End: 2024-07-25
Payer: COMMERCIAL

## 2024-07-25 DIAGNOSIS — Z95.1 S/P CABG X 3: Primary | ICD-10-CM

## 2024-07-25 PROCEDURE — 93798 PHYS/QHP OP CAR RHAB W/ECG: CPT

## 2024-07-25 NOTE — PROGRESS NOTES
CARDIAC REHABILITATION ASSESSMENT AND INDIVIDUALIZED TREATMENT PLAN  60 DAY          Today's date: 2024   # of Exercise Sessions Completed: 20  Patient name: Julia Perry      : 1955  Age: 68 y.o.       MRN: [id number]  Referring Physician: Jeri Rubin PA*  Cardiologist: Maverick Ceballos DO  Provider: Khai  Clinician: Erika Sacks, MS Dr. Cutitta,   Please review the following patient reassessment for Julia to continue cardiac rehabilitation post CABGx3.  Please verify you agree with the outlined plan of care and exercise prescription by signing with attached order.    Thank You.      Comments: Patient has completed 20 sessions of cardiac rehab. She has been working at a 3.44 MET level. She is compliant in wearing her Life Vest. She had a repeat echocardiogram that revealed LV EF 25-30%. It was discussed that she may require an ICD place. She will be progressed as tolerated.         Dx: CABG x 3    Description of Diagnosis: Coronary artery bypass grafting x 3 (LIMA to LAD, EVH/SVG to PDA, and OM). Angioplasty x 4 and RAFFY x 2 to mid Cx    Date of onset: 2024    Other Cardiac History: Hx Afib/LBBB, CHF (EF 20%), HLD          ASSESSMENT    Medical History:   Past Medical History:   Diagnosis Date    CHF (congestive heart failure) (HCC) 2024    Colitis     Diabetes mellitus (HCC)     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HCC)     PONV (postoperative nausea and vomiting)        Family History:  No family history on file.    Allergies:   Patient has no known allergies.    Current Medications:   Current Outpatient Medications   Medication Sig Dispense Refill    acetaminophen (TYLENOL) 325 mg tablet Take 2 tablets (650 mg total) by mouth every 6 (six) hours as needed for mild pain      Alcohol Swabs 70 % PADS May substitute brand based on insurance coverage. Check glucose TID. 100 each 0    amiodarone 200 mg tablet 1 tablet daily 90 tablet 1    aspirin 81 mg chewable tablet Chew 1 tablet (81 mg  total) daily 90 tablet 3    cholecalciferol (VITAMIN D3) 1,000 units tablet Take 1,000 Units by mouth daily      clopidogrel (Plavix) 75 mg tablet Take 1 tablet (75 mg total) by mouth daily Take 8 tabs on day one then 1 tablet daily after 90 tablet 3    clotrimazole (GYNE-LOTRIMIN) 1 % vaginal cream Insert 1 applicator into the vagina daily at bedtime (Patient not taking: Reported on 7/16/2024) 45 g 0    docusate sodium (COLACE) 100 mg capsule Take 1 capsule (100 mg total) by mouth 2 (two) times a day      Empagliflozin (JARDIANCE) 10 MG TABS tablet Take 1 tablet (10 mg total) by mouth daily 90 tablet 2    furosemide (LASIX) 40 mg tablet Take 1 tablet (40 mg total) by mouth daily 30 tablet 2    glucose blood test strip Use 1 each daily as needed Use as instructed      magnesium gluconate (MAGONATE) 500 mg tablet Take 1 tablet (500 mg total) by mouth in the morning (Patient taking differently: Take 400 mg by mouth in the morning) 90 tablet 0    metoprolol succinate (TOPROL-XL) 25 mg 24 hr tablet Take 0.5 tablets (12.5 mg total) by mouth daily 30 tablet 2    midodrine (PROAMATINE) 10 MG tablet Take 0.5 tablets (5 mg total) by mouth 3 (three) times a day before meals (Patient taking differently: Take 2.5 mg by mouth 3 (three) times a day before meals)      OneTouch Delica Lancets 33G MISC May substitute brand based on insurance coverage. Check glucose TID. 100 each 0    potassium chloride (Klor-Con M20) 20 mEq tablet Take 1 tablet (20 mEq total) by mouth daily 90 tablet 3    rosuvastatin (CRESTOR) 40 MG tablet Take 1 tablet (40 mg total) by mouth daily 90 tablet 2     No current facility-administered medications for this visit.       Medication compliance: Yes   Comments: Pt reports to be compliant with medications    Physical Limitations: Decreased strength, fatigue    Fall Risk: Low   Comments: Ambulates with a steady gait with no assist device    Cultural needs: None      CAD Risk Factors:  Cholesterol: Yes (Tot  204, )   60 day 7/25/2024: Tot down to 103, LDL down to 36)  HTN: No  DM: Type 2  (A1c 6.4%)   60 day 7/25/2024: A1c down to 6.3%  Obesity: Yes (BMI 26.67 kg/m2)   60 day 7/25/2024: BMI down to 26.47 kg/m2  Inactivity: Yes      EXERCISE ASSESSMENT:    Initial Fitness Assessment:    Rest: HR 77, /66, SpO2 98%, Asx  Exercise: HR 91, /64, SpO2 97%, Asx, 1.73 METs    Reason for Discontinuation: RPE 6  Recovery: HR 72, /62, SpO2 99%, Asx  ECG Interpretation: NSR w/ LBBB      Functional Status Prior to Diagnosis for Treatment:   Occupation: retired  Recreation/Physical Activity: Spend time outdoors  ADL’s: Capable of performing light to moderate ADLs  [place]: Capable of performing light to moderate ADLs  Home exercise equipment:  Bike  Home exercise: None  Other: None    Current Functional Status:  Occupation: retired  Recreation/Physical Activity: Spend time with family  ADL’s:Capable of performing light ADLs only  [place]: Capable of performing light ADLs only  Home exercise: Biking 4 min 4x/day  Other: None    60 day 7/25/2024: Biking at home 4-10 min 4x/day    Functional Capacity Screening Tool:  [facility] Activity Status Index:  3.97 METs      PSYCHOSOCIAL ASSESSMENT:    Depression screening:  PHQ-9 = 0    Interpretation:  0 =No Depression  Anxiety screening:  CRISTOBAL-7 = 0    Interpretation: 0-4  = Not anxious    Pt self-report of depression and anxiety   Patient reports they are coping well with good social support and denies depression or anxiety      Self-reported stress level:  3   Stressors:  Health, finances, household care  Stress Management Tools: practice Relaxation Techniques and keep a positive mindset    Quality of Life Screen:  (Higher score indicates disease impact on QOL)  [facility] COOP score: 20/40        Social Support:   spouse, children, and friends  Community/Social Activities: Spend time with family     Psychosocial Assessment as it relates to rehabilitation:  "  Patient denies issues with his/her family or home life that may affect their rehabilitation efforts.       NUTRITION ASSESSMENT:    Weight:    Wt Readings from Last 1 Encounters:   07/22/24 69.5 kg (153 lb 3.5 oz)        Height:   Ht Readings from Last 1 Encounters:   07/22/24 5' 4\" (1.626 m)       Rate Your Plate Score: 70/81    Diabetes: T2D  A1c: 6.4    last measured: 12/28/2023    Lipid management: Discussed diet and lipid management and Last lipid profile 1/11/2024  Chol 204    HDL 47      60 day 7/25/2024: Lipid panel from 7/1/2024 Chol 103, Tri 110, HDL 45, LDL 36    Current Dietary Habits:  Patient reports improved dietary habits since discharge from the hospital. She states she has been eating a lot of salads and drinking one protein shake per day.    Drug/Alcohol Use:   No      OTHER CORE COMPONENT ASSESSMENT:    Tobacco Use:     Pt quit in 1979   and has abstained    Anginal Symptoms:  None   NTG use: No prescription        INDIVIDUALIZED TREATMENT PLAN      EXERCISE GOAL and PLAN      SMART Goals:   10% improvement in functional capacity based on max METs achieved in initial fitness assessment  improved DASI score by 10%  increased exercise capacity by 40% based on peak METs tolerated in cardiac rehab exercise session  maintain > 150 minutes per week of moderate intensity exercise    Patient Specific EXERCISE GOALS:       resume ADLs with increased strength, attend rehab regularly, decrease sitting time, and start a home exercise program    Progress toward SMART and personal Exercise goals: Patient has completed 20 sessions of CR. She is working at a 3.44 MET level, which increased from a 3.10 MET level. She continues to use a bike at home daily.      Plan for next 30 days:  Continue attending CR 2-3x/week, work towards increasing her MET level to 5.0 or greater, increase HEP      The patient was counseled on exercise guidelines to achieve a minimum of 150 mins/wk of moderate intensity " (RPE 4-6)   exercise and encouraged to add 1-2 days of exercise on opposite days of cardiac rehab as tolerated.     Current Aerobic Exercise Prescription:      Frequency: 2-3 days/week   Supplement with home exercise 2+ days/wk as tolerated       Minutes: 45-55         METS: 3.44           HR: RHR +30-40bpm   RPE: 4-6         Modalities: Treadmill, UBE, NuStep, and Recumbent bike     Exercise workloads will be progressed gradually as tolerated, within limits of patient's ability, and according to the patient's   response to the exercise program.      Aerobic Exercise Prescription - 30 day goal:   Frequency: 2-3 days/week of cardiac rehab       Supplement with home exercise 2+ days/wk as tolerated    Minutes: 45+       >150 mins/wk of moderate intensity exercise   METS: 3.5-4.5   HR: RHR +30-40bpm     RPE: 4-6   Modalities: Treadmill, UBE, NuStep, and Recumbent bike    Strength training:  CR department is w/o strength equipment. Patient will be intoduced to strength training when new equipment is delivered.     Home Exercise: Bikes 4-10 min ~4x/day    Group and Individual Education: benefit of exercise for CAD risk factors, home exercise guidelines, AHA guidelines to achieve >150 mins/wk of moderate exercise, RPE scale, and Group class: Risk Factors for Heart Disease     Readiness to change: Action:  (Changing behavior)      NUTRITION GOALS AND PLAN    Weight control:    Starting weight: 155   Current weight: 154    Nutritional   Reviewed patient's Rate your Plate. Discussed key elements of heart healthy eating. Reviewed patient goals for dietary modifications and their clinical implications.  Reviewed most recent lipid profile.     SMART Goals:   reduced BMI to < 25, LDL <100, CHOL <200, Improved Rate Your Plate score  >58, and 2.5-5%  wt loss    Patient Specific NUTRITION GOALS:     Weight loss, improve diet    Patient's progress toward SMART and personal Nutrition goals:   Patient had 1# weight loss in this 30 day  reporting window. Patient had repeat lipid panel in this 30 day reporting window (Chol 204 to 103, Tri 135 to 11, HDL 47 to 45,  to 36). Patient had repeat A1c in this 30 day reporting window (6.4% to 6.3%). Patient continues to improve her dietary habits.    Plan for next 30 days:   group class: Reading Food Labels and group Class: Heart Healthy Eating    Measurable goals were based Rate Your Plate Dietary Self-Assessment. These are the areas in which the patient could score higher on the assessment.  Goals include recommendations for a heart healthy diet based on American Heart Association.    Group and Individual Education:   heart healthy eating principles  low sodium diet  maintaining hydration  nutrition for  lipid management  group class: Heart Healthy Eating  group class:  Label Reading    Readiness to change: Action:  (Changing behavior)      PSYCHOSOCIAL ASSESSMENT AND PLAN    Psychosocial Assessment as it relates to rehabilitation:   Patient denies issues with his/her family or home life that may affect their rehabilitation efforts.     SMART Goals:     Reduce perceived stress to 1-3/10, improved [facility] QOL < 27, feel less tired with more energy, Improved appetite control, and take time to relax    Patient Specific PSYCHOCOSOCIAL GOALS:     spend time with family, worry less    Patient's progress toward SMART and personal Psychosocial goals:   Patient continues to report a stress level of 3/10. CR staff will continue to provide support and education.     Plan for next 30 days:   Class: Stress and Your Health, Enroll in [employer], Keep a positive mindset, Enjoy family, and Return to previous social activity    Group and Individual Education: stress management techniques, benefits of enrolling in [employer], depression and CAD, and class:  Stress and Your Health     Information to utilize [employer] was provided as well as contact information for counseling through  [facility] and  group psychotherapy groups available.    Readiness to change: Action:  (Changing behavior)      OTHER CORE COMPONENTS GOALS and PLAN      Blood Pressure will be monitored throughout the program and cardiologist will be notified of elevated trends.    Pt will be encouraged to monitor home BP if advised by cardiologist.    Tobacco Intervention:   N/A:  Pt has a remote history of smoking.      SMART Goals:   consistent, controlled resting BP < 130/80 and medication compliance    Patient Specific CORE COMPONENT GOALS:    reduced dietary sodium <2000mg, medication compliance, and reduce number of medications  needed for BP control    Progress toward SMART and personal Core Component goals:   Patient has had consistent resting BPs < 130/80. Patient reports 100% medication compliance. Patient watches her sodium intake.     Plan for next 30 days:   group class: Understanding Heart Disease, group class: Common Heart Medications, medication compliance, avoid places with second hand smoke, and monitor home BP    Group and Individual Education:  understanding high blood pressure and it's relationship to CAD, components of blood pressure management, group class: Understanding Heart Disease, and group class:  Common Heart Medications    Readiness to change: Action:  (Changing behavior)

## 2024-07-26 ENCOUNTER — TELEPHONE (OUTPATIENT)
Age: 69
End: 2024-07-26

## 2024-07-26 NOTE — TELEPHONE ENCOUNTER
Caller: Patient     Doctor:  Maverick Ceballos DO     Reason for call: Patient called & stated that she spoke with [employer] regarding the vest she is wearing.. Pt was advised that her echo results need to be faxed to [employer]. Pt states that she only has until 8/2 or 8/3 before she is charged for the life vest. Pt does not want to receive a bill for the life vest and is requesting a call back ASAP.     Call back#: [phone]

## 2024-07-29 ENCOUNTER — TELEPHONE (OUTPATIENT)
Dept: CARDIOLOGY CLINIC | Facility: HOSPITAL | Age: 69
End: 2024-07-29

## 2024-07-29 NOTE — TELEPHONE ENCOUNTER
Spoke with pt this morning regarding her call.We faxed last office note and last echo report to [employer] as pts requested. Pt is very concerned about getting billed for lifevest and is leaning on returning the lifevest. We advised her to speak with her cardiologist before doing so. Pt states she can not afford to be billed 700.00 to 800.00 dollars a month    In Basket message:    [employer]  (Newest Message First)  View All Conversations on this Encounter  Brigitte Coelho RN3 days ago       Faxed Echo to [facility] # [phone].         Note          Clarita Jama LPN routed conversation to Cardiology [facility]3 days ago     Maddy Sarmiento MA routed conversation to Cardiology [facility]3 days ago     Maddy Sarmiento MA3 days ago     YESENIA  Caller: Patient      Doctor:  Maverick Ceballos DO      Reason for call: Patient called & stated that she spoke with [employer] regarding the vest she is wearing.. Pt was advised that her echo results need to be faxed to [employer]. Pt states that she only has until 8/2 or 8/3 before she is charged for the life vest. Pt does not want to receive a bill for the life vest and is requesting a call back ASA.      Call back#: [phone]          Note         Julia Perry [phone]  Maddy Sarmiento MA

## 2024-07-30 ENCOUNTER — CLINICAL SUPPORT (OUTPATIENT)
Dept: CARDIAC REHAB | Facility: HOSPITAL | Age: 69
End: 2024-07-30
Payer: COMMERCIAL

## 2024-07-30 DIAGNOSIS — Z95.1 S/P CABG X 3: ICD-10-CM

## 2024-07-30 PROCEDURE — 93798 PHYS/QHP OP CAR RHAB W/ECG: CPT

## 2024-08-02 ENCOUNTER — CLINICAL SUPPORT (OUTPATIENT)
Dept: CARDIAC REHAB | Facility: HOSPITAL | Age: 69
End: 2024-08-02
Payer: COMMERCIAL

## 2024-08-02 DIAGNOSIS — Z95.1 S/P CABG X 3: ICD-10-CM

## 2024-08-02 PROCEDURE — 93798 PHYS/QHP OP CAR RHAB W/ECG: CPT

## 2024-08-05 ENCOUNTER — CLINICAL SUPPORT (OUTPATIENT)
Dept: CARDIAC REHAB | Facility: HOSPITAL | Age: 69
End: 2024-08-05
Payer: COMMERCIAL

## 2024-08-05 DIAGNOSIS — Z95.1 S/P CABG X 3: ICD-10-CM

## 2024-08-05 PROCEDURE — 93798 PHYS/QHP OP CAR RHAB W/ECG: CPT

## 2024-08-07 ENCOUNTER — CLINICAL SUPPORT (OUTPATIENT)
Dept: CARDIAC REHAB | Facility: HOSPITAL | Age: 69
End: 2024-08-07
Payer: COMMERCIAL

## 2024-08-07 DIAGNOSIS — Z95.1 S/P CABG X 3: Primary | ICD-10-CM

## 2024-08-07 PROCEDURE — 93798 PHYS/QHP OP CAR RHAB W/ECG: CPT

## 2024-08-09 ENCOUNTER — CLINICAL SUPPORT (OUTPATIENT)
Dept: CARDIAC REHAB | Facility: HOSPITAL | Age: 69
End: 2024-08-09
Payer: COMMERCIAL

## 2024-08-09 DIAGNOSIS — Z95.1 S/P CABG X 3: ICD-10-CM

## 2024-08-09 PROCEDURE — 93798 PHYS/QHP OP CAR RHAB W/ECG: CPT

## 2024-08-12 ENCOUNTER — CLINICAL SUPPORT (OUTPATIENT)
Dept: CARDIAC REHAB | Facility: HOSPITAL | Age: 69
End: 2024-08-12
Payer: COMMERCIAL

## 2024-08-12 DIAGNOSIS — Z95.1 S/P CABG X 3: ICD-10-CM

## 2024-08-12 PROCEDURE — 93798 PHYS/QHP OP CAR RHAB W/ECG: CPT

## 2024-08-13 ENCOUNTER — OFFICE VISIT (OUTPATIENT)
Dept: VASCULAR SURGERY | Facility: CLINIC | Age: 69
End: 2024-08-13
Payer: COMMERCIAL

## 2024-08-13 VITALS
SYSTOLIC BLOOD PRESSURE: 118 MMHG | WEIGHT: 158 LBS | OXYGEN SATURATION: 97 % | BODY MASS INDEX: 26.98 KG/M2 | DIASTOLIC BLOOD PRESSURE: 72 MMHG | HEIGHT: 64 IN | HEART RATE: 67 BPM

## 2024-08-13 DIAGNOSIS — I65.22 STENOSIS OF LEFT CAROTID ARTERY: Primary | ICD-10-CM

## 2024-08-13 PROCEDURE — 99214 OFFICE O/P EST MOD 30 MIN: CPT | Performed by: SURGERY

## 2024-08-13 NOTE — PROGRESS NOTES
Ambulatory Visit  Name: Julia Perry      : 1955      MRN: [id number]  Encounter Provider: Janeth De Oliveira DO  Encounter Date: 2024   Encounter department: [facility] [place]    Assessment & Plan   1. Stenosis of left carotid artery  Assessment & Plan:  Status post left diversion carotid endarterectomy back in March.  The neck incision is well-healed.  She continues to have mild numbness around the incision.  She has no difficulty eating.  Recent surveillance duplex demonstrates a widely patent endarterectomy site.    Continue surveillance with 6-month duplex.  Continue current medical regimen.  Orders:  -     Ambulatory Referral to Vascular Surgery  -     VAS carotid complete study; Future; Expected date: 2025      History of Present Illness     Patient presents to office for results of CV done 2024. Patient S/P CEA done 3/4/2024. Patient denies TIA/ CVA symptoms. Patient is taking ASA 81mg, plavix, and rosuvastatin.         Julia Perry is a 68 y.o. female who presents to the office to review surveillance carotid duplex following left carotid endarterectomy back in March.    Of note she subsequently underwent open heart surgery.  She presents to the office accompanied by her .  She is currently wearing a LifeVest.  She reports that she has been participating and doing well in rehab.      Review of Systems   Constitutional: Negative.    HENT: Negative.     Eyes: Negative.    Respiratory: Negative.     Cardiovascular: Negative.    Gastrointestinal: Negative.    Endocrine: Negative.    Genitourinary: Negative.    Musculoskeletal: Negative.    Skin: Negative.    Allergic/Immunologic: Negative.    Neurological: Negative.    Hematological: Negative.    Psychiatric/Behavioral: Negative.       Past Medical History   Past Medical History:   Diagnosis Date    CHF (congestive heart failure) (HCC) 2024    Colitis     Diabetes mellitus (HCC)     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HCC)      PONV (postoperative nausea and vomiting)      Past Surgical History:   Procedure Laterality Date    CARDIAC CATHETERIZATION Left 01/17/2024    Procedure: Cardiac Left Heart Cath;  Surgeon: Juan A Sousa MD;  [facility];  Service: Cardiology    CARDIAC CATHETERIZATION  01/17/2024    Procedure: Cardiac catheterization;  Surgeon: Juan A Sousa MD;  [facility];  Service: Cardiology    CARDIAC CATHETERIZATION Left 4/10/2024    Procedure: Cardiac Left Heart Cath;  Surgeon: Viktor Bee MD;  [facility];  Service: Cardiology    CARDIAC CATHETERIZATION N/A 4/10/2024    Procedure: Cardiac Coronary Angiogram;  Surgeon: Viktor Bee MD;  [facility];  Service: Cardiology    CARDIAC CATHETERIZATION N/A 4/10/2024    Procedure: Cardiac Impella Insertion;  Surgeon: Viktor Bee MD;  [facility];  Service: Cardiology    CARDIAC CATHETERIZATION N/A 4/10/2024    Procedure: Cardiac pci;  Surgeon: Viktor Bee MD;  [facility];  Service: Cardiology    CARDIAC CATHETERIZATION N/A 4/12/2024    Procedure: Cardiac Impella Removal;  Surgeon: Viktor Bee MD;  [facility];  Service: Cardiology    COLONOSCOPY  12/06/2023    HYSTERECTOMY      ~1990    IR PICC REPOSITION  4/18/2024    ME CORONARY ARTERY BYP W/VEIN & ARTERY GRAFT 3 VEIN N/A 4/9/2024    Procedure: CORONARY ARTERY BYPASS GRAFT (CABG) x3 VESSELS, LIMA - LAD, LEFT LEG EVH/SVG - PDA  AND OM, W/ELOISA;  Surgeon: Josh Castellanos MD;  Location[facility];  Service: Cardiac Surgery    ME TEAEC W/PATCH GRF CAROTID VERTB SUBCLAV NECK INC Left 03/04/2024    Procedure: LEFT ENDARTERECTOMY ARTERY CAROTID;  Surgeon: Janeth De Oliveira DO;  Location: [facility];  Service: Vascular     No family history on file.  Current Outpatient Medications on File Prior to Visit   Medication Sig Dispense Refill    acetaminophen (TYLENOL) 325 mg tablet Take 2  tablets (650 mg total) by mouth every 6 (six) hours as needed for mild pain      Alcohol Swabs 70 % PADS May substitute brand based on insurance coverage. Check glucose TID. 100 each 0    amiodarone 200 mg tablet 1 tablet daily 90 tablet 1    aspirin 81 mg chewable tablet Chew 1 tablet (81 mg total) daily 90 tablet 3    cholecalciferol (VITAMIN D3) 1,000 units tablet Take 1,000 Units by mouth daily      clopidogrel (Plavix) 75 mg tablet Take 1 tablet (75 mg total) by mouth daily Take 8 tabs on day one then 1 tablet daily after 90 tablet 3    docusate sodium (COLACE) 100 mg capsule Take 1 capsule (100 mg total) by mouth 2 (two) times a day      Empagliflozin (JARDIANCE) 10 MG TABS tablet Take 1 tablet (10 mg total) by mouth daily 90 tablet 2    furosemide (LASIX) 40 mg tablet Take 1 tablet (40 mg total) by mouth daily 30 tablet 2    glucose blood test strip Use 1 each daily as needed Use as instructed      magnesium gluconate (MAGONATE) 500 mg tablet Take 1 tablet (500 mg total) by mouth in the morning (Patient taking differently: Take 400 mg by mouth in the morning) 90 tablet 0    metoprolol succinate (TOPROL-XL) 25 mg 24 hr tablet Take 0.5 tablets (12.5 mg total) by mouth daily 30 tablet 2    midodrine (PROAMATINE) 10 MG tablet Take 0.5 tablets (5 mg total) by mouth 3 (three) times a day before meals (Patient taking differently: Take 2.5 mg by mouth 3 (three) times a day before meals)      OneTouch Delica Lancets 33G MISC May substitute brand based on insurance coverage. Check glucose TID. 100 each 0    potassium chloride (Klor-Con M20) 20 mEq tablet Take 1 tablet (20 mEq total) by mouth daily 90 tablet 3    rosuvastatin (CRESTOR) 40 MG tablet Take 1 tablet (40 mg total) by mouth daily 90 tablet 2    clotrimazole (GYNE-LOTRIMIN) 1 % vaginal cream Insert 1 applicator into the vagina daily at bedtime (Patient not taking: Reported on 7/16/2024) 45 g 0     No current facility-administered medications on file prior to  visit.   No Known Allergies   Current Outpatient Medications on File Prior to Visit   Medication Sig Dispense Refill    acetaminophen (TYLENOL) 325 mg tablet Take 2 tablets (650 mg total) by mouth every 6 (six) hours as needed for mild pain      Alcohol Swabs 70 % PADS May substitute brand based on insurance coverage. Check glucose TID. 100 each 0    amiodarone 200 mg tablet 1 tablet daily 90 tablet 1    aspirin 81 mg chewable tablet Chew 1 tablet (81 mg total) daily 90 tablet 3    cholecalciferol (VITAMIN D3) 1,000 units tablet Take 1,000 Units by mouth daily      clopidogrel (Plavix) 75 mg tablet Take 1 tablet (75 mg total) by mouth daily Take 8 tabs on day one then 1 tablet daily after 90 tablet 3    docusate sodium (COLACE) 100 mg capsule Take 1 capsule (100 mg total) by mouth 2 (two) times a day      Empagliflozin (JARDIANCE) 10 MG TABS tablet Take 1 tablet (10 mg total) by mouth daily 90 tablet 2    furosemide (LASIX) 40 mg tablet Take 1 tablet (40 mg total) by mouth daily 30 tablet 2    glucose blood test strip Use 1 each daily as needed Use as instructed      magnesium gluconate (MAGONATE) 500 mg tablet Take 1 tablet (500 mg total) by mouth in the morning (Patient taking differently: Take 400 mg by mouth in the morning) 90 tablet 0    metoprolol succinate (TOPROL-XL) 25 mg 24 hr tablet Take 0.5 tablets (12.5 mg total) by mouth daily 30 tablet 2    midodrine (PROAMATINE) 10 MG tablet Take 0.5 tablets (5 mg total) by mouth 3 (three) times a day before meals (Patient taking differently: Take 2.5 mg by mouth 3 (three) times a day before meals)      OneTouch Delica Lancets 33G MISC May substitute brand based on insurance coverage. Check glucose TID. 100 each 0    potassium chloride (Klor-Con M20) 20 mEq tablet Take 1 tablet (20 mEq total) by mouth daily 90 tablet 3    rosuvastatin (CRESTOR) 40 MG tablet Take 1 tablet (40 mg total) by mouth daily 90 tablet 2    clotrimazole (GYNE-LOTRIMIN) 1 % vaginal cream Insert  "1 applicator into the vagina daily at bedtime (Patient not taking: Reported on 7/16/2024) 45 g 0     No current facility-administered medications on file prior to visit.      Social History     Tobacco Use    Smoking status: Former     Types: Cigarettes     Start date: 1975     Passive exposure: Past    Smokeless tobacco: Never    Tobacco comments:     Quit 1970's   Vaping Use    Vaping status: Never Used   Substance and Sexual Activity    Alcohol use: Never    Drug use: Never    Sexual activity: Not on file     Objective     /72   Pulse 67   Ht 5' 4\" (1.626 m)   Wt 71.7 kg (158 lb)   SpO2 97%   BMI 27.12 kg/m²     Physical Exam  Constitutional:       General: She is not in acute distress.     Appearance: She is well-developed.   HENT:      Head: Normocephalic and atraumatic.   Eyes:      General: No scleral icterus.     Conjunctiva/sclera: Conjunctivae normal.   Neck:      Trachea: No tracheal deviation.   Cardiovascular:      Rate and Rhythm: Normal rate.   Pulmonary:      Effort: Pulmonary effort is normal.   Abdominal:      General: There is no distension.      Palpations: Abdomen is soft. Mass: no appreciable aortic pulsation/aneurysm.      Tenderness: There is no abdominal tenderness.   Musculoskeletal:         General: Normal range of motion.      Cervical back: Normal range of motion and neck supple.   Skin:     General: Skin is warm and dry.   Neurological:      Mental Status: She is alert and oriented to person, place, and time.   Psychiatric:         Mood and Affect: Mood normal.         Behavior: Behavior normal.         Thought Content: Thought content normal.         Judgment: Judgment normal.           Carotid duplex:  CONCLUSION:  Impression  RIGHT:  There is <50% stenosis noted in the internal carotid artery. Plaque is  heterogenous and irregular.  Vertebral artery flow is antegrade. There is no significant subclavian artery  disease.     LEFT:  Widely patent internal carotid artery and " endarterectomy site.  Vertebral artery flow is antegrade. There is no significant subclavian artery  disease.     Compared to previous study on 01/26/2024 ,the left internal carotid artery is  now patent. There is no progression of disease in the right carotid artery.  Administrative Statements   I have spent a total time of 30 minutes in caring for this patient on the day of the visit/encounter including Diagnostic results, Prognosis, Risks and benefits of tx options, Instructions for management, Patient and family education, Importance of tx compliance, Risk factor reductions, Impressions, Counseling / Coordination of care, Documenting in the medical record, Reviewing / ordering tests, medicine, procedures  , and Obtaining or reviewing history  .

## 2024-08-13 NOTE — ASSESSMENT & PLAN NOTE
Status post left diversion carotid endarterectomy back in March.  The neck incision is well-healed.  She continues to have mild numbness around the incision.  She has no difficulty eating.  Recent surveillance duplex demonstrates a widely patent endarterectomy site.    Continue surveillance with 6-month duplex.  Continue current medical regimen.

## 2024-08-14 ENCOUNTER — CLINICAL SUPPORT (OUTPATIENT)
Dept: CARDIAC REHAB | Facility: HOSPITAL | Age: 69
End: 2024-08-14
Payer: COMMERCIAL

## 2024-08-14 DIAGNOSIS — Z95.1 S/P CABG X 3: ICD-10-CM

## 2024-08-14 PROCEDURE — 93798 PHYS/QHP OP CAR RHAB W/ECG: CPT

## 2024-08-19 ENCOUNTER — CLINICAL SUPPORT (OUTPATIENT)
Dept: CARDIAC REHAB | Facility: HOSPITAL | Age: 69
End: 2024-08-19
Payer: COMMERCIAL

## 2024-08-19 DIAGNOSIS — Z95.1 S/P CABG X 3: Primary | ICD-10-CM

## 2024-08-19 PROCEDURE — 93798 PHYS/QHP OP CAR RHAB W/ECG: CPT

## 2024-08-20 NOTE — PROGRESS NOTES
EPS Consultation/New Patient Evaluation   [facility]  [facility] [address], [place] [address]    Name: Julia Perry  : 1955  MRN: [id number]      Assessment/Plan:  Cardiomyopathy w/ EF 25-30% per 24 ECHO  -Thought to be mixed ischemic/non  -EF remains <35% despite ongoing GDMT >3 months  -Aggressive GDMT unable to be pursued given prior hypotensive episodes  -Has been wearing life vest  -Appears euvolemic in office today   -We discussed ICD implant including CRT therapy given her QRS duration 140 ms and continued NYHA class II-III symptoms  -Patient noted that she would like to wait and have a repeat echo in 3 months before moving forward  -She also noted that she will stop wearing her LifeVest.  We discussed the risk of sudden cardiac death given her heart failure, but she noted she would be okay taking the risk  -I have scheduled her for follow-up in 3 months time at which time we can revisit ICD implant    PAF  -New onset - diagnosed 2024 requiring cardioversion  -Was post Op afib without recurrence  -Ablation: None prior  -Cardioversion: Once at onset 24  -Rate control: Metoprolol succinate 12.5mg daily  -Antiarrythmic: Amiodarone 200mg daily   -TSH WNL in July  -AST 43 w/ all other LFT's WNL in July  AC: [id number] - 4; currently on DAPT  -Will discuss possible discontinuation of amiodarone at next visit.  Would be more reassured if patient has ablation in place for rhythm monitoring.     CAD s/p CABG x3  -Recently swapped to plavix and continues to follow with general cardiology         Chief Complaint: Cardiomyopathy ICD evaluation    HPI:   Julia Perry is a 68 y.o. female with a PMH of cardiomyopathy, Afib, CAD s/p CABG x3 + RAFFY x2, LBBB, CKD, ulcerative colitis, and HLD.     Patient was recently diagnosed w/ cardiomyopathy 2023 after ECHO around that time showed an EF of 30%. Prior to that evaluation she was experiencing  some LE edema since around 08/2023 and subsequent CXR showing BL pleural effusion around the same time. She was evaluated by general cardiology Dr. Gama 1/11/24, GDMT was initated, and subsequent LHC showed severe multivessel disease. CABG was recommended however the procedure had to be delayed due to high grade left carotid stenosis being found on carotid duplex and subsequent CTA H&N, which was later treated with L CEA 3/4/24. That same scan also showed a pituitary macroadenoma and required neurosurgical + endocrinology evaluation.    Eventually on 04/09 she underwent CABG x3 and unfortunatley developed cardiogenic shock post-op which required inotropic and impella support. Subsequent LHC 4/10 required RAFFY placement x2 to the OM and later impella was removed around 4/12. On 4/14 she developed new-onset afib w/ RVR w/ instability requiring chemical and electrical cardioversion and subsequent amiodarone load. After maintaining sinus rhythm, being weaned off pressor support, and heart failure evaluation inpatient, she was discharged from the hospital 4/23 with close outpatient cardiology follow up with plan for life vest and ongoing GDMT therapy.    She presents today to discuss the possibility of ICD implantation given her EF has remained 25-30% despite ongoing GDMT for greater than 3 months. She affirms she had been wearing her life vest after hospital discharge, but did have to return it due to ongoing cost issues which was reported at her 7/22/24 general cardiology follow up appointment.     Today she presents with her .  We discussed ICD implant including reviewing information found on the shared decision-making tool.  We discussed CRT-D implant given her left bundle branch block with QRS duration of 140 ms and reported NYHA class II-III symptoms.  The patient and her  had a lot of questions regarding the implant.  Both appear quite hesitant to proceed with any further intervention given the  "challenges she encountered as listed above.  They have seen especially concerned with the possibility of having to stay overnight in the hospital.    She noted that she would like to wait until she has a repeat echocardiogram as she has had improvement in her ejection fraction noted on prior echo.  We discussed that she should continue wearing a LifeVest, the patient notes she will stop wearing this device.  We discussed the risk of sudden cardiac death associated with not following through with wearing LifeVest, the patient noted that she was okay excepting those risks.    Reluctant, her  agreed to schedule a follow-up with me in 3 months time to revisit possible ICD implant.  Both of them are unsure if they would want to proceed with this device, but they will consider implant and further discuss the options at the time of follow-up.      EKG: Normal sinus rhythm, left bundle branch block      A complete review of systems was performed and was negative intended 10 systems are listed above    Objective:     Vitals: Blood pressure 112/66, pulse 65, height 5' 4\" (1.626 m), weight 71.8 kg (158 lb 3.2 oz)., Body mass index is 27.15 kg/m².,        Physical Exam:   General: No acute distress.  Pleasant and appropriate  HEENT: Normocephalic, atraumatic  Cardiovascular: Regular rate and rhythm.  No rubs murmurs, gallops.  Pulmonary: Clear to occasional lateral without rales, rhonchi, wheezes  Abdomen: Soft and nontender, nondistended  Lower extremities: Warm, perfused, no edema  Neuro: Moving all 4 extremities spontaneously without any focal neurodeficits  Psych: Appropriate mood and affect.  Answers all questions appropriately.      Medications:      Current Outpatient Medications:     acetaminophen (TYLENOL) 325 mg tablet, Take 2 tablets (650 mg total) by mouth every 6 (six) hours as needed for mild pain, Disp: , Rfl:     Alcohol Swabs 70 % PADS, May substitute brand based on insurance coverage. Check glucose " TID., Disp: 100 each, Rfl: 0    amiodarone 200 mg tablet, 1 tablet daily, Disp: 90 tablet, Rfl: 1    aspirin 81 mg chewable tablet, Chew 1 tablet (81 mg total) daily, Disp: 90 tablet, Rfl: 3    cholecalciferol (VITAMIN D3) 1,000 units tablet, Take 1,000 Units by mouth daily, Disp: , Rfl:     clopidogrel (Plavix) 75 mg tablet, Take 1 tablet (75 mg total) by mouth daily Take 8 tabs on day one then 1 tablet daily after, Disp: 90 tablet, Rfl: 3    docusate sodium (COLACE) 100 mg capsule, Take 1 capsule (100 mg total) by mouth 2 (two) times a day, Disp: , Rfl:     Empagliflozin (JARDIANCE) 10 MG TABS tablet, Take 1 tablet (10 mg total) by mouth daily, Disp: 90 tablet, Rfl: 2    furosemide (LASIX) 40 mg tablet, Take 1 tablet (40 mg total) by mouth daily (Patient taking differently: Take 20 mg by mouth daily), Disp: 30 tablet, Rfl: 2    metoprolol succinate (TOPROL-XL) 25 mg 24 hr tablet, Take 0.5 tablets (12.5 mg total) by mouth daily, Disp: 30 tablet, Rfl: 2    midodrine (PROAMATINE) 10 MG tablet, Take 0.5 tablets (5 mg total) by mouth 3 (three) times a day before meals (Patient taking differently: Take 2.5 mg by mouth 3 (three) times a day before meals), Disp: , Rfl:     OneTouch Delica Lancets 33G MISC, May substitute brand based on insurance coverage. Check glucose TID., Disp: 100 each, Rfl: 0    potassium chloride (Klor-Con M20) 20 mEq tablet, Take 1 tablet (20 mEq total) by mouth daily, Disp: 90 tablet, Rfl: 3    rosuvastatin (CRESTOR) 40 MG tablet, Take 1 tablet (40 mg total) by mouth daily, Disp: 90 tablet, Rfl: 2    clotrimazole (GYNE-LOTRIMIN) 1 % vaginal cream, Insert 1 applicator into the vagina daily at bedtime (Patient not taking: Reported on 7/16/2024), Disp: 45 g, Rfl: 0    glucose blood test strip, Use 1 each daily as needed Use as instructed, Disp: , Rfl:     magnesium gluconate (MAGONATE) 500 mg tablet, Take 1 tablet (500 mg total) by mouth in the morning (Patient taking differently: Take 400 mg by  mouth in the morning), Disp: 90 tablet, Rfl: 0       Historical Information   Past Medical History:   Diagnosis Date    CHF (congestive heart failure) (HCC) 4/8/2024    Colitis     Diabetes mellitus (HCC)     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HCC)     PONV (postoperative nausea and vomiting)        Past Surgical History:   Procedure Laterality Date    CARDIAC CATHETERIZATION Left 01/17/2024    Procedure: Cardiac Left Heart Cath;  Surgeon: Juan A Sousa MD;  [facility];  Service: Cardiology    CARDIAC CATHETERIZATION  01/17/2024    Procedure: Cardiac catheterization;  Surgeon: Juan A Sousa MD;  [facility];  Service: Cardiology    CARDIAC CATHETERIZATION Left 4/10/2024    Procedure: Cardiac Left Heart Cath;  Surgeon: Viktor Bee MD;  [facility];  Service: Cardiology    CARDIAC CATHETERIZATION N/A 4/10/2024    Procedure: Cardiac Coronary Angiogram;  Surgeon: Viktor Bee MD;  [facility];  Service: Cardiology    CARDIAC CATHETERIZATION N/A 4/10/2024    Procedure: Cardiac Impella Insertion;  Surgeon: Viktor Bee MD;  [facility];  Service: Cardiology    CARDIAC CATHETERIZATION N/A 4/10/2024    Procedure: Cardiac pci;  Surgeon: Viktor Bee MD;  [facility];  Service: Cardiology    CARDIAC CATHETERIZATION N/A 4/12/2024    Procedure: Cardiac Impella Removal;  Surgeon: Viktor Bee MD;  [facility];  Service: Cardiology    COLONOSCOPY  12/06/2023    HYSTERECTOMY      ~1990    IR PICC REPOSITION  4/18/2024    AL CORONARY ARTERY BYP W/VEIN & ARTERY GRAFT 3 VEIN N/A 4/9/2024    Procedure: CORONARY ARTERY BYPASS GRAFT (CABG) x3 VESSELS, LIMA - LAD, LEFT LEG EVH/SVG - PDA  AND OM, W/ELOISA;  Surgeon: Josh Castellanos MD;  [facility];  Service: Cardiac Surgery    AL TEAEC W/PATCH GRF CAROTID VERTB SUBCLAV NECK INC Left 03/04/2024    Procedure: LEFT ENDARTERECTOMY ARTERY CAROTID;   Surgeon: Janeth De Oliveira DO;  [facility];  Service: Vascular       Social History     Substance and Sexual Activity   Alcohol Use Never     Social History     Substance and Sexual Activity   Drug Use Never     Social History     Tobacco Use   Smoking Status Former    Types: Cigarettes    Start date: 1975    Passive exposure: Past   Smokeless Tobacco Never   Tobacco Comments    Quit 1970's       History reviewed. No pertinent family history.      Labs & Results:  Below is the patient's most recent value for Albumin, ALT, AST, BUN, Calcium, Chloride, Cholesterol, CO2, Creatinine, GFR, Glucose, HDL, Hematocrit, Hemoglobin, Hemoglobin A1C, LDL, Magnesium, Phosphorus, Platelets, Potassium, PSA, Sodium, Triglycerides, and WBC.   Lab Results   Component Value Date    ALT 51 07/01/2024    AST 43 (H) 07/01/2024    BUN 28 (H) 07/01/2024    CALCIUM 10.0 07/01/2024     07/01/2024    CO2 29 07/01/2024    CREATININE 0.93 07/01/2024    HDL 45 (L) 07/01/2024    HCT 38.3 07/01/2024    HGB 11.6 07/01/2024    HGBA1C 6.3 (H) 07/01/2024    MG 2.2 07/01/2024    PHOS 3.6 04/23/2024     07/01/2024    K 4.5 07/01/2024    TRIG 110 07/01/2024    WBC 5.90 07/01/2024     Note: for a comprehensive list of the patient's lab results, access the Results Review activity.

## 2024-08-21 ENCOUNTER — CLINICAL SUPPORT (OUTPATIENT)
Dept: CARDIAC REHAB | Facility: HOSPITAL | Age: 69
End: 2024-08-21
Payer: COMMERCIAL

## 2024-08-21 DIAGNOSIS — Z95.1 S/P CABG X 3: ICD-10-CM

## 2024-08-21 PROCEDURE — 93798 PHYS/QHP OP CAR RHAB W/ECG: CPT

## 2024-08-21 NOTE — PROGRESS NOTES
CARDIAC REHABILITATION ASSESSMENT AND INDIVIDUALIZED TREATMENT PLAN  90 DAY          Today's date: 2024   # of Exercise Sessions Completed: 28  Patient name: Julia Perry      : 1955  Age: 68 y.o.       MRN: [id number]  Referring Physician: Jeri Rubin PA*  Cardiologist: Maverick Ceballos DO  Provider: Khai  Clinician: Erika Sacks, MS Dr. Cutitta,   Please review the following patient reassessment for Julia to continue cardiac rehabilitation post CABGx3.  Please verify you agree with the outlined plan of care and exercise prescription by signing with attached order.    Thank You.      Comments: Patient has completed 28 sessions of cardiac rehab. She has been working at a 3.44 MET level. She is compliant in wearing her Life Vest. She had a repeat echocardiogram that revealed LV EF 25-30%. It was discussed that she may require an ICD place. She will be progressed as tolerated.         Dx: CABG x 3    Description of Diagnosis: Coronary artery bypass grafting x 3 (LIMA to LAD, EVH/SVG to PDA, and OM). Angioplasty x 4 and RAFFY x 2 to mid Cx    Date of onset: 2024    Other Cardiac History: Hx Afib/LBBB, CHF (EF 20%), HLD          ASSESSMENT    Medical History:   Past Medical History:   Diagnosis Date    CHF (congestive heart failure) (HCC) 2024    Colitis     Diabetes mellitus (HCC)     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HCC)     PONV (postoperative nausea and vomiting)        Family History:  No family history on file.    Allergies:   Patient has no known allergies.    Current Medications:   Current Outpatient Medications   Medication Sig Dispense Refill    acetaminophen (TYLENOL) 325 mg tablet Take 2 tablets (650 mg total) by mouth every 6 (six) hours as needed for mild pain      Alcohol Swabs 70 % PADS May substitute brand based on insurance coverage. Check glucose TID. 100 each 0    amiodarone 200 mg tablet 1 tablet daily 90 tablet 1    aspirin 81 mg chewable tablet Chew 1 tablet (81 mg  total) daily 90 tablet 3    cholecalciferol (VITAMIN D3) 1,000 units tablet Take 1,000 Units by mouth daily      clopidogrel (Plavix) 75 mg tablet Take 1 tablet (75 mg total) by mouth daily Take 8 tabs on day one then 1 tablet daily after 90 tablet 3    clotrimazole (GYNE-LOTRIMIN) 1 % vaginal cream Insert 1 applicator into the vagina daily at bedtime (Patient not taking: Reported on 7/16/2024) 45 g 0    docusate sodium (COLACE) 100 mg capsule Take 1 capsule (100 mg total) by mouth 2 (two) times a day      Empagliflozin (JARDIANCE) 10 MG TABS tablet Take 1 tablet (10 mg total) by mouth daily 90 tablet 2    furosemide (LASIX) 40 mg tablet Take 1 tablet (40 mg total) by mouth daily 30 tablet 2    glucose blood test strip Use 1 each daily as needed Use as instructed      magnesium gluconate (MAGONATE) 500 mg tablet Take 1 tablet (500 mg total) by mouth in the morning (Patient taking differently: Take 400 mg by mouth in the morning) 90 tablet 0    metoprolol succinate (TOPROL-XL) 25 mg 24 hr tablet Take 0.5 tablets (12.5 mg total) by mouth daily 30 tablet 2    midodrine (PROAMATINE) 10 MG tablet Take 0.5 tablets (5 mg total) by mouth 3 (three) times a day before meals (Patient taking differently: Take 2.5 mg by mouth 3 (three) times a day before meals)      OneTouch Delica Lancets 33G MISC May substitute brand based on insurance coverage. Check glucose TID. 100 each 0    potassium chloride (Klor-Con M20) 20 mEq tablet Take 1 tablet (20 mEq total) by mouth daily 90 tablet 3    rosuvastatin (CRESTOR) 40 MG tablet Take 1 tablet (40 mg total) by mouth daily 90 tablet 2     No current facility-administered medications for this visit.       Medication compliance: Yes   Comments: Pt reports to be compliant with medications    Physical Limitations: Decreased strength, fatigue    Fall Risk: Low   Comments: Ambulates with a steady gait with no assist device    Cultural needs: None      CAD Risk Factors:  Cholesterol: Yes (Tot  204, )   60 day 7/25/2024: Tot down to 103, LDL down to 36)  HTN: No  DM: Type 2  (A1c 6.4%)   60 day 7/25/2024: A1c down to 6.3%  Obesity: Yes (BMI 26.67 kg/m2)   60 day 7/25/2024: BMI down to 26.47 kg/m2  Inactivity: Yes      EXERCISE ASSESSMENT:    Initial Fitness Assessment:    Rest: HR 77, /66, SpO2 98%, Asx  Exercise: HR 91, /64, SpO2 97%, Asx, 1.73 METs    Reason for Discontinuation: RPE 6  Recovery: HR 72, /62, SpO2 99%, Asx  ECG Interpretation: NSR w/ LBBB      Functional Status Prior to Diagnosis for Treatment:   Occupation: retired  Recreation/Physical Activity: Spend time outdoors  ADL’s: Capable of performing light to moderate ADLs  [place]: Capable of performing light to moderate ADLs  Home exercise equipment:  Bike  Home exercise: None  Other: None    Current Functional Status:  Occupation: retired  Recreation/Physical Activity: Spend time with family  ADL’s:Capable of performing light ADLs only  [place]: Capable of performing light ADLs only  Home exercise: Biking 4 min 4x/day  Other: None    60 day 7/25/2024: Biking at home 4-10 min 4x/day    Functional Capacity Screening Tool:  [facility] Activity Status Index:  3.97 METs      PSYCHOSOCIAL ASSESSMENT:    Depression screening:  PHQ-9 = 0    Interpretation:  0 =No Depression  Anxiety screening:  CRISTOBAL-7 = 0    Interpretation: 0-4  = Not anxious    Pt self-report of depression and anxiety   Patient reports they are coping well with good social support and denies depression or anxiety      Self-reported stress level:  3   Stressors:  Health, finances, household care  Stress Management Tools: practice Relaxation Techniques and keep a positive mindset    Quality of Life Screen:  (Higher score indicates disease impact on QOL)  [facility] COOP score: 20/40        Social Support:   spouse, children, and friends  Community/Social Activities: Spend time with family     Psychosocial Assessment as it relates to rehabilitation:  "  Patient denies issues with his/her family or home life that may affect their rehabilitation efforts.       NUTRITION ASSESSMENT:    Weight:    Wt Readings from Last 1 Encounters:   08/13/24 71.7 kg (158 lb)        Height:   Ht Readings from Last 1 Encounters:   08/13/24 5' 4\" (1.626 m)       Rate Your Plate Score: 70/81    Diabetes: T2D  A1c: 6.4    last measured: 12/28/2023    Lipid management: Discussed diet and lipid management and Last lipid profile 1/11/2024  Chol 204    HDL 47      60 day 7/25/2024: Lipid panel from 7/1/2024 Chol 103, Tri 110, HDL 45, LDL 36    Current Dietary Habits:  Patient reports improved dietary habits since discharge from the hospital. She states she has been eating a lot of salads and drinking one protein shake per day.    Drug/Alcohol Use:   No      OTHER CORE COMPONENT ASSESSMENT:    Tobacco Use:     Pt quit in 1979   and has abstained    Anginal Symptoms:  None   NTG use: No prescription        INDIVIDUALIZED TREATMENT PLAN      EXERCISE GOAL and PLAN      SMART Goals:   10% improvement in functional capacity based on max METs achieved in initial fitness assessment  improved DASI score by 10%  increased exercise capacity by 40% based on peak METs tolerated in cardiac rehab exercise session  maintain > 150 minutes per week of moderate intensity exercise    Patient Specific EXERCISE GOALS:       resume ADLs with increased strength, attend rehab regularly, decrease sitting time, and start a home exercise program    Progress toward SMART and personal Exercise goals: Patient has completed 28 sessions of CR. She is working at a 3.44 MET level, which did not increase in this 30 day reporting window. She continues to use a bike at home daily.      Plan for next 30 days:  Continue attending CR 2-3x/week, work towards increasing her MET level to 5.0 or greater, increase HEP      The patient was counseled on exercise guidelines to achieve a minimum of 150 mins/wk of moderate " intensity (RPE 4-6)   exercise and encouraged to add 1-2 days of exercise on opposite days of cardiac rehab as tolerated.     Current Aerobic Exercise Prescription:      Frequency: 2-3 days/week   Supplement with home exercise 2+ days/wk as tolerated       Minutes: 45-55         METS: 3.44           HR: RHR +30-40bpm   RPE: 4-6         Modalities: Treadmill, UBE, NuStep, and Recumbent bike     Exercise workloads will be progressed gradually as tolerated, within limits of patient's ability, and according to the patient's   response to the exercise program.      Aerobic Exercise Prescription - 30 day goal:   Frequency: 2-3 days/week of cardiac rehab       Supplement with home exercise 2+ days/wk as tolerated    Minutes: 45+       >150 mins/wk of moderate intensity exercise   METS: 3.5-4.5   HR: RHR +30-40bpm     RPE: 4-6   Modalities: Treadmill, UBE, NuStep, and Recumbent bike    Strength training:  Patient will be introduced to strength training in this 30 day reporting window if she is interested.     Home Exercise: Bikes 4-10 min ~4x/day    Group and Individual Education: benefit of exercise for CAD risk factors, home exercise guidelines, AHA guidelines to achieve >150 mins/wk of moderate exercise, RPE scale, and Group class: Risk Factors for Heart Disease     Readiness to change: Action:  (Changing behavior)      NUTRITION GOALS AND PLAN    Weight control:    Starting weight: 155   Current weight: 158    Nutritional   Reviewed patient's Rate your Plate. Discussed key elements of heart healthy eating. Reviewed patient goals for dietary modifications and their clinical implications.  Reviewed most recent lipid profile.     SMART Goals:   reduced BMI to < 25, LDL <100, CHOL <200, Improved Rate Your Plate score  >58, and 2.5-5%  wt loss    Patient Specific NUTRITION GOALS:     Weight loss, improve diet    Patient's progress toward SMART and personal Nutrition goals:   Patient had 4# weight gain in this 30 day  reporting window. Patient continues to improve her dietary habits. She has increased her hydration, drinking more water daily.     Plan for next 30 days:   group class: Reading Food Labels and group Class: Heart Healthy Eating    Measurable goals were based Rate Your Plate Dietary Self-Assessment. These are the areas in which the patient could score higher on the assessment.  Goals include recommendations for a heart healthy diet based on American Heart Association.    Group and Individual Education:   heart healthy eating principles  low sodium diet  maintaining hydration  nutrition for  lipid management  group class: Heart Healthy Eating  group class:  Label Reading    Readiness to change: Action:  (Changing behavior)      PSYCHOSOCIAL ASSESSMENT AND PLAN    Psychosocial Assessment as it relates to rehabilitation:   Patient denies issues with his/her family or home life that may affect their rehabilitation efforts.     SMART Goals:     Reduce perceived stress to 1-3/10, improved [facility] QOL < 27, feel less tired with more energy, Improved appetite control, and take time to relax    Patient Specific PSYCHOCOSOCIAL GOALS:     spend time with family, worry less    Patient's progress toward SMART and personal Psychosocial goals:   Patient continues to report a stress level of 3/10. CR staff will continue to provide support and education.     Plan for next 30 days:   Class: Stress and Your Health, Enroll in [place], Keep a positive mindset, Enjoy family, and Return to previous social activity    Group and Individual Education: stress management techniques, benefits of enrolling in [employer], depression and CAD, and class:  Stress and Your Health     Information to utilize [place] was provided as well as contact information for counseling through  [facility]al [facility] and group psychotherapy groups available.    Readiness to change: Action:  (Changing behavior)      OTHER CORE COMPONENTS GOALS and  PLAN      Blood Pressure will be monitored throughout the program and cardiologist will be notified of elevated trends.    Pt will be encouraged to monitor home BP if advised by cardiologist.    Tobacco Intervention:   N/A:  Pt has a remote history of smoking.      SMART Goals:   consistent, controlled resting BP < 130/80 and medication compliance    Patient Specific CORE COMPONENT GOALS:    reduced dietary sodium <2000mg, medication compliance, and reduce number of medications  needed for BP control    Progress toward SMART and personal Core Component goals:   Patient has had consistent resting BPs < 130/80. Patient reports 100% medication compliance. Patient watches her sodium intake.     Plan for next 30 days:   group class: Understanding Heart Disease, group class: Common Heart Medications, medication compliance, avoid places with second hand smoke, and monitor home BP    Group and Individual Education:  understanding high blood pressure and it's relationship to CAD, components of blood pressure management, group class: Understanding Heart Disease, and group class:  Common Heart Medications    Readiness to change: Action:  (Changing behavior)   No

## 2024-08-22 ENCOUNTER — OFFICE VISIT (OUTPATIENT)
Dept: CARDIOLOGY CLINIC | Facility: CLINIC | Age: 69
End: 2024-08-22
Payer: COMMERCIAL

## 2024-08-22 VITALS
BODY MASS INDEX: 27.01 KG/M2 | HEIGHT: 64 IN | WEIGHT: 158.2 LBS | DIASTOLIC BLOOD PRESSURE: 66 MMHG | SYSTOLIC BLOOD PRESSURE: 112 MMHG | HEART RATE: 65 BPM

## 2024-08-22 DIAGNOSIS — R93.1 LVEF <40%: ICD-10-CM

## 2024-08-22 DIAGNOSIS — E11.65 TYPE 2 DIABETES MELLITUS WITH HYPERGLYCEMIA, WITHOUT LONG-TERM CURRENT USE OF INSULIN (HCC): ICD-10-CM

## 2024-08-22 DIAGNOSIS — I25.118 CORONARY ARTERY DISEASE INVOLVING NATIVE CORONARY ARTERY OF NATIVE HEART WITH OTHER FORM OF ANGINA PECTORIS (HCC): Primary | ICD-10-CM

## 2024-08-22 DIAGNOSIS — Z95.1 S/P CABG X 3: ICD-10-CM

## 2024-08-22 DIAGNOSIS — I50.9 ACUTE CONGESTIVE HEART FAILURE, UNSPECIFIED HEART FAILURE TYPE (HCC): ICD-10-CM

## 2024-08-22 DIAGNOSIS — I48.91 ATRIAL FIBRILLATION WITH RVR (HCC): ICD-10-CM

## 2024-08-22 PROCEDURE — 99214 OFFICE O/P EST MOD 30 MIN: CPT | Performed by: STUDENT IN AN ORGANIZED HEALTH CARE EDUCATION/TRAINING PROGRAM

## 2024-08-22 PROCEDURE — 93000 ELECTROCARDIOGRAM COMPLETE: CPT | Performed by: STUDENT IN AN ORGANIZED HEALTH CARE EDUCATION/TRAINING PROGRAM

## 2024-08-23 ENCOUNTER — CLINICAL SUPPORT (OUTPATIENT)
Dept: CARDIAC REHAB | Facility: HOSPITAL | Age: 69
End: 2024-08-23
Payer: COMMERCIAL

## 2024-08-23 DIAGNOSIS — Z95.1 S/P CABG X 3: ICD-10-CM

## 2024-08-23 PROCEDURE — 93798 PHYS/QHP OP CAR RHAB W/ECG: CPT

## 2024-08-26 ENCOUNTER — CLINICAL SUPPORT (OUTPATIENT)
Dept: CARDIAC REHAB | Facility: HOSPITAL | Age: 69
End: 2024-08-26
Payer: COMMERCIAL

## 2024-08-26 DIAGNOSIS — Z95.1 S/P CABG X 3: ICD-10-CM

## 2024-08-26 PROCEDURE — 93798 PHYS/QHP OP CAR RHAB W/ECG: CPT

## 2024-08-28 ENCOUNTER — CLINICAL SUPPORT (OUTPATIENT)
Dept: CARDIAC REHAB | Facility: HOSPITAL | Age: 69
End: 2024-08-28
Payer: COMMERCIAL

## 2024-08-28 DIAGNOSIS — Z95.1 S/P CABG X 3: ICD-10-CM

## 2024-08-28 PROCEDURE — 93798 PHYS/QHP OP CAR RHAB W/ECG: CPT

## 2024-08-30 ENCOUNTER — CLINICAL SUPPORT (OUTPATIENT)
Dept: CARDIAC REHAB | Facility: HOSPITAL | Age: 69
End: 2024-08-30
Payer: COMMERCIAL

## 2024-08-30 DIAGNOSIS — Z95.1 S/P CABG X 3: ICD-10-CM

## 2024-08-30 PROCEDURE — 93798 PHYS/QHP OP CAR RHAB W/ECG: CPT

## 2024-09-04 ENCOUNTER — APPOINTMENT (OUTPATIENT)
Dept: CARDIAC REHAB | Facility: HOSPITAL | Age: 69
End: 2024-09-04
Payer: COMMERCIAL

## 2024-09-09 ENCOUNTER — CLINICAL SUPPORT (OUTPATIENT)
Dept: CARDIAC REHAB | Facility: HOSPITAL | Age: 69
End: 2024-09-09
Payer: COMMERCIAL

## 2024-09-09 DIAGNOSIS — Z95.1 S/P CABG X 3: ICD-10-CM

## 2024-09-09 PROCEDURE — 93798 PHYS/QHP OP CAR RHAB W/ECG: CPT

## 2024-09-13 ENCOUNTER — CLINICAL SUPPORT (OUTPATIENT)
Dept: CARDIAC REHAB | Facility: HOSPITAL | Age: 69
End: 2024-09-13
Payer: COMMERCIAL

## 2024-09-13 DIAGNOSIS — Z95.1 S/P CABG X 3: ICD-10-CM

## 2024-09-13 PROCEDURE — 93798 PHYS/QHP OP CAR RHAB W/ECG: CPT

## 2024-09-16 ENCOUNTER — CLINICAL SUPPORT (OUTPATIENT)
Dept: CARDIAC REHAB | Facility: HOSPITAL | Age: 69
End: 2024-09-16
Payer: COMMERCIAL

## 2024-09-16 DIAGNOSIS — Z95.1 S/P CABG X 3: Primary | ICD-10-CM

## 2024-09-16 PROCEDURE — 93798 PHYS/QHP OP CAR RHAB W/ECG: CPT

## 2024-09-16 NOTE — PROGRESS NOTES
CARDIAC REHABILITATION ASSESSMENT AND INDIVIDUALIZED TREATMENT PLAN  DISCHARGE            Today's date: 2024   # of Exercise Sessions Completed: 36  Patient name: Julia Perry      : 1955  Age: 68 y.o.       MRN: [id number]  Referring Physician: Jeri Rubin PA*  Cardiologist: Maverick Ceballos DO  Provider: Khai  Clinician: Erika Sacks, MS      Comments: Julia has completed 36 sessions of cardiac rehab. She has been working at a 3.44 MET level. Her submax ETT improved from 1.73 METs to 3.91 METs! She is pleased with her progress through cardiac rehab and was encouraged to continue exercising in a phase III setting.         Dx: CABG x 3    Description of Diagnosis: Coronary artery bypass grafting x 3 (LIMA to LAD, EVH/SVG to PDA, and OM). Angioplasty x 4 and RAFFY x 2 to mid Cx    Date of onset: 2024    Other Cardiac History: Hx Afib/LBBB, CHF (EF 20%), HLD          ASSESSMENT    Medical History:   Past Medical History:   Diagnosis Date    CHF (congestive heart failure) (HCC) 2024    Colitis     Diabetes mellitus (HCC)     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HCC)     PONV (postoperative nausea and vomiting)        Family History:  No family history on file.    Allergies:   Patient has no known allergies.    Current Medications:   Current Outpatient Medications   Medication Sig Dispense Refill    acetaminophen (TYLENOL) 325 mg tablet Take 2 tablets (650 mg total) by mouth every 6 (six) hours as needed for mild pain      Alcohol Swabs 70 % PADS May substitute brand based on insurance coverage. Check glucose TID. 100 each 0    amiodarone 200 mg tablet 1 tablet daily 90 tablet 1    aspirin 81 mg chewable tablet Chew 1 tablet (81 mg total) daily 90 tablet 3    cholecalciferol (VITAMIN D3) 1,000 units tablet Take 1,000 Units by mouth daily      clopidogrel (Plavix) 75 mg tablet Take 1 tablet (75 mg total) by mouth daily Take 8 tabs on day one then 1 tablet daily after 90 tablet 3    clotrimazole  (GYNE-LOTRIMIN) 1 % vaginal cream Insert 1 applicator into the vagina daily at bedtime (Patient not taking: Reported on 7/16/2024) 45 g 0    docusate sodium (COLACE) 100 mg capsule Take 1 capsule (100 mg total) by mouth 2 (two) times a day      Empagliflozin (JARDIANCE) 10 MG TABS tablet Take 1 tablet (10 mg total) by mouth daily 90 tablet 2    furosemide (LASIX) 40 mg tablet Take 1 tablet (40 mg total) by mouth daily (Patient taking differently: Take 20 mg by mouth daily) 30 tablet 2    glucose blood test strip Use 1 each daily as needed Use as instructed      magnesium gluconate (MAGONATE) 500 mg tablet Take 1 tablet (500 mg total) by mouth in the morning (Patient taking differently: Take 400 mg by mouth in the morning) 90 tablet 0    metoprolol succinate (TOPROL-XL) 25 mg 24 hr tablet Take 0.5 tablets (12.5 mg total) by mouth daily 30 tablet 2    midodrine (PROAMATINE) 10 MG tablet Take 0.5 tablets (5 mg total) by mouth 3 (three) times a day before meals (Patient taking differently: Take 2.5 mg by mouth 3 (three) times a day before meals)      OneTouch Delica Lancets 33G MISC May substitute brand based on insurance coverage. Check glucose TID. 100 each 0    potassium chloride (Klor-Con M20) 20 mEq tablet Take 1 tablet (20 mEq total) by mouth daily 90 tablet 3    rosuvastatin (CRESTOR) 40 MG tablet Take 1 tablet (40 mg total) by mouth daily 90 tablet 2     No current facility-administered medications for this visit.       Medication compliance: Yes   Comments: Pt reports to be compliant with medications    Physical Limitations: Decreased strength, fatigue    Fall Risk: Low   Comments: Ambulates with a steady gait with no assist device    Cultural needs: None      CAD Risk Factors:  Cholesterol: Yes (Tot 204, )   60 day 7/25/2024: Tot down to 103, LDL down to 36)  HTN: No  DM: Type 2  (A1c 6.4%)   60 day 7/25/2024: A1c down to 6.3%  Obesity: Yes (BMI 26.67 kg/m2)   60 day 7/25/2024: BMI down to 26.47  kg/m2  Inactivity: Yes      EXERCISE ASSESSMENT:    Initial Fitness Assessment:    Rest: HR 77, /66, SpO2 98%, Asx  Exercise: HR 91, /64, SpO2 97%, Asx, 1.73 METs    Reason for Discontinuation: RPE 6  Recovery: HR 72, /62, SpO2 99%, Asx  ECG Interpretation: NSR w/ LBBB    Final Fitness Assessment:  Rest: HR 81, /62, SpO2 99%, Asx  Exercise: , /68, SpO2 98%, Asx, 3.91 METs   Reason for Discontinuation: HR 30 bpm above rest  Recovery: HR 90, /58, SpO2 99%, Asx        Functional Status Prior to Diagnosis for Treatment:   Occupation: retired  Recreation/Physical Activity: Spend time outdoors  ADL’s: Capable of performing light to moderate ADLs  [place]: Capable of performing light to moderate ADLs  Home exercise equipment:  Bike  Home exercise: None  Other: None    Current Functional Status:  Occupation: retired  Recreation/Physical Activity: Spend time with family  ADL’s:Capable of performing light ADLs only  [place]: Capable of performing light ADLs only  Home exercise: Biking 4 min 4x/day  Other: None    60 day 7/25/2024: Biking at home 4-10 min 4x/day    Functional Capacity Screening Tool:  [facility] Activity Status Index:  3.97 METs at initial evaluation, 7.99 METs at discharge      PSYCHOSOCIAL ASSESSMENT:    Depression screening:  PHQ-9 = 1   Interpretation:  1-4 = Minimal Depression  Anxiety screening:  CRISTOBAL-7 = 2  Interpretation: 0-4  = Not anxious    Pt self-report of depression and anxiety   Patient reports they are coping well with good social support and denies depression or anxiety      Self-reported stress level:  3   Stressors:  Health, finances, household care  Stress Management Tools: practice Relaxation Techniques and keep a positive mindset    Quality of Life Screen:  (Higher score indicates disease impact on QOL)  [facility] COOP score: 20/40 at initial evaluation, 16/40 at discharge       Social Support:   spouse, children, and friends  Community/Social  "Activities: Spend time with family     Psychosocial Assessment as it relates to rehabilitation:   Patient denies issues with his/her family or home life that may affect their rehabilitation efforts.       NUTRITION ASSESSMENT:    Weight:    Wt Readings from Last 1 Encounters:   08/22/24 71.8 kg (158 lb 3.2 oz)        Height:   Ht Readings from Last 1 Encounters:   08/22/24 5' 4\" (1.626 m)       Rate Your Plate Score: 70/81 at initial evaluation, 70/81 at discharge      Diabetes: T2D  A1c: 6.4    last measured: 12/28/2023  A1c: 6.3    last measured: 7/1/2024    Lipid management: Discussed diet and lipid management and Last lipid profile 1/11/2024  Chol 204    HDL 47      Lipid panel from 7/1/2024 Chol 103, Tri 110, HDL 45, LDL 36    Current Dietary Habits:  Patient reports improved dietary habits since discharge from the hospital. She states she has been eating a lot of salads and drinking one protein shake per day.    Drug/Alcohol Use:   No      OTHER CORE COMPONENT ASSESSMENT:    Tobacco Use:     Pt quit in 1979   and has abstained    Anginal Symptoms:  None   NTG use: No prescription        INDIVIDUALIZED TREATMENT PLAN      EXERCISE GOAL and PLAN      SMART Goals:   10% improvement in functional capacity based on max METs achieved in initial fitness assessment  improved DASI score by 10%  increased exercise capacity by 40% based on peak METs tolerated in cardiac rehab exercise session  maintain > 150 minutes per week of moderate intensity exercise    Patient Specific EXERCISE GOALS:       resume ADLs with increased strength, attend rehab regularly, decrease sitting time, and start a home exercise program    Progress toward SMART and personal Exercise goals: Patient has completed 36 sessions of CR. She worked at a 3.44 MET level. Her submax ETT improved from 1.73 METs to 3.91 METs. Her DASI score improved from 9.95 (3.97 METs) to 42.7 (7.99 METs). She continues to bike daily at home. She was " encouraged to continue exercising following her discharge.       The patient was counseled on exercise guidelines to achieve a minimum of 150 mins/wk of moderate intensity (RPE 4-6)   exercise and encouraged to add 1-2 days of exercise on opposite days of cardiac rehab as tolerated.     Current Aerobic Exercise Prescription:      Frequency: 2-3 days/week   Supplement with home exercise 2+ days/wk as tolerated    Minutes: 45-55         METS: 3.44           HR: RHR +30-40bpm   RPE: 4-6         Modalities: Treadmill, UBE, NuStep, and Recumbent bike       Strength training:  Patient did not strength train.     Home Exercise: Bikes 4-10 min ~4x/day    Group and Individual Education: benefit of exercise for CAD risk factors, home exercise guidelines, AHA guidelines to achieve >150 mins/wk of moderate exercise, RPE scale, and Group class: Risk Factors for Heart Disease     Readiness to change: Action:  (Changing behavior)      NUTRITION GOALS AND PLAN    Weight control:    Starting weight: 155   Current weight: 158    Nutritional   Reviewed patient's Rate your Plate. Discussed key elements of heart healthy eating. Reviewed patient goals for dietary modifications and their clinical implications.  Reviewed most recent lipid profile.     SMART Goals:   reduced BMI to < 25, LDL <100, CHOL <200, Improved Rate Your Plate score  >58, and 2.5-5%  wt loss    Patient Specific NUTRITION GOALS:     Weight loss, improve diet    Patient's progress toward SMART and personal Nutrition goals:   Patient gained 3# since beginning cardiac rehab. Her RYP score did not remove (stayed 70/81). Patient continues to improve her dietary habits. She has increased her hydration, drinking more water daily.     Measurable goals were based Rate Your Plate Dietary Self-Assessment. These are the areas in which the patient could score higher on the assessment.  Goals include recommendations for a heart healthy diet based on American Heart  Association.    Group and Individual Education:   heart healthy eating principles  low sodium diet  maintaining hydration  nutrition for  lipid management  group class: Heart Healthy Eating  group class:  Label Reading    Readiness to change: Action:  (Changing behavior)      PSYCHOSOCIAL ASSESSMENT AND PLAN    Psychosocial Assessment as it relates to rehabilitation:   Patient denies issues with his/her family or home life that may affect their rehabilitation efforts.     SMART Goals:     Reduce perceived stress to 1-3/10, improved [facility] QOL < 27, feel less tired with more energy, Improved appetite control, and take time to relax    Patient Specific PSYCHOCOSOCIAL GOALS:     spend time with family, worry less    Patient's progress toward SMART and personal Psychosocial goals:   Patient continues to report a stress level of 3/10. Her final PHQ9 socre was 1 and final GAD7 score was 2.     Group and Individual Education: stress management techniques, benefits of enrolling in [employer], depression and CAD, and class:  Stress and Your Health     Information to utilize [employer] was provided as well as contact information for counseling through  Behavioral Health and group psychotherapy groups available.    Readiness to change: Action:  (Changing behavior)      OTHER CORE COMPONENTS GOALS and PLAN      Blood Pressure will be monitored throughout the program and cardiologist will be notified of elevated trends.    Pt will be encouraged to monitor home BP if advised by cardiologist.    Tobacco Intervention:   N/A:  Pt has a remote history of smoking.      SMART Goals:   consistent, controlled resting BP < 130/80 and medication compliance    Patient Specific CORE COMPONENT GOALS:    reduced dietary sodium <2000mg, medication compliance, and reduce number of medications  needed for BP control    Progress toward SMART and personal Core Component goals:   Patient has had consistent resting BPs < 130/80. Patient  reports 100% medication compliance. Patient watches her sodium intake.     Group and Individual Education:  understanding high blood pressure and it's relationship to CAD, components of blood pressure management, group class: Understanding Heart Disease, and group class:  Common Heart Medications    Readiness to change: Action:  (Changing behavior)

## 2024-09-17 ENCOUNTER — OFFICE VISIT (OUTPATIENT)
Dept: CARDIOLOGY CLINIC | Facility: HOSPITAL | Age: 69
End: 2024-09-17
Payer: COMMERCIAL

## 2024-09-17 VITALS
DIASTOLIC BLOOD PRESSURE: 70 MMHG | BODY MASS INDEX: 27.39 KG/M2 | HEART RATE: 76 BPM | WEIGHT: 160.4 LBS | HEIGHT: 64 IN | SYSTOLIC BLOOD PRESSURE: 130 MMHG | OXYGEN SATURATION: 98 %

## 2024-09-17 DIAGNOSIS — E78.2 MIXED HYPERLIPIDEMIA: ICD-10-CM

## 2024-09-17 DIAGNOSIS — I48.91 NEW ONSET ATRIAL FIBRILLATION (HCC): ICD-10-CM

## 2024-09-17 DIAGNOSIS — I25.118 CORONARY ARTERY DISEASE INVOLVING NATIVE CORONARY ARTERY OF NATIVE HEART WITH OTHER FORM OF ANGINA PECTORIS (HCC): ICD-10-CM

## 2024-09-17 DIAGNOSIS — Z98.890 S/P CAROTID ENDARTERECTOMY: ICD-10-CM

## 2024-09-17 DIAGNOSIS — Z95.1 S/P CABG X 3: ICD-10-CM

## 2024-09-17 DIAGNOSIS — I25.5 ISCHEMIC CARDIOMYOPATHY: Primary | ICD-10-CM

## 2024-09-17 DIAGNOSIS — I65.22 STENOSIS OF LEFT CAROTID ARTERY: ICD-10-CM

## 2024-09-17 PROCEDURE — 99214 OFFICE O/P EST MOD 30 MIN: CPT | Performed by: INTERNAL MEDICINE

## 2024-09-17 NOTE — PROGRESS NOTES
Julia Perry  1955  [id number]  [facility] [address] [id number] [phone]    1. Ischemic cardiomyopathy  Echo follow up/limited w/ contrast if indicated      2. Coronary artery disease involving native coronary artery of native heart with other form of angina pectoris (HCC)        3. New onset atrial fibrillation (HCC)        4. Stenosis of left carotid artery        5. S/P CABG x 3        6. S/P carotid endarterectomy        7. Mixed hyperlipidemia            Discussion/Summary:  #1 cardiomyopathy ejection fraction 30%  #2 extensive heavy coronary calcifications  #3 chronic combined systolic and diastolic congestive heart failure  #4 hyperlipidemia  #5 type 2 diabetes mellitus  #6 IVCD  #7 high degree left internal carotid stenosis  #8 intracranial stenosis  #9 pituitary mass    Recommendations: Following her last visit she saw electrophysiology but elected not to get a defibrillator at this time she wanted to continue to watch and weigh her options.  She has been understanding of the risk.  Blood pressure has normalized through cardiac rehab will discontinue midodrine.  Check blood pressures daily for 1 week if holding above 100 we will add a low-dose of losartan or Entresto depending on where the numbers are.  Plan to recheck limited echo for ejection fraction in November.  Coronary disease is stable no complaints of angina functional capacity has significantly improved.  No signs of decompensated heart failure.  Continue to follow with vascular surgery for carotid disease.  Currently maintaining sinus rhythm no evidence of recurrence of A-fib.    Interval History: 68-year-old female with a history of type 2 diabetes and colitis presents for new patient consultation on behalf of Dr. Titus for new cardiomyopathy.  The patient has had shortness of breath, PND, orthopnea, chest pain since 2023.  She was started on steroids for  colitis and had worsening shortness of breath but attributed this to medication side effects.  An echocardiogram was recently obtained which showed an ejection fraction of 30%.  The patient tells me she has had some chest heaviness with walking on and off for several years.  She is a diabetic.  The symptoms have been quite mild.  She has felt more shortness of breath with activity and had occasional lower extremity edema recently.  There is been no syncope.  She has been taking her medications as prescribed.  She is a non-smoker.      Following the last visit she saw EP she would like to hold off on defibrillator for the time being.  Functional capacity improved through cardiac rehab.  Blood pressures have also been better.  She is eating and drinking more.  Denies any exertional chest pain or discomfort.  Has been no shortness of breath.  Weights have been stable.  This been a lower extremity edema, PND, orthopnea.      Medical Problems       Problem List       Colitis    History of biliary dyskinesia    DMII (diabetes mellitus, type 2) ([facility])      Lab Results   Component Value Date    HGBA1C 6.3 (H) 07/01/2024         Gallbladder polyp    Leiomyoma of uterus    Acquired right foot drop    Hypomagnesemia    Hair loss    Polyp of ascending colon    Ulcerative pancolitis without complication ([facility])    Type 2 diabetes mellitus with hyperglycemia, unspecified whether long term insulin use ([facility])      Lab Results   Component Value Date    HGBA1C 6.3 (H) 07/01/2024             Past Medical History:   Diagnosis Date    CHF (congestive heart failure) ([facility]) 4/8/2024    Colitis     Diabetes mellitus ([facility])     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[facility])     PONV (postoperative nausea and vomiting)      Social History     Socioeconomic History    Marital status: /Civil Union     Spouse name: Not on file    Number of children: Not on file    Years of education: Not on file    Highest education level: Not on file   Occupational History     Not on file   Tobacco Use    Smoking status: Former     Types: Cigarettes     Start date: 1975     Passive exposure: Past    Smokeless tobacco: Never    Tobacco comments:     Quit 1970's   Vaping Use    Vaping status: Never Used   Substance and Sexual Activity    Alcohol use: Never    Drug use: Never    Sexual activity: Not on file   Other Topics Concern    Not on file   Social History Narrative    Not on file     Social Determinants of Health     Financial Resource Strain: Patient Declined (8/18/2023)    Overall Financial Resource Strain (CARDIA)     Difficulty of Paying Living Expenses: Patient declined   Food Insecurity: No Food Insecurity (4/11/2024)    Hunger Vital Sign     Worried About Running Out of Food in the Last Year: Never true     Ran Out of Food in the Last Year: Never true   Transportation Needs: No Transportation Needs (4/11/2024)    PRAPARE - Transportation     Lack of Transportation (Medical): No     Lack of Transportation (Non-Medical): No   Physical Activity: Not on file   Stress: Not on file   Social Connections: Not on file   Intimate Partner Violence: Not on file   Housing Stability: Low Risk  (4/11/2024)    Housing Stability Vital Sign     Unable to Pay for Housing in the Last Year: No     Number of Times Moved in the Last Year: 1     Homeless in the Last Year: No      History reviewed. No pertinent family history.  Past Surgical History:   Procedure Laterality Date    CARDIAC CATHETERIZATION Left 01/17/2024    Procedure: Cardiac Left Heart Cath;  Surgeon: Juan A Sousa MD;  Location: [facility];  Service: Cardiology    CARDIAC CATHETERIZATION  01/17/2024    Procedure: Cardiac catheterization;  Surgeon: Juan A Sousa MD;  [facility];  Service: Cardiology    CARDIAC CATHETERIZATION Left 4/10/2024    Procedure: Cardiac Left Heart Cath;  Surgeon: Viktor Bee MD;  [facility];  Service: Cardiology    CARDIAC CATHETERIZATION N/A 4/10/2024     Procedure: Cardiac Coronary Angiogram;  Surgeon: Viktor Bee MD;  [facility];  Service: Cardiology    CARDIAC CATHETERIZATION N/A 4/10/2024    Procedure: Cardiac Impella Insertion;  Surgeon: Viktor Bee MD;  [facility];  Service: Cardiology    CARDIAC CATHETERIZATION N/A 4/10/2024    Procedure: Cardiac pci;  Surgeon: Viktor Bee MD;  [facility];  Service: Cardiology    CARDIAC CATHETERIZATION N/A 4/12/2024    Procedure: Cardiac Impella Removal;  Surgeon: Viktor Bee MD;  [facility]: [facility];  Service: Cardiology    COLONOSCOPY  12/06/2023    HYSTERECTOMY      ~1990    IR PICC REPOSITION  4/18/2024    MN CORONARY ARTERY BYP W/VEIN & ARTERY GRAFT 3 VEIN N/A 4/9/2024    Procedure: CORONARY ARTERY BYPASS GRAFT (CABG) x3 VESSELS, LIMA - LAD, LEFT LEG EVH/SVG - PDA  AND OM, W/ELOISA;  Surgeon: Josh Castellanos MD;  Location: [facility];  Service: Cardiac Surgery    MN TEAEC W/PATCH GRF CAROTID VERTB SUBCLAV NECK INC Left 03/04/2024    Procedure: LEFT ENDARTERECTOMY ARTERY CAROTID;  Surgeon: Janeth De Oliveira DO;  Location: [facility];  Service: Vascular       Current Outpatient Medications:     acetaminophen (TYLENOL) 325 mg tablet, Take 2 tablets (650 mg total) by mouth every 6 (six) hours as needed for mild pain, Disp: , Rfl:     Alcohol Swabs 70 % PADS, May substitute brand based on insurance coverage. Check glucose TID., Disp: 100 each, Rfl: 0    amiodarone 200 mg tablet, 1 tablet daily, Disp: 90 tablet, Rfl: 1    aspirin 81 mg chewable tablet, Chew 1 tablet (81 mg total) daily, Disp: 90 tablet, Rfl: 3    cholecalciferol (VITAMIN D3) 1,000 units tablet, Take 1,000 Units by mouth daily, Disp: , Rfl:     clopidogrel (Plavix) 75 mg tablet, Take 1 tablet (75 mg total) by mouth daily Take 8 tabs on day one then 1 tablet daily after, Disp: 90 tablet, Rfl: 3    docusate sodium (COLACE) 100 mg capsule, Take 1 capsule (100 mg total) by mouth 2  "(two) times a day, Disp: , Rfl:     Empagliflozin (JARDIANCE) 10 MG TABS tablet, Take 1 tablet (10 mg total) by mouth daily, Disp: 90 tablet, Rfl: 2    furosemide (LASIX) 40 mg tablet, Take 1 tablet (40 mg total) by mouth daily (Patient taking differently: Take 20 mg by mouth daily), Disp: 30 tablet, Rfl: 2    glucose blood test strip, Use 1 each daily as needed Use as instructed, Disp: , Rfl:     magnesium gluconate (MAGONATE) 500 mg tablet, Take 1 tablet (500 mg total) by mouth in the morning (Patient taking differently: Take 400 mg by mouth in the morning), Disp: 90 tablet, Rfl: 0    metoprolol succinate (TOPROL-XL) 25 mg 24 hr tablet, Take 0.5 tablets (12.5 mg total) by mouth daily, Disp: 30 tablet, Rfl: 2    OneTouch Delica Lancets 33G MISC, May substitute brand based on insurance coverage. Check glucose TID., Disp: 100 each, Rfl: 0    potassium chloride (Klor-Con M20) 20 mEq tablet, Take 1 tablet (20 mEq total) by mouth daily, Disp: 90 tablet, Rfl: 3    rosuvastatin (CRESTOR) 40 MG tablet, Take 1 tablet (40 mg total) by mouth daily, Disp: 90 tablet, Rfl: 2    clotrimazole (GYNE-LOTRIMIN) 1 % vaginal cream, Insert 1 applicator into the vagina daily at bedtime (Patient not taking: Reported on 7/16/2024), Disp: 45 g, Rfl: 0  No Known Allergies      Labs:     Chemistry        Component Value Date/Time    K 4.5 07/01/2024 0936    K 4.4 08/17/2022 1507     07/01/2024 0936     08/17/2022 1507    CO2 29 07/01/2024 0936    CO2 22 04/09/2024 1307    CO2 27 08/17/2022 1507    BUN 28 (H) 07/01/2024 0936    BUN 14 08/17/2022 1507    CREATININE 0.93 07/01/2024 0936    CREATININE 0.78 08/17/2022 1507        Component Value Date/Time    CALCIUM 10.0 07/01/2024 0936    CALCIUM 10.0 08/17/2022 1507    ALKPHOS 104 07/01/2024 0936    AST 43 (H) 07/01/2024 0936    ALT 51 07/01/2024 0936            No results found for: \"CHOL\"  Lab Results   Component Value Date    HDL 45 (L) 07/01/2024    HDL 47 (L) 01/11/2024    HDL " "44 06/02/2021     Lab Results   Component Value Date    LDLCALC 36 07/01/2024    LDLCALC 130 (H) 01/11/2024    LDLCALC 156 (H) 06/02/2021     Lab Results   Component Value Date    TRIG 110 07/01/2024    TRIG 135 01/11/2024    TRIG 204 (H) 06/02/2021     No results found for: \"CHOLHDL\"    Imaging: Echo complete w/ contrast if indicated    Result Date: 1/3/2024  Narrative:   Left Ventricle: Left ventricular cavity size is at the upper limits of normal when indexed for BSA. Wall thickness is mildly increased. There is eccentric hypertrophy. The left ventricular ejection fraction is 30%. Systolic function is severely reduced. Wall motion is normal. Unable to assess diastolic function due to E/A fusion due to tachycardia.   The following segments are akinetic: basal inferoseptal, basal inferior, mid inferoseptal and mid inferior.   The following segments are hypokinetic: basal anterior, basal anteroseptal, basal inferolateral, basal anterolateral, mid anterior, mid anteroseptal, mid inferolateral, mid anterolateral, apical anterior, apical septal, apical inferior, apical lateral and apex.   Left Atrium: The atrium is moderately dilated (42-48 mL/m2).   Mitral Valve: There is mild annular calcification. There is mild to moderate regurgitation.   Pulmonic Valve: There is mild regurgitation.   Pericardium: There is a small pericardial effusion along the right atrial and right ventricular free wall. The fluid exhibits no internal echoes. There is no echocardiographic evidence of tamponade. There is a left pleural effusion.       ECG: Sinus rhythm IVCD      ROS    Vitals:    09/17/24 1306   BP: 130/70   Pulse: 76   SpO2: 98%     Vitals:    09/17/24 1306   Weight: 72.8 kg (160 lb 6.4 oz)     Height: 5' 4\" (162.6 cm)   Body mass index is 27.53 kg/m².    Physical Exam:  Vital signs reviewed  General:  Alert and cooperative, appears stated age, no acute distress  HEENT:  PERRLA, EOMI, no scleral icterus, no conjunctival " pallor  Neck:  No lymphadenopathy, no thyromegaly, no carotid bruits, no elevated JVP  Heart:  Regular rate and rhythm, normal S1/S2, no S3/S4, no murmur, rubs or gallops.  PMI nondisplaced  Lungs:  Clear to auscultation bilaterally, no wheezes rales or rhonchi  Abdomen:  Soft, non-tender, positive bowel sounds, no rebound or guarding,   no organomegaly   Extremities:  Normal range of motion.  No clubbing, cyanosis or edema   Vascular:  2+ pedal pulses  Skin:  No rashes or lesions on exposed skin  Neurologic:  Cranial nerves II-XII grossly intact without focal deficits  Psych:  Normal mood and affect

## 2024-10-10 DIAGNOSIS — I50.9 ACUTE CONGESTIVE HEART FAILURE, UNSPECIFIED HEART FAILURE TYPE (HCC): ICD-10-CM

## 2024-10-10 DIAGNOSIS — R93.1 LVEF <40%: ICD-10-CM

## 2024-10-10 DIAGNOSIS — Z95.1 S/P CABG X 3: ICD-10-CM

## 2024-10-10 DIAGNOSIS — E11.65 TYPE 2 DIABETES MELLITUS WITH HYPERGLYCEMIA, WITHOUT LONG-TERM CURRENT USE OF INSULIN (HCC): ICD-10-CM

## 2024-10-10 DIAGNOSIS — I48.91 ATRIAL FIBRILLATION WITH RVR (HCC): ICD-10-CM

## 2024-10-11 RX ORDER — METOPROLOL SUCCINATE 25 MG/1
12.5 TABLET, EXTENDED RELEASE ORAL DAILY
Qty: 45 TABLET | Refills: 1 | Status: SHIPPED | OUTPATIENT
Start: 2024-10-11

## 2024-10-30 ENCOUNTER — HOSPITAL ENCOUNTER (OUTPATIENT)
Dept: NON INVASIVE DIAGNOSTICS | Facility: HOSPITAL | Age: 69
Discharge: HOME | End: 2024-10-30
Attending: INTERNAL MEDICINE
Payer: COMMERCIAL

## 2024-10-30 VITALS
SYSTOLIC BLOOD PRESSURE: 130 MMHG | DIASTOLIC BLOOD PRESSURE: 70 MMHG | BODY MASS INDEX: 27.31 KG/M2 | HEIGHT: 64 IN | WEIGHT: 160 LBS

## 2024-10-30 DIAGNOSIS — I25.5 ISCHEMIC CARDIOMYOPATHY: ICD-10-CM

## 2024-10-30 PROCEDURE — 93325 DOPPLER ECHO COLOR FLOW MAPG: CPT

## 2024-10-30 PROCEDURE — 93308 TTE F-UP OR LMTD: CPT

## 2024-10-30 PROCEDURE — 93321 DOPPLER ECHO F-UP/LMTD STD: CPT

## 2024-10-30 RX ADMIN — PERFLUTREN 1 ML/MIN: 6.52 INJECTION, SUSPENSION INTRAVENOUS at 14:25

## 2024-10-31 LAB
APICAL FOUR CHAMBER EJECTION FRACTION: 33 %
BSA FOR ECHO PROCEDURE: 1.78 M2
E WAVE DECELERATION TIME: 203 MS
E/A RATIO: 1.12
FRACTIONAL SHORTENING: 16 (ref 28–44)
INTERVENTRICULAR SEPTUM IN DIASTOLE (PARASTERNAL SHORT AXIS VIEW): 1.3 CM
INTERVENTRICULAR SEPTUM: 1.3 CM (ref 0.6–1.1)
IVC: 16 MM
LEFT ATRIUM SIZE: 4.6 CM
LEFT INTERNAL DIMENSION IN SYSTOLE: 4.1 CM (ref 2.1–4)
LEFT VENTRICLE DIASTOLIC VOLUME (MOD BIPLANE): 76 ML
LEFT VENTRICLE DIASTOLIC VOLUME INDEX (MOD BIPLANE): 42.7 ML/M2
LEFT VENTRICLE SYSTOLIC VOLUME (MOD BIPLANE): 48 ML
LEFT VENTRICLE SYSTOLIC VOLUME INDEX (MOD BIPLANE): 27 ML/M2
LEFT VENTRICULAR INTERNAL DIMENSION IN DIASTOLE: 4.9 CM (ref 3.5–6)
LEFT VENTRICULAR POSTERIOR WALL IN END DIASTOLE: 1.3 CM
LEFT VENTRICULAR STROKE VOLUME: 39 ML
LV EF: 37 %
LVSV (TEICH): 39 ML
MV E'TISSUE VEL-SEP: 3 CM/S
MV PEAK A VEL: 0.97 M/S
MV PEAK E VEL: 109 CM/S
MV STENOSIS PRESSURE HALF TIME: 59 MS
MV VALVE AREA P 1/2 METHOD: 3.73
SL CV LV EF: 35
SL CV PED ECHO LEFT VENTRICLE DIASTOLIC VOLUME (MOD BIPLANE) 2D: 112 ML
SL CV PED ECHO LEFT VENTRICLE SYSTOLIC VOLUME (MOD BIPLANE) 2D: 73 ML
TRICUSPID ANNULAR PLANE SYSTOLIC EXCURSION: 1.4 CM

## 2024-10-31 PROCEDURE — 93325 DOPPLER ECHO COLOR FLOW MAPG: CPT | Performed by: INTERNAL MEDICINE

## 2024-10-31 PROCEDURE — 93321 DOPPLER ECHO F-UP/LMTD STD: CPT | Performed by: INTERNAL MEDICINE

## 2024-10-31 PROCEDURE — 93308 TTE F-UP OR LMTD: CPT | Performed by: INTERNAL MEDICINE

## 2024-11-11 ENCOUNTER — APPOINTMENT (OUTPATIENT)
Age: 69
End: 2024-11-11
Payer: COMMERCIAL

## 2024-11-11 ENCOUNTER — APPOINTMENT (OUTPATIENT)
Dept: RADIOLOGY | Facility: CLINIC | Age: 69
End: 2024-11-11
Payer: COMMERCIAL

## 2024-11-11 ENCOUNTER — OFFICE VISIT (OUTPATIENT)
Dept: FAMILY MEDICINE CLINIC | Facility: CLINIC | Age: 69
End: 2024-11-11
Payer: COMMERCIAL

## 2024-11-11 VITALS
HEART RATE: 79 BPM | TEMPERATURE: 97.9 F | OXYGEN SATURATION: 97 % | SYSTOLIC BLOOD PRESSURE: 134 MMHG | DIASTOLIC BLOOD PRESSURE: 68 MMHG | BODY MASS INDEX: 28.09 KG/M2 | WEIGHT: 168.6 LBS | HEIGHT: 65 IN

## 2024-11-11 DIAGNOSIS — M54.50 LUMBAR PAIN: ICD-10-CM

## 2024-11-11 DIAGNOSIS — I50.20 SYSTOLIC HEART FAILURE, UNSPECIFIED HF CHRONICITY (HCC): ICD-10-CM

## 2024-11-11 DIAGNOSIS — Z11.59 NEED FOR HEPATITIS C SCREENING TEST: ICD-10-CM

## 2024-11-11 DIAGNOSIS — M25.552 BILATERAL HIP PAIN: ICD-10-CM

## 2024-11-11 DIAGNOSIS — Z78.0 ASYMPTOMATIC MENOPAUSE: ICD-10-CM

## 2024-11-11 DIAGNOSIS — M25.551 BILATERAL HIP PAIN: ICD-10-CM

## 2024-11-11 DIAGNOSIS — R20.2 PARESTHESIAS WITH SUBJECTIVE WEAKNESS: ICD-10-CM

## 2024-11-11 DIAGNOSIS — Z12.31 ENCOUNTER FOR SCREENING MAMMOGRAM FOR BREAST CANCER: ICD-10-CM

## 2024-11-11 DIAGNOSIS — K51.00 ULCERATIVE PANCOLITIS WITHOUT COMPLICATION (HCC): ICD-10-CM

## 2024-11-11 DIAGNOSIS — N64.4 BREAST PAIN, LEFT: ICD-10-CM

## 2024-11-11 DIAGNOSIS — R53.1 PARESTHESIAS WITH SUBJECTIVE WEAKNESS: ICD-10-CM

## 2024-11-11 DIAGNOSIS — K52.9 INFLAMMATORY BOWEL DISEASE: ICD-10-CM

## 2024-11-11 DIAGNOSIS — E11.65 TYPE 2 DIABETES MELLITUS WITH HYPERGLYCEMIA, WITHOUT LONG-TERM CURRENT USE OF INSULIN (HCC): ICD-10-CM

## 2024-11-11 DIAGNOSIS — Z00.00 MEDICARE ANNUAL WELLNESS VISIT, SUBSEQUENT: Primary | ICD-10-CM

## 2024-11-11 LAB
ALBUMIN SERPL BCG-MCNC: 4.3 G/DL (ref 3.5–5)
ALP SERPL-CCNC: 69 U/L (ref 34–104)
ALT SERPL W P-5'-P-CCNC: 14 U/L (ref 7–52)
ANION GAP SERPL CALCULATED.3IONS-SCNC: 5 MMOL/L (ref 4–13)
AST SERPL W P-5'-P-CCNC: 22 U/L (ref 13–39)
BASOPHILS # BLD AUTO: 0.05 THOUSANDS/ÂΜL (ref 0–0.1)
BASOPHILS NFR BLD AUTO: 1 % (ref 0–1)
BILIRUB SERPL-MCNC: 0.56 MG/DL (ref 0.2–1)
BUN SERPL-MCNC: 26 MG/DL (ref 5–25)
CALCIUM SERPL-MCNC: 9.7 MG/DL (ref 8.4–10.2)
CHLORIDE SERPL-SCNC: 104 MMOL/L (ref 96–108)
CO2 SERPL-SCNC: 32 MMOL/L (ref 21–32)
CREAT SERPL-MCNC: 0.91 MG/DL (ref 0.6–1.3)
CRP SERPL QL: 4.1 MG/L
EOSINOPHIL # BLD AUTO: 0.19 THOUSAND/ÂΜL (ref 0–0.61)
EOSINOPHIL NFR BLD AUTO: 3 % (ref 0–6)
ERYTHROCYTE [DISTWIDTH] IN BLOOD BY AUTOMATED COUNT: 14.5 % (ref 11.6–15.1)
EST. AVERAGE GLUCOSE BLD GHB EST-MCNC: 137 MG/DL
GFR SERPL CREATININE-BSD FRML MDRD: 65 ML/MIN/1.73SQ M
GLUCOSE P FAST SERPL-MCNC: 111 MG/DL (ref 65–99)
HBA1C MFR BLD: 6.4 %
HCT VFR BLD AUTO: 42.1 % (ref 34.8–46.1)
HCV AB SER QL: NORMAL
HGB BLD-MCNC: 13.3 G/DL (ref 11.5–15.4)
IMM GRANULOCYTES # BLD AUTO: 0.02 THOUSAND/UL (ref 0–0.2)
IMM GRANULOCYTES NFR BLD AUTO: 0 % (ref 0–2)
LYMPHOCYTES # BLD AUTO: 1.87 THOUSANDS/ÂΜL (ref 0.6–4.47)
LYMPHOCYTES NFR BLD AUTO: 34 % (ref 14–44)
MCH RBC QN AUTO: 29.6 PG (ref 26.8–34.3)
MCHC RBC AUTO-ENTMCNC: 31.6 G/DL (ref 31.4–37.4)
MCV RBC AUTO: 94 FL (ref 82–98)
MONOCYTES # BLD AUTO: 0.53 THOUSAND/ÂΜL (ref 0.17–1.22)
MONOCYTES NFR BLD AUTO: 10 % (ref 4–12)
NEUTROPHILS # BLD AUTO: 2.93 THOUSANDS/ÂΜL (ref 1.85–7.62)
NEUTS SEG NFR BLD AUTO: 52 % (ref 43–75)
NRBC BLD AUTO-RTO: 0 /100 WBCS
PLATELET # BLD AUTO: 232 THOUSANDS/UL (ref 149–390)
PMV BLD AUTO: 11.2 FL (ref 8.9–12.7)
POTASSIUM SERPL-SCNC: 4.5 MMOL/L (ref 3.5–5.3)
PROT SERPL-MCNC: 7.3 G/DL (ref 6.4–8.4)
RBC # BLD AUTO: 4.49 MILLION/UL (ref 3.81–5.12)
SODIUM SERPL-SCNC: 141 MMOL/L (ref 135–147)
WBC # BLD AUTO: 5.59 THOUSAND/UL (ref 4.31–10.16)

## 2024-11-11 PROCEDURE — 86803 HEPATITIS C AB TEST: CPT

## 2024-11-11 PROCEDURE — 83036 HEMOGLOBIN GLYCOSYLATED A1C: CPT

## 2024-11-11 PROCEDURE — 99214 OFFICE O/P EST MOD 30 MIN: CPT | Performed by: NURSE PRACTITIONER

## 2024-11-11 PROCEDURE — 86140 C-REACTIVE PROTEIN: CPT

## 2024-11-11 PROCEDURE — 72100 X-RAY EXAM L-S SPINE 2/3 VWS: CPT

## 2024-11-11 PROCEDURE — 73522 X-RAY EXAM HIPS BI 3-4 VIEWS: CPT

## 2024-11-11 PROCEDURE — G0439 PPPS, SUBSEQ VISIT: HCPCS | Performed by: NURSE PRACTITIONER

## 2024-11-11 PROCEDURE — 85025 COMPLETE CBC W/AUTO DIFF WBC: CPT

## 2024-11-11 PROCEDURE — 36415 COLL VENOUS BLD VENIPUNCTURE: CPT

## 2024-11-11 PROCEDURE — 80053 COMPREHEN METABOLIC PANEL: CPT

## 2024-11-11 RX ORDER — PREDNISOLONE ACETATE 10 MG/ML
SUSPENSION/ DROPS OPHTHALMIC
COMMUNITY
Start: 2024-09-13

## 2024-11-11 NOTE — ASSESSMENT & PLAN NOTE
Lab Results   Component Value Date    HGBA1C 6.3 (H) 07/01/2024   Has blood work she is doing today so we will add hemoglobin A1c to the blood work    Orders:    Hemoglobin A1C; Future

## 2024-11-11 NOTE — PROGRESS NOTES
Ambulatory Visit  Name: Julia Perry      : 1955      MRN: [id number]  Encounter Provider: ROYAL Olivia  Encounter Date: 2024   Encounter department: [facility]    Assessment & Plan  Medicare annual wellness visit, subsequent         Encounter for screening mammogram for breast cancer    Orders:    Mammo screening bilateral w 3d and cad; Future  Due to breast pain in the left breast will have to be diagnostic  Need for hepatitis C screening test    Orders:    Hepatitis C Antibody; Future    Type 2 diabetes mellitus with hyperglycemia, without long-term current use of insulin (HCC)    Lab Results   Component Value Date    HGBA1C 6.3 (H) 2024   Has blood work she is doing today so we will add hemoglobin A1c to the blood work    Orders:    Hemoglobin A1C; Future    Asymptomatic menopause    Orders:    DXA bone density spine hip and pelvis; Future    Bilateral hip pain    Orders:    XR hips bilateral 3-4 vw w pelvis if performed; Future    Lumbar pain    Orders:    XR spine lumbar 2 or 3 views injury; Future    Breast pain, left    Orders:    Mammo diagnostic left w 3d and cad; Future    Paresthesias with subjective weakness  .  Though she does have a hyper reflexive left patellar reflex.  Discussed options such as EMG we will begin with x-rays of her spine.  She is not really looking forward to any additional testing but she will consider it         Depression Screening and Follow-up Plan: Patient was screened for depression during today's encounter. They screened negative with a PHQ-2 score of 0.      Preventive health issues were discussed with patient, and age appropriate screening tests were ordered as noted in patient's After Visit Summary. Personalized health advice and appropriate referrals for health education or preventive services given if needed, as noted in patient's After Visit Summary.    History of Present Illness     Patient is here for Medicare well.   She is accompanied by her .  Offers no significant new complaints today.  She has been followed for heart care and CAD.  She is curious as to the results of her most recent echo.  She has an appointment in early December with cardiology to discuss.  2 days later she sees a different cardiologist to discuss defibrillator.  She has hesitated hesitant to do that and just not really sure she wanted to she had a long talk with the cardiologist before.  I did briefly to review echo results, which appear similar to previous but I did ask her to hold off and discuss it in greater length with cardiology  She did complete cardiac rehab and does well she has been trying to do some exercises at home and wonders if it is okay to continue her exercises at the gym.  Feels she tolerates them well does not have any chest pain or shortness of breath with them.  The home exercises she is doing is a rowing machine.  She has some left breast discomfort which sounds mostly like muscular pain.  It is with palpation there is no lump that she palpated.  It is not with exertion.  Overdue for mammogram and willing to have that done.  Has never had a bone density done which we will also do today.  Also notes an overall weakness in her lower extremities she attributes that to prior prednisone use.  She does admit to chronic back pain and thinks she might have some restless leg symptoms.  Has been wants her to see a chiropractor but she is worried to do that.  Also if she would not be willing to takes steroids nor can she take NSAIDs with her colitis.       Patient Care Team:  ROYAL Olivia as PCP - General (Family Medicine)  MD Jaylene Herron LCSW as  (Care Coordination)    Review of Systems  Medical History Reviewed by provider this encounter:       Annual Wellness Visit Questionnaire   Julia is here for her Subsequent Wellness visit.     Health Risk Assessment:   Patient rates overall health as  good. Patient feels that their physical health rating is same. Patient is satisfied with their life. Eyesight was rated as same. Hearing was rated as same. Patient feels that their emotional and mental health rating is same. Patients states they are never, rarely angry. Patient states they are never, rarely unusually tired/fatigued. Pain experienced in the last 7 days has been some. Patient's pain rating has been 4/10. Patient states that she has experienced no weight loss or gain in last 6 months.     Depression Screening:   PHQ-2 Score: 0      Fall Risk Screening:   In the past year, patient has experienced: no history of falling in past year      Urinary Incontinence Screening:   Patient has not leaked urine accidently in the last six months.     Home Safety:  Patient does not have trouble with stairs inside or outside of their home. Patient has working smoke alarms and has working carbon monoxide detector. Home safety hazards include: none.     Nutrition:   Current diet is Diabetic.     Medications:   Patient is currently taking over-the-counter supplements. OTC medications include: see medication list. Patient is able to manage medications.     Activities of Daily Living (ADLs)/Instrumental Activities of Daily Living (IADLs):   Walk and transfer into and out of bed and chair?: Yes  Dress and groom yourself?: Yes    Bathe or shower yourself?: Yes    Feed yourself? Yes  Do your laundry/housekeeping?: Yes  Manage your money, pay your bills and track your expenses?: Yes  Make your own meals?: Yes    Do your own shopping?: Yes    Previous Hospitalizations:   Any hospitalizations or ED visits within the last 12 months?: Yes    How many hospitalizations have you had in the last year?: 1-2    Advance Care Planning:   Living will: No    Durable POA for healthcare: No    Advanced directive: No    Advanced directive counseling given: Yes    ACP document given: Yes    Patient declined ACP directive: Yes    End of Life  Decisions reviewed with patient: Yes    Provider agrees with end of life decisions: Yes      Cognitive Screening:   Provider or family/friend/caregiver concerned regarding cognition?: No    PREVENTIVE SCREENINGS      Cardiovascular Screening:    General: Screening Not Indicated and History Lipid Disorder      Diabetes Screening:     General: Screening Not Indicated and History Diabetes      Colorectal Cancer Screening:     General: Screening Current      Cervical Cancer Screening:    General: Screening Not Indicated      Lung Cancer Screening:     General: Screening Not Indicated    Screening, Brief Intervention, and Referral to Treatment (SBIRT)    Screening      Single Item Drug Screening:  How often have you used an illegal drug (including marijuana) or a prescription medication for non-medical reasons in the past year? never    Single Item Drug Screen Score: 0  Interpretation: Negative screen for possible drug use disorder    Social Determinants of Health     Financial Resource Strain: Patient Declined (8/18/2023)    Overall Financial Resource Strain (CARDIA)     Difficulty of Paying Living Expenses: Patient declined   Food Insecurity: Patient Declined (11/11/2024)    Hunger Vital Sign     Worried About Running Out of Food in the Last Year: Patient declined     Ran Out of Food in the Last Year: Patient declined   Transportation Needs: Patient Declined (11/11/2024)    PRAPARE - Transportation     Lack of Transportation (Medical): Patient declined     Lack of Transportation (Non-Medical): Patient declined   Housing Stability: Patient Declined (11/11/2024)    Housing Stability Vital Sign     Unable to Pay for Housing in the Last Year: Patient declined     Number of Times Moved in the Last Year: 1     Homeless in the Last Year: Patient declined   Utilities: Patient Declined (11/11/2024)    [facility] Utilities     Threatened with loss of utilities: Patient declined     No results found.    Objective     /68 (BP  "Location: Left arm, Patient Position: Sitting, Cuff Size: Adult)   Pulse 79   Temp 97.9 °F (36.6 °C) (Tympanic)   Ht 5' 4.5\" (1.638 m)   Wt 76.5 kg (168 lb 9.6 oz)   SpO2 97%   BMI 28.49 kg/m²     Physical Exam  Constitutional:       Appearance: Normal appearance. She is not ill-appearing.   Neck:      Vascular: Carotid bruit (decreased on R) present.   Cardiovascular:      Rate and Rhythm: Regular rhythm. Tachycardia present.   Pulmonary:      Effort: Pulmonary effort is normal.      Breath sounds: Normal breath sounds.   Musculoskeletal:      Cervical back: Normal range of motion.      Right lower leg: Edema (Trace pitting edema) present.      Left lower leg: Edema (Trace pitting edema) present.      Comments: Mild pain with forward bending moderate with back extension.  +2 patellar reflex on the right +3 patellar reflex on the left no evidence of lower extremity weakness in feet or lower extremities.  Mild discomfort bilaterally with straight leg raise.   Neurological:      Mental Status: She is alert.   Psychiatric:         Mood and Affect: Mood normal.      Comments: Doing okay overall.  Has had to deal with a lot of medical issues and certainly seems like it is taking a toll but she is seems otherwise stable         "

## 2024-11-11 NOTE — PATIENT INSTRUCTIONS
Medicare Preventive Visit Patient Instructions  Thank you for completing your Welcome to Medicare Visit or Medicare Annual Wellness Visit today. Your next wellness visit will be due in one year (11/12/2025).  The screening/preventive services that you may require over the next 5-10 years are detailed below. Some tests may not apply to you based off risk factors and/or age. Screening tests ordered at today's visit but not completed yet may show as past due. Also, please note that scanned in results may not display below.  Preventive Screenings:  Service Recommendations Previous Testing/Comments   Colorectal Cancer Screening  * Colonoscopy    * Fecal Occult Blood Test (FOBT)/Fecal Immunochemical Test (FIT)  * Fecal DNA/Cologuard Test  * Flexible Sigmoidoscopy Age: 45-75 years old   Colonoscopy: every 10 years (may be performed more frequently if at higher risk)  OR  FOBT/FIT: every 1 year  OR  Cologuard: every 3 years  OR  Sigmoidoscopy: every 5 years  Screening may be recommended earlier than age 45 if at higher risk for colorectal cancer. Also, an individualized decision between you and your healthcare provider will decide whether screening between the ages of 76-85 would be appropriate. Colonoscopy: 12/06/2023  FOBT/FIT: 06/22/2023  Cologuard: Not on file  Sigmoidoscopy: Not on file    Screening Current     Breast Cancer Screening Age: 40+ years old  Frequency: every 1-2 years  Not required if history of left and right mastectomy Mammogram: 11/09/2016        Cervical Cancer Screening Between the ages of 21-29, pap smear recommended once every 3 years.   Between the ages of 30-65, can perform pap smear with HPV co-testing every 5 years.   Recommendations may differ for women with a history of total hysterectomy, cervical cancer, or abnormal pap smears in past. Pap Smear: Not on file    Screening Not Indicated   Hepatitis C Screening Once for adults born between 1945 and 1965  More frequently in patients at high risk  for Hepatitis C Hep C Antibody: Not on file        Diabetes Screening 1-2 times per year if you're at risk for diabetes or have pre-diabetes Fasting glucose: 92 mg/dL (7/1/2024)  A1C: 6.3 % (7/1/2024)  Screening Not Indicated  History Diabetes   Cholesterol Screening Once every 5 years if you don't have a lipid disorder. May order more often based on risk factors. Lipid panel: 07/01/2024    Screening Not Indicated  History Lipid Disorder     Other Preventive Screenings Covered by Medicare:  Abdominal Aortic Aneurysm (AAA) Screening: covered once if your at risk. You're considered to be at risk if you have a family history of AAA.  Lung Cancer Screening: covers low dose CT scan once per year if you meet all of the following conditions: (1) Age 55-77; (2) No signs or symptoms of lung cancer; (3) Current smoker or have quit smoking within the last 15 years; (4) You have a tobacco smoking history of at least 20 pack years (packs per day multiplied by number of years you smoked); (5) You get a written order from a healthcare provider.  Glaucoma Screening: covered annually if you're considered high risk: (1) You have diabetes OR (2) Family history of glaucoma OR (3)  aged 50 and older OR (4)  American aged 65 and older  Osteoporosis Screening: covered every 2 years if you meet one of the following conditions: (1) You're estrogen deficient and at risk for osteoporosis based off medical history and other findings; (2) Have a vertebral abnormality; (3) On glucocorticoid therapy for more than 3 months; (4) Have primary hyperparathyroidism; (5) On osteoporosis medications and need to assess response to drug therapy.   Last bone density test (DXA Scan): Not on file.  HIV Screening: covered annually if you're between the age of 15-65. Also covered annually if you are younger than 15 and older than 65 with risk factors for HIV infection. For pregnant patients, it is covered up to 3 times per  pregnancy.    Immunizations:  Immunization Recommendations   Influenza Vaccine Annual influenza vaccination during flu season is recommended for all persons aged >= 6 months who do not have contraindications   Pneumococcal Vaccine   * Pneumococcal conjugate vaccine = PCV13 (Prevnar 13), PCV15 (Vaxneuvance), PCV20 (Prevnar 20)  * Pneumococcal polysaccharide vaccine = PPSV23 (Pneumovax) Adults 19-63 yo with certain risk factors or if 65+ yo  If never received any pneumonia vaccine: recommend Prevnar 20 (PCV20)  Give PCV20 if previously received 1 dose of PCV13 or PPSV23   Hepatitis B Vaccine 3 dose series if at intermediate or high risk (ex: diabetes, end stage renal disease, liver disease)   Respiratory syncytial virus (RSV) Vaccine - COVERED BY MEDICARE PART D  * RSVPreF3 (Arexvy) CDC recommends that adults 60 years of age and older may receive a single dose of RSV vaccine using shared clinical decision-making (SCDM)   Tetanus (Td) Vaccine - COST NOT COVERED BY MEDICARE PART B Following completion of primary series, a booster dose should be given every 10 years to maintain immunity against tetanus. Td may also be given as tetanus wound prophylaxis.   Tdap Vaccine - COST NOT COVERED BY MEDICARE PART B Recommended at least once for all adults. For pregnant patients, recommended with each pregnancy.   Shingles Vaccine (Shingrix) - COST NOT COVERED BY MEDICARE PART B  2 shot series recommended in those 19 years and older who have or will have weakened immune systems or those 50 years and older     Health Maintenance Due:      Topic Date Due   • Hepatitis C Screening  Never done   • Breast Cancer Screening: Mammogram  11/09/2018   • Colorectal Cancer Screening  12/05/2025     Immunizations Due:      Topic Date Due   • Pneumococcal Vaccine: 65+ Years (1 of 2 - PCV) Never done   • Influenza Vaccine (1) Never done   • COVID-19 Vaccine (1 - 2023-24 season) Never done     Advance Directives   What are advance directives?   Advance directives are legal documents that state your wishes and plans for medical care. These plans are made ahead of time in case you lose your ability to make decisions for yourself. Advance directives can apply to any medical decision, such as the treatments you want, and if you want to donate organs.   What are the types of advance directives?  There are many types of advance directives, and each state has rules about how to use them. You may choose a combination of any of the following:  Living will:  This is a written record of the treatment you want. You can also choose which treatments you do not want, which to limit, and which to stop at a certain time. This includes surgery, medicine, IV fluid, and tube feedings.   Durable power of  for healthcare (DPAHC):  This is a written record that states who you want to make healthcare choices for you when you are unable to make them for yourself. This person, called a proxy, is usually a family member or a friend. You may choose more than 1 proxy.  Do not resuscitate (DNR) order:  A DNR order is used in case your heart stops beating or you stop breathing. It is a request not to have certain forms of treatment, such as CPR. A DNR order may be included in other types of advance directives.  Medical directive:  This covers the care that you want if you are in a coma, near death, or unable to make decisions for yourself. You can list the treatments you want for each condition. Treatment may include pain medicine, surgery, blood transfusions, dialysis, IV or tube feedings, and a ventilator (breathing machine).  Values history:  This document has questions about your views, beliefs, and how you feel and think about life. This information can help others choose the care that you would choose.  Why are advance directives important?  An advance directive helps you control your care. Although spoken wishes may be used, it is better to have your wishes written down.  Spoken wishes can be misunderstood, or not followed. Treatments may be given even if you do not want them. An advance directive may make it easier for your family to make difficult choices about your care.   Weight Management   Why it is important to manage your weight:  Being overweight increases your risk of health conditions such as heart disease, high blood pressure, type 2 diabetes, and certain types of cancer. It can also increase your risk for osteoarthritis, sleep apnea, and other respiratory problems. Aim for a slow, steady weight loss. Even a small amount of weight loss can lower your risk of health problems.  How to lose weight safely:  A safe and healthy way to lose weight is to eat fewer calories and get regular exercise. You can lose up about 1 pound a week by decreasing the number of calories you eat by 500 calories each day.   Healthy meal plan for weight management:  A healthy meal plan includes a variety of foods, contains fewer calories, and helps you stay healthy. A healthy meal plan includes the following:  Eat whole-grain foods more often.  A healthy meal plan should contain fiber. Fiber is the part of grains, fruits, and vegetables that is not broken down by your body. Whole-grain foods are healthy and provide extra fiber in your diet. Some examples of whole-grain foods are whole-wheat breads and pastas, oatmeal, brown rice, and bulgur.  Eat a variety of vegetables every day.  Include dark, leafy greens such as spinach, kale, lidia greens, and mustard greens. Eat yellow and orange vegetables such as carrots, sweet potatoes, and winter squash.   Eat a variety of fruits every day.  Choose fresh or canned fruit (canned in its own juice or light syrup) instead of juice. Fruit juice has very little or no fiber.  Eat low-fat dairy foods.  Drink fat-free (skim) milk or 1% milk. Eat fat-free yogurt and low-fat cottage cheese. Try low-fat cheeses such as mozzarella and other reduced-fat  cheeses.  Choose meat and other protein foods that are low in fat.  Choose beans or other legumes such as split peas or lentils. Choose fish, skinless poultry (chicken or turkey), or lean cuts of red meat (beef or pork). Before you cook meat or poultry, cut off any visible fat.   Use less fat and oil.  Try baking foods instead of frying them. Add less fat, such as margarine, sour cream, regular salad dressing and mayonnaise to foods. Eat fewer high-fat foods. Some examples of high-fat foods include french fries, doughnuts, ice cream, and cakes.  Eat fewer sweets.  Limit foods and drinks that are high in sugar. This includes candy, cookies, regular soda, and sweetened drinks.  Exercise:  Exercise at least 30 minutes per day on most days of the week. Some examples of exercise include walking, biking, dancing, and swimming. You can also fit in more physical activity by taking the stairs instead of the elevator or parking farther away from stores. Ask your healthcare provider about the best exercise plan for you.      © Copyright [employer] 2018 Information is for End User's use only and may not be sold, redistributed or otherwise used for commercial purposes. All illustrations and images included in CareNotes® are the copyrighted property of [employer] or [employer]

## 2024-11-12 DIAGNOSIS — S32.019A CLOSED FRACTURE OF FIRST LUMBAR VERTEBRA, UNSPECIFIED FRACTURE MORPHOLOGY, INITIAL ENCOUNTER (HCC): Primary | ICD-10-CM

## 2024-11-14 ENCOUNTER — RESULTS FOLLOW-UP (OUTPATIENT)
Age: 69
End: 2024-11-14

## 2024-11-14 NOTE — TELEPHONE ENCOUNTER
----- Message from João Dejesus MD sent at 11/11/2024  4:55 PM EST -----  Hi,    Can you please let the patient know her blood counts look good and we will keep her updated as additional results return    Thank you!

## 2024-11-14 NOTE — TELEPHONE ENCOUNTER
Spoke with patient and relayed lab result message from provider. Pt verbalized understanding. She also requested to send lab results via mail. I printed out results and sent out results to patient's home, per her request.

## 2024-11-15 DIAGNOSIS — S32.019A CLOSED FRACTURE OF FIRST LUMBAR VERTEBRA, UNSPECIFIED FRACTURE MORPHOLOGY, INITIAL ENCOUNTER (HCC): Primary | ICD-10-CM

## 2024-11-15 DIAGNOSIS — M54.50 LUMBAR PAIN: ICD-10-CM

## 2024-11-18 ENCOUNTER — TELEPHONE (OUTPATIENT)
Age: 69
End: 2024-11-18

## 2024-11-18 DIAGNOSIS — Z12.31 ENCOUNTER FOR SCREENING MAMMOGRAM FOR BREAST CANCER: Primary | ICD-10-CM

## 2024-11-18 NOTE — TELEPHONE ENCOUNTER
Patient has been having a hard time getting in touch [facility] to schedule her diagnostic mamm[facility].  She states she has been calling them since 11/12/24 and no one has called her back yet.  She questioned why she needed the diagnostic vs the screening mammogram.  I explained to her the difference between the two, and she got an order for diagnostic due to her complaint of pain in her left side.  She believes this may be due to the heart surgery she had in April but is not sure.    Since she is having such a hard time getting in touch with anyone, she asked if she could have a screening mammogram done (her last one was 2016) instead?  Please advise.

## 2024-11-22 ENCOUNTER — TELEPHONE (OUTPATIENT)
Age: 69
End: 2024-11-22

## 2024-11-22 NOTE — TELEPHONE ENCOUNTER
Patient returning call to the office tried to connect to office, office was close. Ask if patient wanted to speak with a nurse she refuses she will call back on Monday.

## 2024-11-22 NOTE — TELEPHONE ENCOUNTER
Pt called stating she is scheduled for an MRI lumbar spine without contrast this Monday and asking if that would look at the kidneys and hips. Pt states she had a lot of pain yesterday in her lower back to the right side.  Also, asking if her kidney function could be reviewed from the labs she had done on 11/11/24.    Please advise

## 2024-11-25 ENCOUNTER — RESULTS FOLLOW-UP (OUTPATIENT)
Dept: FAMILY MEDICINE CLINIC | Facility: CLINIC | Age: 69
End: 2024-11-25

## 2024-11-25 ENCOUNTER — HOSPITAL ENCOUNTER (OUTPATIENT)
Dept: MRI IMAGING | Facility: HOSPITAL | Age: 69
Discharge: HOME | End: 2024-11-25
Payer: COMMERCIAL

## 2024-11-25 ENCOUNTER — HOSPITAL ENCOUNTER (OUTPATIENT)
Dept: BONE DENSITY | Facility: HOSPITAL | Age: 69
Discharge: HOME | End: 2024-11-25
Payer: COMMERCIAL

## 2024-11-25 VITALS — HEIGHT: 64 IN | WEIGHT: 163 LBS | BODY MASS INDEX: 27.83 KG/M2

## 2024-11-25 DIAGNOSIS — S32.019A CLOSED FRACTURE OF FIRST LUMBAR VERTEBRA, UNSPECIFIED FRACTURE MORPHOLOGY, INITIAL ENCOUNTER (HCC): ICD-10-CM

## 2024-11-25 DIAGNOSIS — M54.50 LUMBAR PAIN: ICD-10-CM

## 2024-11-25 DIAGNOSIS — Z78.0 ASYMPTOMATIC MENOPAUSE: ICD-10-CM

## 2024-11-25 PROCEDURE — 77080 DXA BONE DENSITY AXIAL: CPT

## 2024-11-25 PROCEDURE — 72148 MRI LUMBAR SPINE W/O DYE: CPT

## 2024-11-25 NOTE — TELEPHONE ENCOUNTER
Called patient and explained that kidneys and hip are actually two more additional test and would have to be ordered.  Patient states pain not as bad as Friday, will call if it persist to make an appt.

## 2024-11-26 NOTE — TELEPHONE ENCOUNTER
Pt called back and was given test results and recommendations.  Pt would like a copy of the MRI mailed to her. Also, she will think over the referral to pain management and let us know.     Also, patient asking if her blood work from 11/11/24 showed anything regarding her kidney function.     Please advise

## 2024-11-27 NOTE — RESULT ENCOUNTER NOTE
Notify patient the blood work showed good kidney function.  She may have a copy of the MRI mailed for her if somebody has not already done that.

## 2024-12-02 ENCOUNTER — OFFICE VISIT (OUTPATIENT)
Dept: CARDIOLOGY CLINIC | Facility: HOSPITAL | Age: 69
End: 2024-12-02
Payer: COMMERCIAL

## 2024-12-02 ENCOUNTER — RESULTS FOLLOW-UP (OUTPATIENT)
Dept: FAMILY MEDICINE CLINIC | Facility: CLINIC | Age: 69
End: 2024-12-02

## 2024-12-02 VITALS
OXYGEN SATURATION: 98 % | HEART RATE: 79 BPM | BODY MASS INDEX: 29.02 KG/M2 | DIASTOLIC BLOOD PRESSURE: 68 MMHG | WEIGHT: 170 LBS | HEIGHT: 64 IN | SYSTOLIC BLOOD PRESSURE: 112 MMHG

## 2024-12-02 DIAGNOSIS — I48.91 NEW ONSET ATRIAL FIBRILLATION (HCC): ICD-10-CM

## 2024-12-02 DIAGNOSIS — I25.118 CORONARY ARTERY DISEASE INVOLVING NATIVE CORONARY ARTERY OF NATIVE HEART WITH OTHER FORM OF ANGINA PECTORIS (HCC): Primary | ICD-10-CM

## 2024-12-02 DIAGNOSIS — I44.7 LBBB (LEFT BUNDLE BRANCH BLOCK): ICD-10-CM

## 2024-12-02 DIAGNOSIS — I25.5 ISCHEMIC CARDIOMYOPATHY: ICD-10-CM

## 2024-12-02 DIAGNOSIS — Z95.1 S/P CABG X 3: ICD-10-CM

## 2024-12-02 DIAGNOSIS — I65.22 STENOSIS OF LEFT CAROTID ARTERY: ICD-10-CM

## 2024-12-02 DIAGNOSIS — E78.2 MIXED HYPERLIPIDEMIA: ICD-10-CM

## 2024-12-02 PROCEDURE — 99214 OFFICE O/P EST MOD 30 MIN: CPT | Performed by: INTERNAL MEDICINE

## 2024-12-02 NOTE — RESULT ENCOUNTER NOTE
Notify patient that her bone density did show osteopenia which is the beginning of lower bone mass but it was not yet in the category for osteoporosis.  Continue supplementing with vitamin D and calcium and we will recheck it in 2 years.

## 2024-12-02 NOTE — PROGRESS NOTES
Julia Perry  1955  [id number]  [facility] [address] [id number] [phone]    1. Coronary artery disease involving native coronary artery of native heart with other form of angina pectoris (HCC)        2. Ischemic cardiomyopathy        3. LBBB (left bundle branch block)        4. New onset atrial fibrillation (HCC)        5. Stenosis of left carotid artery        6. S/P CABG x 3        7. Mixed hyperlipidemia            Discussion/Summary:  #1 cardiomyopathy ejection fraction 30%  #2 extensive heavy coronary calcifications  #3 chronic combined systolic and diastolic congestive heart failure  #4 hyperlipidemia  #5 type 2 diabetes mellitus  #6 IVCD  #7 high degree left internal carotid stenosis  #8 intracranial stenosis  #9 pituitary mass    Recommendations: Coronary disease is stable no complaints of angina.  Repeat echocardiogram showed EF 30%.  She is on maximal goal-directed medical therapy no afterload reducing agents due to prior hypotension that was symptomatic with attempts.  She is off the midodrine.  Continues to refuse AICD understands the risks.  PAD followed by vascular surgery.  Lipids have been doing well continue current medical support I will see her back in 6 months.      Interval History: 68-year-old female with a history of type 2 diabetes and colitis presents for new patient consultation on behalf of Dr. Titus for new cardiomyopathy.  The patient has had shortness of breath, PND, orthopnea, chest pain since 2023.  She was started on steroids for colitis and had worsening shortness of breath but attributed this to medication side effects.  An echocardiogram was recently obtained which showed an ejection fraction of 30%.  The patient tells me she has had some chest heaviness with walking on and off for several years.  She is a diabetic.  The symptoms have been quite mild.  She has felt more shortness of breath with  activity and had occasional lower extremity edema recently.  There is been no syncope.  She has been taking her medications as prescribed.  She is a non-smoker.    Overall she is feeling well remains active around the house gets out and does a fair amount of activity.  Denies any chest pain, shortness of breath, palpitations, lightheadedness, dizziness, or syncope.  This been a lower extreme edema, PND, orthopnea.  He has been taking all medications as prescribed.    Medical Problems       Problem List       Colitis    History of biliary dyskinesia    DMII (diabetes mellitus, type 2) ([facility])      Lab Results   Component Value Date    HGBA1C 6.4 (H) 11/11/2024         Gallbladder polyp    Leiomyoma of uterus    Acquired right foot drop    Hypomagnesemia    Hair loss    Polyp of ascending colon    Ulcerative pancolitis without complication ([facility])    Type 2 diabetes mellitus with hyperglycemia, unspecified whether long term insulin use ([facility])      Lab Results   Component Value Date    HGBA1C 6.4 (H) 11/11/2024             Past Medical History:   Diagnosis Date    CHF (congestive heart failure) ([facility]) 4/8/2024    Colitis     Diabetes mellitus ([facility])     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[facility])     PONV (postoperative nausea and vomiting)      Social History     Socioeconomic History    Marital status: /Civil Union     Spouse name: Not on file    Number of children: Not on file    Years of education: Not on file    Highest education level: Not on file   Occupational History    Not on file   Tobacco Use    Smoking status: Former     Types: Cigarettes     Start date: 1975     Passive exposure: Past    Smokeless tobacco: Never    Tobacco comments:     Quit 1970's   Vaping Use    Vaping status: Never Used   Substance and Sexual Activity    Alcohol use: Never    Drug use: Never    Sexual activity: Not on file   Other Topics Concern    Not on file   Social History Narrative    Not on file     Social Drivers of Health     Financial  Resource Strain: Patient Declined (8/18/2023)    Overall Financial Resource Strain (CARDIA)     Difficulty of Paying Living Expenses: Patient declined   Food Insecurity: Patient Declined (11/11/2024)    Hunger Vital Sign     Worried About Running Out of Food in the Last Year: Patient declined     Ran Out of Food in the Last Year: Patient declined   Transportation Needs: Patient Declined (11/11/2024)    PRAPARE - Transportation     Lack of Transportation (Medical): Patient declined     Lack of Transportation (Non-Medical): Patient declined   Physical Activity: Not on file   Stress: Not on file   Social Connections: Not on file   Intimate Partner Violence: Not on file   Housing Stability: Patient Declined (11/11/2024)    Housing Stability Vital Sign     Unable to Pay for Housing in the Last Year: Patient declined     Number of Times Moved in the Last Year: 1     Homeless in the Last Year: Patient declined      History reviewed. No pertinent family history.  Past Surgical History:   Procedure Laterality Date    CARDIAC CATHETERIZATION Left 01/17/2024    Procedure: Cardiac Left Heart Cath;  Surgeon: Juan A Sousa MD;  [facility]: [facility];  Service: Cardiology    CARDIAC CATHETERIZATION  01/17/2024    Procedure: Cardiac catheterization;  Surgeon: Juan A Sousa MD;  [facility];  Service: Cardiology    CARDIAC CATHETERIZATION Left 4/10/2024    Procedure: Cardiac Left Heart Cath;  Surgeon: Viktor Bee MD;  [facility];  Service: Cardiology    CARDIAC CATHETERIZATION N/A 4/10/2024    Procedure: Cardiac Coronary Angiogram;  Surgeon: Viktor Bee MD;  [facility]: [facility];  Service: Cardiology    CARDIAC CATHETERIZATION N/A 4/10/2024    Procedure: Cardiac Impella Insertion;  Surgeon: Viktor Bee MD;  [facility];  Service: Cardiology    CARDIAC CATHETERIZATION N/A 4/10/2024    Procedure: Cardiac pci;  Surgeon: Viktor Bee MD;  [facility];  Service: Cardiology    CARDIAC CATHETERIZATION N/A 4/12/2024    Procedure: Cardiac Impella Removal;  Surgeon: Viktor Bee MD;  [facility]: [facility];  Service: Cardiology    COLONOSCOPY  12/06/2023    HYSTERECTOMY      ~1990    IR PICC REPOSITION  4/18/2024    KY CORONARY ARTERY BYP W/VEIN & ARTERY GRAFT 3 VEIN N/A 4/9/2024    Procedure: CORONARY ARTERY BYPASS GRAFT (CABG) x3 VESSELS, LIMA - LAD, LEFT LEG EVH/SVG - PDA  AND OM, W/ELOISA;  Surgeon: Josh Castellanos MD;  Location: [facility];  Service: Cardiac Surgery    KY TEAEC W/PATCH GRF CAROTID VERTB SUBCLAV NECK INC Left 03/04/2024    Procedure: LEFT ENDARTERECTOMY ARTERY CAROTID;  Surgeon: Janeth De Oliveira DO;  Location: [facility];  Service: Vascular       Current Outpatient Medications:     acetaminophen (TYLENOL) 325 mg tablet, Take 2 tablets (650 mg total) by mouth every 6 (six) hours as needed for mild pain, Disp: , Rfl:     Alcohol Swabs 70 % PADS, May substitute brand based on insurance coverage. Check glucose TID., Disp: 100 each, Rfl: 0    aspirin 81 mg chewable tablet, Chew 1 tablet (81 mg total) daily, Disp: 90 tablet, Rfl: 3    cholecalciferol (VITAMIN D3) 1,000 units tablet, Take 1,000 Units by mouth daily, Disp: , Rfl:     clopidogrel (Plavix) 75 mg tablet, Take 1 tablet (75 mg total) by mouth daily Take 8 tabs on day one then 1 tablet daily after, Disp: 90 tablet, Rfl: 3    docusate sodium (COLACE) 100 mg capsule, Take 1 capsule (100 mg total) by mouth 2 (two) times a day, Disp: , Rfl:     Empagliflozin (JARDIANCE) 10 MG TABS tablet, Take 1 tablet (10 mg total) by mouth daily, Disp: 90 tablet, Rfl: 2    furosemide (LASIX) 40 mg tablet, Take 1 tablet (40 mg total) by mouth daily, Disp: 30 tablet, Rfl: 2    glucose blood test strip, Use 1 each daily as needed Use as instructed, Disp: , Rfl:     metoprolol succinate (TOPROL-XL) 25 mg 24 hr tablet, Take 0.5 tablets (12.5 mg total) by mouth daily, Disp: 45 tablet,  "Rfl: 1    OneTouch Delica Lancets 33G MISC, May substitute brand based on insurance coverage. Check glucose TID., Disp: 100 each, Rfl: 0    potassium chloride (Klor-Con M20) 20 mEq tablet, Take 1 tablet (20 mEq total) by mouth daily, Disp: 90 tablet, Rfl: 3    rosuvastatin (CRESTOR) 40 MG tablet, Take 1 tablet (40 mg total) by mouth daily, Disp: 90 tablet, Rfl: 2    clotrimazole (GYNE-LOTRIMIN) 1 % vaginal cream, Insert 1 applicator into the vagina daily at bedtime (Patient not taking: Reported on 7/16/2024), Disp: 45 g, Rfl: 0    magnesium gluconate (MAGONATE) 500 mg tablet, Take 1 tablet (500 mg total) by mouth in the morning (Patient taking differently: Take 400 mg by mouth in the morning), Disp: 90 tablet, Rfl: 0    prednisoLONE acetate (PRED FORTE) 1 % ophthalmic suspension, instill 1 drop into right eye as directed BEFORE LASER THEN AFTER...  (REFER TO PRESCRIPTION NOTES). (Patient not taking: Reported on 12/2/2024), Disp: , Rfl:   No Known Allergies      Labs:     Chemistry        Component Value Date/Time    K 4.5 11/11/2024 1144    K 4.4 08/17/2022 1507     11/11/2024 1144     08/17/2022 1507    CO2 32 11/11/2024 1144    CO2 22 04/09/2024 1307    CO2 27 08/17/2022 1507    BUN 26 (H) 11/11/2024 1144    BUN 14 08/17/2022 1507    CREATININE 0.91 11/11/2024 1144    CREATININE 0.78 08/17/2022 1507        Component Value Date/Time    CALCIUM 9.7 11/11/2024 1144    CALCIUM 10.0 08/17/2022 1507    ALKPHOS 69 11/11/2024 1144    AST 22 11/11/2024 1144    ALT 14 11/11/2024 1144            No results found for: \"CHOL\"  Lab Results   Component Value Date    HDL 45 (L) 07/01/2024    HDL 47 (L) 01/11/2024    HDL 44 06/02/2021     Lab Results   Component Value Date    LDLCALC 36 07/01/2024    LDLCALC 130 (H) 01/11/2024    LDLCALC 156 (H) 06/02/2021     Lab Results   Component Value Date    TRIG 110 07/01/2024    TRIG 135 01/11/2024    TRIG 204 (H) 06/02/2021     No results found for: \"CHOLHDL\"    Imaging: Echo " "complete w/ contrast if indicated    Result Date: 1/3/2024  Narrative:   Left Ventricle: Left ventricular cavity size is at the upper limits of normal when indexed for BSA. Wall thickness is mildly increased. There is eccentric hypertrophy. The left ventricular ejection fraction is 30%. Systolic function is severely reduced. Wall motion is normal. Unable to assess diastolic function due to E/A fusion due to tachycardia.   The following segments are akinetic: basal inferoseptal, basal inferior, mid inferoseptal and mid inferior.   The following segments are hypokinetic: basal anterior, basal anteroseptal, basal inferolateral, basal anterolateral, mid anterior, mid anteroseptal, mid inferolateral, mid anterolateral, apical anterior, apical septal, apical inferior, apical lateral and apex.   Left Atrium: The atrium is moderately dilated (42-48 mL/m2).   Mitral Valve: There is mild annular calcification. There is mild to moderate regurgitation.   Pulmonic Valve: There is mild regurgitation.   Pericardium: There is a small pericardial effusion along the right atrial and right ventricular free wall. The fluid exhibits no internal echoes. There is no echocardiographic evidence of tamponade. There is a left pleural effusion.       ECG: Sinus rhythm IVCD      ROS    Vitals:    12/02/24 1258   BP: 112/68   Pulse: 79   SpO2: 98%     Vitals:    12/02/24 1258   Weight: 77.1 kg (170 lb)     Height: 5' 4\" (162.6 cm)   Body mass index is 29.18 kg/m².    Physical Exam:  Vital signs reviewed  General:  Alert and cooperative, appears stated age, no acute distress  HEENT:  PERRLA, EOMI, no scleral icterus, no conjunctival pallor  Neck:  No lymphadenopathy, no thyromegaly, no carotid bruits, no elevated JVP  Heart:  Regular rate and rhythm, normal S1/S2, no S3/S4, no murmur, rubs or gallops.  PMI nondisplaced  Lungs:  Clear to auscultation bilaterally, no wheezes rales or rhonchi  Abdomen:  Soft, non-tender, positive bowel sounds, no " rebound or guarding,   no organomegaly   Extremities:  Normal range of motion.  No clubbing, cyanosis or edema   Vascular:  2+ pedal pulses  Skin:  No rashes or lesions on exposed skin  Neurologic:  Cranial nerves II-XII grossly intact without focal deficits  Psych:  Normal mood and affect

## 2024-12-05 ENCOUNTER — HOSPITAL ENCOUNTER (OUTPATIENT)
Dept: MAMMOGRAPHY | Facility: HOSPITAL | Age: 69
End: 2024-12-05
Payer: COMMERCIAL

## 2024-12-05 DIAGNOSIS — Z12.31 ENCOUNTER FOR SCREENING MAMMOGRAM FOR BREAST CANCER: ICD-10-CM

## 2024-12-05 PROCEDURE — 77063 BREAST TOMOSYNTHESIS BI: CPT

## 2024-12-05 PROCEDURE — 77067 SCR MAMMO BI INCL CAD: CPT

## 2024-12-17 ENCOUNTER — NURSE TRIAGE (OUTPATIENT)
Age: 69
End: 2024-12-17

## 2024-12-17 NOTE — TELEPHONE ENCOUNTER
"Reason for Conversation: received call from pt.  She calls regarding Rosuvastatin.  She read that it can cause muscle weakness.  She states she has been experiencing muscle weakness in legs for a while and is asking if this is the cause.  She does therapy, but states weakness continues to progress.  Pt asking if dose could be reduced to see if it helps.    VS/Weight: (Note: Please include date/time vitals/weight were measured)  unsure    Pain: No    Risk Factors: Arrhythmia and Other CAD, left bundle branch block, afib, CABG x 3, hyperlipidemia     Recent relevant testing and date of testing: Labs 7/1/24, echo 10/30/24    Medication: rosuvastatin 40 mg qd, metoprolol succinate 12.5 mg qd, plavix 75 mg qd, lasix 40 mg qd, jardiance 10 mg qd, asa 81 mg qd    Upcoming Office Visit: Yes    Last Office Visit: 12/2/24        Reason for Disposition  • Caller has NON-URGENT medicine question about med that PCP or specialist prescribed and triager unable to answer question    Answer Assessment - Initial Assessment Questions  1. NAME of MEDICINE: \"What medicine(s) are you calling about?\"      Rosuvastatin  2. QUESTION: \"What is your question?\" (e.g., double dose of medicine, side effect)      Side effect  3. PRESCRIBER: \"Who prescribed the medicine?\" Reason: if prescribed by specialist, call should be referred to that group.      Dr. Ceballos  4. SYMPTOMS: \"Do you have any symptoms?\" If Yes, ask: \"What symptoms are you having?\"  \"How bad are the symptoms (e.g., mild, moderate, severe)      Muscle weakness    Protocols used: Medication Question Call-Adult-[place]    "

## 2025-02-06 ENCOUNTER — RA CDI HCC (OUTPATIENT)
Dept: OTHER | Facility: HOSPITAL | Age: 70
End: 2025-02-06

## 2025-02-06 NOTE — PROGRESS NOTES
HCC coding opportunities          Chart Reviewed number of suggestions sent to Provider: 2   E11.22  E11.42 (12/22/23 foot exam)    Please review and document on all HCC diagnoses, using M.E.A.T. criteria, as risk scores reset with the New Year.    Patients Insurance     Medicare Insurance: [employer]

## 2025-02-12 ENCOUNTER — OFFICE VISIT (OUTPATIENT)
Dept: FAMILY MEDICINE CLINIC | Facility: CLINIC | Age: 70
End: 2025-02-12
Payer: COMMERCIAL

## 2025-02-12 VITALS
BODY MASS INDEX: 29.23 KG/M2 | WEIGHT: 171.2 LBS | SYSTOLIC BLOOD PRESSURE: 106 MMHG | OXYGEN SATURATION: 96 % | DIASTOLIC BLOOD PRESSURE: 80 MMHG | TEMPERATURE: 97.2 F | HEART RATE: 76 BPM | HEIGHT: 64 IN

## 2025-02-12 DIAGNOSIS — I25.5 ISCHEMIC CARDIOMYOPATHY: ICD-10-CM

## 2025-02-12 DIAGNOSIS — E78.2 MIXED HYPERLIPIDEMIA: ICD-10-CM

## 2025-02-12 DIAGNOSIS — R53.1 PARESTHESIAS WITH SUBJECTIVE WEAKNESS: ICD-10-CM

## 2025-02-12 DIAGNOSIS — I50.9 ACUTE CONGESTIVE HEART FAILURE, UNSPECIFIED HEART FAILURE TYPE (HCC): ICD-10-CM

## 2025-02-12 DIAGNOSIS — E11.65 TYPE 2 DIABETES MELLITUS WITH HYPERGLYCEMIA, WITHOUT LONG-TERM CURRENT USE OF INSULIN (HCC): Primary | ICD-10-CM

## 2025-02-12 DIAGNOSIS — N18.31 STAGE 3A CHRONIC KIDNEY DISEASE (HCC): ICD-10-CM

## 2025-02-12 DIAGNOSIS — D35.2 PITUITARY MACROADENOMA (HCC): ICD-10-CM

## 2025-02-12 DIAGNOSIS — N76.0 ACUTE VAGINITIS: ICD-10-CM

## 2025-02-12 DIAGNOSIS — M79.605 PAIN IN BOTH LOWER EXTREMITIES: ICD-10-CM

## 2025-02-12 DIAGNOSIS — Z79.4 TYPE 2 DIABETES MELLITUS WITH DIABETIC NEUROPATHY, WITH LONG-TERM CURRENT USE OF INSULIN (HCC): ICD-10-CM

## 2025-02-12 DIAGNOSIS — E11.40 TYPE 2 DIABETES MELLITUS WITH DIABETIC NEUROPATHY, WITH LONG-TERM CURRENT USE OF INSULIN (HCC): ICD-10-CM

## 2025-02-12 DIAGNOSIS — R53.83 OTHER FATIGUE: ICD-10-CM

## 2025-02-12 DIAGNOSIS — R25.2 FOOT CRAMPS: ICD-10-CM

## 2025-02-12 DIAGNOSIS — R20.2 PARESTHESIAS WITH SUBJECTIVE WEAKNESS: ICD-10-CM

## 2025-02-12 DIAGNOSIS — R30.0 DYSURIA: ICD-10-CM

## 2025-02-12 DIAGNOSIS — M79.604 PAIN IN BOTH LOWER EXTREMITIES: ICD-10-CM

## 2025-02-12 PROCEDURE — 99214 OFFICE O/P EST MOD 30 MIN: CPT | Performed by: NURSE PRACTITIONER

## 2025-02-12 PROCEDURE — G2211 COMPLEX E/M VISIT ADD ON: HCPCS | Performed by: NURSE PRACTITIONER

## 2025-02-12 RX ORDER — FLUCONAZOLE 150 MG/1
150 TABLET ORAL ONCE
Qty: 1 TABLET | Refills: 0 | Status: SHIPPED | OUTPATIENT
Start: 2025-02-12 | End: 2025-02-12

## 2025-02-12 NOTE — ASSESSMENT & PLAN NOTE
Lab Results   Component Value Date    HGBA1C 6.4 (H) 11/11/2024     Orders:  •  Comprehensive metabolic panel; Future  •  Hemoglobin A1C; Future

## 2025-02-12 NOTE — ASSESSMENT & PLAN NOTE
Lab Results   Component Value Date    EGFR 65 11/11/2024    EGFR 63 07/01/2024    EGFR 58 05/28/2024    CREATININE 0.91 11/11/2024    CREATININE 0.93 07/01/2024    CREATININE 1.00 05/28/2024

## 2025-02-12 NOTE — PROGRESS NOTES
Name: Julia Perry      : 1955      MRN: [id number]  Encounter Provider: ROYAL Olivia  Encounter Date: 2025   Encounter department: [facility]  :  Assessment & Plan  Type 2 diabetes mellitus with hyperglycemia, without long-term current use of insulin (HCC)    Lab Results   Component Value Date    HGBA1C 6.4 (H) 2024     Orders:  •  Comprehensive metabolic panel; Future  •  Hemoglobin A1C; Future    Pain in both lower extremities  Myalgias versus neuropathy.  Stop Crestor for 2 weeks and let me know if symptoms improved.  Did review the differential.  I think at least some of this is coming from her back she could have a local neuropathy as well.  I offered EMG with nerve conduction test, steroid course, gabapentin trial, pain management referral.  All were declined.  They are opening open to beginning physical therapy guarding back and seeing if that improves symptoms.  I will recheck in 3 to 4 months they will let me know if something is getting worse.       Foot cramps    Orders:  •  Magnesium; Future    Paresthesias with subjective weakness  Explained I do agree about the restless leg I just feel like she is got some other kind and arm neuropathic process most likely coming from her back.       Acute congestive heart failure, unspecified heart failure type (HCC)  Wt Readings from Last 3 Encounters:   25 77.7 kg (171 lb 3.2 oz)   24 77.1 kg (170 lb)   24 73.9 kg (163 lb)                  Ischemic cardiomyopathy    Orders:  •  Comprehensive metabolic panel; Future  •  Lipid Panel with Direct LDL reflex; Future    Stage 3a chronic kidney disease (HCC)  Lab Results   Component Value Date    EGFR 65 2024    EGFR 63 2024    EGFR 58 2024    CREATININE 0.91 2024    CREATININE 0.93 2024    CREATININE 1.00 2024          Mixed hyperlipidemia         Other fatigue    Orders:  •  TSH, 3rd generation with Free T4 reflex;  "Future  •  CBC and differential; Future    Type 2 diabetes mellitus with diabetic neuropathy, with long-term current use of insulin (HCC)    Lab Results   Component Value Date    HGBA1C 6.4 (H) 11/11/2024     Labs       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HCC)         Acute vaginitis    Orders:  •  fluconazole (DIFLUCAN) 150 mg tablet; Take 1 tablet (150 mg total) by mouth once for 1 dose  Recommend GYN referral if no better  Dysuria  Symptoms are vague is not clear if she is really having true dysuria or just the vaginal issue.  Orders:  •  UA w Reflex to Microscopic w Reflex to Culture; Future           History of Present Illness   Patient is here for routine visit.  Multiple issues.  Her first concern is pain in her feet and a restless legs.  She is has restless legs since a young age.  She has burning sensation in her feet and paresthesias that feel better when she moves them.  She did try to increase iron in diet thought it might of helped a little bit.  She also has a history of degenerative changes of her lumbar spine.  Her symptoms seem worse when she is sitting and she is having some during the day as well.  Definitely seem worse at night.  Her  wonders if it could be that and that was started when she had her coronary artery issue.  She also complains that she is fatigued all the time really has not recovered since she had her cardiomyopathy  diagnosis.  Did go to cardiac rehab for a while.  Does try and work out a little bit on her equipment at home.  There is a rowing machine and a bicycle.  That does not seem to make her symptoms worse when she uses it.  Sometimes when she wakes up and her feet are bothering her that it actually feels better to use the equipment    Vaginal Itching      Review of Systems    Objective   /80 (BP Location: Left arm, Patient Position: Sitting, Cuff Size: Adult)   Pulse 76   Temp (!) 97.2 °F (36.2 °C) (Tympanic)   Ht 5' 4\" (1.626 m)   Wt 77.7 kg (171 lb 3.2 oz)   SpO2 " 96%   BMI 29.39 kg/m²      Physical Exam  Constitutional:       General: She is not in acute distress.  Cardiovascular:      Rate and Rhythm: Normal rate and regular rhythm.      Pulses: no weak pulses.           Dorsalis pedis pulses are 2+ on the right side and 2+ on the left side.        Posterior tibial pulses are 2+ on the right side and 2+ on the left side.   Pulmonary:      Effort: Pulmonary effort is normal.      Breath sounds: Normal breath sounds.   Musculoskeletal:        Feet:    Feet:      Right foot:      Skin integrity: Callus present. No ulcer, skin breakdown, erythema, warmth or dry skin.      Left foot:      Skin integrity: Callus present. No ulcer, skin breakdown, erythema, warmth or dry skin.   Neurological:      Mental Status: She is alert.     Diabetic Foot Exam    Patient's shoes and socks removed.    Right Foot/Ankle   Right Foot Inspection  Skin Exam: skin normal, skin intact (Dystrophic toenails), callus and callus. No dry skin, no warmth, no erythema, no maceration, no abnormal color, no pre-ulcer and no ulcer.     Toe Exam: No swelling, no tenderness, erythema and  no right toe deformity    Sensory   Vibration: intact  Proprioception: intact  Monofilament testing: diminished    Vascular  Capillary refills: elevated  The right DP pulse is 2+. The right PT pulse is 2+.     Left Foot/Ankle  Left Foot Inspection  Skin Exam: skin normal, skin intact and callus. No dry skin, no warmth, no erythema, no maceration, normal color, no pre-ulcer and no ulcer.     Toe Exam: No swelling, no tenderness, no erythema and no left toe deformity.     Sensory   Vibration: intact  Proprioception: intact  Monofilament testing: intact    Vascular  Capillary refills: < 3 seconds  The left DP pulse is 2+. The left PT pulse is 2+.     Assign Risk Category  No deformity present  Loss of protective sensation  No weak pulses  Risk: 1

## 2025-02-13 ENCOUNTER — APPOINTMENT (OUTPATIENT)
Dept: LAB | Facility: HOSPITAL | Age: 70
End: 2025-02-13
Payer: COMMERCIAL

## 2025-02-13 ENCOUNTER — HOSPITAL ENCOUNTER (OUTPATIENT)
Dept: NON INVASIVE DIAGNOSTICS | Facility: HOSPITAL | Age: 70
Discharge: HOME | End: 2025-02-13
Attending: SURGERY
Payer: COMMERCIAL

## 2025-02-13 ENCOUNTER — RESULTS FOLLOW-UP (OUTPATIENT)
Dept: FAMILY MEDICINE CLINIC | Facility: CLINIC | Age: 70
End: 2025-02-13

## 2025-02-13 DIAGNOSIS — R53.83 OTHER FATIGUE: ICD-10-CM

## 2025-02-13 DIAGNOSIS — R25.2 FOOT CRAMPS: ICD-10-CM

## 2025-02-13 DIAGNOSIS — K51.00 ULCERATIVE PANCOLITIS WITHOUT COMPLICATION (HCC): ICD-10-CM

## 2025-02-13 DIAGNOSIS — K52.9 INFLAMMATORY BOWEL DISEASE: ICD-10-CM

## 2025-02-13 DIAGNOSIS — R30.0 DYSURIA: ICD-10-CM

## 2025-02-13 DIAGNOSIS — I25.5 ISCHEMIC CARDIOMYOPATHY: ICD-10-CM

## 2025-02-13 DIAGNOSIS — E11.65 TYPE 2 DIABETES MELLITUS WITH HYPERGLYCEMIA, WITHOUT LONG-TERM CURRENT USE OF INSULIN (HCC): ICD-10-CM

## 2025-02-13 DIAGNOSIS — I50.20 SYSTOLIC HEART FAILURE, UNSPECIFIED HF CHRONICITY (HCC): ICD-10-CM

## 2025-02-13 DIAGNOSIS — I65.22 STENOSIS OF LEFT CAROTID ARTERY: ICD-10-CM

## 2025-02-13 LAB
ALBUMIN SERPL BCG-MCNC: 4.5 G/DL (ref 3.5–5)
ALP SERPL-CCNC: 78 U/L (ref 34–104)
ALT SERPL W P-5'-P-CCNC: 13 U/L (ref 7–52)
ANION GAP SERPL CALCULATED.3IONS-SCNC: 6 MMOL/L (ref 4–13)
AST SERPL W P-5'-P-CCNC: 18 U/L (ref 13–39)
BASOPHILS # BLD AUTO: 0.09 THOUSANDS/ÂΜL (ref 0–0.1)
BASOPHILS NFR BLD AUTO: 1 % (ref 0–1)
BILIRUB SERPL-MCNC: 0.6 MG/DL (ref 0.2–1)
BILIRUB UR QL STRIP: NEGATIVE
BUN SERPL-MCNC: 26 MG/DL (ref 5–25)
CALCIUM SERPL-MCNC: 9.9 MG/DL (ref 8.4–10.2)
CHLORIDE SERPL-SCNC: 106 MMOL/L (ref 96–108)
CHOLEST SERPL-MCNC: 138 MG/DL (ref ?–200)
CLARITY UR: CLEAR
CO2 SERPL-SCNC: 28 MMOL/L (ref 21–32)
COLOR UR: YELLOW
CREAT SERPL-MCNC: 0.92 MG/DL (ref 0.6–1.3)
EOSINOPHIL # BLD AUTO: 0.38 THOUSAND/ÂΜL (ref 0–0.61)
EOSINOPHIL NFR BLD AUTO: 5 % (ref 0–6)
ERYTHROCYTE [DISTWIDTH] IN BLOOD BY AUTOMATED COUNT: 13.1 % (ref 11.6–15.1)
EST. AVERAGE GLUCOSE BLD GHB EST-MCNC: 151 MG/DL
GFR SERPL CREATININE-BSD FRML MDRD: 64 ML/MIN/1.73SQ M
GLUCOSE P FAST SERPL-MCNC: 123 MG/DL (ref 65–99)
GLUCOSE UR STRIP-MCNC: ABNORMAL MG/DL
HBA1C MFR BLD: 6.9 %
HCT VFR BLD AUTO: 42.9 % (ref 34.8–46.1)
HDLC SERPL-MCNC: 49 MG/DL
HGB BLD-MCNC: 13.6 G/DL (ref 11.5–15.4)
HGB UR QL STRIP.AUTO: NEGATIVE
IMM GRANULOCYTES # BLD AUTO: 0.01 THOUSAND/UL (ref 0–0.2)
IMM GRANULOCYTES NFR BLD AUTO: 0 % (ref 0–2)
KETONES UR STRIP-MCNC: NEGATIVE MG/DL
LDLC SERPL CALC-MCNC: 72 MG/DL (ref 0–100)
LEUKOCYTE ESTERASE UR QL STRIP: NEGATIVE
LYMPHOCYTES # BLD AUTO: 1.75 THOUSANDS/ÂΜL (ref 0.6–4.47)
LYMPHOCYTES NFR BLD AUTO: 21 % (ref 14–44)
MAGNESIUM SERPL-MCNC: 2.1 MG/DL (ref 1.9–2.7)
MCH RBC QN AUTO: 29.8 PG (ref 26.8–34.3)
MCHC RBC AUTO-ENTMCNC: 31.7 G/DL (ref 31.4–37.4)
MCV RBC AUTO: 94 FL (ref 82–98)
MONOCYTES # BLD AUTO: 0.63 THOUSAND/ÂΜL (ref 0.17–1.22)
MONOCYTES NFR BLD AUTO: 8 % (ref 4–12)
NEUTROPHILS # BLD AUTO: 5.53 THOUSANDS/ÂΜL (ref 1.85–7.62)
NEUTS SEG NFR BLD AUTO: 65 % (ref 43–75)
NITRITE UR QL STRIP: NEGATIVE
NRBC BLD AUTO-RTO: 0 /100 WBCS
PH UR STRIP.AUTO: 6 [PH]
PLATELET # BLD AUTO: 232 THOUSANDS/UL (ref 149–390)
PMV BLD AUTO: 11.1 FL (ref 8.9–12.7)
POTASSIUM SERPL-SCNC: 4.5 MMOL/L (ref 3.5–5.3)
PROT SERPL-MCNC: 7.5 G/DL (ref 6.4–8.4)
PROT UR STRIP-MCNC: NEGATIVE MG/DL
RBC # BLD AUTO: 4.57 MILLION/UL (ref 3.81–5.12)
SODIUM SERPL-SCNC: 140 MMOL/L (ref 135–147)
SP GR UR STRIP.AUTO: 1.01
TRIGL SERPL-MCNC: 84 MG/DL (ref ?–150)
TSH SERPL DL<=0.05 MIU/L-ACNC: 1.91 UIU/ML (ref 0.45–4.5)
UROBILINOGEN UR QL STRIP.AUTO: 0.2 E.U./DL
WBC # BLD AUTO: 8.39 THOUSAND/UL (ref 4.31–10.16)

## 2025-02-13 PROCEDURE — 83993 ASSAY FOR CALPROTECTIN FECAL: CPT

## 2025-02-13 PROCEDURE — 36415 COLL VENOUS BLD VENIPUNCTURE: CPT

## 2025-02-13 PROCEDURE — 83735 ASSAY OF MAGNESIUM: CPT

## 2025-02-13 PROCEDURE — 83036 HEMOGLOBIN GLYCOSYLATED A1C: CPT

## 2025-02-13 PROCEDURE — 81003 URINALYSIS AUTO W/O SCOPE: CPT

## 2025-02-13 PROCEDURE — 80053 COMPREHEN METABOLIC PANEL: CPT

## 2025-02-13 PROCEDURE — 93880 EXTRACRANIAL BILAT STUDY: CPT

## 2025-02-13 PROCEDURE — 80061 LIPID PANEL: CPT

## 2025-02-13 PROCEDURE — 85025 COMPLETE CBC W/AUTO DIFF WBC: CPT

## 2025-02-13 PROCEDURE — 84443 ASSAY THYROID STIM HORMONE: CPT

## 2025-02-14 LAB — CALPROTECTIN STL-MCNC: 264 ÂΜG/G

## 2025-02-14 PROCEDURE — 93880 EXTRACRANIAL BILAT STUDY: CPT | Performed by: SURGERY

## 2025-02-25 ENCOUNTER — TELEPHONE (OUTPATIENT)
Age: 70
End: 2025-02-25

## 2025-02-25 NOTE — TELEPHONE ENCOUNTER
Pt called stating Dr. Ceballos prescribed her clopidogrel 75mg daily 7/22/24.    She believes at that time he mentioned she would only need to take it for a year. She would like to confirm how long she will need to take clopidogrel.

## 2025-02-25 NOTE — TELEPHONE ENCOUNTER
Kathy Angulo RN  You3 minutes ago (2:59 PM)       Left message on voice mail for patient that she will need to continue clopidogrel throughout the fall. You will discuss further at her office visit.     Maverick Ceballos, DO  You50 minutes ago (2:12 PM)       She still needs to remain on the medications we will talk about it at our next visit but it would at least be through the fall.

## 2025-03-03 DIAGNOSIS — I50.9 ACUTE CONGESTIVE HEART FAILURE, UNSPECIFIED HEART FAILURE TYPE (HCC): ICD-10-CM

## 2025-03-03 DIAGNOSIS — R60.9 EDEMA, UNSPECIFIED TYPE: ICD-10-CM

## 2025-03-03 NOTE — TELEPHONE ENCOUNTER
Reason for call:   [x] Refill   [] Prior Auth  [] Other:     Office:   [x] PCP/Provider -   [] Specialty/Provider -     Medication: Furosemide    Dose/Frequency: 40 mg    Quantity: 90 tablets    Pharmacy: [facility] [address] [place] [address] [address] [address] [phone]     Does the patient have enough for 3 days?   [x] Yes   [] No - Send as HP to POD

## 2025-03-04 RX ORDER — FUROSEMIDE 40 MG/1
40 TABLET ORAL DAILY
Qty: 30 TABLET | Refills: 5 | Status: SHIPPED | OUTPATIENT
Start: 2025-03-04

## 2025-04-02 DIAGNOSIS — E11.65 TYPE 2 DIABETES MELLITUS WITH HYPERGLYCEMIA, WITHOUT LONG-TERM CURRENT USE OF INSULIN (HCC): Primary | ICD-10-CM

## 2025-04-02 DIAGNOSIS — R93.1 LVEF <40%: ICD-10-CM

## 2025-04-02 DIAGNOSIS — Z95.1 S/P CABG X 3: ICD-10-CM

## 2025-04-02 DIAGNOSIS — I48.91 ATRIAL FIBRILLATION WITH RVR (HCC): ICD-10-CM

## 2025-04-02 DIAGNOSIS — R60.9 EDEMA, UNSPECIFIED TYPE: ICD-10-CM

## 2025-04-02 DIAGNOSIS — I50.9 ACUTE CONGESTIVE HEART FAILURE, UNSPECIFIED HEART FAILURE TYPE (HCC): ICD-10-CM

## 2025-04-02 DIAGNOSIS — I25.5 ISCHEMIC CARDIOMYOPATHY: ICD-10-CM

## 2025-04-02 DIAGNOSIS — E11.65 TYPE 2 DIABETES MELLITUS WITH HYPERGLYCEMIA, WITHOUT LONG-TERM CURRENT USE OF INSULIN (HCC): ICD-10-CM

## 2025-04-02 RX ORDER — ATORVASTATIN CALCIUM 10 MG/1
10 TABLET, FILM COATED ORAL DAILY
Qty: 90 TABLET | Refills: 2 | Status: SHIPPED | OUTPATIENT
Start: 2025-04-02

## 2025-04-02 RX ORDER — FUROSEMIDE 40 MG/1
40 TABLET ORAL DAILY
Qty: 90 TABLET | Refills: 3 | Status: SHIPPED | OUTPATIENT
Start: 2025-04-02

## 2025-04-02 RX ORDER — POTASSIUM CHLORIDE 1500 MG/1
20 TABLET, EXTENDED RELEASE ORAL DAILY
Qty: 90 TABLET | Refills: 3 | Status: SHIPPED | OUTPATIENT
Start: 2025-04-02

## 2025-04-02 NOTE — TELEPHONE ENCOUNTER
Patient stopped by office requesting to speak to a nurse.  She needed refills on medication but also wanted to let ROYAL Ayala now how she did stopping her rosuvastatin.  After stopping the rosuvastatin her muscle pains resolved. She did wait awhile and restarted at 1/2 the dose and symptoms all came back.  She was asking for another statin to be called in for her and if it could be a generic due to expense of the statin medications.  Patient was taking rosuvastatin 40mg but her Cardiologist cut dose in 1/2 due to her lipid profile being improved.  Patient uses [facility] in [place] and would like a 90 day suplly.

## 2025-06-05 ENCOUNTER — OFFICE VISIT (OUTPATIENT)
Dept: CARDIOLOGY CLINIC | Facility: HOSPITAL | Age: 70
End: 2025-06-05

## 2025-06-05 VITALS
SYSTOLIC BLOOD PRESSURE: 116 MMHG | HEIGHT: 64 IN | HEART RATE: 82 BPM | WEIGHT: 173.8 LBS | DIASTOLIC BLOOD PRESSURE: 64 MMHG | OXYGEN SATURATION: 96 % | BODY MASS INDEX: 29.67 KG/M2

## 2025-06-05 DIAGNOSIS — Z95.1 S/P CABG X 3: ICD-10-CM

## 2025-06-05 DIAGNOSIS — I25.5 ISCHEMIC CARDIOMYOPATHY: ICD-10-CM

## 2025-06-05 DIAGNOSIS — I25.118 CORONARY ARTERY DISEASE INVOLVING NATIVE CORONARY ARTERY OF NATIVE HEART WITH OTHER FORM OF ANGINA PECTORIS (HCC): Primary | ICD-10-CM

## 2025-06-05 DIAGNOSIS — I65.22 STENOSIS OF LEFT CAROTID ARTERY: ICD-10-CM

## 2025-06-05 DIAGNOSIS — I48.91 NEW ONSET ATRIAL FIBRILLATION (HCC): ICD-10-CM

## 2025-06-05 DIAGNOSIS — E78.2 MIXED HYPERLIPIDEMIA: ICD-10-CM

## 2025-06-05 DIAGNOSIS — I44.7 LBBB (LEFT BUNDLE BRANCH BLOCK): ICD-10-CM

## 2025-06-06 NOTE — PROGRESS NOTES
Julia Perry  1955  [id number]  [facility] [address] [address] [phone]    1. Coronary artery disease involving native coronary artery of native heart with other form of angina pectoris (HCC)  POCT ECG      2. Ischemic cardiomyopathy        3. LBBB (left bundle branch block)        4. New onset atrial fibrillation (HCC)        5. Stenosis of left carotid artery        6. S/P CABG x 3        7. Mixed hyperlipidemia            Discussion/Summary:  #1 cardiomyopathy ejection fraction 30%  #2 extensive heavy coronary calcifications  #3 chronic combined systolic and diastolic congestive heart failure  #4 hyperlipidemia  #5 type 2 diabetes mellitus  #6 IVCD  #7 high degree left internal carotid stenosis  #8 intracranial stenosis  #9 pituitary mass    Recommendations: Overall she has been stable no signs of decompensated heart failure.  Ejection fraction remains depressed around 30%.  Patient continues to decline an ICD.  We talked about uptitrating GDMT she is concerned due to prior episodes of hypotension.  She is now off the midodrine completely.  Continue Toprol and Jardiance.  Carotids are followed by vascular.  No active claudication symptoms.  Continue workup for pituitary mass    Interval History: 69-year-old female with a history of type 2 diabetes and colitis presents for new patient consultation on behalf of Dr. Titus for new cardiomyopathy.  The patient has had shortness of breath, PND, orthopnea, chest pain since 2023.  She was started on steroids for colitis and had worsening shortness of breath but attributed this to medication side effects.  An echocardiogram was recently obtained which showed an ejection fraction of 30%.  The patient tells me she has had some chest heaviness with walking on and off for several years.  She is a diabetic.  The symptoms have been quite mild.  She has felt more shortness of breath with  activity and had occasional lower extremity edema recently.  There is been no syncope.  She has been taking her medications as prescribed.  She is a non-smoker.    Continues to remain active around the house.  Denies any chest pain, shortness of breath, palpitations, lightheadedness, dizziness, or syncope.  There is been a lower extreme edema, PND, orthopnea.  She has been taking all medications as prescribed.  No claudication.    Medical Problems       Problem List       Colitis    History of biliary dyskinesia    DMII (diabetes mellitus, type 2) ([facility] [place] [facility])      Lab Results   Component Value Date    HGBA1C 6.9 (H) 02/13/2025         Gallbladder polyp    Leiomyoma of uterus    Acquired right foot drop    Hypomagnesemia    Hair loss    Polyp of ascending colon    Ulcerative pancolitis without complication ([facility] [place] [facility])    Type 2 diabetes mellitus with hyperglycemia, unspecified whether long term insulin use ([facility] [place] [facility])      Lab Results   Component Value Date    HGBA1C 6.9 (H) 02/13/2025             Past Medical History:   Diagnosis Date    CHF (congestive heart failure) ([facility] [place] [facility]) 4/8/2024    Colitis     Diabetes mellitus ([facility] [place] [facility])     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[facility] [place] [facility])     PONV (postoperative nausea and vomiting)      Social History     Socioeconomic History    Marital status: /Civil Union     Spouse name: Not on file    Number of children: Not on file    Years of education: Not on file    Highest education level: Not on file   Occupational History    Not on file   Tobacco Use    Smoking status: Former     Types: Cigarettes     Start date: 1975     Passive exposure: Past    Smokeless tobacco: Never    Tobacco comments:     Quit 1970's   Vaping Use    Vaping status: Never Used   Substance and Sexual Activity    Alcohol use: Never    Drug use: Never    Sexual activity: Not on file   Other Topics Concern    Not on file   Social History Narrative    Not on file     Social Drivers of Health     Financial Resource Strain: Patient Declined  (8/18/2023)    Overall Financial Resource Strain (CARDIA)     Difficulty of Paying Living Expenses: Patient declined   Food Insecurity: Patient Declined (11/11/2024)    Nursing - Inadequate Food Risk Classification     Worried About Running Out of Food in the Last Year: Patient declined     Ran Out of Food in the Last Year: Patient declined     Ran Out of Food in the Last Year: Not on file   Transportation Needs: Patient Declined (11/11/2024)    PRAPARE - Transportation     Lack of Transportation (Medical): Patient declined     Lack of Transportation (Non-Medical): Patient declined   Physical Activity: Not on file   Stress: Not on file   Social Connections: Not on file   Intimate Partner Violence: Not on file   Housing Stability: Patient Declined (11/11/2024)    Housing Stability Vital Sign     Unable to Pay for Housing in the Last Year: Patient declined     Number of Times Moved in the Last Year: Not on file     Homeless in the Last Year: Patient declined      Family History   Problem Relation Name Age of Onset    No Known Problems Mother      No Known Problems Sister      No Known Problems Maternal Grandmother      No Known Problems Maternal Grandfather      No Known Problems Paternal Grandmother      No Known Problems Paternal Grandfather      Colon cancer Maternal Aunt      Cancer Maternal Aunt       Past Surgical History:   Procedure Laterality Date    CARDIAC CATHETERIZATION Left 01/17/2024    Procedure: Cardiac Left Heart Cath;  Surgeon: Juan A Sousa MD;  [facility]: [facility];  Service: Cardiology    CARDIAC CATHETERIZATION  01/17/2024    Procedure: Cardiac catheterization;  Surgeon: Juan A Sousa MD;  [facility];  Service: Cardiology    CARDIAC CATHETERIZATION Left 4/10/2024    Procedure: Cardiac Left Heart Cath;  Surgeon: Viktor Bee MD;  [facility];  Service: Cardiology    CARDIAC CATHETERIZATION N/A 4/10/2024    Procedure: Cardiac Coronary Angiogram;   Surgeon: Viktor Bee MD;  [facility];  Service: Cardiology    CARDIAC CATHETERIZATION N/A 4/10/2024    Procedure: Cardiac Impella Insertion;  Surgeon: Viktor Bee MD;  [facility];  Service: Cardiology    CARDIAC CATHETERIZATION N/A 4/10/2024    Procedure: Cardiac pci;  Surgeon: Viktor Bee MD;  [facility];  Service: Cardiology    CARDIAC CATHETERIZATION N/A 4/12/2024    Procedure: Cardiac Impella Removal;  Surgeon: Viktor Bee MD;  [facility];  Service: Cardiology    COLONOSCOPY  12/06/2023    HYSTERECTOMY      ~1990    IR PICC REPOSITION  4/18/2024    DC CORONARY ARTERY BYP W/VEIN & ARTERY GRAFT 3 VEIN N/A 4/9/2024    Procedure: CORONARY ARTERY BYPASS GRAFT (CABG) x3 VESSELS, LIMA - LAD, LEFT LEG EVH/SVG - PDA  AND OM, W/ELOISA;  Surgeon: Josh Castellanos MD;  Location: [facility];  Service: Cardiac Surgery    DC TEAEC W/PATCH GRF CAROTID VERTB SUBCLAV NECK INC Left 03/04/2024    Procedure: LEFT ENDARTERECTOMY ARTERY CAROTID;  Surgeon: Janeth De Oliveira DO;  Location: [facility];  Service: Vascular       Current Outpatient Medications:     acetaminophen (TYLENOL) 325 mg tablet, Take 2 tablets (650 mg total) by mouth every 6 (six) hours as needed for mild pain, Disp: , Rfl:     aspirin 81 mg chewable tablet, Chew 1 tablet (81 mg total) daily, Disp: 90 tablet, Rfl: 3    atorvastatin (LIPITOR) 10 mg tablet, Take 1 tablet (10 mg total) by mouth daily (Patient taking differently: Take 10 mg by mouth in the morning. 1/2 a day.), Disp: 90 tablet, Rfl: 2    clopidogrel (Plavix) 75 mg tablet, Take 1 tablet (75 mg total) by mouth daily Take 8 tabs on day one then 1 tablet daily after, Disp: 90 tablet, Rfl: 3    docusate sodium (COLACE) 100 mg capsule, Take 1 capsule (100 mg total) by mouth 2 (two) times a day (Patient taking differently: Take 100 mg by mouth if needed), Disp: , Rfl:     Empagliflozin (JARDIANCE) 10 MG TABS tablet,  Take 1 tablet (10 mg total) by mouth daily, Disp: 90 tablet, Rfl: 2    furosemide (LASIX) 40 mg tablet, Take 1 tablet (40 mg total) by mouth daily (Patient taking differently: Take 40 mg by mouth in the morning. 1/2 a day.), Disp: 90 tablet, Rfl: 3    glucose blood test strip, Use 2 times daily or as directed One Touch Ultra, Disp: 180 each, Rfl: 1    magnesium gluconate (MAGONATE) 500 mg tablet, Take 1 tablet (500 mg total) by mouth in the morning, Disp: 90 tablet, Rfl: 0    metoprolol succinate (TOPROL-XL) 25 mg 24 hr tablet, Take 0.5 tablets (12.5 mg total) by mouth daily, Disp: 45 tablet, Rfl: 1    OneTouch Delica Lancets 33G MISC, May substitute brand based on insurance coverage. Check glucose TID., Disp: 100 each, Rfl: 0    potassium chloride (Klor-Con M20) 20 mEq tablet, Take 1 tablet (20 mEq total) by mouth daily, Disp: 90 tablet, Rfl: 3    Alcohol Swabs 70 % PADS, May substitute brand based on insurance coverage. Check glucose TID. (Patient not taking: Reported on 6/5/2025), Disp: 100 each, Rfl: 0    cholecalciferol (VITAMIN D3) 1,000 units tablet, Take 1,000 Units by mouth in the morning. (Patient not taking: Reported on 6/5/2025), Disp: , Rfl:     clotrimazole (GYNE-LOTRIMIN) 1 % vaginal cream, Insert 1 applicator into the vagina daily at bedtime (Patient not taking: Reported on 7/16/2024), Disp: 45 g, Rfl: 0    prednisoLONE acetate (PRED FORTE) 1 % ophthalmic suspension, instill 1 drop into right eye as directed BEFORE LASER THEN AFTER...  (REFER TO PRESCRIPTION NOTES). (Patient not taking: Reported on 2/12/2025), Disp: , Rfl:     rosuvastatin (CRESTOR) 40 MG tablet, Take 1 tablet (40 mg total) by mouth daily (Patient taking differently: Take 20 mg by mouth daily), Disp: 90 tablet, Rfl: 2  Allergies   Allergen Reactions    Crestor [Rosuvastatin] Myalgia         Labs:     Chemistry        Component Value Date/Time    K 4.5 02/13/2025 1253    K 4.4 08/17/2022 1507     02/13/2025 1253      "08/17/2022 1507    CO2 28 02/13/2025 1253    CO2 22 04/09/2024 1307    CO2 27 08/17/2022 1507    BUN 26 (H) 02/13/2025 1253    BUN 14 08/17/2022 1507    CREATININE 0.92 02/13/2025 1253    CREATININE 0.78 08/17/2022 1507        Component Value Date/Time    CALCIUM 9.9 02/13/2025 1253    CALCIUM 10.0 08/17/2022 1507    ALKPHOS 78 02/13/2025 1253    AST 18 02/13/2025 1253    ALT 13 02/13/2025 1253            No results found for: \"CHOL\"  Lab Results   Component Value Date    HDL 49 (L) 02/13/2025    HDL 45 (L) 07/01/2024    HDL 47 (L) 01/11/2024     Lab Results   Component Value Date    LDLCALC 72 02/13/2025    LDLCALC 36 07/01/2024    LDLCALC 130 (H) 01/11/2024     Lab Results   Component Value Date    TRIG 84 02/13/2025    TRIG 110 07/01/2024    TRIG 135 01/11/2024     No results found for: \"CHOLHDL\"    Imaging: Echo complete w/ contrast if indicated    Result Date: 1/3/2024  Narrative:   Left Ventricle: Left ventricular cavity size is at the upper limits of normal when indexed for BSA. Wall thickness is mildly increased. There is eccentric hypertrophy. The left ventricular ejection fraction is 30%. Systolic function is severely reduced. Wall motion is normal. Unable to assess diastolic function due to E/A fusion due to tachycardia.   The following segments are akinetic: basal inferoseptal, basal inferior, mid inferoseptal and mid inferior.   The following segments are hypokinetic: basal anterior, basal anteroseptal, basal inferolateral, basal anterolateral, mid anterior, mid anteroseptal, mid inferolateral, mid anterolateral, apical anterior, apical septal, apical inferior, apical lateral and apex.   Left Atrium: The atrium is moderately dilated (42-48 mL/m2).   Mitral Valve: There is mild annular calcification. There is mild to moderate regurgitation.   Pulmonic Valve: There is mild regurgitation.   Pericardium: There is a small pericardial effusion along the right atrial and right ventricular free wall. The fluid " "exhibits no internal echoes. There is no echocardiographic evidence of tamponade. There is a left pleural effusion.       ECG: Sinus rhythm IVCD      ROS    Vitals:    06/05/25 1339   BP: 116/64   Pulse: 82   SpO2: 96%     Vitals:    06/05/25 1339   Weight: 78.8 kg (173 lb 12.8 oz)     Height: 5' 4\" (162.6 cm)   Body mass index is 29.83 kg/m².    Physical Exam:  Vital signs reviewed  General:  Alert and cooperative, appears stated age, no acute distress  HEENT:  PERRLA, EOMI, no scleral icterus, no conjunctival pallor  Neck:  No lymphadenopathy, no thyromegaly, no carotid bruits, no elevated JVP  Heart:  Regular rate and rhythm, normal S1/S2, no S3/S4, no murmur, rubs or gallops.  PMI nondisplaced  Lungs:  Clear to auscultation bilaterally, no wheezes rales or rhonchi  Abdomen:  Soft, non-tender, positive bowel sounds, no rebound or guarding,   no organomegaly   Extremities:  Normal range of motion.  No clubbing, cyanosis or edema   Vascular:  2+ pedal pulses  Skin:  No rashes or lesions on exposed skin  Neurologic:  Cranial nerves II-XII grossly intact without focal deficits  Psych:  Normal mood and affect            "

## 2025-06-11 ENCOUNTER — OFFICE VISIT (OUTPATIENT)
Dept: FAMILY MEDICINE CLINIC | Facility: CLINIC | Age: 70
End: 2025-06-11
Payer: COMMERCIAL

## 2025-06-11 ENCOUNTER — APPOINTMENT (OUTPATIENT)
Age: 70
End: 2025-06-11
Payer: COMMERCIAL

## 2025-06-11 VITALS
HEIGHT: 64 IN | OXYGEN SATURATION: 97 % | SYSTOLIC BLOOD PRESSURE: 100 MMHG | HEART RATE: 66 BPM | DIASTOLIC BLOOD PRESSURE: 62 MMHG | WEIGHT: 173 LBS | BODY MASS INDEX: 29.53 KG/M2

## 2025-06-11 DIAGNOSIS — E11.65 TYPE 2 DIABETES MELLITUS WITH HYPERGLYCEMIA, WITHOUT LONG-TERM CURRENT USE OF INSULIN (HCC): ICD-10-CM

## 2025-06-11 DIAGNOSIS — R25.2 CRAMPING OF FEET: ICD-10-CM

## 2025-06-11 DIAGNOSIS — E53.8 VITAMIN B 12 DEFICIENCY: ICD-10-CM

## 2025-06-11 DIAGNOSIS — M79.671 RIGHT FOOT PAIN: ICD-10-CM

## 2025-06-11 DIAGNOSIS — E55.9 VITAMIN D DEFICIENCY: ICD-10-CM

## 2025-06-11 DIAGNOSIS — E11.65 TYPE 2 DIABETES MELLITUS WITH HYPERGLYCEMIA, WITHOUT LONG-TERM CURRENT USE OF INSULIN (HCC): Primary | ICD-10-CM

## 2025-06-11 DIAGNOSIS — R53.83 OTHER FATIGUE: ICD-10-CM

## 2025-06-11 DIAGNOSIS — Z23 ENCOUNTER FOR IMMUNIZATION: ICD-10-CM

## 2025-06-11 DIAGNOSIS — E78.2 MIXED HYPERLIPIDEMIA: ICD-10-CM

## 2025-06-11 LAB
25(OH)D3 SERPL-MCNC: 47.3 NG/ML (ref 30–100)
ANION GAP SERPL CALCULATED.3IONS-SCNC: 8 MMOL/L (ref 4–13)
BASOPHILS # BLD AUTO: 0.07 THOUSANDS/ÂΜL (ref 0–0.1)
BASOPHILS NFR BLD AUTO: 1 % (ref 0–1)
BUN SERPL-MCNC: 22 MG/DL (ref 5–25)
CALCIUM SERPL-MCNC: 9.8 MG/DL (ref 8.4–10.2)
CHLORIDE SERPL-SCNC: 104 MMOL/L (ref 96–108)
CHOLEST SERPL-MCNC: 170 MG/DL (ref ?–200)
CO2 SERPL-SCNC: 29 MMOL/L (ref 21–32)
CREAT SERPL-MCNC: 0.88 MG/DL (ref 0.6–1.3)
EOSINOPHIL # BLD AUTO: 0.22 THOUSAND/ÂΜL (ref 0–0.61)
EOSINOPHIL NFR BLD AUTO: 3 % (ref 0–6)
ERYTHROCYTE [DISTWIDTH] IN BLOOD BY AUTOMATED COUNT: 13.2 % (ref 11.6–15.1)
GFR SERPL CREATININE-BSD FRML MDRD: 67 ML/MIN/1.73SQ M
GLUCOSE P FAST SERPL-MCNC: 127 MG/DL (ref 65–99)
HCT VFR BLD AUTO: 45.1 % (ref 34.8–46.1)
HDLC SERPL-MCNC: 49 MG/DL
HGB BLD-MCNC: 14.4 G/DL (ref 11.5–15.4)
IMM GRANULOCYTES # BLD AUTO: 0.02 THOUSAND/UL (ref 0–0.2)
IMM GRANULOCYTES NFR BLD AUTO: 0 % (ref 0–2)
LDLC SERPL CALC-MCNC: 97 MG/DL (ref 0–100)
LYMPHOCYTES # BLD AUTO: 2.13 THOUSANDS/ÂΜL (ref 0.6–4.47)
LYMPHOCYTES NFR BLD AUTO: 28 % (ref 14–44)
MAGNESIUM SERPL-MCNC: 2.3 MG/DL (ref 1.9–2.7)
MCH RBC QN AUTO: 29.8 PG (ref 26.8–34.3)
MCHC RBC AUTO-ENTMCNC: 31.9 G/DL (ref 31.4–37.4)
MCV RBC AUTO: 93 FL (ref 82–98)
MONOCYTES # BLD AUTO: 0.6 THOUSAND/ÂΜL (ref 0.17–1.22)
MONOCYTES NFR BLD AUTO: 8 % (ref 4–12)
NEUTROPHILS # BLD AUTO: 4.55 THOUSANDS/ÂΜL (ref 1.85–7.62)
NEUTS SEG NFR BLD AUTO: 60 % (ref 43–75)
NONHDLC SERPL-MCNC: 121 MG/DL
NRBC BLD AUTO-RTO: 0 /100 WBCS
PLATELET # BLD AUTO: 266 THOUSANDS/UL (ref 149–390)
PMV BLD AUTO: 12.5 FL (ref 8.9–12.7)
POTASSIUM SERPL-SCNC: 4.4 MMOL/L (ref 3.5–5.3)
RBC # BLD AUTO: 4.83 MILLION/UL (ref 3.81–5.12)
SODIUM SERPL-SCNC: 141 MMOL/L (ref 135–147)
TRIGL SERPL-MCNC: 118 MG/DL (ref ?–150)
VIT B12 SERPL-MCNC: 236 PG/ML (ref 180–914)
WBC # BLD AUTO: 7.59 THOUSAND/UL (ref 4.31–10.16)

## 2025-06-11 PROCEDURE — 99214 OFFICE O/P EST MOD 30 MIN: CPT | Performed by: NURSE PRACTITIONER

## 2025-06-11 PROCEDURE — 83036 HEMOGLOBIN GLYCOSYLATED A1C: CPT

## 2025-06-11 PROCEDURE — G2211 COMPLEX E/M VISIT ADD ON: HCPCS | Performed by: NURSE PRACTITIONER

## 2025-06-11 PROCEDURE — 85025 COMPLETE CBC W/AUTO DIFF WBC: CPT

## 2025-06-11 PROCEDURE — 83735 ASSAY OF MAGNESIUM: CPT

## 2025-06-11 PROCEDURE — 80048 BASIC METABOLIC PNL TOTAL CA: CPT

## 2025-06-11 PROCEDURE — 36415 COLL VENOUS BLD VENIPUNCTURE: CPT

## 2025-06-11 PROCEDURE — 82306 VITAMIN D 25 HYDROXY: CPT

## 2025-06-11 PROCEDURE — 82607 VITAMIN B-12: CPT

## 2025-06-11 PROCEDURE — 80061 LIPID PANEL: CPT

## 2025-06-11 NOTE — PROGRESS NOTES
"Name: Julia Perry      : 1955      MRN: [id number]  Encounter Provider: ROYAL Olivia  Encounter Date: 2025   Encounter department: [facility]  :  Assessment & Plan  Type 2 diabetes mellitus with hyperglycemia, without long-term current use of insulin (HCC)    Lab Results   Component Value Date    HGBA1C 7.1 (H) 2025       Orders:    POCT hemoglobin A1c    Basic metabolic panel; Future    Hemoglobin A1C; Future    Vitamin D 25 hydroxy; Future    Encounter for immunization         Other fatigue    Orders:    Basic metabolic panel; Future    CBC and differential; Future    Vitamin B12; Future    Cramping of feet    Orders:    Basic metabolic panel; Future    Magnesium; Future    Vitamin B 12 deficiency    Orders:    Vitamin B12; Future    Vitamin D deficiency    Orders:    Vitamin D 25 hydroxy; Future    Mixed hyperlipidemia    Orders:    Lipid panel; Future    Right foot pain                History of Present Illness   Patient is here for routine.  Is a follow-up for diabetes.  Also when I last saw her she was having a lot of neuropathy.  She had declined any EMG, gabapentin trial, and steroid trial.  They were considering physical therapy when I saw them last.  For today's visit she mentions she is most bothered by the numbness on the dorsum of her foot in a discomfort there that sometimes is a sharp pain.  She does not feel any burning or tingling on the soles of her feet.    She is feeling better but she notes her sugar might be little worse because she has been doing a little worse with her diet.  Her mood is good.  Her  is with her.      Review of Systems    Objective   /62 (BP Location: Left arm, Patient Position: Sitting, Cuff Size: Adult)   Pulse 66   Ht 5' 4\" (1.626 m)   Wt 78.5 kg (173 lb)   SpO2 97%   BMI 29.70 kg/m²      Physical Exam    Cardiovascular:      Rate and Rhythm: Normal rate and regular rhythm.   Pulmonary:      Effort: " Pulmonary effort is normal.      Breath sounds: Normal breath sounds.     Psychiatric:         Mood and Affect: Mood normal.         Behavior: Behavior normal.

## 2025-06-11 NOTE — ASSESSMENT & PLAN NOTE
Lab Results   Component Value Date    HGBA1C 7.1 (H) 06/11/2025       Orders:    POCT hemoglobin A1c    Basic metabolic panel; Future    Hemoglobin A1C; Future    Vitamin D 25 hydroxy; Future

## 2025-06-12 ENCOUNTER — TELEPHONE (OUTPATIENT)
Dept: ADMINISTRATIVE | Facility: OTHER | Age: 70
End: 2025-06-12

## 2025-06-12 ENCOUNTER — RESULTS FOLLOW-UP (OUTPATIENT)
Dept: FAMILY MEDICINE CLINIC | Facility: CLINIC | Age: 70
End: 2025-06-12

## 2025-06-12 LAB
EST. AVERAGE GLUCOSE BLD GHB EST-MCNC: 157 MG/DL
HBA1C MFR BLD: 7.1 %

## 2025-06-12 NOTE — TELEPHONE ENCOUNTER
Upon review of the In Basket request and the patient's chart, initial outreach has been made via fax to facility. Please see Contacts section for details.     Thank you  Denise Dove MA

## 2025-06-12 NOTE — TELEPHONE ENCOUNTER
----- Message from Luiza HERRERA sent at 6/11/2025 12:56 PM EDT -----  Regarding: dm eye  06/11/25 12:56 PMHello, our patient Julia Perry has had Diabetic Eye Exam completed/performed. Please assist in updating the patient chart by making an External outreach to [facility] @[address] [place], [place] [phone] facility located in . The date of service is 2025.Thank you,Luiza Cooper, [facility]

## 2025-06-12 NOTE — LETTER
Diabetic Eye Exam Form    Date Requested: 25  Patient: Julia Perry  Patient : 1955   Referring Provider: ROYAL Olivia      DIABETIC Eye Exam Date _______________________________      Type of Exam MUST be documented for Diabetic Eye Exams. Please CHECK ONE.     Retinal Exam       Dilated Retinal Exam       OCT       Optomap-Iris Exam      Fundus Photography       Left Eye - Please check Retinopathy or No Retinopathy        Exam did show retinopathy    Exam did not show retinopathy       Right Eye - Please check Retinopathy or No Retinopathy       Exam did show retinopathy    Exam did not show retinopathy       Comments __________________________________________________________    Practice Providing Exam ______________________________________________    Exam Performed By (print name) _______________________________________      Provider Signature ___________________________________________________      These reports are needed for  compliance.    Please fax this completed form and a copy of the Diabetic Eye Exam report to the [facility] Based Department as soon as possible via Fax [phone], cynthia Walker: Phone [phone]. Our office is located at [address] [place] [address].     We thank you for your assistance in treating our mutual patient.

## 2025-06-13 PROBLEM — G89.29 CHRONIC FOOT PAIN, RIGHT: Status: ACTIVE | Noted: 2025-06-13

## 2025-06-13 PROBLEM — M79.671 CHRONIC FOOT PAIN, RIGHT: Status: ACTIVE | Noted: 2025-06-13

## 2025-06-20 NOTE — TELEPHONE ENCOUNTER
Upon review of the In Basket request we were able to locate, review, and update the patient chart as requested for Diabetic Eye Exam.    Any additional questions or concerns should be emailed to the Practice Liaisons via the appropriate education email address, please do not reply via In Basket.    Thank you  Denise Dove MA   [facility]

## 2025-06-23 DIAGNOSIS — I48.91 ATRIAL FIBRILLATION WITH RVR (HCC): ICD-10-CM

## 2025-06-23 DIAGNOSIS — I50.9 ACUTE CONGESTIVE HEART FAILURE, UNSPECIFIED HEART FAILURE TYPE (HCC): ICD-10-CM

## 2025-06-23 DIAGNOSIS — E11.65 TYPE 2 DIABETES MELLITUS WITH HYPERGLYCEMIA, WITHOUT LONG-TERM CURRENT USE OF INSULIN (HCC): ICD-10-CM

## 2025-06-23 DIAGNOSIS — Z95.1 S/P CABG X 3: ICD-10-CM

## 2025-06-23 DIAGNOSIS — R93.1 LVEF <40%: ICD-10-CM

## 2025-06-23 NOTE — TELEPHONE ENCOUNTER
Refill     Toprol-XL 25 mg    [facility] [place]     Patient is picking up other meds and would like to pick them all up together

## 2025-06-24 RX ORDER — METOPROLOL SUCCINATE 25 MG/1
12.5 TABLET, EXTENDED RELEASE ORAL DAILY
Qty: 45 TABLET | Refills: 1 | Status: SHIPPED | OUTPATIENT
Start: 2025-06-24

## 2025-07-29 ENCOUNTER — OFFICE VISIT (OUTPATIENT)
Dept: FAMILY MEDICINE CLINIC | Facility: CLINIC | Age: 70
End: 2025-07-29
Payer: COMMERCIAL

## 2025-07-29 ENCOUNTER — NURSE TRIAGE (OUTPATIENT)
Age: 70
End: 2025-07-29

## 2025-07-29 VITALS
DIASTOLIC BLOOD PRESSURE: 68 MMHG | SYSTOLIC BLOOD PRESSURE: 112 MMHG | WEIGHT: 172 LBS | OXYGEN SATURATION: 98 % | TEMPERATURE: 96.7 F | BODY MASS INDEX: 29.37 KG/M2 | HEIGHT: 64 IN | HEART RATE: 80 BPM

## 2025-07-29 DIAGNOSIS — N76.0 ACUTE VAGINITIS: ICD-10-CM

## 2025-07-29 DIAGNOSIS — R30.0 DYSURIA: Primary | ICD-10-CM

## 2025-07-29 LAB
SL AMB  POCT GLUCOSE, UA: NORMAL
SL AMB LEUKOCYTE ESTERASE,UA: NEGATIVE
SL AMB POCT BILIRUBIN,UA: NEGATIVE
SL AMB POCT BLOOD,UA: NEGATIVE
SL AMB POCT CLARITY,UA: CLEAR
SL AMB POCT COLOR,UA: YELLOW
SL AMB POCT KETONES,UA: NEGATIVE
SL AMB POCT NITRITE,UA: NEGATIVE
SL AMB POCT PH,UA: 5.5
SL AMB POCT SPECIFIC GRAVITY,UA: 1.02
SL AMB POCT URINE PROTEIN: NEGATIVE
SL AMB POCT UROBILINOGEN: NORMAL

## 2025-07-29 PROCEDURE — 81002 URINALYSIS NONAUTO W/O SCOPE: CPT | Performed by: NURSE PRACTITIONER

## 2025-07-29 PROCEDURE — 87086 URINE CULTURE/COLONY COUNT: CPT | Performed by: NURSE PRACTITIONER

## 2025-07-29 PROCEDURE — 99214 OFFICE O/P EST MOD 30 MIN: CPT | Performed by: NURSE PRACTITIONER

## 2025-07-29 PROCEDURE — G2211 COMPLEX E/M VISIT ADD ON: HCPCS | Performed by: NURSE PRACTITIONER

## 2025-07-29 RX ORDER — FLUCONAZOLE 100 MG/1
100 TABLET ORAL DAILY
Qty: 10 TABLET | Refills: 0 | Status: SHIPPED | OUTPATIENT
Start: 2025-07-29 | End: 2025-07-29

## 2025-07-29 RX ORDER — FLUCONAZOLE 100 MG/1
100 TABLET ORAL DAILY
Qty: 10 TABLET | Refills: 0 | Status: SHIPPED | OUTPATIENT
Start: 2025-07-29 | End: 2025-08-08

## 2025-07-31 LAB — BACTERIA UR CULT: NORMAL

## (undated) DEVICE — JP CHANNEL DRAIN, 24FR HUBLESS: Brand: CARDINAL HEALTH

## (undated) DEVICE — STERNAL WIRE

## (undated) DEVICE — PROBE VASC 1-1.5MM 45CM

## (undated) DEVICE — ELECTRODE BLADE E-Z CLEAN 4IN -0014A

## (undated) DEVICE — CATH DIAG 6FR IMPULSE 100CM FL4

## (undated) DEVICE — CATH DIAG 5FR IMPULSE 100CM MPA-1

## (undated) DEVICE — 3M™ IOBAN™ 2 ANTIMICROBIAL INCISE DRAPE 6650EZ: Brand: IOBAN™ 2

## (undated) DEVICE — RECIP.STERNUM SAW BLADE 34/7.5/0.7MM: Brand: AESCULAP

## (undated) DEVICE — SUT SILK 3-0 30 IN A304H

## (undated) DEVICE — THYROID SHEET: Brand: CONVERTORS

## (undated) DEVICE — GLOVE INDICATOR PI UNDERGLOVE SZ 7.5 BLUE

## (undated) DEVICE — INTENDED FOR TISSUE SEPARATION, AND OTHER PROCEDURES THAT REQUIRE A SHARP SURGICAL BLADE TO PUNCTURE OR CUT.: Brand: BARD-PARKER ® CARBON RIB-BACK BLADES

## (undated) DEVICE — NEEDLE 25G X 1 1/2

## (undated) DEVICE — SUT SILK 2 60 IN SA8H

## (undated) DEVICE — OCTOPUS SUTURE HOLDER INSERT MEDTRONIC

## (undated) DEVICE — 40601 PROLONGED POSITIONING SYSTEM: Brand: 40601 PROLONGED POSITIONING SYSTEM

## (undated) DEVICE — 28 FR RIGHT ANGLE – SOFT PVC CATHETER: Brand: PVC THORACIC CATHETERS

## (undated) DEVICE — GLIDESHEATH BASIC HYDROPHILIC COATED INTRODUCER SHEATH: Brand: GLIDESHEATH

## (undated) DEVICE — DESTINATION PERIPHERAL GUIDING SHEATH: Brand: DESTINATION

## (undated) DEVICE — DECANTER: Brand: UNBRANDED

## (undated) DEVICE — EVERGRIP INSERT SET 61MM: Brand: FOGARTY EVERGRIP

## (undated) DEVICE — ACE WRAP 6 IN UNSTERILE

## (undated) DEVICE — MICROPUNCTURE INTRODUCER SET SILHOUETTE TRANSITIONLESS PUSH-PLUS DESIGN - STIFFENED CANNULA WITH NITINOL WIRE GUIDE: Brand: MICROPUNCTURE

## (undated) DEVICE — CATH DIAG 6FR IMPULSE 100CM FR4

## (undated) DEVICE — IV CATH 16 G SAFETY

## (undated) DEVICE — MICROPUNCTURE 501

## (undated) DEVICE — BLADE BEAVER MINI SZ 69

## (undated) DEVICE — GLOVE SRG BIOGEL 7.5

## (undated) DEVICE — DRAPE SHEET THREE QUARTER

## (undated) DEVICE — 40529 DERMAPROX PAD 11'' X 15'' X 1'': Brand: 40529 DERMAPROX PAD 11'' X 15'' X 1''

## (undated) DEVICE — SYRINGE 10ML LL

## (undated) DEVICE — TUBING INSUFFLATION SET ISO CONNECTOR

## (undated) DEVICE — SURGICEL FIBRILLAR 1 X 2

## (undated) DEVICE — Device: Brand: PROWATER

## (undated) DEVICE — SYRINGE 50ML LL

## (undated) DEVICE — SUT MONOCRYL 3-0 SH 27 IN Y416H

## (undated) DEVICE — GAUZE SPONGES,16 PLY: Brand: CURITY

## (undated) DEVICE — SURGICEL 2 X 14

## (undated) DEVICE — CATH DIAG 5FR IMPULSE 100CM FL4

## (undated) DEVICE — SUT SILK 3-0 SH CR/8 18 IN C013D

## (undated) DEVICE — PROXIMATE SKIN STAPLERS (35 WIDE) CONTAINS 35 STAINLESS STEEL STAPLES (FIXED HEAD): Brand: PROXIMATE

## (undated) DEVICE — SUT SILK 4-0 30 IN A303H

## (undated) DEVICE — Device: Brand: ASAHI SILVERWAY

## (undated) DEVICE — BETHL CAROTID ENDARTERECTOMY: Brand: CARDINAL HEALTH

## (undated) DEVICE — CATH IMPELLA CP

## (undated) DEVICE — PROVE COVER: Brand: UNBRANDED

## (undated) DEVICE — SILVER-COATED ANTIBACTERIAL BARRIER DRESSING: Brand: ACTICOAT SURGIC 10X12CM 5PK US

## (undated) DEVICE — RUNTHROUGH NS EXTRA FLOPPY PTCA GUIDEWIRE: Brand: RUNTHROUGH

## (undated) DEVICE — SUT MONOCRYL 4-0 PS-2 27 IN Y426H

## (undated) DEVICE — SUT SILK 2-0 30 IN A305H

## (undated) DEVICE — 28 FR STRAIGHT – SOFT PVC CATHETER: Brand: PVC THORACIC CATHETERS

## (undated) DEVICE — PLUMEPEN PRO 10FT

## (undated) DEVICE — GLOVE SRG BIOGEL ECLIPSE 7

## (undated) DEVICE — PINNACLE INTRODUCER SHEATH: Brand: PINNACLE

## (undated) DEVICE — PLEDGET CARDIO PTFE 9.5 X 4.8 SOFT LF (6EA/PK)

## (undated) DEVICE — ADHESIVE SKIN HIGH VISCOSITY EXOFIN 1ML

## (undated) DEVICE — DRAPE ISO IRRIGATION POUCH 1016

## (undated) DEVICE — CATH DIAG 5FR IMPULSE 110CM PIG

## (undated) DEVICE — 3M™ TEGADERM™ CHG DRESSING 25/CARTON 4 CARTONS/CASE 1659: Brand: TEGADERM™

## (undated) DEVICE — CATH DIAG 5FR IMPULSE 100CM IM

## (undated) DEVICE — ANTIBACTERIAL UNDYED BRAIDED (POLYGLACTIN 910), SYNTHETIC ABSORBABLE SUTURE: Brand: COATED VICRYL

## (undated) DEVICE — PACK CUSTOM PERFUSION ANH

## (undated) DEVICE — BALLOON EUPHORA RX 2 X 15MM

## (undated) DEVICE — CATH GUIDE LAUNCHER 6FR EBU 3.5

## (undated) DEVICE — INTENDED FOR TISSUE SEPARATION, AND OTHER PROCEDURES THAT REQUIRE A SHARP SURGICAL BLADE TO PUNCTURE OR CUT.: Brand: BARD-PARKER SAFETY BLADES SIZE 15, STERILE

## (undated) DEVICE — TRAY FOLEY 16FR SURESTEP TEMP SENS URIMETER STAT LOK

## (undated) DEVICE — DGW .035 FC J3MM 260CM TEF: Brand: EMERALD

## (undated) DEVICE — Device

## (undated) DEVICE — SUT SILK 0 CT-1 30 IN 424H

## (undated) DEVICE — 3000CC GUARDIAN II: Brand: GUARDIAN

## (undated) DEVICE — SUT PROLENE 3-0 SH 36 IN 8522H

## (undated) DEVICE — SUT MONOCRYL 3-0 PS-2 27 IN Y427H

## (undated) DEVICE — LIGHT HANDLE COVER SLEEVE DISP BLUE STELLAR

## (undated) DEVICE — DGW .035 FC J3MM 150CM TEF: Brand: EMERALD

## (undated) DEVICE — SUT PROLENE 7-0 BV175-8/BV175-8 24 IN EPM8747

## (undated) DEVICE — PACK CUSTOM PERFUSION PLEG PK

## (undated) DEVICE — DISPOSABLE OR TOWEL: Brand: CARDINAL HEALTH

## (undated) DEVICE — OASIS DRAIN, SINGLE, INLINE & ATS COMPATIBLE: Brand: OASIS

## (undated) DEVICE — BONE WAX WHITE: Brand: BONE WAX WHITE

## (undated) DEVICE — THERMOFLECT BLANKET, L, 25EA                               TS THERMOFLECT BLANKET, 48" X 84", SILVER, 5/BG, 5 BG/CS NW: Brand: THERMOFLECT

## (undated) DEVICE — PENCIL ELECTROSURG E-Z CLEAN -0035H

## (undated) DEVICE — GLOVE INDICATOR PI UNDERGLOVE SZ 8 BLUE

## (undated) DEVICE — PETRI DISH STERILE

## (undated) DEVICE — SUT PDS PLUS 1 CTB 36 IN PDPB359T

## (undated) DEVICE — SYRINGE CATH TIP 50ML

## (undated) DEVICE — SUT PROLENE 7-0 BV 175-6 24 IN 8735H

## (undated) DEVICE — SCD SEQUENTIAL COMPRESSION COMFORT SLEEVE MEDIUM KNEE LENGTH: Brand: KENDALL SCD

## (undated) DEVICE — DRESSING ALLEVYN LIFE HEEL 25 X 25.2CM

## (undated) DEVICE — SUT PROLENE 4-0 BB 36 IN 8581H

## (undated) DEVICE — PACK CABG PBDS

## (undated) DEVICE — RED RUBBER URETHRAL CATHETER: Brand: DOVER

## (undated) DEVICE — RADIFOCUS OPTITORQUE ANGIOGRAPHIC CATHETER: Brand: OPTITORQUE

## (undated) DEVICE — VASOVIEW HEMOPRO 2: Brand: VASOVIEW HEMOPRO 2

## (undated) DEVICE — AIRLIFE™ TRI-FLO™ SUCTION CATHETER: Brand: AIRLIFE™

## (undated) DEVICE — SUT PROLENE 6-0 C-1/C-1 30 IN 8307H

## (undated) DEVICE — AORTIC PUNCH 5.2 MM DISP

## (undated) DEVICE — BLANKET HYPOTHERMIA ADULT GAYMAR

## (undated) DEVICE — SILVER-COATED ANTIBACTERIAL BARRIER DRESSING: Brand: ACTICOAT SURGIC 10X25CM 5PK US

## (undated) DEVICE — CAROTID ARTERY SHUNT KIT,RADIOPAQUE LINE, STRAIGHT: Brand: ARGYLE

## (undated) DEVICE — ALCON OPHTHALMIC KNIFE 15 °: Brand: ALCON

## (undated) DEVICE — DRAPE EQUIPMENT RF WAND

## (undated) DEVICE — PUMP TUBING FUSION PACK

## (undated) DEVICE — FILTER SMOKE EVAC VIROSAFE

## (undated) DEVICE — ULTRASOUND GEL STERILE FOIL PK

## (undated) DEVICE — CATH DIAG 5FR IMPULSE 125CM FR4

## (undated) DEVICE — SUT PROLENE 6-0 BV130 30 IN 8709H